# Patient Record
Sex: FEMALE | Race: WHITE | NOT HISPANIC OR LATINO | URBAN - METROPOLITAN AREA
[De-identification: names, ages, dates, MRNs, and addresses within clinical notes are randomized per-mention and may not be internally consistent; named-entity substitution may affect disease eponyms.]

---

## 2023-07-25 ENCOUNTER — INPATIENT (INPATIENT)
Facility: HOSPITAL | Age: 63
LOS: 12 days | Discharge: HOME HEALTH | DRG: 623 | End: 2023-08-07
Attending: INTERNAL MEDICINE | Admitting: INTERNAL MEDICINE
Payer: MEDICARE

## 2023-07-25 VITALS
SYSTOLIC BLOOD PRESSURE: 137 MMHG | HEART RATE: 61 BPM | RESPIRATION RATE: 18 BRPM | OXYGEN SATURATION: 95 % | TEMPERATURE: 98 F | WEIGHT: 199.08 LBS | DIASTOLIC BLOOD PRESSURE: 68 MMHG | HEIGHT: 63 IN

## 2023-07-25 DIAGNOSIS — E11.621 TYPE 2 DIABETES MELLITUS WITH FOOT ULCER: ICD-10-CM

## 2023-07-25 LAB
ALBUMIN SERPL ELPH-MCNC: 3.6 G/DL — SIGNIFICANT CHANGE UP (ref 3.3–5)
ALP SERPL-CCNC: 69 U/L — SIGNIFICANT CHANGE UP (ref 40–120)
ALT FLD-CCNC: 11 U/L — SIGNIFICANT CHANGE UP (ref 10–45)
ANION GAP SERPL CALC-SCNC: 11 MMOL/L — SIGNIFICANT CHANGE UP (ref 5–17)
APTT BLD: 28.6 SEC — SIGNIFICANT CHANGE UP (ref 24.5–35.6)
AST SERPL-CCNC: 19 U/L — SIGNIFICANT CHANGE UP (ref 10–40)
BASOPHILS # BLD AUTO: 0.06 K/UL — SIGNIFICANT CHANGE UP (ref 0–0.2)
BASOPHILS NFR BLD AUTO: 0.8 % — SIGNIFICANT CHANGE UP (ref 0–2)
BILIRUB SERPL-MCNC: 0.4 MG/DL — SIGNIFICANT CHANGE UP (ref 0.2–1.2)
BLD GP AB SCN SERPL QL: NEGATIVE — SIGNIFICANT CHANGE UP
BUN SERPL-MCNC: 22 MG/DL — SIGNIFICANT CHANGE UP (ref 7–23)
CALCIUM SERPL-MCNC: 9.5 MG/DL — SIGNIFICANT CHANGE UP (ref 8.4–10.5)
CHLORIDE SERPL-SCNC: 103 MMOL/L — SIGNIFICANT CHANGE UP (ref 96–108)
CO2 SERPL-SCNC: 26 MMOL/L — SIGNIFICANT CHANGE UP (ref 22–31)
CREAT SERPL-MCNC: 1.1 MG/DL — SIGNIFICANT CHANGE UP (ref 0.5–1.3)
CRP SERPL-MCNC: 41 MG/L — HIGH (ref 0–4)
EGFR: 56 ML/MIN/1.73M2 — LOW
EOSINOPHIL # BLD AUTO: 0.2 K/UL — SIGNIFICANT CHANGE UP (ref 0–0.5)
EOSINOPHIL NFR BLD AUTO: 2.8 % — SIGNIFICANT CHANGE UP (ref 0–6)
GLUCOSE BLDC GLUCOMTR-MCNC: 252 MG/DL — HIGH (ref 70–99)
GLUCOSE SERPL-MCNC: 201 MG/DL — HIGH (ref 70–99)
HCT VFR BLD CALC: 35.1 % — SIGNIFICANT CHANGE UP (ref 34.5–45)
HGB BLD-MCNC: 11.3 G/DL — LOW (ref 11.5–15.5)
IMM GRANULOCYTES NFR BLD AUTO: 0.4 % — SIGNIFICANT CHANGE UP (ref 0–0.9)
INR BLD: 1 RATIO — SIGNIFICANT CHANGE UP (ref 0.85–1.18)
LYMPHOCYTES # BLD AUTO: 2.17 K/UL — SIGNIFICANT CHANGE UP (ref 1–3.3)
LYMPHOCYTES # BLD AUTO: 29.9 % — SIGNIFICANT CHANGE UP (ref 13–44)
MCHC RBC-ENTMCNC: 27.5 PG — SIGNIFICANT CHANGE UP (ref 27–34)
MCHC RBC-ENTMCNC: 32.2 GM/DL — SIGNIFICANT CHANGE UP (ref 32–36)
MCV RBC AUTO: 85.4 FL — SIGNIFICANT CHANGE UP (ref 80–100)
MONOCYTES # BLD AUTO: 0.51 K/UL — SIGNIFICANT CHANGE UP (ref 0–0.9)
MONOCYTES NFR BLD AUTO: 7 % — SIGNIFICANT CHANGE UP (ref 2–14)
NEUTROPHILS # BLD AUTO: 4.28 K/UL — SIGNIFICANT CHANGE UP (ref 1.8–7.4)
NEUTROPHILS NFR BLD AUTO: 59.1 % — SIGNIFICANT CHANGE UP (ref 43–77)
NRBC # BLD: 0 /100 WBCS — SIGNIFICANT CHANGE UP (ref 0–0)
PLATELET # BLD AUTO: 275 K/UL — SIGNIFICANT CHANGE UP (ref 150–400)
POTASSIUM SERPL-MCNC: 4.8 MMOL/L — SIGNIFICANT CHANGE UP (ref 3.5–5.3)
POTASSIUM SERPL-SCNC: 4.8 MMOL/L — SIGNIFICANT CHANGE UP (ref 3.5–5.3)
PROT SERPL-MCNC: 8.3 G/DL — SIGNIFICANT CHANGE UP (ref 6–8.3)
PROTHROM AB SERPL-ACNC: 11.6 SEC — SIGNIFICANT CHANGE UP (ref 9.5–13)
RBC # BLD: 4.11 M/UL — SIGNIFICANT CHANGE UP (ref 3.8–5.2)
RBC # FLD: 14.4 % — SIGNIFICANT CHANGE UP (ref 10.3–14.5)
RH IG SCN BLD-IMP: POSITIVE — SIGNIFICANT CHANGE UP
SODIUM SERPL-SCNC: 140 MMOL/L — SIGNIFICANT CHANGE UP (ref 135–145)
WBC # BLD: 7.25 K/UL — SIGNIFICANT CHANGE UP (ref 3.8–10.5)
WBC # FLD AUTO: 7.25 K/UL — SIGNIFICANT CHANGE UP (ref 3.8–10.5)

## 2023-07-25 PROCEDURE — 99285 EMERGENCY DEPT VISIT HI MDM: CPT | Mod: FS,GC

## 2023-07-25 PROCEDURE — 73630 X-RAY EXAM OF FOOT: CPT | Mod: 26,RT

## 2023-07-25 RX ORDER — ATORVASTATIN CALCIUM 80 MG/1
40 TABLET, FILM COATED ORAL AT BEDTIME
Refills: 0 | Status: DISCONTINUED | OUTPATIENT
Start: 2023-07-25 | End: 2023-07-27

## 2023-07-25 RX ORDER — TAMSULOSIN HYDROCHLORIDE 0.4 MG/1
0.4 CAPSULE ORAL AT BEDTIME
Refills: 0 | Status: DISCONTINUED | OUTPATIENT
Start: 2023-07-25 | End: 2023-07-27

## 2023-07-25 RX ORDER — INSULIN GLARGINE 100 [IU]/ML
20 INJECTION, SOLUTION SUBCUTANEOUS AT BEDTIME
Refills: 0 | Status: DISCONTINUED | OUTPATIENT
Start: 2023-07-25 | End: 2023-07-26

## 2023-07-25 RX ORDER — SODIUM CHLORIDE 9 MG/ML
1000 INJECTION, SOLUTION INTRAVENOUS
Refills: 0 | Status: DISCONTINUED | OUTPATIENT
Start: 2023-07-25 | End: 2023-07-27

## 2023-07-25 RX ORDER — PIPERACILLIN AND TAZOBACTAM 4; .5 G/20ML; G/20ML
3.38 INJECTION, POWDER, LYOPHILIZED, FOR SOLUTION INTRAVENOUS ONCE
Refills: 0 | Status: COMPLETED | OUTPATIENT
Start: 2023-07-25 | End: 2023-07-25

## 2023-07-25 RX ORDER — INSULIN LISPRO 100/ML
VIAL (ML) SUBCUTANEOUS
Refills: 0 | Status: DISCONTINUED | OUTPATIENT
Start: 2023-07-25 | End: 2023-07-26

## 2023-07-25 RX ORDER — VANCOMYCIN HCL 1 G
1000 VIAL (EA) INTRAVENOUS ONCE
Refills: 0 | Status: COMPLETED | OUTPATIENT
Start: 2023-07-25 | End: 2023-07-25

## 2023-07-25 RX ORDER — DEXTROSE 50 % IN WATER 50 %
12.5 SYRINGE (ML) INTRAVENOUS ONCE
Refills: 0 | Status: DISCONTINUED | OUTPATIENT
Start: 2023-07-25 | End: 2023-07-27

## 2023-07-25 RX ORDER — GLUCAGON INJECTION, SOLUTION 0.5 MG/.1ML
1 INJECTION, SOLUTION SUBCUTANEOUS ONCE
Refills: 0 | Status: DISCONTINUED | OUTPATIENT
Start: 2023-07-25 | End: 2023-07-27

## 2023-07-25 RX ORDER — DEXTROSE 50 % IN WATER 50 %
25 SYRINGE (ML) INTRAVENOUS ONCE
Refills: 0 | Status: DISCONTINUED | OUTPATIENT
Start: 2023-07-25 | End: 2023-07-27

## 2023-07-25 RX ORDER — FUROSEMIDE 40 MG
20 TABLET ORAL DAILY
Refills: 0 | Status: DISCONTINUED | OUTPATIENT
Start: 2023-07-25 | End: 2023-07-27

## 2023-07-25 RX ORDER — DEXTROSE 50 % IN WATER 50 %
15 SYRINGE (ML) INTRAVENOUS ONCE
Refills: 0 | Status: DISCONTINUED | OUTPATIENT
Start: 2023-07-25 | End: 2023-07-27

## 2023-07-25 RX ORDER — VANCOMYCIN HCL 1 G
1000 VIAL (EA) INTRAVENOUS EVERY 12 HOURS
Refills: 0 | Status: DISCONTINUED | OUTPATIENT
Start: 2023-07-25 | End: 2023-07-26

## 2023-07-25 RX ORDER — PIPERACILLIN AND TAZOBACTAM 4; .5 G/20ML; G/20ML
3.38 INJECTION, POWDER, LYOPHILIZED, FOR SOLUTION INTRAVENOUS EVERY 8 HOURS
Refills: 0 | Status: DISCONTINUED | OUTPATIENT
Start: 2023-07-25 | End: 2023-07-27

## 2023-07-25 RX ORDER — HYDRALAZINE HCL 50 MG
50 TABLET ORAL
Refills: 0 | Status: DISCONTINUED | OUTPATIENT
Start: 2023-07-25 | End: 2023-07-27

## 2023-07-25 RX ORDER — METOPROLOL TARTRATE 50 MG
50 TABLET ORAL DAILY
Refills: 0 | Status: DISCONTINUED | OUTPATIENT
Start: 2023-07-25 | End: 2023-07-27

## 2023-07-25 RX ORDER — HEPARIN SODIUM 5000 [USP'U]/ML
5000 INJECTION INTRAVENOUS; SUBCUTANEOUS EVERY 8 HOURS
Refills: 0 | Status: DISCONTINUED | OUTPATIENT
Start: 2023-07-25 | End: 2023-07-27

## 2023-07-25 RX ORDER — LISINOPRIL 2.5 MG/1
20 TABLET ORAL DAILY
Refills: 0 | Status: DISCONTINUED | OUTPATIENT
Start: 2023-07-25 | End: 2023-07-27

## 2023-07-25 RX ADMIN — PIPERACILLIN AND TAZOBACTAM 25 GRAM(S): 4; .5 INJECTION, POWDER, LYOPHILIZED, FOR SOLUTION INTRAVENOUS at 23:19

## 2023-07-25 RX ADMIN — INSULIN GLARGINE 20 UNIT(S): 100 INJECTION, SOLUTION SUBCUTANEOUS at 23:03

## 2023-07-25 RX ADMIN — Medication 250 MILLIGRAM(S): at 20:09

## 2023-07-25 RX ADMIN — Medication 6: at 23:03

## 2023-07-25 RX ADMIN — PIPERACILLIN AND TAZOBACTAM 200 GRAM(S): 4; .5 INJECTION, POWDER, LYOPHILIZED, FOR SOLUTION INTRAVENOUS at 18:20

## 2023-07-25 NOTE — PATIENT PROFILE ADULT - FALL HARM RISK - RISK INTERVENTIONS
Assistance OOB with selected safe patient handling equipment/Assistance with ambulation/Communicate Fall Risk and Risk Factors to all staff, patient, and family/Discuss with provider need for PT consult/Monitor gait and stability/Provide patient with walking aids - walker, cane, crutches/Reinforce activity limits and safety measures with patient and family/Visual Cue: Yellow wristband/Bed in lowest position, wheels locked, appropriate side rails in place/Call bell, personal items and telephone in reach/Instruct patient to call for assistance before getting out of bed or chair/Non-slip footwear when patient is out of bed/[place] to call system/Physically safe environment - no spills, clutter or unnecessary equipment/Purposeful Proactive Rounding/Room/bathroom lighting operational, light cord in reach Assistance OOB with selected safe patient handling equipment/Assistance with ambulation/Communicate Fall Risk and Risk Factors to all staff, patient, and family/Discuss with provider need for PT consult/Monitor gait and stability/Provide patient with walking aids - walker, cane, crutches/Reinforce activity limits and safety measures with patient and family/Visual Cue: Yellow wristband/Bed in lowest position, wheels locked, appropriate side rails in place/Call bell, personal items and telephone in reach/Instruct patient to call for assistance before getting out of bed or chair/Non-slip footwear when patient is out of bed/[place] to call system/Physically safe environment - no spills, clutter or unnecessary equipment/Purposeful Proactive Rounding/Room/bathroom lighting operational, light cord in reach Assistance OOB with selected safe patient handling equipment/Assistance with ambulation/Communicate Fall Risk and Risk Factors to all staff, patient, and family/Discuss with provider need for PT consult/Monitor gait and stability/Provide patient with walking aids - walker, cane, crutches/Reinforce activity limits and safety measures with patient and family/Visual Cue: Yellow wristband/Bed in lowest position, wheels locked, appropriate side rails in place/Call bell, personal items and telephone in reach/Instruct patient to call for assistance before getting out of bed or chair/Non-slip footwear when patient is out of bed/[place] to call system/Physically safe environment - no spills, clutter or unnecessary equipment/Purposeful Proactive Rounding/Room/bathroom lighting operational, light cord in reach

## 2023-07-25 NOTE — ED PROVIDER NOTE - ATTENDING CONTRIBUTION TO CARE
I, Carmelo Mcgrath, performed a history and physical exam of the patient and discussed their management with the resident and/or advanced care provider. I reviewed the resident and/or advanced care provider's note and agree with the documented findings and plan of care. I was present and available for all procedures.    64 y/o M PMHx of DMH2 on insulin, HTN, HLD p/w right heel ulcer. Patient is sent by her podiatrist for questionable right foot procedure.  She is unsure how long she has had this ulcer for.  States that she was recently hospitalized in the near [facility] for an evaluation of the right foot infection.  States she still had a 102 fever yesterday which resolved.  Denies any fevers today, chills, chest pain, shortness of breath, abdominal pain, nausea vomiting diarrhea.  States she ambulates with a walker at baseline.  Accompanied by her sister-in-law    Podiatrist: Dr. Ronald Flores    Well appearing and in NAD, head normal appearing atraumatic, trachea midline, no respiratory distress, lungs cta bilaterally, rrr no murmurs, soft NT ND abdomen, Right lower extremity foot ulcer otherwise no signs of necrotizing fasciitis no proximal cellulitis on tibial region, otherwise no visible extremity deformities, Alert and oriented, non focal neuro exam, skin warm and dry, normal affect and mood, no leg swelling, calf ttp or jvd    Concerning for right lower extremity wound infection acute on chronic otherwise fever which is since resolved needs podiatry evaluation x-ray screening blood work IV antibiotics admission for further management possible operative repair discussed with patient agreeable plan unlikely necrotizing fasciitis I, Carmelo Mcgrath, performed a history and physical exam of the patient and discussed their management with the resident and/or advanced care provider. I reviewed the resident and/or advanced care provider's note and agree with the documented findings and plan of care. I was present and available for all procedures.    64 y/o M PMHx of DMH2 on insulin, HTN, HLD p/w right heel ulcer. Patient is sent by her podiatrist for questionable right foot procedure.  She is unsure how long she has had this ulcer for.  States that she was recently hospitalized in the near [facility] for an evaluation of the right foot infection.  States she still had a 102 fever yesterday which resolved.  Denies any fevers today, chills, chest pain, shortness of breath, abdominal pain, nausea vomiting diarrhea.  States she ambulates with a walker at baseline.  Accompanied by her sister-in-law    Podiatrist: Dr. Ronald Flores    Well appearing and in NAD, head normal appearing atraumatic, trachea midline, no respiratory distress, lungs cta bilaterally, rrr no murmurs, soft NT ND abdomen, Right lower extremity foot ulcer otherwise no signs of necrotizing fasciitis no proximal cellulitis on tibial region, otherwise no visible extremity deformities, Alert and oriented, non focal neuro exam, skin warm and dry, normal affect and mood, no leg swelling, calf ttp or jvd    Concerning for right lower extremity wound infection acute on chronic otherwise fever which is since resolved needs podiatry evaluation x-ray screening blood work IV antibiotics admission for further management possible operative repair discussed with patient agreeable plan unlikely necrotizing fasciitis I, Carmelo Mcgrath, performed a history and physical exam of the patient and discussed their management with the resident and/or advanced care provider. I reviewed the resident and/or advanced care provider's note and agree with the documented findings and plan of care. I was present and available for all procedures.    62 y/o M PMHx of DMH2 on insulin, HTN, HLD p/w right heel ulcer. Patient is sent by her podiatrist for questionable right foot procedure.  She is unsure how long she has had this ulcer for.  States that she was recently hospitalized in the near [facility] for an evaluation of the right foot infection.  States she still had a 102 fever yesterday which resolved.  Denies any fevers today, chills, chest pain, shortness of breath, abdominal pain, nausea vomiting diarrhea.  States she ambulates with a walker at baseline.  Accompanied by her sister-in-law    Podiatrist: Dr. Ronald Flores    Well appearing and in NAD, head normal appearing atraumatic, trachea midline, no respiratory distress, lungs cta bilaterally, rrr no murmurs, soft NT ND abdomen, Right lower extremity foot ulcer otherwise no signs of necrotizing fasciitis no proximal cellulitis on tibial region, otherwise no visible extremity deformities, Alert and oriented, non focal neuro exam, skin warm and dry, normal affect and mood, no leg swelling, calf ttp or jvd    Concerning for right lower extremity wound infection acute on chronic otherwise fever which is since resolved needs podiatry evaluation x-ray screening blood work IV antibiotics admission for further management possible operative repair discussed with patient agreeable plan unlikely necrotizing fasciitis

## 2023-07-25 NOTE — ED PROVIDER NOTE - OBJECTIVE STATEMENT
62 y/o M PMHx of DMH2 on insulin, HTN, HLD p/w right heel ulcer.      Podiatrist: Dr. Ronald Flores 64 y/o M PMHx of DMH2 on insulin, HTN, HLD p/w right heel ulcer.      Podiatrist: Dr. Ronald Flores 62 y/o M PMHx of DMH2 on insulin, HTN, HLD p/w right heel ulcer. Patient is sent by her podiatrist for questionable right foot procedure.  She is unsure how long she has had this ulcer for.  States that she was recently hospitalized in the near [facility] for an evaluation of the right foot infection.  States she still had a 102 fever yesterday which resolved.  Denies any fevers today, chills, chest pain, shortness of breath, abdominal pain, nausea vomiting diarrhea.  States she ambulates with a walker at baseline.  Accompanied by her sister-in-law    Podiatrist: Dr. Ronald Flores 62 y/o M PMHx of DMH2 on insulin, HTN, HLD p/w right heel ulcer. Patient is sent by her podiatrist for questionable right foot procedure.  She is unsure how long she has had this ulcer for.  States that she was recently hospitalized in the near [facility] for an evaluation of the right foot infection.  States she still had a 102 fever yesterday which resolved.  Denies any fevers today, chills, chest pain, shortness of breath, abdominal pain, nausea vomiting diarrhea.  States she ambulates with a walker at baseline.  Accompanied by her sister-in-law    Podiatrist: Dr. Ronald Flores 62 y/o M PMHx of DMH2 on insulin, HTN, HLD p/w right heel ulcer. Patient is sent by her podiatrist for questionable right foot procedure.  She is unsure how long she has had this ulcer for.  States that she was recently hospitalized in the near [facility] for an evaluation of the right foot infection.  States she still had a 102 fever yesterday which resolved.  Denies any fevers today, chills, chest pain, shortness of breath, abdominal pain, nausea vomiting diarrhea.  States she ambulates with a walker at baseline.  Accompanied by her sister-in-law    Podiatrist: Dr. Ronald Flores

## 2023-07-25 NOTE — CONSULT NOTE ADULT - ASSESSMENT
63 F w bilateral foot wounds  - Patient seen and evaluated  - Patient is afebrile, WBC 7.25, ESR (p), CRP 4.1  - Left foot posterior heel wound to subq no drainage no pus no surrounding erythema. right heel full thickness eschar will mild bogginess noted plantar medially, mild malodor, no purulent drainage, no surrounding erythema.   - Right foot xray: no OM, no gas prelim  - Right foot wound culture  - Requested admission to medicine  - IV abx Vanco/Zosyn  - no need for vascular consult: patient has had bilateral vascular procedure outpatient  - ordered Right ankle MRI  - Pod plan right foot wound debridement with integra graft application pending MRI  - Please document medical clearance for podiatry procedure under anesthesia  - Discussed with attending    63 F w bilateral foot wounds  - Patient seen and evaluated  - Patient is afebrile, WBC 7.25, ESR (p), CRP 4.1  - Left foot posterior heel wound to subq no drainage no pus no surrounding erythema. right heel full thickness eschar with mild bogginess noted plantar medially, mild malodor, no purulent drainage, no surrounding erythema.   - Right foot xray: no OM, no gas prelim  - Right foot wound cultured  - Requested admission to medicine  - IV abx Vanco/Zosyn  - no need for vascular consult: patient has had bilateral vascular procedure outpatient  - ordered Right ankle MRI  - Pod plan right foot wound debridement with integra graft application pending MRI  - Please document medical clearance for podiatry procedure under anesthesia  - Discussed with attending

## 2023-07-25 NOTE — ED ADULT NURSE NOTE - OBJECTIVE STATEMENT
64 yo presents to the ED from home. A&Ox4, ambulatory with assistance c/o R heel ulcer. history of DMH2 on insulin, HTN, HLD. Patient is sent by her podiatrist for questionable right foot procedure. She is unsure how long she has had this ulcer for. States that she was recently hospitalized in the near [facility] for an evaluation of the right foot infection. States she still had a 102 fever yesterday which resolved. Denies any fevers today, chills, chest pain, shortness of breath, abdominal pain, nausea vomiting diarrhea. States she ambulates with a walker at baseline. Accompanied by her sister-in-law. 20G RAC. Patient undressed and placed into gown, call bell in hand and side rails up for safety. warm blanket provided, vital signs stable, pt in no acute distress. 62 yo presents to the ED from home. A&Ox4, ambulatory with assistance c/o R heel ulcer. history of DMH2 on insulin, HTN, HLD. Patient is sent by her podiatrist for questionable right foot procedure. She is unsure how long she has had this ulcer for. States that she was recently hospitalized in the near [facility] for an evaluation of the right foot infection. States she still had a 102 fever yesterday which resolved. Denies any fevers today, chills, chest pain, shortness of breath, abdominal pain, nausea vomiting diarrhea. States she ambulates with a walker at baseline. Accompanied by her sister-in-law. 20G RAC. Patient undressed and placed into gown, call bell in hand and side rails up for safety. warm blanket provided, vital signs stable, pt in no acute distress. 62 yo presents to the ED from home. A&Ox4, ambulatory with assistance c/o R heel ulcer. history of DMH2 on insulin, HTN, HLD. Patient is sent by her podiatrist for questionable right foot procedure. She is unsure how long she has had this ulcer for. States that she was recently hospitalized in the near [facility] for an evaluation of the right foot infection. States she still had a 102 fever yesterday which resolved. Denies any fevers today, chills, chest pain, shortness of breath, abdominal pain, nausea vomiting diarrhea. States she ambulates with a walker at baseline. Accompanied by her sister-in-law. 20G RAC. Patient undressed and placed into gown, call bell in hand and side rails up for safety. warm blanket provided, vital signs stable, pt in no acute distress.

## 2023-07-25 NOTE — ED PROVIDER NOTE - PHYSICAL EXAMINATION
Srinivasa Bassett DO (PGY2)   Physical Exam:    Gen: NAD, AOx3  Head: NCAT  HEENT: EOMI, PEERLA  Lung: CTAB, no respiratory distress, no wheezes/rhonchi/rales B/L  CV: RRR, no murmurs, rubs or gallops  Abd: soft, NT, ND, no guarding, no rigidity, no rebound tenderness, no CVA tenderness   MSK: Right foot ulcer localized to the heal, minimal serosangineous drainage. Chronic lymphedema with venous stasis to bilateral lower extremities. Diminished distal pulses to RLE. No visible deformities, ROM normal in UE/LE, no back pain  Neuro: No focal sensory or motor deficits

## 2023-07-25 NOTE — ED PROVIDER NOTE - NS ED MD DISPO DIVISION
[facility]

## 2023-07-25 NOTE — H&P ADULT - HISTORY OF PRESENT ILLNESS
64 y/o F PMHx of DMH2 on insulin, HTN, HLD p/w right heel ulcer. Patient is sent by her podiatrist for questionable right foot procedure.  She is unsure how long she has had this ulcer for.  States that she was recently hospitalized in the near [facility] for an evaluation of the right foot infection.  States she still had a 102 fever yesterday which resolved.  Denies any fevers today, chills, chest pain, shortness of breath, abdominal pain, nausea vomiting diarrhea.  States she ambulates with a walker at baseline.  Accompanied by her sister-in-law  64 y/o F PMHx of DMH2 on insulin, HTN, HLD p/w right heel ulcer. Patient is sent by her podiatrist for questionable right foot procedure.  She is unsure how long she has had this ulcer for.  States that she was recently hospitalized in the near [facility] for an evaluation of the right foot infection.  States she still had a 102 fever yesterday which resolved.  Denies any fevers today, chills, chest pain, shortness of breath, abdominal pain, nausea vomiting diarrhea.  States she ambulates with a walker at baseline.  Accompanied by her sister-in-law  62 y/o F PMHx of DMH2 on insulin, HTN, HLD p/w right heel ulcer. Patient is sent by her podiatrist for questionable right foot procedure.  She is unsure how long she has had this ulcer for.  States that she was recently hospitalized in the near [facility] for an evaluation of the right foot infection.  States she still had a 102 fever yesterday which resolved.  Denies any fevers today, chills, chest pain, shortness of breath, abdominal pain, nausea vomiting diarrhea.  States she ambulates with a walker at baseline.  Accompanied by her sister-in-law

## 2023-07-25 NOTE — ED PROVIDER NOTE - CLINICAL SUMMARY MEDICAL DECISION MAKING FREE TEXT BOX
62 y/o M PMHx of DMH2 on insulin, HTN, HLD p/w right heel ulcer. Patient is sent by her podiatrist for questionable right foot procedure.  She is unsure how long she has had this ulcer for.  States that she was recently hospitalized in the near [facility] for an evaluation of the right foot infection.  States she still had a 102 fever yesterday which resolved.  Vital signs stable, afebrile, not hypoxic. Plan for basic labs, esr, crp, xray of foot, podiatry consult. Likely abx and admission for procedure. 64 y/o M PMHx of DMH2 on insulin, HTN, HLD p/w right heel ulcer. Patient is sent by her podiatrist for questionable right foot procedure.  She is unsure how long she has had this ulcer for.  States that she was recently hospitalized in the near [facility] for an evaluation of the right foot infection.  States she still had a 102 fever yesterday which resolved.  Vital signs stable, afebrile, not hypoxic. Plan for basic labs, esr, crp, xray of foot, podiatry consult. Likely abx and admission for procedure. 64 y/o M PMHx of DMH2 on insulin, HTN, HLD p/w right heel ulcer. Patient is sent by her podiatrist for questionable right foot procedure.  She is unsure how long she has had this ulcer for.  States that she was recently hospitalized in the near [facility] for an evaluation of the right foot infection.  States she still had a 102 fever yesterday which resolved.  Vital signs stable, afebrile, not hypoxic. Plan for basic labs, esr, crp, xray of foot, podiatry consult. Likely abx and admission for procedure. 64 y/o M PMHx of DMH2 on insulin, HTN, HLD p/w right heel ulcer. Patient is sent by her podiatrist for questionable right foot procedure.  She is unsure how long she has had this ulcer for.  States that she was recently hospitalized in the near [facility] for an evaluation of the right foot infection.  States she still had a 102 fever yesterday which resolved.  Vital signs stable, afebrile, not hypoxic. Plan for basic labs, esr, crp, xray of foot, podiatry consult. Likely abx and admission for procedure.    pettet attending- see attending attestation for my mdm 64 y/o M PMHx of DMH2 on insulin, HTN, HLD p/w right heel ulcer. Patient is sent by her podiatrist for questionable right foot procedure.  She is unsure how long she has had this ulcer for.  States that she was recently hospitalized in the near [facility] for an evaluation of the right foot infection.  States she still had a 102 fever yesterday which resolved.  Vital signs stable, afebrile, not hypoxic. Plan for basic labs, esr, crp, xray of foot, podiatry consult. Likely abx and admission for procedure.    pettet attending- see attending attestation for my mdm 62 y/o M PMHx of DMH2 on insulin, HTN, HLD p/w right heel ulcer. Patient is sent by her podiatrist for questionable right foot procedure.  She is unsure how long she has had this ulcer for.  States that she was recently hospitalized in the near [facility] for an evaluation of the right foot infection.  States she still had a 102 fever yesterday which resolved.  Vital signs stable, afebrile, not hypoxic. Plan for basic labs, esr, crp, xray of foot, podiatry consult. Likely abx and admission for procedure.    pettet attending- see attending attestation for my mdm

## 2023-07-25 NOTE — ED PROVIDER NOTE - RAPID ASSESSMENT
64yo F with PMH of DMT2 on insulin, HTN, HLD, presenting with R heel ulcer, sent in by her podiatrist for "R foot procedure." Unsure how long she has had ulcer. Reports recent hospitalization at [facility] for abx for R foot infection. + Fever to 102.5F yesterday.   Podiatrist: Dr. Ronald Flores    Patient sitting comfortably in NAD. Ambulatory with walker. Nonlabored breathing.     JEIMY Ghotra: Patient seen by myself in triage area for rapid assessment only. Full H&P and complete work-up to be performed in main emergency department by ED providers. 64yo F with PMH of DMT2 on insulin, HTN, HLD, presenting with R heel ulcer, sent in by her podiatrist for "R foot procedure." Unsure how long she has had ulcer. Reports recent hospitalization at [facility] for abx for R foot infection. + Fever to 102.5F yesterday.   Podiatrist: Dr. Ronald lFores    Patient sitting comfortably in NAD. Ambulatory with walker. Nonlabored breathing.     JEIMY Ghotra: Patient seen by myself in triage area for rapid assessment only. Full H&P and complete work-up to be performed in main emergency department by ED providers. 64yo F with PMH of DMT2 on insulin, HTN, HLD, presenting with R heel ulcer, sent in by her podiatrist for "R foot procedure." Unsure how long she has had ulcer. Reports recent hospitalization at [facility] for abx for R foot infection. + Fever to 102.5F yesterday.   Podiatrist: Dr. Ronald Flores    Patient sitting comfortably in NAD. Ambulatory with walker. Nonlabored breathing.     JEIMY Ghotra: Patient seen by myself in triage area for rapid assessment only. Full H&P and complete work-up to be performed in main emergency department by ED providers. 62yo F with PMH of DMT2 on insulin, HTN, HLD, presenting with R heel ulcer, sent in by her podiatrist for "R foot procedure." Unsure how long she has had ulcer. Reports recent hospitalization at [facility] for abx for R foot infection. + Fever to 102.5F yesterday.   Podiatrist: Dr. Ronald Flores    Patient sitting comfortably in NAD. Ambulatory with walker in waiting room. Nonlabored breathing.     JEIMY Ghotra: Patient seen by myself in triage area for rapid assessment only. Full H&P and complete work-up to be performed in main emergency department by ED providers. 62yo F with PMH of DMT2 on insulin, HTN, HLD, presenting with R heel ulcer, sent in by her podiatrist for "R foot procedure." Unsure how long she has had ulcer. Reports recent hospitalization at [facility] for abx for R foot infection. + Fever to 102.5F yesterday.   Podiatrist: Dr. Ronald Flores    Patient sitting comfortably in NAD. Ambulatory with walker in waiting room. Nonlabored breathing.     JEIMY Ghotra: Patient seen by myself in triage area for rapid assessment only. Full H&P and complete work-up to be performed in main emergency department by ED providers. 64yo F with PMH of DMT2 on insulin, HTN, HLD, presenting with R heel ulcer, sent in by her podiatrist for "R foot procedure." Unsure how long she has had ulcer. Reports recent hospitalization at [facility] for abx for R foot infection. + Fever to 102.5F yesterday.   Podiatrist: Dr. Ronald Flores    Patient sitting comfortably in NAD. Ambulatory with walker in waiting room. Nonlabored breathing.     JEIMY Ghotra: Patient seen by myself in triage area for rapid assessment only. Full H&P and complete work-up to be performed in main emergency department by ED providers.

## 2023-07-25 NOTE — H&P ADULT - NSHPPHYSICALEXAM_GEN_ALL_CORE
Vital Signs Last 24 Hrs  T(C): 36.4 (25 Jul 2023 20:37), Max: 36.9 (25 Jul 2023 13:30)  T(F): 97.6 (25 Jul 2023 20:37), Max: 98.5 (25 Jul 2023 13:30)  HR: 59 (25 Jul 2023 20:37) (59 - 89)  BP: 148/74 (25 Jul 2023 20:37) (130/74 - 167/79)  BP(mean): 108 (25 Jul 2023 19:39) (108 - 108)  RR: 18 (25 Jul 2023 20:37) (18 - 18)  SpO2: 98% (25 Jul 2023 20:37) (95% - 98%)    Parameters below as of 25 Jul 2023 20:37  Patient On (Oxygen Delivery Method): room air      PHYSICAL EXAM:  GENERAL: NAD, well-developed  HEAD:  Atraumatic, Normocephalic  EYES: EOMI, PERRLA, conjunctiva and sclera clear  NECK: Supple, No JVD  CHEST/LUNG: Clear to auscultation bilaterally; No wheeze  HEART: Regular rate and rhythm; No murmurs, rubs, or gallops  ABDOMEN: Soft, Nontender, Nondistended; Bowel sounds present  EXTREMITIES:  2+ Peripheral Pulses, No clubbing, cyanosis, or edema. b/l foot dressing in place   PSYCH: AAOx3  NEUROLOGY: non-focal  SKIN: No rashes or lesions

## 2023-07-25 NOTE — H&P ADULT - ASSESSMENT
63 female h/o dm, htn, chol, here with b/l foot wounds    b/l foot wounds  pod eval noted  id consult  iv abx  f/u cult  wound care  f/u mri r/o osteo    dm  insulin as ordered  monitor fs    chol  cont statin    htn  cont home meds    dvt ppx      Advanced care planning was discussed with patient and family.  Advanced care planning forms were reviewed and discussed as appropriate.  Differential diagnosis and plan of care discussed with patient after the evaluation.   Pain assessed and judicious use of narcotics when appropriate was discussed.  Importance of Fall prevention discussed.  Counseling on Smoking and Alcohol cessation was offered when appropriate.  Counseling on Diet, exercise, and medication compliance was done.       Approx 60 minutes spent.

## 2023-07-25 NOTE — CONSULT NOTE ADULT - SUBJECTIVE AND OBJECTIVE BOX
Patient is a 63y old  Female who presents with a chief complaint of bilateral non healing wounds    HPI:  62 y/o M PMHx of DMH2 on insulin, HTN, HLD p/w right heel ulcer. Patient is sent by her podiatrist for questionable right foot procedure.  She is unsure how long she has had this ulcer for.  States that she was recently hospitalized in the near [facility] for an evaluation of the right foot infection.  States she still had a 102 fever yesterday which resolved.  Denies any fevers today, chills, chest pain, shortness of breath, abdominal pain, nausea vomiting diarrhea.  States she ambulates with a walker at baseline.  Accompanied by her sister-in-law      PAST MEDICAL & SURGICAL HISTORY:      MEDICATIONS  (STANDING):  vancomycin  IVPB. 1000 milliGRAM(s) IV Intermittent once    MEDICATIONS  (PRN):      Allergies    Allergy Status Unknown    Intolerances        VITALS:    Vital Signs Last 24 Hrs  T(C): 36.8 (25 Jul 2023 15:41), Max: 36.9 (25 Jul 2023 13:30)  T(F): 98.3 (25 Jul 2023 15:41), Max: 98.5 (25 Jul 2023 13:30)  HR: 89 (25 Jul 2023 15:41) (61 - 89)  BP: 130/74 (25 Jul 2023 15:41) (130/74 - 137/68)  BP(mean): --  RR: 18 (25 Jul 2023 13:30) (18 - 18)  SpO2: 98% (25 Jul 2023 15:41) (95% - 98%)    Parameters below as of 25 Jul 2023 15:41  Patient On (Oxygen Delivery Method): room air        LABS:                          11.3   7.25  )-----------( 275      ( 25 Jul 2023 16:49 )             35.1       07-25    140  |  103  |  22  ----------------------------<  201<H>  4.8   |  26  |  1.10    Ca    9.5      25 Jul 2023 16:49    TPro  8.3  /  Alb  3.6  /  TBili  0.4  /  DBili  x   /  AST  19  /  ALT  11  /  AlkPhos  69  07-25      CAPILLARY BLOOD GLUCOSE          PT/INR - ( 25 Jul 2023 16:49 )   PT: 11.6 sec;   INR: 1.00 ratio         PTT - ( 25 Jul 2023 16:49 )  PTT:28.6 sec    LOWER EXTREMITY PHYSICAL EXAM:    Vascular: DP/PT 0/4, B/L, CFT <3 seconds B/L, Temperature gradient warm to cool, B/L.   Neuro: Epicritic sensation dimished to the level of toes, B/L.  Musculoskeletal/Ortho: unremarkable   Skin: Left foot posterior heel wound to subq no drainage no pus no surrounding erythema. right heel full thickness eschar will mild bogginess noted plantar medially, mild malodor, no purulent drainage, no surrounding erythema.       RADIOLOGY & ADDITIONAL STUDIES:    < from: Xray Foot AP + Lateral + Oblique, Right (07.25.23 @ 17:54) >    ******PRELIMINARY REPORT******      ******PRELIMINARY REPORT******         ACC: [id number] EXAM:  XR FOOT COMP MIN 3 VIEWS RT   ORDERED BY: TAYA CARRANZA     PROCEDURE DATE:  07/25/2023    ******PRELIMINARY REPORT******      ******PRELIMINARY REPORT******           INTERPRETATION:  CLINICAL INDICATION: Right heel diabetic wound    TECHNIQUE: 3 views of the right foot    COMPARISON: None available.    FINDINGS /  IMPRESSION:  :  No acute fracture or dislocation of the right foot. Midfoot degenerative   changes are noted.    Soft tissue defect at the heel related to known heel ulcer. No osseous   erosive changes of the calcaneus or adjacent osseous structures. Soft   tissue edema is also noted along the dorsal foot and anterior lower shin.   No tracking subcutaneous gas.    Along the first distal phalanx base, there are juxtaarticular erosions at   the medial aspect and productive changes at the lateral aspect. Findings   may be due to underlying inflammatory arthritis.    Vascular calcifications.          ******PRELIMINARY REPORT******      ******PRELIMINARY REPORT******        DIGNA ROBERTS MD; Resident Radiologist  This document is a PRELIMINARY interpretation and is pending final   attending approval. Jul 25 2023  6:23PM    < end of copied text >   Patient is a 63y old  Female who presents with a chief complaint of bilateral non healing wounds    HPI:  64 y/o M PMHx of DMH2 on insulin, HTN, HLD p/w right heel ulcer. Patient is sent by her podiatrist for questionable right foot procedure.  She is unsure how long she has had this ulcer for.  States that she was recently hospitalized in the near [facility] for an evaluation of the right foot infection.  States she still had a 102 fever yesterday which resolved.  Denies any fevers today, chills, chest pain, shortness of breath, abdominal pain, nausea vomiting diarrhea.  States she ambulates with a walker at baseline.  Accompanied by her sister-in-law      PAST MEDICAL & SURGICAL HISTORY:      MEDICATIONS  (STANDING):  vancomycin  IVPB. 1000 milliGRAM(s) IV Intermittent once    MEDICATIONS  (PRN):      Allergies    Allergy Status Unknown    Intolerances        VITALS:    Vital Signs Last 24 Hrs  T(C): 36.8 (25 Jul 2023 15:41), Max: 36.9 (25 Jul 2023 13:30)  T(F): 98.3 (25 Jul 2023 15:41), Max: 98.5 (25 Jul 2023 13:30)  HR: 89 (25 Jul 2023 15:41) (61 - 89)  BP: 130/74 (25 Jul 2023 15:41) (130/74 - 137/68)  BP(mean): --  RR: 18 (25 Jul 2023 13:30) (18 - 18)  SpO2: 98% (25 Jul 2023 15:41) (95% - 98%)    Parameters below as of 25 Jul 2023 15:41  Patient On (Oxygen Delivery Method): room air        LABS:                          11.3   7.25  )-----------( 275      ( 25 Jul 2023 16:49 )             35.1       07-25    140  |  103  |  22  ----------------------------<  201<H>  4.8   |  26  |  1.10    Ca    9.5      25 Jul 2023 16:49    TPro  8.3  /  Alb  3.6  /  TBili  0.4  /  DBili  x   /  AST  19  /  ALT  11  /  AlkPhos  69  07-25      CAPILLARY BLOOD GLUCOSE          PT/INR - ( 25 Jul 2023 16:49 )   PT: 11.6 sec;   INR: 1.00 ratio         PTT - ( 25 Jul 2023 16:49 )  PTT:28.6 sec    LOWER EXTREMITY PHYSICAL EXAM:    Vascular: DP/PT 0/4, B/L, CFT <3 seconds B/L, Temperature gradient warm to cool, B/L.   Neuro: Epicritic sensation dimished to the level of toes, B/L.  Musculoskeletal/Ortho: unremarkable   Skin: Left foot posterior heel wound to subq no drainage no pus no surrounding erythema. right heel full thickness eschar will mild bogginess noted plantar medially, mild malodor, no purulent drainage, no surrounding erythema.       RADIOLOGY & ADDITIONAL STUDIES:    < from: Xray Foot AP + Lateral + Oblique, Right (07.25.23 @ 17:54) >    ******PRELIMINARY REPORT******      ******PRELIMINARY REPORT******         ACC: [id number] EXAM:  XR FOOT COMP MIN 3 VIEWS RT   ORDERED BY: TAYA CARRANZA     PROCEDURE DATE:  07/25/2023    ******PRELIMINARY REPORT******      ******PRELIMINARY REPORT******           INTERPRETATION:  CLINICAL INDICATION: Right heel diabetic wound    TECHNIQUE: 3 views of the right foot    COMPARISON: None available.    FINDINGS /  IMPRESSION:  :  No acute fracture or dislocation of the right foot. Midfoot degenerative   changes are noted.    Soft tissue defect at the heel related to known heel ulcer. No osseous   erosive changes of the calcaneus or adjacent osseous structures. Soft   tissue edema is also noted along the dorsal foot and anterior lower shin.   No tracking subcutaneous gas.    Along the first distal phalanx base, there are juxtaarticular erosions at   the medial aspect and productive changes at the lateral aspect. Findings   may be due to underlying inflammatory arthritis.    Vascular calcifications.          ******PRELIMINARY REPORT******      ******PRELIMINARY REPORT******        DIGNA ROBERTS MD; Resident Radiologist  This document is a PRELIMINARY interpretation and is pending final   attending approval. Jul 25 2023  6:23PM    < end of copied text >   Patient is a 63y old  Female who presents with a chief complaint of bilateral non healing wounds    HPI:  64 y/o M PMHx of DMH2 on insulin, HTN, HLD p/w right heel ulcer. Patient is sent by her podiatrist for questionable right foot procedure.  She is unsure how long she has had this ulcer for.  States that she was recently hospitalized in the near [facility] for an evaluation of the right foot infection.  States she still had a 102 fever yesterday which resolved.  Denies any fevers today, chills, chest pain, shortness of breath, abdominal pain, nausea vomiting diarrhea.  States she ambulates with a walker at baseline.  Accompanied by her sister-in-law      PAST MEDICAL & SURGICAL HISTORY:      MEDICATIONS  (STANDING):  vancomycin  IVPB. 1000 milliGRAM(s) IV Intermittent once    MEDICATIONS  (PRN):      Allergies    Allergy Status Unknown    Intolerances        VITALS:    Vital Signs Last 24 Hrs  T(C): 36.8 (25 Jul 2023 15:41), Max: 36.9 (25 Jul 2023 13:30)  T(F): 98.3 (25 Jul 2023 15:41), Max: 98.5 (25 Jul 2023 13:30)  HR: 89 (25 Jul 2023 15:41) (61 - 89)  BP: 130/74 (25 Jul 2023 15:41) (130/74 - 137/68)  BP(mean): --  RR: 18 (25 Jul 2023 13:30) (18 - 18)  SpO2: 98% (25 Jul 2023 15:41) (95% - 98%)    Parameters below as of 25 Jul 2023 15:41  Patient On (Oxygen Delivery Method): room air        LABS:                          11.3   7.25  )-----------( 275      ( 25 Jul 2023 16:49 )             35.1       07-25    140  |  103  |  22  ----------------------------<  201<H>  4.8   |  26  |  1.10    Ca    9.5      25 Jul 2023 16:49    TPro  8.3  /  Alb  3.6  /  TBili  0.4  /  DBili  x   /  AST  19  /  ALT  11  /  AlkPhos  69  07-25      CAPILLARY BLOOD GLUCOSE          PT/INR - ( 25 Jul 2023 16:49 )   PT: 11.6 sec;   INR: 1.00 ratio         PTT - ( 25 Jul 2023 16:49 )  PTT:28.6 sec    LOWER EXTREMITY PHYSICAL EXAM:    Vascular: DP/PT 0/4, B/L, CFT <3 seconds B/L, Temperature gradient warm to cool, B/L.   Neuro: Epicritic sensation dimished to the level of toes, B/L.  Musculoskeletal/Ortho: unremarkable   Skin: Left foot posterior heel wound to subq no drainage no pus no surrounding erythema. right heel full thickness eschar will mild bogginess noted plantar medially, mild malodor, no purulent drainage, no surrounding erythema.       RADIOLOGY & ADDITIONAL STUDIES:    < from: Xray Foot AP + Lateral + Oblique, Right (07.25.23 @ 17:54) >    ******PRELIMINARY REPORT******      ******PRELIMINARY REPORT******         ACC: [id number] EXAM:  XR FOOT COMP MIN 3 VIEWS RT   ORDERED BY: TAYA CARRANZA     PROCEDURE DATE:  07/25/2023    ******PRELIMINARY REPORT******      ******PRELIMINARY REPORT******           INTERPRETATION:  CLINICAL INDICATION: Right heel diabetic wound    TECHNIQUE: 3 views of the right foot    COMPARISON: None available.    FINDINGS /  IMPRESSION:  :  No acute fracture or dislocation of the right foot. Midfoot degenerative   changes are noted.    Soft tissue defect at the heel related to known heel ulcer. No osseous   erosive changes of the calcaneus or adjacent osseous structures. Soft   tissue edema is also noted along the dorsal foot and anterior lower shin.   No tracking subcutaneous gas.    Along the first distal phalanx base, there are juxtaarticular erosions at   the medial aspect and productive changes at the lateral aspect. Findings   may be due to underlying inflammatory arthritis.    Vascular calcifications.          ******PRELIMINARY REPORT******      ******PRELIMINARY REPORT******        DIGNA ROBERTS MD; Resident Radiologist  This document is a PRELIMINARY interpretation and is pending final   attending approval. Jul 25 2023  6:23PM    < end of copied text >   Patient is a 63y old  Female who presents with a chief complaint of bilateral non healing wounds    HPI:  64 y/o M PMHx of DMH2 on insulin, HTN, HLD p/w right heel ulcer. Patient is sent by her podiatrist for questionable right foot procedure.  She is unsure how long she has had this ulcer for.  States that she was recently hospitalized in the near [facility] for an evaluation of the right foot infection.  States she still had a 102 fever yesterday which resolved.  Denies any fevers today, chills, chest pain, shortness of breath, abdominal pain, nausea vomiting diarrhea.  States she ambulates with a walker at baseline.  Accompanied by her sister-in-law      PAST MEDICAL & SURGICAL HISTORY:      MEDICATIONS  (STANDING):  vancomycin  IVPB. 1000 milliGRAM(s) IV Intermittent once    MEDICATIONS  (PRN):      Allergies    Allergy Status Unknown    Intolerances        VITALS:    Vital Signs Last 24 Hrs  T(C): 36.8 (25 Jul 2023 15:41), Max: 36.9 (25 Jul 2023 13:30)  T(F): 98.3 (25 Jul 2023 15:41), Max: 98.5 (25 Jul 2023 13:30)  HR: 89 (25 Jul 2023 15:41) (61 - 89)  BP: 130/74 (25 Jul 2023 15:41) (130/74 - 137/68)  BP(mean): --  RR: 18 (25 Jul 2023 13:30) (18 - 18)  SpO2: 98% (25 Jul 2023 15:41) (95% - 98%)    Parameters below as of 25 Jul 2023 15:41  Patient On (Oxygen Delivery Method): room air        LABS:                          11.3   7.25  )-----------( 275      ( 25 Jul 2023 16:49 )             35.1       07-25    140  |  103  |  22  ----------------------------<  201<H>  4.8   |  26  |  1.10    Ca    9.5      25 Jul 2023 16:49    TPro  8.3  /  Alb  3.6  /  TBili  0.4  /  DBili  x   /  AST  19  /  ALT  11  /  AlkPhos  69  07-25      CAPILLARY BLOOD GLUCOSE          PT/INR - ( 25 Jul 2023 16:49 )   PT: 11.6 sec;   INR: 1.00 ratio         PTT - ( 25 Jul 2023 16:49 )  PTT:28.6 sec    LOWER EXTREMITY PHYSICAL EXAM:    Vascular: DP/PT 0/4, B/L, CFT <3 seconds B/L, Temperature gradient warm to cool, B/L.   Neuro: Epicritic sensation dimished to the level of toes, B/L.  Musculoskeletal/Ortho: unremarkable   Skin: Left foot posterior heel wound to subq no drainage no pus no surrounding erythema. right heel full thickness eschar with mild bogginess noted plantar medially, mild malodor, no purulent drainage, no surrounding erythema.       RADIOLOGY & ADDITIONAL STUDIES:    < from: Xray Foot AP + Lateral + Oblique, Right (07.25.23 @ 17:54) >    ******PRELIMINARY REPORT******      ******PRELIMINARY REPORT******         ACC: [id number] EXAM:  XR FOOT COMP MIN 3 VIEWS RT   ORDERED BY: TAYA CARRANZA     PROCEDURE DATE:  07/25/2023    ******PRELIMINARY REPORT******      ******PRELIMINARY REPORT******           INTERPRETATION:  CLINICAL INDICATION: Right heel diabetic wound    TECHNIQUE: 3 views of the right foot    COMPARISON: None available.    FINDINGS /  IMPRESSION:  :  No acute fracture or dislocation of the right foot. Midfoot degenerative   changes are noted.    Soft tissue defect at the heel related to known heel ulcer. No osseous   erosive changes of the calcaneus or adjacent osseous structures. Soft   tissue edema is also noted along the dorsal foot and anterior lower shin.   No tracking subcutaneous gas.    Along the first distal phalanx base, there are juxtaarticular erosions at   the medial aspect and productive changes at the lateral aspect. Findings   may be due to underlying inflammatory arthritis.    Vascular calcifications.          ******PRELIMINARY REPORT******      ******PRELIMINARY REPORT******        DIGNA ROBERTS MD; Resident Radiologist  This document is a PRELIMINARY interpretation and is pending final   attending approval. Jul 25 2023  6:23PM    < end of copied text >   Patient is a 63y old  Female who presents with a chief complaint of bilateral non healing wounds    HPI:  64 y/o M PMHx of DMH2 on insulin, HTN, HLD p/w right heel ulcer. Patient is sent by her podiatrist for questionable right foot procedure.  She is unsure how long she has had this ulcer for.  States that she was recently hospitalized in the near [facility] for an evaluation of the right foot infection.  States she still had a 102 fever yesterday which resolved.  Denies any fevers today, chills, chest pain, shortness of breath, abdominal pain, nausea vomiting diarrhea.  States she ambulates with a walker at baseline.  Accompanied by her sister-in-law      PAST MEDICAL & SURGICAL HISTORY:      MEDICATIONS  (STANDING):  vancomycin  IVPB. 1000 milliGRAM(s) IV Intermittent once    MEDICATIONS  (PRN):      Allergies    Allergy Status Unknown    Intolerances        VITALS:    Vital Signs Last 24 Hrs  T(C): 36.8 (25 Jul 2023 15:41), Max: 36.9 (25 Jul 2023 13:30)  T(F): 98.3 (25 Jul 2023 15:41), Max: 98.5 (25 Jul 2023 13:30)  HR: 89 (25 Jul 2023 15:41) (61 - 89)  BP: 130/74 (25 Jul 2023 15:41) (130/74 - 137/68)  BP(mean): --  RR: 18 (25 Jul 2023 13:30) (18 - 18)  SpO2: 98% (25 Jul 2023 15:41) (95% - 98%)    Parameters below as of 25 Jul 2023 15:41  Patient On (Oxygen Delivery Method): room air        LABS:                          11.3   7.25  )-----------( 275      ( 25 Jul 2023 16:49 )             35.1       07-25    140  |  103  |  22  ----------------------------<  201<H>  4.8   |  26  |  1.10    Ca    9.5      25 Jul 2023 16:49    TPro  8.3  /  Alb  3.6  /  TBili  0.4  /  DBili  x   /  AST  19  /  ALT  11  /  AlkPhos  69  07-25      CAPILLARY BLOOD GLUCOSE          PT/INR - ( 25 Jul 2023 16:49 )   PT: 11.6 sec;   INR: 1.00 ratio         PTT - ( 25 Jul 2023 16:49 )  PTT:28.6 sec    LOWER EXTREMITY PHYSICAL EXAM:    Vascular: DP/PT 0/4, B/L, CFT <3 seconds B/L, Temperature gradient warm to cool, B/L.   Neuro: Epicritic sensation dimished to the level of toes, B/L.  Musculoskeletal/Ortho: unremarkable   Skin: Left foot posterior heel wound to subq no drainage no pus no surrounding erythema. right heel full thickness eschar with mild bogginess noted plantar medially, mild malodor, no purulent drainage, no surrounding erythema.       RADIOLOGY & ADDITIONAL STUDIES:    < from: Xray Foot AP + Lateral + Oblique, Right (07.25.23 @ 17:54) >    ******PRELIMINARY REPORT******      ******PRELIMINARY REPORT******         ACC: [id number] EXAM:  XR FOOT COMP MIN 3 VIEWS RT   ORDERED BY: TAYA CARRANZA     PROCEDURE DATE:  07/25/2023    ******PRELIMINARY REPORT******      ******PRELIMINARY REPORT******           INTERPRETATION:  CLINICAL INDICATION: Right heel diabetic wound    TECHNIQUE: 3 views of the right foot    COMPARISON: None available.    FINDINGS /  IMPRESSION:  :  No acute fracture or dislocation of the right foot. Midfoot degenerative   changes are noted.    Soft tissue defect at the heel related to known heel ulcer. No osseous   erosive changes of the calcaneus or adjacent osseous structures. Soft   tissue edema is also noted along the dorsal foot and anterior lower shin.   No tracking subcutaneous gas.    Along the first distal phalanx base, there are juxtaarticular erosions at   the medial aspect and productive changes at the lateral aspect. Findings   may be due to underlying inflammatory arthritis.    Vascular calcifications.          ******PRELIMINARY REPORT******      ******PRELIMINARY REPORT******        DIGNA ROBERTS MD; Resident Radiologist  This document is a PRELIMINARY interpretation and is pending final   attending approval. Jul 25 2023  6:23PM    < end of copied text >   Patient is a 63y old  Female who presents with a chief complaint of bilateral non healing wounds    HPI:  64 y/o M PMHx of DMH2 on insulin, HTN, HLD p/w right heel ulcer. Patient is sent by her podiatrist for questionable right foot procedure.  She is unsure how long she has had this ulcer for.  States that she was recently hospitalized in the near [facility] for an evaluation of the right foot infection.  States she still had a 102 fever yesterday which resolved.  Denies any fevers today, chills, chest pain, shortness of breath, abdominal pain, nausea vomiting diarrhea.  States she ambulates with a walker at baseline.  Accompanied by her sister-in-law      PAST MEDICAL & SURGICAL HISTORY:      MEDICATIONS  (STANDING):  vancomycin  IVPB. 1000 milliGRAM(s) IV Intermittent once    MEDICATIONS  (PRN):      Allergies    Allergy Status Unknown    Intolerances        VITALS:    Vital Signs Last 24 Hrs  T(C): 36.8 (25 Jul 2023 15:41), Max: 36.9 (25 Jul 2023 13:30)  T(F): 98.3 (25 Jul 2023 15:41), Max: 98.5 (25 Jul 2023 13:30)  HR: 89 (25 Jul 2023 15:41) (61 - 89)  BP: 130/74 (25 Jul 2023 15:41) (130/74 - 137/68)  BP(mean): --  RR: 18 (25 Jul 2023 13:30) (18 - 18)  SpO2: 98% (25 Jul 2023 15:41) (95% - 98%)    Parameters below as of 25 Jul 2023 15:41  Patient On (Oxygen Delivery Method): room air        LABS:                          11.3   7.25  )-----------( 275      ( 25 Jul 2023 16:49 )             35.1       07-25    140  |  103  |  22  ----------------------------<  201<H>  4.8   |  26  |  1.10    Ca    9.5      25 Jul 2023 16:49    TPro  8.3  /  Alb  3.6  /  TBili  0.4  /  DBili  x   /  AST  19  /  ALT  11  /  AlkPhos  69  07-25      CAPILLARY BLOOD GLUCOSE          PT/INR - ( 25 Jul 2023 16:49 )   PT: 11.6 sec;   INR: 1.00 ratio         PTT - ( 25 Jul 2023 16:49 )  PTT:28.6 sec    LOWER EXTREMITY PHYSICAL EXAM:    Vascular: DP/PT 0/4, B/L, CFT <3 seconds B/L, Temperature gradient warm to cool, B/L.   Neuro: Epicritic sensation dimished to the level of toes, B/L.  Musculoskeletal/Ortho: unremarkable   Skin: Left foot posterior heel wound to subq no drainage no pus no surrounding erythema. right heel full thickness eschar with mild bogginess noted plantar medially, mild malodor, no purulent drainage, no surrounding erythema.       RADIOLOGY & ADDITIONAL STUDIES:    < from: Xray Foot AP + Lateral + Oblique, Right (07.25.23 @ 17:54) >    ******PRELIMINARY REPORT******      ******PRELIMINARY REPORT******         ACC: [id number] EXAM:  XR FOOT COMP MIN 3 VIEWS RT   ORDERED BY: TAYA CARRANZA     PROCEDURE DATE:  07/25/2023    ******PRELIMINARY REPORT******      ******PRELIMINARY REPORT******           INTERPRETATION:  CLINICAL INDICATION: Right heel diabetic wound    TECHNIQUE: 3 views of the right foot    COMPARISON: None available.    FINDINGS /  IMPRESSION:  :  No acute fracture or dislocation of the right foot. Midfoot degenerative   changes are noted.    Soft tissue defect at the heel related to known heel ulcer. No osseous   erosive changes of the calcaneus or adjacent osseous structures. Soft   tissue edema is also noted along the dorsal foot and anterior lower shin.   No tracking subcutaneous gas.    Along the first distal phalanx base, there are juxtaarticular erosions at   the medial aspect and productive changes at the lateral aspect. Findings   may be due to underlying inflammatory arthritis.    Vascular calcifications.          ******PRELIMINARY REPORT******      ******PRELIMINARY REPORT******        DIGNA ROBERTS MD; Resident Radiologist  This document is a PRELIMINARY interpretation and is pending final   attending approval. Jul 25 2023  6:23PM    < end of copied text >

## 2023-07-25 NOTE — ED ADULT NURSE NOTE - NSFALLRISKINTERV_ED_ALL_ED
Assistance OOB with selected safe patient handling equipment if applicable/Assistance with ambulation/Communicate fall risk and risk factors to all staff, patient, and family/Monitor gait and stability/Provide patient with walking aids/Provide visual cue: yellow wristband, yellow gown, etc/Reinforce activity limits and safety measures with patient and family/Call bell, personal items and telephone in reach/Instruct patient to call for assistance before getting out of bed/chair/stretcher/Non-slip footwear applied when patient is off stretcher/[place] to call system/Physically safe environment - no spills, clutter or unnecessary equipment/Purposeful Proactive Rounding/Room/bathroom lighting operational, light cord in reach Assistance OOB with selected safe patient handling equipment if applicable/Assistance with ambulation/Communicate fall risk and risk factors to all staff, patient, and family/Monitor gait and stability/Provide patient with walking aids/Provide visual cue: yellow wristband, yellow gown, etc/Reinforce activity limits and safety measures with patient and family/Call bell, personal items and telephone in reach/Instruct patient to call for assistance before getting out of bed/chair/stretcher/Non-slip footwear applied when patient is off stretcher/[place] to call system/Physically safe environment - no spills, clutter or unnecessary equipment/Purposeful Proactive Rounding/Room/bathroom lighting operational, light cord in reach Assistance OOB with selected safe patient handling equipment if applicable/Assistance with ambulation/Communicate fall risk and risk factors to all staff, patient, and family/Monitor gait and stability/Provide patient with walking aids/Provide visual cue: yellow wristband, yellow gown, etc/Reinforce activity limits and safety measures with patient and family/Call bell, personal items and telephone in reach/Instruct patient to call for assistance before getting out of bed/chair/stretcher/Non-slip footwear applied when patient is off stretcher/[place] to call system/Physically safe environment - no spills, clutter or unnecessary equipment/Purposeful Proactive Rounding/Room/bathroom lighting operational, light cord in reach

## 2023-07-26 ENCOUNTER — TRANSCRIPTION ENCOUNTER (OUTPATIENT)
Age: 63
End: 2023-07-26

## 2023-07-26 DIAGNOSIS — E11.9 TYPE 2 DIABETES MELLITUS WITHOUT COMPLICATIONS: ICD-10-CM

## 2023-07-26 DIAGNOSIS — E78.5 HYPERLIPIDEMIA, UNSPECIFIED: ICD-10-CM

## 2023-07-26 DIAGNOSIS — S91.301A UNSPECIFIED OPEN WOUND, RIGHT FOOT, INITIAL ENCOUNTER: ICD-10-CM

## 2023-07-26 DIAGNOSIS — I10 ESSENTIAL (PRIMARY) HYPERTENSION: ICD-10-CM

## 2023-07-26 LAB
A1C WITH ESTIMATED AVERAGE GLUCOSE RESULT: 8.4 % — HIGH (ref 4–5.6)
ANION GAP SERPL CALC-SCNC: 16 MMOL/L — SIGNIFICANT CHANGE UP (ref 5–17)
BUN SERPL-MCNC: 21 MG/DL — SIGNIFICANT CHANGE UP (ref 7–23)
CALCIUM SERPL-MCNC: 9.5 MG/DL — SIGNIFICANT CHANGE UP (ref 8.4–10.5)
CHLORIDE SERPL-SCNC: 103 MMOL/L — SIGNIFICANT CHANGE UP (ref 96–108)
CO2 SERPL-SCNC: 25 MMOL/L — SIGNIFICANT CHANGE UP (ref 22–31)
CREAT SERPL-MCNC: 0.99 MG/DL — SIGNIFICANT CHANGE UP (ref 0.5–1.3)
EGFR: 63 ML/MIN/1.73M2 — SIGNIFICANT CHANGE UP
ERYTHROCYTE [SEDIMENTATION RATE] IN BLOOD: 85 MM/HR — HIGH (ref 0–20)
ESTIMATED AVERAGE GLUCOSE: 194 MG/DL — HIGH (ref 68–114)
GLUCOSE BLDC GLUCOMTR-MCNC: 149 MG/DL — HIGH (ref 70–99)
GLUCOSE BLDC GLUCOMTR-MCNC: 183 MG/DL — HIGH (ref 70–99)
GLUCOSE BLDC GLUCOMTR-MCNC: 211 MG/DL — HIGH (ref 70–99)
GLUCOSE BLDC GLUCOMTR-MCNC: 215 MG/DL — HIGH (ref 70–99)
GLUCOSE SERPL-MCNC: 121 MG/DL — HIGH (ref 70–99)
GRAM STN FLD: SIGNIFICANT CHANGE UP
HCT VFR BLD CALC: 33.2 % — LOW (ref 34.5–45)
HCV AB S/CO SERPL IA: 0.14 S/CO — SIGNIFICANT CHANGE UP (ref 0–0.99)
HCV AB SERPL-IMP: SIGNIFICANT CHANGE UP
HGB BLD-MCNC: 10.9 G/DL — LOW (ref 11.5–15.5)
MCHC RBC-ENTMCNC: 27.7 PG — SIGNIFICANT CHANGE UP (ref 27–34)
MCHC RBC-ENTMCNC: 32.8 GM/DL — SIGNIFICANT CHANGE UP (ref 32–36)
MCV RBC AUTO: 84.5 FL — SIGNIFICANT CHANGE UP (ref 80–100)
NRBC # BLD: 0 /100 WBCS — SIGNIFICANT CHANGE UP (ref 0–0)
PLATELET # BLD AUTO: 295 K/UL — SIGNIFICANT CHANGE UP (ref 150–400)
POTASSIUM SERPL-MCNC: 4.3 MMOL/L — SIGNIFICANT CHANGE UP (ref 3.5–5.3)
POTASSIUM SERPL-SCNC: 4.3 MMOL/L — SIGNIFICANT CHANGE UP (ref 3.5–5.3)
RBC # BLD: 3.93 M/UL — SIGNIFICANT CHANGE UP (ref 3.8–5.2)
RBC # FLD: 14.3 % — SIGNIFICANT CHANGE UP (ref 10.3–14.5)
SODIUM SERPL-SCNC: 144 MMOL/L — SIGNIFICANT CHANGE UP (ref 135–145)
SPECIMEN SOURCE: SIGNIFICANT CHANGE UP
WBC # BLD: 7.24 K/UL — SIGNIFICANT CHANGE UP (ref 3.8–10.5)
WBC # FLD AUTO: 7.24 K/UL — SIGNIFICANT CHANGE UP (ref 3.8–10.5)

## 2023-07-26 PROCEDURE — 71045 X-RAY EXAM CHEST 1 VIEW: CPT | Mod: 26

## 2023-07-26 PROCEDURE — 99223 1ST HOSP IP/OBS HIGH 75: CPT | Mod: GC

## 2023-07-26 PROCEDURE — 93010 ELECTROCARDIOGRAM REPORT: CPT

## 2023-07-26 PROCEDURE — 73722 MRI JOINT OF LWR EXTR W/DYE: CPT | Mod: 26,RT

## 2023-07-26 RX ORDER — PANTOPRAZOLE SODIUM 20 MG/1
40 TABLET, DELAYED RELEASE ORAL
Refills: 0 | Status: DISCONTINUED | OUTPATIENT
Start: 2023-07-26 | End: 2023-07-27

## 2023-07-26 RX ORDER — INSULIN LISPRO 100/ML
4 VIAL (ML) SUBCUTANEOUS
Refills: 0 | Status: DISCONTINUED | OUTPATIENT
Start: 2023-07-26 | End: 2023-07-27

## 2023-07-26 RX ORDER — INSULIN LISPRO 100/ML
2 VIAL (ML) SUBCUTANEOUS
Refills: 0 | Status: DISCONTINUED | OUTPATIENT
Start: 2023-07-26 | End: 2023-07-26

## 2023-07-26 RX ORDER — INSULIN LISPRO 100/ML
VIAL (ML) SUBCUTANEOUS
Refills: 0 | Status: DISCONTINUED | OUTPATIENT
Start: 2023-07-26 | End: 2023-07-27

## 2023-07-26 RX ORDER — INSULIN GLARGINE 100 [IU]/ML
10 INJECTION, SOLUTION SUBCUTANEOUS AT BEDTIME
Refills: 0 | Status: COMPLETED | OUTPATIENT
Start: 2023-07-26 | End: 2023-07-26

## 2023-07-26 RX ORDER — INSULIN LISPRO 100/ML
3 VIAL (ML) SUBCUTANEOUS
Refills: 0 | Status: DISCONTINUED | OUTPATIENT
Start: 2023-07-26 | End: 2023-07-26

## 2023-07-26 RX ORDER — INSULIN LISPRO 100/ML
VIAL (ML) SUBCUTANEOUS AT BEDTIME
Refills: 0 | Status: DISCONTINUED | OUTPATIENT
Start: 2023-07-26 | End: 2023-07-27

## 2023-07-26 RX ORDER — CHLORHEXIDINE GLUCONATE 213 G/1000ML
1 SOLUTION TOPICAL
Refills: 0 | Status: DISCONTINUED | OUTPATIENT
Start: 2023-07-26 | End: 2023-07-27

## 2023-07-26 RX ORDER — ONDANSETRON 8 MG/1
4 TABLET, FILM COATED ORAL EVERY 8 HOURS
Refills: 0 | Status: DISCONTINUED | OUTPATIENT
Start: 2023-07-26 | End: 2023-07-27

## 2023-07-26 RX ADMIN — INSULIN GLARGINE 10 UNIT(S): 100 INJECTION, SOLUTION SUBCUTANEOUS at 21:13

## 2023-07-26 RX ADMIN — PIPERACILLIN AND TAZOBACTAM 25 GRAM(S): 4; .5 INJECTION, POWDER, LYOPHILIZED, FOR SOLUTION INTRAVENOUS at 18:38

## 2023-07-26 RX ADMIN — PIPERACILLIN AND TAZOBACTAM 25 GRAM(S): 4; .5 INJECTION, POWDER, LYOPHILIZED, FOR SOLUTION INTRAVENOUS at 23:51

## 2023-07-26 RX ADMIN — ONDANSETRON 4 MILLIGRAM(S): 8 TABLET, FILM COATED ORAL at 10:20

## 2023-07-26 RX ADMIN — Medication 50 MILLIGRAM(S): at 18:40

## 2023-07-26 RX ADMIN — Medication 50 MILLIGRAM(S): at 05:03

## 2023-07-26 RX ADMIN — HEPARIN SODIUM 5000 UNIT(S): 5000 INJECTION INTRAVENOUS; SUBCUTANEOUS at 21:13

## 2023-07-26 RX ADMIN — TAMSULOSIN HYDROCHLORIDE 0.4 MILLIGRAM(S): 0.4 CAPSULE ORAL at 21:13

## 2023-07-26 RX ADMIN — Medication 4 UNIT(S): at 18:41

## 2023-07-26 RX ADMIN — Medication 250 MILLIGRAM(S): at 05:01

## 2023-07-26 RX ADMIN — Medication 3 UNIT(S): at 12:37

## 2023-07-26 RX ADMIN — ONDANSETRON 4 MILLIGRAM(S): 8 TABLET, FILM COATED ORAL at 20:35

## 2023-07-26 RX ADMIN — Medication 1: at 18:51

## 2023-07-26 RX ADMIN — HEPARIN SODIUM 5000 UNIT(S): 5000 INJECTION INTRAVENOUS; SUBCUTANEOUS at 05:03

## 2023-07-26 RX ADMIN — ATORVASTATIN CALCIUM 40 MILLIGRAM(S): 80 TABLET, FILM COATED ORAL at 21:14

## 2023-07-26 RX ADMIN — LISINOPRIL 20 MILLIGRAM(S): 2.5 TABLET ORAL at 05:47

## 2023-07-26 RX ADMIN — PIPERACILLIN AND TAZOBACTAM 25 GRAM(S): 4; .5 INJECTION, POWDER, LYOPHILIZED, FOR SOLUTION INTRAVENOUS at 09:06

## 2023-07-26 RX ADMIN — Medication 20 MILLIGRAM(S): at 05:03

## 2023-07-26 RX ADMIN — HEPARIN SODIUM 5000 UNIT(S): 5000 INJECTION INTRAVENOUS; SUBCUTANEOUS at 12:38

## 2023-07-26 NOTE — CONSULT NOTE ADULT - ATTENDING COMMENTS
62 y/o F PMHx of DMH2 on insulin &7/26/23: A1C= 8.4%), HTN, HLD, BMI = 35.3,  bilateral heel ulcer and fever to 102F day prior to admission. ID consulted for eval of bilateral foot ulcer.   admitted 7/25/23  She notes nausea, emesis malaise  Right heel ulcer is long standing - Currently with extensive black eschar with sl separation at edges, drainage on dressing and malodour with likley underlying  infection, however malodor suggests infection.   The L heel ulcer is small, superficial and benign with no significant concern for infection.    7/25 ESR/CRP = 85/41    Antibiotics  Vano 7/25-->  Zosyn 7/25-->      #B/l Foot ulcer  #possible osteo  #infection  - pending MRI foot for possible osteomyelitis  -  right foot wound debridement with Integra graft application tomorrow 7/27 at 5pm per podiatry      Suggest  Continue vanc/zosyn for now   Vascular surgery consult evaluate for possible stent restenosis   Check nasal swab for MRSA PCR 64 y/o F PMHx of DMH2 on insulin &7/26/23: A1C= 8.4%), HTN, HLD, BMI = 35.3,  bilateral heel ulcer and fever to 102F day prior to admission. ID consulted for eval of bilateral foot ulcer.   admitted 7/25/23  She notes nausea, emesis malaise  Right heel ulcer is long standing - Currently with extensive black eschar with sl separation at edges, drainage on dressing and malodour with likley underlying  infection, however malodor suggests infection.   The L heel ulcer is small, superficial and benign with no significant concern for infection.    7/25 ESR/CRP = 85/41    Antibiotics  Vano 7/25-->  Zosyn 7/25-->      #B/l Foot ulcer  #possible osteo  #infection  - pending MRI foot for possible osteomyelitis  -  right foot wound debridement with Integra graft application tomorrow 7/27 at 5pm per podiatry      Suggest  Continue vanc/zosyn for now   Vascular surgery consult evaluate for possible stent restenosis   Check nasal swab for MRSA PCR

## 2023-07-26 NOTE — PROGRESS NOTE ADULT - ASSESSMENT
63F with BL heel wounds.  - Pt seen and evaluated.  - Afebrile, WBC 7.24, ESR 85, CRP 4.1.  - Left foot posterior heel wound to subq, no drainage, no purulence, no surrounding erythema, no acute signs of infection. Right heel full thickness eschar with mild bogginess plantar medially, well adhered eschar centrally with mild non-adherence along the circumferential periphery, mild malodor, no purulent drainage, no surrounding erythema.   - Right Foot X-Ray: No OM, no gas.  - Right Foot Wound Culture: Pending.  - Recommend continuing IV Vanco and Zosyn.  - No need for vascular consult 2/2 recent  vascular procedure outpatient.  - Right Ankle MRI: Pending.  - Pod Plan: Right foot wound debridement with Integra graft application likely tomorrow 7/27 pending MRI.  - Please document medical clearance for podiatry procedure under anesthesia.  - Discussed with attending. 63F with BL heel wounds.  - Pt seen and evaluated.  - Afebrile, WBC 7.24, ESR 85, CRP 4.1.  - Left foot posterior heel wound to subq, no drainage, no purulence, no surrounding erythema, no acute signs of infection. Right heel full thickness eschar with mild bogginess plantar medially, well adhered eschar centrally with mild non-adherence along the circumferential periphery, mild malodor, no purulent drainage, no surrounding erythema.   - Right Foot X-Ray: No OM, no gas.  - Right Foot Wound Culture: Pending.  - Recommend continuing IV Vanco and Zosyn.  - No need for vascular consult 2/2 recent  vascular procedure outpatient.  - Right Ankle MRI: Pending.  - Ordered BL z-flows to be worn at all times while in bed.  - Pod Plan: Right foot wound debridement with Integra graft application likely tomorrow 7/27 pending MRI.  - Please document medical clearance for podiatry procedure under anesthesia.  - Discussed with attending. 63F with BL heel wounds.  - Pt seen and evaluated.  - Afebrile, WBC 7.24, ESR 85, CRP 4.1.  - Left foot posterior heel wound to subq, no drainage, no purulence, no surrounding erythema, no acute signs of infection. Right heel full thickness eschar with mild bogginess plantar medially, well adhered eschar centrally with mild non-adherence along the circumferential periphery, mild malodor, no purulent drainage, no surrounding erythema.   - Right Foot X-Ray: No OM, no gas.  - Right Foot Wound Culture: Pending.  - Recommend continuing IV Vanco and Zosyn.  - No need for vascular consult 2/2 recent  vascular procedure outpatient.  - Right Ankle MRI: Pending.  - Ordered BL z-flows to be worn at all times while in bed.  - Pod Plan: Booked for right foot wound debridement with Integra graft application tomorrow 7/27 at 5pm with Dr. Flores pending MRI.  - Please document medical clearance for podiatry procedure under anesthesia.  - NPO tonight at midnight.  - AM labs including CBC, BMP, coags, and type and screen.  - Discussed with attending.

## 2023-07-26 NOTE — PROGRESS NOTE ADULT - ASSESSMENT
63 female h/o dm, htn, chol, here with b/l foot wounds    b/l foot wounds  pod eval noted  id consulted  iv abx  f/u cult  wound care  f/u mri r/o osteo    dm  insulin as ordered  monitor fs    chol  cont statin    htn  cont home meds    dvt ppx      Advanced care planning was discussed with patient and family.  Advanced care planning forms were reviewed and discussed as appropriate.  Differential diagnosis and plan of care discussed with patient after the evaluation.   Pain assessed and judicious use of narcotics when appropriate was discussed.  Importance of Fall prevention discussed.  Counseling on Smoking and Alcohol cessation was offered when appropriate.  Counseling on Diet, exercise, and medication compliance was done.       Approx 60 minutes spent.

## 2023-07-26 NOTE — CONSULT NOTE ADULT - ASSESSMENT
62 y/o F PMHx of DMH2 on insulin, HTN, HLD p/w right heel ulcer. Patient is sent by her podiatrist for questionable right foot procedure.  She is unsure how long she has had this ulcer for.  States that she was recently hospitalized in the near [facility] for an evaluation of the right foot infection.  States she still had a 102 fever yesterday which resolved.  Denies any fevers today, chills, chest pain, shortness of breath, abdominal pain, nausea vomiting diarrhea.  States she ambulates with a walker at baseline.  Accompanied by her sister-in-law. 62 y/o F PMHx of DMH2 on insulin, HTN, HLD p/w right heel ulcer. Patient is sent by her podiatrist for questionable right foot procedure.  She is unsure how long she has had this ulcer for.  States that she was recently hospitalized in the near [facility] for an evaluation of the right foot infection.  States she still had a 102 fever yesterday which resolved.  Denies any fevers today, chills, chest pain, shortness of breath, abdominal pain, nausea vomiting diarrhea.  States she ambulates with a walker at baseline.  Accompanied by her sister-in-law. 62 y/o F PMHx of DMH2 on insulin, HTN, HLD p/w right heel ulcer. Patient is sent by her podiatrist for questionable right foot procedure.  She is unsure how long she has had this ulcer for.  States that she was recently hospitalized in the near [facility] for an evaluation of the right foot infection.  States she still had a 102 fever yesterday which resolved.  Denies any fevers today, chills, chest pain, shortness of breath, abdominal pain, nausea vomiting diarrhea.  States she ambulates with a walker at baseline.  Accompanied by her sister-in-law.

## 2023-07-26 NOTE — PROGRESS NOTE ADULT - SUBJECTIVE AND OBJECTIVE BOX
STEPHANIE KAYE  63y Female  MRN:[id number]    Patient is a 63y old  Female who presents with a chief complaint of foot infection    HPI:   64 y/o F PMHx of DMH2 on insulin, HTN, HLD p/w right heel ulcer. Patient is sent by her podiatrist for questionable right foot procedure.  She is unsure how long she has had this ulcer for.  States that she was recently hospitalized in the near [facility] for an evaluation of the right foot infection.  States she still had a 102 fever yesterday which resolved.  Denies any fevers today, chills, chest pain, shortness of breath, abdominal pain, nausea vomiting diarrhea.  States she ambulates with a walker at baseline.  Accompanied by her sister-in-law (25 Jul 2023 21:58)      Patient seen and evaluated at bedside. No acute events overnight except as noted.    Interval HPI: no acute events o/n    PAST MEDICAL & SURGICAL HISTORY:  Diabetes      Hypertension      Hypercholesteremia          REVIEW OF SYSTEMS:  as per hpi     VITALS:  Vital Signs Last 24 Hrs  T(C): 36.4 (26 Jul 2023 04:56), Max: 36.9 (25 Jul 2023 13:30)  T(F): 97.5 (26 Jul 2023 04:56), Max: 98.5 (25 Jul 2023 13:30)  HR: 57 (26 Jul 2023 04:56) (57 - 89)  BP: 138/74 (26 Jul 2023 04:56) (130/74 - 167/79)  BP(mean): 108 (25 Jul 2023 19:39) (108 - 108)  RR: 18 (26 Jul 2023 04:56) (18 - 18)  SpO2: 96% (26 Jul 2023 04:56) (95% - 98%)    Parameters below as of 26 Jul 2023 04:56  Patient On (Oxygen Delivery Method): room air      CAPILLARY BLOOD GLUCOSE      POCT Blood Glucose.: 149 mg/dL (26 Jul 2023 08:15)  POCT Blood Glucose.: 252 mg/dL (25 Jul 2023 22:43)    I&O's Summary    25 Jul 2023 07:01  -  26 Jul 2023 07:00  --------------------------------------------------------  IN: 690 mL / OUT: 0 mL / NET: 690 mL        PHYSICAL EXAM:  GENERAL: NAD, well-developed  HEAD:  Atraumatic, Normocephalic  EYES: EOMI, PERRLA, conjunctiva and sclera clear  NECK: Supple, No JVD  CHEST/LUNG: Clear to auscultation bilaterally; No wheeze  HEART: S1, S2; No murmurs, rubs, or gallops  ABDOMEN: Soft, Nontender, Nondistended; Bowel sounds present  EXTREMITIES:  2+ Peripheral Pulses, No clubbing, cyanosis, or edema. b/l lower ext dressing in place   PSYCH: Normal affect  NEUROLOGY: AAOX3; non-focal  SKIN: No rashes or lesions    Consultant(s) Notes Reviewed:  [x ] YES  [ ] NO  Care Discussed with Consultants/Other Providers [ x] YES  [ ] NO    MEDS:  MEDICATIONS  (STANDING):  atorvastatin 40 milliGRAM(s) Oral at bedtime  dextrose 5%. 1000 milliLiter(s) (100 mL/Hr) IV Continuous <Continuous>  dextrose 5%. 1000 milliLiter(s) (50 mL/Hr) IV Continuous <Continuous>  dextrose 50% Injectable 12.5 Gram(s) IV Push once  dextrose 50% Injectable 25 Gram(s) IV Push once  dextrose 50% Injectable 25 Gram(s) IV Push once  furosemide    Tablet 20 milliGRAM(s) Oral daily  glucagon  Injectable 1 milliGRAM(s) IntraMuscular once  heparin   Injectable 5000 Unit(s) SubCutaneous every 8 hours  hydrALAZINE 50 milliGRAM(s) Oral two times a day  insulin glargine Injectable (LANTUS) 20 Unit(s) SubCutaneous at bedtime  insulin lispro (ADMELOG) corrective regimen sliding scale   SubCutaneous three times a day before meals  insulin lispro (ADMELOG) corrective regimen sliding scale   SubCutaneous at bedtime  insulin lispro Injectable (ADMELOG) 3 Unit(s) SubCutaneous before breakfast  insulin lispro Injectable (ADMELOG) 2 Unit(s) SubCutaneous before dinner  insulin lispro Injectable (ADMELOG) 3 Unit(s) SubCutaneous before lunch  lisinopril 20 milliGRAM(s) Oral daily  metoprolol succinate ER 50 milliGRAM(s) Oral daily  piperacillin/tazobactam IVPB.. 3.375 Gram(s) IV Intermittent every 8 hours  tamsulosin 0.4 milliGRAM(s) Oral at bedtime    MEDICATIONS  (PRN):  dextrose Oral Gel 15 Gram(s) Oral once PRN Blood Glucose LESS THAN 70 milliGRAM(s)/deciliter  ondansetron Injectable 4 milliGRAM(s) IV Push every 8 hours PRN Nausea and/or Vomiting    ALLERGIES:  Allergy Status Unknown      LABS:                        10.9   7.24  )-----------( 295      ( 26 Jul 2023 07:11 )             33.2     07-26    144  |  103  |  21  ----------------------------<  121<H>  4.3   |  25  |  0.99    Ca    9.5      26 Jul 2023 07:01    TPro  8.3  /  Alb  3.6  /  TBili  0.4  /  DBili  x   /  AST  19  /  ALT  11  /  AlkPhos  69  07-25    PT/INR - ( 25 Jul 2023 16:49 )   PT: 11.6 sec;   INR: 1.00 ratio         PTT - ( 25 Jul 2023 16:49 )  PTT:28.6 sec      LIVER FUNCTIONS - ( 25 Jul 2023 16:49 )  Alb: 3.6 g/dL / Pro: 8.3 g/dL / ALK PHOS: 69 U/L / ALT: 11 U/L / AST: 19 U/L / GGT: x           Urinalysis Basic - ( 26 Jul 2023 07:01 )    Color: x / Appearance: x / SG: x / pH: x  Gluc: 121 mg/dL / Ketone: x  / Bili: x / Urobili: x   Blood: x / Protein: x / Nitrite: x   Leuk Esterase: x / RBC: x / WBC x   Sq Epi: x / Non Sq Epi: x / Bacteria: x     < from: Xray Foot AP + Lateral + Oblique, Right (07.25.23 @ 17:54) >    IMPRESSION:    No acute fracture or dislocation of the right foot. Midfoot degenerative   changes are noted.    Soft tissue defect at the heel related to known heel ulcer. No osseous   erosive changes of the calcaneus or adjacent osseousstructures. Soft   tissue edema is also noted along the dorsal foot and anterior lower shin.   No tracking subcutaneous gas.    Along the first distal phalanx base, there are juxtaarticular erosions at   the medial aspect and productive changes at the lateral aspect. Findings   may be due to underlying inflammatory arthritis or crystalline   arthropathy.    Vascular calcifications.      --- End of Report ---    < end of copied text >   STEPHANIE KAYE  63y Female  MRN:[id number]    Patient is a 63y old  Female who presents with a chief complaint of foot infection    HPI:   64 y/o F PMHx of DMH2 on insulin, HTN, HLD p/w right heel ulcer. Patient is sent by her podiatrist for questionable right foot procedure.  She is unsure how long she has had this ulcer for.  States that she was recently hospitalized in the near [facility] for an evaluation of the right foot infection.  States she still had a 102 fever yesterday which resolved.  Denies any fevers today, chills, chest pain, shortness of breath, abdominal pain, nausea vomiting diarrhea.  States she ambulates with a walker at baseline.  Accompanied by her sister-in-law (25 Jul 2023 21:58)      Patient seen and evaluated at bedside. No acute events overnight except as noted.    Interval HPI: no acute events o/n    PAST MEDICAL & SURGICAL HISTORY:  Diabetes      Hypertension      Hypercholesteremia          REVIEW OF SYSTEMS:  as per hpi     VITALS:  Vital Signs Last 24 Hrs  T(C): 36.4 (26 Jul 2023 04:56), Max: 36.9 (25 Jul 2023 13:30)  T(F): 97.5 (26 Jul 2023 04:56), Max: 98.5 (25 Jul 2023 13:30)  HR: 57 (26 Jul 2023 04:56) (57 - 89)  BP: 138/74 (26 Jul 2023 04:56) (130/74 - 167/79)  BP(mean): 108 (25 Jul 2023 19:39) (108 - 108)  RR: 18 (26 Jul 2023 04:56) (18 - 18)  SpO2: 96% (26 Jul 2023 04:56) (95% - 98%)    Parameters below as of 26 Jul 2023 04:56  Patient On (Oxygen Delivery Method): room air      CAPILLARY BLOOD GLUCOSE      POCT Blood Glucose.: 149 mg/dL (26 Jul 2023 08:15)  POCT Blood Glucose.: 252 mg/dL (25 Jul 2023 22:43)    I&O's Summary    25 Jul 2023 07:01  -  26 Jul 2023 07:00  --------------------------------------------------------  IN: 690 mL / OUT: 0 mL / NET: 690 mL        PHYSICAL EXAM:  GENERAL: NAD, well-developed  HEAD:  Atraumatic, Normocephalic  EYES: EOMI, PERRLA, conjunctiva and sclera clear  NECK: Supple, No JVD  CHEST/LUNG: Clear to auscultation bilaterally; No wheeze  HEART: S1, S2; No murmurs, rubs, or gallops  ABDOMEN: Soft, Nontender, Nondistended; Bowel sounds present  EXTREMITIES:  2+ Peripheral Pulses, No clubbing, cyanosis, or edema. b/l lower ext dressing in place   PSYCH: Normal affect  NEUROLOGY: AAOX3; non-focal  SKIN: No rashes or lesions    Consultant(s) Notes Reviewed:  [x ] YES  [ ] NO  Care Discussed with Consultants/Other Providers [ x] YES  [ ] NO    MEDS:  MEDICATIONS  (STANDING):  atorvastatin 40 milliGRAM(s) Oral at bedtime  dextrose 5%. 1000 milliLiter(s) (100 mL/Hr) IV Continuous <Continuous>  dextrose 5%. 1000 milliLiter(s) (50 mL/Hr) IV Continuous <Continuous>  dextrose 50% Injectable 12.5 Gram(s) IV Push once  dextrose 50% Injectable 25 Gram(s) IV Push once  dextrose 50% Injectable 25 Gram(s) IV Push once  furosemide    Tablet 20 milliGRAM(s) Oral daily  glucagon  Injectable 1 milliGRAM(s) IntraMuscular once  heparin   Injectable 5000 Unit(s) SubCutaneous every 8 hours  hydrALAZINE 50 milliGRAM(s) Oral two times a day  insulin glargine Injectable (LANTUS) 20 Unit(s) SubCutaneous at bedtime  insulin lispro (ADMELOG) corrective regimen sliding scale   SubCutaneous three times a day before meals  insulin lispro (ADMELOG) corrective regimen sliding scale   SubCutaneous at bedtime  insulin lispro Injectable (ADMELOG) 3 Unit(s) SubCutaneous before breakfast  insulin lispro Injectable (ADMELOG) 2 Unit(s) SubCutaneous before dinner  insulin lispro Injectable (ADMELOG) 3 Unit(s) SubCutaneous before lunch  lisinopril 20 milliGRAM(s) Oral daily  metoprolol succinate ER 50 milliGRAM(s) Oral daily  piperacillin/tazobactam IVPB.. 3.375 Gram(s) IV Intermittent every 8 hours  tamsulosin 0.4 milliGRAM(s) Oral at bedtime    MEDICATIONS  (PRN):  dextrose Oral Gel 15 Gram(s) Oral once PRN Blood Glucose LESS THAN 70 milliGRAM(s)/deciliter  ondansetron Injectable 4 milliGRAM(s) IV Push every 8 hours PRN Nausea and/or Vomiting    ALLERGIES:  Allergy Status Unknown      LABS:                        10.9   7.24  )-----------( 295      ( 26 Jul 2023 07:11 )             33.2     07-26    144  |  103  |  21  ----------------------------<  121<H>  4.3   |  25  |  0.99    Ca    9.5      26 Jul 2023 07:01    TPro  8.3  /  Alb  3.6  /  TBili  0.4  /  DBili  x   /  AST  19  /  ALT  11  /  AlkPhos  69  07-25    PT/INR - ( 25 Jul 2023 16:49 )   PT: 11.6 sec;   INR: 1.00 ratio         PTT - ( 25 Jul 2023 16:49 )  PTT:28.6 sec      LIVER FUNCTIONS - ( 25 Jul 2023 16:49 )  Alb: 3.6 g/dL / Pro: 8.3 g/dL / ALK PHOS: 69 U/L / ALT: 11 U/L / AST: 19 U/L / GGT: x           Urinalysis Basic - ( 26 Jul 2023 07:01 )    Color: x / Appearance: x / SG: x / pH: x  Gluc: 121 mg/dL / Ketone: x  / Bili: x / Urobili: x   Blood: x / Protein: x / Nitrite: x   Leuk Esterase: x / RBC: x / WBC x   Sq Epi: x / Non Sq Epi: x / Bacteria: x     < from: Xray Foot AP + Lateral + Oblique, Right (07.25.23 @ 17:54) >    IMPRESSION:    No acute fracture or dislocation of the right foot. Midfoot degenerative   changes are noted.    Soft tissue defect at the heel related to known heel ulcer. No osseous   erosive changes of the calcaneus or adjacent osseousstructures. Soft   tissue edema is also noted along the dorsal foot and anterior lower shin.   No tracking subcutaneous gas.    Along the first distal phalanx base, there are juxtaarticular erosions at   the medial aspect and productive changes at the lateral aspect. Findings   may be due to underlying inflammatory arthritis or crystalline   arthropathy.    Vascular calcifications.      --- End of Report ---    < end of copied text >   STEPHANIE KAYE  63y Female  MRN:[id number]    Patient is a 63y old  Female who presents with a chief complaint of foot infection    HPI:   62 y/o F PMHx of DMH2 on insulin, HTN, HLD p/w right heel ulcer. Patient is sent by her podiatrist for questionable right foot procedure.  She is unsure how long she has had this ulcer for.  States that she was recently hospitalized in the near [facility] for an evaluation of the right foot infection.  States she still had a 102 fever yesterday which resolved.  Denies any fevers today, chills, chest pain, shortness of breath, abdominal pain, nausea vomiting diarrhea.  States she ambulates with a walker at baseline.  Accompanied by her sister-in-law (25 Jul 2023 21:58)      Patient seen and evaluated at bedside. No acute events overnight except as noted.    Interval HPI: no acute events o/n    PAST MEDICAL & SURGICAL HISTORY:  Diabetes      Hypertension      Hypercholesteremia          REVIEW OF SYSTEMS:  as per hpi     VITALS:  Vital Signs Last 24 Hrs  T(C): 36.4 (26 Jul 2023 04:56), Max: 36.9 (25 Jul 2023 13:30)  T(F): 97.5 (26 Jul 2023 04:56), Max: 98.5 (25 Jul 2023 13:30)  HR: 57 (26 Jul 2023 04:56) (57 - 89)  BP: 138/74 (26 Jul 2023 04:56) (130/74 - 167/79)  BP(mean): 108 (25 Jul 2023 19:39) (108 - 108)  RR: 18 (26 Jul 2023 04:56) (18 - 18)  SpO2: 96% (26 Jul 2023 04:56) (95% - 98%)    Parameters below as of 26 Jul 2023 04:56  Patient On (Oxygen Delivery Method): room air      CAPILLARY BLOOD GLUCOSE      POCT Blood Glucose.: 149 mg/dL (26 Jul 2023 08:15)  POCT Blood Glucose.: 252 mg/dL (25 Jul 2023 22:43)    I&O's Summary    25 Jul 2023 07:01  -  26 Jul 2023 07:00  --------------------------------------------------------  IN: 690 mL / OUT: 0 mL / NET: 690 mL        PHYSICAL EXAM:  GENERAL: NAD, well-developed  HEAD:  Atraumatic, Normocephalic  EYES: EOMI, PERRLA, conjunctiva and sclera clear  NECK: Supple, No JVD  CHEST/LUNG: Clear to auscultation bilaterally; No wheeze  HEART: S1, S2; No murmurs, rubs, or gallops  ABDOMEN: Soft, Nontender, Nondistended; Bowel sounds present  EXTREMITIES:  2+ Peripheral Pulses, No clubbing, cyanosis, or edema. b/l lower ext dressing in place   PSYCH: Normal affect  NEUROLOGY: AAOX3; non-focal  SKIN: No rashes or lesions    Consultant(s) Notes Reviewed:  [x ] YES  [ ] NO  Care Discussed with Consultants/Other Providers [ x] YES  [ ] NO    MEDS:  MEDICATIONS  (STANDING):  atorvastatin 40 milliGRAM(s) Oral at bedtime  dextrose 5%. 1000 milliLiter(s) (100 mL/Hr) IV Continuous <Continuous>  dextrose 5%. 1000 milliLiter(s) (50 mL/Hr) IV Continuous <Continuous>  dextrose 50% Injectable 12.5 Gram(s) IV Push once  dextrose 50% Injectable 25 Gram(s) IV Push once  dextrose 50% Injectable 25 Gram(s) IV Push once  furosemide    Tablet 20 milliGRAM(s) Oral daily  glucagon  Injectable 1 milliGRAM(s) IntraMuscular once  heparin   Injectable 5000 Unit(s) SubCutaneous every 8 hours  hydrALAZINE 50 milliGRAM(s) Oral two times a day  insulin glargine Injectable (LANTUS) 20 Unit(s) SubCutaneous at bedtime  insulin lispro (ADMELOG) corrective regimen sliding scale   SubCutaneous three times a day before meals  insulin lispro (ADMELOG) corrective regimen sliding scale   SubCutaneous at bedtime  insulin lispro Injectable (ADMELOG) 3 Unit(s) SubCutaneous before breakfast  insulin lispro Injectable (ADMELOG) 2 Unit(s) SubCutaneous before dinner  insulin lispro Injectable (ADMELOG) 3 Unit(s) SubCutaneous before lunch  lisinopril 20 milliGRAM(s) Oral daily  metoprolol succinate ER 50 milliGRAM(s) Oral daily  piperacillin/tazobactam IVPB.. 3.375 Gram(s) IV Intermittent every 8 hours  tamsulosin 0.4 milliGRAM(s) Oral at bedtime    MEDICATIONS  (PRN):  dextrose Oral Gel 15 Gram(s) Oral once PRN Blood Glucose LESS THAN 70 milliGRAM(s)/deciliter  ondansetron Injectable 4 milliGRAM(s) IV Push every 8 hours PRN Nausea and/or Vomiting    ALLERGIES:  Allergy Status Unknown      LABS:                        10.9   7.24  )-----------( 295      ( 26 Jul 2023 07:11 )             33.2     07-26    144  |  103  |  21  ----------------------------<  121<H>  4.3   |  25  |  0.99    Ca    9.5      26 Jul 2023 07:01    TPro  8.3  /  Alb  3.6  /  TBili  0.4  /  DBili  x   /  AST  19  /  ALT  11  /  AlkPhos  69  07-25    PT/INR - ( 25 Jul 2023 16:49 )   PT: 11.6 sec;   INR: 1.00 ratio         PTT - ( 25 Jul 2023 16:49 )  PTT:28.6 sec      LIVER FUNCTIONS - ( 25 Jul 2023 16:49 )  Alb: 3.6 g/dL / Pro: 8.3 g/dL / ALK PHOS: 69 U/L / ALT: 11 U/L / AST: 19 U/L / GGT: x           Urinalysis Basic - ( 26 Jul 2023 07:01 )    Color: x / Appearance: x / SG: x / pH: x  Gluc: 121 mg/dL / Ketone: x  / Bili: x / Urobili: x   Blood: x / Protein: x / Nitrite: x   Leuk Esterase: x / RBC: x / WBC x   Sq Epi: x / Non Sq Epi: x / Bacteria: x     < from: Xray Foot AP + Lateral + Oblique, Right (07.25.23 @ 17:54) >    IMPRESSION:    No acute fracture or dislocation of the right foot. Midfoot degenerative   changes are noted.    Soft tissue defect at the heel related to known heel ulcer. No osseous   erosive changes of the calcaneus or adjacent osseousstructures. Soft   tissue edema is also noted along the dorsal foot and anterior lower shin.   No tracking subcutaneous gas.    Along the first distal phalanx base, there are juxtaarticular erosions at   the medial aspect and productive changes at the lateral aspect. Findings   may be due to underlying inflammatory arthritis or crystalline   arthropathy.    Vascular calcifications.      --- End of Report ---    < end of copied text >

## 2023-07-26 NOTE — CONSULT NOTE ADULT - PROBLEM SELECTOR RECOMMENDATION 9
Will start Lantus to 20 units at bed time.  Will start Admelog to 4 units before each meal in addition to Admelog correction scale coverage.  Will continue monitoring FS, log, and glucose trends, will Follow up.  Patient counseled for compliance with consistent low carb diet and exercise as tolerated outpatient.   Was on home insulin Will start Lantus to 20 units at bed time.  Will start Admelog to 4 units before each meal if eats  in addition to Admelog correction scale coverage.  Will continue monitoring FS, log, and glucose trends, will Follow up.  Patient counseled for compliance with consistent low carb diet and exercise as tolerated outpatient.   Was on home insulin.  Vomiting, could be du to Zosyn, or Gasteroparesis  if continue vomiting consider Reglan 10 mg pre-each meal.

## 2023-07-26 NOTE — CONSULT NOTE ADULT - PROBLEM SELECTOR RECOMMENDATION 2
c/w meds as ordered  pain management   continue to monitor
Suggest to continue medications, monitoring, FU primary team recommendations.

## 2023-07-26 NOTE — CONSULT NOTE ADULT - SUBJECTIVE AND OBJECTIVE BOX
CHIEF COMPLAINT:  Right Heel Ulcer    HISTORY OF PRESENT ILLNESS: 62 y/o F PMHx of DMH2 on insulin, HTN, HLD p/w right heel ulcer. Patient is sent by her podiatrist for questionable right foot procedure.  She is unsure how long she has had this ulcer for.  States that she was recently hospitalized in the near [facility] for an evaluation of the right foot infection.  States she still had a 102 fever yesterday which resolved.  Denies any fevers today, chills, chest pain, shortness of breath, abdominal pain, nausea vomiting diarrhea.  States she ambulates with a walker at baseline.  Accompanied by her sister-in-law.    Cardiology consulted for pre-op cardiac risk stratification.  Pt denies cardiac history.  Currently denies chest pain, SOB, palpitations.     PAST MEDICAL & SURGICAL HISTORY:  Diabetes    Hypertension    Hypercholesteremia    MEDICATIONS:  furosemide    Tablet 20 milliGRAM(s) Oral daily  heparin   Injectable 5000 Unit(s) SubCutaneous every 8 hours  hydrALAZINE 50 milliGRAM(s) Oral two times a day  lisinopril 20 milliGRAM(s) Oral daily  metoprolol succinate ER 50 milliGRAM(s) Oral daily    piperacillin/tazobactam IVPB.. 3.375 Gram(s) IV Intermittent every 8 hours  vancomycin  IVPB 1000 milliGRAM(s) IV Intermittent every 12 hours    atorvastatin 40 milliGRAM(s) Oral at bedtime  dextrose 50% Injectable 25 Gram(s) IV Push once  dextrose 50% Injectable 25 Gram(s) IV Push once  dextrose 50% Injectable 12.5 Gram(s) IV Push once  dextrose Oral Gel 15 Gram(s) Oral once PRN  glucagon  Injectable 1 milliGRAM(s) IntraMuscular once  insulin glargine Injectable (LANTUS) 20 Unit(s) SubCutaneous at bedtime  insulin lispro (ADMELOG) corrective regimen sliding scale   SubCutaneous three times a day before meals    dextrose 5%. 1000 milliLiter(s) IV Continuous <Continuous>  dextrose 5%. 1000 milliLiter(s) IV Continuous <Continuous>  tamsulosin 0.4 milliGRAM(s) Oral at bedtime    FAMILY HISTORY:    SOCIAL HISTORY:    [ ] Non-smoker  [ ] Smoker  [ ] Alcohol    Allergies    Allergy Status Unknown    Intolerances    REVIEW OF SYSTEMS:  CONSTITUTIONAL: No fever, weight loss, + fatigue  EYES: No eye pain, visual disturbances, or discharge  ENMT:  No difficulty hearing, tinnitus, vertigo; No sinus or throat pain  NECK: No pain or stiffness  RESPIRATORY: No cough, wheezing, chills or hemoptysis; No Shortness of Breath  CARDIOVASCULAR: No chest pain, palpitations, passing out, dizziness, or leg swelling  GASTROINTESTINAL: No abdominal or epigastric pain. No nausea, vomiting, or hematemesis; No diarrhea or constipation. No melena or hematochezia.  GENITOURINARY: No dysuria, frequency, hematuria, or incontinence  NEUROLOGICAL: No headaches, memory loss, loss of strength, numbness, or tremors  SKIN: No itching, burning, rashes, or lesions   LYMPH Nodes: No enlarged glands  ENDOCRINE: No heat or cold intolerance; No hair loss  MUSCULOSKELETAL: No joint pain or swelling; No muscle, back, or extremity pain  PSYCHIATRIC: No depression, anxiety, mood swings, or difficulty sleeping  HEME/LYMPH: No easy bruising, or bleeding gums  ALLERY AND IMMUNOLOGIC: No hives or eczema	    [ ] All others negative	  [ ] Unable to obtain    PHYSICAL EXAM:  T(C): 36.4 (07-26-23 @ 04:56), Max: 36.9 (07-25-23 @ 13:30)  HR: 57 (07-26-23 @ 04:56) (57 - 89)  BP: 138/74 (07-26-23 @ 04:56) (130/74 - 167/79)  RR: 18 (07-26-23 @ 04:56) (18 - 18)  SpO2: 96% (07-26-23 @ 04:56) (95% - 98%)  Wt(kg): --  I&O's Summary    25 Jul 2023 07:01  -  26 Jul 2023 07:00  --------------------------------------------------------  IN: 690 mL / OUT: 0 mL / NET: 690 mL    Appearance: Normal	  HEENT:  Normal oral mucosa, PERRL, EOMI	  Lymphatic: No lymphadenopathy  Cardiovascular: Normal S1 S2, No JVD, No murmurs, No edema  Respiratory: Lungs clear to auscultation	  Psychiatry: A & O x 3, Mood & affect appropriate  Gastrointestinal:  Soft, Non-tender, + BS	  Skin: No rashes, No ecchymoses, No cyanosis - right heel ulcer dressing c/d/i  Neurologic: Non-focal  Extremities: Normal range of motion, No clubbing, cyanosis or edema  Vascular: Peripheral pulses palpable 2+ bilaterally    TELEMETRY: 	    ECG:  	  RADIOLOGY:  < from: Xray Foot AP + Lateral + Oblique, Right (07.25.23 @ 17:54) >  ACC: [id number] EXAM:  XR FOOT COMP MIN 3 VIEWS RT   ORDERED BY: TAYA CARRANZA     PROCEDURE DATE:  07/25/2023          INTERPRETATION:  CLINICAL INDICATION: Right heel diabetic wound    TECHNIQUE: 3 views of the right foot    COMPARISON: None available.    IMPRESSION:    No acute fracture or dislocation of the right foot. Midfoot degenerative   changes are noted.    Soft tissue defect at the heel related to known heel ulcer. No osseous   erosive changes of the calcaneus or adjacent osseousstructures. Soft   tissue edema is also noted along the dorsal foot and anterior lower shin.   No tracking subcutaneous gas.    Along the first distal phalanx base, there are juxtaarticular erosions at   the medial aspect and productive changes at the lateral aspect. Findings   may be due to underlying inflammatory arthritis or crystalline   arthropathy.    Vascular calcifications.      --- End of Report ---    < end of copied text >    OTHER: 	  	  LABS:	 	    CARDIAC MARKERS:                        10.9   7.24  )-----------( 295      ( 26 Jul 2023 07:11 )             33.2     07-26    144  |  103  |  21  ----------------------------<  121<H>  4.3   |  25  |  0.99    Ca    9.5      26 Jul 2023 07:01    TPro  8.3  /  Alb  3.6  /  TBili  0.4  /  DBili  x   /  AST  19  /  ALT  11  /  AlkPhos  69  07-25    proBNP:   Lipid Profile:   HgA1c:   TSH: CHIEF COMPLAINT:  Right Heel Ulcer    HISTORY OF PRESENT ILLNESS: 64 y/o F PMHx of DMH2 on insulin, HTN, HLD p/w right heel ulcer. Patient is sent by her podiatrist for questionable right foot procedure.  She is unsure how long she has had this ulcer for.  States that she was recently hospitalized in the near [facility] for an evaluation of the right foot infection.  States she still had a 102 fever yesterday which resolved.  Denies any fevers today, chills, chest pain, shortness of breath, abdominal pain, nausea vomiting diarrhea.  States she ambulates with a walker at baseline.  Accompanied by her sister-in-law.    Cardiology consulted for pre-op cardiac risk stratification.  Pt denies cardiac history.  Currently denies chest pain, SOB, palpitations.     PAST MEDICAL & SURGICAL HISTORY:  Diabetes    Hypertension    Hypercholesteremia    MEDICATIONS:  furosemide    Tablet 20 milliGRAM(s) Oral daily  heparin   Injectable 5000 Unit(s) SubCutaneous every 8 hours  hydrALAZINE 50 milliGRAM(s) Oral two times a day  lisinopril 20 milliGRAM(s) Oral daily  metoprolol succinate ER 50 milliGRAM(s) Oral daily    piperacillin/tazobactam IVPB.. 3.375 Gram(s) IV Intermittent every 8 hours  vancomycin  IVPB 1000 milliGRAM(s) IV Intermittent every 12 hours    atorvastatin 40 milliGRAM(s) Oral at bedtime  dextrose 50% Injectable 25 Gram(s) IV Push once  dextrose 50% Injectable 25 Gram(s) IV Push once  dextrose 50% Injectable 12.5 Gram(s) IV Push once  dextrose Oral Gel 15 Gram(s) Oral once PRN  glucagon  Injectable 1 milliGRAM(s) IntraMuscular once  insulin glargine Injectable (LANTUS) 20 Unit(s) SubCutaneous at bedtime  insulin lispro (ADMELOG) corrective regimen sliding scale   SubCutaneous three times a day before meals    dextrose 5%. 1000 milliLiter(s) IV Continuous <Continuous>  dextrose 5%. 1000 milliLiter(s) IV Continuous <Continuous>  tamsulosin 0.4 milliGRAM(s) Oral at bedtime    FAMILY HISTORY:    SOCIAL HISTORY:    [ ] Non-smoker  [ ] Smoker  [ ] Alcohol    Allergies    Allergy Status Unknown    Intolerances    REVIEW OF SYSTEMS:  CONSTITUTIONAL: No fever, weight loss, + fatigue  EYES: No eye pain, visual disturbances, or discharge  ENMT:  No difficulty hearing, tinnitus, vertigo; No sinus or throat pain  NECK: No pain or stiffness  RESPIRATORY: No cough, wheezing, chills or hemoptysis; No Shortness of Breath  CARDIOVASCULAR: No chest pain, palpitations, passing out, dizziness, or leg swelling  GASTROINTESTINAL: No abdominal or epigastric pain. No nausea, vomiting, or hematemesis; No diarrhea or constipation. No melena or hematochezia.  GENITOURINARY: No dysuria, frequency, hematuria, or incontinence  NEUROLOGICAL: No headaches, memory loss, loss of strength, numbness, or tremors  SKIN: No itching, burning, rashes, or lesions   LYMPH Nodes: No enlarged glands  ENDOCRINE: No heat or cold intolerance; No hair loss  MUSCULOSKELETAL: No joint pain or swelling; No muscle, back, or extremity pain  PSYCHIATRIC: No depression, anxiety, mood swings, or difficulty sleeping  HEME/LYMPH: No easy bruising, or bleeding gums  ALLERY AND IMMUNOLOGIC: No hives or eczema	    [ ] All others negative	  [ ] Unable to obtain    PHYSICAL EXAM:  T(C): 36.4 (07-26-23 @ 04:56), Max: 36.9 (07-25-23 @ 13:30)  HR: 57 (07-26-23 @ 04:56) (57 - 89)  BP: 138/74 (07-26-23 @ 04:56) (130/74 - 167/79)  RR: 18 (07-26-23 @ 04:56) (18 - 18)  SpO2: 96% (07-26-23 @ 04:56) (95% - 98%)  Wt(kg): --  I&O's Summary    25 Jul 2023 07:01  -  26 Jul 2023 07:00  --------------------------------------------------------  IN: 690 mL / OUT: 0 mL / NET: 690 mL    Appearance: Normal	  HEENT:  Normal oral mucosa, PERRL, EOMI	  Lymphatic: No lymphadenopathy  Cardiovascular: Normal S1 S2, No JVD, No murmurs, No edema  Respiratory: Lungs clear to auscultation	  Psychiatry: A & O x 3, Mood & affect appropriate  Gastrointestinal:  Soft, Non-tender, + BS	  Skin: No rashes, No ecchymoses, No cyanosis - right heel ulcer dressing c/d/i  Neurologic: Non-focal  Extremities: Normal range of motion, No clubbing, cyanosis or edema  Vascular: Peripheral pulses palpable 2+ bilaterally    TELEMETRY: 	    ECG:  	  RADIOLOGY:  < from: Xray Foot AP + Lateral + Oblique, Right (07.25.23 @ 17:54) >  ACC: [id number] EXAM:  XR FOOT COMP MIN 3 VIEWS RT   ORDERED BY: TAYA CARRANZA     PROCEDURE DATE:  07/25/2023          INTERPRETATION:  CLINICAL INDICATION: Right heel diabetic wound    TECHNIQUE: 3 views of the right foot    COMPARISON: None available.    IMPRESSION:    No acute fracture or dislocation of the right foot. Midfoot degenerative   changes are noted.    Soft tissue defect at the heel related to known heel ulcer. No osseous   erosive changes of the calcaneus or adjacent osseousstructures. Soft   tissue edema is also noted along the dorsal foot and anterior lower shin.   No tracking subcutaneous gas.    Along the first distal phalanx base, there are juxtaarticular erosions at   the medial aspect and productive changes at the lateral aspect. Findings   may be due to underlying inflammatory arthritis or crystalline   arthropathy.    Vascular calcifications.      --- End of Report ---    < end of copied text >    OTHER: 	  	  LABS:	 	    CARDIAC MARKERS:                        10.9   7.24  )-----------( 295      ( 26 Jul 2023 07:11 )             33.2     07-26    144  |  103  |  21  ----------------------------<  121<H>  4.3   |  25  |  0.99    Ca    9.5      26 Jul 2023 07:01    TPro  8.3  /  Alb  3.6  /  TBili  0.4  /  DBili  x   /  AST  19  /  ALT  11  /  AlkPhos  69  07-25    proBNP:   Lipid Profile:   HgA1c:   TSH: CHIEF COMPLAINT:  Right Heel Ulcer    HISTORY OF PRESENT ILLNESS: 62 y/o F PMHx of DMH2 on insulin, HTN, HLD p/w right heel ulcer. Patient is sent by her podiatrist for questionable right foot procedure.  She is unsure how long she has had this ulcer for.  States that she was recently hospitalized in the near [facility] for an evaluation of the right foot infection.  States she still had a 102 fever yesterday which resolved.  Denies any fevers today, chills, chest pain, shortness of breath, abdominal pain, nausea vomiting diarrhea.  States she ambulates with a walker at baseline.  Accompanied by her sister-in-law.    Cardiology consulted for pre-op cardiac risk stratification.  Pt denies cardiac history.  Currently denies chest pain, SOB, palpitations.     PAST MEDICAL & SURGICAL HISTORY:  Diabetes    Hypertension    Hypercholesteremia    MEDICATIONS:  furosemide    Tablet 20 milliGRAM(s) Oral daily  heparin   Injectable 5000 Unit(s) SubCutaneous every 8 hours  hydrALAZINE 50 milliGRAM(s) Oral two times a day  lisinopril 20 milliGRAM(s) Oral daily  metoprolol succinate ER 50 milliGRAM(s) Oral daily    piperacillin/tazobactam IVPB.. 3.375 Gram(s) IV Intermittent every 8 hours  vancomycin  IVPB 1000 milliGRAM(s) IV Intermittent every 12 hours    atorvastatin 40 milliGRAM(s) Oral at bedtime  dextrose 50% Injectable 25 Gram(s) IV Push once  dextrose 50% Injectable 25 Gram(s) IV Push once  dextrose 50% Injectable 12.5 Gram(s) IV Push once  dextrose Oral Gel 15 Gram(s) Oral once PRN  glucagon  Injectable 1 milliGRAM(s) IntraMuscular once  insulin glargine Injectable (LANTUS) 20 Unit(s) SubCutaneous at bedtime  insulin lispro (ADMELOG) corrective regimen sliding scale   SubCutaneous three times a day before meals    dextrose 5%. 1000 milliLiter(s) IV Continuous <Continuous>  dextrose 5%. 1000 milliLiter(s) IV Continuous <Continuous>  tamsulosin 0.4 milliGRAM(s) Oral at bedtime    FAMILY HISTORY:    SOCIAL HISTORY:    [ ] Non-smoker  [ ] Smoker  [ ] Alcohol    Allergies    Allergy Status Unknown    Intolerances    REVIEW OF SYSTEMS:  CONSTITUTIONAL: No fever, weight loss, + fatigue  EYES: No eye pain, visual disturbances, or discharge  ENMT:  No difficulty hearing, tinnitus, vertigo; No sinus or throat pain  NECK: No pain or stiffness  RESPIRATORY: No cough, wheezing, chills or hemoptysis; No Shortness of Breath  CARDIOVASCULAR: No chest pain, palpitations, passing out, dizziness, or leg swelling  GASTROINTESTINAL: No abdominal or epigastric pain. No nausea, vomiting, or hematemesis; No diarrhea or constipation. No melena or hematochezia.  GENITOURINARY: No dysuria, frequency, hematuria, or incontinence  NEUROLOGICAL: No headaches, memory loss, loss of strength, numbness, or tremors  SKIN: No itching, burning, rashes, or lesions   LYMPH Nodes: No enlarged glands  ENDOCRINE: No heat or cold intolerance; No hair loss  MUSCULOSKELETAL: No joint pain or swelling; No muscle, back, or extremity pain  PSYCHIATRIC: No depression, anxiety, mood swings, or difficulty sleeping  HEME/LYMPH: No easy bruising, or bleeding gums  ALLERY AND IMMUNOLOGIC: No hives or eczema	    [ ] All others negative	  [ ] Unable to obtain    PHYSICAL EXAM:  T(C): 36.4 (07-26-23 @ 04:56), Max: 36.9 (07-25-23 @ 13:30)  HR: 57 (07-26-23 @ 04:56) (57 - 89)  BP: 138/74 (07-26-23 @ 04:56) (130/74 - 167/79)  RR: 18 (07-26-23 @ 04:56) (18 - 18)  SpO2: 96% (07-26-23 @ 04:56) (95% - 98%)  Wt(kg): --  I&O's Summary    25 Jul 2023 07:01  -  26 Jul 2023 07:00  --------------------------------------------------------  IN: 690 mL / OUT: 0 mL / NET: 690 mL    Appearance: Normal	  HEENT:  Normal oral mucosa, PERRL, EOMI	  Lymphatic: No lymphadenopathy  Cardiovascular: Normal S1 S2, No JVD, No murmurs, No edema  Respiratory: Lungs clear to auscultation	  Psychiatry: A & O x 3, Mood & affect appropriate  Gastrointestinal:  Soft, Non-tender, + BS	  Skin: No rashes, No ecchymoses, No cyanosis - right heel ulcer dressing c/d/i  Neurologic: Non-focal  Extremities: Normal range of motion, No clubbing, cyanosis or edema  Vascular: Peripheral pulses palpable 2+ bilaterally    TELEMETRY: 	    ECG:  	  RADIOLOGY:  < from: Xray Foot AP + Lateral + Oblique, Right (07.25.23 @ 17:54) >  ACC: [id number] EXAM:  XR FOOT COMP MIN 3 VIEWS RT   ORDERED BY: TAYA CARRANZA     PROCEDURE DATE:  07/25/2023          INTERPRETATION:  CLINICAL INDICATION: Right heel diabetic wound    TECHNIQUE: 3 views of the right foot    COMPARISON: None available.    IMPRESSION:    No acute fracture or dislocation of the right foot. Midfoot degenerative   changes are noted.    Soft tissue defect at the heel related to known heel ulcer. No osseous   erosive changes of the calcaneus or adjacent osseousstructures. Soft   tissue edema is also noted along the dorsal foot and anterior lower shin.   No tracking subcutaneous gas.    Along the first distal phalanx base, there are juxtaarticular erosions at   the medial aspect and productive changes at the lateral aspect. Findings   may be due to underlying inflammatory arthritis or crystalline   arthropathy.    Vascular calcifications.      --- End of Report ---    < end of copied text >    OTHER: 	  	  LABS:	 	    CARDIAC MARKERS:                        10.9   7.24  )-----------( 295      ( 26 Jul 2023 07:11 )             33.2     07-26    144  |  103  |  21  ----------------------------<  121<H>  4.3   |  25  |  0.99    Ca    9.5      26 Jul 2023 07:01    TPro  8.3  /  Alb  3.6  /  TBili  0.4  /  DBili  x   /  AST  19  /  ALT  11  /  AlkPhos  69  07-25    proBNP:   Lipid Profile:   HgA1c:   TSH:

## 2023-07-26 NOTE — CONSULT NOTE ADULT - SUBJECTIVE AND OBJECTIVE BOX
INFECTIOUS DISEASE CONSULT NOTE    Patient is a 63y old  Female who presents with a chief complaint of   HPI:   64 y/o F PMHx of DMH2 on insulin, HTN, HLD p/w right heel ulcer. Patient is sent by her podiatrist for questionable right foot procedure.  She is unsure how long she has had this ulcer for.  States that she was recently hospitalized in the near [facility] for an evaluation of the right foot infection.  States she still had a 102 fever yesterday which resolved.  Denies any fevers today, chills, chest pain, shortness of breath, abdominal pain, nausea vomiting diarrhea.  States she ambulates with a walker at baseline.  Accompanied by her sister-in-law (25 Jul 2023 21:58)       REVIEW OF SYSTEMS:  CONSTITUTIONAL: No fever or chills  HEENT: No sore throat  RESPIRATORY: No cough, no shortness of breath  CARDIOVASCULAR: No chest pain or palpitations  GASTROINTESTINAL: No abdominal or epigastric pain  GENITOURINARY: No dysuria  NEUROLOGICAL: No headache/dizziness  MSK: No joint pain, erythema, or swelling; no back pain  SKIN: No itching, rashes  All other ROS negative except noted above    Prior hospital charts reviewed [Yes]  Primary team notes reviewed [Yes]  Other consultant notes reviewed [Yes]    PAST MEDICAL & SURGICAL HISTORY:  Diabetes      Hypertension      Hypercholesteremia          SOCIAL HISTORY:  - Born in _____, [place] in 19XX  - Currently working as / Retired  - Lives with _____; no pets  - No recent travel  - Denies tobacco use  - Denies alcohol use  - Denies illicit drug use  - Currently sexually active, has one male/female sexual partner    FAMILY HISTORY:      Allergies:  Allergy Status Unknown      ANTIMICROBIALS:  piperacillin/tazobactam IVPB.. 3.375 every 8 hours  vancomycin  IVPB 1000 every 12 hours      ANTIMICROBIALS (past 90 days):  MEDICATIONS  (STANDING):  piperacillin/tazobactam IVPB..   25 mL/Hr IV Intermittent (07-26-23 @ 09:06)   25 mL/Hr IV Intermittent (07-25-23 @ 23:19)    piperacillin/tazobactam IVPB...   200 mL/Hr IV Intermittent (07-25-23 @ 18:20)    vancomycin  IVPB   250 mL/Hr IV Intermittent (07-26-23 @ 05:01)    vancomycin  IVPB.   250 mL/Hr IV Intermittent (07-25-23 @ 20:09)        OTHER MEDS:   MEDICATIONS  (STANDING):  atorvastatin 40 at bedtime  dextrose 50% Injectable 25 once  dextrose 50% Injectable 25 once  dextrose 50% Injectable 12.5 once  dextrose Oral Gel 15 once PRN  furosemide    Tablet 20 daily  glucagon  Injectable 1 once  heparin   Injectable 5000 every 8 hours  hydrALAZINE 50 two times a day  insulin glargine Injectable (LANTUS) 20 at bedtime  insulin lispro (ADMELOG) corrective regimen sliding scale  three times a day before meals  lisinopril 20 daily  metoprolol succinate ER 50 daily  ondansetron Injectable 4 every 8 hours PRN  tamsulosin 0.4 at bedtime      VITALS:  Vital Signs Last 24 Hrs  T(F): 97.5 (07-26-23 @ 04:56), Max: 98.5 (07-25-23 @ 13:30)    Vital Signs Last 24 Hrs  HR: 57 (07-26-23 @ 04:56) (57 - 89)  BP: 138/74 (07-26-23 @ 04:56) (130/74 - 167/79)  RR: 18 (07-26-23 @ 04:56)  SpO2: 96% (07-26-23 @ 04:56) (95% - 98%)  Wt(kg): --    EXAM:  GENERAL: NAD, lying in bed comfortably  HEAD: No head lesions  EYES: Conjunctiva pink and cornea white  ENT: Normal external ears and nose, no discharges; moist mucous membranes  NECK: Supple, nontender to palpation; no JVD  CHEST/LUNG: Clear to auscultation bilaterally  HEART: S1 S2  ABDOMEN: Soft, nontender, nondistended; normoactive bowel sounds  EXTREMITIES: No clubbing, cyanosis, or petal edema  NERVOUS SYSTEM: Alert and oriented to person, time, place and situation, speech clear. No focal deficits   MSK: No joint erythema, swelling or pain  SKIN: No rashes or lesions, no superficial thrombophlebitis    Labs:                        10.9   7.24  )-----------( 295      ( 26 Jul 2023 07:11 )             33.2     07-26    144  |  103  |  21  ----------------------------<  121<H>  4.3   |  25  |  0.99    Ca    9.5      26 Jul 2023 07:01    TPro  8.3  /  Alb  3.6  /  TBili  0.4  /  DBili  x   /  AST  19  /  ALT  11  /  AlkPhos  69  07-25      WBC Trend:  WBC Count: 7.24 (07-26-23 @ 07:11)  WBC Count: 7.25 (07-25-23 @ 16:49)      Auto Neutrophil #: 4.28 K/uL (07-25-23 @ 16:49)      Creatine Trend:  Creatinine: 0.99 (07-26)  Creatinine: 1.10 (07-25)      Liver Biochemical Testing Trend:  Alanine Aminotransferase (ALT/SGPT): 11 (07-25)  Aspartate Aminotransferase (AST/SGOT): 19 (07-25-23 @ 16:49)  Bilirubin Total: 0.4 (07-25)      Trend LDH      Auto Eosinophil %: 2.8 % (07-25-23 @ 16:49)      Urinalysis Basic - ( 26 Jul 2023 07:01 )    Color: x / Appearance: x / SG: x / pH: x  Gluc: 121 mg/dL / Ketone: x  / Bili: x / Urobili: x   Blood: x / Protein: x / Nitrite: x   Leuk Esterase: x / RBC: x / WBC x   Sq Epi: x / Non Sq Epi: x / Bacteria: x        MICROBIOLOGY:        Culture - Abscess with Gram Stain (collected 25 Jul 2023 23:18)  Source: .Abscess right heel wound                                  C-Reactive Protein, Serum: 41 (07-25)                    RADIOLOGY:  imaging below personally reviewed   INFECTIOUS DISEASE CONSULT NOTE    Patient is a 63y old  Female who presents with a chief complaint of   HPI:   62 y/o F PMHx of DMH2 on insulin, HTN, HLD p/w right heel ulcer. Patient is sent by her podiatrist for questionable right foot procedure.  She is unsure how long she has had this ulcer for.  States that she was recently hospitalized in the near [facility] for an evaluation of the right foot infection.  States she still had a 102 fever yesterday which resolved.  Denies any fevers today, chills, chest pain, shortness of breath, abdominal pain, nausea vomiting diarrhea.  States she ambulates with a walker at baseline.  Accompanied by her sister-in-law (25 Jul 2023 21:58)       REVIEW OF SYSTEMS:  CONSTITUTIONAL: No fever or chills  HEENT: No sore throat  RESPIRATORY: No cough, no shortness of breath  CARDIOVASCULAR: No chest pain or palpitations  GASTROINTESTINAL: No abdominal or epigastric pain  GENITOURINARY: No dysuria  NEUROLOGICAL: No headache/dizziness  MSK: No joint pain, erythema, or swelling; no back pain  SKIN: No itching, rashes  All other ROS negative except noted above    Prior hospital charts reviewed [Yes]  Primary team notes reviewed [Yes]  Other consultant notes reviewed [Yes]    PAST MEDICAL & SURGICAL HISTORY:  Diabetes      Hypertension      Hypercholesteremia          SOCIAL HISTORY:  - Born in _____, [place] in 19XX  - Currently working as / Retired  - Lives with _____; no pets  - No recent travel  - Denies tobacco use  - Denies alcohol use  - Denies illicit drug use  - Currently sexually active, has one male/female sexual partner    FAMILY HISTORY:      Allergies:  Allergy Status Unknown      ANTIMICROBIALS:  piperacillin/tazobactam IVPB.. 3.375 every 8 hours  vancomycin  IVPB 1000 every 12 hours      ANTIMICROBIALS (past 90 days):  MEDICATIONS  (STANDING):  piperacillin/tazobactam IVPB..   25 mL/Hr IV Intermittent (07-26-23 @ 09:06)   25 mL/Hr IV Intermittent (07-25-23 @ 23:19)    piperacillin/tazobactam IVPB...   200 mL/Hr IV Intermittent (07-25-23 @ 18:20)    vancomycin  IVPB   250 mL/Hr IV Intermittent (07-26-23 @ 05:01)    vancomycin  IVPB.   250 mL/Hr IV Intermittent (07-25-23 @ 20:09)        OTHER MEDS:   MEDICATIONS  (STANDING):  atorvastatin 40 at bedtime  dextrose 50% Injectable 25 once  dextrose 50% Injectable 25 once  dextrose 50% Injectable 12.5 once  dextrose Oral Gel 15 once PRN  furosemide    Tablet 20 daily  glucagon  Injectable 1 once  heparin   Injectable 5000 every 8 hours  hydrALAZINE 50 two times a day  insulin glargine Injectable (LANTUS) 20 at bedtime  insulin lispro (ADMELOG) corrective regimen sliding scale  three times a day before meals  lisinopril 20 daily  metoprolol succinate ER 50 daily  ondansetron Injectable 4 every 8 hours PRN  tamsulosin 0.4 at bedtime      VITALS:  Vital Signs Last 24 Hrs  T(F): 97.5 (07-26-23 @ 04:56), Max: 98.5 (07-25-23 @ 13:30)    Vital Signs Last 24 Hrs  HR: 57 (07-26-23 @ 04:56) (57 - 89)  BP: 138/74 (07-26-23 @ 04:56) (130/74 - 167/79)  RR: 18 (07-26-23 @ 04:56)  SpO2: 96% (07-26-23 @ 04:56) (95% - 98%)  Wt(kg): --    EXAM:  GENERAL: NAD, lying in bed comfortably  HEAD: No head lesions  EYES: Conjunctiva pink and cornea white  ENT: Normal external ears and nose, no discharges; moist mucous membranes  NECK: Supple, nontender to palpation; no JVD  CHEST/LUNG: Clear to auscultation bilaterally  HEART: S1 S2  ABDOMEN: Soft, nontender, nondistended; normoactive bowel sounds  EXTREMITIES: No clubbing, cyanosis, or petal edema  NERVOUS SYSTEM: Alert and oriented to person, time, place and situation, speech clear. No focal deficits   MSK: No joint erythema, swelling or pain  SKIN: No rashes or lesions, no superficial thrombophlebitis    Labs:                        10.9   7.24  )-----------( 295      ( 26 Jul 2023 07:11 )             33.2     07-26    144  |  103  |  21  ----------------------------<  121<H>  4.3   |  25  |  0.99    Ca    9.5      26 Jul 2023 07:01    TPro  8.3  /  Alb  3.6  /  TBili  0.4  /  DBili  x   /  AST  19  /  ALT  11  /  AlkPhos  69  07-25      WBC Trend:  WBC Count: 7.24 (07-26-23 @ 07:11)  WBC Count: 7.25 (07-25-23 @ 16:49)      Auto Neutrophil #: 4.28 K/uL (07-25-23 @ 16:49)      Creatine Trend:  Creatinine: 0.99 (07-26)  Creatinine: 1.10 (07-25)      Liver Biochemical Testing Trend:  Alanine Aminotransferase (ALT/SGPT): 11 (07-25)  Aspartate Aminotransferase (AST/SGOT): 19 (07-25-23 @ 16:49)  Bilirubin Total: 0.4 (07-25)      Trend LDH      Auto Eosinophil %: 2.8 % (07-25-23 @ 16:49)      Urinalysis Basic - ( 26 Jul 2023 07:01 )    Color: x / Appearance: x / SG: x / pH: x  Gluc: 121 mg/dL / Ketone: x  / Bili: x / Urobili: x   Blood: x / Protein: x / Nitrite: x   Leuk Esterase: x / RBC: x / WBC x   Sq Epi: x / Non Sq Epi: x / Bacteria: x        MICROBIOLOGY:        Culture - Abscess with Gram Stain (collected 25 Jul 2023 23:18)  Source: .Abscess right heel wound                                  C-Reactive Protein, Serum: 41 (07-25)                    RADIOLOGY:  imaging below personally reviewed   INFECTIOUS DISEASE CONSULT NOTE    Patient is a 63y old  Female who presents with a chief complaint of   HPI:   62 y/o F PMHx of DMH2 on insulin, HTN, HLD p/w right heel ulcer. Patient is sent by her podiatrist for questionable right foot procedure.  She is unsure how long she has had this ulcer for.  States that she was recently hospitalized in the near [facility] for an evaluation of the right foot infection.  States she still had a 102 fever yesterday which resolved.  Denies any fevers today, chills, chest pain, shortness of breath, abdominal pain, nausea vomiting diarrhea.  States she ambulates with a walker at baseline.  Accompanied by her sister-in-law (25 Jul 2023 21:58)       REVIEW OF SYSTEMS:  CONSTITUTIONAL: No fever or chills  HEENT: No sore throat  RESPIRATORY: No cough, no shortness of breath  CARDIOVASCULAR: No chest pain or palpitations  GASTROINTESTINAL: No abdominal or epigastric pain  GENITOURINARY: No dysuria  NEUROLOGICAL: No headache/dizziness  MSK: No joint pain, erythema, or swelling; no back pain  SKIN: No itching, rashes  All other ROS negative except noted above    Prior hospital charts reviewed [Yes]  Primary team notes reviewed [Yes]  Other consultant notes reviewed [Yes]    PAST MEDICAL & SURGICAL HISTORY:  Diabetes      Hypertension      Hypercholesteremia          SOCIAL HISTORY:  - Born in _____, [place] in 19XX  - Currently working as / Retired  - Lives with _____; no pets  - No recent travel  - Denies tobacco use  - Denies alcohol use  - Denies illicit drug use  - Currently sexually active, has one male/female sexual partner    FAMILY HISTORY:      Allergies:  Allergy Status Unknown      ANTIMICROBIALS:  piperacillin/tazobactam IVPB.. 3.375 every 8 hours  vancomycin  IVPB 1000 every 12 hours      ANTIMICROBIALS (past 90 days):  MEDICATIONS  (STANDING):  piperacillin/tazobactam IVPB..   25 mL/Hr IV Intermittent (07-26-23 @ 09:06)   25 mL/Hr IV Intermittent (07-25-23 @ 23:19)    piperacillin/tazobactam IVPB...   200 mL/Hr IV Intermittent (07-25-23 @ 18:20)    vancomycin  IVPB   250 mL/Hr IV Intermittent (07-26-23 @ 05:01)    vancomycin  IVPB.   250 mL/Hr IV Intermittent (07-25-23 @ 20:09)        OTHER MEDS:   MEDICATIONS  (STANDING):  atorvastatin 40 at bedtime  dextrose 50% Injectable 25 once  dextrose 50% Injectable 25 once  dextrose 50% Injectable 12.5 once  dextrose Oral Gel 15 once PRN  furosemide    Tablet 20 daily  glucagon  Injectable 1 once  heparin   Injectable 5000 every 8 hours  hydrALAZINE 50 two times a day  insulin glargine Injectable (LANTUS) 20 at bedtime  insulin lispro (ADMELOG) corrective regimen sliding scale  three times a day before meals  lisinopril 20 daily  metoprolol succinate ER 50 daily  ondansetron Injectable 4 every 8 hours PRN  tamsulosin 0.4 at bedtime      VITALS:  Vital Signs Last 24 Hrs  T(F): 97.5 (07-26-23 @ 04:56), Max: 98.5 (07-25-23 @ 13:30)    Vital Signs Last 24 Hrs  HR: 57 (07-26-23 @ 04:56) (57 - 89)  BP: 138/74 (07-26-23 @ 04:56) (130/74 - 167/79)  RR: 18 (07-26-23 @ 04:56)  SpO2: 96% (07-26-23 @ 04:56) (95% - 98%)  Wt(kg): --    EXAM:  GENERAL: NAD, lying in bed comfortably  HEAD: No head lesions  EYES: Conjunctiva pink and cornea white  ENT: Normal external ears and nose, no discharges; moist mucous membranes  NECK: Supple, nontender to palpation; no JVD  CHEST/LUNG: Clear to auscultation bilaterally  HEART: S1 S2  ABDOMEN: Soft, nontender, nondistended; normoactive bowel sounds  EXTREMITIES: No clubbing, cyanosis, or petal edema  NERVOUS SYSTEM: Alert and oriented to person, time, place and situation, speech clear. No focal deficits   MSK: No joint erythema, swelling or pain  SKIN: No rashes or lesions, no superficial thrombophlebitis    Labs:                        10.9   7.24  )-----------( 295      ( 26 Jul 2023 07:11 )             33.2     07-26    144  |  103  |  21  ----------------------------<  121<H>  4.3   |  25  |  0.99    Ca    9.5      26 Jul 2023 07:01    TPro  8.3  /  Alb  3.6  /  TBili  0.4  /  DBili  x   /  AST  19  /  ALT  11  /  AlkPhos  69  07-25      WBC Trend:  WBC Count: 7.24 (07-26-23 @ 07:11)  WBC Count: 7.25 (07-25-23 @ 16:49)      Auto Neutrophil #: 4.28 K/uL (07-25-23 @ 16:49)      Creatine Trend:  Creatinine: 0.99 (07-26)  Creatinine: 1.10 (07-25)      Liver Biochemical Testing Trend:  Alanine Aminotransferase (ALT/SGPT): 11 (07-25)  Aspartate Aminotransferase (AST/SGOT): 19 (07-25-23 @ 16:49)  Bilirubin Total: 0.4 (07-25)      Trend LDH      Auto Eosinophil %: 2.8 % (07-25-23 @ 16:49)      Urinalysis Basic - ( 26 Jul 2023 07:01 )    Color: x / Appearance: x / SG: x / pH: x  Gluc: 121 mg/dL / Ketone: x  / Bili: x / Urobili: x   Blood: x / Protein: x / Nitrite: x   Leuk Esterase: x / RBC: x / WBC x   Sq Epi: x / Non Sq Epi: x / Bacteria: x        MICROBIOLOGY:        Culture - Abscess with Gram Stain (collected 25 Jul 2023 23:18)  Source: .Abscess right heel wound                                  C-Reactive Protein, Serum: 41 (07-25)                    RADIOLOGY:  imaging below personally reviewed   INFECTIOUS DISEASE CONSULT NOTE    Patient is a 63y old  Female who presents with a chief complaint of   HPI:   64 y/o F PMHx of DMH2 on insulin, HTN, HLD p/w right heel ulcer. Patient is sent by her podiatrist for questionable right foot procedure.  She is unsure how long she has had this ulcer for.  States that she was recently hospitalized in the near [facility] for an evaluation of the right foot infection.  States she still had a 102 fever yesterday which resolved.  Denies any fevers today, chills, chest pain, shortness of breath, abdominal pain, nausea vomiting diarrhea.  States she ambulates with a walker at baseline.  Accompanied by her sister-in-law (25 Jul 2023 21:58)    On exam. patient states that she is not sure about when her bilateral foot ulcers began. States that discoloration on her legs has been there since 1996. States has some nausea currently and she feels it is due to having too many pills and not enough food. No chills, chest pain, SOB, abdominal pain, diarrhea, dysuria.        REVIEW OF SYSTEMS:  CONSTITUTIONAL: No fever or chills  HEENT: No sore throat  RESPIRATORY: No cough, no shortness of breath  CARDIOVASCULAR: No chest pain or palpitations  GASTROINTESTINAL: No abdominal or epigastric pain  GENITOURINARY: No dysuria  NEUROLOGICAL: No headache/dizziness  MSK: No joint pain, erythema, or swelling; no back pain  SKIN: No itching, rashes  All other ROS negative except noted above    Prior hospital charts reviewed [Yes]  Primary team notes reviewed [Yes]  Other consultant notes reviewed [Yes]    PAST MEDICAL & SURGICAL HISTORY:  Diabetes      Hypertension      Hypercholesteremia          SOCIAL HISTORY:  - Born in _____, [place] in 19XX  - Currently working as / Retired  - Lives with _____; no pets  - No recent travel  - Denies tobacco use  - Denies alcohol use  - Denies illicit drug use  - Currently sexually active, has one male/female sexual partner    FAMILY HISTORY:      Allergies:  Allergy Status Unknown      ANTIMICROBIALS:  piperacillin/tazobactam IVPB.. 3.375 every 8 hours  vancomycin  IVPB 1000 every 12 hours      ANTIMICROBIALS (past 90 days):  MEDICATIONS  (STANDING):  piperacillin/tazobactam IVPB..   25 mL/Hr IV Intermittent (07-26-23 @ 09:06)   25 mL/Hr IV Intermittent (07-25-23 @ 23:19)    piperacillin/tazobactam IVPB...   200 mL/Hr IV Intermittent (07-25-23 @ 18:20)    vancomycin  IVPB   250 mL/Hr IV Intermittent (07-26-23 @ 05:01)    vancomycin  IVPB.   250 mL/Hr IV Intermittent (07-25-23 @ 20:09)        OTHER MEDS:   MEDICATIONS  (STANDING):  atorvastatin 40 at bedtime  dextrose 50% Injectable 25 once  dextrose 50% Injectable 25 once  dextrose 50% Injectable 12.5 once  dextrose Oral Gel 15 once PRN  furosemide    Tablet 20 daily  glucagon  Injectable 1 once  heparin   Injectable 5000 every 8 hours  hydrALAZINE 50 two times a day  insulin glargine Injectable (LANTUS) 20 at bedtime  insulin lispro (ADMELOG) corrective regimen sliding scale  three times a day before meals  lisinopril 20 daily  metoprolol succinate ER 50 daily  ondansetron Injectable 4 every 8 hours PRN  tamsulosin 0.4 at bedtime      VITALS:  Vital Signs Last 24 Hrs  T(F): 97.5 (07-26-23 @ 04:56), Max: 98.5 (07-25-23 @ 13:30)    Vital Signs Last 24 Hrs  HR: 57 (07-26-23 @ 04:56) (57 - 89)  BP: 138/74 (07-26-23 @ 04:56) (130/74 - 167/79)  RR: 18 (07-26-23 @ 04:56)  SpO2: 96% (07-26-23 @ 04:56) (95% - 98%)  Wt(kg): --    EXAM:  GENERAL: NAD, lying in bed comfortably  HEAD: No head lesions  EYES: Conjunctiva pink and cornea white  ENT: Normal external ears and nose, no discharges; moist mucous membranes  NECK: Supple, nontender to palpation; no JVD  CHEST/LUNG: Clear to auscultation bilaterally  HEART: S1 S2  ABDOMEN: Soft, nontender, nondistended; normoactive bowel sounds  EXTREMITIES: No clubbing, cyanosis  NERVOUS SYSTEM: Alert and oriented to person, time, place and situation, speech clear. No focal deficits   MSK: No joint erythema, swelling or pain  SKIN: Bilateral shins with black, scaly skin. L heel with 2cm wound to subcutaneous tissue, no erythema or purulence. R heel with large black eschar, no erythema or purulence noted, no probing to bone.     Labs:                        10.9   7.24  )-----------( 295      ( 26 Jul 2023 07:11 )             33.2     07-26    144  |  103  |  21  ----------------------------<  121<H>  4.3   |  25  |  0.99    Ca    9.5      26 Jul 2023 07:01    TPro  8.3  /  Alb  3.6  /  TBili  0.4  /  DBili  x   /  AST  19  /  ALT  11  /  AlkPhos  69  07-25      WBC Trend:  WBC Count: 7.24 (07-26-23 @ 07:11)  WBC Count: 7.25 (07-25-23 @ 16:49)      Auto Neutrophil #: 4.28 K/uL (07-25-23 @ 16:49)      Creatine Trend:  Creatinine: 0.99 (07-26)  Creatinine: 1.10 (07-25)      Liver Biochemical Testing Trend:  Alanine Aminotransferase (ALT/SGPT): 11 (07-25)  Aspartate Aminotransferase (AST/SGOT): 19 (07-25-23 @ 16:49)  Bilirubin Total: 0.4 (07-25)      Trend LDH      Auto Eosinophil %: 2.8 % (07-25-23 @ 16:49)      Urinalysis Basic - ( 26 Jul 2023 07:01 )    Color: x / Appearance: x / SG: x / pH: x  Gluc: 121 mg/dL / Ketone: x  / Bili: x / Urobili: x   Blood: x / Protein: x / Nitrite: x   Leuk Esterase: x / RBC: x / WBC x   Sq Epi: x / Non Sq Epi: x / Bacteria: x        MICROBIOLOGY:        Culture - Abscess with Gram Stain (collected 25 Jul 2023 23:18)  Source: .Abscess right heel wound                                  C-Reactive Protein, Serum: 41 (07-25)                    RADIOLOGY:  imaging below personally reviewed   INFECTIOUS DISEASE CONSULT NOTE    Patient is a 63y old  Female who presents with a chief complaint of   HPI:   62 y/o F PMHx of DMH2 on insulin, HTN, HLD p/w right heel ulcer. Patient is sent by her podiatrist for questionable right foot procedure.  She is unsure how long she has had this ulcer for.  States that she was recently hospitalized in the near [facility] for an evaluation of the right foot infection.  States she still had a 102 fever yesterday which resolved.  Denies any fevers today, chills, chest pain, shortness of breath, abdominal pain, nausea vomiting diarrhea.  States she ambulates with a walker at baseline.  Accompanied by her sister-in-law (25 Jul 2023 21:58)    On exam. patient states that she is not sure about when her bilateral foot ulcers began. States that discoloration on her legs has been there since 1996. States has some nausea currently and she feels it is due to having too many pills and not enough food. No chills, chest pain, SOB, abdominal pain, diarrhea, dysuria.        REVIEW OF SYSTEMS:  CONSTITUTIONAL: No fever or chills  HEENT: No sore throat  RESPIRATORY: No cough, no shortness of breath  CARDIOVASCULAR: No chest pain or palpitations  GASTROINTESTINAL: No abdominal or epigastric pain  GENITOURINARY: No dysuria  NEUROLOGICAL: No headache/dizziness  MSK: No joint pain, erythema, or swelling; no back pain  SKIN: No itching, rashes  All other ROS negative except noted above    Prior hospital charts reviewed [Yes]  Primary team notes reviewed [Yes]  Other consultant notes reviewed [Yes]    PAST MEDICAL & SURGICAL HISTORY:  Diabetes      Hypertension      Hypercholesteremia          SOCIAL HISTORY:  - Born in _____, [place] in [place]XX  - Currently working as / Retired  - Lives with _____; no pets  - No recent travel  - Denies tobacco use  - Denies alcohol use  - Denies illicit drug use  - Currently sexually active, has one male/female sexual partner    FAMILY HISTORY:      Allergies:  Allergy Status Unknown      ANTIMICROBIALS:  piperacillin/tazobactam IVPB.. 3.375 every 8 hours  vancomycin  IVPB 1000 every 12 hours      ANTIMICROBIALS (past 90 days):  MEDICATIONS  (STANDING):  piperacillin/tazobactam IVPB..   25 mL/Hr IV Intermittent (07-26-23 @ 09:06)   25 mL/Hr IV Intermittent (07-25-23 @ 23:19)    piperacillin/tazobactam IVPB...   200 mL/Hr IV Intermittent (07-25-23 @ 18:20)    vancomycin  IVPB   250 mL/Hr IV Intermittent (07-26-23 @ 05:01)    vancomycin  IVPB.   250 mL/Hr IV Intermittent (07-25-23 @ 20:09)        OTHER MEDS:   MEDICATIONS  (STANDING):  atorvastatin 40 at bedtime  dextrose 50% Injectable 25 once  dextrose 50% Injectable 25 once  dextrose 50% Injectable 12.5 once  dextrose Oral Gel 15 once PRN  furosemide    Tablet 20 daily  glucagon  Injectable 1 once  heparin   Injectable 5000 every 8 hours  hydrALAZINE 50 two times a day  insulin glargine Injectable (LANTUS) 20 at bedtime  insulin lispro (ADMELOG) corrective regimen sliding scale  three times a day before meals  lisinopril 20 daily  metoprolol succinate ER 50 daily  ondansetron Injectable 4 every 8 hours PRN  tamsulosin 0.4 at bedtime      VITALS:  Vital Signs Last 24 Hrs  T(F): 97.5 (07-26-23 @ 04:56), Max: 98.5 (07-25-23 @ 13:30)    Vital Signs Last 24 Hrs  HR: 57 (07-26-23 @ 04:56) (57 - 89)  BP: 138/74 (07-26-23 @ 04:56) (130/74 - 167/79)  RR: 18 (07-26-23 @ 04:56)  SpO2: 96% (07-26-23 @ 04:56) (95% - 98%)  Wt(kg): --    EXAM:  GENERAL: NAD, lying in bed comfortably  HEAD: No head lesions  EYES: Conjunctiva pink and cornea white  ENT: Normal external ears and nose, no discharges; moist mucous membranes  NECK: Supple, nontender to palpation; no JVD  CHEST/LUNG: Clear to auscultation bilaterally  HEART: S1 S2  ABDOMEN: Soft, nontender, nondistended; normoactive bowel sounds  EXTREMITIES: No clubbing, cyanosis  NERVOUS SYSTEM: Alert and oriented to person, time, place and situation, speech clear. No focal deficits   MSK: No joint erythema, swelling or pain  SKIN: Bilateral shins with black, scaly skin. L heel with 2cm wound to subcutaneous tissue, no erythema or purulence. R heel with large black eschar, no erythema or purulence noted, no probing to bone.     Labs:                        10.9   7.24  )-----------( 295      ( 26 Jul 2023 07:11 )             33.2     07-26    144  |  103  |  21  ----------------------------<  121<H>  4.3   |  25  |  0.99    Ca    9.5      26 Jul 2023 07:01    TPro  8.3  /  Alb  3.6  /  TBili  0.4  /  DBili  x   /  AST  19  /  ALT  11  /  AlkPhos  69  07-25      WBC Trend:  WBC Count: 7.24 (07-26-23 @ 07:11)  WBC Count: 7.25 (07-25-23 @ 16:49)      Auto Neutrophil #: 4.28 K/uL (07-25-23 @ 16:49)      Creatine Trend:  Creatinine: 0.99 (07-26)  Creatinine: 1.10 (07-25)      Liver Biochemical Testing Trend:  Alanine Aminotransferase (ALT/SGPT): 11 (07-25)  Aspartate Aminotransferase (AST/SGOT): 19 (07-25-23 @ 16:49)  Bilirubin Total: 0.4 (07-25)      Trend LDH      Auto Eosinophil %: 2.8 % (07-25-23 @ 16:49)      Urinalysis Basic - ( 26 Jul 2023 07:01 )    Color: x / Appearance: x / SG: x / pH: x  Gluc: 121 mg/dL / Ketone: x  / Bili: x / Urobili: x   Blood: x / Protein: x / Nitrite: x   Leuk Esterase: x / RBC: x / WBC x   Sq Epi: x / Non Sq Epi: x / Bacteria: x        MICROBIOLOGY:        Culture - Abscess with Gram Stain (collected 25 Jul 2023 23:18)  Source: .Abscess right heel wound                                  C-Reactive Protein, Serum: 41 (07-25)                    RADIOLOGY:  imaging below personally reviewed   INFECTIOUS DISEASE CONSULT NOTE    Patient is a 63y old  Female who presents with a chief complaint of   HPI:   62 y/o F PMHx of DMH2 on insulin, HTN, HLD p/w right heel ulcer. Patient is sent by her podiatrist for questionable right foot procedure.  She is unsure how long she has had this ulcer for.  States that she was recently hospitalized in the near [facility] for an evaluation of the right foot infection.  States she still had a 102 fever yesterday which resolved.  Denies any fevers today, chills, chest pain, shortness of breath, abdominal pain, nausea vomiting diarrhea.  States she ambulates with a walker at baseline.  Accompanied by her sister-in-law (25 Jul 2023 21:58)    On exam. patient states that she is not sure about when her bilateral foot ulcers began. States that discoloration on her legs has been there since 1996. States has some nausea currently and she feels it is due to having too many pills and not enough food. No chills, chest pain, SOB, abdominal pain, diarrhea, dysuria.        REVIEW OF SYSTEMS:  CONSTITUTIONAL: No fever or chills  HEENT: No sore throat  RESPIRATORY: No cough, no shortness of breath  CARDIOVASCULAR: No chest pain or palpitations  GASTROINTESTINAL: No abdominal or epigastric pain  GENITOURINARY: No dysuria  NEUROLOGICAL: No headache/dizziness  MSK: No joint pain, erythema, or swelling; no back pain  SKIN: No itching, rashes  All other ROS negative except noted above    Prior hospital charts reviewed [Yes]  Primary team notes reviewed [Yes]  Other consultant notes reviewed [Yes]    PAST MEDICAL & SURGICAL HISTORY:  Diabetes      Hypertension      Hypercholesteremia          SOCIAL HISTORY:  - Born in [place], [place] in 19XX  - Currently working as / Retired  - Lives with _____; no pets  - No recent travel  - Denies tobacco use  - Denies alcohol use  - Denies illicit drug use  - Currently sexually active, has one male/female sexual partner    FAMILY HISTORY:      Allergies:  Allergy Status Unknown      ANTIMICROBIALS:  piperacillin/tazobactam IVPB.. 3.375 every 8 hours  vancomycin  IVPB 1000 every 12 hours      ANTIMICROBIALS (past 90 days):  MEDICATIONS  (STANDING):  piperacillin/tazobactam IVPB..   25 mL/Hr IV Intermittent (07-26-23 @ 09:06)   25 mL/Hr IV Intermittent (07-25-23 @ 23:19)    piperacillin/tazobactam IVPB...   200 mL/Hr IV Intermittent (07-25-23 @ 18:20)    vancomycin  IVPB   250 mL/Hr IV Intermittent (07-26-23 @ 05:01)    vancomycin  IVPB.   250 mL/Hr IV Intermittent (07-25-23 @ 20:09)        OTHER MEDS:   MEDICATIONS  (STANDING):  atorvastatin 40 at bedtime  dextrose 50% Injectable 25 once  dextrose 50% Injectable 25 once  dextrose 50% Injectable 12.5 once  dextrose Oral Gel 15 once PRN  furosemide    Tablet 20 daily  glucagon  Injectable 1 once  heparin   Injectable 5000 every 8 hours  hydrALAZINE 50 two times a day  insulin glargine Injectable (LANTUS) 20 at bedtime  insulin lispro (ADMELOG) corrective regimen sliding scale  three times a day before meals  lisinopril 20 daily  metoprolol succinate ER 50 daily  ondansetron Injectable 4 every 8 hours PRN  tamsulosin 0.4 at bedtime      VITALS:  Vital Signs Last 24 Hrs  T(F): 97.5 (07-26-23 @ 04:56), Max: 98.5 (07-25-23 @ 13:30)    Vital Signs Last 24 Hrs  HR: 57 (07-26-23 @ 04:56) (57 - 89)  BP: 138/74 (07-26-23 @ 04:56) (130/74 - 167/79)  RR: 18 (07-26-23 @ 04:56)  SpO2: 96% (07-26-23 @ 04:56) (95% - 98%)  Wt(kg): --    EXAM:  GENERAL: NAD, lying in bed comfortably  HEAD: No head lesions  EYES: Conjunctiva pink and cornea white  ENT: Normal external ears and nose, no discharges; moist mucous membranes  NECK: Supple, nontender to palpation; no JVD  CHEST/LUNG: Clear to auscultation bilaterally  HEART: S1 S2  ABDOMEN: Soft, nontender, nondistended; normoactive bowel sounds  EXTREMITIES: No clubbing, cyanosis  NERVOUS SYSTEM: Alert and oriented to person, time, place and situation, speech clear. No focal deficits   MSK: No joint erythema, swelling or pain  SKIN: Bilateral shins with black, scaly skin. L heel with 2cm wound to subcutaneous tissue, no erythema or purulence. R heel with large black eschar, no erythema or purulence noted, no probing to bone.     Labs:                        10.9   7.24  )-----------( 295      ( 26 Jul 2023 07:11 )             33.2     07-26    144  |  103  |  21  ----------------------------<  121<H>  4.3   |  25  |  0.99    Ca    9.5      26 Jul 2023 07:01    TPro  8.3  /  Alb  3.6  /  TBili  0.4  /  DBili  x   /  AST  19  /  ALT  11  /  AlkPhos  69  07-25      WBC Trend:  WBC Count: 7.24 (07-26-23 @ 07:11)  WBC Count: 7.25 (07-25-23 @ 16:49)      Auto Neutrophil #: 4.28 K/uL (07-25-23 @ 16:49)      Creatine Trend:  Creatinine: 0.99 (07-26)  Creatinine: 1.10 (07-25)      Liver Biochemical Testing Trend:  Alanine Aminotransferase (ALT/SGPT): 11 (07-25)  Aspartate Aminotransferase (AST/SGOT): 19 (07-25-23 @ 16:49)  Bilirubin Total: 0.4 (07-25)      Trend LDH      Auto Eosinophil %: 2.8 % (07-25-23 @ 16:49)      Urinalysis Basic - ( 26 Jul 2023 07:01 )    Color: x / Appearance: x / SG: x / pH: x  Gluc: 121 mg/dL / Ketone: x  / Bili: x / Urobili: x   Blood: x / Protein: x / Nitrite: x   Leuk Esterase: x / RBC: x / WBC x   Sq Epi: x / Non Sq Epi: x / Bacteria: x        MICROBIOLOGY:        Culture - Abscess with Gram Stain (collected 25 Jul 2023 23:18)  Source: .Abscess right heel wound                                  C-Reactive Protein, Serum: 41 (07-25)                    RADIOLOGY:  imaging below personally reviewed   INFECTIOUS DISEASE CONSULT NOTE    Patient is a 63y old  Female who presents with a chief complaint of   HPI:   62 y/o F PMHx of DMH2 on insulin, HTN, HLD p/w right heel ulcer. Patient is sent by her podiatrist for questionable right foot procedure.  She is unsure how long she has had this ulcer for.  States that she was recently hospitalized in the near [facility] for an evaluation of the right foot infection.  States she still had a 102 fever yesterday which resolved.  Denies any fevers today, chills, chest pain, shortness of breath, abdominal pain, nausea vomiting diarrhea.  States she ambulates with a walker at baseline.  Accompanied by her sister-in-law (25 Jul 2023 21:58)    On exam. patient states that she is not sure about when her bilateral foot ulcers began. States that discoloration on her legs has been there since 1996. States has some nausea currently and she feels it is due to having too many pills and not enough food. No chills, chest pain, SOB, abdominal pain, diarrhea, dysuria.        REVIEW OF SYSTEMS:  CONSTITUTIONAL: No fever or chills  HEENT: No sore throat  RESPIRATORY: No cough, no shortness of breath  CARDIOVASCULAR: No chest pain or palpitations  GASTROINTESTINAL: No abdominal or epigastric pain,  + nausea/vomiting  GENITOURINARY: No dysuria  NEUROLOGICAL: No headache/dizziness  MSK: No joint pain, erythema, or swelling; no back pain  SKIN: No itching, rashes  All other ROS negative except noted above    Prior hospital charts reviewed [Yes]  Primary team notes reviewed [Yes]  Other consultant notes reviewed [Yes]    PAST MEDICAL & SURGICAL HISTORY:  Diabetes      Hypertension      Hypercholesteremia          SOCIAL HISTORY:  not currently working    FAMILY HISTORY:      Allergies:  Allergy Status Unknown      ANTIMICROBIALS:  piperacillin/tazobactam IVPB.. 3.375 every 8 hours  vancomycin  IVPB 1000 every 12 hours      ANTIMICROBIALS (past 90 days):  MEDICATIONS  (STANDING):  piperacillin/tazobactam IVPB..   25 mL/Hr IV Intermittent (07-26-23 @ 09:06)   25 mL/Hr IV Intermittent (07-25-23 @ 23:19)    piperacillin/tazobactam IVPB...   200 mL/Hr IV Intermittent (07-25-23 @ 18:20)    vancomycin  IVPB   250 mL/Hr IV Intermittent (07-26-23 @ 05:01)    vancomycin  IVPB.   250 mL/Hr IV Intermittent (07-25-23 @ 20:09)        OTHER MEDS:   MEDICATIONS  (STANDING):  atorvastatin 40 at bedtime  dextrose 50% Injectable 25 once  dextrose 50% Injectable 25 once  dextrose 50% Injectable 12.5 once  dextrose Oral Gel 15 once PRN  furosemide    Tablet 20 daily  glucagon  Injectable 1 once  heparin   Injectable 5000 every 8 hours  hydrALAZINE 50 two times a day  insulin glargine Injectable (LANTUS) 20 at bedtime  insulin lispro (ADMELOG) corrective regimen sliding scale  three times a day before meals  lisinopril 20 daily  metoprolol succinate ER 50 daily  ondansetron Injectable 4 every 8 hours PRN  tamsulosin 0.4 at bedtime      VITALS:  Vital Signs Last 24 Hrs  T(F): 97.5 (07-26-23 @ 04:56), Max: 98.5 (07-25-23 @ 13:30)    Vital Signs Last 24 Hrs  HR: 57 (07-26-23 @ 04:56) (57 - 89)  BP: 138/74 (07-26-23 @ 04:56) (130/74 - 167/79)  RR: 18 (07-26-23 @ 04:56)  SpO2: 96% (07-26-23 @ 04:56) (95% - 98%)  Wt(kg): --    EXAM:  GENERAL: NAD, lying in bed comfortably  HEAD: No head lesions  EYES: Conjunctiva pink and cornea white  ENT: Normal external ears and nose, no discharges; moist mucous membranes  NECK: Supple, nontender to palpation; no JVD  CHEST/LUNG: Clear to auscultation bilaterally  HEART: S1 S2  ABDOMEN: Soft, nontender, nondistended; normoactive bowel sounds  EXTREMITIES: No clubbing, cyanosis  NERVOUS SYSTEM: Alert and oriented to person, time, place and situation, speech clear. No focal deficits   MSK: No joint erythema, swelling or pain  SKIN: Bilateral shins with black, scaly skin. L heel with 2cm wound to subcutaneous tissue, no erythema or purulence. R heel with large black eschar, separation noted in between anterior aspect of eschar and skin, malodorous    Labs:                        10.9   7.24  )-----------( 295      ( 26 Jul 2023 07:11 )             33.2     07-26    144  |  103  |  21  ----------------------------<  121<H>  4.3   |  25  |  0.99    Ca    9.5      26 Jul 2023 07:01    TPro  8.3  /  Alb  3.6  /  TBili  0.4  /  DBili  x   /  AST  19  /  ALT  11  /  AlkPhos  69  07-25      WBC Trend:  WBC Count: 7.24 (07-26-23 @ 07:11)  WBC Count: 7.25 (07-25-23 @ 16:49)      Auto Neutrophil #: 4.28 K/uL (07-25-23 @ 16:49)      Creatine Trend:  Creatinine: 0.99 (07-26)  Creatinine: 1.10 (07-25)      Liver Biochemical Testing Trend:  Alanine Aminotransferase (ALT/SGPT): 11 (07-25)  Aspartate Aminotransferase (AST/SGOT): 19 (07-25-23 @ 16:49)  Bilirubin Total: 0.4 (07-25)      Trend LDH      Auto Eosinophil %: 2.8 % (07-25-23 @ 16:49)      Urinalysis Basic - ( 26 Jul 2023 07:01 )    Color: x / Appearance: x / SG: x / pH: x  Gluc: 121 mg/dL / Ketone: x  / Bili: x / Urobili: x   Blood: x / Protein: x / Nitrite: x   Leuk Esterase: x / RBC: x / WBC x   Sq Epi: x / Non Sq Epi: x / Bacteria: x        MICROBIOLOGY:        Culture - Abscess with Gram Stain (collected 25 Jul 2023 23:18)  Source: .Abscess right heel wound                                  C-Reactive Protein, Serum: 41 (07-25)                    RADIOLOGY:  imaging below personally reviewed   INFECTIOUS DISEASE CONSULT NOTE    Patient is a 63y old  Female who presents with a chief complaint of   HPI:   62 y/o F PMHx of DMH2 on insulin, HTN, HLD p/w right heel ulcer. Patient is sent by her podiatrist for questionable right foot procedure.  She is unsure how long she has had this ulcer for.  States that she was recently hospitalized in the near [facility] for an evaluation of the right foot infection.  States she still had a 102 fever yesterday which resolved.  Denies any fevers today, chills, chest pain, shortness of breath, abdominal pain, nausea vomiting diarrhea.  States she ambulates with a walker at baseline.  Accompanied by her sister-in-law (25 Jul 2023 21:58)    On exam. patient states that she is not sure about when her bilateral foot ulcers began. States that discoloration on her legs has been there since 1996. States has some nausea currently and she feels it is due to having too many pills and not enough food. No chills, chest pain, SOB, abdominal pain, diarrhea, dysuria.        REVIEW OF SYSTEMS:  CONSTITUTIONAL: No fever or chills  HEENT: No sore throat  RESPIRATORY: No cough, no shortness of breath  CARDIOVASCULAR: No chest pain or palpitations  GASTROINTESTINAL: No abdominal or epigastric pain,  + nausea/vomiting  GENITOURINARY: No dysuria  NEUROLOGICAL: No headache/dizziness  MSK: No joint pain, erythema, or swelling; no back pain  SKIN: No itching, rashes  All other ROS negative except noted above    Prior hospital charts reviewed [Yes]  Primary team notes reviewed [Yes]  Other consultant notes reviewed [Yes]    PAST MEDICAL & SURGICAL HISTORY:  Diabetes      Hypertension      Hypercholesteremia          SOCIAL HISTORY:  not currently working    FAMILY HISTORY:      Allergies:  Allergy Status Unknown      ANTIMICROBIALS:  piperacillin/tazobactam IVPB.. 3.375 every 8 hours  vancomycin  IVPB 1000 every 12 hours      ANTIMICROBIALS (past 90 days):  MEDICATIONS  (STANDING):  piperacillin/tazobactam IVPB..   25 mL/Hr IV Intermittent (07-26-23 @ 09:06)   25 mL/Hr IV Intermittent (07-25-23 @ 23:19)    piperacillin/tazobactam IVPB...   200 mL/Hr IV Intermittent (07-25-23 @ 18:20)    vancomycin  IVPB   250 mL/Hr IV Intermittent (07-26-23 @ 05:01)    vancomycin  IVPB.   250 mL/Hr IV Intermittent (07-25-23 @ 20:09)        OTHER MEDS:   MEDICATIONS  (STANDING):  atorvastatin 40 at bedtime  dextrose 50% Injectable 25 once  dextrose 50% Injectable 25 once  dextrose 50% Injectable 12.5 once  dextrose Oral Gel 15 once PRN  furosemide    Tablet 20 daily  glucagon  Injectable 1 once  heparin   Injectable 5000 every 8 hours  hydrALAZINE 50 two times a day  insulin glargine Injectable (LANTUS) 20 at bedtime  insulin lispro (ADMELOG) corrective regimen sliding scale  three times a day before meals  lisinopril 20 daily  metoprolol succinate ER 50 daily  ondansetron Injectable 4 every 8 hours PRN  tamsulosin 0.4 at bedtime      VITALS:  Vital Signs Last 24 Hrs  T(F): 97.5 (07-26-23 @ 04:56), Max: 98.5 (07-25-23 @ 13:30)    Vital Signs Last 24 Hrs  HR: 57 (07-26-23 @ 04:56) (57 - 89)  BP: 138/74 (07-26-23 @ 04:56) (130/74 - 167/79)  RR: 18 (07-26-23 @ 04:56)  SpO2: 96% (07-26-23 @ 04:56) (95% - 98%)  Wt(kg): --    EXAM:  GENERAL: NAD, lying in bed comfortably  HEAD: No head lesions  EYES: Conjunctiva pink and cornea white  ENT: Normal external ears and nose, no discharges; moist mucous membranes  NECK: Supple, nontender to palpation; no JVD  CHEST/LUNG: Clear to auscultation bilaterally  HEART: S1 S2  ABDOMEN: Soft, nontender, nondistended; normoactive bowel sounds  EXTREMITIES: No clubbing, cyanosis  NERVOUS SYSTEM: Alert and oriented to person, time, place and situation, speech clear. No focal deficits   MSK: No joint erythema, swelling or pain  SKIN: Bilateral shins with black, scaly skin. L heel with 2cm wound to subcutaneous tissue, no erythema or purulence. R heel with large black eschar, separation noted in between anterior aspect of eschar and skin, malodorous    Labs:                        10.9   7.24  )-----------( 295      ( 26 Jul 2023 07:11 )             33.2     07-26    144  |  103  |  21  ----------------------------<  121<H>  4.3   |  25  |  0.99    Ca    9.5      26 Jul 2023 07:01    TPro  8.3  /  Alb  3.6  /  TBili  0.4  /  DBili  x   /  AST  19  /  ALT  11  /  AlkPhos  69  07-25      WBC Trend:  WBC Count: 7.24 (07-26-23 @ 07:11)  WBC Count: 7.25 (07-25-23 @ 16:49)      Auto Neutrophil #: 4.28 K/uL (07-25-23 @ 16:49)      Creatine Trend:  Creatinine: 0.99 (07-26)  Creatinine: 1.10 (07-25)      Liver Biochemical Testing Trend:  Alanine Aminotransferase (ALT/SGPT): 11 (07-25)  Aspartate Aminotransferase (AST/SGOT): 19 (07-25-23 @ 16:49)  Bilirubin Total: 0.4 (07-25)      Trend LDH      Auto Eosinophil %: 2.8 % (07-25-23 @ 16:49)      Urinalysis Basic - ( 26 Jul 2023 07:01 )    Color: x / Appearance: x / SG: x / pH: x  Gluc: 121 mg/dL / Ketone: x  / Bili: x / Urobili: x   Blood: x / Protein: x / Nitrite: x   Leuk Esterase: x / RBC: x / WBC x   Sq Epi: x / Non Sq Epi: x / Bacteria: x        MICROBIOLOGY:        Culture - Abscess with Gram Stain (collected 25 Jul 2023 23:18)  Source: .Abscess right heel wound                                  C-Reactive Protein, Serum: 41 (07-25)                    RADIOLOGY:  imaging below personally reviewed   INFECTIOUS DISEASE CONSULT NOTE    Patient is a 63y old  Female who presents with a chief complaint of   HPI:   62 y/o F PMHx of DMH2 on insulin, HTN, HLD p/w right heel ulcer. Patient is sent by her podiatrist for questionable right foot procedure.  She is unsure how long she has had this ulcer for.  States that she was recently hospitalized in the near [facility] for an evaluation of the right foot infection.  States she still had a 102 fever yesterday which resolved.  Denies any fevers today, chills, chest pain, shortness of breath, abdominal pain, nausea vomiting diarrhea.  States she ambulates with a walker at baseline.  Accompanied by her sister-in-law (25 Jul 2023 21:58)    On exam. patient states that she is not sure about when her bilateral foot ulcers began. States that discoloration on her legs has been there since 1996. States has some nausea currently and she feels it is due to having too many pills and not enough food. No chills, chest pain, SOB, abdominal pain, diarrhea, dysuria.        REVIEW OF SYSTEMS:  CONSTITUTIONAL: No fever or chills  HEENT: No sore throat  RESPIRATORY: No cough, no shortness of breath  CARDIOVASCULAR: No chest pain or palpitations  GASTROINTESTINAL: No abdominal or epigastric pain,  + nausea/vomiting  GENITOURINARY: No dysuria  NEUROLOGICAL: No headache/dizziness  MSK: No joint pain, erythema, or swelling; no back pain  SKIN: No itching, rashes  All other ROS negative except noted above    Prior hospital charts reviewed [Yes]  Primary team notes reviewed [Yes]  Other consultant notes reviewed [Yes]    PAST MEDICAL & SURGICAL HISTORY:  Diabetes      Hypertension      Hypercholesteremia          SOCIAL HISTORY:  not currently working    FAMILY HISTORY:      Allergies:  Allergy Status Unknown      ANTIMICROBIALS:  piperacillin/tazobactam IVPB.. 3.375 every 8 hours  vancomycin  IVPB 1000 every 12 hours      ANTIMICROBIALS (past 90 days):  MEDICATIONS  (STANDING):  piperacillin/tazobactam IVPB..   25 mL/Hr IV Intermittent (07-26-23 @ 09:06)   25 mL/Hr IV Intermittent (07-25-23 @ 23:19)    piperacillin/tazobactam IVPB...   200 mL/Hr IV Intermittent (07-25-23 @ 18:20)    vancomycin  IVPB   250 mL/Hr IV Intermittent (07-26-23 @ 05:01)    vancomycin  IVPB.   250 mL/Hr IV Intermittent (07-25-23 @ 20:09)        OTHER MEDS:   MEDICATIONS  (STANDING):  atorvastatin 40 at bedtime  dextrose 50% Injectable 25 once  dextrose 50% Injectable 25 once  dextrose 50% Injectable 12.5 once  dextrose Oral Gel 15 once PRN  furosemide    Tablet 20 daily  glucagon  Injectable 1 once  heparin   Injectable 5000 every 8 hours  hydrALAZINE 50 two times a day  insulin glargine Injectable (LANTUS) 20 at bedtime  insulin lispro (ADMELOG) corrective regimen sliding scale  three times a day before meals  lisinopril 20 daily  metoprolol succinate ER 50 daily  ondansetron Injectable 4 every 8 hours PRN  tamsulosin 0.4 at bedtime      VITALS:  Vital Signs Last 24 Hrs  T(F): 97.5 (07-26-23 @ 04:56), Max: 98.5 (07-25-23 @ 13:30)    Vital Signs Last 24 Hrs  HR: 57 (07-26-23 @ 04:56) (57 - 89)  BP: 138/74 (07-26-23 @ 04:56) (130/74 - 167/79)  RR: 18 (07-26-23 @ 04:56)  SpO2: 96% (07-26-23 @ 04:56) (95% - 98%)  Wt(kg): --    EXAM:  GENERAL: NAD, lying in bed comfortably  HEAD: No head lesions  EYES: Conjunctiva pink and cornea white  ENT: Normal external ears and nose, no discharges; moist mucous membranes  NECK: Supple, nontender to palpation; no JVD  CHEST/LUNG: Clear to auscultation bilaterally  HEART: S1 S2  ABDOMEN: Soft, nontender, nondistended; normoactive bowel sounds  EXTREMITIES: No clubbing, cyanosis  NERVOUS SYSTEM: Alert and oriented to person, time, place and situation, speech clear. No focal deficits   MSK: No joint erythema, swelling or pain  SKIN: Bilateral shins with black, scaly skin. L heel with 2cm wound to subcutaneous tissue, no erythema or purulence. R heel with large black eschar, separation noted in between anterior aspect of eschar and skin, malodorous    Labs:                        10.9   7.24  )-----------( 295      ( 26 Jul 2023 07:11 )             33.2     07-26    144  |  103  |  21  ----------------------------<  121<H>  4.3   |  25  |  0.99    Ca    9.5      26 Jul 2023 07:01    TPro  8.3  /  Alb  3.6  /  TBili  0.4  /  DBili  x   /  AST  19  /  ALT  11  /  AlkPhos  69  07-25      WBC Trend:  WBC Count: 7.24 (07-26-23 @ 07:11)  WBC Count: 7.25 (07-25-23 @ 16:49)      Auto Neutrophil #: 4.28 K/uL (07-25-23 @ 16:49)      Creatine Trend:  Creatinine: 0.99 (07-26)  Creatinine: 1.10 (07-25)      Liver Biochemical Testing Trend:  Alanine Aminotransferase (ALT/SGPT): 11 (07-25)  Aspartate Aminotransferase (AST/SGOT): 19 (07-25-23 @ 16:49)  Bilirubin Total: 0.4 (07-25)      Trend LDH      Auto Eosinophil %: 2.8 % (07-25-23 @ 16:49)      Urinalysis Basic - ( 26 Jul 2023 07:01 )    Color: x / Appearance: x / SG: x / pH: x  Gluc: 121 mg/dL / Ketone: x  / Bili: x / Urobili: x   Blood: x / Protein: x / Nitrite: x   Leuk Esterase: x / RBC: x / WBC x   Sq Epi: x / Non Sq Epi: x / Bacteria: x        MICROBIOLOGY:        Culture - Abscess with Gram Stain (collected 25 Jul 2023 23:18)  Source: .Abscess right heel wound                                  C-Reactive Protein, Serum: 41 (07-25)                    RADIOLOGY:  imaging below personally reviewed

## 2023-07-26 NOTE — CONSULT NOTE ADULT - ASSESSMENT
62 y/o F PMHx of DMH2 on insulin, HTN, HLD p/w right heel ulcer.    Assessment  DMT2: 63y Female with DM T2 with hyperglycemia, A1C 8.4%, was on insulin at home, now on basal bolus insulin with coverage, blood sugars running high. Has right heel ulcer.   Foot wound: on medications, monitored. Podiatry eval/work up  HTN: on antihypertensive medications, monitored, asymptomatic.  HLD: stable, on statin, monitored.    Discussed plan and management with Attending   Armando Toscano NP - TEAMS  Dr Wesley Hannah  [phone]         62 y/o F PMHx of DMH2 on insulin, HTN, HLD p/w right heel ulcer.    Assessment  DMT2: 63y Female with DM T2 with hyperglycemia, A1C 8.4%, was on insulin at home, now on basal bolus insulin with coverage, blood sugars running high. Has right heel ulcer.   Foot wound: on medications, monitored. Podiatry eval/work up  HTN: on antihypertensive medications, monitored, asymptomatic.  HLD: stable, on statin, monitored.    Discussed plan and management with Attending   Armando Toscano NP - TEAMS  Dr Wesley Hannah  [phone]         62 y/o F PMHx of DMH2 on insulin, HTN, HLD p/w right heel ulcer.    Assessment  DMT2: 63y Female with DM T2 with hyperglycemia, A1C 8.4%, was on insulin at home, now on basal bolus insulin with coverage, blood sugars running high. Has right heel ulcer.   Foot wound: on medications, monitored. Podiatry eval/work up  HTN: on antihypertensive medications, monitored, asymptomatic.  HLD: stable, on statin, monitored.    Discussed plan and management with Attending   Armando Toscano NP - TEAMS  Dr Wesley Hannah  [phone]

## 2023-07-26 NOTE — CONSULT NOTE ADULT - SUBJECTIVE AND OBJECTIVE BOX
HPI:   64 y/o F PMHx of DMH2 on insulin, HTN, HLD p/w right heel ulcer. Patient is sent by her podiatrist for questionable right foot procedure.  She is unsure how long she has had this ulcer for.  States that she was recently hospitalized in the near [facility] for an evaluation of the right foot infection.  States she still had a 102 fever yesterday which resolved.  Denies any fevers today, chills, chest pain, shortness of breath, abdominal pain, nausea vomiting diarrhea.  States she ambulates with a walker at baseline.  Accompanied by her sister-in-law (25 Jul 2023 21:58)      Patient has history of diabetes, A1C A1C with Estimated Average Glucose Result: 8.4 % on home insulin   Endo was consulted for glycemic control.      PAST MEDICAL & SURGICAL HISTORY:  Diabetes      Hypertension      Hypercholesteremia          FAMILY HISTORY:      Social History:            HOME MEDICATIONS:  Home Medications:  atorvastatin 40 mg oral tablet: 1 tab(s) orally once a day (25 Jul 2023 18:56)  furosemide 20 mg oral tablet: 1 tab(s) orally once a day (25 Jul 2023 18:56)  hydrALAZINE 50 mg oral tablet: 1 tab(s) orally 2 times a day (25 Jul 2023 18:56)  Lantus Solostar Pen 100 units/mL subcutaneous solution: 20 unit(s) subcutaneous once a day (at bedtime) (25 Jul 2023 18:56)  lisinopril 20 mg oral tablet: 1 tab(s) orally once a day (25 Jul 2023 18:56)  metoprolol succinate 50 mg oral tablet, extended release: 1 tab(s) orally once a day (25 Jul 2023 18:56)  NovoLOG FlexPen 100 units/mL injectable solution: 2-4 units subcutaneous three times a before meals (25 Jul 2023 18:56)  Santyl 250 units/g topical ointment: Apply topically to affected area 2 times a day right heel (25 Jul 2023 18:56)  tamsulosin 0.4 mg oral capsule: 1 cap(s) orally once a day (25 Jul 2023 18:56)  Thera-M oral tablet: 1 tab(s) orally once a day (25 Jul 2023 18:56)  Tylenol 325 mg oral tablet: 2 tab(s) orally as needed for pain (25 Jul 2023 18:56)            MEDICATIONS  (STANDING):  atorvastatin 40 milliGRAM(s) Oral at bedtime  dextrose 5%. 1000 milliLiter(s) (100 mL/Hr) IV Continuous <Continuous>  dextrose 5%. 1000 milliLiter(s) (50 mL/Hr) IV Continuous <Continuous>  dextrose 50% Injectable 12.5 Gram(s) IV Push once  dextrose 50% Injectable 25 Gram(s) IV Push once  dextrose 50% Injectable 25 Gram(s) IV Push once  furosemide    Tablet 20 milliGRAM(s) Oral daily  glucagon  Injectable 1 milliGRAM(s) IntraMuscular once  heparin   Injectable 5000 Unit(s) SubCutaneous every 8 hours  hydrALAZINE 50 milliGRAM(s) Oral two times a day  insulin glargine Injectable (LANTUS) 20 Unit(s) SubCutaneous at bedtime  insulin lispro (ADMELOG) corrective regimen sliding scale   SubCutaneous three times a day before meals  insulin lispro (ADMELOG) corrective regimen sliding scale   SubCutaneous at bedtime  insulin lispro Injectable (ADMELOG) 3 Unit(s) SubCutaneous before breakfast  insulin lispro Injectable (ADMELOG) 2 Unit(s) SubCutaneous before dinner  insulin lispro Injectable (ADMELOG) 3 Unit(s) SubCutaneous before lunch  lisinopril 20 milliGRAM(s) Oral daily  metoprolol succinate ER 50 milliGRAM(s) Oral daily  piperacillin/tazobactam IVPB.. 3.375 Gram(s) IV Intermittent every 8 hours  tamsulosin 0.4 milliGRAM(s) Oral at bedtime    MEDICATIONS  (PRN):  dextrose Oral Gel 15 Gram(s) Oral once PRN Blood Glucose LESS THAN 70 milliGRAM(s)/deciliter  ondansetron Injectable 4 milliGRAM(s) IV Push every 8 hours PRN Nausea and/or Vomiting      Allergies    Allergy Status Unknown    Intolerances        Review of Systems:  Neuro: No HA, no dizziness  Cardiovascular: No chest pain, no palpitations  Respiratory: no SOB, no cough  GI: No nausea, vomiting, abdominal pain  MSK: Denies joint/muscle pain      ALL OTHER SYSTEMS REVIEWED AND NEGATIVE      PHYSICAL EXAM:  VITALS: T(C): 36.4 (07-26-23 @ 04:56)  T(F): 97.5 (07-26-23 @ 04:56), Max: 98.5 (07-25-23 @ 13:30)  HR: 57 (07-26-23 @ 04:56) (57 - 89)  BP: 138/74 (07-26-23 @ 04:56) (130/74 - 167/79)  RR:  (18 - 18)  SpO2:  (95% - 98%)  Wt(kg): --  GENERAL: NAD, well-groomed, well-developed  NEURO:  alert and oriented  RESPIRATORY: Clear to auscultation bilaterally; No rales, rhonchi, wheezing  CARDIOVASCULAR: Si S2  GI: Soft, non distended, normal bowel sounds  MUSCULOSKELETAL: Moves all extremities equally       POCT Blood Glucose.: 215 mg/dL (07-26-23 @ 12:33)  POCT Blood Glucose.: 149 mg/dL (07-26-23 @ 08:15)  POCT Blood Glucose.: 252 mg/dL (07-25-23 @ 22:43)                            10.9   7.24  )-----------( 295      ( 26 Jul 2023 07:11 )             33.2       07-26    144  |  103  |  21  ----------------------------<  121<H>  4.3   |  25  |  0.99    eGFR: 63    Ca    9.5      07-26    TPro  8.3  /  Alb  3.6  /  TBili  0.4  /  DBili  x   /  AST  19  /  ALT  11  /  AlkPhos  69  07-25      Thyroid Function Tests:    Diet, NPO after Midnight:      NPO Start Date: 26-Jul-2023,   NPO Start Time: 23:59 (07-26-23 @ 11:26) [Active]  Diet, Consistent Carbohydrate w/Evening Snack (07-25-23 @ 22:06) [Active]          A1C with Estimated Average Glucose Result: 8.4 % (07-26-23 @ 07:11)                     HPI:   64 y/o F PMHx of DMH2 on insulin, HTN, HLD p/w right heel ulcer. Patient is sent by her podiatrist for questionable right foot procedure.  She is unsure how long she has had this ulcer for.  States that she was recently hospitalized in the near [facility] for an evaluation of the right foot infection.  States she still had a 102 fever yesterday which resolved.  Denies any fevers today, chills, chest pain, shortness of breath, abdominal pain, nausea vomiting diarrhea.  States she ambulates with a walker at baseline.  Accompanied by her sister-in-law (25 Jul 2023 21:58)      Patient has history of diabetes, A1C A1C with Estimated Average Glucose Result: 8.4 % on home insulin   Endo was consulted for glycemic control.      PAST MEDICAL & SURGICAL HISTORY:  Diabetes      Hypertension      Hypercholesteremia          FAMILY HISTORY:      Social History:            HOME MEDICATIONS:  Home Medications:  atorvastatin 40 mg oral tablet: 1 tab(s) orally once a day (25 Jul 2023 18:56)  furosemide 20 mg oral tablet: 1 tab(s) orally once a day (25 Jul 2023 18:56)  hydrALAZINE 50 mg oral tablet: 1 tab(s) orally 2 times a day (25 Jul 2023 18:56)  Lantus Solostar Pen 100 units/mL subcutaneous solution: 20 unit(s) subcutaneous once a day (at bedtime) (25 Jul 2023 18:56)  lisinopril 20 mg oral tablet: 1 tab(s) orally once a day (25 Jul 2023 18:56)  metoprolol succinate 50 mg oral tablet, extended release: 1 tab(s) orally once a day (25 Jul 2023 18:56)  NovoLOG FlexPen 100 units/mL injectable solution: 2-4 units subcutaneous three times a before meals (25 Jul 2023 18:56)  Santyl 250 units/g topical ointment: Apply topically to affected area 2 times a day right heel (25 Jul 2023 18:56)  tamsulosin 0.4 mg oral capsule: 1 cap(s) orally once a day (25 Jul 2023 18:56)  Thera-M oral tablet: 1 tab(s) orally once a day (25 Jul 2023 18:56)  Tylenol 325 mg oral tablet: 2 tab(s) orally as needed for pain (25 Jul 2023 18:56)            MEDICATIONS  (STANDING):  atorvastatin 40 milliGRAM(s) Oral at bedtime  dextrose 5%. 1000 milliLiter(s) (100 mL/Hr) IV Continuous <Continuous>  dextrose 5%. 1000 milliLiter(s) (50 mL/Hr) IV Continuous <Continuous>  dextrose 50% Injectable 12.5 Gram(s) IV Push once  dextrose 50% Injectable 25 Gram(s) IV Push once  dextrose 50% Injectable 25 Gram(s) IV Push once  furosemide    Tablet 20 milliGRAM(s) Oral daily  glucagon  Injectable 1 milliGRAM(s) IntraMuscular once  heparin   Injectable 5000 Unit(s) SubCutaneous every 8 hours  hydrALAZINE 50 milliGRAM(s) Oral two times a day  insulin glargine Injectable (LANTUS) 20 Unit(s) SubCutaneous at bedtime  insulin lispro (ADMELOG) corrective regimen sliding scale   SubCutaneous three times a day before meals  insulin lispro (ADMELOG) corrective regimen sliding scale   SubCutaneous at bedtime  insulin lispro Injectable (ADMELOG) 3 Unit(s) SubCutaneous before breakfast  insulin lispro Injectable (ADMELOG) 2 Unit(s) SubCutaneous before dinner  insulin lispro Injectable (ADMELOG) 3 Unit(s) SubCutaneous before lunch  lisinopril 20 milliGRAM(s) Oral daily  metoprolol succinate ER 50 milliGRAM(s) Oral daily  piperacillin/tazobactam IVPB.. 3.375 Gram(s) IV Intermittent every 8 hours  tamsulosin 0.4 milliGRAM(s) Oral at bedtime    MEDICATIONS  (PRN):  dextrose Oral Gel 15 Gram(s) Oral once PRN Blood Glucose LESS THAN 70 milliGRAM(s)/deciliter  ondansetron Injectable 4 milliGRAM(s) IV Push every 8 hours PRN Nausea and/or Vomiting      Allergies    Allergy Status Unknown    Intolerances        Review of Systems:  Neuro: No HA, no dizziness  Cardiovascular: No chest pain, no palpitations  Respiratory: no SOB, no cough  GI: No nausea, vomiting, abdominal pain  MSK: Denies joint/muscle pain      ALL OTHER SYSTEMS REVIEWED AND NEGATIVE      PHYSICAL EXAM:  VITALS: T(C): 36.4 (07-26-23 @ 04:56)  T(F): 97.5 (07-26-23 @ 04:56), Max: 98.5 (07-25-23 @ 13:30)  HR: 57 (07-26-23 @ 04:56) (57 - 89)  BP: 138/74 (07-26-23 @ 04:56) (130/74 - 167/79)  RR:  (18 - 18)  SpO2:  (95% - 98%)  Wt(kg): --  GENERAL: NAD, well-groomed, well-developed  NEURO:  alert and oriented  RESPIRATORY: Clear to auscultation bilaterally; No rales, rhonchi, wheezing  CARDIOVASCULAR: Si S2  GI: Soft, non distended, normal bowel sounds  MUSCULOSKELETAL: Moves all extremities equally       POCT Blood Glucose.: 215 mg/dL (07-26-23 @ 12:33)  POCT Blood Glucose.: 149 mg/dL (07-26-23 @ 08:15)  POCT Blood Glucose.: 252 mg/dL (07-25-23 @ 22:43)                            10.9   7.24  )-----------( 295      ( 26 Jul 2023 07:11 )             33.2       07-26    144  |  103  |  21  ----------------------------<  121<H>  4.3   |  25  |  0.99    eGFR: 63    Ca    9.5      07-26    TPro  8.3  /  Alb  3.6  /  TBili  0.4  /  DBili  x   /  AST  19  /  ALT  11  /  AlkPhos  69  07-25      Thyroid Function Tests:    Diet, NPO after Midnight:      NPO Start Date: 26-Jul-2023,   NPO Start Time: 23:59 (07-26-23 @ 11:26) [Active]  Diet, Consistent Carbohydrate w/Evening Snack (07-25-23 @ 22:06) [Active]          A1C with Estimated Average Glucose Result: 8.4 % (07-26-23 @ 07:11)                     HPI:   64 y/o F PMHx of DMH2 on insulin, HTN, HLD p/w right heel ulcer. Patient is sent by her podiatrist for questionable right foot procedure.  She is unsure how long she has had this ulcer for.  States that she was recently hospitalized in the near [facility] for an evaluation of the right foot infection.  States she still had a 102 fever yesterday which resolved.  Denies any fevers today, chills, chest pain, shortness of breath, abdominal pain, nausea vomiting diarrhea.  States she ambulates with a walker at baseline.  Accompanied by her sister-in-law (25 Jul 2023 21:58)      Patient has history of diabetes, A1C A1C with Estimated Average Glucose Result: 8.4 % on home insulin   Endo was consulted for glycemic control.      PAST MEDICAL & SURGICAL HISTORY:  Diabetes      Hypertension      Hypercholesteremia          FAMILY HISTORY:      Social History:            HOME MEDICATIONS:  Home Medications:  atorvastatin 40 mg oral tablet: 1 tab(s) orally once a day (25 Jul 2023 18:56)  furosemide 20 mg oral tablet: 1 tab(s) orally once a day (25 Jul 2023 18:56)  hydrALAZINE 50 mg oral tablet: 1 tab(s) orally 2 times a day (25 Jul 2023 18:56)  Lantus Solostar Pen 100 units/mL subcutaneous solution: 20 unit(s) subcutaneous once a day (at bedtime) (25 Jul 2023 18:56)  lisinopril 20 mg oral tablet: 1 tab(s) orally once a day (25 Jul 2023 18:56)  metoprolol succinate 50 mg oral tablet, extended release: 1 tab(s) orally once a day (25 Jul 2023 18:56)  NovoLOG FlexPen 100 units/mL injectable solution: 2-4 units subcutaneous three times a before meals (25 Jul 2023 18:56)  Santyl 250 units/g topical ointment: Apply topically to affected area 2 times a day right heel (25 Jul 2023 18:56)  tamsulosin 0.4 mg oral capsule: 1 cap(s) orally once a day (25 Jul 2023 18:56)  Thera-M oral tablet: 1 tab(s) orally once a day (25 Jul 2023 18:56)  Tylenol 325 mg oral tablet: 2 tab(s) orally as needed for pain (25 Jul 2023 18:56)            MEDICATIONS  (STANDING):  atorvastatin 40 milliGRAM(s) Oral at bedtime  dextrose 5%. 1000 milliLiter(s) (100 mL/Hr) IV Continuous <Continuous>  dextrose 5%. 1000 milliLiter(s) (50 mL/Hr) IV Continuous <Continuous>  dextrose 50% Injectable 12.5 Gram(s) IV Push once  dextrose 50% Injectable 25 Gram(s) IV Push once  dextrose 50% Injectable 25 Gram(s) IV Push once  furosemide    Tablet 20 milliGRAM(s) Oral daily  glucagon  Injectable 1 milliGRAM(s) IntraMuscular once  heparin   Injectable 5000 Unit(s) SubCutaneous every 8 hours  hydrALAZINE 50 milliGRAM(s) Oral two times a day  insulin glargine Injectable (LANTUS) 20 Unit(s) SubCutaneous at bedtime  insulin lispro (ADMELOG) corrective regimen sliding scale   SubCutaneous three times a day before meals  insulin lispro (ADMELOG) corrective regimen sliding scale   SubCutaneous at bedtime  insulin lispro Injectable (ADMELOG) 3 Unit(s) SubCutaneous before breakfast  insulin lispro Injectable (ADMELOG) 2 Unit(s) SubCutaneous before dinner  insulin lispro Injectable (ADMELOG) 3 Unit(s) SubCutaneous before lunch  lisinopril 20 milliGRAM(s) Oral daily  metoprolol succinate ER 50 milliGRAM(s) Oral daily  piperacillin/tazobactam IVPB.. 3.375 Gram(s) IV Intermittent every 8 hours  tamsulosin 0.4 milliGRAM(s) Oral at bedtime    MEDICATIONS  (PRN):  dextrose Oral Gel 15 Gram(s) Oral once PRN Blood Glucose LESS THAN 70 milliGRAM(s)/deciliter  ondansetron Injectable 4 milliGRAM(s) IV Push every 8 hours PRN Nausea and/or Vomiting      Allergies    Allergy Status Unknown    Intolerances        Review of Systems:  Neuro: No HA, no dizziness  Cardiovascular: No chest pain, no palpitations  Respiratory: no SOB, no cough  GI: No nausea, vomiting, abdominal pain  MSK: Denies joint/muscle pain      ALL OTHER SYSTEMS REVIEWED AND NEGATIVE      PHYSICAL EXAM:  VITALS: T(C): 36.4 (07-26-23 @ 04:56)  T(F): 97.5 (07-26-23 @ 04:56), Max: 98.5 (07-25-23 @ 13:30)  HR: 57 (07-26-23 @ 04:56) (57 - 89)  BP: 138/74 (07-26-23 @ 04:56) (130/74 - 167/79)  RR:  (18 - 18)  SpO2:  (95% - 98%)  Wt(kg): --  GENERAL: NAD, well-groomed, well-developed  NEURO:  alert and oriented  RESPIRATORY: Clear to auscultation bilaterally; No rales, rhonchi, wheezing  CARDIOVASCULAR: Si S2  GI: Soft, non distended, normal bowel sounds  MUSCULOSKELETAL: Moves all extremities equally       POCT Blood Glucose.: 215 mg/dL (07-26-23 @ 12:33)  POCT Blood Glucose.: 149 mg/dL (07-26-23 @ 08:15)  POCT Blood Glucose.: 252 mg/dL (07-25-23 @ 22:43)                            10.9   7.24  )-----------( 295      ( 26 Jul 2023 07:11 )             33.2       07-26    144  |  103  |  21  ----------------------------<  121<H>  4.3   |  25  |  0.99    eGFR: 63    Ca    9.5      07-26    TPro  8.3  /  Alb  3.6  /  TBili  0.4  /  DBili  x   /  AST  19  /  ALT  11  /  AlkPhos  69  07-25      Thyroid Function Tests:    Diet, NPO after Midnight:      NPO Start Date: 26-Jul-2023,   NPO Start Time: 23:59 (07-26-23 @ 11:26) [Active]  Diet, Consistent Carbohydrate w/Evening Snack (07-25-23 @ 22:06) [Active]          A1C with Estimated Average Glucose Result: 8.4 % (07-26-23 @ 07:11)                     HPI:   62 y/o F PMHx of DMH2 on insulin, HTN, HLD p/w right heel ulcer. Patient is sent by her podiatrist for questionable right foot procedure.  She is unsure how long she has had this ulcer for.  States that she was recently hospitalized in the near [facility] for an evaluation of the right foot infection.  States she still had a 102 fever yesterday which resolved.  Denies any fevers today, chills, chest pain, shortness of breath, abdominal pain, nausea vomiting diarrhea.  States she ambulates with a walker at baseline.  Accompanied by her sister-in-law (25 Jul 2023 21:58)  Raquel Zosyn, complaining of nausea and vomiting    Patient has history of diabetes, A1C A1C with Estimated Average Glucose Result: 8.4 % on home insulin   Endo was consulted for glycemic control.      PAST MEDICAL & SURGICAL HISTORY:  Diabetes      Hypertension      Hypercholesteremia          FAMILY HISTORY:      Social History:            HOME MEDICATIONS:  Home Medications:  atorvastatin 40 mg oral tablet: 1 tab(s) orally once a day (25 Jul 2023 18:56)  furosemide 20 mg oral tablet: 1 tab(s) orally once a day (25 Jul 2023 18:56)  hydrALAZINE 50 mg oral tablet: 1 tab(s) orally 2 times a day (25 Jul 2023 18:56)  Lantus Solostar Pen 100 units/mL subcutaneous solution: 20 unit(s) subcutaneous once a day (at bedtime) (25 Jul 2023 18:56)  lisinopril 20 mg oral tablet: 1 tab(s) orally once a day (25 Jul 2023 18:56)  metoprolol succinate 50 mg oral tablet, extended release: 1 tab(s) orally once a day (25 Jul 2023 18:56)  NovoLOG FlexPen 100 units/mL injectable solution: 2-4 units subcutaneous three times a before meals (25 Jul 2023 18:56)  Santyl 250 units/g topical ointment: Apply topically to affected area 2 times a day right heel (25 Jul 2023 18:56)  tamsulosin 0.4 mg oral capsule: 1 cap(s) orally once a day (25 Jul 2023 18:56)  Thera-M oral tablet: 1 tab(s) orally once a day (25 Jul 2023 18:56)  Tylenol 325 mg oral tablet: 2 tab(s) orally as needed for pain (25 Jul 2023 18:56)            MEDICATIONS  (STANDING):  atorvastatin 40 milliGRAM(s) Oral at bedtime  dextrose 5%. 1000 milliLiter(s) (100 mL/Hr) IV Continuous <Continuous>  dextrose 5%. 1000 milliLiter(s) (50 mL/Hr) IV Continuous <Continuous>  dextrose 50% Injectable 12.5 Gram(s) IV Push once  dextrose 50% Injectable 25 Gram(s) IV Push once  dextrose 50% Injectable 25 Gram(s) IV Push once  furosemide    Tablet 20 milliGRAM(s) Oral daily  glucagon  Injectable 1 milliGRAM(s) IntraMuscular once  heparin   Injectable 5000 Unit(s) SubCutaneous every 8 hours  hydrALAZINE 50 milliGRAM(s) Oral two times a day  insulin glargine Injectable (LANTUS) 20 Unit(s) SubCutaneous at bedtime  insulin lispro (ADMELOG) corrective regimen sliding scale   SubCutaneous three times a day before meals  insulin lispro (ADMELOG) corrective regimen sliding scale   SubCutaneous at bedtime  insulin lispro Injectable (ADMELOG) 3 Unit(s) SubCutaneous before breakfast  insulin lispro Injectable (ADMELOG) 2 Unit(s) SubCutaneous before dinner  insulin lispro Injectable (ADMELOG) 3 Unit(s) SubCutaneous before lunch  lisinopril 20 milliGRAM(s) Oral daily  metoprolol succinate ER 50 milliGRAM(s) Oral daily  piperacillin/tazobactam IVPB.. 3.375 Gram(s) IV Intermittent every 8 hours  tamsulosin 0.4 milliGRAM(s) Oral at bedtime    MEDICATIONS  (PRN):  dextrose Oral Gel 15 Gram(s) Oral once PRN Blood Glucose LESS THAN 70 milliGRAM(s)/deciliter  ondansetron Injectable 4 milliGRAM(s) IV Push every 8 hours PRN Nausea and/or Vomiting      Allergies    Allergy Status Unknown    Intolerances        Review of Systems:  Neuro: No HA, no dizziness  Cardiovascular: No chest pain, no palpitations  Respiratory: no SOB, no cough  GI: No nausea, vomiting, abdominal pain  MSK: Denies joint/muscle pain      ALL OTHER SYSTEMS REVIEWED AND NEGATIVE      PHYSICAL EXAM:  VITALS: T(C): 36.4 (07-26-23 @ 04:56)  T(F): 97.5 (07-26-23 @ 04:56), Max: 98.5 (07-25-23 @ 13:30)  HR: 57 (07-26-23 @ 04:56) (57 - 89)  BP: 138/74 (07-26-23 @ 04:56) (130/74 - 167/79)  RR:  (18 - 18)  SpO2:  (95% - 98%)  Wt(kg): --  GENERAL: NAD, well-groomed, well-developed  NEURO:  alert and oriented  RESPIRATORY: Clear to auscultation bilaterally; No rales, rhonchi, wheezing  CARDIOVASCULAR: Si S2  GI: Soft, non distended, normal bowel sounds  MUSCULOSKELETAL: Moves all extremities equally       POCT Blood Glucose.: 215 mg/dL (07-26-23 @ 12:33)  POCT Blood Glucose.: 149 mg/dL (07-26-23 @ 08:15)  POCT Blood Glucose.: 252 mg/dL (07-25-23 @ 22:43)                            10.9   7.24  )-----------( 295      ( 26 Jul 2023 07:11 )             33.2       07-26    144  |  103  |  21  ----------------------------<  121<H>  4.3   |  25  |  0.99    eGFR: 63    Ca    9.5      07-26    TPro  8.3  /  Alb  3.6  /  TBili  0.4  /  DBili  x   /  AST  19  /  ALT  11  /  AlkPhos  69  07-25      Thyroid Function Tests:    Diet, NPO after Midnight:      NPO Start Date: 26-Jul-2023,   NPO Start Time: 23:59 (07-26-23 @ 11:26) [Active]  Diet, Consistent Carbohydrate w/Evening Snack (07-25-23 @ 22:06) [Active]          A1C with Estimated Average Glucose Result: 8.4 % (07-26-23 @ 07:11)                     HPI:   64 y/o F PMHx of DMH2 on insulin, HTN, HLD p/w right heel ulcer. Patient is sent by her podiatrist for questionable right foot procedure.  She is unsure how long she has had this ulcer for.  States that she was recently hospitalized in the near [facility] for an evaluation of the right foot infection.  States she still had a 102 fever yesterday which resolved.  Denies any fevers today, chills, chest pain, shortness of breath, abdominal pain, nausea vomiting diarrhea.  States she ambulates with a walker at baseline.  Accompanied by her sister-in-law (25 Jul 2023 21:58)  Raquel Zosyn, complaining of nausea and vomiting    Patient has history of diabetes, A1C A1C with Estimated Average Glucose Result: 8.4 % on home insulin   Endo was consulted for glycemic control.      PAST MEDICAL & SURGICAL HISTORY:  Diabetes      Hypertension      Hypercholesteremia          FAMILY HISTORY:      Social History:            HOME MEDICATIONS:  Home Medications:  atorvastatin 40 mg oral tablet: 1 tab(s) orally once a day (25 Jul 2023 18:56)  furosemide 20 mg oral tablet: 1 tab(s) orally once a day (25 Jul 2023 18:56)  hydrALAZINE 50 mg oral tablet: 1 tab(s) orally 2 times a day (25 Jul 2023 18:56)  Lantus Solostar Pen 100 units/mL subcutaneous solution: 20 unit(s) subcutaneous once a day (at bedtime) (25 Jul 2023 18:56)  lisinopril 20 mg oral tablet: 1 tab(s) orally once a day (25 Jul 2023 18:56)  metoprolol succinate 50 mg oral tablet, extended release: 1 tab(s) orally once a day (25 Jul 2023 18:56)  NovoLOG FlexPen 100 units/mL injectable solution: 2-4 units subcutaneous three times a before meals (25 Jul 2023 18:56)  Santyl 250 units/g topical ointment: Apply topically to affected area 2 times a day right heel (25 Jul 2023 18:56)  tamsulosin 0.4 mg oral capsule: 1 cap(s) orally once a day (25 Jul 2023 18:56)  Thera-M oral tablet: 1 tab(s) orally once a day (25 Jul 2023 18:56)  Tylenol 325 mg oral tablet: 2 tab(s) orally as needed for pain (25 Jul 2023 18:56)            MEDICATIONS  (STANDING):  atorvastatin 40 milliGRAM(s) Oral at bedtime  dextrose 5%. 1000 milliLiter(s) (100 mL/Hr) IV Continuous <Continuous>  dextrose 5%. 1000 milliLiter(s) (50 mL/Hr) IV Continuous <Continuous>  dextrose 50% Injectable 12.5 Gram(s) IV Push once  dextrose 50% Injectable 25 Gram(s) IV Push once  dextrose 50% Injectable 25 Gram(s) IV Push once  furosemide    Tablet 20 milliGRAM(s) Oral daily  glucagon  Injectable 1 milliGRAM(s) IntraMuscular once  heparin   Injectable 5000 Unit(s) SubCutaneous every 8 hours  hydrALAZINE 50 milliGRAM(s) Oral two times a day  insulin glargine Injectable (LANTUS) 20 Unit(s) SubCutaneous at bedtime  insulin lispro (ADMELOG) corrective regimen sliding scale   SubCutaneous three times a day before meals  insulin lispro (ADMELOG) corrective regimen sliding scale   SubCutaneous at bedtime  insulin lispro Injectable (ADMELOG) 3 Unit(s) SubCutaneous before breakfast  insulin lispro Injectable (ADMELOG) 2 Unit(s) SubCutaneous before dinner  insulin lispro Injectable (ADMELOG) 3 Unit(s) SubCutaneous before lunch  lisinopril 20 milliGRAM(s) Oral daily  metoprolol succinate ER 50 milliGRAM(s) Oral daily  piperacillin/tazobactam IVPB.. 3.375 Gram(s) IV Intermittent every 8 hours  tamsulosin 0.4 milliGRAM(s) Oral at bedtime    MEDICATIONS  (PRN):  dextrose Oral Gel 15 Gram(s) Oral once PRN Blood Glucose LESS THAN 70 milliGRAM(s)/deciliter  ondansetron Injectable 4 milliGRAM(s) IV Push every 8 hours PRN Nausea and/or Vomiting      Allergies    Allergy Status Unknown    Intolerances        Review of Systems:  Neuro: No HA, no dizziness  Cardiovascular: No chest pain, no palpitations  Respiratory: no SOB, no cough  GI: No nausea, vomiting, abdominal pain  MSK: Denies joint/muscle pain      ALL OTHER SYSTEMS REVIEWED AND NEGATIVE      PHYSICAL EXAM:  VITALS: T(C): 36.4 (07-26-23 @ 04:56)  T(F): 97.5 (07-26-23 @ 04:56), Max: 98.5 (07-25-23 @ 13:30)  HR: 57 (07-26-23 @ 04:56) (57 - 89)  BP: 138/74 (07-26-23 @ 04:56) (130/74 - 167/79)  RR:  (18 - 18)  SpO2:  (95% - 98%)  Wt(kg): --  GENERAL: NAD, well-groomed, well-developed  NEURO:  alert and oriented  RESPIRATORY: Clear to auscultation bilaterally; No rales, rhonchi, wheezing  CARDIOVASCULAR: Si S2  GI: Soft, non distended, normal bowel sounds  MUSCULOSKELETAL: Moves all extremities equally       POCT Blood Glucose.: 215 mg/dL (07-26-23 @ 12:33)  POCT Blood Glucose.: 149 mg/dL (07-26-23 @ 08:15)  POCT Blood Glucose.: 252 mg/dL (07-25-23 @ 22:43)                            10.9   7.24  )-----------( 295      ( 26 Jul 2023 07:11 )             33.2       07-26    144  |  103  |  21  ----------------------------<  121<H>  4.3   |  25  |  0.99    eGFR: 63    Ca    9.5      07-26    TPro  8.3  /  Alb  3.6  /  TBili  0.4  /  DBili  x   /  AST  19  /  ALT  11  /  AlkPhos  69  07-25      Thyroid Function Tests:    Diet, NPO after Midnight:      NPO Start Date: 26-Jul-2023,   NPO Start Time: 23:59 (07-26-23 @ 11:26) [Active]  Diet, Consistent Carbohydrate w/Evening Snack (07-25-23 @ 22:06) [Active]          A1C with Estimated Average Glucose Result: 8.4 % (07-26-23 @ 07:11)                     HPI:   62 y/o F PMHx of DMH2 on insulin, HTN, HLD p/w right heel ulcer. Patient is sent by her podiatrist for questionable right foot procedure.  She is unsure how long she has had this ulcer for.  States that she was recently hospitalized in the near [facility] for an evaluation of the right foot infection.  States she still had a 102 fever yesterday which resolved.  Denies any fevers today, chills, chest pain, shortness of breath, abdominal pain, nausea vomiting diarrhea.  States she ambulates with a walker at baseline.  Accompanied by her sister-in-law (25 Jul 2023 21:58)  Raquel Zosyn, complaining of nausea and vomiting    Patient has history of diabetes, A1C A1C with Estimated Average Glucose Result: 8.4 % on home insulin   Endo was consulted for glycemic control.      PAST MEDICAL & SURGICAL HISTORY:  Diabetes      Hypertension      Hypercholesteremia          FAMILY HISTORY:      Social History:            HOME MEDICATIONS:  Home Medications:  atorvastatin 40 mg oral tablet: 1 tab(s) orally once a day (25 Jul 2023 18:56)  furosemide 20 mg oral tablet: 1 tab(s) orally once a day (25 Jul 2023 18:56)  hydrALAZINE 50 mg oral tablet: 1 tab(s) orally 2 times a day (25 Jul 2023 18:56)  Lantus Solostar Pen 100 units/mL subcutaneous solution: 20 unit(s) subcutaneous once a day (at bedtime) (25 Jul 2023 18:56)  lisinopril 20 mg oral tablet: 1 tab(s) orally once a day (25 Jul 2023 18:56)  metoprolol succinate 50 mg oral tablet, extended release: 1 tab(s) orally once a day (25 Jul 2023 18:56)  NovoLOG FlexPen 100 units/mL injectable solution: 2-4 units subcutaneous three times a before meals (25 Jul 2023 18:56)  Santyl 250 units/g topical ointment: Apply topically to affected area 2 times a day right heel (25 Jul 2023 18:56)  tamsulosin 0.4 mg oral capsule: 1 cap(s) orally once a day (25 Jul 2023 18:56)  Thera-M oral tablet: 1 tab(s) orally once a day (25 Jul 2023 18:56)  Tylenol 325 mg oral tablet: 2 tab(s) orally as needed for pain (25 Jul 2023 18:56)            MEDICATIONS  (STANDING):  atorvastatin 40 milliGRAM(s) Oral at bedtime  dextrose 5%. 1000 milliLiter(s) (100 mL/Hr) IV Continuous <Continuous>  dextrose 5%. 1000 milliLiter(s) (50 mL/Hr) IV Continuous <Continuous>  dextrose 50% Injectable 12.5 Gram(s) IV Push once  dextrose 50% Injectable 25 Gram(s) IV Push once  dextrose 50% Injectable 25 Gram(s) IV Push once  furosemide    Tablet 20 milliGRAM(s) Oral daily  glucagon  Injectable 1 milliGRAM(s) IntraMuscular once  heparin   Injectable 5000 Unit(s) SubCutaneous every 8 hours  hydrALAZINE 50 milliGRAM(s) Oral two times a day  insulin glargine Injectable (LANTUS) 20 Unit(s) SubCutaneous at bedtime  insulin lispro (ADMELOG) corrective regimen sliding scale   SubCutaneous three times a day before meals  insulin lispro (ADMELOG) corrective regimen sliding scale   SubCutaneous at bedtime  insulin lispro Injectable (ADMELOG) 3 Unit(s) SubCutaneous before breakfast  insulin lispro Injectable (ADMELOG) 2 Unit(s) SubCutaneous before dinner  insulin lispro Injectable (ADMELOG) 3 Unit(s) SubCutaneous before lunch  lisinopril 20 milliGRAM(s) Oral daily  metoprolol succinate ER 50 milliGRAM(s) Oral daily  piperacillin/tazobactam IVPB.. 3.375 Gram(s) IV Intermittent every 8 hours  tamsulosin 0.4 milliGRAM(s) Oral at bedtime    MEDICATIONS  (PRN):  dextrose Oral Gel 15 Gram(s) Oral once PRN Blood Glucose LESS THAN 70 milliGRAM(s)/deciliter  ondansetron Injectable 4 milliGRAM(s) IV Push every 8 hours PRN Nausea and/or Vomiting      Allergies    Allergy Status Unknown    Intolerances        Review of Systems:  Neuro: No HA, no dizziness  Cardiovascular: No chest pain, no palpitations  Respiratory: no SOB, no cough  GI: No nausea, vomiting, abdominal pain  MSK: Denies joint/muscle pain      ALL OTHER SYSTEMS REVIEWED AND NEGATIVE      PHYSICAL EXAM:  VITALS: T(C): 36.4 (07-26-23 @ 04:56)  T(F): 97.5 (07-26-23 @ 04:56), Max: 98.5 (07-25-23 @ 13:30)  HR: 57 (07-26-23 @ 04:56) (57 - 89)  BP: 138/74 (07-26-23 @ 04:56) (130/74 - 167/79)  RR:  (18 - 18)  SpO2:  (95% - 98%)  Wt(kg): --  GENERAL: NAD, well-groomed, well-developed  NEURO:  alert and oriented  RESPIRATORY: Clear to auscultation bilaterally; No rales, rhonchi, wheezing  CARDIOVASCULAR: Si S2  GI: Soft, non distended, normal bowel sounds  MUSCULOSKELETAL: Moves all extremities equally       POCT Blood Glucose.: 215 mg/dL (07-26-23 @ 12:33)  POCT Blood Glucose.: 149 mg/dL (07-26-23 @ 08:15)  POCT Blood Glucose.: 252 mg/dL (07-25-23 @ 22:43)                            10.9   7.24  )-----------( 295      ( 26 Jul 2023 07:11 )             33.2       07-26    144  |  103  |  21  ----------------------------<  121<H>  4.3   |  25  |  0.99    eGFR: 63    Ca    9.5      07-26    TPro  8.3  /  Alb  3.6  /  TBili  0.4  /  DBili  x   /  AST  19  /  ALT  11  /  AlkPhos  69  07-25      Thyroid Function Tests:    Diet, NPO after Midnight:      NPO Start Date: 26-Jul-2023,   NPO Start Time: 23:59 (07-26-23 @ 11:26) [Active]  Diet, Consistent Carbohydrate w/Evening Snack (07-25-23 @ 22:06) [Active]          A1C with Estimated Average Glucose Result: 8.4 % (07-26-23 @ 07:11)

## 2023-07-26 NOTE — PROGRESS NOTE ADULT - SUBJECTIVE AND OBJECTIVE BOX
Patient is a 63y old  Female who presents with a chief complaint of      INTERVAL HPI/OVERNIGHT EVENTS:  Patient seen and evaluated at bedside.  Pt is resting comfortable in NAD. Denies N/V/F/C.    Allergies    Allergy Status Unknown    Intolerances        Vital Signs Last 24 Hrs  T(C): 36.4 (26 Jul 2023 04:56), Max: 36.9 (25 Jul 2023 13:30)  T(F): 97.5 (26 Jul 2023 04:56), Max: 98.5 (25 Jul 2023 13:30)  HR: 57 (26 Jul 2023 04:56) (57 - 89)  BP: 138/74 (26 Jul 2023 04:56) (130/74 - 167/79)  BP(mean): 108 (25 Jul 2023 19:39) (108 - 108)  RR: 18 (26 Jul 2023 04:56) (18 - 18)  SpO2: 96% (26 Jul 2023 04:56) (95% - 98%)    Parameters below as of 26 Jul 2023 04:56  Patient On (Oxygen Delivery Method): room air        LABS:                        10.9   7.24  )-----------( 295      ( 26 Jul 2023 07:11 )             33.2     07-26    144  |  103  |  21  ----------------------------<  121<H>  4.3   |  25  |  0.99    Ca    9.5      26 Jul 2023 07:01    TPro  8.3  /  Alb  3.6  /  TBili  0.4  /  DBili  x   /  AST  19  /  ALT  11  /  AlkPhos  69  07-25    PT/INR - ( 25 Jul 2023 16:49 )   PT: 11.6 sec;   INR: 1.00 ratio         PTT - ( 25 Jul 2023 16:49 )  PTT:28.6 sec  Urinalysis Basic - ( 26 Jul 2023 07:01 )    Color: x / Appearance: x / SG: x / pH: x  Gluc: 121 mg/dL / Ketone: x  / Bili: x / Urobili: x   Blood: x / Protein: x / Nitrite: x   Leuk Esterase: x / RBC: x / WBC x   Sq Epi: x / Non Sq Epi: x / Bacteria: x      CAPILLARY BLOOD GLUCOSE      POCT Blood Glucose.: 252 mg/dL (25 Jul 2023 22:43)      Lower Extremity Physical Exam:  Vascular: DP/PT 0/4, B/L, CFT <3 seconds B/L, Temperature gradient warm to cool, B/L.   Neuro: Epicritic sensation dimished to the level of toes, B/L.  Musculoskeletal/Ortho: Unremarkable.  Skin: Left foot posterior heel wound to subq, no drainage, no purulence, no surrounding erythema, no acute signs of infection. Right heel full thickness eschar with mild bogginess plantar medially, well adhered eschar centrally with mild non-adherence along the circumferential periphery, mild malodor, no purulent drainage, no surrounding erythema.     RADIOLOGY & ADDITIONAL TESTS:

## 2023-07-26 NOTE — CONSULT NOTE ADULT - PROBLEM SELECTOR RECOMMENDATION 9
right foot posterior heel wound     - x-ray - no OM    - culture pending  IV Antibiotics  plan for wound debridement with integra graft application    - RCRI Class I - no anginal symptoms, no SOB - acceptable cardiac risk to proceed  wound care  pain management  management as per podiatry and primary team

## 2023-07-26 NOTE — CONSULT NOTE ADULT - ASSESSMENT
62 y/o F PMHx of DMH2 on insulin, HTN, HLD p/w right heel ulcer and fever to 102F day prior to admission. ID consulted for eval of foot infection.         NOTE AND RECOMMENDATIONS NOT FINALIZED UNTIL SIGNED BY ATTENDING.   64 y/o F PMHx of DMH2 on insulin, HTN, HLD p/w right heel ulcer and fever to 102F day prior to admission. ID consulted for eval of foot infection.         NOTE AND RECOMMENDATIONS NOT FINALIZED UNTIL SIGNED BY ATTENDING.   64 y/o F PMHx of DMH2 on insulin, HTN, HLD p/w known bilateral heel ulcer and fever to 102F day prior to admission. ID consulted for eval of bilateral foot ulcer.       #B/l Foot ulcer  - pending MRI foot  - Currently on vanc/zosyn  -  right foot wound debridement with Integra graft application tomorrow 7/27 at 5pm per podiatry        NOTE AND RECOMMENDATIONS NOT FINALIZED UNTIL SIGNED BY ATTENDING.   62 y/o F PMHx of DMH2 on insulin, HTN, HLD p/w known bilateral heel ulcer and fever to 102F day prior to admission. ID consulted for eval of bilateral foot ulcer.       #B/l Foot ulcer  - pending MRI foot  - Currently on vanc/zosyn  -  right foot wound debridement with Integra graft application tomorrow 7/27 at 5pm per podiatry        NOTE AND RECOMMENDATIONS NOT FINALIZED UNTIL SIGNED BY ATTENDING.   62 y/o F PMHx of DMH2 on insulin, HTN, HLD p/w known bilateral heel ulcer and fever to 102F day prior to admission. ID consulted for eval of bilateral foot ulcer. Patient's black eschar on R heel obscures visualization of possible infection, however malodor suggests infection. Patient's L heel ulcer with no significant concern for infection.      #B/l Foot ulcer  #possible osteo  #infection  - pending MRI foot for possible osteomyelitis  -  right foot wound debridement with Integra graft application tomorrow 7/27 at 5pm per podiatry  - Continue vanc/zosyn for now given podiatry procedure 7/27, where biopsy can be taken for guidance for narrowing abx  - Vascular surgery consult recommended given possible stent restenosis   - MRSA PCR      Discussed with attending.   64 y/o F PMHx of DMH2 on insulin, HTN, HLD p/w known bilateral heel ulcer and fever to 102F day prior to admission. ID consulted for eval of bilateral foot ulcer. Patient's black eschar on R heel obscures visualization of possible infection, however malodor suggests infection. Patient's L heel ulcer with no significant concern for infection.      #B/l Foot ulcer  #possible osteo  #infection  - pending MRI foot for possible osteomyelitis  -  right foot wound debridement with Integra graft application tomorrow 7/27 at 5pm per podiatry  - Continue vanc/zosyn for now given podiatry procedure 7/27, where biopsy can be taken for guidance for narrowing abx  - Vascular surgery consult recommended given possible stent restenosis   - MRSA PCR      Discussed with attending.

## 2023-07-26 NOTE — CHART NOTE - NSCHARTNOTEFT_GEN_A_CORE
Primary team had initially reached out to vascular surgery for consult. But rescinded the consult due to palpable pulses in distal lower extremities.    Vascular surgery available for consultation, if needed. Reach out with questions/concerns.    Jose Harrison, PGY2  Vascular Surgery, [id number] Primary team had initially reached out to vascular surgery for consult. But rescinded the consult due to palpable pulses in distal lower extremities.    Vascular surgery available for consultation, if needed. Reach out with questions/concerns.    Jose Harrison, PGY2  Vascular Surgery, [id number] Primary team had initially reached out to vascular surgery for consult. But rescinded the consult due to palpable pulses in distal lower extremities.    Vascular surgery available for consultation, if needed. Reach out with questions/concerns.    Jose Harrison, PGY2  Vascular Surgery, [id number]

## 2023-07-27 ENCOUNTER — TRANSCRIPTION ENCOUNTER (OUTPATIENT)
Age: 63
End: 2023-07-27

## 2023-07-27 LAB
ANION GAP SERPL CALC-SCNC: 9 MMOL/L — SIGNIFICANT CHANGE UP (ref 5–17)
APTT BLD: 27.1 SEC — SIGNIFICANT CHANGE UP (ref 24.5–35.6)
BLD GP AB SCN SERPL QL: NEGATIVE — SIGNIFICANT CHANGE UP
BUN SERPL-MCNC: 14 MG/DL — SIGNIFICANT CHANGE UP (ref 7–23)
CALCIUM SERPL-MCNC: 8.8 MG/DL — SIGNIFICANT CHANGE UP (ref 8.4–10.5)
CHLORIDE SERPL-SCNC: 102 MMOL/L — SIGNIFICANT CHANGE UP (ref 96–108)
CO2 SERPL-SCNC: 28 MMOL/L — SIGNIFICANT CHANGE UP (ref 22–31)
CREAT SERPL-MCNC: 0.86 MG/DL — SIGNIFICANT CHANGE UP (ref 0.5–1.3)
EGFR: 75 ML/MIN/1.73M2 — SIGNIFICANT CHANGE UP
GLUCOSE BLDC GLUCOMTR-MCNC: 113 MG/DL — HIGH (ref 70–99)
GLUCOSE BLDC GLUCOMTR-MCNC: 122 MG/DL — HIGH (ref 70–99)
GLUCOSE BLDC GLUCOMTR-MCNC: 126 MG/DL — HIGH (ref 70–99)
GLUCOSE BLDC GLUCOMTR-MCNC: 146 MG/DL — HIGH (ref 70–99)
GLUCOSE BLDC GLUCOMTR-MCNC: 168 MG/DL — HIGH (ref 70–99)
GLUCOSE BLDC GLUCOMTR-MCNC: 186 MG/DL — HIGH (ref 70–99)
GLUCOSE SERPL-MCNC: 170 MG/DL — HIGH (ref 70–99)
HCT VFR BLD CALC: 34.8 % — SIGNIFICANT CHANGE UP (ref 34.5–45)
HGB BLD-MCNC: 11.4 G/DL — LOW (ref 11.5–15.5)
INR BLD: 1.07 RATIO — SIGNIFICANT CHANGE UP (ref 0.85–1.18)
MCHC RBC-ENTMCNC: 27.4 PG — SIGNIFICANT CHANGE UP (ref 27–34)
MCHC RBC-ENTMCNC: 32.8 GM/DL — SIGNIFICANT CHANGE UP (ref 32–36)
MCV RBC AUTO: 83.7 FL — SIGNIFICANT CHANGE UP (ref 80–100)
MRSA PCR RESULT.: DETECTED
NRBC # BLD: 0 /100 WBCS — SIGNIFICANT CHANGE UP (ref 0–0)
PLATELET # BLD AUTO: 292 K/UL — SIGNIFICANT CHANGE UP (ref 150–400)
POTASSIUM SERPL-MCNC: 3.5 MMOL/L — SIGNIFICANT CHANGE UP (ref 3.5–5.3)
POTASSIUM SERPL-SCNC: 3.5 MMOL/L — SIGNIFICANT CHANGE UP (ref 3.5–5.3)
PROTHROM AB SERPL-ACNC: 11.8 SEC — SIGNIFICANT CHANGE UP (ref 9.5–13)
RBC # BLD: 4.16 M/UL — SIGNIFICANT CHANGE UP (ref 3.8–5.2)
RBC # FLD: 14.1 % — SIGNIFICANT CHANGE UP (ref 10.3–14.5)
RH IG SCN BLD-IMP: POSITIVE — SIGNIFICANT CHANGE UP
S AUREUS DNA NOSE QL NAA+PROBE: DETECTED
SODIUM SERPL-SCNC: 139 MMOL/L — SIGNIFICANT CHANGE UP (ref 135–145)
WBC # BLD: 7.26 K/UL — SIGNIFICANT CHANGE UP (ref 3.8–10.5)
WBC # FLD AUTO: 7.26 K/UL — SIGNIFICANT CHANGE UP (ref 3.8–10.5)

## 2023-07-27 PROCEDURE — 99232 SBSQ HOSP IP/OBS MODERATE 35: CPT

## 2023-07-27 PROCEDURE — 88311 DECALCIFY TISSUE: CPT | Mod: 26

## 2023-07-27 PROCEDURE — 88307 TISSUE EXAM BY PATHOLOGIST: CPT | Mod: 26

## 2023-07-27 DEVICE — GRAFT FISH SKIN OMEGA3 WOUND 7X10CM: Type: IMPLANTABLE DEVICE | Site: RIGHT | Status: FUNCTIONAL

## 2023-07-27 RX ORDER — TAMSULOSIN HYDROCHLORIDE 0.4 MG/1
0.4 CAPSULE ORAL AT BEDTIME
Refills: 0 | Status: DISCONTINUED | OUTPATIENT
Start: 2023-07-27 | End: 2023-08-07

## 2023-07-27 RX ORDER — INSULIN LISPRO 100/ML
VIAL (ML) SUBCUTANEOUS EVERY 6 HOURS
Refills: 0 | Status: DISCONTINUED | OUTPATIENT
Start: 2023-07-27 | End: 2023-07-28

## 2023-07-27 RX ORDER — ATORVASTATIN CALCIUM 80 MG/1
40 TABLET, FILM COATED ORAL AT BEDTIME
Refills: 0 | Status: DISCONTINUED | OUTPATIENT
Start: 2023-07-27 | End: 2023-08-07

## 2023-07-27 RX ORDER — PANTOPRAZOLE SODIUM 20 MG/1
40 TABLET, DELAYED RELEASE ORAL
Refills: 0 | Status: DISCONTINUED | OUTPATIENT
Start: 2023-07-27 | End: 2023-08-07

## 2023-07-27 RX ORDER — INSULIN GLARGINE 100 [IU]/ML
12 INJECTION, SOLUTION SUBCUTANEOUS ONCE
Refills: 0 | Status: COMPLETED | OUTPATIENT
Start: 2023-07-27 | End: 2023-07-27

## 2023-07-27 RX ORDER — INSULIN GLARGINE 100 [IU]/ML
20 INJECTION, SOLUTION SUBCUTANEOUS AT BEDTIME
Refills: 0 | Status: DISCONTINUED | OUTPATIENT
Start: 2023-07-27 | End: 2023-07-28

## 2023-07-27 RX ORDER — ACETAMINOPHEN 500 MG
650 TABLET ORAL EVERY 6 HOURS
Refills: 0 | Status: DISCONTINUED | OUTPATIENT
Start: 2023-07-27 | End: 2023-08-07

## 2023-07-27 RX ORDER — PIPERACILLIN AND TAZOBACTAM 4; .5 G/20ML; G/20ML
3.38 INJECTION, POWDER, LYOPHILIZED, FOR SOLUTION INTRAVENOUS EVERY 8 HOURS
Refills: 0 | Status: COMPLETED | OUTPATIENT
Start: 2023-07-27 | End: 2023-08-03

## 2023-07-27 RX ORDER — INSULIN LISPRO 100/ML
VIAL (ML) SUBCUTANEOUS
Refills: 0 | Status: DISCONTINUED | OUTPATIENT
Start: 2023-07-27 | End: 2023-08-07

## 2023-07-27 RX ORDER — HYDROMORPHONE HYDROCHLORIDE 2 MG/ML
0.5 INJECTION INTRAMUSCULAR; INTRAVENOUS; SUBCUTANEOUS EVERY 4 HOURS
Refills: 0 | Status: DISCONTINUED | OUTPATIENT
Start: 2023-07-27 | End: 2023-07-28

## 2023-07-27 RX ORDER — DEXTROSE 50 % IN WATER 50 %
15 SYRINGE (ML) INTRAVENOUS ONCE
Refills: 0 | Status: DISCONTINUED | OUTPATIENT
Start: 2023-07-27 | End: 2023-08-07

## 2023-07-27 RX ORDER — INSULIN GLARGINE 100 [IU]/ML
12 INJECTION, SOLUTION SUBCUTANEOUS AT BEDTIME
Refills: 0 | Status: DISCONTINUED | OUTPATIENT
Start: 2023-07-27 | End: 2023-07-27

## 2023-07-27 RX ORDER — MUPIROCIN 20 MG/G
1 OINTMENT TOPICAL
Refills: 0 | Status: DISCONTINUED | OUTPATIENT
Start: 2023-07-27 | End: 2023-07-27

## 2023-07-27 RX ORDER — DEXTROSE 50 % IN WATER 50 %
25 SYRINGE (ML) INTRAVENOUS ONCE
Refills: 0 | Status: DISCONTINUED | OUTPATIENT
Start: 2023-07-27 | End: 2023-08-07

## 2023-07-27 RX ORDER — DEXTROSE 50 % IN WATER 50 %
12.5 SYRINGE (ML) INTRAVENOUS ONCE
Refills: 0 | Status: DISCONTINUED | OUTPATIENT
Start: 2023-07-27 | End: 2023-08-07

## 2023-07-27 RX ORDER — CHLORHEXIDINE GLUCONATE 213 G/1000ML
1 SOLUTION TOPICAL
Refills: 0 | Status: DISCONTINUED | OUTPATIENT
Start: 2023-07-27 | End: 2023-08-07

## 2023-07-27 RX ORDER — INSULIN LISPRO 100/ML
4 VIAL (ML) SUBCUTANEOUS
Refills: 0 | Status: DISCONTINUED | OUTPATIENT
Start: 2023-07-27 | End: 2023-07-28

## 2023-07-27 RX ORDER — SODIUM CHLORIDE 9 MG/ML
1000 INJECTION, SOLUTION INTRAVENOUS
Refills: 0 | Status: DISCONTINUED | OUTPATIENT
Start: 2023-07-27 | End: 2023-07-27

## 2023-07-27 RX ORDER — LISINOPRIL 2.5 MG/1
20 TABLET ORAL DAILY
Refills: 0 | Status: DISCONTINUED | OUTPATIENT
Start: 2023-07-27 | End: 2023-08-07

## 2023-07-27 RX ORDER — FENTANYL CITRATE 50 UG/ML
25 INJECTION INTRAVENOUS
Refills: 0 | Status: DISCONTINUED | OUTPATIENT
Start: 2023-07-27 | End: 2023-07-27

## 2023-07-27 RX ORDER — METOPROLOL TARTRATE 50 MG
50 TABLET ORAL DAILY
Refills: 0 | Status: DISCONTINUED | OUTPATIENT
Start: 2023-07-27 | End: 2023-08-07

## 2023-07-27 RX ORDER — INSULIN LISPRO 100/ML
2 VIAL (ML) SUBCUTANEOUS
Refills: 0 | Status: DISCONTINUED | OUTPATIENT
Start: 2023-07-27 | End: 2023-07-31

## 2023-07-27 RX ORDER — INSULIN LISPRO 100/ML
VIAL (ML) SUBCUTANEOUS AT BEDTIME
Refills: 0 | Status: DISCONTINUED | OUTPATIENT
Start: 2023-07-27 | End: 2023-07-28

## 2023-07-27 RX ORDER — INSULIN LISPRO 100/ML
3 VIAL (ML) SUBCUTANEOUS
Refills: 0 | Status: DISCONTINUED | OUTPATIENT
Start: 2023-07-27 | End: 2023-07-31

## 2023-07-27 RX ORDER — INSULIN LISPRO 100/ML
VIAL (ML) SUBCUTANEOUS EVERY 6 HOURS
Refills: 0 | Status: DISCONTINUED | OUTPATIENT
Start: 2023-07-27 | End: 2023-07-27

## 2023-07-27 RX ORDER — GLUCAGON INJECTION, SOLUTION 0.5 MG/.1ML
1 INJECTION, SOLUTION SUBCUTANEOUS ONCE
Refills: 0 | Status: DISCONTINUED | OUTPATIENT
Start: 2023-07-27 | End: 2023-08-07

## 2023-07-27 RX ORDER — FUROSEMIDE 40 MG
20 TABLET ORAL DAILY
Refills: 0 | Status: DISCONTINUED | OUTPATIENT
Start: 2023-07-27 | End: 2023-08-07

## 2023-07-27 RX ORDER — OXYCODONE AND ACETAMINOPHEN 5; 325 MG/1; MG/1
1 TABLET ORAL EVERY 4 HOURS
Refills: 0 | Status: DISCONTINUED | OUTPATIENT
Start: 2023-07-27 | End: 2023-07-27

## 2023-07-27 RX ORDER — SODIUM CHLORIDE 9 MG/ML
1000 INJECTION, SOLUTION INTRAVENOUS
Refills: 0 | Status: DISCONTINUED | OUTPATIENT
Start: 2023-07-27 | End: 2023-08-07

## 2023-07-27 RX ORDER — ONDANSETRON 8 MG/1
4 TABLET, FILM COATED ORAL EVERY 8 HOURS
Refills: 0 | Status: DISCONTINUED | OUTPATIENT
Start: 2023-07-27 | End: 2023-08-07

## 2023-07-27 RX ORDER — INSULIN LISPRO 100/ML
VIAL (ML) SUBCUTANEOUS
Refills: 0 | Status: DISCONTINUED | OUTPATIENT
Start: 2023-07-27 | End: 2023-07-28

## 2023-07-27 RX ORDER — INSULIN LISPRO 100/ML
VIAL (ML) SUBCUTANEOUS AT BEDTIME
Refills: 0 | Status: DISCONTINUED | OUTPATIENT
Start: 2023-07-27 | End: 2023-08-07

## 2023-07-27 RX ORDER — HYDRALAZINE HCL 50 MG
50 TABLET ORAL
Refills: 0 | Status: DISCONTINUED | OUTPATIENT
Start: 2023-07-27 | End: 2023-08-07

## 2023-07-27 RX ORDER — MUPIROCIN 20 MG/G
1 OINTMENT TOPICAL
Refills: 0 | Status: COMPLETED | OUTPATIENT
Start: 2023-07-27 | End: 2023-08-01

## 2023-07-27 RX ORDER — HEPARIN SODIUM 5000 [USP'U]/ML
5000 INJECTION INTRAVENOUS; SUBCUTANEOUS EVERY 8 HOURS
Refills: 0 | Status: DISCONTINUED | OUTPATIENT
Start: 2023-07-27 | End: 2023-08-07

## 2023-07-27 RX ADMIN — Medication 50 MILLIGRAM(S): at 05:11

## 2023-07-27 RX ADMIN — Medication 50 MILLIGRAM(S): at 17:32

## 2023-07-27 RX ADMIN — Medication 1 DROP(S): at 05:50

## 2023-07-27 RX ADMIN — PIPERACILLIN AND TAZOBACTAM 25 GRAM(S): 4; .5 INJECTION, POWDER, LYOPHILIZED, FOR SOLUTION INTRAVENOUS at 23:30

## 2023-07-27 RX ADMIN — HEPARIN SODIUM 5000 UNIT(S): 5000 INJECTION INTRAVENOUS; SUBCUTANEOUS at 05:12

## 2023-07-27 RX ADMIN — ATORVASTATIN CALCIUM 40 MILLIGRAM(S): 80 TABLET, FILM COATED ORAL at 21:51

## 2023-07-27 RX ADMIN — LISINOPRIL 20 MILLIGRAM(S): 2.5 TABLET ORAL at 05:11

## 2023-07-27 RX ADMIN — MUPIROCIN 1 APPLICATION(S): 20 OINTMENT TOPICAL at 17:35

## 2023-07-27 RX ADMIN — HEPARIN SODIUM 5000 UNIT(S): 5000 INJECTION INTRAVENOUS; SUBCUTANEOUS at 12:29

## 2023-07-27 RX ADMIN — PIPERACILLIN AND TAZOBACTAM 25 GRAM(S): 4; .5 INJECTION, POWDER, LYOPHILIZED, FOR SOLUTION INTRAVENOUS at 17:33

## 2023-07-27 RX ADMIN — Medication 1: at 06:09

## 2023-07-27 RX ADMIN — CHLORHEXIDINE GLUCONATE 1 APPLICATION(S): 213 SOLUTION TOPICAL at 05:50

## 2023-07-27 RX ADMIN — TAMSULOSIN HYDROCHLORIDE 0.4 MILLIGRAM(S): 0.4 CAPSULE ORAL at 21:51

## 2023-07-27 RX ADMIN — PIPERACILLIN AND TAZOBACTAM 25 GRAM(S): 4; .5 INJECTION, POWDER, LYOPHILIZED, FOR SOLUTION INTRAVENOUS at 06:02

## 2023-07-27 RX ADMIN — Medication 1 DROP(S): at 12:30

## 2023-07-27 RX ADMIN — Medication 20 MILLIGRAM(S): at 05:12

## 2023-07-27 RX ADMIN — SODIUM CHLORIDE 75 MILLILITER(S): 9 INJECTION, SOLUTION INTRAVENOUS at 21:52

## 2023-07-27 NOTE — DISCHARGE NOTE PROVIDER - CARE PROVIDER_API CALL
Ronald Flores  Podiatric Medicine and Surgery  [address] [address]  Phone: [phone]  Fax: [phone]  Follow Up Time: 2 weeks    Mikael Daniels  Infectious Disease  [address] [place] [address]  Phone: [phone]  Fax: [phone]  Follow Up Time: 1 month   Ronald Flores  Podiatric Medicine and Surgery  [address] [address]  Phone: [phone]  Fax: [phone]  Follow Up Time: 2 weeks    Mikael Daniels  Infectious Disease  [address] [place] [address]  Phone: [phone]  Fax: [phone]  Follow Up Time: 1 month   Ronald Flores  Podiatric Medicine and Surgery  [address] [address]  Phone: [phone]  Fax: [phone]  Follow Up Time: 2 weeks    Mikael Daniels  Infectious Disease  [address] [place] [address]  Phone: [phone]  Fax: [phone]  Follow Up Time: 1 month

## 2023-07-27 NOTE — DISCHARGE NOTE PROVIDER - NSDCMRMEDTOKEN_GEN_ALL_CORE_FT
atorvastatin 40 mg oral tablet: 1 tab(s) orally once a day  furosemide 20 mg oral tablet: 1 tab(s) orally once a day  hydrALAZINE 50 mg oral tablet: 1 tab(s) orally 2 times a day  Lantus Solostar Pen 100 units/mL subcutaneous solution: 20 unit(s) subcutaneous once a day (at bedtime)  lisinopril 20 mg oral tablet: 1 tab(s) orally once a day  metoprolol succinate 50 mg oral tablet, extended release: 1 tab(s) orally once a day  NovoLOG FlexPen 100 units/mL injectable solution: 2-4 units subcutaneous three times a before meals  Santyl 250 units/g topical ointment: Apply topically to affected area 2 times a day right heel  tamsulosin 0.4 mg oral capsule: 1 cap(s) orally once a day  Thera-M oral tablet: 1 tab(s) orally once a day  Tylenol 325 mg oral tablet: 2 tab(s) orally as needed for pain   atorvastatin 40 mg oral tablet: 1 tab(s) orally once a day  furosemide 20 mg oral tablet: 1 tab(s) orally once a day  hydrALAZINE 50 mg oral tablet: 1 tab(s) orally 2 times a day  Lantus Solostar Pen 100 units/mL subcutaneous solution: 26 unit(s) subcutaneous once a day (at bedtime)  lisinopril 20 mg oral tablet: 1 tab(s) orally once a day  metoprolol succinate 50 mg oral tablet, extended release: 1 tab(s) orally once a day  mupirocin 2% topical ointment: 1 Apply topically to affected area 2 times a day  NovoLOG FlexPen 100 units/mL injectable solution: 9 unit(s) injectable 3 times a day (before meals) plus sliding scales  ocular lubricant ophthalmic solution: 1 drop(s) to each affected eye 3 times a day  Santyl 250 units/g topical ointment: Apply topically to affected area 2 times a day right heel  senna leaf extract oral tablet: 2 tab(s) orally once a day (at bedtime)  tamsulosin 0.4 mg oral capsule: 1 cap(s) orally once a day  Thera-M oral tablet: 1 tab(s) orally once a day  Tylenol 325 mg oral tablet: 2 tab(s) orally as needed for pain  weekly labs while on antibiotics.: wkly CBC. CMP, CK, ESR, CRP; fax the results to Dr. Mikael Daniels(Fax: [phone].)   atorvastatin 40 mg oral tablet: 1 tab(s) orally once a day  furosemide 20 mg oral tablet: 1 tab(s) orally once a day  hydrALAZINE 50 mg oral tablet: 1 tab(s) orally 2 times a day  Lantus Solostar Pen 100 units/mL subcutaneous solution: 26 unit(s) subcutaneous once a day (at bedtime)  lisinopril 20 mg oral tablet: 1 tab(s) orally once a day  metoprolol succinate 50 mg oral tablet, extended release: 1 tab(s) orally once a day  mupirocin 2% topical ointment: 1 Apply topically to affected area 2 times a day  NovoLOG FlexPen 100 units/mL injectable solution: 9 unit(s) injectable 3 times a day (before meals) plus sliding scales  ocular lubricant ophthalmic solution: 1 drop(s) to each affected eye 3 times a day  Santyl 250 units/g topical ointment: Apply topically to affected area 2 times a day right heel  senna leaf extract oral tablet: 2 tab(s) orally once a day (at bedtime)  tamsulosin 0.4 mg oral capsule: 1 cap(s) orally once a day  Thera-M oral tablet: 1 tab(s) orally once a day  Tylenol 325 mg oral tablet: 2 tab(s) orally as needed for pain  weekly labs while on antibiotics.: wkly CBC. CMP, CK, ESR, CRP; fax the results to Dr. Mikael Daniels(Fax: [phone].)   atorvastatin 40 mg oral tablet: 1 tab(s) orally once a day  furosemide 20 mg oral tablet: 1 tab(s) orally once a day  hydrALAZINE 50 mg oral tablet: 1 tab(s) orally 2 times a day  Lantus Solostar Pen 100 units/mL subcutaneous solution: 26 unit(s) subcutaneous once a day (at bedtime)  lisinopril 20 mg oral tablet: 1 tab(s) orally once a day  metoprolol succinate 50 mg oral tablet, extended release: 1 tab(s) orally once a day  mupirocin 2% topical ointment: 1 Apply topically to affected area 2 times a day  NovoLOG FlexPen 100 units/mL injectable solution: 9 unit(s) injectable 3 times a day (before meals) plus sliding scales  ocular lubricant ophthalmic solution: 1 drop(s) to each affected eye 3 times a day  Santyl 250 units/g topical ointment: Apply topically to affected area 2 times a day right heel  senna leaf extract oral tablet: 2 tab(s) orally once a day (at bedtime)  tamsulosin 0.4 mg oral capsule: 1 cap(s) orally once a day  Thera-M oral tablet: 1 tab(s) orally once a day  Tylenol 325 mg oral tablet: 2 tab(s) orally as needed for pain  weekly labs while on antibiotics.: wkly CBC. CMP, CK, ESR, CRP; fax the results to Dr. Mikael Daniels(Fax: [phone].)

## 2023-07-27 NOTE — DISCHARGE NOTE PROVIDER - HOSPITAL COURSE
· Assessment	  63 female h/o dm, htn, chol, here with b/l foot wounds    b/l foot wounds  pod eval noted  id consult apprec  iv abx as per id, currently on vanco and zosyn, will switch them to dampto 500QD & Cipro 500 bid til 9/7 with wkly labs.   f/u cult  wound care  MRI noted  s/p bilateral foot heel wound debridement with right foot kerecis graft application, and right foot calcaneus bone biopsy on 7/27  post op care per pod  VAC applied  f/u cultures from OR  abx per id  picc line placed for long term abx       dm  insulin as ordered  monitor fs    chol  cont statin    htn  cont home meds    dvt ppx    dc planning        Advanced care planning was discussed with patient and family.  Advanced care planning forms were reviewed and discussed as appropriate.  Differential diagnosis and plan of care discussed with patient after the evaluation.   Pain assessed and judicious use of narcotics when appropriate was discussed.  Importance of Fall prevention discussed.  Counseling on Smoking and Alcohol cessation was offered when appropriate.  Counseling on Diet, exercise, and medication compliance was done.

## 2023-07-27 NOTE — PROGRESS NOTE ADULT - ASSESSMENT
63 female h/o dm, htn, chol, here with b/l foot wounds    b/l foot wounds  pod eval noted  id consult apprec  iv abx as per id  f/u cult  wound care  MRI noted  plan for OR today for  bilateral wound debridement with integra  no medical contraindications to proceed with planned procedure    dm  insulin as ordered  monitor fs    chol  cont statin    htn  cont home meds    dvt ppx      Advanced care planning was discussed with patient and family.  Advanced care planning forms were reviewed and discussed as appropriate.  Differential diagnosis and plan of care discussed with patient after the evaluation.   Pain assessed and judicious use of narcotics when appropriate was discussed.  Importance of Fall prevention discussed.  Counseling on Smoking and Alcohol cessation was offered when appropriate.  Counseling on Diet, exercise, and medication compliance was done.       Approx 60 minutes spent.

## 2023-07-27 NOTE — PROGRESS NOTE ADULT - SUBJECTIVE AND OBJECTIVE BOX
Follow Up:  heel wound    Interval History/ROS:  nausea resolved today,     Allergies  Allergy Status Unknown    ANTIMICROBIALS:  piperacillin/tazobactam IVPB.. 3.375 every 8 hours      OTHER MEDS:  MEDICATIONS  (STANDING):  atorvastatin 40 at bedtime  dextrose 50% Injectable 12.5 once  dextrose 50% Injectable 25 once  dextrose 50% Injectable 25 once  dextrose Oral Gel 15 once PRN  furosemide    Tablet 20 daily  glucagon  Injectable 1 once  heparin   Injectable 5000 every 8 hours  hydrALAZINE 50 two times a day  insulin glargine Injectable (LANTUS) 12 at bedtime  insulin lispro (ADMELOG) corrective regimen sliding scale  at bedtime  insulin lispro (ADMELOG) corrective regimen sliding scale  every 6 hours  insulin lispro Injectable (ADMELOG) 4 before breakfast  insulin lispro Injectable (ADMELOG) 4 before dinner  insulin lispro Injectable (ADMELOG) 4 before lunch  lisinopril 20 daily  metoprolol succinate ER 50 daily  ondansetron Injectable 4 every 8 hours PRN  pantoprazole    Tablet 40 before breakfast  tamsulosin 0.4 at bedtime      Vital Signs Last 24 Hrs  T(C): 36.6 (27 Jul 2023 16:42), Max: 36.9 (27 Jul 2023 12:26)  T(F): 97.8 (27 Jul 2023 16:42), Max: 98.5 (27 Jul 2023 12:26)  HR: 78 (27 Jul 2023 16:42) (59 - 81)  BP: 145/77 (27 Jul 2023 16:42) (124/76 - 178/87)  BP(mean): --  RR: 18 (27 Jul 2023 16:42) (18 - 18)  SpO2: 98% (27 Jul 2023 16:42) (91% - 98%)    Parameters below as of 27 Jul 2023 16:42  Patient On (Oxygen Delivery Method): room air        PHYSICAL EXAM:  General:  NAD, Non-toxic  Neurology: resting comfortably  Respiratory: no resp distress  Extremities: dressing clean and dry  Line Sites: Clear  Skin: No rash                        11.4   7.26  )-----------( 292      ( 27 Jul 2023 06:46 )             34.8       07-27    139  |  102  |  14  ----------------------------<  170<H>  3.5   |  28  |  0.86    Ca    8.8      27 Jul 2023 06:46        Urinalysis Basic - ( 27 Jul 2023 06:46 )    Color: x / Appearance: x / SG: x / pH: x  Gluc: 170 mg/dL / Ketone: x  / Bili: x / Urobili: x   Blood: x / Protein: x / Nitrite: x   Leuk Esterase: x / RBC: x / WBC x   Sq Epi: x / Non Sq Epi: x / Bacteria: x        MICROBIOLOGY:  .Abscess right heel wound  07-25-23   Numerous Proteus mirabilis  Numerous Pseudomonas aeruginosa  Moderate Enterococcus faecalis  --    No polymorphonuclear cells seen per low power field  Moderate Gram Negative Rods seen per oil power field    .Blood Blood  07-25-23   No growth at 24 hours  --  --      .Blood Blood  07-25-23   No growth at 24 hours  --  --      RADIOLOGY:  < from: MR Ankle w/ IV Cont, Right (07.26.23 @ 18:31) >  IMPRESSION:  1.  There is a ulcer overlying the heel roughly spanning 5.4 cm extending   to the lateral calcaneus. There is minimal osseous edema within the   lateral calcaneus with mild postcontrast enhancement and without   significant loss of normal T1 fatty marrow. This is possibly representing   early osteomyelitis versus reactive in nature.  2.  Questionable minimal osseous edema within the fourth metatarsal head   with postcontrast enhancement which is incompletely evaluated. These   findings are possibly secondary to reactive degenerative changes versus   osteomyelitis if there is adjacent ulcer. Clinical correlation is advised.    < end of copied text >      Mikael Daniels MD; Division of Infectious Disease; Pager: [phone]; nights and weekends: [phone]   Follow Up:  heel wound    Interval History/ROS:  nausea resolved today,     Allergies  Allergy Status Unknown    ANTIMICROBIALS:  piperacillin/tazobactam IVPB.. 3.375 every 8 hours      OTHER MEDS:  MEDICATIONS  (STANDING):  atorvastatin 40 at bedtime  dextrose 50% Injectable 12.5 once  dextrose 50% Injectable 25 once  dextrose 50% Injectable 25 once  dextrose Oral Gel 15 once PRN  furosemide    Tablet 20 daily  glucagon  Injectable 1 once  heparin   Injectable 5000 every 8 hours  hydrALAZINE 50 two times a day  insulin glargine Injectable (LANTUS) 12 at bedtime  insulin lispro (ADMELOG) corrective regimen sliding scale  at bedtime  insulin lispro (ADMELOG) corrective regimen sliding scale  every 6 hours  insulin lispro Injectable (ADMELOG) 4 before breakfast  insulin lispro Injectable (ADMELOG) 4 before dinner  insulin lispro Injectable (ADMELOG) 4 before lunch  lisinopril 20 daily  metoprolol succinate ER 50 daily  ondansetron Injectable 4 every 8 hours PRN  pantoprazole    Tablet 40 before breakfast  tamsulosin 0.4 at bedtime      Vital Signs Last 24 Hrs  T(C): 36.6 (27 Jul 2023 16:42), Max: 36.9 (27 Jul 2023 12:26)  T(F): 97.8 (27 Jul 2023 16:42), Max: 98.5 (27 Jul 2023 12:26)  HR: 78 (27 Jul 2023 16:42) (59 - 81)  BP: 145/77 (27 Jul 2023 16:42) (124/76 - 178/87)  BP(mean): --  RR: 18 (27 Jul 2023 16:42) (18 - 18)  SpO2: 98% (27 Jul 2023 16:42) (91% - 98%)    Parameters below as of 27 Jul 2023 16:42  Patient On (Oxygen Delivery Method): room air        PHYSICAL EXAM:  General:  NAD, Non-toxic  Neurology: resting comfortably  Respiratory: no resp distress  Extremities: dressing clean and dry  Line Sites: Clear  Skin: No rash                        11.4   7.26  )-----------( 292      ( 27 Jul 2023 06:46 )             34.8       07-27    139  |  102  |  14  ----------------------------<  170<H>  3.5   |  28  |  0.86    Ca    8.8      27 Jul 2023 06:46        Urinalysis Basic - ( 27 Jul 2023 06:46 )    Color: x / Appearance: x / SG: x / pH: x  Gluc: 170 mg/dL / Ketone: x  / Bili: x / Urobili: x   Blood: x / Protein: x / Nitrite: x   Leuk Esterase: x / RBC: x / WBC x   Sq Epi: x / Non Sq Epi: x / Bacteria: x        MICROBIOLOGY:  .Abscess right heel wound  07-25-23   Numerous Proteus mirabilis  Numerous Pseudomonas aeruginosa  Moderate Enterococcus faecalis  --    No polymorphonuclear cells seen per low power field  Moderate Gram Negative Rods seen per oil power field    .Blood Blood  07-25-23   No growth at 24 hours  --  --      .Blood Blood  07-25-23   No growth at 24 hours  --  --      RADIOLOGY:  < from: MR Ankle w/ IV Cont, Right (07.26.23 @ 18:31) >  IMPRESSION:  1.  There is a ulcer overlying the heel roughly spanning 5.4 cm extending   to the lateral calcaneus. There is minimal osseous edema within the   lateral calcaneus with mild postcontrast enhancement and without   significant loss of normal T1 fatty marrow. This is possibly representing   early osteomyelitis versus reactive in nature.  2.  Questionable minimal osseous edema within the fourth metatarsal head   with postcontrast enhancement which is incompletely evaluated. These   findings are possibly secondary to reactive degenerative changes versus   osteomyelitis if there is adjacent ulcer. Clinical correlation is advised.    < end of copied text >      Mikael Daniels MD; Division of Infectious Disease; Pager: [phone]; nights and weekends: [phone]   Follow Up:  heel wound    Interval History/ROS:  nausea resolved today,     Allergies  Allergy Status Unknown    ANTIMICROBIALS:  piperacillin/tazobactam IVPB.. 3.375 every 8 hours      OTHER MEDS:  MEDICATIONS  (STANDING):  atorvastatin 40 at bedtime  dextrose 50% Injectable 12.5 once  dextrose 50% Injectable 25 once  dextrose 50% Injectable 25 once  dextrose Oral Gel 15 once PRN  furosemide    Tablet 20 daily  glucagon  Injectable 1 once  heparin   Injectable 5000 every 8 hours  hydrALAZINE 50 two times a day  insulin glargine Injectable (LANTUS) 12 at bedtime  insulin lispro (ADMELOG) corrective regimen sliding scale  at bedtime  insulin lispro (ADMELOG) corrective regimen sliding scale  every 6 hours  insulin lispro Injectable (ADMELOG) 4 before breakfast  insulin lispro Injectable (ADMELOG) 4 before dinner  insulin lispro Injectable (ADMELOG) 4 before lunch  lisinopril 20 daily  metoprolol succinate ER 50 daily  ondansetron Injectable 4 every 8 hours PRN  pantoprazole    Tablet 40 before breakfast  tamsulosin 0.4 at bedtime      Vital Signs Last 24 Hrs  T(C): 36.6 (27 Jul 2023 16:42), Max: 36.9 (27 Jul 2023 12:26)  T(F): 97.8 (27 Jul 2023 16:42), Max: 98.5 (27 Jul 2023 12:26)  HR: 78 (27 Jul 2023 16:42) (59 - 81)  BP: 145/77 (27 Jul 2023 16:42) (124/76 - 178/87)  BP(mean): --  RR: 18 (27 Jul 2023 16:42) (18 - 18)  SpO2: 98% (27 Jul 2023 16:42) (91% - 98%)    Parameters below as of 27 Jul 2023 16:42  Patient On (Oxygen Delivery Method): room air        PHYSICAL EXAM:  General:  NAD, Non-toxic  Neurology: resting comfortably  Respiratory: no resp distress  Extremities: dressing clean and dry  Line Sites: Clear  Skin: No rash                        11.4   7.26  )-----------( 292      ( 27 Jul 2023 06:46 )             34.8       07-27    139  |  102  |  14  ----------------------------<  170<H>  3.5   |  28  |  0.86    Ca    8.8      27 Jul 2023 06:46        Urinalysis Basic - ( 27 Jul 2023 06:46 )    Color: x / Appearance: x / SG: x / pH: x  Gluc: 170 mg/dL / Ketone: x  / Bili: x / Urobili: x   Blood: x / Protein: x / Nitrite: x   Leuk Esterase: x / RBC: x / WBC x   Sq Epi: x / Non Sq Epi: x / Bacteria: x        MICROBIOLOGY:  .Abscess right heel wound  07-25-23   Numerous Proteus mirabilis  Numerous Pseudomonas aeruginosa  Moderate Enterococcus faecalis  --    No polymorphonuclear cells seen per low power field  Moderate Gram Negative Rods seen per oil power field    .Blood Blood  07-25-23   No growth at 24 hours  --  --      .Blood Blood  07-25-23   No growth at 24 hours  --  --      RADIOLOGY:  < from: MR Ankle w/ IV Cont, Right (07.26.23 @ 18:31) >  IMPRESSION:  1.  There is a ulcer overlying the heel roughly spanning 5.4 cm extending   to the lateral calcaneus. There is minimal osseous edema within the   lateral calcaneus with mild postcontrast enhancement and without   significant loss of normal T1 fatty marrow. This is possibly representing   early osteomyelitis versus reactive in nature.  2.  Questionable minimal osseous edema within the fourth metatarsal head   with postcontrast enhancement which is incompletely evaluated. These   findings are possibly secondary to reactive degenerative changes versus   osteomyelitis if there is adjacent ulcer. Clinical correlation is advised.    < end of copied text >      Mikael Daniels MD; Division of Infectious Disease; Pager: [phone]; nights and weekends: [phone]

## 2023-07-27 NOTE — DISCHARGE NOTE PROVIDER - NSDCCPCAREPLAN_GEN_ALL_CORE_FT
PRINCIPAL DISCHARGE DIAGNOSIS  Diagnosis: Diabetic ulcer of right heel  Assessment and Plan of Treatment: found MRSA from wound and staphy from bone biopsy. started vancomycin and zosyn, will discharge with daptomycin 500mg IV daily  and cipro 500mg twice a day until 9/7/23.   s/p PICC for long term iv antibiotic.   weekly labs needed.         SECONDARY DISCHARGE DIAGNOSES  Diagnosis: DM2 (diabetes mellitus, type 2)  Assessment and Plan of Treatment: HgA1C this admission 8.4.   Make sure you get your HgA1c checked every three months.  If you take oral diabetes medications, check your blood glucose two times a day.  If you take insulin, check your blood glucose before meals and at bedtime.  It's important not to skip any meals.  Keep a log of your blood glucose results and always take it with you to your doctor appointments.  Keep a list of your current medications including injectables and over the counter medications and bring this medication list with you to all your doctor appointments.  If you have not seen your opthalmologist this year call for appointment.  Check your feet daily for redness, sores, or openings. Do not self treat. If no improvement in two days call your primary care physician for an appointment.  Low blood sugar (hypoglycemia) is a blood sugar below 70mg/dl. Check your blood sugar if you feel signs/symptoms of hypoglycemia. If your blood sugar is below 70 take 15 grams of carbohydrates (ex 4 oz of apple juice, 3-4 glucosr tablets, or 4-6 oz of regular soda) wait 15 minutes and repeat blood sugar to make sure it comes up above 70.  If your blood sugar is above 70 and you are due for a meal, have a meal.  If you are not due for a meal have a snack.  This snack helps keeps your blood sugar at a safe range.      Diagnosis: HLD (hyperlipidemia)  Assessment and Plan of Treatment: Follow up with PCP for treatment goals, continue medication, have liver function testing every 3 months as anti lipid medications can cause liver irritation, eat low fat, low cholesterol meals      Diagnosis: HTN (hypertension)  Assessment and Plan of Treatment: Low salt diet  Activity as tolerated.  Take all medication as prescribed.  Follow up with your medical doctor for routine blood pressure monitoring at your next visit.  Notify your doctor if you have any of the following symptoms:   Dizziness, Lightheadedness, Blurry vision, Headache, Chest pain, Shortness of breath

## 2023-07-27 NOTE — BRIEF OPERATIVE NOTE - OPERATION/FINDINGS
s/p b/l foot heel wound debridement and right foot heel graft application   - Right foot bone at proximal resection bone was soft and of poor quality  - Adequate intraop bleeding   - no evidence of purulence   - application of kerecis graft on right foot secured with staples s/p bilateral foot heel wound debridement with right foot kerecis graft application, and right foot calcaneus bone biopsy  - Right foot bone was soft and of poor quality  - Adequate intraop bleeding   - no evidence of purulence   - application of kerecis graft on right foot secured with staples

## 2023-07-27 NOTE — PROGRESS NOTE ADULT - ASSESSMENT
62 y/o F PMHx of DMH2 on insulin, HTN, HLD p/w right heel ulcer.    Assessment  DMT2: 63y Female with DM T2 with hyperglycemia, A1C 8.4%, was on insulin at home, now on basal bolus insulin with coverage, blood sugars running high. Has right heel ulcer.   ?Gastroparesis: Has nausea at times  Foot wound: on medications, monitored. Podiatry eval/work up  HTN: on antihypertensive medications, monitored, asymptomatic.  HLD: stable, on statin, monitored.    Discussed plan and management with Attending   Armando Toscano NP - TEAMS  Dr Wesley Hannah  [phone]         64 y/o F PMHx of DMH2 on insulin, HTN, HLD p/w right heel ulcer.    Assessment  DMT2: 63y Female with DM T2 with hyperglycemia, A1C 8.4%, was on insulin at home, now on basal bolus insulin with coverage, blood sugars running high. Has right heel ulcer.   ?Gastroparesis: Has nausea at times  Foot wound: on medications, monitored. Podiatry eval/work up  HTN: on antihypertensive medications, monitored, asymptomatic.  HLD: stable, on statin, monitored.    Discussed plan and management with Attending   Armando Toscano NP - TEAMS  Dr Wesley Hannah  [phone]         64 y/o F PMHx of DMH2 on insulin, HTN, HLD p/w right heel ulcer.    Assessment  DMT2: 63y Female with DM T2 with hyperglycemia, A1C 8.4%, was on insulin at home, now on basal bolus insulin with coverage, blood sugars running high. Has right heel ulcer.   ?Gastroparesis: Has nausea at times  Foot wound: on medications, monitored. Podiatry eval/work up  HTN: on antihypertensive medications, monitored, asymptomatic.  HLD: stable, on statin, monitored.    Discussed plan and management with Attending   Armando Toscano NP - TEAMS  Dr Wesley Hannah  [phone]

## 2023-07-27 NOTE — PROGRESS NOTE ADULT - SUBJECTIVE AND OBJECTIVE BOX
Patient is a 63y old  Female who presents with a chief complaint of     INTERVAL HPI/OVERNIGHT EVENTS:   Pt is scheduled for bilateral wound debridement with integra with Dr. Flores at 5:00 PM. Patient is aware of procedure and is NPO since midnight.    MEDICATIONS  (STANDING):  artificial  tears Solution 1 Drop(s) Both EYES three times a day  atorvastatin 40 milliGRAM(s) Oral at bedtime  chlorhexidine 2% Cloths 1 Application(s) Topical <User Schedule>  dextrose 5%. 1000 milliLiter(s) (50 mL/Hr) IV Continuous <Continuous>  dextrose 5%. 1000 milliLiter(s) (100 mL/Hr) IV Continuous <Continuous>  dextrose 50% Injectable 25 Gram(s) IV Push once  dextrose 50% Injectable 25 Gram(s) IV Push once  dextrose 50% Injectable 12.5 Gram(s) IV Push once  furosemide    Tablet 20 milliGRAM(s) Oral daily  glucagon  Injectable 1 milliGRAM(s) IntraMuscular once  heparin   Injectable 5000 Unit(s) SubCutaneous every 8 hours  hydrALAZINE 50 milliGRAM(s) Oral two times a day  insulin glargine Injectable (LANTUS) 12 Unit(s) SubCutaneous at bedtime  insulin lispro (ADMELOG) corrective regimen sliding scale   SubCutaneous at bedtime  insulin lispro (ADMELOG) corrective regimen sliding scale   SubCutaneous every 6 hours  insulin lispro Injectable (ADMELOG) 4 Unit(s) SubCutaneous before dinner  insulin lispro Injectable (ADMELOG) 4 Unit(s) SubCutaneous before lunch  insulin lispro Injectable (ADMELOG) 4 Unit(s) SubCutaneous before breakfast  lisinopril 20 milliGRAM(s) Oral daily  metoprolol succinate ER 50 milliGRAM(s) Oral daily  pantoprazole    Tablet 40 milliGRAM(s) Oral before breakfast  piperacillin/tazobactam IVPB.. 3.375 Gram(s) IV Intermittent every 8 hours  tamsulosin 0.4 milliGRAM(s) Oral at bedtime    MEDICATIONS  (PRN):  dextrose Oral Gel 15 Gram(s) Oral once PRN Blood Glucose LESS THAN 70 milliGRAM(s)/deciliter  ondansetron Injectable 4 milliGRAM(s) IV Push every 8 hours PRN Nausea and/or Vomiting      Allergies    Allergy Status Unknown    Intolerances        Vital Signs Last 24 Hrs  T(C): 36.4 (27 Jul 2023 06:14), Max: 36.6 (26 Jul 2023 19:37)  T(F): 97.5 (27 Jul 2023 06:14), Max: 97.8 (26 Jul 2023 19:37)  HR: 62 (27 Jul 2023 04:12) (56 - 62)  BP: 167/75 (27 Jul 2023 06:14) (161/80 - 178/87)  BP(mean): --  RR: 18 (27 Jul 2023 04:12) (18 - 18)  SpO2: 97% (27 Jul 2023 06:14) (91% - 97%)    Parameters below as of 27 Jul 2023 04:12  Patient On (Oxygen Delivery Method): room air        LABS:                        11.4   7.26  )-----------( 292      ( 27 Jul 2023 06:46 )             34.8     07-27    139  |  102  |  14  ----------------------------<  170<H>  3.5   |  28  |  0.86    Ca    8.8      27 Jul 2023 06:46    TPro  8.3  /  Alb  3.6  /  TBili  0.4  /  DBili  x   /  AST  19  /  ALT  11  /  AlkPhos  69  07-25    PT/INR - ( 27 Jul 2023 06:46 )   PT: 11.8 sec;   INR: 1.07 ratio         PTT - ( 27 Jul 2023 06:46 )  PTT:27.1 sec  Urinalysis Basic - ( 27 Jul 2023 06:46 )    Color: x / Appearance: x / SG: x / pH: x  Gluc: 170 mg/dL / Ketone: x  / Bili: x / Urobili: x   Blood: x / Protein: x / Nitrite: x   Leuk Esterase: x / RBC: x / WBC x   Sq Epi: x / Non Sq Epi: x / Bacteria: x      CAPILLARY BLOOD GLUCOSE      POCT Blood Glucose.: 168 mg/dL (27 Jul 2023 06:07)  POCT Blood Glucose.: 186 mg/dL (27 Jul 2023 00:35)  POCT Blood Glucose.: 211 mg/dL (26 Jul 2023 21:08)  POCT Blood Glucose.: 183 mg/dL (26 Jul 2023 18:36)  POCT Blood Glucose.: 215 mg/dL (26 Jul 2023 12:33)      RADIOLOGY & ADDITIONAL TESTS:

## 2023-07-27 NOTE — PROGRESS NOTE ADULT - ASSESSMENT
Plan:   Pt is scheduled for bilateral wound debridement with integra with Dr. Flores at 5:00 PM. Patient is aware of procedure and is NPO since midnight.  CXR on sunrise.  EKG in patient's chart  Medical/Cardiac clearance PENDING and documented in chart.  Consent signed and in chart.  Procedure was explained to patient in detail. All alternatives, risks and complications were discussed. All questions answered.   Plan:   Pt is scheduled for bilateral wound debridement with integra with Dr. Flores at 5:00 PM . Patient is aware of procedure and is NPO since midnight.  CXR on sunrise.  EKG in patient's chart  Medical clearance since 7/27 and documented in chart.  Consent signed and in chart.  Procedure was explained to patient in detail. All alternatives, risks and complications were discussed. All questions answered.

## 2023-07-27 NOTE — PROGRESS NOTE ADULT - ASSESSMENT
64 y/o F PMHx of DMH2 on insulin &7/26/23: A1C= 8.4%), HTN, HLD, BMI = 35.3,  bilateral heel ulcer and fever to 102F day prior to admission. ID consulted for eval of bilateral foot ulcer.   admitted 7/25/23  She notes nausea, emesis malaise  Right heel ulcer is long standing - Currently with extensive black eschar with sl separation at edges, drainage on dressing and malodour with likley underlying  infection, however malodor suggests infection.   The L heel ulcer is small, superficial and benign with no significant concern for infection.    7/25 ESR/CRP = 85/41    Antibiotics  Vano 7/25--> 7/26  Zosyn 7/25-->      #B/l Foot ulcer  #possible osteo  #infection  MRI foot suggest  possible osteomyelitis calcaneus    7/26  nasal swab POSITIVE MRSA PCR .  7/27 for  right foot wound debridement with Integra graft application later today       Suggest  Continue Zosyn  STOP Vanco  Vascular surgery consult evaluate for possible stent restenosis     I will be out until 7/31  ID service will follow and monitor susceptibilities       62 y/o F PMHx of DMH2 on insulin &7/26/23: A1C= 8.4%), HTN, HLD, BMI = 35.3,  bilateral heel ulcer and fever to 102F day prior to admission. ID consulted for eval of bilateral foot ulcer.   admitted 7/25/23  She notes nausea, emesis malaise  Right heel ulcer is long standing - Currently with extensive black eschar with sl separation at edges, drainage on dressing and malodour with likley underlying  infection, however malodor suggests infection.   The L heel ulcer is small, superficial and benign with no significant concern for infection.    7/25 ESR/CRP = 85/41    Antibiotics  Vano 7/25--> 7/26  Zosyn 7/25-->      #B/l Foot ulcer  #possible osteo  #infection  MRI foot suggest  possible osteomyelitis calcaneus    7/26  nasal swab POSITIVE MRSA PCR .  7/27 for  right foot wound debridement with Integra graft application later today       Suggest  Continue Zosyn  STOP Vanco  Vascular surgery consult evaluate for possible stent restenosis     I will be out until 7/31  ID service will follow and monitor susceptibilities

## 2023-07-27 NOTE — PROGRESS NOTE ADULT - SUBJECTIVE AND OBJECTIVE BOX
STEPHANIE KAYE  63y Female  MRN:[id number]    Patient is a 63y old  Female who presents with a chief complaint of foot infection    HPI:   62 y/o F PMHx of DMH2 on insulin, HTN, HLD p/w right heel ulcer. Patient is sent by her podiatrist for questionable right foot procedure.  She is unsure how long she has had this ulcer for.  States that she was recently hospitalized in the near [facility] for an evaluation of the right foot infection.  States she still had a 102 fever yesterday which resolved.  Denies any fevers today, chills, chest pain, shortness of breath, abdominal pain, nausea vomiting diarrhea.  States she ambulates with a walker at baseline.  Accompanied by her sister-in-law (25 Jul 2023 21:58)      Patient seen and evaluated at bedside. No acute events overnight except as noted.    Interval HPI: no acute events o/n    PAST MEDICAL & SURGICAL HISTORY:  Diabetes      Hypertension      Hypercholesteremia          REVIEW OF SYSTEMS:  as per hpi     VITALS:   Vital Signs Last 24 Hrs  T(C): 36.4 (27 Jul 2023 06:14), Max: 36.6 (26 Jul 2023 19:37)  T(F): 97.5 (27 Jul 2023 06:14), Max: 97.8 (26 Jul 2023 19:37)  HR: 62 (27 Jul 2023 04:12) (56 - 62)  BP: 167/75 (27 Jul 2023 06:14) (161/80 - 178/87)  BP(mean): --  RR: 18 (27 Jul 2023 04:12) (18 - 18)  SpO2: 97% (27 Jul 2023 06:14) (91% - 97%)    Parameters below as of 27 Jul 2023 04:12  Patient On (Oxygen Delivery Method): room air          PHYSICAL EXAM:  GENERAL: NAD, well-developed  HEAD:  Atraumatic, Normocephalic  EYES: EOMI, PERRLA, conjunctiva and sclera clear  NECK: Supple, No JVD  CHEST/LUNG: Clear to auscultation bilaterally; No wheeze  HEART: S1, S2; No murmurs, rubs, or gallops  ABDOMEN: Soft, Nontender, Nondistended; Bowel sounds present  EXTREMITIES:  2+ Peripheral Pulses, No clubbing, cyanosis, or edema. b/l lower ext dressing in place   PSYCH: Normal affect  NEUROLOGY: AAOX3; non-focal  SKIN: No rashes or lesions    Consultant(s) Notes Reviewed:  [x ] YES  [ ] NO  Care Discussed with Consultants/Other Providers [ x] YES  [ ] NO    MEDS:   MEDICATIONS  (STANDING):  artificial  tears Solution 1 Drop(s) Both EYES three times a day  atorvastatin 40 milliGRAM(s) Oral at bedtime  chlorhexidine 2% Cloths 1 Application(s) Topical <User Schedule>  dextrose 5%. 1000 milliLiter(s) (100 mL/Hr) IV Continuous <Continuous>  dextrose 5%. 1000 milliLiter(s) (50 mL/Hr) IV Continuous <Continuous>  dextrose 50% Injectable 12.5 Gram(s) IV Push once  dextrose 50% Injectable 25 Gram(s) IV Push once  dextrose 50% Injectable 25 Gram(s) IV Push once  furosemide    Tablet 20 milliGRAM(s) Oral daily  glucagon  Injectable 1 milliGRAM(s) IntraMuscular once  heparin   Injectable 5000 Unit(s) SubCutaneous every 8 hours  hydrALAZINE 50 milliGRAM(s) Oral two times a day  insulin glargine Injectable (LANTUS) 12 Unit(s) SubCutaneous at bedtime  insulin lispro (ADMELOG) corrective regimen sliding scale   SubCutaneous at bedtime  insulin lispro (ADMELOG) corrective regimen sliding scale   SubCutaneous every 6 hours  insulin lispro Injectable (ADMELOG) 4 Unit(s) SubCutaneous before breakfast  insulin lispro Injectable (ADMELOG) 4 Unit(s) SubCutaneous before dinner  insulin lispro Injectable (ADMELOG) 4 Unit(s) SubCutaneous before lunch  lisinopril 20 milliGRAM(s) Oral daily  metoprolol succinate ER 50 milliGRAM(s) Oral daily  pantoprazole    Tablet 40 milliGRAM(s) Oral before breakfast  piperacillin/tazobactam IVPB.. 3.375 Gram(s) IV Intermittent every 8 hours  tamsulosin 0.4 milliGRAM(s) Oral at bedtime    MEDICATIONS  (PRN):  dextrose Oral Gel 15 Gram(s) Oral once PRN Blood Glucose LESS THAN 70 milliGRAM(s)/deciliter  ondansetron Injectable 4 milliGRAM(s) IV Push every 8 hours PRN Nausea and/or Vomiting        ALLERGIES:  Allergy Status Unknown      LABS:                                            11.4   7.26  )-----------( 292      ( 27 Jul 2023 06:46 )             34.8   07-27    139  |  102  |  14  ----------------------------<  170<H>  3.5   |  28  |  0.86    Ca    8.8      27 Jul 2023 06:46    TPro  8.3  /  Alb  3.6  /  TBili  0.4  /  DBili  x   /  AST  19  /  ALT  11  /  AlkPhos  69  07-25    < from: MR Ankle w/ IV Cont, Right (07.26.23 @ 18:31) >    IMPRESSION:  1.  There is a ulcer overlying the heel roughly spanning 5.4 cm extending   to the lateral calcaneus. There is minimal osseous edema within the   lateral calcaneus with mild postcontrast enhancement and without   significant loss of normal T1 fatty marrow. This is possibly representing   early osteomyelitis versus reactive in nature.  2.  Questionable minimal osseous edema within the fourth metatarsal head   with postcontrast enhancement which is incompletely evaluated. These   findings are possibly secondary to reactive degenerative changes versus   osteomyelitis if there is adjacent ulcer. Clinical correlation is advised.    --- End of Report ---        < end of copied text >     < from: Xray Foot AP + Lateral + Oblique, Right (07.25.23 @ 17:54) >    IMPRESSION:    No acute fracture or dislocation of the right foot. Midfoot degenerative   changes are noted.    Soft tissue defect at the heel related to known heel ulcer. No osseous   erosive changes of the calcaneus or adjacent osseousstructures. Soft   tissue edema is also noted along the dorsal foot and anterior lower shin.   No tracking subcutaneous gas.    Along the first distal phalanx base, there are juxtaarticular erosions at   the medial aspect and productive changes at the lateral aspect. Findings   may be due to underlying inflammatory arthritis or crystalline   arthropathy.    Vascular calcifications.      --- End of Report ---    < end of copied text >   STEPHANIE KAYE  63y Female  MRN:[id number]    Patient is a 63y old  Female who presents with a chief complaint of foot infection    HPI:   64 y/o F PMHx of DMH2 on insulin, HTN, HLD p/w right heel ulcer. Patient is sent by her podiatrist for questionable right foot procedure.  She is unsure how long she has had this ulcer for.  States that she was recently hospitalized in the near [facility] for an evaluation of the right foot infection.  States she still had a 102 fever yesterday which resolved.  Denies any fevers today, chills, chest pain, shortness of breath, abdominal pain, nausea vomiting diarrhea.  States she ambulates with a walker at baseline.  Accompanied by her sister-in-law (25 Jul 2023 21:58)      Patient seen and evaluated at bedside. No acute events overnight except as noted.    Interval HPI: no acute events o/n    PAST MEDICAL & SURGICAL HISTORY:  Diabetes      Hypertension      Hypercholesteremia          REVIEW OF SYSTEMS:  as per hpi     VITALS:   Vital Signs Last 24 Hrs  T(C): 36.4 (27 Jul 2023 06:14), Max: 36.6 (26 Jul 2023 19:37)  T(F): 97.5 (27 Jul 2023 06:14), Max: 97.8 (26 Jul 2023 19:37)  HR: 62 (27 Jul 2023 04:12) (56 - 62)  BP: 167/75 (27 Jul 2023 06:14) (161/80 - 178/87)  BP(mean): --  RR: 18 (27 Jul 2023 04:12) (18 - 18)  SpO2: 97% (27 Jul 2023 06:14) (91% - 97%)    Parameters below as of 27 Jul 2023 04:12  Patient On (Oxygen Delivery Method): room air          PHYSICAL EXAM:  GENERAL: NAD, well-developed  HEAD:  Atraumatic, Normocephalic  EYES: EOMI, PERRLA, conjunctiva and sclera clear  NECK: Supple, No JVD  CHEST/LUNG: Clear to auscultation bilaterally; No wheeze  HEART: S1, S2; No murmurs, rubs, or gallops  ABDOMEN: Soft, Nontender, Nondistended; Bowel sounds present  EXTREMITIES:  2+ Peripheral Pulses, No clubbing, cyanosis, or edema. b/l lower ext dressing in place   PSYCH: Normal affect  NEUROLOGY: AAOX3; non-focal  SKIN: No rashes or lesions    Consultant(s) Notes Reviewed:  [x ] YES  [ ] NO  Care Discussed with Consultants/Other Providers [ x] YES  [ ] NO    MEDS:   MEDICATIONS  (STANDING):  artificial  tears Solution 1 Drop(s) Both EYES three times a day  atorvastatin 40 milliGRAM(s) Oral at bedtime  chlorhexidine 2% Cloths 1 Application(s) Topical <User Schedule>  dextrose 5%. 1000 milliLiter(s) (100 mL/Hr) IV Continuous <Continuous>  dextrose 5%. 1000 milliLiter(s) (50 mL/Hr) IV Continuous <Continuous>  dextrose 50% Injectable 12.5 Gram(s) IV Push once  dextrose 50% Injectable 25 Gram(s) IV Push once  dextrose 50% Injectable 25 Gram(s) IV Push once  furosemide    Tablet 20 milliGRAM(s) Oral daily  glucagon  Injectable 1 milliGRAM(s) IntraMuscular once  heparin   Injectable 5000 Unit(s) SubCutaneous every 8 hours  hydrALAZINE 50 milliGRAM(s) Oral two times a day  insulin glargine Injectable (LANTUS) 12 Unit(s) SubCutaneous at bedtime  insulin lispro (ADMELOG) corrective regimen sliding scale   SubCutaneous at bedtime  insulin lispro (ADMELOG) corrective regimen sliding scale   SubCutaneous every 6 hours  insulin lispro Injectable (ADMELOG) 4 Unit(s) SubCutaneous before breakfast  insulin lispro Injectable (ADMELOG) 4 Unit(s) SubCutaneous before dinner  insulin lispro Injectable (ADMELOG) 4 Unit(s) SubCutaneous before lunch  lisinopril 20 milliGRAM(s) Oral daily  metoprolol succinate ER 50 milliGRAM(s) Oral daily  pantoprazole    Tablet 40 milliGRAM(s) Oral before breakfast  piperacillin/tazobactam IVPB.. 3.375 Gram(s) IV Intermittent every 8 hours  tamsulosin 0.4 milliGRAM(s) Oral at bedtime    MEDICATIONS  (PRN):  dextrose Oral Gel 15 Gram(s) Oral once PRN Blood Glucose LESS THAN 70 milliGRAM(s)/deciliter  ondansetron Injectable 4 milliGRAM(s) IV Push every 8 hours PRN Nausea and/or Vomiting        ALLERGIES:  Allergy Status Unknown      LABS:                                            11.4   7.26  )-----------( 292      ( 27 Jul 2023 06:46 )             34.8   07-27    139  |  102  |  14  ----------------------------<  170<H>  3.5   |  28  |  0.86    Ca    8.8      27 Jul 2023 06:46    TPro  8.3  /  Alb  3.6  /  TBili  0.4  /  DBili  x   /  AST  19  /  ALT  11  /  AlkPhos  69  07-25    < from: MR Ankle w/ IV Cont, Right (07.26.23 @ 18:31) >    IMPRESSION:  1.  There is a ulcer overlying the heel roughly spanning 5.4 cm extending   to the lateral calcaneus. There is minimal osseous edema within the   lateral calcaneus with mild postcontrast enhancement and without   significant loss of normal T1 fatty marrow. This is possibly representing   early osteomyelitis versus reactive in nature.  2.  Questionable minimal osseous edema within the fourth metatarsal head   with postcontrast enhancement which is incompletely evaluated. These   findings are possibly secondary to reactive degenerative changes versus   osteomyelitis if there is adjacent ulcer. Clinical correlation is advised.    --- End of Report ---        < end of copied text >     < from: Xray Foot AP + Lateral + Oblique, Right (07.25.23 @ 17:54) >    IMPRESSION:    No acute fracture or dislocation of the right foot. Midfoot degenerative   changes are noted.    Soft tissue defect at the heel related to known heel ulcer. No osseous   erosive changes of the calcaneus or adjacent osseousstructures. Soft   tissue edema is also noted along the dorsal foot and anterior lower shin.   No tracking subcutaneous gas.    Along the first distal phalanx base, there are juxtaarticular erosions at   the medial aspect and productive changes at the lateral aspect. Findings   may be due to underlying inflammatory arthritis or crystalline   arthropathy.    Vascular calcifications.      --- End of Report ---    < end of copied text >   STEPHANIE KAYE  63y Female  MRN:[id number]    Patient is a 63y old  Female who presents with a chief complaint of foot infection    HPI:   64 y/o F PMHx of DMH2 on insulin, HTN, HLD p/w right heel ulcer. Patient is sent by her podiatrist for questionable right foot procedure.  She is unsure how long she has had this ulcer for.  States that she was recently hospitalized in the near [facility] for an evaluation of the right foot infection.  States she still had a 102 fever yesterday which resolved.  Denies any fevers today, chills, chest pain, shortness of breath, abdominal pain, nausea vomiting diarrhea.  States she ambulates with a walker at baseline.  Accompanied by her sister-in-law (25 Jul 2023 21:58)      Patient seen and evaluated at bedside. No acute events overnight except as noted.    Interval HPI: no acute events o/n    PAST MEDICAL & SURGICAL HISTORY:  Diabetes      Hypertension      Hypercholesteremia          REVIEW OF SYSTEMS:  as per hpi     VITALS:   Vital Signs Last 24 Hrs  T(C): 36.4 (27 Jul 2023 06:14), Max: 36.6 (26 Jul 2023 19:37)  T(F): 97.5 (27 Jul 2023 06:14), Max: 97.8 (26 Jul 2023 19:37)  HR: 62 (27 Jul 2023 04:12) (56 - 62)  BP: 167/75 (27 Jul 2023 06:14) (161/80 - 178/87)  BP(mean): --  RR: 18 (27 Jul 2023 04:12) (18 - 18)  SpO2: 97% (27 Jul 2023 06:14) (91% - 97%)    Parameters below as of 27 Jul 2023 04:12  Patient On (Oxygen Delivery Method): room air          PHYSICAL EXAM:  GENERAL: NAD, well-developed  HEAD:  Atraumatic, Normocephalic  EYES: EOMI, PERRLA, conjunctiva and sclera clear  NECK: Supple, No JVD  CHEST/LUNG: Clear to auscultation bilaterally; No wheeze  HEART: S1, S2; No murmurs, rubs, or gallops  ABDOMEN: Soft, Nontender, Nondistended; Bowel sounds present  EXTREMITIES:  2+ Peripheral Pulses, No clubbing, cyanosis, or edema. b/l lower ext dressing in place   PSYCH: Normal affect  NEUROLOGY: AAOX3; non-focal  SKIN: No rashes or lesions    Consultant(s) Notes Reviewed:  [x ] YES  [ ] NO  Care Discussed with Consultants/Other Providers [ x] YES  [ ] NO    MEDS:   MEDICATIONS  (STANDING):  artificial  tears Solution 1 Drop(s) Both EYES three times a day  atorvastatin 40 milliGRAM(s) Oral at bedtime  chlorhexidine 2% Cloths 1 Application(s) Topical <User Schedule>  dextrose 5%. 1000 milliLiter(s) (100 mL/Hr) IV Continuous <Continuous>  dextrose 5%. 1000 milliLiter(s) (50 mL/Hr) IV Continuous <Continuous>  dextrose 50% Injectable 12.5 Gram(s) IV Push once  dextrose 50% Injectable 25 Gram(s) IV Push once  dextrose 50% Injectable 25 Gram(s) IV Push once  furosemide    Tablet 20 milliGRAM(s) Oral daily  glucagon  Injectable 1 milliGRAM(s) IntraMuscular once  heparin   Injectable 5000 Unit(s) SubCutaneous every 8 hours  hydrALAZINE 50 milliGRAM(s) Oral two times a day  insulin glargine Injectable (LANTUS) 12 Unit(s) SubCutaneous at bedtime  insulin lispro (ADMELOG) corrective regimen sliding scale   SubCutaneous at bedtime  insulin lispro (ADMELOG) corrective regimen sliding scale   SubCutaneous every 6 hours  insulin lispro Injectable (ADMELOG) 4 Unit(s) SubCutaneous before breakfast  insulin lispro Injectable (ADMELOG) 4 Unit(s) SubCutaneous before dinner  insulin lispro Injectable (ADMELOG) 4 Unit(s) SubCutaneous before lunch  lisinopril 20 milliGRAM(s) Oral daily  metoprolol succinate ER 50 milliGRAM(s) Oral daily  pantoprazole    Tablet 40 milliGRAM(s) Oral before breakfast  piperacillin/tazobactam IVPB.. 3.375 Gram(s) IV Intermittent every 8 hours  tamsulosin 0.4 milliGRAM(s) Oral at bedtime    MEDICATIONS  (PRN):  dextrose Oral Gel 15 Gram(s) Oral once PRN Blood Glucose LESS THAN 70 milliGRAM(s)/deciliter  ondansetron Injectable 4 milliGRAM(s) IV Push every 8 hours PRN Nausea and/or Vomiting        ALLERGIES:  Allergy Status Unknown      LABS:                                            11.4   7.26  )-----------( 292      ( 27 Jul 2023 06:46 )             34.8   07-27    139  |  102  |  14  ----------------------------<  170<H>  3.5   |  28  |  0.86    Ca    8.8      27 Jul 2023 06:46    TPro  8.3  /  Alb  3.6  /  TBili  0.4  /  DBili  x   /  AST  19  /  ALT  11  /  AlkPhos  69  07-25    < from: MR Ankle w/ IV Cont, Right (07.26.23 @ 18:31) >    IMPRESSION:  1.  There is a ulcer overlying the heel roughly spanning 5.4 cm extending   to the lateral calcaneus. There is minimal osseous edema within the   lateral calcaneus with mild postcontrast enhancement and without   significant loss of normal T1 fatty marrow. This is possibly representing   early osteomyelitis versus reactive in nature.  2.  Questionable minimal osseous edema within the fourth metatarsal head   with postcontrast enhancement which is incompletely evaluated. These   findings are possibly secondary to reactive degenerative changes versus   osteomyelitis if there is adjacent ulcer. Clinical correlation is advised.    --- End of Report ---        < end of copied text >     < from: Xray Foot AP + Lateral + Oblique, Right (07.25.23 @ 17:54) >    IMPRESSION:    No acute fracture or dislocation of the right foot. Midfoot degenerative   changes are noted.    Soft tissue defect at the heel related to known heel ulcer. No osseous   erosive changes of the calcaneus or adjacent osseousstructures. Soft   tissue edema is also noted along the dorsal foot and anterior lower shin.   No tracking subcutaneous gas.    Along the first distal phalanx base, there are juxtaarticular erosions at   the medial aspect and productive changes at the lateral aspect. Findings   may be due to underlying inflammatory arthritis or crystalline   arthropathy.    Vascular calcifications.      --- End of Report ---    < end of copied text >

## 2023-07-27 NOTE — PRE-OP CHECKLIST - ISOLATION PRECAUTIONS
The left coronary artery was selectively engaged and injected. A Catheter Guide Jl 4 Crv 6fr 100cm Vista Brtp was used. 
none
none

## 2023-07-27 NOTE — DISCHARGE NOTE PROVIDER - CARE PROVIDERS DIRECT ADDRESSES
,DirectAddress_Unknown,[email or web address] ,DirectAddress_Unknown,[email or web address] ,DirectAddress_Unknown,[email or web address]

## 2023-07-27 NOTE — DISCHARGE NOTE PROVIDER - NSDCFUADDAPPT_GEN_ALL_CORE_FT
Podiatry Discharge Instructions:  Follow up: Please follow up with Dr. Flores within 1 week of discharge from the hospital, please call [phone] for appointment and discuss that you recently were seen in the hospital.  Wound Care: Please apply VAC to right foot heel to be changed 3x/week  Weight bearing: Please remain nonweightbearing bilaterally   Antibiotics: Please continue as instructed.   Podiatry Discharge Instructions:  Follow up: Please follow up with Dr. Flores within 1 week of discharge from the hospital, please call [phone] for appointment and discuss that you recently were seen in the hospital.  Wound Care: Please apply VAC to right foot heel to be changed 3x/week  Weight bearing: Please remain nonweightbearing bilaterally   Antibiotics: Please continue as instructed.   Podiatry Discharge Instructions:  Follow up: Please follow up with Dr. Flores within 1 week of discharge from the hospital, please call [phone] for appointment and discuss that you recently were seen in the hospital.  Wound Care: Please apply VAC to right foot heel to be changed 3x/week  Weight bearing: Please remain nonweightbearing bilaterally   Antibiotics: Please continue as instructed.   Podiatry Discharge Instructions:  Follow up: Please follow up with Dr. Flores within 1 week of discharge from the hospital, please call [phone] for appointment and discuss that you recently were seen in the hospital.  Wound Care: Please manage wound vac to right foot at 125mmHg with black foam and change 3x per week.  Weight bearing: Please remain non-weightbearing to both feet and wear z-flows at all times while in bed.  Antibiotics: Please continue as instructed.   Podiatry Discharge Instructions:  Follow up: Please follow up with Dr. Flores within 1 week of discharge from the hospital, please call [phone] for appointment and discuss that you recently were seen in the hospital.  Wound Care: Please manage wound vac to right foot at 125mmHg with black foam and change 3x per week.  Weight bearing: Please remain non-weightbearing to both feet and wear z-flows at all times while in bed.  Antibiotics: Please continue as instructed.   Podiatry Discharge Instructions:  Follow up: Please follow up with Dr. Floers within 1 week of discharge from the hospital, please call [phone] for appointment and discuss that you recently were seen in the hospital.  Wound Care: Please manage wound vac to right foot at 125mmHg with black foam and change 3x per week.  Weight bearing: Please remain non-weightbearing to both feet and wear z-flows at all times while in bed.  Antibiotics: Please continue as instructed.   Podiatry Discharge Instructions:  Follow up: Please follow up with Dr. Flores within 1 week of discharge from the hospital, please call [phone] for appointment and discuss that you recently were seen in the hospital.  Wound Care: Please manage wound vac to right foot at 125mmHg with black foam and change 3x per week.  Weight bearing: Please weightbear as tolerated to both feet on the tops in heel offloading shoes.  Antibiotics: Please continue as instructed.   Podiatry Discharge Instructions:  Follow up: Please follow up with Dr. Flores within 1 week of discharge from the hospital, please call [phone] for appointment and discuss that you recently were seen in the hospital.  Wound Care: Please manage wound vac to right foot at 125mmHg with black foam and change 3x per week.  Weight bearing: Please weightbear as tolerated to both feet on the tops in heel offloading shoes.  Antibiotics: Please continue as instructed.   Podiatry Discharge Instructions:  Follow up: Please follow up with Dr. Flores within 1 week of discharge from the hospital, please call [phone] for appointment and discuss that you recently were seen in the hospital.  Wound Care: Please manage wound vac to right foot at 125mmHg with black foam and change 3x per week.  Weight bearing: Please weightbear as tolerated to both feet on the tops in heel offloading shoes.  Antibiotics: Please continue as instructed.   Podiatry Discharge Instructions:  Follow up: Please follow up with Dr. Flores within 1 week of discharge from the hospital, please call [phone] for appointment and discuss that you recently were seen in the hospital.  Wound Care: Please manage wound vac to right foot at 125mmHg with black foam and change 3x per week.  Weight bearing: Please weight bear as tolerated to both feet on the tops in heel offloading shoes.  Antibiotics: Please continue as instructed.   Podiatry Discharge Instructions:  Follow up: Please follow up with Dr. Flores within 1 week of discharge from the hospital, please call [phone] for appointment and discuss that you recently were seen in the hospital.  Wound Care: Please manage wound vac to right foot at 125mmHg with black foam and change 3x per week.  Weight bearing: Please weight bear as tolerated to both feet on the tops in heel offloading shoes.  Antibiotics: Please continue as instructed.   Podiatry Discharge Instructions:  Follow up: Please follow up with Dr. Flores within 1 week of discharge from the hospital, please call [phone] for appointment and discuss that you recently were seen in the hospital.  Wound Care: Please manage wound vac to right foot at 125mmHg with black foam and change 3x per week.  Weight bearing: Please weight bear as tolerated to both feet on the tops in heel offloading shoes.  Antibiotics: Please continue as instructed.

## 2023-07-27 NOTE — PROGRESS NOTE ADULT - SUBJECTIVE AND OBJECTIVE BOX
Chief complaint  Patient is a 63y old  Female who presents with a chief complaint of        Labs and Fingersticks  CAPILLARY BLOOD GLUCOSE      POCT Blood Glucose.: 168 mg/dL (27 Jul 2023 06:07)  POCT Blood Glucose.: 186 mg/dL (27 Jul 2023 00:35)  POCT Blood Glucose.: 211 mg/dL (26 Jul 2023 21:08)  POCT Blood Glucose.: 183 mg/dL (26 Jul 2023 18:36)  POCT Blood Glucose.: 215 mg/dL (26 Jul 2023 12:33)      Anion Gap: 9 (07-27 @ 06:46)  Anion Gap: 16 (07-26 @ 07:01)  Anion Gap: 11 (07-25 @ 16:49)      Calcium: 8.8 (07-27 @ 06:46)  Calcium: 9.5 (07-26 @ 07:01)  Calcium: 9.5 (07-25 @ 16:49)  Albumin: 3.6 (07-25 @ 16:49)    Alanine Aminotransferase (ALT/SGPT): 11 (07-25 @ 16:49)  Alkaline Phosphatase: 69 (07-25 @ 16:49)  Aspartate Aminotransferase (AST/SGOT): 19 (07-25 @ 16:49)        07-27    139  |  102  |  14  ----------------------------<  170<H>  3.5   |  28  |  0.86    Ca    8.8      27 Jul 2023 06:46    TPro  8.3  /  Alb  3.6  /  TBili  0.4  /  DBili  x   /  AST  19  /  ALT  11  /  AlkPhos  69  07-25                        11.4   7.26  )-----------( 292      ( 27 Jul 2023 06:46 )             34.8     Medications  MEDICATIONS  (STANDING):  artificial  tears Solution 1 Drop(s) Both EYES three times a day  atorvastatin 40 milliGRAM(s) Oral at bedtime  chlorhexidine 2% Cloths 1 Application(s) Topical <User Schedule>  dextrose 5%. 1000 milliLiter(s) (100 mL/Hr) IV Continuous <Continuous>  dextrose 5%. 1000 milliLiter(s) (50 mL/Hr) IV Continuous <Continuous>  dextrose 50% Injectable 12.5 Gram(s) IV Push once  dextrose 50% Injectable 25 Gram(s) IV Push once  dextrose 50% Injectable 25 Gram(s) IV Push once  furosemide    Tablet 20 milliGRAM(s) Oral daily  glucagon  Injectable 1 milliGRAM(s) IntraMuscular once  heparin   Injectable 5000 Unit(s) SubCutaneous every 8 hours  hydrALAZINE 50 milliGRAM(s) Oral two times a day  insulin glargine Injectable (LANTUS) 12 Unit(s) SubCutaneous at bedtime  insulin lispro (ADMELOG) corrective regimen sliding scale   SubCutaneous at bedtime  insulin lispro (ADMELOG) corrective regimen sliding scale   SubCutaneous every 6 hours  insulin lispro Injectable (ADMELOG) 4 Unit(s) SubCutaneous before breakfast  insulin lispro Injectable (ADMELOG) 4 Unit(s) SubCutaneous before dinner  insulin lispro Injectable (ADMELOG) 4 Unit(s) SubCutaneous before lunch  lisinopril 20 milliGRAM(s) Oral daily  metoprolol succinate ER 50 milliGRAM(s) Oral daily  pantoprazole    Tablet 40 milliGRAM(s) Oral before breakfast  piperacillin/tazobactam IVPB.. 3.375 Gram(s) IV Intermittent every 8 hours  tamsulosin 0.4 milliGRAM(s) Oral at bedtime      Physical Exam  General: Patient comfortable in bed  Vital Signs Last 12 Hrs  T(F): 97.5 (07-27-23 @ 06:14), Max: 97.5 (07-26-23 @ 23:49)  HR: 62 (07-27-23 @ 04:12) (59 - 62)  BP: 167/75 (07-27-23 @ 06:14) (167/75 - 168/78)  BP(mean): --  RR: 18 (07-27-23 @ 04:12) (18 - 18)  SpO2: 97% (07-27-23 @ 06:14) (91% - 97%)    CVS: S1S2   Respiratory: No wheezing, no crepitations  GI: Abdomen soft, bowel sounds positive  Musculoskeletal:  moves all extremities  : Voiding

## 2023-07-27 NOTE — BRIEF OPERATIVE NOTE - COMMENTS
Pt is s/p b/l foot heel wound debridement and right foot heel graft application   - high concern for residual bone infection of right foot  - low concern for viability, adequate intra-op bleeding  - podiatry plan: follow up OR data for 48 hours of no growth, stable for discharge from podiatry standpoint pending VAC placement of right foot and 48 hours of no growth from the clean bone margin culture Pt is s/p bilateral foot heel wound debridement with right foot kerecis graft application, and right foot calcaneus bone biopsy  - high concern for residual bone infection of right foot  - low concern for viability, adequate intra-op bleeding  - podiatry plan: follow up OR data for 48 hours of no growth, stable for discharge from podiatry standpoint pending VAC placement of right foot and 48 hours of no growth from the clean bone biopsy culture

## 2023-07-27 NOTE — DISCHARGE NOTE PROVIDER - PROVIDER TOKENS
PROVIDER:[TOKEN:[[id number]],FOLLOWUP:[2 weeks]],PROVIDER:[TOKEN:[[id number]],FOLLOWUP:[1 month]] PROVIDER:[TOKEN:[[id number]],FOLLOWUP:[2 weeks]],PROVIDER:[TOKEN:[[id number]],FOLLOWUP:[1 month]] PROVIDER:[TOKEN:[[id number]],FOLLOWUP:[2 weeks]],PROVIDER:[[id number]],FOLLOWUP:[1 month]]

## 2023-07-27 NOTE — BRIEF OPERATIVE NOTE - SPECIMENS
Pathogy: 1) Right foot clean bone margin, Culture: 1) Right foot soft tissue, 2) Right foot clean bone margin Pathogy: 1) Right foot calcaneal bone biopsy, Culture: 1) Right foot soft tissue, 2) Right foot calcaneal bone biopsy

## 2023-07-27 NOTE — PROGRESS NOTE ADULT - PROBLEM SELECTOR PLAN 1
Will continue current insulin regimen for now.   Will continue monitoring FS, log, and glucose trends, will Follow up.  Patient counseled for compliance with consistent low carb diet and exercise as tolerated outpatient.   ?Gastroparesis? suggest gi eval, study Will continue current insulin regimen  lanatus  20 units qHS  and ADMELOG 4 unit before each meal for now.   Will continue monitoring FS, log, and glucose trends, will Follow up.  Patient counseled for compliance with consistent low carb diet and exercise as tolerated outpatient.   If continue nausea and vomiting Consider Gastric emptying study, to r/o Gastroparesis.

## 2023-07-28 LAB
-  AMIKACIN: SIGNIFICANT CHANGE UP
-  AMOXICILLIN/CLAVULANIC ACID: SIGNIFICANT CHANGE UP
-  AMPICILLIN/SULBACTAM: SIGNIFICANT CHANGE UP
-  AMPICILLIN: SIGNIFICANT CHANGE UP
-  AZTREONAM: SIGNIFICANT CHANGE UP
-  CEFAZOLIN: SIGNIFICANT CHANGE UP
-  CEFEPIME: SIGNIFICANT CHANGE UP
-  CEFOXITIN: SIGNIFICANT CHANGE UP
-  CEFTAZIDIME: SIGNIFICANT CHANGE UP
-  CEFTRIAXONE: SIGNIFICANT CHANGE UP
-  CIPROFLOXACIN: SIGNIFICANT CHANGE UP
-  DAPTOMYCIN: SIGNIFICANT CHANGE UP
-  ERTAPENEM: SIGNIFICANT CHANGE UP
-  GENTAMICIN: SIGNIFICANT CHANGE UP
-  IMIPENEM: SIGNIFICANT CHANGE UP
-  LEVOFLOXACIN: SIGNIFICANT CHANGE UP
-  LINEZOLID: SIGNIFICANT CHANGE UP
-  MEROPENEM: SIGNIFICANT CHANGE UP
-  PIPERACILLIN/TAZOBACTAM: SIGNIFICANT CHANGE UP
-  TETRACYCLINE: SIGNIFICANT CHANGE UP
-  TOBRAMYCIN: SIGNIFICANT CHANGE UP
-  TRIMETHOPRIM/SULFAMETHOXAZOLE: SIGNIFICANT CHANGE UP
-  VANCOMYCIN: SIGNIFICANT CHANGE UP
ANION GAP SERPL CALC-SCNC: 11 MMOL/L — SIGNIFICANT CHANGE UP (ref 5–17)
BASOPHILS # BLD AUTO: 0.08 K/UL — SIGNIFICANT CHANGE UP (ref 0–0.2)
BASOPHILS NFR BLD AUTO: 1 % — SIGNIFICANT CHANGE UP (ref 0–2)
BUN SERPL-MCNC: 12 MG/DL — SIGNIFICANT CHANGE UP (ref 7–23)
CALCIUM SERPL-MCNC: 9.3 MG/DL — SIGNIFICANT CHANGE UP (ref 8.4–10.5)
CHLORIDE SERPL-SCNC: 103 MMOL/L — SIGNIFICANT CHANGE UP (ref 96–108)
CO2 SERPL-SCNC: 31 MMOL/L — SIGNIFICANT CHANGE UP (ref 22–31)
CREAT SERPL-MCNC: 1 MG/DL — SIGNIFICANT CHANGE UP (ref 0.5–1.3)
EGFR: 63 ML/MIN/1.73M2 — SIGNIFICANT CHANGE UP
EOSINOPHIL # BLD AUTO: 0.22 K/UL — SIGNIFICANT CHANGE UP (ref 0–0.5)
EOSINOPHIL NFR BLD AUTO: 2.7 % — SIGNIFICANT CHANGE UP (ref 0–6)
GLUCOSE BLDC GLUCOMTR-MCNC: 114 MG/DL — HIGH (ref 70–99)
GLUCOSE BLDC GLUCOMTR-MCNC: 124 MG/DL — HIGH (ref 70–99)
GLUCOSE BLDC GLUCOMTR-MCNC: 129 MG/DL — HIGH (ref 70–99)
GLUCOSE BLDC GLUCOMTR-MCNC: 75 MG/DL — SIGNIFICANT CHANGE UP (ref 70–99)
GLUCOSE SERPL-MCNC: 74 MG/DL — SIGNIFICANT CHANGE UP (ref 70–99)
GRAM STN FLD: SIGNIFICANT CHANGE UP
HCT VFR BLD CALC: 36.2 % — SIGNIFICANT CHANGE UP (ref 34.5–45)
HGB BLD-MCNC: 11.5 G/DL — SIGNIFICANT CHANGE UP (ref 11.5–15.5)
IMM GRANULOCYTES NFR BLD AUTO: 0.4 % — SIGNIFICANT CHANGE UP (ref 0–0.9)
LYMPHOCYTES # BLD AUTO: 2.17 K/UL — SIGNIFICANT CHANGE UP (ref 1–3.3)
LYMPHOCYTES # BLD AUTO: 27 % — SIGNIFICANT CHANGE UP (ref 13–44)
MCHC RBC-ENTMCNC: 27.3 PG — SIGNIFICANT CHANGE UP (ref 27–34)
MCHC RBC-ENTMCNC: 31.8 GM/DL — LOW (ref 32–36)
MCV RBC AUTO: 85.8 FL — SIGNIFICANT CHANGE UP (ref 80–100)
METHOD TYPE: SIGNIFICANT CHANGE UP
MONOCYTES # BLD AUTO: 0.51 K/UL — SIGNIFICANT CHANGE UP (ref 0–0.9)
MONOCYTES NFR BLD AUTO: 6.3 % — SIGNIFICANT CHANGE UP (ref 2–14)
NEUTROPHILS # BLD AUTO: 5.03 K/UL — SIGNIFICANT CHANGE UP (ref 1.8–7.4)
NEUTROPHILS NFR BLD AUTO: 62.6 % — SIGNIFICANT CHANGE UP (ref 43–77)
NRBC # BLD: 0 /100 WBCS — SIGNIFICANT CHANGE UP (ref 0–0)
PLATELET # BLD AUTO: 335 K/UL — SIGNIFICANT CHANGE UP (ref 150–400)
POTASSIUM SERPL-MCNC: 4.3 MMOL/L — SIGNIFICANT CHANGE UP (ref 3.5–5.3)
POTASSIUM SERPL-SCNC: 4.3 MMOL/L — SIGNIFICANT CHANGE UP (ref 3.5–5.3)
RBC # BLD: 4.22 M/UL — SIGNIFICANT CHANGE UP (ref 3.8–5.2)
RBC # FLD: 14.2 % — SIGNIFICANT CHANGE UP (ref 10.3–14.5)
SODIUM SERPL-SCNC: 145 MMOL/L — SIGNIFICANT CHANGE UP (ref 135–145)
SPECIMEN SOURCE: SIGNIFICANT CHANGE UP
WBC # BLD: 8.04 K/UL — SIGNIFICANT CHANGE UP (ref 3.8–10.5)
WBC # FLD AUTO: 8.04 K/UL — SIGNIFICANT CHANGE UP (ref 3.8–10.5)

## 2023-07-28 PROCEDURE — 99232 SBSQ HOSP IP/OBS MODERATE 35: CPT

## 2023-07-28 RX ORDER — INSULIN GLARGINE 100 [IU]/ML
12 INJECTION, SOLUTION SUBCUTANEOUS AT BEDTIME
Refills: 0 | Status: DISCONTINUED | OUTPATIENT
Start: 2023-07-28 | End: 2023-07-29

## 2023-07-28 RX ORDER — MUPIROCIN 20 MG/G
1 OINTMENT TOPICAL
Refills: 0 | Status: DISCONTINUED | OUTPATIENT
Start: 2023-07-28 | End: 2023-08-07

## 2023-07-28 RX ADMIN — Medication 3 UNIT(S): at 13:28

## 2023-07-28 RX ADMIN — HYDROMORPHONE HYDROCHLORIDE 0.5 MILLIGRAM(S): 2 INJECTION INTRAMUSCULAR; INTRAVENOUS; SUBCUTANEOUS at 02:50

## 2023-07-28 RX ADMIN — MUPIROCIN 1 APPLICATION(S): 20 OINTMENT TOPICAL at 05:04

## 2023-07-28 RX ADMIN — HYDROMORPHONE HYDROCHLORIDE 0.5 MILLIGRAM(S): 2 INJECTION INTRAMUSCULAR; INTRAVENOUS; SUBCUTANEOUS at 03:05

## 2023-07-28 RX ADMIN — INSULIN GLARGINE 12 UNIT(S): 100 INJECTION, SOLUTION SUBCUTANEOUS at 00:20

## 2023-07-28 RX ADMIN — TAMSULOSIN HYDROCHLORIDE 0.4 MILLIGRAM(S): 0.4 CAPSULE ORAL at 21:00

## 2023-07-28 RX ADMIN — PIPERACILLIN AND TAZOBACTAM 25 GRAM(S): 4; .5 INJECTION, POWDER, LYOPHILIZED, FOR SOLUTION INTRAVENOUS at 06:01

## 2023-07-28 RX ADMIN — HEPARIN SODIUM 5000 UNIT(S): 5000 INJECTION INTRAVENOUS; SUBCUTANEOUS at 13:28

## 2023-07-28 RX ADMIN — HYDROMORPHONE HYDROCHLORIDE 0.5 MILLIGRAM(S): 2 INJECTION INTRAMUSCULAR; INTRAVENOUS; SUBCUTANEOUS at 23:32

## 2023-07-28 RX ADMIN — Medication 2 UNIT(S): at 17:23

## 2023-07-28 RX ADMIN — Medication 50 MILLIGRAM(S): at 05:03

## 2023-07-28 RX ADMIN — HEPARIN SODIUM 5000 UNIT(S): 5000 INJECTION INTRAVENOUS; SUBCUTANEOUS at 21:00

## 2023-07-28 RX ADMIN — Medication 20 MILLIGRAM(S): at 05:03

## 2023-07-28 RX ADMIN — Medication 50 MILLIGRAM(S): at 17:23

## 2023-07-28 RX ADMIN — PIPERACILLIN AND TAZOBACTAM 25 GRAM(S): 4; .5 INJECTION, POWDER, LYOPHILIZED, FOR SOLUTION INTRAVENOUS at 15:56

## 2023-07-28 RX ADMIN — CHLORHEXIDINE GLUCONATE 1 APPLICATION(S): 213 SOLUTION TOPICAL at 05:33

## 2023-07-28 RX ADMIN — INSULIN GLARGINE 12 UNIT(S): 100 INJECTION, SOLUTION SUBCUTANEOUS at 21:16

## 2023-07-28 RX ADMIN — PIPERACILLIN AND TAZOBACTAM 25 GRAM(S): 4; .5 INJECTION, POWDER, LYOPHILIZED, FOR SOLUTION INTRAVENOUS at 23:02

## 2023-07-28 RX ADMIN — ATORVASTATIN CALCIUM 40 MILLIGRAM(S): 80 TABLET, FILM COATED ORAL at 21:00

## 2023-07-28 RX ADMIN — MUPIROCIN 1 APPLICATION(S): 20 OINTMENT TOPICAL at 17:22

## 2023-07-28 RX ADMIN — HEPARIN SODIUM 5000 UNIT(S): 5000 INJECTION INTRAVENOUS; SUBCUTANEOUS at 05:04

## 2023-07-28 RX ADMIN — LISINOPRIL 20 MILLIGRAM(S): 2.5 TABLET ORAL at 05:04

## 2023-07-28 RX ADMIN — MUPIROCIN 1 APPLICATION(S): 20 OINTMENT TOPICAL at 21:00

## 2023-07-28 RX ADMIN — PANTOPRAZOLE SODIUM 40 MILLIGRAM(S): 20 TABLET, DELAYED RELEASE ORAL at 05:34

## 2023-07-28 NOTE — PHYSICAL THERAPY INITIAL EVALUATION ADULT - MODALITIES TREATMENT COMMENTS
Pt dressing rec'd C/D/I, graft with adaptic over top appears clean, [id number]. VAC with adaptic and black sponge to 75mmHg, continuous therapy, good seal. PT woundcare to continue to follow up 3x per week Pt dressing rec'd C/D/I, graft with adaptic over top appears clean, [id number]. VAC with adaptic and black sponge to 75mmHg, continuous therapy, good seal. PT woundcare to continue to follow up 3x per week Pt dressing rec'd C/D/I, graft with adaptic over top appears clean, [id number]. VAC with adaptic and black sponge to 75mmHg, continuous therapy, good seal. PT woundcare to continue to follow up 3x per week

## 2023-07-28 NOTE — PROGRESS NOTE ADULT - PROBLEM SELECTOR PLAN 1
Will continue current insulin regimen  Lantus 12 units qHS  and ADMELOG 3 unit before each meal for now.   Received partial dose Lantus, insulin decreased  Will continue monitoring FS, log, and glucose trends, will Follow up.  Patient counseled for compliance with consistent low carb diet and exercise as tolerated outpatient.   If continue nausea and vomiting Consider Gastric emptying study, to r/o Gastroparesis.

## 2023-07-28 NOTE — PHYSICAL THERAPY INITIAL EVALUATION ADULT - PERTINENT HX OF CURRENT PROBLEM, REHAB EVAL
62 y/o M PMHx of DMH2 on insulin, HTN, HLD p/w right heel ulcer. Patient is sent by her podiatrist for questionable right foot procedure.  She is unsure how long she has had this ulcer for.  States that she was recently hospitalized in the near [facility] for an evaluation of the right foot infection.  States she still had a 102 fever yesterday which resolved.  Denies any fevers today, chills, chest pain, shortness of breath, abdominal pain, nausea vomiting diarrhea. Pt s/p bilateral foot heel wound debridement with right foot kerecis graft application, and right foot calcaneus bone biopsy on 7/27. Xray R foot: Midfoot degenerative changes are noted. Soft tissue defect at the heel related to known heel ulcer. No osseous erosive changes of the calcaneus or adjacent osseous structures. Soft tissue edema is also noted along the dorsal foot and anterior lower shin. No tracking subcutaneous gas. Along the first distal phalanx base, there are juxtaarticular erosions at the medial aspect and productive changes at the lateral aspect. Findings may be due to underlying inflammatory arthritis or crystalline arthropathy. Vascular calcifications. CXR: (-). MRI R ankle: 1. There is a ulcer overlying the heel roughly spanning 5.4 cm extending to the lateral calcaneus. There is minimal osseous edema within the lateral calcaneus with mild postcontrast enhancement and without significant loss of normal T1 fatty marrow. This is possibly representing early osteomyelitis versus reactive in nature. 2. Questionable minimal osseous edema within the fourth metatarsal head with postcontrast enhancement which is incompletely evaluated. These findings are possibly secondary to reactive degenerative changes versus osteomyelitis if there is adjacent ulcer. 64 y/o M PMHx of DMH2 on insulin, HTN, HLD p/w right heel ulcer. Patient is sent by her podiatrist for questionable right foot procedure.  She is unsure how long she has had this ulcer for.  States that she was recently hospitalized in the near [facility] for an evaluation of the right foot infection.  States she still had a 102 fever yesterday which resolved.  Denies any fevers today, chills, chest pain, shortness of breath, abdominal pain, nausea vomiting diarrhea. Pt s/p bilateral foot heel wound debridement with right foot kerecis graft application, and right foot calcaneus bone biopsy on 7/27. Xray R foot: Midfoot degenerative changes are noted. Soft tissue defect at the heel related to known heel ulcer. No osseous erosive changes of the calcaneus or adjacent osseous structures. Soft tissue edema is also noted along the dorsal foot and anterior lower shin. No tracking subcutaneous gas. Along the first distal phalanx base, there are juxtaarticular erosions at the medial aspect and productive changes at the lateral aspect. Findings may be due to underlying inflammatory arthritis or crystalline arthropathy. Vascular calcifications. CXR: (-). MRI R ankle: 1. There is a ulcer overlying the heel roughly spanning 5.4 cm extending to the lateral calcaneus. There is minimal osseous edema within the lateral calcaneus with mild postcontrast enhancement and without significant loss of normal T1 fatty marrow. This is possibly representing early osteomyelitis versus reactive in nature. 2. Questionable minimal osseous edema within the fourth metatarsal head with postcontrast enhancement which is incompletely evaluated. These findings are possibly secondary to reactive degenerative changes versus osteomyelitis if there is adjacent ulcer. 64 y/o M PMHx of DMH2 on insulin, HTN, HLD p/w right heel ulcer. Patient is sent by her podiatrist for questionable right foot procedure.  She is unsure how long she has had this ulcer for.  States that she was recently hospitalized in the near [facility] for an evaluation of the right foot infection.  States she still had a 102 fever yesterday which resolved.  Denies any fevers today, chills, chest pain, shortness of breath, abdominal pain, nausea vomiting diarrhea. Pt s/p bilateral foot heel wound debridement with right foot kerecis graft application, and right foot calcaneus bone biopsy on 7/27. Xray R foot: Midfoot degenerative changes are noted. Soft tissue defect at the heel related to known heel ulcer. No osseous erosive changes of the calcaneus or adjacent osseous structures. Soft tissue edema is also noted along the dorsal foot and anterior lower shin. No tracking subcutaneous gas. Along the first distal phalanx base, there are juxtaarticular erosions at the medial aspect and productive changes at the lateral aspect. Findings may be due to underlying inflammatory arthritis or crystalline arthropathy. Vascular calcifications. CXR: (-). MRI R ankle: 1. There is a ulcer overlying the heel roughly spanning 5.4 cm extending to the lateral calcaneus. There is minimal osseous edema within the lateral calcaneus with mild postcontrast enhancement and without significant loss of normal T1 fatty marrow. This is possibly representing early osteomyelitis versus reactive in nature. 2. Questionable minimal osseous edema within the fourth metatarsal head with postcontrast enhancement which is incompletely evaluated. These findings are possibly secondary to reactive degenerative changes versus osteomyelitis if there is adjacent ulcer.

## 2023-07-28 NOTE — PROGRESS NOTE ADULT - ASSESSMENT
63 female h/o dm, htn, chol, here with b/l foot wounds    b/l foot wounds  pod eval noted  id consult apprec  iv abx as per id  f/u cult  wound care  MRI noted  s/p bilateral foot heel wound debridement with right foot kerecis graft application, and right foot calcaneus bone biopsy  post op care per pod  VAC applied  f/u cultures from OR      dm  insulin as ordered  monitor fs    chol  cont statin    htn  cont home meds    dvt ppx      Advanced care planning was discussed with patient and family.  Advanced care planning forms were reviewed and discussed as appropriate.  Differential diagnosis and plan of care discussed with patient after the evaluation.   Pain assessed and judicious use of narcotics when appropriate was discussed.  Importance of Fall prevention discussed.  Counseling on Smoking and Alcohol cessation was offered when appropriate.  Counseling on Diet, exercise, and medication compliance was done.       Approx 60 minutes spent.

## 2023-07-28 NOTE — PHYSICAL THERAPY INITIAL EVALUATION ADULT - GENERAL OBSERVATIONS, REHAB EVAL
Pt rachel 35-min PT eval well. Pt received semi-supine in bed, NAD, +PIVlocked, +b/l LE dressings clean/dry/intact, +R heel wound vac, +external female catheter, A&O x4, agreeable to PT. Pt s/p bilateral foot heel wound debridement with right foot kerecis graft application, and right foot calcaneus bone biopsy on 7/27, NWB b/l LEs.

## 2023-07-28 NOTE — PROGRESS NOTE ADULT - ASSESSMENT
62 y/o F PMHx of DMH2 on insulin &7/26/23: A1C= 8.4%), HTN, HLD, BMI = 35.3,  bilateral heel ulcer and fever to 102F day prior to admission. ID consulted for eval of bilateral foot ulcer.   admitted 7/25/23  She notes nausea, emesis malaise  Right heel ulcer is long standing - Currently with extensive black eschar with sl separation at edges, drainage on dressing and malodour with likley underlying  infection, however malodor suggests infection.   The L heel ulcer is small, superficial and benign with no significant concern for infection.    7/25 ESR/CRP = 85/41    Antibiotics  Vano 7/25--> 7/26  Zosyn 7/25-->      #B/l Foot ulcer  #possible osteo  #infection  MRI foot suggest  possible osteomyelitis calcaneus    7/26  nasal swab POSITIVE MRSA PCR .  7/27 for  right foot wound debridement with Integra graft application later today       Suggest  Continue Zosyn   await the results of biopsy and culture    OM of the calcaneous is likely to ultimately lead to BKA  Vascular surgery consult evaluate for possible stent restenosis   ID covering over weekend             64 y/o F PMHx of DMH2 on insulin &7/26/23: A1C= 8.4%), HTN, HLD, BMI = 35.3,  bilateral heel ulcer and fever to 102F day prior to admission. ID consulted for eval of bilateral foot ulcer.   admitted 7/25/23  She notes nausea, emesis malaise  Right heel ulcer is long standing - Currently with extensive black eschar with sl separation at edges, drainage on dressing and malodour with likley underlying  infection, however malodor suggests infection.   The L heel ulcer is small, superficial and benign with no significant concern for infection.    7/25 ESR/CRP = 85/41    Antibiotics  Vano 7/25--> 7/26  Zosyn 7/25-->      #B/l Foot ulcer  #possible osteo  #infection  MRI foot suggest  possible osteomyelitis calcaneus    7/26  nasal swab POSITIVE MRSA PCR .  7/27 for  right foot wound debridement with Integra graft application later today       Suggest  Continue Zosyn   await the results of biopsy and culture    OM of the calcaneous is likely to ultimately lead to BKA  Vascular surgery consult evaluate for possible stent restenosis   ID covering over weekend

## 2023-07-28 NOTE — PHYSICAL THERAPY INITIAL EVALUATION ADULT - ADDITIONAL COMMENTS
Per pt, pt lives in a private house alone (brothers live upstairs) with no steps to enter. Prior to admission, pt was ambulating and performing all ADLs/IADLs independently. Pt owns a rolling walker and wheelchair. Pt stated she was recently d/c'd from [facility] and was receiving home care services including [facility] 5 days/week 4 hours/day. Per pt, pt lives in a private house alone (brothers live upstairs) with no steps to enter. Prior to admission, pt was ambulating and performing all ADLs/IADLs independently. Pt owns a rolling walker and wheelchair. Pt stated she was recently d/c'd from [facility] and was receiving home care services including [facility] 5 days/week 4 hours/day. Per pt, pt lives in a private house alone (brothers live upstairs) with no steps to enter. Prior to admission, pt was ambulating and performing all ADLs/IADLs independently. Pt owns a rolling walker and wheelchair. Pt stated she was recently d/c'd from [facility] and was receiving home care services including [facility] 5 days/week 4 hours/day.

## 2023-07-28 NOTE — PROGRESS NOTE ADULT - ASSESSMENT
63F s/p BL heel wound debridement with right foot graft application and bone biopsy (DOS 7/27).  - Pt seen and evaluated.  - Afebrile, WBC 8.04.  - s/p BL heel wound debridement with right foot graft application and bone biopsy (DOS 7/27): adaptic and graft intact, staples intact, mild serosagnuinous drainage, no purulence, no erythema, no acute signs of infection.   - Intraop Findings: High concern for residual bone infection, low concern for viability.  - Right Foot Eschar Culture: Pending.  - Right Foot Calcaneal Bone Biopsy Culture: Pending.   - Right foot VAC to be applied today.  - BL z-flows to be worn at all times while in bed.  - Stable for discharge pending VAC application, 48 hours of no growth from the calcaneal bone biopsy, and final ID recs.  - Wound care instructions and followup information listed in discharge provider note.  - Discussed with attending.

## 2023-07-28 NOTE — PROGRESS NOTE ADULT - SUBJECTIVE AND OBJECTIVE BOX
STEPHANIE KAYE  63y Female  MRN:[id number]    Patient is a 63y old  Female who presents with a chief complaint of foot infection    HPI:   64 y/o F PMHx of DMH2 on insulin, HTN, HLD p/w right heel ulcer. Patient is sent by her podiatrist for questionable right foot procedure.  She is unsure how long she has had this ulcer for.  States that she was recently hospitalized in the near [facility] for an evaluation of the right foot infection.  States she still had a 102 fever yesterday which resolved.  Denies any fevers today, chills, chest pain, shortness of breath, abdominal pain, nausea vomiting diarrhea.  States she ambulates with a walker at baseline.  Accompanied by her sister-in-law (25 Jul 2023 21:58)      Patient seen and evaluated at bedside. No acute events overnight except as noted.    Interval HPI: s/p bilateral foot heel wound debridement with right foot kerecis graft application, and right foot calcaneus bone biopsy        PAST MEDICAL & SURGICAL HISTORY:  Diabetes      Hypertension      Hypercholesteremia          REVIEW OF SYSTEMS:  as per hpi     VITALS:   Vital Signs Last 24 Hrs  T(C): 36.3 (28 Jul 2023 04:54), Max: 36.9 (27 Jul 2023 12:26)  T(F): 97.3 (28 Jul 2023 04:54), Max: 98.5 (27 Jul 2023 12:26)  HR: 52 (28 Jul 2023 04:54) (50 - 81)  BP: 143/71 (28 Jul 2023 04:54) (124/76 - 176/77)  BP(mean): 103 (27 Jul 2023 22:30) (83 - 108)  RR: 18 (28 Jul 2023 04:54) (15 - 18)  SpO2: 98% (28 Jul 2023 04:54) (94% - 100%)    Parameters below as of 28 Jul 2023 04:54  Patient On (Oxygen Delivery Method): room air        PHYSICAL EXAM:  GENERAL: NAD, well-developed  HEAD:  Atraumatic, Normocephalic  EYES: EOMI, PERRLA, conjunctiva and sclera clear  NECK: Supple, No JVD  CHEST/LUNG: Clear to auscultation bilaterally; No wheeze  HEART: S1, S2; No murmurs, rubs, or gallops  ABDOMEN: Soft, Nontender, Nondistended; Bowel sounds present  EXTREMITIES:  2+ Peripheral Pulses, No clubbing, cyanosis, or edema. b/l lower ext dressing in place   PSYCH: Normal affect  NEUROLOGY: AAOX3; non-focal  SKIN: No rashes or lesions    Consultant(s) Notes Reviewed:  [x ] YES  [ ] NO  Care Discussed with Consultants/Other Providers [ x] YES  [ ] NO    MEDS:   MEDICATIONS  (STANDING):  artificial  tears Solution 1 Drop(s) Both EYES three times a day  atorvastatin 40 milliGRAM(s) Oral at bedtime  chlorhexidine 2% Cloths 1 Application(s) Topical <User Schedule>  dextrose 5%. 1000 milliLiter(s) (50 mL/Hr) IV Continuous <Continuous>  dextrose 5%. 1000 milliLiter(s) (100 mL/Hr) IV Continuous <Continuous>  dextrose 50% Injectable 25 Gram(s) IV Push once  dextrose 50% Injectable 12.5 Gram(s) IV Push once  dextrose 50% Injectable 25 Gram(s) IV Push once  furosemide    Tablet 20 milliGRAM(s) Oral daily  glucagon  Injectable 1 milliGRAM(s) IntraMuscular once  heparin   Injectable 5000 Unit(s) SubCutaneous every 8 hours  hydrALAZINE 50 milliGRAM(s) Oral two times a day  insulin glargine Injectable (LANTUS) 12 Unit(s) SubCutaneous at bedtime  insulin lispro (ADMELOG) corrective regimen sliding scale   SubCutaneous three times a day before meals  insulin lispro (ADMELOG) corrective regimen sliding scale   SubCutaneous at bedtime  insulin lispro Injectable (ADMELOG) 3 Unit(s) SubCutaneous before lunch  insulin lispro Injectable (ADMELOG) 2 Unit(s) SubCutaneous before dinner  insulin lispro Injectable (ADMELOG) 3 Unit(s) SubCutaneous before breakfast  lisinopril 20 milliGRAM(s) Oral daily  metoprolol succinate ER 50 milliGRAM(s) Oral daily  mupirocin 2% Nasal 1 Application(s) Both Nostrils two times a day  pantoprazole    Tablet 40 milliGRAM(s) Oral before breakfast  piperacillin/tazobactam IVPB.. 3.375 Gram(s) IV Intermittent every 8 hours  tamsulosin 0.4 milliGRAM(s) Oral at bedtime    MEDICATIONS  (PRN):  acetaminophen     Tablet .. 650 milliGRAM(s) Oral every 6 hours PRN Mild Pain (1 - 3)  dextrose Oral Gel 15 Gram(s) Oral once PRN Blood Glucose LESS THAN 70 milliGRAM(s)/deciliter  HYDROmorphone  Injectable 0.5 milliGRAM(s) IV Push every 4 hours PRN Severe Pain (7 - 10)  ondansetron Injectable 4 milliGRAM(s) IV Push every 8 hours PRN Nausea and/or Vomiting  oxycodone    5 mG/acetaminophen 325 mG 1 Tablet(s) Oral every 4 hours PRN Moderate Pain (4 - 6)        ALLERGIES:  Allergy Status Unknown      LABS:                                              11.5   8.04  )-----------( 335      ( 28 Jul 2023 07:21 )             36.2    07-28    145  |  103  |  12  ----------------------------<  74  4.3   |  31  |  1.00    Ca    9.3      28 Jul 2023 07:19        < from: MR Ankle w/ IV Cont, Right (07.26.23 @ 18:31) >    IMPRESSION:  1.  There is a ulcer overlying the heel roughly spanning 5.4 cm extending   to the lateral calcaneus. There is minimal osseous edema within the   lateral calcaneus with mild postcontrast enhancement and without   significant loss of normal T1 fatty marrow. This is possibly representing   early osteomyelitis versus reactive in nature.  2.  Questionable minimal osseous edema within the fourth metatarsal head   with postcontrast enhancement which is incompletely evaluated. These   findings are possibly secondary to reactive degenerative changes versus   osteomyelitis if there is adjacent ulcer. Clinical correlation is advised.    --- End of Report ---        < end of copied text >     < from: Xray Foot AP + Lateral + Oblique, Right (07.25.23 @ 17:54) >    IMPRESSION:    No acute fracture or dislocation of the right foot. Midfoot degenerative   changes are noted.    Soft tissue defect at the heel related to known heel ulcer. No osseous   erosive changes of the calcaneus or adjacent osseousstructures. Soft   tissue edema is also noted along the dorsal foot and anterior lower shin.   No tracking subcutaneous gas.    Along the first distal phalanx base, there are juxtaarticular erosions at   the medial aspect and productive changes at the lateral aspect. Findings   may be due to underlying inflammatory arthritis or crystalline   arthropathy.    Vascular calcifications.      --- End of Report ---    < end of copied text >   STEPHANIE KAYE  63y Female  MRN:[id number]    Patient is a 63y old  Female who presents with a chief complaint of foot infection    HPI:   62 y/o F PMHx of DMH2 on insulin, HTN, HLD p/w right heel ulcer. Patient is sent by her podiatrist for questionable right foot procedure.  She is unsure how long she has had this ulcer for.  States that she was recently hospitalized in the near [facility] for an evaluation of the right foot infection.  States she still had a 102 fever yesterday which resolved.  Denies any fevers today, chills, chest pain, shortness of breath, abdominal pain, nausea vomiting diarrhea.  States she ambulates with a walker at baseline.  Accompanied by her sister-in-law (25 Jul 2023 21:58)      Patient seen and evaluated at bedside. No acute events overnight except as noted.    Interval HPI: s/p bilateral foot heel wound debridement with right foot kerecis graft application, and right foot calcaneus bone biopsy        PAST MEDICAL & SURGICAL HISTORY:  Diabetes      Hypertension      Hypercholesteremia          REVIEW OF SYSTEMS:  as per hpi     VITALS:   Vital Signs Last 24 Hrs  T(C): 36.3 (28 Jul 2023 04:54), Max: 36.9 (27 Jul 2023 12:26)  T(F): 97.3 (28 Jul 2023 04:54), Max: 98.5 (27 Jul 2023 12:26)  HR: 52 (28 Jul 2023 04:54) (50 - 81)  BP: 143/71 (28 Jul 2023 04:54) (124/76 - 176/77)  BP(mean): 103 (27 Jul 2023 22:30) (83 - 108)  RR: 18 (28 Jul 2023 04:54) (15 - 18)  SpO2: 98% (28 Jul 2023 04:54) (94% - 100%)    Parameters below as of 28 Jul 2023 04:54  Patient On (Oxygen Delivery Method): room air        PHYSICAL EXAM:  GENERAL: NAD, well-developed  HEAD:  Atraumatic, Normocephalic  EYES: EOMI, PERRLA, conjunctiva and sclera clear  NECK: Supple, No JVD  CHEST/LUNG: Clear to auscultation bilaterally; No wheeze  HEART: S1, S2; No murmurs, rubs, or gallops  ABDOMEN: Soft, Nontender, Nondistended; Bowel sounds present  EXTREMITIES:  2+ Peripheral Pulses, No clubbing, cyanosis, or edema. b/l lower ext dressing in place   PSYCH: Normal affect  NEUROLOGY: AAOX3; non-focal  SKIN: No rashes or lesions    Consultant(s) Notes Reviewed:  [x ] YES  [ ] NO  Care Discussed with Consultants/Other Providers [ x] YES  [ ] NO    MEDS:   MEDICATIONS  (STANDING):  artificial  tears Solution 1 Drop(s) Both EYES three times a day  atorvastatin 40 milliGRAM(s) Oral at bedtime  chlorhexidine 2% Cloths 1 Application(s) Topical <User Schedule>  dextrose 5%. 1000 milliLiter(s) (50 mL/Hr) IV Continuous <Continuous>  dextrose 5%. 1000 milliLiter(s) (100 mL/Hr) IV Continuous <Continuous>  dextrose 50% Injectable 25 Gram(s) IV Push once  dextrose 50% Injectable 12.5 Gram(s) IV Push once  dextrose 50% Injectable 25 Gram(s) IV Push once  furosemide    Tablet 20 milliGRAM(s) Oral daily  glucagon  Injectable 1 milliGRAM(s) IntraMuscular once  heparin   Injectable 5000 Unit(s) SubCutaneous every 8 hours  hydrALAZINE 50 milliGRAM(s) Oral two times a day  insulin glargine Injectable (LANTUS) 12 Unit(s) SubCutaneous at bedtime  insulin lispro (ADMELOG) corrective regimen sliding scale   SubCutaneous three times a day before meals  insulin lispro (ADMELOG) corrective regimen sliding scale   SubCutaneous at bedtime  insulin lispro Injectable (ADMELOG) 3 Unit(s) SubCutaneous before lunch  insulin lispro Injectable (ADMELOG) 2 Unit(s) SubCutaneous before dinner  insulin lispro Injectable (ADMELOG) 3 Unit(s) SubCutaneous before breakfast  lisinopril 20 milliGRAM(s) Oral daily  metoprolol succinate ER 50 milliGRAM(s) Oral daily  mupirocin 2% Nasal 1 Application(s) Both Nostrils two times a day  pantoprazole    Tablet 40 milliGRAM(s) Oral before breakfast  piperacillin/tazobactam IVPB.. 3.375 Gram(s) IV Intermittent every 8 hours  tamsulosin 0.4 milliGRAM(s) Oral at bedtime    MEDICATIONS  (PRN):  acetaminophen     Tablet .. 650 milliGRAM(s) Oral every 6 hours PRN Mild Pain (1 - 3)  dextrose Oral Gel 15 Gram(s) Oral once PRN Blood Glucose LESS THAN 70 milliGRAM(s)/deciliter  HYDROmorphone  Injectable 0.5 milliGRAM(s) IV Push every 4 hours PRN Severe Pain (7 - 10)  ondansetron Injectable 4 milliGRAM(s) IV Push every 8 hours PRN Nausea and/or Vomiting  oxycodone    5 mG/acetaminophen 325 mG 1 Tablet(s) Oral every 4 hours PRN Moderate Pain (4 - 6)        ALLERGIES:  Allergy Status Unknown      LABS:                                              11.5   8.04  )-----------( 335      ( 28 Jul 2023 07:21 )             36.2    07-28    145  |  103  |  12  ----------------------------<  74  4.3   |  31  |  1.00    Ca    9.3      28 Jul 2023 07:19        < from: MR Ankle w/ IV Cont, Right (07.26.23 @ 18:31) >    IMPRESSION:  1.  There is a ulcer overlying the heel roughly spanning 5.4 cm extending   to the lateral calcaneus. There is minimal osseous edema within the   lateral calcaneus with mild postcontrast enhancement and without   significant loss of normal T1 fatty marrow. This is possibly representing   early osteomyelitis versus reactive in nature.  2.  Questionable minimal osseous edema within the fourth metatarsal head   with postcontrast enhancement which is incompletely evaluated. These   findings are possibly secondary to reactive degenerative changes versus   osteomyelitis if there is adjacent ulcer. Clinical correlation is advised.    --- End of Report ---        < end of copied text >     < from: Xray Foot AP + Lateral + Oblique, Right (07.25.23 @ 17:54) >    IMPRESSION:    No acute fracture or dislocation of the right foot. Midfoot degenerative   changes are noted.    Soft tissue defect at the heel related to known heel ulcer. No osseous   erosive changes of the calcaneus or adjacent osseousstructures. Soft   tissue edema is also noted along the dorsal foot and anterior lower shin.   No tracking subcutaneous gas.    Along the first distal phalanx base, there are juxtaarticular erosions at   the medial aspect and productive changes at the lateral aspect. Findings   may be due to underlying inflammatory arthritis or crystalline   arthropathy.    Vascular calcifications.      --- End of Report ---    < end of copied text >   STEPHANIE KAYE  63y Female  MRN:[id number]    Patient is a 63y old  Female who presents with a chief complaint of foot infection    HPI:   64 y/o F PMHx of DMH2 on insulin, HTN, HLD p/w right heel ulcer. Patient is sent by her podiatrist for questionable right foot procedure.  She is unsure how long she has had this ulcer for.  States that she was recently hospitalized in the near [facility] for an evaluation of the right foot infection.  States she still had a 102 fever yesterday which resolved.  Denies any fevers today, chills, chest pain, shortness of breath, abdominal pain, nausea vomiting diarrhea.  States she ambulates with a walker at baseline.  Accompanied by her sister-in-law (25 Jul 2023 21:58)      Patient seen and evaluated at bedside. No acute events overnight except as noted.    Interval HPI: s/p bilateral foot heel wound debridement with right foot kerecis graft application, and right foot calcaneus bone biopsy        PAST MEDICAL & SURGICAL HISTORY:  Diabetes      Hypertension      Hypercholesteremia          REVIEW OF SYSTEMS:  as per hpi     VITALS:   Vital Signs Last 24 Hrs  T(C): 36.3 (28 Jul 2023 04:54), Max: 36.9 (27 Jul 2023 12:26)  T(F): 97.3 (28 Jul 2023 04:54), Max: 98.5 (27 Jul 2023 12:26)  HR: 52 (28 Jul 2023 04:54) (50 - 81)  BP: 143/71 (28 Jul 2023 04:54) (124/76 - 176/77)  BP(mean): 103 (27 Jul 2023 22:30) (83 - 108)  RR: 18 (28 Jul 2023 04:54) (15 - 18)  SpO2: 98% (28 Jul 2023 04:54) (94% - 100%)    Parameters below as of 28 Jul 2023 04:54  Patient On (Oxygen Delivery Method): room air        PHYSICAL EXAM:  GENERAL: NAD, well-developed  HEAD:  Atraumatic, Normocephalic  EYES: EOMI, PERRLA, conjunctiva and sclera clear  NECK: Supple, No JVD  CHEST/LUNG: Clear to auscultation bilaterally; No wheeze  HEART: S1, S2; No murmurs, rubs, or gallops  ABDOMEN: Soft, Nontender, Nondistended; Bowel sounds present  EXTREMITIES:  2+ Peripheral Pulses, No clubbing, cyanosis, or edema. b/l lower ext dressing in place   PSYCH: Normal affect  NEUROLOGY: AAOX3; non-focal  SKIN: No rashes or lesions    Consultant(s) Notes Reviewed:  [x ] YES  [ ] NO  Care Discussed with Consultants/Other Providers [ x] YES  [ ] NO    MEDS:   MEDICATIONS  (STANDING):  artificial  tears Solution 1 Drop(s) Both EYES three times a day  atorvastatin 40 milliGRAM(s) Oral at bedtime  chlorhexidine 2% Cloths 1 Application(s) Topical <User Schedule>  dextrose 5%. 1000 milliLiter(s) (50 mL/Hr) IV Continuous <Continuous>  dextrose 5%. 1000 milliLiter(s) (100 mL/Hr) IV Continuous <Continuous>  dextrose 50% Injectable 25 Gram(s) IV Push once  dextrose 50% Injectable 12.5 Gram(s) IV Push once  dextrose 50% Injectable 25 Gram(s) IV Push once  furosemide    Tablet 20 milliGRAM(s) Oral daily  glucagon  Injectable 1 milliGRAM(s) IntraMuscular once  heparin   Injectable 5000 Unit(s) SubCutaneous every 8 hours  hydrALAZINE 50 milliGRAM(s) Oral two times a day  insulin glargine Injectable (LANTUS) 12 Unit(s) SubCutaneous at bedtime  insulin lispro (ADMELOG) corrective regimen sliding scale   SubCutaneous three times a day before meals  insulin lispro (ADMELOG) corrective regimen sliding scale   SubCutaneous at bedtime  insulin lispro Injectable (ADMELOG) 3 Unit(s) SubCutaneous before lunch  insulin lispro Injectable (ADMELOG) 2 Unit(s) SubCutaneous before dinner  insulin lispro Injectable (ADMELOG) 3 Unit(s) SubCutaneous before breakfast  lisinopril 20 milliGRAM(s) Oral daily  metoprolol succinate ER 50 milliGRAM(s) Oral daily  mupirocin 2% Nasal 1 Application(s) Both Nostrils two times a day  pantoprazole    Tablet 40 milliGRAM(s) Oral before breakfast  piperacillin/tazobactam IVPB.. 3.375 Gram(s) IV Intermittent every 8 hours  tamsulosin 0.4 milliGRAM(s) Oral at bedtime    MEDICATIONS  (PRN):  acetaminophen     Tablet .. 650 milliGRAM(s) Oral every 6 hours PRN Mild Pain (1 - 3)  dextrose Oral Gel 15 Gram(s) Oral once PRN Blood Glucose LESS THAN 70 milliGRAM(s)/deciliter  HYDROmorphone  Injectable 0.5 milliGRAM(s) IV Push every 4 hours PRN Severe Pain (7 - 10)  ondansetron Injectable 4 milliGRAM(s) IV Push every 8 hours PRN Nausea and/or Vomiting  oxycodone    5 mG/acetaminophen 325 mG 1 Tablet(s) Oral every 4 hours PRN Moderate Pain (4 - 6)        ALLERGIES:  Allergy Status Unknown      LABS:                                              11.5   8.04  )-----------( 335      ( 28 Jul 2023 07:21 )             36.2    07-28    145  |  103  |  12  ----------------------------<  74  4.3   |  31  |  1.00    Ca    9.3      28 Jul 2023 07:19        < from: MR Ankle w/ IV Cont, Right (07.26.23 @ 18:31) >    IMPRESSION:  1.  There is a ulcer overlying the heel roughly spanning 5.4 cm extending   to the lateral calcaneus. There is minimal osseous edema within the   lateral calcaneus with mild postcontrast enhancement and without   significant loss of normal T1 fatty marrow. This is possibly representing   early osteomyelitis versus reactive in nature.  2.  Questionable minimal osseous edema within the fourth metatarsal head   with postcontrast enhancement which is incompletely evaluated. These   findings are possibly secondary to reactive degenerative changes versus   osteomyelitis if there is adjacent ulcer. Clinical correlation is advised.    --- End of Report ---        < end of copied text >     < from: Xray Foot AP + Lateral + Oblique, Right (07.25.23 @ 17:54) >    IMPRESSION:    No acute fracture or dislocation of the right foot. Midfoot degenerative   changes are noted.    Soft tissue defect at the heel related to known heel ulcer. No osseous   erosive changes of the calcaneus or adjacent osseousstructures. Soft   tissue edema is also noted along the dorsal foot and anterior lower shin.   No tracking subcutaneous gas.    Along the first distal phalanx base, there are juxtaarticular erosions at   the medial aspect and productive changes at the lateral aspect. Findings   may be due to underlying inflammatory arthritis or crystalline   arthropathy.    Vascular calcifications.      --- End of Report ---    < end of copied text >

## 2023-07-28 NOTE — PROGRESS NOTE ADULT - SUBJECTIVE AND OBJECTIVE BOX
Patient is a 63y old  Female who presents with a chief complaint of heel wound (27 Jul 2023 18:16)       INTERVAL HPI/OVERNIGHT EVENTS:  Patient seen and evaluated at bedside.  Pt is resting comfortable in NAD. Denies N/V/F/C.    Allergies    No Known Allergies    Intolerances        Vital Signs Last 24 Hrs  T(C): 36.3 (28 Jul 2023 04:54), Max: 36.9 (27 Jul 2023 12:26)  T(F): 97.3 (28 Jul 2023 04:54), Max: 98.5 (27 Jul 2023 12:26)  HR: 52 (28 Jul 2023 04:54) (50 - 81)  BP: 143/71 (28 Jul 2023 04:54) (124/76 - 176/77)  BP(mean): 103 (27 Jul 2023 22:30) (83 - 108)  RR: 18 (28 Jul 2023 04:54) (15 - 18)  SpO2: 98% (28 Jul 2023 04:54) (94% - 100%)    Parameters below as of 28 Jul 2023 04:54  Patient On (Oxygen Delivery Method): room air        LABS:                        11.5   8.04  )-----------( 335      ( 28 Jul 2023 07:21 )             36.2     07-28    145  |  103  |  12  ----------------------------<  74  4.3   |  31  |  1.00    Ca    9.3      28 Jul 2023 07:19      PT/INR - ( 27 Jul 2023 06:46 )   PT: 11.8 sec;   INR: 1.07 ratio         PTT - ( 27 Jul 2023 06:46 )  PTT:27.1 sec  Urinalysis Basic - ( 28 Jul 2023 07:19 )    Color: x / Appearance: x / SG: x / pH: x  Gluc: 74 mg/dL / Ketone: x  / Bili: x / Urobili: x   Blood: x / Protein: x / Nitrite: x   Leuk Esterase: x / RBC: x / WBC x   Sq Epi: x / Non Sq Epi: x / Bacteria: x      CAPILLARY BLOOD GLUCOSE      POCT Blood Glucose.: 75 mg/dL (28 Jul 2023 08:23)  POCT Blood Glucose.: 122 mg/dL (27 Jul 2023 23:29)  POCT Blood Glucose.: 113 mg/dL (27 Jul 2023 21:15)  POCT Blood Glucose.: 126 mg/dL (27 Jul 2023 16:51)  POCT Blood Glucose.: 146 mg/dL (27 Jul 2023 12:00)      Lower Extremity Physical Exam:  Vascular: DP/PT 0/4, B/L, CFT <3 seconds B/L, Temperature gradient warm to cool, B/L.   Neuro: Epicritic sensation dimished to the level of toes, B/L.  Musculoskeletal/Ortho: Unremarkable.  Skin: s/p BL heel wound debridement with right foot graft application and bone biopsy (DOS 7/27): adaptic and graft intact, staples intact, mild serosagnuinous drainage, no purulence, no erythema, no acute signs of infection.     RADIOLOGY & ADDITIONAL TESTS:

## 2023-07-28 NOTE — PROGRESS NOTE ADULT - ASSESSMENT
62 y/o F PMHx of DMH2 on insulin, HTN, HLD p/w right heel ulcer.    Assessment  DMT2: 63y Female with DM T2 with hyperglycemia, A1C 8.4%, was on insulin at home, now on basal bolus insulin with coverage, blood sugars running high. Has right heel ulcer. Received partial dose Lantus, glucose improving.   ?Gastroparesis: Has nausea at times  Foot wound: on medications, monitored. Podiatry eval/work up  HTN: on antihypertensive medications, monitored, asymptomatic.  HLD: stable, on statin, monitored.    Discussed plan and management with Attending   Armando Toscano NP - TEAMS  Dr Wesley Hannah  [phone]         64 y/o F PMHx of DMH2 on insulin, HTN, HLD p/w right heel ulcer.    Assessment  DMT2: 63y Female with DM T2 with hyperglycemia, A1C 8.4%, was on insulin at home, now on basal bolus insulin with coverage, blood sugars running high. Has right heel ulcer. Received partial dose Lantus, glucose improving.   ?Gastroparesis: Has nausea at times  Foot wound: on medications, monitored. Podiatry eval/work up  HTN: on antihypertensive medications, monitored, asymptomatic.  HLD: stable, on statin, monitored.    Discussed plan and management with Attending   Armando Toscano NP - TEAMS  Dr Wesley Hannah  [phone]         64 y/o F PMHx of DMH2 on insulin, HTN, HLD p/w right heel ulcer.    Assessment  DMT2: 63y Female with DM T2 with hyperglycemia, A1C 8.4%, was on insulin at home, now on basal bolus insulin with coverage, blood sugars running high. Has right heel ulcer. Received partial dose Lantus, glucose improving.   ?Gastroparesis: Has nausea at times  Foot wound: on medications, monitored. Podiatry eval/work up  HTN: on antihypertensive medications, monitored, asymptomatic.  HLD: stable, on statin, monitored.    Discussed plan and management with Attending   Armando Toscano NP - TEAMS  Dr Wesley Hannah  [phone]

## 2023-07-28 NOTE — PROGRESS NOTE ADULT - SUBJECTIVE AND OBJECTIVE BOX
infectious diseases progress note:    Patient is a 63y old  Female who presents with a chief complaint of heel wound (27 Jul 2023 18:16)        Type 2 diabetes mellitus with foot ulcer             Allergies    No Known Allergies    Intolerances        ANTIBIOTICS/RELEVANT:  antimicrobials  piperacillin/tazobactam IVPB.. 3.375 Gram(s) IV Intermittent every 8 hours    immunologic:    OTHER:  acetaminophen     Tablet .. 650 milliGRAM(s) Oral every 6 hours PRN  artificial  tears Solution 1 Drop(s) Both EYES three times a day  atorvastatin 40 milliGRAM(s) Oral at bedtime  chlorhexidine 2% Cloths 1 Application(s) Topical <User Schedule>  dextrose 5%. 1000 milliLiter(s) IV Continuous <Continuous>  dextrose 5%. 1000 milliLiter(s) IV Continuous <Continuous>  dextrose 50% Injectable 25 Gram(s) IV Push once  dextrose 50% Injectable 12.5 Gram(s) IV Push once  dextrose 50% Injectable 25 Gram(s) IV Push once  dextrose Oral Gel 15 Gram(s) Oral once PRN  furosemide    Tablet 20 milliGRAM(s) Oral daily  glucagon  Injectable 1 milliGRAM(s) IntraMuscular once  heparin   Injectable 5000 Unit(s) SubCutaneous every 8 hours  hydrALAZINE 50 milliGRAM(s) Oral two times a day  HYDROmorphone  Injectable 0.5 milliGRAM(s) IV Push every 4 hours PRN  insulin glargine Injectable (LANTUS) 12 Unit(s) SubCutaneous at bedtime  insulin lispro (ADMELOG) corrective regimen sliding scale   SubCutaneous three times a day before meals  insulin lispro (ADMELOG) corrective regimen sliding scale   SubCutaneous at bedtime  insulin lispro Injectable (ADMELOG) 3 Unit(s) SubCutaneous before breakfast  insulin lispro Injectable (ADMELOG) 2 Unit(s) SubCutaneous before dinner  insulin lispro Injectable (ADMELOG) 3 Unit(s) SubCutaneous before lunch  lisinopril 20 milliGRAM(s) Oral daily  metoprolol succinate ER 50 milliGRAM(s) Oral daily  mupirocin 2% Nasal 1 Application(s) Both Nostrils two times a day  ondansetron Injectable 4 milliGRAM(s) IV Push every 8 hours PRN  oxycodone    5 mG/acetaminophen 325 mG 1 Tablet(s) Oral every 4 hours PRN  pantoprazole    Tablet 40 milliGRAM(s) Oral before breakfast  tamsulosin 0.4 milliGRAM(s) Oral at bedtime      Objective:  Vital Signs Last 24 Hrs  T(C): 36.3 (28 Jul 2023 04:54), Max: 36.9 (27 Jul 2023 12:26)  T(F): 97.3 (28 Jul 2023 04:54), Max: 98.5 (27 Jul 2023 12:26)  HR: 52 (28 Jul 2023 04:54) (50 - 81)  BP: 143/71 (28 Jul 2023 04:54) (124/76 - 176/77)  BP(mean): 103 (27 Jul 2023 22:30) (83 - 108)  RR: 18 (28 Jul 2023 04:54) (15 - 18)  SpO2: 98% (28 Jul 2023 04:54) (94% - 100%)    Parameters below as of 28 Jul 2023 04:54  Patient On (Oxygen Delivery Method): room air         Eyes:DYANA, EOMI  Ear/Nose/Throat: no oral lesion, no sinus tenderness on percussion	  Neck:no JVD, no lymphadenopathy, supple  Respiratory: CTA shravan  Cardiovascular: S1S2 RRR, no murmurs  Gastrointestinal:soft, (+) BS, no HSM  Extremities:no e/e/c        LABS:                        11.5   8.04  )-----------( 335      ( 28 Jul 2023 07:21 )             36.2     07-28    145  |  103  |  12  ----------------------------<  74  4.3   |  31  |  1.00    Ca    9.3      28 Jul 2023 07:19      PT/INR - ( 27 Jul 2023 06:46 )   PT: 11.8 sec;   INR: 1.07 ratio         PTT - ( 27 Jul 2023 06:46 )  PTT:27.1 sec  Urinalysis Basic - ( 28 Jul 2023 07:19 )    Color: x / Appearance: x / SG: x / pH: x  Gluc: 74 mg/dL / Ketone: x  / Bili: x / Urobili: x   Blood: x / Protein: x / Nitrite: x   Leuk Esterase: x / RBC: x / WBC x   Sq Epi: x / Non Sq Epi: x / Bacteria: x          MICROBIOLOGY:    RECENT CULTURES:  07-27 @ 22:22 .Tissue Other       No polymorphonuclear cells seen per low power field  Numerous Gram Negative Rods seen per oil power field  Few Gram positive cocci in pairs seen per oil power field  Rare Yeast like cells seen per oil power field             07-25 @ 23:18 .Abscess right heel wound       No polymorphonuclear cells seen per low power field  Moderate Gram Negative Rods seen per oil power field           Numerous Proteus mirabilis  Numerous Pseudomonas aeruginosa  Moderate Enterococcus faecalis    07-25 @ 18:10 .Blood Blood                No growth at 48 Hours    07-25 @ 18:05 .Blood Blood                No growth at 48 Hours          RESPIRATORY CULTURES:              RADIOLOGY & ADDITIONAL STUDIES:        Pager [phone]  After 5 pm/weekends or if no response :[phone] infectious diseases progress note:    Patient is a 63y old  Female who presents with a chief complaint of heel wound (27 Jul 2023 18:16)        Type 2 diabetes mellitus with foot ulcer             Allergies    No Known Allergies    Intolerances        ANTIBIOTICS/RELEVANT:  antimicrobials  piperacillin/tazobactam IVPB.. 3.375 Gram(s) IV Intermittent every 8 hours    immunologic:    OTHER:  acetaminophen     Tablet .. 650 milliGRAM(s) Oral every 6 hours PRN  artificial  tears Solution 1 Drop(s) Both EYES three times a day  atorvastatin 40 milliGRAM(s) Oral at bedtime  chlorhexidine 2% Cloths 1 Application(s) Topical <User Schedule>  dextrose 5%. 1000 milliLiter(s) IV Continuous <Continuous>  dextrose 5%. 1000 milliLiter(s) IV Continuous <Continuous>  dextrose 50% Injectable 25 Gram(s) IV Push once  dextrose 50% Injectable 12.5 Gram(s) IV Push once  dextrose 50% Injectable 25 Gram(s) IV Push once  dextrose Oral Gel 15 Gram(s) Oral once PRN  furosemide    Tablet 20 milliGRAM(s) Oral daily  glucagon  Injectable 1 milliGRAM(s) IntraMuscular once  heparin   Injectable 5000 Unit(s) SubCutaneous every 8 hours  hydrALAZINE 50 milliGRAM(s) Oral two times a day  HYDROmorphone  Injectable 0.5 milliGRAM(s) IV Push every 4 hours PRN  insulin glargine Injectable (LANTUS) 12 Unit(s) SubCutaneous at bedtime  insulin lispro (ADMELOG) corrective regimen sliding scale   SubCutaneous three times a day before meals  insulin lispro (ADMELOG) corrective regimen sliding scale   SubCutaneous at bedtime  insulin lispro Injectable (ADMELOG) 3 Unit(s) SubCutaneous before breakfast  insulin lispro Injectable (ADMELOG) 2 Unit(s) SubCutaneous before dinner  insulin lispro Injectable (ADMELOG) 3 Unit(s) SubCutaneous before lunch  lisinopril 20 milliGRAM(s) Oral daily  metoprolol succinate ER 50 milliGRAM(s) Oral daily  mupirocin 2% Nasal 1 Application(s) Both Nostrils two times a day  ondansetron Injectable 4 milliGRAM(s) IV Push every 8 hours PRN  oxycodone    5 mG/acetaminophen 325 mG 1 Tablet(s) Oral every 4 hours PRN  pantoprazole    Tablet 40 milliGRAM(s) Oral before breakfast  tamsulosin 0.4 milliGRAM(s) Oral at bedtime      Objective:  Vital Signs Last 24 Hrs  T(C): 36.3 (28 Jul 2023 04:54), Max: 36.9 (27 Jul 2023 12:26)  T(F): 97.3 (28 Jul 2023 04:54), Max: 98.5 (27 Jul 2023 12:26)  HR: 52 (28 Jul 2023 04:54) (50 - 81)  BP: 143/71 (28 Jul 2023 04:54) (124/76 - 176/77)  BP(mean): 103 (27 Jul 2023 22:30) (83 - 108)  RR: 18 (28 Jul 2023 04:54) (15 - 18)  SpO2: 98% (28 Jul 2023 04:54) (94% - 100%)    Parameters below as of 28 Jul 2023 04:54  Patient On (Oxygen Delivery Method): room air         Eyes:DYANA, EOMI  Ear/Nose/Throat: no oral lesion, no sinus tenderness on percussion	  Neck:no JVD, no lymphadenopathy, supple  Respiratory: CTA shravan  Cardiovascular: S1S2 RRR, no murmurs  Gastrointestinal:soft, (+) BS, no HSM  Extremities:no e/e/c        LABS:                        11.5   8.04  )-----------( 335      ( 28 Jul 2023 07:21 )             36.2     07-28    145  |  103  |  12  ----------------------------<  74  4.3   |  31  |  1.00    Ca    9.3      28 Jul 2023 07:19      PT/INR - ( 27 Jul 2023 06:46 )   PT: 11.8 sec;   INR: 1.07 ratio         PTT - ( 27 Jul 2023 06:46 )  PTT:27.1 sec  Urinalysis Basic - ( 28 Jul 2023 07:19 )    Color: x / Appearance: x / SG: x / pH: x  Gluc: 74 mg/dL / Ketone: x  / Bili: x / Urobili: x   Blood: x / Protein: x / Nitrite: x   Leuk Esterase: x / RBC: x / WBC x   Sq Epi: x / Non Sq Epi: x / Bacteria: x          MICROBIOLOGY:    RECENT CULTURES:  07-27 @ 22:22 .Tissue Other       No polymorphonuclear cells seen per low power field  Numerous Gram Negative Rods seen per oil power field  Few Gram positive cocci in pairs seen per oil power field  Rare Yeast like cells seen per oil power field             07-25 @ 23:18 .Abscess right heel wound       No polymorphonuclear cells seen per low power field  Moderate Gram Negative Rods seen per oil power field           Numerous Proteus mirabilis  Numerous Pseudomonas aeruginosa  Moderate Enterococcus faecalis    07-25 @ 18:10 .Blood Blood                No growth at 48 Hours    07-25 @ 18:05 .Blood Blood                No growth at 48 Hours          RESPIRATORY CULTURES:              RADIOLOGY & ADDITIONAL STUDIES:        Pager [phone]  After 5 pm/weekends or if no response :[phone] infectious diseases progress note:    Patient is a 63y old  Female who presents with a chief complaint of heel wound (27 Jul 2023 18:16)        Type 2 diabetes mellitus with foot ulcer             Allergies    No Known Allergies    Intolerances        ANTIBIOTICS/RELEVANT:  antimicrobials  piperacillin/tazobactam IVPB.. 3.375 Gram(s) IV Intermittent every 8 hours    immunologic:    OTHER:  acetaminophen     Tablet .. 650 milliGRAM(s) Oral every 6 hours PRN  artificial  tears Solution 1 Drop(s) Both EYES three times a day  atorvastatin 40 milliGRAM(s) Oral at bedtime  chlorhexidine 2% Cloths 1 Application(s) Topical <User Schedule>  dextrose 5%. 1000 milliLiter(s) IV Continuous <Continuous>  dextrose 5%. 1000 milliLiter(s) IV Continuous <Continuous>  dextrose 50% Injectable 25 Gram(s) IV Push once  dextrose 50% Injectable 12.5 Gram(s) IV Push once  dextrose 50% Injectable 25 Gram(s) IV Push once  dextrose Oral Gel 15 Gram(s) Oral once PRN  furosemide    Tablet 20 milliGRAM(s) Oral daily  glucagon  Injectable 1 milliGRAM(s) IntraMuscular once  heparin   Injectable 5000 Unit(s) SubCutaneous every 8 hours  hydrALAZINE 50 milliGRAM(s) Oral two times a day  HYDROmorphone  Injectable 0.5 milliGRAM(s) IV Push every 4 hours PRN  insulin glargine Injectable (LANTUS) 12 Unit(s) SubCutaneous at bedtime  insulin lispro (ADMELOG) corrective regimen sliding scale   SubCutaneous three times a day before meals  insulin lispro (ADMELOG) corrective regimen sliding scale   SubCutaneous at bedtime  insulin lispro Injectable (ADMELOG) 3 Unit(s) SubCutaneous before breakfast  insulin lispro Injectable (ADMELOG) 2 Unit(s) SubCutaneous before dinner  insulin lispro Injectable (ADMELOG) 3 Unit(s) SubCutaneous before lunch  lisinopril 20 milliGRAM(s) Oral daily  metoprolol succinate ER 50 milliGRAM(s) Oral daily  mupirocin 2% Nasal 1 Application(s) Both Nostrils two times a day  ondansetron Injectable 4 milliGRAM(s) IV Push every 8 hours PRN  oxycodone    5 mG/acetaminophen 325 mG 1 Tablet(s) Oral every 4 hours PRN  pantoprazole    Tablet 40 milliGRAM(s) Oral before breakfast  tamsulosin 0.4 milliGRAM(s) Oral at bedtime      Objective:  Vital Signs Last 24 Hrs  T(C): 36.3 (28 Jul 2023 04:54), Max: 36.9 (27 Jul 2023 12:26)  T(F): 97.3 (28 Jul 2023 04:54), Max: 98.5 (27 Jul 2023 12:26)  HR: 52 (28 Jul 2023 04:54) (50 - 81)  BP: 143/71 (28 Jul 2023 04:54) (124/76 - 176/77)  BP(mean): 103 (27 Jul 2023 22:30) (83 - 108)  RR: 18 (28 Jul 2023 04:54) (15 - 18)  SpO2: 98% (28 Jul 2023 04:54) (94% - 100%)    Parameters below as of 28 Jul 2023 04:54  Patient On (Oxygen Delivery Method): room air         Eyes:DYANA, EOMI  Ear/Nose/Throat: no oral lesion, no sinus tenderness on percussion	  Neck:no JVD, no lymphadenopathy, supple  Respiratory: CTA shravan  Cardiovascular: S1S2 RRR, no murmurs  Gastrointestinal:soft, (+) BS, no HSM  Extremities:no e/e/c        LABS:                        11.5   8.04  )-----------( 335      ( 28 Jul 2023 07:21 )             36.2     07-28    145  |  103  |  12  ----------------------------<  74  4.3   |  31  |  1.00    Ca    9.3      28 Jul 2023 07:19      PT/INR - ( 27 Jul 2023 06:46 )   PT: 11.8 sec;   INR: 1.07 ratio         PTT - ( 27 Jul 2023 06:46 )  PTT:27.1 sec  Urinalysis Basic - ( 28 Jul 2023 07:19 )    Color: x / Appearance: x / SG: x / pH: x  Gluc: 74 mg/dL / Ketone: x  / Bili: x / Urobili: x   Blood: x / Protein: x / Nitrite: x   Leuk Esterase: x / RBC: x / WBC x   Sq Epi: x / Non Sq Epi: x / Bacteria: x          MICROBIOLOGY:    RECENT CULTURES:  07-27 @ 22:22 .Tissue Other       No polymorphonuclear cells seen per low power field  Numerous Gram Negative Rods seen per oil power field  Few Gram positive cocci in pairs seen per oil power field  Rare Yeast like cells seen per oil power field             07-25 @ 23:18 .Abscess right heel wound       No polymorphonuclear cells seen per low power field  Moderate Gram Negative Rods seen per oil power field           Numerous Proteus mirabilis  Numerous Pseudomonas aeruginosa  Moderate Enterococcus faecalis    07-25 @ 18:10 .Blood Blood                No growth at 48 Hours    07-25 @ 18:05 .Blood Blood                No growth at 48 Hours          RESPIRATORY CULTURES:              RADIOLOGY & ADDITIONAL STUDIES:        Pager [phone]  After 5 pm/weekends or if no response :[phone]

## 2023-07-28 NOTE — PROVIDER CONTACT NOTE (OTHER) - ASSESSMENT
Pt A+Ox4, VSS, has FSQ6 order for when she was NPO. Pt is no longer NPO, surgery was completed 7/27.

## 2023-07-29 LAB
-  AMIKACIN: SIGNIFICANT CHANGE UP
-  AMOXICILLIN/CLAVULANIC ACID: SIGNIFICANT CHANGE UP
-  AMPICILLIN/SULBACTAM: SIGNIFICANT CHANGE UP
-  AMPICILLIN: SIGNIFICANT CHANGE UP
-  AZTREONAM: SIGNIFICANT CHANGE UP
-  CEFAZOLIN: SIGNIFICANT CHANGE UP
-  CEFEPIME: SIGNIFICANT CHANGE UP
-  CEFOXITIN: SIGNIFICANT CHANGE UP
-  CEFTRIAXONE: SIGNIFICANT CHANGE UP
-  CIPROFLOXACIN: SIGNIFICANT CHANGE UP
-  CLINDAMYCIN: SIGNIFICANT CHANGE UP
-  DAPTOMYCIN: SIGNIFICANT CHANGE UP
-  ERTAPENEM: SIGNIFICANT CHANGE UP
-  ERYTHROMYCIN: SIGNIFICANT CHANGE UP
-  GENTAMICIN: SIGNIFICANT CHANGE UP
-  IMIPENEM: SIGNIFICANT CHANGE UP
-  LEVOFLOXACIN: SIGNIFICANT CHANGE UP
-  LINEZOLID: SIGNIFICANT CHANGE UP
-  MEROPENEM: SIGNIFICANT CHANGE UP
-  OXACILLIN: SIGNIFICANT CHANGE UP
-  PENICILLIN: SIGNIFICANT CHANGE UP
-  PIPERACILLIN/TAZOBACTAM: SIGNIFICANT CHANGE UP
-  RIFAMPIN: SIGNIFICANT CHANGE UP
-  TETRACYCLINE: SIGNIFICANT CHANGE UP
-  TOBRAMYCIN: SIGNIFICANT CHANGE UP
-  TRIMETHOPRIM/SULFAMETHOXAZOLE: SIGNIFICANT CHANGE UP
-  VANCOMYCIN: SIGNIFICANT CHANGE UP
GLUCOSE BLDC GLUCOMTR-MCNC: 143 MG/DL — HIGH (ref 70–99)
GLUCOSE BLDC GLUCOMTR-MCNC: 148 MG/DL — HIGH (ref 70–99)
GLUCOSE BLDC GLUCOMTR-MCNC: 154 MG/DL — HIGH (ref 70–99)
GLUCOSE BLDC GLUCOMTR-MCNC: 96 MG/DL — SIGNIFICANT CHANGE UP (ref 70–99)
METHOD TYPE: SIGNIFICANT CHANGE UP

## 2023-07-29 PROCEDURE — 99232 SBSQ HOSP IP/OBS MODERATE 35: CPT

## 2023-07-29 RX ORDER — SENNA PLUS 8.6 MG/1
2 TABLET ORAL AT BEDTIME
Refills: 0 | Status: DISCONTINUED | OUTPATIENT
Start: 2023-07-29 | End: 2023-08-07

## 2023-07-29 RX ORDER — INSULIN GLARGINE 100 [IU]/ML
10 INJECTION, SOLUTION SUBCUTANEOUS AT BEDTIME
Refills: 0 | Status: DISCONTINUED | OUTPATIENT
Start: 2023-07-29 | End: 2023-07-31

## 2023-07-29 RX ADMIN — HEPARIN SODIUM 5000 UNIT(S): 5000 INJECTION INTRAVENOUS; SUBCUTANEOUS at 14:26

## 2023-07-29 RX ADMIN — PANTOPRAZOLE SODIUM 40 MILLIGRAM(S): 20 TABLET, DELAYED RELEASE ORAL at 05:12

## 2023-07-29 RX ADMIN — INSULIN GLARGINE 10 UNIT(S): 100 INJECTION, SOLUTION SUBCUTANEOUS at 21:53

## 2023-07-29 RX ADMIN — MUPIROCIN 1 APPLICATION(S): 20 OINTMENT TOPICAL at 05:13

## 2023-07-29 RX ADMIN — Medication 2 UNIT(S): at 16:41

## 2023-07-29 RX ADMIN — MUPIROCIN 1 APPLICATION(S): 20 OINTMENT TOPICAL at 18:02

## 2023-07-29 RX ADMIN — Medication 50 MILLIGRAM(S): at 18:02

## 2023-07-29 RX ADMIN — ATORVASTATIN CALCIUM 40 MILLIGRAM(S): 80 TABLET, FILM COATED ORAL at 21:53

## 2023-07-29 RX ADMIN — Medication 1: at 14:24

## 2023-07-29 RX ADMIN — PIPERACILLIN AND TAZOBACTAM 25 GRAM(S): 4; .5 INJECTION, POWDER, LYOPHILIZED, FOR SOLUTION INTRAVENOUS at 22:46

## 2023-07-29 RX ADMIN — Medication 20 MILLIGRAM(S): at 05:12

## 2023-07-29 RX ADMIN — CHLORHEXIDINE GLUCONATE 1 APPLICATION(S): 213 SOLUTION TOPICAL at 05:13

## 2023-07-29 RX ADMIN — LISINOPRIL 20 MILLIGRAM(S): 2.5 TABLET ORAL at 05:12

## 2023-07-29 RX ADMIN — Medication 3 UNIT(S): at 08:56

## 2023-07-29 RX ADMIN — SENNA PLUS 2 TABLET(S): 8.6 TABLET ORAL at 21:52

## 2023-07-29 RX ADMIN — Medication 1 DROP(S): at 14:25

## 2023-07-29 RX ADMIN — HEPARIN SODIUM 5000 UNIT(S): 5000 INJECTION INTRAVENOUS; SUBCUTANEOUS at 05:12

## 2023-07-29 RX ADMIN — PIPERACILLIN AND TAZOBACTAM 25 GRAM(S): 4; .5 INJECTION, POWDER, LYOPHILIZED, FOR SOLUTION INTRAVENOUS at 06:00

## 2023-07-29 RX ADMIN — Medication 50 MILLIGRAM(S): at 05:12

## 2023-07-29 RX ADMIN — TAMSULOSIN HYDROCHLORIDE 0.4 MILLIGRAM(S): 0.4 CAPSULE ORAL at 21:53

## 2023-07-29 RX ADMIN — HYDROMORPHONE HYDROCHLORIDE 0.5 MILLIGRAM(S): 2 INJECTION INTRAMUSCULAR; INTRAVENOUS; SUBCUTANEOUS at 00:00

## 2023-07-29 RX ADMIN — PIPERACILLIN AND TAZOBACTAM 25 GRAM(S): 4; .5 INJECTION, POWDER, LYOPHILIZED, FOR SOLUTION INTRAVENOUS at 16:41

## 2023-07-29 RX ADMIN — Medication 50 MILLIGRAM(S): at 05:11

## 2023-07-29 RX ADMIN — Medication 3 UNIT(S): at 14:24

## 2023-07-29 RX ADMIN — MUPIROCIN 1 APPLICATION(S): 20 OINTMENT TOPICAL at 06:00

## 2023-07-29 RX ADMIN — HEPARIN SODIUM 5000 UNIT(S): 5000 INJECTION INTRAVENOUS; SUBCUTANEOUS at 21:53

## 2023-07-29 NOTE — PROGRESS NOTE ADULT - ASSESSMENT
62 y/o F PMHx of DMH2 on insulin, HTN, HLD p/w right heel ulcer. Patient is sent by her podiatrist for questionable right foot procedure.  She is unsure how long she has had this ulcer for.  States that she was recently hospitalized in the near [facility] for an evaluation of the right foot infection.  States she still had a 102 fever yesterday which resolved.  Denies any fevers today, chills, chest pain, shortness of breath, abdominal pain, nausea vomiting diarrhea.  States she ambulates with a walker at baseline.  Accompanied by her sister-in-law. 62 y/o F PMHx of DMH2 on insulin, HTN, HLD p/w right heel ulcer. Patient is sent by her podiatrist for questionable right foot procedure.  She is unsure how long she has had this ulcer for.  States that she was recently hospitalized in the near [facility] for an evaluation of the right foot infection.  States she still had a 102 fever yesterday which resolved.  Denies any fevers today, chills, chest pain, shortness of breath, abdominal pain, nausea vomiting diarrhea.  States she ambulates with a walker at baseline.  Accompanied by her sister-in-law. 62 y/o F PMHx of DMH2 on insulin, HTN, HLD p/w right heel ulcer. Patient is sent by her podiatrist for questionable right foot procedure.  She is unsure how long she has had this ulcer for.  States that she was recently hospitalized in the near [facility] for an evaluation of the right foot infection.  States she still had a 102 fever yesterday which resolved.  Denies any fevers today, chills, chest pain, shortness of breath, abdominal pain, nausea vomiting diarrhea.  States she ambulates with a walker at baseline.  Accompanied by her sister-in-law.

## 2023-07-29 NOTE — PROGRESS NOTE ADULT - SUBJECTIVE AND OBJECTIVE BOX
Chief complaint  Patient is a 63y old  Female who presents with a chief complaint of ro om (28 Jul 2023 09:41)         Labs and Fingersticks  CAPILLARY BLOOD GLUCOSE      POCT Blood Glucose.: 96 mg/dL (29 Jul 2023 08:34)  POCT Blood Glucose.: 124 mg/dL (28 Jul 2023 21:11)  POCT Blood Glucose.: 114 mg/dL (28 Jul 2023 16:59)  POCT Blood Glucose.: 129 mg/dL (28 Jul 2023 12:23)      Anion Gap: 11 (07-28 @ 07:19)      Calcium: 9.3 (07-28 @ 07:19)          07-28    145  |  103  |  12  ----------------------------<  74  4.3   |  31  |  1.00    Ca    9.3      28 Jul 2023 07:19                          11.5   8.04  )-----------( 335      ( 28 Jul 2023 07:21 )             36.2     Medications  MEDICATIONS  (STANDING):  artificial  tears Solution 1 Drop(s) Both EYES three times a day  atorvastatin 40 milliGRAM(s) Oral at bedtime  chlorhexidine 2% Cloths 1 Application(s) Topical <User Schedule>  dextrose 5%. 1000 milliLiter(s) (50 mL/Hr) IV Continuous <Continuous>  dextrose 5%. 1000 milliLiter(s) (100 mL/Hr) IV Continuous <Continuous>  dextrose 50% Injectable 25 Gram(s) IV Push once  dextrose 50% Injectable 12.5 Gram(s) IV Push once  dextrose 50% Injectable 25 Gram(s) IV Push once  furosemide    Tablet 20 milliGRAM(s) Oral daily  glucagon  Injectable 1 milliGRAM(s) IntraMuscular once  heparin   Injectable 5000 Unit(s) SubCutaneous every 8 hours  hydrALAZINE 50 milliGRAM(s) Oral two times a day  insulin glargine Injectable (LANTUS) 12 Unit(s) SubCutaneous at bedtime  insulin lispro (ADMELOG) corrective regimen sliding scale   SubCutaneous three times a day before meals  insulin lispro (ADMELOG) corrective regimen sliding scale   SubCutaneous at bedtime  insulin lispro Injectable (ADMELOG) 3 Unit(s) SubCutaneous before lunch  insulin lispro Injectable (ADMELOG) 2 Unit(s) SubCutaneous before dinner  insulin lispro Injectable (ADMELOG) 3 Unit(s) SubCutaneous before breakfast  lisinopril 20 milliGRAM(s) Oral daily  metoprolol succinate ER 50 milliGRAM(s) Oral daily  mupirocin 2% Nasal 1 Application(s) Both Nostrils two times a day  mupirocin 2% Ointment 1 Application(s) Topical two times a day  pantoprazole    Tablet 40 milliGRAM(s) Oral before breakfast  piperacillin/tazobactam IVPB.. 3.375 Gram(s) IV Intermittent every 8 hours  tamsulosin 0.4 milliGRAM(s) Oral at bedtime      Physical Exam  General: Patient comfortable in bed   Vital Signs Last 12 Hrs  T(F): 98.4 (07-29-23 @ 04:05), Max: 98.4 (07-29-23 @ 04:05)  HR: 60 (07-29-23 @ 04:05) (60 - 60)  BP: 130/74 (07-29-23 @ 04:05) (130/74 - 130/74)  BP(mean): --  RR: 17 (07-29-23 @ 04:05) (17 - 17)  SpO2: 95% (07-29-23 @ 04:05) (95% - 95%)    CVS: S1S2   Respiratory: No wheezing, no crepitations  GI: Abdomen soft, bowel sounds positive  Musculoskeletal:  moves all extremities  : Voiding

## 2023-07-29 NOTE — PROGRESS NOTE ADULT - PROBLEM SELECTOR PLAN 1
right foot posterior heel wound     - x-ray - no OM    - culture pending  IV Antibiotics  s/p wound debridement with integra graft application  wound care w/ wound vac  pain management  management as per podiatry and primary team

## 2023-07-29 NOTE — PROGRESS NOTE ADULT - SUBJECTIVE AND OBJECTIVE BOX
Subjective: Patient seen and examined. No new events except as noted.   No complaints.     REVIEW OF SYSTEMS:    CONSTITUTIONAL: + weakness, fevers or chills  EYES/ENT: No visual changes;  No vertigo or throat pain   NECK: No pain or stiffness  RESPIRATORY: No cough, wheezing, hemoptysis; No shortness of breath  CARDIOVASCULAR: No chest pain or palpitations  GASTROINTESTINAL: No abdominal or epigastric pain. No nausea, vomiting, or hematemesis; No diarrhea or constipation. No melena or hematochezia.  GENITOURINARY: No dysuria, frequency or hematuria  NEUROLOGICAL: No numbness or weakness  SKIN: No itching, burning, rashes, or lesions   All other review of systems is negative unless indicated above.    MEDICATIONS:  MEDICATIONS  (STANDING):  artificial  tears Solution 1 Drop(s) Both EYES three times a day  atorvastatin 40 milliGRAM(s) Oral at bedtime  chlorhexidine 2% Cloths 1 Application(s) Topical <User Schedule>  dextrose 5%. 1000 milliLiter(s) (50 mL/Hr) IV Continuous <Continuous>  dextrose 5%. 1000 milliLiter(s) (100 mL/Hr) IV Continuous <Continuous>  dextrose 50% Injectable 25 Gram(s) IV Push once  dextrose 50% Injectable 12.5 Gram(s) IV Push once  dextrose 50% Injectable 25 Gram(s) IV Push once  furosemide    Tablet 20 milliGRAM(s) Oral daily  glucagon  Injectable 1 milliGRAM(s) IntraMuscular once  heparin   Injectable 5000 Unit(s) SubCutaneous every 8 hours  hydrALAZINE 50 milliGRAM(s) Oral two times a day  insulin glargine Injectable (LANTUS) 10 Unit(s) SubCutaneous at bedtime  insulin lispro (ADMELOG) corrective regimen sliding scale   SubCutaneous three times a day before meals  insulin lispro (ADMELOG) corrective regimen sliding scale   SubCutaneous at bedtime  insulin lispro Injectable (ADMELOG) 2 Unit(s) SubCutaneous before dinner  insulin lispro Injectable (ADMELOG) 3 Unit(s) SubCutaneous before lunch  insulin lispro Injectable (ADMELOG) 3 Unit(s) SubCutaneous before breakfast  lisinopril 20 milliGRAM(s) Oral daily  metoprolol succinate ER 50 milliGRAM(s) Oral daily  mupirocin 2% Nasal 1 Application(s) Both Nostrils two times a day  mupirocin 2% Ointment 1 Application(s) Topical two times a day  pantoprazole    Tablet 40 milliGRAM(s) Oral before breakfast  piperacillin/tazobactam IVPB.. 3.375 Gram(s) IV Intermittent every 8 hours  tamsulosin 0.4 milliGRAM(s) Oral at bedtime    PHYSICAL EXAM:  T(C): 36.9 (07-29-23 @ 04:05), Max: 36.9 (07-28-23 @ 20:12)  HR: 60 (07-29-23 @ 04:05) (60 - 71)  BP: 130/74 (07-29-23 @ 04:05) (130/74 - 175/76)  RR: 17 (07-29-23 @ 04:05) (17 - 18)  SpO2: 95% (07-29-23 @ 04:05) (95% - 98%)  Wt(kg): --  I&O's Summary    28 Jul 2023 07:01  -  29 Jul 2023 07:00  --------------------------------------------------------  IN: 730 mL / OUT: 2200 mL / NET: -1470 mL    Appearance: Normal	  HEENT:  Normal oral mucosa, PERRL, EOMI	  Lymphatic: No lymphadenopathy , no edema  Cardiovascular: Normal S1 S2, No JVD, No murmurs , Peripheral pulses palpable 2+ bilaterally  Respiratory: Lungs clear to auscultation, normal effort 	  Gastrointestinal:  Soft, Non-tender, + BS	  Skin: No rashes, No ecchymoses, No cyanosis, warm to touch - bilateral leg dressings w/ vac  Musculoskeletal: Normal range of motion, normal strength  Psychiatry:  Mood & affect appropriate  Ext: No edema    LABS:    CARDIAC MARKERS:                        11.5   8.04  )-----------( 335      ( 28 Jul 2023 07:21 )             36.2     07-28    145  |  103  |  12  ----------------------------<  74  4.3   |  31  |  1.00    Ca    9.3      28 Jul 2023 07:19    proBNP:   Lipid Profile:   HgA1c:   TSH:     TELEMETRY: 	    ECG:  	  RADIOLOGY:   DIAGNOSTIC TESTING:  [ ] Echocardiogram:  [ ]  Catheterization:  [ ] Stress Test:    OTHER:

## 2023-07-29 NOTE — PROGRESS NOTE ADULT - PROBLEM SELECTOR PLAN 1
Will continue current insulin regimen  Lantus 10 units qHS  and ADMELOG 3 unit before each meal for now.   Will continue monitoring FS, log, and glucose trends, will Follow up.  Patient counseled for compliance with consistent low carb diet and exercise as tolerated outpatient.   If continue nausea and vomiting Consider Gastric emptying study, to r/o Gastroparesis.    Was using insulin at home, basal and bolus. DC home on current insulin doses if sugars remain stable  may use his home insulin brands Will continue current insulin regimen  Lantus 10 units qHS  and ADMELOG 3 unit before each meal for now.   Will continue monitoring FS, log, and glucose trends, will Follow up.  Patient counseled for compliance with consistent low carb diet and exercise as tolerated outpatient.   If continue nausea and vomiting Consider Gastric emptying study, to r/o Gastroparesis.    Was using insulin at home, basal and bolus. DC home on current insulin doses if sugars remain stable  may use her home insulin brands

## 2023-07-29 NOTE — PROGRESS NOTE ADULT - ASSESSMENT
62 y/o F PMHx of DMH2 on insulin, HTN, HLD p/w right heel ulcer.    Assessment  DMT2: 63y Female with DM T2 with hyperglycemia, A1C 8.4%, was on insulin at home, now on basal bolus insulin with coverage, blood sugars running high. Has right heel ulcer. Started on basal insulin glucose improving.   ?Gastroparesis: Has nausea at times  Foot wound: on medications, monitored. Podiatry eval/work up  HTN: on antihypertensive medications, monitored, asymptomatic.  HLD: stable, on statin, monitored.    Discussed plan and management with Attending   Armando Toscano NP - TEAMS  Dr Wesley Hannah  [phone]         64 y/o F PMHx of DMH2 on insulin, HTN, HLD p/w right heel ulcer.    Assessment  DMT2: 63y Female with DM T2 with hyperglycemia, A1C 8.4%, was on insulin at home, now on basal bolus insulin with coverage, blood sugars running high. Has right heel ulcer. Started on basal insulin glucose improving.   ?Gastroparesis: Has nausea at times  Foot wound: on medications, monitored. Podiatry eval/work up  HTN: on antihypertensive medications, monitored, asymptomatic.  HLD: stable, on statin, monitored.    Discussed plan and management with Attending   Armando Toscano NP - TEAMS  Dr Wesley Hannah  [phone]         62 y/o F PMHx of DMH2 on insulin, HTN, HLD p/w right heel ulcer.    Assessment  DMT2: 63y Female with DM T2 with hyperglycemia, A1C 8.4%, was on insulin at home, now on basal bolus insulin with coverage, blood sugars running high. Has right heel ulcer. Started on basal insulin glucose improving.   ?Gastroparesis: Has nausea at times  Foot wound: on medications, monitored. Podiatry eval/work up  HTN: on antihypertensive medications, monitored, asymptomatic.  HLD: stable, on statin, monitored.    Discussed plan and management with Attending   Armando Toscano NP - TEAMS  Dr Wesley Hannah  [phone]

## 2023-07-29 NOTE — PROGRESS NOTE ADULT - ASSESSMENT
63 female h/o dm, htn, chol, here with b/l foot wounds    b/l foot wounds  pod eval noted  id consult apprec  iv abx as per id  f/u cult  wound care  MRI noted  s/p bilateral foot heel wound debridement with right foot kerecis graft application, and right foot calcaneus bone biopsy on 7/27  post op care per pod  VAC applied  f/u cultures from OR  abx per id      dm  insulin as ordered  monitor fs    chol  cont statin    htn  cont home meds    dvt ppx      Advanced care planning was discussed with patient and family.  Advanced care planning forms were reviewed and discussed as appropriate.  Differential diagnosis and plan of care discussed with patient after the evaluation.   Pain assessed and judicious use of narcotics when appropriate was discussed.  Importance of Fall prevention discussed.  Counseling on Smoking and Alcohol cessation was offered when appropriate.  Counseling on Diet, exercise, and medication compliance was done.       Approx 60 minutes spent.

## 2023-07-29 NOTE — PROGRESS NOTE ADULT - PROBLEM SELECTOR PLAN 2
c/w meds as ordered  pain management   continue to monitor c/w meds as ordered    - can increase lisinopril to 40mg  pain management   continue to monitor

## 2023-07-29 NOTE — PROGRESS NOTE ADULT - SUBJECTIVE AND OBJECTIVE BOX
Patient is a 63y old  Female who presents with a chief complaint of ro om (28 Jul 2023 09:41)    Being followed by ID for Heel infection    Interval history:  No acute events      ROS:  No cough, SOB, CP  No N/V/D./abd pain  No other complaints      Antimicrobials:    piperacillin/tazobactam IVPB.. 3.375 Gram(s) IV Intermittent every 8 hours      Vital Signs Last 24 Hrs  T(C): 36.9 (07-29-23 @ 04:05), Max: 36.9 (07-28-23 @ 20:12)  T(F): 98.4 (07-29-23 @ 04:05), Max: 98.5 (07-28-23 @ 20:12)  HR: 60 (07-29-23 @ 04:05) (60 - 60)  BP: 130/74 (07-29-23 @ 04:05) (130/74 - 175/76)  BP(mean): --  RR: 17 (07-29-23 @ 04:05) (17 - 18)  SpO2: 95% (07-29-23 @ 04:05) (95% - 96%)    Physical Exam:    Constitutional well preserved, NAD    HEENT EOMI, No pallor or icterus    No oral exudate or erythema    Neck supple no LN    Chest Clear to auscultation    CVS S1 S2 WNl No murmur or rub or gallop    Abd soft BS normal No tenderness no masses    Ext Bilateral dressings intact, Right foot Vac    IV site no erythema tenderness or discharge    Joints no swelling or LOM    CNS AAO X 3 non focal    Lab Data:                          11.5   8.04  )-----------( 335      ( 28 Jul 2023 07:21 )             36.2       07-28    145  |  103  |  12  ----------------------------<  74  4.3   |  31  |  1.00    Ca    9.3      28 Jul 2023 07:19          .Tissue Other  07-27-23   No growth  --    No polymorphonuclear cells seen per low power field  Numerous Gram Negative Rods seen per oil power field  Few Gram positive cocci in pairs seen per oil power field  Rare Yeast like cells seen per oil power field      .Abscess right heel wound  07-25-23   Numerous Proteus mirabilis  Numerous Pseudomonas aeruginosa  Moderate Enterococcus faecalis (vancomycin resistant)  Few Methicillin Resistant Staphylococcus aureus  Moderate Klebsiella oxytoca/Raoultella ornithinolytica  --  Proteus mirabilis  Pseudomonas aeruginosa  Enterococcus faecalis (vancomycin resistant)  Methicillin resistant Staphylococcus aureus  Klebsiella oxytoca /Raoutella ornithinolytica      .Blood Blood  07-25-23   No growth at 72 Hours  --  --      .Blood Blood  07-25-23   No growth at 72 Hours  --  --    < from: MR Ankle w/ IV Cont, Right (07.26.23 @ 18:31) >  ACC: [id number] EXAM:  MR ANKLE IC RT   ORDERED BY:  ERICA AMADOR     PROCEDURE DATE:  07/26/2023          INTERPRETATION:  Exam Type: MR ANKLE WITH IV CONTRAST RIGHT  Exam Date and Time: 7/26/2023 6:31 PM  Indication: Concern for osteoarthritis of the heel  Comparison: Right foot radiograph performed on 6 7/25/2023    TECHNIQUE:  Multiplanar multisequence MRI of the right ankle was performed with   contrast.    Contrast: 9 ml IV Gadavist    FINDINGS:    There is a ulcer overlying the heel roughly spanning 5.4 cm extending to   the lateral calcaneus. There is minimal osseous edema within the lateral   calcaneus with mild postcontrast enhancement and without significant loss   of normal T1 fatty marrow. Questionable minimal osseous edema within the   fourth metatarsal head with postcontrast enhancement which is   incompletely evaluated. These findings are possibly secondary to reactive   degenerative changes versus osteomyelitis if there is adjacent ulcer.   Clinical correlation is advised. The ankle mortise is intact. Moderate   osteoarthritis of the midfoot. Extensive soft tissue swelling is seen   throughout the ankle. Fatty atrophy is of the muscles of the foot. No   mature, drainable, or enhancing collection is seen at this time.    IMPRESSION:  1.  There is a ulcer overlying the heel roughly spanning 5.4 cm extending   to the lateral calcaneus. There is minimal osseous edema within the   lateral calcaneus with mild postcontrast enhancement and without   significant loss of normal T1 fatty marrow. This is possibly representing   early osteomyelitis versus reactive in nature.  2.  Questionable minimal osseous edema within the fourth metatarsal head   with postcontrast enhancement which is incompletely evaluated. These   findings are possibly secondary to reactive degenerative changes versus   osteomyelitis if there is adjacent ulcer. Clinical correlation is advised.    < end of copied text >  < from: Xray Chest 1 View- PORTABLE-Urgent (Xray Chest 1 View- PORTABLE-Urgent .) (07.26.23 @ 12:19) >    ACC: [id number] EXAM:  XR CHEST PORTABLE URGENT 1V   ORDERED BY:  JUAN SANCHEZ     PROCEDURE DATE:  07/26/2023          INTERPRETATION:  CLINICAL INFORMATION: Preoperative evaluation    TECHNIQUE: Frontal radiograph of the chest    COMPARISON: None available.    FINDINGS:    No focal consolidation. No pleural effusion. No pneumothorax. Cardiac   silhouette size cannot be accurately assessed on this projection. No   acute osseous findings.    IMPRESSION:    No focal consolidation.    < end of copied text >                       Patient is a 63y old  Female who presents with a chief complaint of ro om (28 Jul 2023 09:41)    Being followed by ID for Heel infection    Interval history:  No acute events      ROS:  No cough, SOB, CP  No N/V/D./abd pain  No other complaints      Antimicrobials:    piperacillin/tazobactam IVPB.. 3.375 Gram(s) IV Intermittent every 8 hours      Vital Signs Last 24 Hrs  T(C): 36.9 (07-29-23 @ 04:05), Max: 36.9 (07-28-23 @ 20:12)  T(F): 98.4 (07-29-23 @ 04:05), Max: 98.5 (07-28-23 @ 20:12)  HR: 60 (07-29-23 @ 04:05) (60 - 60)  BP: 130/74 (07-29-23 @ 04:05) (130/74 - 175/76)  BP(mean): --  RR: 17 (07-29-23 @ 04:05) (17 - 18)  SpO2: 95% (07-29-23 @ 04:05) (95% - 96%)    Physical Exam:    Constitutional well preserved, NAD    HEENT EOMI, No pallor or icterus    No oral exudate or erythema    Neck supple no LN    Chest Clear to auscultation    CVS S1 S2 WNl No murmur or rub or gallop    Abd soft BS normal No tenderness no masses    Ext Bilateral dressings intact, Right foot Vac    IV site no erythema tenderness or discharge    Joints no swelling or LOM    CNS AAO X 3 non focal    Lab Data:                          11.5   8.04  )-----------( 335      ( 28 Jul 2023 07:21 )             36.2       07-28    145  |  103  |  12  ----------------------------<  74  4.3   |  31  |  1.00    Ca    9.3      28 Jul 2023 07:19          .Tissue Other  07-27-23   No growth  --    No polymorphonuclear cells seen per low power field  Numerous Gram Negative Rods seen per oil power field  Few Gram positive cocci in pairs seen per oil power field  Rare Yeast like cells seen per oil power field      .Abscess right heel wound  07-25-23   Numerous Proteus mirabilis  Numerous Pseudomonas aeruginosa  Moderate Enterococcus faecalis (vancomycin resistant)  Few Methicillin Resistant Staphylococcus aureus  Moderate Klebsiella oxytoca/Raoultella ornithinolytica  --  Proteus mirabilis  Pseudomonas aeruginosa  Enterococcus faecalis (vancomycin resistant)  Methicillin resistant Staphylococcus aureus  Klebsiella oxytoca /Raoutella ornithinolytica      .Blood Blood  07-25-23   No growth at 72 Hours  --  --      .Blood Blood  07-25-23   No growth at 72 Hours  --  --    < from: MR Ankle w/ IV Cont, Right (07.26.23 @ 18:31) >  ACC: [id number] EXAM:  MR ANKLE IC RT   ORDERED BY:  ERICA AMADOR     PROCEDURE DATE:  07/26/2023          INTERPRETATION:  Exam Type: MR ANKLE WITH IV CONTRAST RIGHT  Exam Date and Time: 7/26/2023 6:31 PM  Indication: Concern for osteoarthritis of the heel  Comparison: Right foot radiograph performed on 6 7/25/2023    TECHNIQUE:  Multiplanar multisequence MRI of the right ankle was performed with   contrast.    Contrast: 9 ml IV Gadavist    FINDINGS:    There is a ulcer overlying the heel roughly spanning 5.4 cm extending to   the lateral calcaneus. There is minimal osseous edema within the lateral   calcaneus with mild postcontrast enhancement and without significant loss   of normal T1 fatty marrow. Questionable minimal osseous edema within the   fourth metatarsal head with postcontrast enhancement which is   incompletely evaluated. These findings are possibly secondary to reactive   degenerative changes versus osteomyelitis if there is adjacent ulcer.   Clinical correlation is advised. The ankle mortise is intact. Moderate   osteoarthritis of the midfoot. Extensive soft tissue swelling is seen   throughout the ankle. Fatty atrophy is of the muscles of the foot. No   mature, drainable, or enhancing collection is seen at this time.    IMPRESSION:  1.  There is a ulcer overlying the heel roughly spanning 5.4 cm extending   to the lateral calcaneus. There is minimal osseous edema within the   lateral calcaneus with mild postcontrast enhancement and without   significant loss of normal T1 fatty marrow. This is possibly representing   early osteomyelitis versus reactive in nature.  2.  Questionable minimal osseous edema within the fourth metatarsal head   with postcontrast enhancement which is incompletely evaluated. These   findings are possibly secondary to reactive degenerative changes versus   osteomyelitis if there is adjacent ulcer. Clinical correlation is advised.    < end of copied text >  < from: Xray Chest 1 View- PORTABLE-Urgent (Xray Chest 1 View- PORTABLE-Urgent .) (07.26.23 @ 12:19) >    ACC: [id number] EXAM:  XR CHEST PORTABLE URGENT 1V   ORDERED BY:  JUAN SANCHEZ     PROCEDURE DATE:  07/26/2023          INTERPRETATION:  CLINICAL INFORMATION: Preoperative evaluation    TECHNIQUE: Frontal radiograph of the chest    COMPARISON: None available.    FINDINGS:    No focal consolidation. No pleural effusion. No pneumothorax. Cardiac   silhouette size cannot be accurately assessed on this projection. No   acute osseous findings.    IMPRESSION:    No focal consolidation.    < end of copied text >                       Patient is a 63y old  Female who presents with a chief complaint of ro om (28 Jul 2023 09:41)    Being followed by ID for Heel infection    Interval history:  No acute events      ROS:  No cough, SOB, CP  No N/V/D./abd pain  No other complaints      Antimicrobials:    piperacillin/tazobactam IVPB.. 3.375 Gram(s) IV Intermittent every 8 hours      Vital Signs Last 24 Hrs  T(C): 36.9 (07-29-23 @ 04:05), Max: 36.9 (07-28-23 @ 20:12)  T(F): 98.4 (07-29-23 @ 04:05), Max: 98.5 (07-28-23 @ 20:12)  HR: 60 (07-29-23 @ 04:05) (60 - 60)  BP: 130/74 (07-29-23 @ 04:05) (130/74 - 175/76)  BP(mean): --  RR: 17 (07-29-23 @ 04:05) (17 - 18)  SpO2: 95% (07-29-23 @ 04:05) (95% - 96%)    Physical Exam:    Constitutional well preserved, NAD    HEENT EOMI, No pallor or icterus    No oral exudate or erythema    Neck supple no LN    Chest Clear to auscultation    CVS S1 S2 WNl No murmur or rub or gallop    Abd soft BS normal No tenderness no masses    Ext Bilateral dressings intact, Right foot Vac    IV site no erythema tenderness or discharge    Joints no swelling or LOM    CNS AAO X 3 non focal    Lab Data:                          11.5   8.04  )-----------( 335      ( 28 Jul 2023 07:21 )             36.2       07-28    145  |  103  |  12  ----------------------------<  74  4.3   |  31  |  1.00    Ca    9.3      28 Jul 2023 07:19          .Tissue Other  07-27-23   No growth  --    No polymorphonuclear cells seen per low power field  Numerous Gram Negative Rods seen per oil power field  Few Gram positive cocci in pairs seen per oil power field  Rare Yeast like cells seen per oil power field      .Abscess right heel wound  07-25-23   Numerous Proteus mirabilis  Numerous Pseudomonas aeruginosa  Moderate Enterococcus faecalis (vancomycin resistant)  Few Methicillin Resistant Staphylococcus aureus  Moderate Klebsiella oxytoca/Raoultella ornithinolytica  --  Proteus mirabilis  Pseudomonas aeruginosa  Enterococcus faecalis (vancomycin resistant)  Methicillin resistant Staphylococcus aureus  Klebsiella oxytoca /Raoutella ornithinolytica      .Blood Blood  07-25-23   No growth at 72 Hours  --  --      .Blood Blood  07-25-23   No growth at 72 Hours  --  --    < from: MR Ankle w/ IV Cont, Right (07.26.23 @ 18:31) >  ACC: [id number] EXAM:  MR ANKLE IC RT   ORDERED BY:  ERICA AMADOR     PROCEDURE DATE:  07/26/2023          INTERPRETATION:  Exam Type: MR ANKLE WITH IV CONTRAST RIGHT  Exam Date and Time: 7/26/2023 6:31 PM  Indication: Concern for osteoarthritis of the heel  Comparison: Right foot radiograph performed on 6 7/25/2023    TECHNIQUE:  Multiplanar multisequence MRI of the right ankle was performed with   contrast.    Contrast: 9 ml IV Gadavist    FINDINGS:    There is a ulcer overlying the heel roughly spanning 5.4 cm extending to   the lateral calcaneus. There is minimal osseous edema within the lateral   calcaneus with mild postcontrast enhancement and without significant loss   of normal T1 fatty marrow. Questionable minimal osseous edema within the   fourth metatarsal head with postcontrast enhancement which is   incompletely evaluated. These findings are possibly secondary to reactive   degenerative changes versus osteomyelitis if there is adjacent ulcer.   Clinical correlation is advised. The ankle mortise is intact. Moderate   osteoarthritis of the midfoot. Extensive soft tissue swelling is seen   throughout the ankle. Fatty atrophy is of the muscles of the foot. No   mature, drainable, or enhancing collection is seen at this time.    IMPRESSION:  1.  There is a ulcer overlying the heel roughly spanning 5.4 cm extending   to the lateral calcaneus. There is minimal osseous edema within the   lateral calcaneus with mild postcontrast enhancement and without   significant loss of normal T1 fatty marrow. This is possibly representing   early osteomyelitis versus reactive in nature.  2.  Questionable minimal osseous edema within the fourth metatarsal head   with postcontrast enhancement which is incompletely evaluated. These   findings are possibly secondary to reactive degenerative changes versus   osteomyelitis if there is adjacent ulcer. Clinical correlation is advised.    < end of copied text >  < from: Xray Chest 1 View- PORTABLE-Urgent (Xray Chest 1 View- PORTABLE-Urgent .) (07.26.23 @ 12:19) >    ACC: [id number] EXAM:  XR CHEST PORTABLE URGENT 1V   ORDERED BY:  JUAN SANCHEZ     PROCEDURE DATE:  07/26/2023          INTERPRETATION:  CLINICAL INFORMATION: Preoperative evaluation    TECHNIQUE: Frontal radiograph of the chest    COMPARISON: None available.    FINDINGS:    No focal consolidation. No pleural effusion. No pneumothorax. Cardiac   silhouette size cannot be accurately assessed on this projection. No   acute osseous findings.    IMPRESSION:    No focal consolidation.    < end of copied text >                       Patient is a 63y old  Female who presents with a chief complaint of ro om (28 Jul 2023 09:41)    Being followed by ID for Heel infection    Interval history:  No acute events      ROS: Foot discomfort, not in heel  No cough, SOB, CP  No N/V/D./abd pain  No other complaints      Antimicrobials:    piperacillin/tazobactam IVPB.. 3.375 Gram(s) IV Intermittent every 8 hours      Vital Signs Last 24 Hrs  T(C): 36.9 (07-29-23 @ 04:05), Max: 36.9 (07-28-23 @ 20:12)  T(F): 98.4 (07-29-23 @ 04:05), Max: 98.5 (07-28-23 @ 20:12)  HR: 60 (07-29-23 @ 04:05) (60 - 60)  BP: 130/74 (07-29-23 @ 04:05) (130/74 - 175/76)  BP(mean): --  RR: 17 (07-29-23 @ 04:05) (17 - 18)  SpO2: 95% (07-29-23 @ 04:05) (95% - 96%)    Physical Exam:    Constitutional well preserved, NAD    HEENT EOMI, No pallor or icterus    No oral exudate or erythema    Neck supple no LN    Chest Clear to auscultation    CVS S1 S2 WNl No murmur or rub or gallop    Abd soft BS normal No tenderness no masses    Ext Bilateral chronic stasis changes LE, Bilateral dressings intact, Right foot Vac    IV site no erythema tenderness or discharge    Joints no swelling or LOM    CNS AAO X 3 non focal    Lab Data:                          11.5   8.04  )-----------( 335      ( 28 Jul 2023 07:21 )             36.2       07-28    145  |  103  |  12  ----------------------------<  74  4.3   |  31  |  1.00    Ca    9.3      28 Jul 2023 07:19          .Tissue Other  07-27-23   No growth  --    No polymorphonuclear cells seen per low power field  Numerous Gram Negative Rods seen per oil power field  Few Gram positive cocci in pairs seen per oil power field  Rare Yeast like cells seen per oil power field      .Abscess right heel wound  07-25-23   Numerous Proteus mirabilis  Numerous Pseudomonas aeruginosa  Moderate Enterococcus faecalis (vancomycin resistant)  Few Methicillin Resistant Staphylococcus aureus  Moderate Klebsiella oxytoca/Raoultella ornithinolytica  --  Proteus mirabilis  Pseudomonas aeruginosa  Enterococcus faecalis (vancomycin resistant)  Methicillin resistant Staphylococcus aureus  Klebsiella oxytoca /Raoutella ornithinolytica      .Blood Blood  07-25-23   No growth at 72 Hours  --  --      .Blood Blood  07-25-23   No growth at 72 Hours  --  --    < from: MR Ankle w/ IV Cont, Right (07.26.23 @ 18:31) >  ACC: [id number] EXAM:  MR ANKLE IC RT   ORDERED BY:  ERICA AMADOR     PROCEDURE DATE:  07/26/2023          INTERPRETATION:  Exam Type: MR ANKLE WITH IV CONTRAST RIGHT  Exam Date and Time: 7/26/2023 6:31 PM  Indication: Concern for osteoarthritis of the heel  Comparison: Right foot radiograph performed on 6 7/25/2023    TECHNIQUE:  Multiplanar multisequence MRI of the right ankle was performed with   contrast.    Contrast: 9 ml IV Gadavist    FINDINGS:    There is a ulcer overlying the heel roughly spanning 5.4 cm extending to   the lateral calcaneus. There is minimal osseous edema within the lateral   calcaneus with mild postcontrast enhancement and without significant loss   of normal T1 fatty marrow. Questionable minimal osseous edema within the   fourth metatarsal head with postcontrast enhancement which is   incompletely evaluated. These findings are possibly secondary to reactive   degenerative changes versus osteomyelitis if there is adjacent ulcer.   Clinical correlation is advised. The ankle mortise is intact. Moderate   osteoarthritis of the midfoot. Extensive soft tissue swelling is seen   throughout the ankle. Fatty atrophy is of the muscles of the foot. No   mature, drainable, or enhancing collection is seen at this time.    IMPRESSION:  1.  There is a ulcer overlying the heel roughly spanning 5.4 cm extending   to the lateral calcaneus. There is minimal osseous edema within the   lateral calcaneus with mild postcontrast enhancement and without   significant loss of normal T1 fatty marrow. This is possibly representing   early osteomyelitis versus reactive in nature.  2.  Questionable minimal osseous edema within the fourth metatarsal head   with postcontrast enhancement which is incompletely evaluated. These   findings are possibly secondary to reactive degenerative changes versus   osteomyelitis if there is adjacent ulcer. Clinical correlation is advised.    < end of copied text >  < from: Xray Chest 1 View- PORTABLE-Urgent (Xray Chest 1 View- PORTABLE-Urgent .) (07.26.23 @ 12:19) >    ACC: [id number] EXAM:  XR CHEST PORTABLE URGENT 1V   ORDERED BY:  JUAN SANCHEZ     PROCEDURE DATE:  07/26/2023          INTERPRETATION:  CLINICAL INFORMATION: Preoperative evaluation    TECHNIQUE: Frontal radiograph of the chest    COMPARISON: None available.    FINDINGS:    No focal consolidation. No pleural effusion. No pneumothorax. Cardiac   silhouette size cannot be accurately assessed on this projection. No   acute osseous findings.    IMPRESSION:    No focal consolidation.    < end of copied text >                       Patient is a 63y old  Female who presents with a chief complaint of ro om (28 Jul 2023 09:41)    Being followed by ID for Heel infection    Interval history:  No acute events      ROS: Foot discomfort, not in heel  No cough, SOB, CP  No N/V/D./abd pain  No other complaints      Antimicrobials:    piperacillin/tazobactam IVPB.. 3.375 Gram(s) IV Intermittent every 8 hours      Vital Signs Last 24 Hrs  T(C): 36.9 (07-29-23 @ 04:05), Max: 36.9 (07-28-23 @ 20:12)  T(F): 98.4 (07-29-23 @ 04:05), Max: 98.5 (07-28-23 @ 20:12)  HR: 60 (07-29-23 @ 04:05) (60 - 60)  BP: 130/74 (07-29-23 @ 04:05) (130/74 - 175/76)  BP(mean): --  RR: 17 (07-29-23 @ 04:05) (17 - 18)  SpO2: 95% (07-29-23 @ 04:05) (95% - 96%)    Physical Exam:    Constitutional well preserved, NAD    HEENT EOMI, No pallor or icterus    No oral exudate or erythema    Neck supple no LN    Chest Clear to auscultation    CVS S1 S2 WNl No murmur or rub or gallop    Abd soft BS normal No tenderness no masses    Ext Bilateral chronic stasis changes LE, Bilateral dressings intact, Right foot Vac    IV site no erythema tenderness or discharge    Joints no swelling or LOM    CNS AAO X 3 non focal    Lab Data:                          11.5   8.04  )-----------( 335      ( 28 Jul 2023 07:21 )             36.2       07-28    145  |  103  |  12  ----------------------------<  74  4.3   |  31  |  1.00    Ca    9.3      28 Jul 2023 07:19          .Tissue Other  07-27-23   No growth  --    No polymorphonuclear cells seen per low power field  Numerous Gram Negative Rods seen per oil power field  Few Gram positive cocci in pairs seen per oil power field  Rare Yeast like cells seen per oil power field      .Abscess right heel wound  07-25-23   Numerous Proteus mirabilis  Numerous Pseudomonas aeruginosa  Moderate Enterococcus faecalis (vancomycin resistant)  Few Methicillin Resistant Staphylococcus aureus  Moderate Klebsiella oxytoca/Raoultella ornithinolytica  --  Proteus mirabilis  Pseudomonas aeruginosa  Enterococcus faecalis (vancomycin resistant)  Methicillin resistant Staphylococcus aureus  Klebsiella oxytoca /Raoutella ornithinolytica      .Blood Blood  07-25-23   No growth at 72 Hours  --  --      .Blood Blood  07-25-23   No growth at 72 Hours  --  --    < from: MR Ankle w/ IV Cont, Right (07.26.23 @ 18:31) >  ACC: [id number] EXAM:  MR ANKLE IC RT   ORDERED BY:  ERICA AMADOR     PROCEDURE DATE:  07/26/2023          INTERPRETATION:  Exam Type: MR ANKLE WITH IV CONTRAST RIGHT  Exam Date and Time: 7/26/2023 6:31 PM  Indication: Concern for osteoarthritis of the heel  Comparison: Right foot radiograph performed on 6 7/25/2023    TECHNIQUE:  Multiplanar multisequence MRI of the right ankle was performed with   contrast.    Contrast: 9 ml IV Gadavist    FINDINGS:    There is a ulcer overlying the heel roughly spanning 5.4 cm extending to   the lateral calcaneus. There is minimal osseous edema within the lateral   calcaneus with mild postcontrast enhancement and without significant loss   of normal T1 fatty marrow. Questionable minimal osseous edema within the   fourth metatarsal head with postcontrast enhancement which is   incompletely evaluated. These findings are possibly secondary to reactive   degenerative changes versus osteomyelitis if there is adjacent ulcer.   Clinical correlation is advised. The ankle mortise is intact. Moderate   osteoarthritis of the midfoot. Extensive soft tissue swelling is seen   throughout the ankle. Fatty atrophy is of the muscles of the foot. No   mature, drainable, or enhancing collection is seen at this time.    IMPRESSION:  1.  There is a ulcer overlying the heel roughly spanning 5.4 cm extending   to the lateral calcaneus. There is minimal osseous edema within the   lateral calcaneus with mild postcontrast enhancement and without   significant loss of normal T1 fatty marrow. This is possibly representing   early osteomyelitis versus reactive in nature.  2.  Questionable minimal osseous edema within the fourth metatarsal head   with postcontrast enhancement which is incompletely evaluated. These   findings are possibly secondary to reactive degenerative changes versus   osteomyelitis if there is adjacent ulcer. Clinical correlation is advised.    < end of copied text >  < from: Xray Chest 1 View- PORTABLE-Urgent (Xray Chest 1 View- PORTABLE-Urgent .) (07.26.23 @ 12:19) >    ACC: [id number] EXAM:  XR CHEST PORTABLE URGENT 1V   ORDERED BY:  JUAN SANCHEZ     PROCEDURE DATE:  07/26/2023          INTERPRETATION:  CLINICAL INFORMATION: Preoperative evaluation    TECHNIQUE: Frontal radiograph of the chest    COMPARISON: None available.    FINDINGS:    No focal consolidation. No pleural effusion. No pneumothorax. Cardiac   silhouette size cannot be accurately assessed on this projection. No   acute osseous findings.    IMPRESSION:    No focal consolidation.    < end of copied text >                       Patient is a 63y old  Female who presents with a chief complaint of ro om (28 Jul 2023 09:41)    Being followed by ID for Heel infection    Interval history:  No acute events      ROS: Foot discomfort, not in heel  No cough, SOB, CP  No N/V/D./abd pain  No other complaints      Antimicrobials:    piperacillin/tazobactam IVPB.. 3.375 Gram(s) IV Intermittent every 8 hours      Vital Signs Last 24 Hrs  T(C): 36.9 (07-29-23 @ 04:05), Max: 36.9 (07-28-23 @ 20:12)  T(F): 98.4 (07-29-23 @ 04:05), Max: 98.5 (07-28-23 @ 20:12)  HR: 60 (07-29-23 @ 04:05) (60 - 60)  BP: 130/74 (07-29-23 @ 04:05) (130/74 - 175/76)  BP(mean): --  RR: 17 (07-29-23 @ 04:05) (17 - 18)  SpO2: 95% (07-29-23 @ 04:05) (95% - 96%)    Physical Exam:    Constitutional well preserved, NAD    HEENT EOMI, No pallor or icterus    No oral exudate or erythema    Neck supple no LN    Chest Clear to auscultation    CVS S1 S2 WNl No murmur or rub or gallop    Abd soft BS normal No tenderness no masses    Ext Bilateral chronic stasis changes LE, Bilateral dressings intact, Right foot Vac    IV site no erythema tenderness or discharge    Joints no swelling or LOM    CNS AAO X 3 non focal    Lab Data:                          11.5   8.04  )-----------( 335      ( 28 Jul 2023 07:21 )             36.2       07-28    145  |  103  |  12  ----------------------------<  74  4.3   |  31  |  1.00    Ca    9.3      28 Jul 2023 07:19          .Tissue Other  07-27-23   No growth  --    No polymorphonuclear cells seen per low power field  Numerous Gram Negative Rods seen per oil power field  Few Gram positive cocci in pairs seen per oil power field  Rare Yeast like cells seen per oil power field      .Abscess right heel wound  07-25-23   Numerous Proteus mirabilis  Numerous Pseudomonas aeruginosa  Moderate Enterococcus faecalis (vancomycin resistant)  Few Methicillin Resistant Staphylococcus aureus  Moderate Klebsiella oxytoca/Raoultella ornithinolytica  --  Proteus mirabilis  Pseudomonas aeruginosa  Enterococcus faecalis (vancomycin resistant)  Methicillin resistant Staphylococcus aureus  Klebsiella oxytoca /Raoutella ornithinolytica      .Blood Blood  07-25-23   No growth at 72 Hours  --  --      .Blood Blood  07-25-23   No growth at 72 Hours  --  --    < from: MR Ankle w/ IV Cont, Right (07.26.23 @ 18:31) >  ACC: [id number] EXAM:  MR ANKLE IC RT   ORDERED BY:  ERICA AMADOR     PROCEDURE DATE:  07/26/2023          INTERPRETATION:  Exam Type: MR ANKLE WITH IV CONTRAST RIGHT  Exam Date and Time: 7/26/2023 6:31 PM  Indication: Concern for osteoarthritis of the heel  Comparison: Right foot radiograph performed on 6 7/25/2023    TECHNIQUE:  Multiplanar multisequence MRI of the right ankle was performed with   contrast.    Contrast: 9 ml IV Gadavist    FINDINGS:    There is a ulcer overlying the heel roughly spanning 5.4 cm extending to   the lateral calcaneus. There is minimal osseous edema within the lateral   calcaneus with mild postcontrast enhancement and without significant loss   of normal T1 fatty marrow. Questionable minimal osseous edema within the   fourth metatarsal head with postcontrast enhancement which is   incompletely evaluated. These findings are possibly secondary to reactive   degenerative changes versus osteomyelitis if there is adjacent ulcer.   Clinical correlation is advised. The ankle mortise is intact. Moderate   osteoarthritis of the midfoot. Extensive soft tissue swelling is seen   throughout the ankle. Fatty atrophy is of the muscles of the foot. No   mature, drainable, or enhancing collection is seen at this time.    IMPRESSION:  1.  There is a ulcer overlying the heel roughly spanning 5.4 cm extending   to the lateral calcaneus. There is minimal osseous edema within the   lateral calcaneus with mild postcontrast enhancement and without   significant loss of normal T1 fatty marrow. This is possibly representing   early osteomyelitis versus reactive in nature.  2.  Questionable minimal osseous edema within the fourth metatarsal head   with postcontrast enhancement which is incompletely evaluated. These   findings are possibly secondary to reactive degenerative changes versus   osteomyelitis if there is adjacent ulcer. Clinical correlation is advised.    < end of copied text >  < from: Xray Chest 1 View- PORTABLE-Urgent (Xray Chest 1 View- PORTABLE-Urgent .) (07.26.23 @ 12:19) >    ACC: [id number] EXAM:  XR CHEST PORTABLE URGENT 1V   ORDERED BY:  JUAN SANCHEZ     PROCEDURE DATE:  07/26/2023          INTERPRETATION:  CLINICAL INFORMATION: Preoperative evaluation    TECHNIQUE: Frontal radiograph of the chest    COMPARISON: None available.    FINDINGS:    No focal consolidation. No pleural effusion. No pneumothorax. Cardiac   silhouette size cannot be accurately assessed on this projection. No   acute osseous findings.    IMPRESSION:    No focal consolidation.    < end of copied text >

## 2023-07-29 NOTE — PROGRESS NOTE ADULT - SUBJECTIVE AND OBJECTIVE BOX
STEPHANIE KAYE  63y Female  MRN:[id number]    Patient is a 63y old  Female who presents with a chief complaint of foot infection    HPI:   64 y/o F PMHx of DMH2 on insulin, HTN, HLD p/w right heel ulcer. Patient is sent by her podiatrist for questionable right foot procedure.  She is unsure how long she has had this ulcer for.  States that she was recently hospitalized in the near [facility] for an evaluation of the right foot infection.  States she still had a 102 fever yesterday which resolved.  Denies any fevers today, chills, chest pain, shortness of breath, abdominal pain, nausea vomiting diarrhea.  States she ambulates with a walker at baseline.  Accompanied by her sister-in-law (25 Jul 2023 21:58)      pt now s/p bilateral foot heel wound debridement with right foot kerecis graft application, and right foot calcaneus bone biopsy on 7/27      Patient seen and evaluated at bedside. No acute events overnight except as noted.    Interval HPI: no acute events o/n        PAST MEDICAL & SURGICAL HISTORY:  Diabetes      Hypertension      Hypercholesteremia          REVIEW OF SYSTEMS:  as per hpi     VITALS:   Vital Signs Last 24 Hrs  T(C): 37.5 (29 Jul 2023 21:31), Max: 37.5 (29 Jul 2023 21:31)  T(F): 99.5 (29 Jul 2023 21:31), Max: 99.5 (29 Jul 2023 21:31)  HR: 64 (29 Jul 2023 21:31) (60 - 64)  BP: 139/72 (29 Jul 2023 21:31) (122/75 - 139/72)  BP(mean): --  RR: 18 (29 Jul 2023 21:31) (17 - 18)  SpO2: 94% (29 Jul 2023 21:31) (94% - 95%)    Parameters below as of 29 Jul 2023 21:31  Patient On (Oxygen Delivery Method): room air          PHYSICAL EXAM:  GENERAL: NAD, well-developed  HEAD:  Atraumatic, Normocephalic  EYES: EOMI, PERRLA, conjunctiva and sclera clear  NECK: Supple, No JVD  CHEST/LUNG: Clear to auscultation bilaterally; No wheeze  HEART: S1, S2; No murmurs, rubs, or gallops  ABDOMEN: Soft, Nontender, Nondistended; Bowel sounds present  EXTREMITIES:  2+ Peripheral Pulses, No clubbing, cyanosis, or edema. b/l lower ext dressing in place   PSYCH: Normal affect  NEUROLOGY: AAOX3; non-focal  SKIN: No rashes or lesions    Consultant(s) Notes Reviewed:  [x ] YES  [ ] NO  Care Discussed with Consultants/Other Providers [ x] YES  [ ] NO    MEDS:   MEDICATIONS  (STANDING):  artificial  tears Solution 1 Drop(s) Both EYES three times a day  atorvastatin 40 milliGRAM(s) Oral at bedtime  chlorhexidine 2% Cloths 1 Application(s) Topical <User Schedule>  dextrose 5%. 1000 milliLiter(s) (100 mL/Hr) IV Continuous <Continuous>  dextrose 5%. 1000 milliLiter(s) (50 mL/Hr) IV Continuous <Continuous>  dextrose 50% Injectable 25 Gram(s) IV Push once  dextrose 50% Injectable 25 Gram(s) IV Push once  dextrose 50% Injectable 12.5 Gram(s) IV Push once  furosemide    Tablet 20 milliGRAM(s) Oral daily  glucagon  Injectable 1 milliGRAM(s) IntraMuscular once  heparin   Injectable 5000 Unit(s) SubCutaneous every 8 hours  hydrALAZINE 50 milliGRAM(s) Oral two times a day  insulin glargine Injectable (LANTUS) 10 Unit(s) SubCutaneous at bedtime  insulin lispro (ADMELOG) corrective regimen sliding scale   SubCutaneous three times a day before meals  insulin lispro (ADMELOG) corrective regimen sliding scale   SubCutaneous at bedtime  insulin lispro Injectable (ADMELOG) 3 Unit(s) SubCutaneous before breakfast  insulin lispro Injectable (ADMELOG) 2 Unit(s) SubCutaneous before dinner  insulin lispro Injectable (ADMELOG) 3 Unit(s) SubCutaneous before lunch  lisinopril 20 milliGRAM(s) Oral daily  metoprolol succinate ER 50 milliGRAM(s) Oral daily  mupirocin 2% Nasal 1 Application(s) Both Nostrils two times a day  mupirocin 2% Ointment 1 Application(s) Topical two times a day  pantoprazole    Tablet 40 milliGRAM(s) Oral before breakfast  piperacillin/tazobactam IVPB.. 3.375 Gram(s) IV Intermittent every 8 hours  senna 2 Tablet(s) Oral at bedtime  tamsulosin 0.4 milliGRAM(s) Oral at bedtime    MEDICATIONS  (PRN):  acetaminophen     Tablet .. 650 milliGRAM(s) Oral every 6 hours PRN Mild Pain (1 - 3)  dextrose Oral Gel 15 Gram(s) Oral once PRN Blood Glucose LESS THAN 70 milliGRAM(s)/deciliter  HYDROmorphone  Injectable 0.5 milliGRAM(s) IV Push every 4 hours PRN Severe Pain (7 - 10)  ondansetron Injectable 4 milliGRAM(s) IV Push every 8 hours PRN Nausea and/or Vomiting  oxycodone    5 mG/acetaminophen 325 mG 1 Tablet(s) Oral every 4 hours PRN Moderate Pain (4 - 6)      ALLERGIES:  Allergy Status Unknown      LABS:                                               11.5   8.04  )-----------( 335      ( 28 Jul 2023 07:21 )             36.2   07-28    145  |  103  |  12  ----------------------------<  74  4.3   |  31  |  1.00    Ca    9.3      28 Jul 2023 07:19        < from: MR Ankle w/ IV Cont, Right (07.26.23 @ 18:31) >    IMPRESSION:  1.  There is a ulcer overlying the heel roughly spanning 5.4 cm extending   to the lateral calcaneus. There is minimal osseous edema within the   lateral calcaneus with mild postcontrast enhancement and without   significant loss of normal T1 fatty marrow. This is possibly representing   early osteomyelitis versus reactive in nature.  2.  Questionable minimal osseous edema within the fourth metatarsal head   with postcontrast enhancement which is incompletely evaluated. These   findings are possibly secondary to reactive degenerative changes versus   osteomyelitis if there is adjacent ulcer. Clinical correlation is advised.    --- End of Report ---        < end of copied text >     < from: Xray Foot AP + Lateral + Oblique, Right (07.25.23 @ 17:54) >    IMPRESSION:    No acute fracture or dislocation of the right foot. Midfoot degenerative   changes are noted.    Soft tissue defect at the heel related to known heel ulcer. No osseous   erosive changes of the calcaneus or adjacent osseousstructures. Soft   tissue edema is also noted along the dorsal foot and anterior lower shin.   No tracking subcutaneous gas.    Along the first distal phalanx base, there are juxtaarticular erosions at   the medial aspect and productive changes at the lateral aspect. Findings   may be due to underlying inflammatory arthritis or crystalline   arthropathy.    Vascular calcifications.      --- End of Report ---    < end of copied text >   STEPHANIE KAYE  63y Female  MRN:[id number]    Patient is a 63y old  Female who presents with a chief complaint of foot infection    HPI:   62 y/o F PMHx of DMH2 on insulin, HTN, HLD p/w right heel ulcer. Patient is sent by her podiatrist for questionable right foot procedure.  She is unsure how long she has had this ulcer for.  States that she was recently hospitalized in the near [facility] for an evaluation of the right foot infection.  States she still had a 102 fever yesterday which resolved.  Denies any fevers today, chills, chest pain, shortness of breath, abdominal pain, nausea vomiting diarrhea.  States she ambulates with a walker at baseline.  Accompanied by her sister-in-law (25 Jul 2023 21:58)      pt now s/p bilateral foot heel wound debridement with right foot kerecis graft application, and right foot calcaneus bone biopsy on 7/27      Patient seen and evaluated at bedside. No acute events overnight except as noted.    Interval HPI: no acute events o/n        PAST MEDICAL & SURGICAL HISTORY:  Diabetes      Hypertension      Hypercholesteremia          REVIEW OF SYSTEMS:  as per hpi     VITALS:   Vital Signs Last 24 Hrs  T(C): 37.5 (29 Jul 2023 21:31), Max: 37.5 (29 Jul 2023 21:31)  T(F): 99.5 (29 Jul 2023 21:31), Max: 99.5 (29 Jul 2023 21:31)  HR: 64 (29 Jul 2023 21:31) (60 - 64)  BP: 139/72 (29 Jul 2023 21:31) (122/75 - 139/72)  BP(mean): --  RR: 18 (29 Jul 2023 21:31) (17 - 18)  SpO2: 94% (29 Jul 2023 21:31) (94% - 95%)    Parameters below as of 29 Jul 2023 21:31  Patient On (Oxygen Delivery Method): room air          PHYSICAL EXAM:  GENERAL: NAD, well-developed  HEAD:  Atraumatic, Normocephalic  EYES: EOMI, PERRLA, conjunctiva and sclera clear  NECK: Supple, No JVD  CHEST/LUNG: Clear to auscultation bilaterally; No wheeze  HEART: S1, S2; No murmurs, rubs, or gallops  ABDOMEN: Soft, Nontender, Nondistended; Bowel sounds present  EXTREMITIES:  2+ Peripheral Pulses, No clubbing, cyanosis, or edema. b/l lower ext dressing in place   PSYCH: Normal affect  NEUROLOGY: AAOX3; non-focal  SKIN: No rashes or lesions    Consultant(s) Notes Reviewed:  [x ] YES  [ ] NO  Care Discussed with Consultants/Other Providers [ x] YES  [ ] NO    MEDS:   MEDICATIONS  (STANDING):  artificial  tears Solution 1 Drop(s) Both EYES three times a day  atorvastatin 40 milliGRAM(s) Oral at bedtime  chlorhexidine 2% Cloths 1 Application(s) Topical <User Schedule>  dextrose 5%. 1000 milliLiter(s) (100 mL/Hr) IV Continuous <Continuous>  dextrose 5%. 1000 milliLiter(s) (50 mL/Hr) IV Continuous <Continuous>  dextrose 50% Injectable 25 Gram(s) IV Push once  dextrose 50% Injectable 25 Gram(s) IV Push once  dextrose 50% Injectable 12.5 Gram(s) IV Push once  furosemide    Tablet 20 milliGRAM(s) Oral daily  glucagon  Injectable 1 milliGRAM(s) IntraMuscular once  heparin   Injectable 5000 Unit(s) SubCutaneous every 8 hours  hydrALAZINE 50 milliGRAM(s) Oral two times a day  insulin glargine Injectable (LANTUS) 10 Unit(s) SubCutaneous at bedtime  insulin lispro (ADMELOG) corrective regimen sliding scale   SubCutaneous three times a day before meals  insulin lispro (ADMELOG) corrective regimen sliding scale   SubCutaneous at bedtime  insulin lispro Injectable (ADMELOG) 3 Unit(s) SubCutaneous before breakfast  insulin lispro Injectable (ADMELOG) 2 Unit(s) SubCutaneous before dinner  insulin lispro Injectable (ADMELOG) 3 Unit(s) SubCutaneous before lunch  lisinopril 20 milliGRAM(s) Oral daily  metoprolol succinate ER 50 milliGRAM(s) Oral daily  mupirocin 2% Nasal 1 Application(s) Both Nostrils two times a day  mupirocin 2% Ointment 1 Application(s) Topical two times a day  pantoprazole    Tablet 40 milliGRAM(s) Oral before breakfast  piperacillin/tazobactam IVPB.. 3.375 Gram(s) IV Intermittent every 8 hours  senna 2 Tablet(s) Oral at bedtime  tamsulosin 0.4 milliGRAM(s) Oral at bedtime    MEDICATIONS  (PRN):  acetaminophen     Tablet .. 650 milliGRAM(s) Oral every 6 hours PRN Mild Pain (1 - 3)  dextrose Oral Gel 15 Gram(s) Oral once PRN Blood Glucose LESS THAN 70 milliGRAM(s)/deciliter  HYDROmorphone  Injectable 0.5 milliGRAM(s) IV Push every 4 hours PRN Severe Pain (7 - 10)  ondansetron Injectable 4 milliGRAM(s) IV Push every 8 hours PRN Nausea and/or Vomiting  oxycodone    5 mG/acetaminophen 325 mG 1 Tablet(s) Oral every 4 hours PRN Moderate Pain (4 - 6)      ALLERGIES:  Allergy Status Unknown      LABS:                                               11.5   8.04  )-----------( 335      ( 28 Jul 2023 07:21 )             36.2   07-28    145  |  103  |  12  ----------------------------<  74  4.3   |  31  |  1.00    Ca    9.3      28 Jul 2023 07:19        < from: MR Ankle w/ IV Cont, Right (07.26.23 @ 18:31) >    IMPRESSION:  1.  There is a ulcer overlying the heel roughly spanning 5.4 cm extending   to the lateral calcaneus. There is minimal osseous edema within the   lateral calcaneus with mild postcontrast enhancement and without   significant loss of normal T1 fatty marrow. This is possibly representing   early osteomyelitis versus reactive in nature.  2.  Questionable minimal osseous edema within the fourth metatarsal head   with postcontrast enhancement which is incompletely evaluated. These   findings are possibly secondary to reactive degenerative changes versus   osteomyelitis if there is adjacent ulcer. Clinical correlation is advised.    --- End of Report ---        < end of copied text >     < from: Xray Foot AP + Lateral + Oblique, Right (07.25.23 @ 17:54) >    IMPRESSION:    No acute fracture or dislocation of the right foot. Midfoot degenerative   changes are noted.    Soft tissue defect at the heel related to known heel ulcer. No osseous   erosive changes of the calcaneus or adjacent osseousstructures. Soft   tissue edema is also noted along the dorsal foot and anterior lower shin.   No tracking subcutaneous gas.    Along the first distal phalanx base, there are juxtaarticular erosions at   the medial aspect and productive changes at the lateral aspect. Findings   may be due to underlying inflammatory arthritis or crystalline   arthropathy.    Vascular calcifications.      --- End of Report ---    < end of copied text >   STEPHANIE KAYE  63y Female  MRN:[id number]    Patient is a 63y old  Female who presents with a chief complaint of foot infection    HPI:   62 y/o F PMHx of DMH2 on insulin, HTN, HLD p/w right heel ulcer. Patient is sent by her podiatrist for questionable right foot procedure.  She is unsure how long she has had this ulcer for.  States that she was recently hospitalized in the near [facility] for an evaluation of the right foot infection.  States she still had a 102 fever yesterday which resolved.  Denies any fevers today, chills, chest pain, shortness of breath, abdominal pain, nausea vomiting diarrhea.  States she ambulates with a walker at baseline.  Accompanied by her sister-in-law (25 Jul 2023 21:58)      pt now s/p bilateral foot heel wound debridement with right foot kerecis graft application, and right foot calcaneus bone biopsy on 7/27      Patient seen and evaluated at bedside. No acute events overnight except as noted.    Interval HPI: no acute events o/n        PAST MEDICAL & SURGICAL HISTORY:  Diabetes      Hypertension      Hypercholesteremia          REVIEW OF SYSTEMS:  as per hpi     VITALS:   Vital Signs Last 24 Hrs  T(C): 37.5 (29 Jul 2023 21:31), Max: 37.5 (29 Jul 2023 21:31)  T(F): 99.5 (29 Jul 2023 21:31), Max: 99.5 (29 Jul 2023 21:31)  HR: 64 (29 Jul 2023 21:31) (60 - 64)  BP: 139/72 (29 Jul 2023 21:31) (122/75 - 139/72)  BP(mean): --  RR: 18 (29 Jul 2023 21:31) (17 - 18)  SpO2: 94% (29 Jul 2023 21:31) (94% - 95%)    Parameters below as of 29 Jul 2023 21:31  Patient On (Oxygen Delivery Method): room air          PHYSICAL EXAM:  GENERAL: NAD, well-developed  HEAD:  Atraumatic, Normocephalic  EYES: EOMI, PERRLA, conjunctiva and sclera clear  NECK: Supple, No JVD  CHEST/LUNG: Clear to auscultation bilaterally; No wheeze  HEART: S1, S2; No murmurs, rubs, or gallops  ABDOMEN: Soft, Nontender, Nondistended; Bowel sounds present  EXTREMITIES:  2+ Peripheral Pulses, No clubbing, cyanosis, or edema. b/l lower ext dressing in place   PSYCH: Normal affect  NEUROLOGY: AAOX3; non-focal  SKIN: No rashes or lesions    Consultant(s) Notes Reviewed:  [x ] YES  [ ] NO  Care Discussed with Consultants/Other Providers [ x] YES  [ ] NO    MEDS:   MEDICATIONS  (STANDING):  artificial  tears Solution 1 Drop(s) Both EYES three times a day  atorvastatin 40 milliGRAM(s) Oral at bedtime  chlorhexidine 2% Cloths 1 Application(s) Topical <User Schedule>  dextrose 5%. 1000 milliLiter(s) (100 mL/Hr) IV Continuous <Continuous>  dextrose 5%. 1000 milliLiter(s) (50 mL/Hr) IV Continuous <Continuous>  dextrose 50% Injectable 25 Gram(s) IV Push once  dextrose 50% Injectable 25 Gram(s) IV Push once  dextrose 50% Injectable 12.5 Gram(s) IV Push once  furosemide    Tablet 20 milliGRAM(s) Oral daily  glucagon  Injectable 1 milliGRAM(s) IntraMuscular once  heparin   Injectable 5000 Unit(s) SubCutaneous every 8 hours  hydrALAZINE 50 milliGRAM(s) Oral two times a day  insulin glargine Injectable (LANTUS) 10 Unit(s) SubCutaneous at bedtime  insulin lispro (ADMELOG) corrective regimen sliding scale   SubCutaneous three times a day before meals  insulin lispro (ADMELOG) corrective regimen sliding scale   SubCutaneous at bedtime  insulin lispro Injectable (ADMELOG) 3 Unit(s) SubCutaneous before breakfast  insulin lispro Injectable (ADMELOG) 2 Unit(s) SubCutaneous before dinner  insulin lispro Injectable (ADMELOG) 3 Unit(s) SubCutaneous before lunch  lisinopril 20 milliGRAM(s) Oral daily  metoprolol succinate ER 50 milliGRAM(s) Oral daily  mupirocin 2% Nasal 1 Application(s) Both Nostrils two times a day  mupirocin 2% Ointment 1 Application(s) Topical two times a day  pantoprazole    Tablet 40 milliGRAM(s) Oral before breakfast  piperacillin/tazobactam IVPB.. 3.375 Gram(s) IV Intermittent every 8 hours  senna 2 Tablet(s) Oral at bedtime  tamsulosin 0.4 milliGRAM(s) Oral at bedtime    MEDICATIONS  (PRN):  acetaminophen     Tablet .. 650 milliGRAM(s) Oral every 6 hours PRN Mild Pain (1 - 3)  dextrose Oral Gel 15 Gram(s) Oral once PRN Blood Glucose LESS THAN 70 milliGRAM(s)/deciliter  HYDROmorphone  Injectable 0.5 milliGRAM(s) IV Push every 4 hours PRN Severe Pain (7 - 10)  ondansetron Injectable 4 milliGRAM(s) IV Push every 8 hours PRN Nausea and/or Vomiting  oxycodone    5 mG/acetaminophen 325 mG 1 Tablet(s) Oral every 4 hours PRN Moderate Pain (4 - 6)      ALLERGIES:  Allergy Status Unknown      LABS:                                               11.5   8.04  )-----------( 335      ( 28 Jul 2023 07:21 )             36.2   07-28    145  |  103  |  12  ----------------------------<  74  4.3   |  31  |  1.00    Ca    9.3      28 Jul 2023 07:19        < from: MR Ankle w/ IV Cont, Right (07.26.23 @ 18:31) >    IMPRESSION:  1.  There is a ulcer overlying the heel roughly spanning 5.4 cm extending   to the lateral calcaneus. There is minimal osseous edema within the   lateral calcaneus with mild postcontrast enhancement and without   significant loss of normal T1 fatty marrow. This is possibly representing   early osteomyelitis versus reactive in nature.  2.  Questionable minimal osseous edema within the fourth metatarsal head   with postcontrast enhancement which is incompletely evaluated. These   findings are possibly secondary to reactive degenerative changes versus   osteomyelitis if there is adjacent ulcer. Clinical correlation is advised.    --- End of Report ---        < end of copied text >     < from: Xray Foot AP + Lateral + Oblique, Right (07.25.23 @ 17:54) >    IMPRESSION:    No acute fracture or dislocation of the right foot. Midfoot degenerative   changes are noted.    Soft tissue defect at the heel related to known heel ulcer. No osseous   erosive changes of the calcaneus or adjacent osseousstructures. Soft   tissue edema is also noted along the dorsal foot and anterior lower shin.   No tracking subcutaneous gas.    Along the first distal phalanx base, there are juxtaarticular erosions at   the medial aspect and productive changes at the lateral aspect. Findings   may be due to underlying inflammatory arthritis or crystalline   arthropathy.    Vascular calcifications.      --- End of Report ---    < end of copied text >

## 2023-07-29 NOTE — PROGRESS NOTE ADULT - ASSESSMENT
64 y/o F PMHx of DMH2 on insulin &7/26/23: A1C= 8.4%), HTN, HLD, BMI = 35.3,  bilateral heel ulcer and fever to 102F day prior to admission. ID consulted for eval of bilateral foot ulcer.   admitted 7/25/23  She notes nausea, emesis malaise  Right heel ulcer is long standing - Currently with extensive black eschar with sl separation at edges, drainage on dressing and malodour with likley underlying  infection, however malodor suggests infection.   The L heel ulcer is small, superficial and benign with no significant concern for infection.    7/25 ESR/CRP = 85/41    Antibiotics  Vano 7/25--> 7/26  Zosyn 7/25-->      #B/l Foot ulcer  #possible osteo  #infection  MRI foot suggest  possible osteomyelitis calcaneus    7/26  nasal swab POSITIVE MRSA PCR .  7/27 bilateral foot wound debridement,  right foot wound debridement with Integra graft application and bone biopsy for culture/R/O osteo. Intraoperative finding suggested residual bone infection present.      Suggest  Continue Zosyn   await the results of biopsy and culture from OR    OM of the calcaneous is likely to ultimately lead to BKA  Vascular surgery consult evaluate for possible stent restenosis   Kuldeep Ramirez MD  pager [phone]  office [phone]  Over the weekend please call [phone]            64 y/o F PMHx of DMH2 on insulin &7/26/23: A1C= 8.4%), HTN, HLD, BMI = 35.3,  bilateral heel ulcer and fever to 102F day prior to admission. ID consulted for eval of bilateral foot ulcer.   admitted 7/25/23  She notes nausea, emesis malaise  Right heel ulcer is long standing - Currently with extensive black eschar with sl separation at edges, drainage on dressing and malodour with likley underlying  infection, however malodor suggests infection.   The L heel ulcer is small, superficial and benign with no significant concern for infection.    7/25 ESR/CRP = 85/41    Antibiotics  Vano 7/25--> 7/26  Zosyn 7/25-->      #B/l Foot ulcer  #possible osteo  #infection  MRI foot suggest  possible osteomyelitis calcaneus    7/26  nasal swab POSITIVE MRSA PCR .  7/27 bilateral foot wound debridement,  right foot wound debridement with Integra graft application and bone biopsy for culture/R/O osteo. Intraoperative finding suggested residual bone infection present.      Suggest  Continue Zosyn   await the results of biopsy and culture from OR    OM of the calcaneous is likely to ultimately lead to BKA  Vascular surgery consult evaluate for possible stent restenosis   Kuldeep Ramirez MD  pager [phone]  office [phone]  Over the weekend please call [phone]            62 y/o F PMHx of DMH2 on insulin &7/26/23: A1C= 8.4%), HTN, HLD, BMI = 35.3,  bilateral heel ulcer and fever to 102F day prior to admission. ID consulted for eval of bilateral foot ulcer.   admitted 7/25/23  She notes nausea, emesis malaise  Right heel ulcer is long standing - Currently with extensive black eschar with sl separation at edges, drainage on dressing and malodour with likley underlying  infection, however malodor suggests infection.   The L heel ulcer is small, superficial and benign with no significant concern for infection.    7/25 ESR/CRP = 85/41    Antibiotics  Vano 7/25--> 7/26  Zosyn 7/25-->      #B/l Foot ulcer  #possible osteo  #infection  MRI foot suggest  possible osteomyelitis calcaneus    7/26  nasal swab POSITIVE MRSA PCR .  7/27 bilateral foot wound debridement,  right foot wound debridement with Integra graft application and bone biopsy for culture/R/O osteo. Intraoperative finding suggested residual bone infection present.      Suggest  Continue Zosyn   await the results of biopsy and culture from OR    OM of the calcaneous is likely to ultimately lead to BKA  Vascular surgery consult evaluate for possible stent restenosis   Kuldeep Ramirez MD  pager [phone]  office [phone]  Over the weekend please call [phone]            62 y/o F PMHx of DMH2 on insulin &7/26/23: A1C= 8.4%), HTN, HLD, BMI = 35.3,  bilateral heel ulcer and fever to 102F day prior to admission. ID consulted for eval of bilateral foot ulcer.   admitted 7/25/23  She notes nausea, emesis malaise  Right heel ulcer is long standing - Currently with extensive black eschar with sl separation at edges, drainage on dressing and malodour with likley underlying  infection, however malodor suggests infection.   The L heel ulcer is small, superficial and benign with no significant concern for infection.    7/25 ESR/CRP = 85/41    Antibiotics  Vano 7/25--> 7/26  Zosyn 7/25-->      #B/l Foot ulcer  #possible osteo  #infection  MRI foot suggest  possible osteomyelitis calcaneus    7/26  nasal swab POSITIVE MRSA PCR .  7/27 bilateral foot wound debridement,  right foot wound debridement with Integra graft application and bone biopsy for culture/R/O osteo. Intraoperative finding suggested residual bone infection present.      Suggest  Continue Zosyn   await the results of biopsy and culture from OR    OM of the calcaneous is likely to ultimately lead to BKA  Vascular surgery consult evaluate for possible stent restenosis       Kuldeep Ramirez MD  pager [phone]  office [phone]  Over the weekend please call [phone]            64 y/o F PMHx of DMH2 on insulin &7/26/23: A1C= 8.4%), HTN, HLD, BMI = 35.3,  bilateral heel ulcer and fever to 102F day prior to admission. ID consulted for eval of bilateral foot ulcer.   admitted 7/25/23  She notes nausea, emesis malaise  Right heel ulcer is long standing - Currently with extensive black eschar with sl separation at edges, drainage on dressing and malodour with likley underlying  infection, however malodor suggests infection.   The L heel ulcer is small, superficial and benign with no significant concern for infection.    7/25 ESR/CRP = 85/41    Antibiotics  Vano 7/25--> 7/26  Zosyn 7/25-->      #B/l Foot ulcer  #possible osteo  #infection  MRI foot suggest  possible osteomyelitis calcaneus    7/26  nasal swab POSITIVE MRSA PCR .  7/27 bilateral foot wound debridement,  right foot wound debridement with Integra graft application and bone biopsy for culture/R/O osteo. Intraoperative finding suggested residual bone infection present.      Suggest  Continue Zosyn   await the results of biopsy and culture from OR    OM of the calcaneous is likely to ultimately lead to BKA  Vascular surgery consult evaluate for possible stent restenosis       Kuldeep Ramirez MD  pager [phone]  office [phone]  Over the weekend please call [phone]            62 y/o F PMHx of DMH2 on insulin &7/26/23: A1C= 8.4%), HTN, HLD, BMI = 35.3,  bilateral heel ulcer and fever to 102F day prior to admission. ID consulted for eval of bilateral foot ulcer.   admitted 7/25/23  She notes nausea, emesis malaise  Right heel ulcer is long standing - Currently with extensive black eschar with sl separation at edges, drainage on dressing and malodour with likley underlying  infection, however malodor suggests infection.   The L heel ulcer is small, superficial and benign with no significant concern for infection.    7/25 ESR/CRP = 85/41    Antibiotics  Vano 7/25--> 7/26  Zosyn 7/25-->      #B/l Foot ulcer  #possible osteo  #infection  MRI foot suggest  possible osteomyelitis calcaneus    7/26  nasal swab POSITIVE MRSA PCR .  7/27 bilateral foot wound debridement,  right foot wound debridement with Integra graft application and bone biopsy for culture/R/O osteo. Intraoperative finding suggested residual bone infection present.      Suggest  Continue Zosyn   await the results of biopsy and culture from OR    OM of the calcaneous is likely to ultimately lead to BKA  Vascular surgery consult evaluate for possible stent restenosis       Kuldeep Ramirez MD  pager [phone]  office [phone]  Over the weekend please call [phone]

## 2023-07-30 LAB
-  AMIKACIN: SIGNIFICANT CHANGE UP
-  AMOXICILLIN/CLAVULANIC ACID: SIGNIFICANT CHANGE UP
-  AMPICILLIN/SULBACTAM: SIGNIFICANT CHANGE UP
-  AMPICILLIN: SIGNIFICANT CHANGE UP
-  AZTREONAM: SIGNIFICANT CHANGE UP
-  CEFAZOLIN: SIGNIFICANT CHANGE UP
-  CEFEPIME: SIGNIFICANT CHANGE UP
-  CEFOXITIN: SIGNIFICANT CHANGE UP
-  CEFTRIAXONE: SIGNIFICANT CHANGE UP
-  CIPROFLOXACIN: SIGNIFICANT CHANGE UP
-  CLINDAMYCIN: SIGNIFICANT CHANGE UP
-  DAPTOMYCIN: SIGNIFICANT CHANGE UP
-  ERTAPENEM: SIGNIFICANT CHANGE UP
-  ERYTHROMYCIN: SIGNIFICANT CHANGE UP
-  GENTAMICIN: SIGNIFICANT CHANGE UP
-  LEVOFLOXACIN: SIGNIFICANT CHANGE UP
-  LINEZOLID: SIGNIFICANT CHANGE UP
-  MEROPENEM: SIGNIFICANT CHANGE UP
-  OXACILLIN: SIGNIFICANT CHANGE UP
-  PENICILLIN: SIGNIFICANT CHANGE UP
-  PIPERACILLIN/TAZOBACTAM: SIGNIFICANT CHANGE UP
-  RIFAMPIN: SIGNIFICANT CHANGE UP
-  TETRACYCLINE: SIGNIFICANT CHANGE UP
-  TOBRAMYCIN: SIGNIFICANT CHANGE UP
-  TRIMETHOPRIM/SULFAMETHOXAZOLE: SIGNIFICANT CHANGE UP
-  VANCOMYCIN: SIGNIFICANT CHANGE UP
CULTURE RESULTS: SIGNIFICANT CHANGE UP
GLUCOSE BLDC GLUCOMTR-MCNC: 147 MG/DL — HIGH (ref 70–99)
GLUCOSE BLDC GLUCOMTR-MCNC: 164 MG/DL — HIGH (ref 70–99)
GLUCOSE BLDC GLUCOMTR-MCNC: 190 MG/DL — HIGH (ref 70–99)
GLUCOSE BLDC GLUCOMTR-MCNC: 191 MG/DL — HIGH (ref 70–99)
METHOD TYPE: SIGNIFICANT CHANGE UP
SPECIMEN SOURCE: SIGNIFICANT CHANGE UP

## 2023-07-30 RX ADMIN — Medication 2 UNIT(S): at 17:04

## 2023-07-30 RX ADMIN — Medication 1 DROP(S): at 22:10

## 2023-07-30 RX ADMIN — Medication 1: at 17:04

## 2023-07-30 RX ADMIN — Medication 20 MILLIGRAM(S): at 06:45

## 2023-07-30 RX ADMIN — Medication 50 MILLIGRAM(S): at 06:46

## 2023-07-30 RX ADMIN — Medication 3 UNIT(S): at 12:25

## 2023-07-30 RX ADMIN — HEPARIN SODIUM 5000 UNIT(S): 5000 INJECTION INTRAVENOUS; SUBCUTANEOUS at 14:24

## 2023-07-30 RX ADMIN — CHLORHEXIDINE GLUCONATE 1 APPLICATION(S): 213 SOLUTION TOPICAL at 14:25

## 2023-07-30 RX ADMIN — PIPERACILLIN AND TAZOBACTAM 25 GRAM(S): 4; .5 INJECTION, POWDER, LYOPHILIZED, FOR SOLUTION INTRAVENOUS at 22:09

## 2023-07-30 RX ADMIN — HEPARIN SODIUM 5000 UNIT(S): 5000 INJECTION INTRAVENOUS; SUBCUTANEOUS at 06:46

## 2023-07-30 RX ADMIN — PIPERACILLIN AND TAZOBACTAM 25 GRAM(S): 4; .5 INJECTION, POWDER, LYOPHILIZED, FOR SOLUTION INTRAVENOUS at 14:24

## 2023-07-30 RX ADMIN — INSULIN GLARGINE 10 UNIT(S): 100 INJECTION, SOLUTION SUBCUTANEOUS at 22:10

## 2023-07-30 RX ADMIN — MUPIROCIN 1 APPLICATION(S): 20 OINTMENT TOPICAL at 18:05

## 2023-07-30 RX ADMIN — TAMSULOSIN HYDROCHLORIDE 0.4 MILLIGRAM(S): 0.4 CAPSULE ORAL at 22:09

## 2023-07-30 RX ADMIN — Medication 50 MILLIGRAM(S): at 17:03

## 2023-07-30 RX ADMIN — Medication 1 DROP(S): at 14:25

## 2023-07-30 RX ADMIN — ATORVASTATIN CALCIUM 40 MILLIGRAM(S): 80 TABLET, FILM COATED ORAL at 22:09

## 2023-07-30 RX ADMIN — Medication 1: at 12:25

## 2023-07-30 RX ADMIN — MUPIROCIN 1 APPLICATION(S): 20 OINTMENT TOPICAL at 17:04

## 2023-07-30 RX ADMIN — HEPARIN SODIUM 5000 UNIT(S): 5000 INJECTION INTRAVENOUS; SUBCUTANEOUS at 22:10

## 2023-07-30 RX ADMIN — PANTOPRAZOLE SODIUM 40 MILLIGRAM(S): 20 TABLET, DELAYED RELEASE ORAL at 06:46

## 2023-07-30 RX ADMIN — Medication 3 UNIT(S): at 08:13

## 2023-07-30 RX ADMIN — PIPERACILLIN AND TAZOBACTAM 25 GRAM(S): 4; .5 INJECTION, POWDER, LYOPHILIZED, FOR SOLUTION INTRAVENOUS at 06:57

## 2023-07-30 RX ADMIN — Medication 50 MILLIGRAM(S): at 06:45

## 2023-07-30 RX ADMIN — MUPIROCIN 1 APPLICATION(S): 20 OINTMENT TOPICAL at 06:59

## 2023-07-30 RX ADMIN — LISINOPRIL 20 MILLIGRAM(S): 2.5 TABLET ORAL at 06:46

## 2023-07-30 RX ADMIN — MUPIROCIN 1 APPLICATION(S): 20 OINTMENT TOPICAL at 06:45

## 2023-07-30 RX ADMIN — Medication 1 DROP(S): at 06:57

## 2023-07-30 NOTE — PROGRESS NOTE ADULT - PROBLEM SELECTOR PLAN 1
Will continue current insulin regimen  Lantus 10 units qHS  and ADMELOG 3 unit before each meal for now.   Will continue monitoring FS, log, and glucose trends, will Follow up.  Patient counseled for compliance with consistent low carb diet and exercise as tolerated outpatient.   If continue nausea and vomiting Consider Gastric emptying study, to r/o Gastroparesis.    Was using insulin at home, basal and bolus. DC home on current insulin doses if sugars remain stable  may use her home insulin brands

## 2023-07-30 NOTE — PROGRESS NOTE ADULT - SUBJECTIVE AND OBJECTIVE BOX
Subjective: Patient seen and examined. No new events except as noted.     REVIEW OF SYSTEMS:    CONSTITUTIONAL: + weakness, fevers or chills  EYES/ENT: No visual changes;  No vertigo or throat pain   NECK: No pain or stiffness  RESPIRATORY: No cough, wheezing, hemoptysis; No shortness of breath  CARDIOVASCULAR: No chest pain or palpitations  GASTROINTESTINAL: No abdominal or epigastric pain. No nausea, vomiting, or hematemesis; No diarrhea or constipation. No melena or hematochezia.  GENITOURINARY: No dysuria, frequency or hematuria  NEUROLOGICAL: No numbness or weakness  SKIN: No itching, burning, rashes, or lesions   All other review of systems is negative unless indicated above.    MEDICATIONS:  MEDICATIONS  (STANDING):  artificial  tears Solution 1 Drop(s) Both EYES three times a day  atorvastatin 40 milliGRAM(s) Oral at bedtime  chlorhexidine 2% Cloths 1 Application(s) Topical <User Schedule>  dextrose 5%. 1000 milliLiter(s) (50 mL/Hr) IV Continuous <Continuous>  dextrose 5%. 1000 milliLiter(s) (100 mL/Hr) IV Continuous <Continuous>  dextrose 50% Injectable 25 Gram(s) IV Push once  dextrose 50% Injectable 12.5 Gram(s) IV Push once  dextrose 50% Injectable 25 Gram(s) IV Push once  furosemide    Tablet 20 milliGRAM(s) Oral daily  glucagon  Injectable 1 milliGRAM(s) IntraMuscular once  heparin   Injectable 5000 Unit(s) SubCutaneous every 8 hours  hydrALAZINE 50 milliGRAM(s) Oral two times a day  insulin glargine Injectable (LANTUS) 10 Unit(s) SubCutaneous at bedtime  insulin lispro (ADMELOG) corrective regimen sliding scale   SubCutaneous three times a day before meals  insulin lispro (ADMELOG) corrective regimen sliding scale   SubCutaneous at bedtime  insulin lispro Injectable (ADMELOG) 3 Unit(s) SubCutaneous before breakfast  insulin lispro Injectable (ADMELOG) 2 Unit(s) SubCutaneous before dinner  insulin lispro Injectable (ADMELOG) 3 Unit(s) SubCutaneous before lunch  lisinopril 20 milliGRAM(s) Oral daily  metoprolol succinate ER 50 milliGRAM(s) Oral daily  mupirocin 2% Nasal 1 Application(s) Both Nostrils two times a day  mupirocin 2% Ointment 1 Application(s) Topical two times a day  pantoprazole    Tablet 40 milliGRAM(s) Oral before breakfast  piperacillin/tazobactam IVPB.. 3.375 Gram(s) IV Intermittent every 8 hours  senna 2 Tablet(s) Oral at bedtime  tamsulosin 0.4 milliGRAM(s) Oral at bedtime      PHYSICAL EXAM:  T(C): 36.6 (07-30-23 @ 06:40), Max: 37.5 (07-29-23 @ 21:31)  HR: 68 (07-30-23 @ 06:40) (61 - 68)  BP: 159/85 (07-30-23 @ 06:40) (122/75 - 162/72)  RR: 18 (07-30-23 @ 06:40) (18 - 18)  SpO2: 95% (07-30-23 @ 06:40) (94% - 95%)  Wt(kg): --  I&O's Summary    29 Jul 2023 07:01  -  30 Jul 2023 07:00  --------------------------------------------------------  IN: 220 mL / OUT: 1 mL / NET: 219 mL          Appearance: NAD  HEENT:  Dry oral mucosa, PERRL, EOMI	  Lymphatic: No lymphadenopathy , no edema  Cardiovascular: Normal S1 S2, No JVD, No murmurs , Peripheral pulses palpable 2+ bilaterally  Respiratory: Lungs clear to auscultation, normal effort 	  Gastrointestinal:  Soft, Non-tender, + BS	  Musculoskeletal: Normal range of motion, normal strength  Psychiatry:  Mood & affect appropriate  Vascular: DP/PT 0/4, B/L, CFT <3 seconds B/L, Temperature gradient warm to cool, B/L.   Neuro: Epicritic sensation dimished to the level of toes, B/L.  Musculoskeletal/Ortho: Unremarkable.  Skin: s/p BL heel wound debridement with right foot graft application and bone biopsy (DOS 7/27): adaptic and graft intact, staples intact, mild serosagnuinous drainage, no purulence, no erythema, no acute signs of infection.           LABS:    CARDIAC MARKERS:                  proBNP:   Lipid Profile:   HgA1c:   TSH:             TELEMETRY: 	    ECG:  	  RADIOLOGY:   DIAGNOSTIC TESTING:  [ ] Echocardiogram:  [ ]  Catheterization:  [ ] Stress Test:    OTHER:

## 2023-07-30 NOTE — PROVIDER CONTACT NOTE (CRITICAL VALUE NOTIFICATION) - SITUATION
called by [facility] lab to report abnormal tissue culture from 7/27/2023. Numerous Morganelli Morganii, moderate MRSA, moderate enterococcus Faecalis, few klebsiella pneumoniae and moderate corynebacterium amycolatum called by [facility] lab to report abnormal tissue culture from 7/27/2023. Numerous Morganelli Morganii, moderate MRSA, moderate enterococcus Faecalis, few klebsiella pneumoniae and moderate corynebacterium amycolatum called by [facility] lab to report abnormal tissue culture from 7/27/2023. Numerous Morganelli Morganii, moderate MRSA, moderate enterococcus Faecalis, few klebsiella pneumoniae and moderate corynebacterium amycolatum

## 2023-07-30 NOTE — PROGRESS NOTE ADULT - SUBJECTIVE AND OBJECTIVE BOX
STEPHANIE KAYE  63y Female  MRN:[id number]    Patient is a 63y old  Female who presents with a chief complaint of foot infection    HPI:   62 y/o F PMHx of DMH2 on insulin, HTN, HLD p/w right heel ulcer. Patient is sent by her podiatrist for questionable right foot procedure.  She is unsure how long she has had this ulcer for.  States that she was recently hospitalized in the near [facility] for an evaluation of the right foot infection.  States she still had a 102 fever yesterday which resolved.  Denies any fevers today, chills, chest pain, shortness of breath, abdominal pain, nausea vomiting diarrhea.  States she ambulates with a walker at baseline.  Accompanied by her sister-in-law (25 Jul 2023 21:58)      pt now s/p bilateral foot heel wound debridement with right foot kerecis graft application, and right foot calcaneus bone biopsy on 7/27      Patient seen and evaluated at bedside. No acute events overnight except as noted.    Interval HPI: no acute events o/n        PAST MEDICAL & SURGICAL HISTORY:  Diabetes      Hypertension      Hypercholesteremia          REVIEW OF SYSTEMS:  as per hpi     VITALS:   Vital Signs Last 24 Hrs  T(C): 36.5 (30 Jul 2023 15:32), Max: 37.5 (29 Jul 2023 21:31)  T(F): 97.7 (30 Jul 2023 15:32), Max: 99.5 (29 Jul 2023 21:31)  HR: 55 (30 Jul 2023 15:32) (55 - 68)  BP: 146/72 (30 Jul 2023 15:32) (139/72 - 162/72)  BP(mean): --  RR: 18 (30 Jul 2023 15:32) (18 - 18)  SpO2: 95% (30 Jul 2023 15:32) (94% - 100%)    Parameters below as of 30 Jul 2023 15:32  Patient On (Oxygen Delivery Method): room air            PHYSICAL EXAM:  GENERAL: NAD, well-developed  HEAD:  Atraumatic, Normocephalic  EYES: EOMI, PERRLA, conjunctiva and sclera clear  NECK: Supple, No JVD  CHEST/LUNG: Clear to auscultation bilaterally; No wheeze  HEART: S1, S2; No murmurs, rubs, or gallops  ABDOMEN: Soft, Nontender, Nondistended; Bowel sounds present  EXTREMITIES:  2+ Peripheral Pulses, No clubbing, cyanosis, or edema. b/l lower ext dressing in place   PSYCH: Normal affect  NEUROLOGY: AAOX3; non-focal  SKIN: No rashes or lesions    Consultant(s) Notes Reviewed:  [x ] YES  [ ] NO  Care Discussed with Consultants/Other Providers [ x] YES  [ ] NO    MEDS:   MEDICATIONS  (STANDING):  artificial  tears Solution 1 Drop(s) Both EYES three times a day  atorvastatin 40 milliGRAM(s) Oral at bedtime  chlorhexidine 2% Cloths 1 Application(s) Topical <User Schedule>  dextrose 5%. 1000 milliLiter(s) (100 mL/Hr) IV Continuous <Continuous>  dextrose 5%. 1000 milliLiter(s) (50 mL/Hr) IV Continuous <Continuous>  dextrose 50% Injectable 25 Gram(s) IV Push once  dextrose 50% Injectable 12.5 Gram(s) IV Push once  dextrose 50% Injectable 25 Gram(s) IV Push once  furosemide    Tablet 20 milliGRAM(s) Oral daily  glucagon  Injectable 1 milliGRAM(s) IntraMuscular once  heparin   Injectable 5000 Unit(s) SubCutaneous every 8 hours  hydrALAZINE 50 milliGRAM(s) Oral two times a day  insulin glargine Injectable (LANTUS) 10 Unit(s) SubCutaneous at bedtime  insulin lispro (ADMELOG) corrective regimen sliding scale   SubCutaneous three times a day before meals  insulin lispro (ADMELOG) corrective regimen sliding scale   SubCutaneous at bedtime  insulin lispro Injectable (ADMELOG) 2 Unit(s) SubCutaneous before dinner  insulin lispro Injectable (ADMELOG) 3 Unit(s) SubCutaneous before lunch  insulin lispro Injectable (ADMELOG) 3 Unit(s) SubCutaneous before breakfast  lisinopril 20 milliGRAM(s) Oral daily  metoprolol succinate ER 50 milliGRAM(s) Oral daily  mupirocin 2% Nasal 1 Application(s) Both Nostrils two times a day  mupirocin 2% Ointment 1 Application(s) Topical two times a day  pantoprazole    Tablet 40 milliGRAM(s) Oral before breakfast  piperacillin/tazobactam IVPB.. 3.375 Gram(s) IV Intermittent every 8 hours  senna 2 Tablet(s) Oral at bedtime  tamsulosin 0.4 milliGRAM(s) Oral at bedtime    MEDICATIONS  (PRN):  acetaminophen     Tablet .. 650 milliGRAM(s) Oral every 6 hours PRN Mild Pain (1 - 3)  dextrose Oral Gel 15 Gram(s) Oral once PRN Blood Glucose LESS THAN 70 milliGRAM(s)/deciliter  HYDROmorphone  Injectable 0.5 milliGRAM(s) IV Push every 4 hours PRN Severe Pain (7 - 10)  ondansetron Injectable 4 milliGRAM(s) IV Push every 8 hours PRN Nausea and/or Vomiting  oxycodone    5 mG/acetaminophen 325 mG 1 Tablet(s) Oral every 4 hours PRN Moderate Pain (4 - 6)      ALLERGIES:  Allergy Status Unknown      LABS:                        < from: MR Ankle w/ IV Cont, Right (07.26.23 @ 18:31) >    IMPRESSION:  1.  There is a ulcer overlying the heel roughly spanning 5.4 cm extending   to the lateral calcaneus. There is minimal osseous edema within the   lateral calcaneus with mild postcontrast enhancement and without   significant loss of normal T1 fatty marrow. This is possibly representing   early osteomyelitis versus reactive in nature.  2.  Questionable minimal osseous edema within the fourth metatarsal head   with postcontrast enhancement which is incompletely evaluated. These   findings are possibly secondary to reactive degenerative changes versus   osteomyelitis if there is adjacent ulcer. Clinical correlation is advised.    --- End of Report ---        < end of copied text >     < from: Xray Foot AP + Lateral + Oblique, Right (07.25.23 @ 17:54) >    IMPRESSION:    No acute fracture or dislocation of the right foot. Midfoot degenerative   changes are noted.    Soft tissue defect at the heel related to known heel ulcer. No osseous   erosive changes of the calcaneus or adjacent osseousstructures. Soft   tissue edema is also noted along the dorsal foot and anterior lower shin.   No tracking subcutaneous gas.    Along the first distal phalanx base, there are juxtaarticular erosions at   the medial aspect and productive changes at the lateral aspect. Findings   may be due to underlying inflammatory arthritis or crystalline   arthropathy.    Vascular calcifications.      --- End of Report ---    < end of copied text >   STEPHANIE KAYE  63y Female  MRN:[id number]    Patient is a 63y old  Female who presents with a chief complaint of foot infection    HPI:   62 y/o F PMHx of DMH2 on insulin, HTN, HLD p/w right heel ulcer. Patient is sent by her podiatrist for questionable right foot procedure.  She is unsure how long she has had this ulcer for.  States that she was recently hospitalized in the near [facility] for an evaluation of the right foot infection.  States she still had a 102 fever yesterday which resolved.  Denies any fevers today, chills, chest pain, shortness of breath, abdominal pain, nausea vomiting diarrhea.  States she ambulates with a walker at baseline.  Accompanied by her sister-in-law (25 Jul 2023 21:58)      pt now s/p bilateral foot heel wound debridement with right foot kerecis graft application, and right foot calcaneus bone biopsy on 7/27      Patient seen and evaluated at bedside. No acute events overnight except as noted.    Interval HPI: no acute events o/n        PAST MEDICAL & SURGICAL HISTORY:  Diabetes      Hypertension      Hypercholesteremia          REVIEW OF SYSTEMS:  as per hpi     VITALS:   Vital Signs Last 24 Hrs  T(C): 36.5 (30 Jul 2023 15:32), Max: 37.5 (29 Jul 2023 21:31)  T(F): 97.7 (30 Jul 2023 15:32), Max: 99.5 (29 Jul 2023 21:31)  HR: 55 (30 Jul 2023 15:32) (55 - 68)  BP: 146/72 (30 Jul 2023 15:32) (139/72 - 162/72)  BP(mean): --  RR: 18 (30 Jul 2023 15:32) (18 - 18)  SpO2: 95% (30 Jul 2023 15:32) (94% - 100%)    Parameters below as of 30 Jul 2023 15:32  Patient On (Oxygen Delivery Method): room air            PHYSICAL EXAM:  GENERAL: NAD, well-developed  HEAD:  Atraumatic, Normocephalic  EYES: EOMI, PERRLA, conjunctiva and sclera clear  NECK: Supple, No JVD  CHEST/LUNG: Clear to auscultation bilaterally; No wheeze  HEART: S1, S2; No murmurs, rubs, or gallops  ABDOMEN: Soft, Nontender, Nondistended; Bowel sounds present  EXTREMITIES:  2+ Peripheral Pulses, No clubbing, cyanosis, or edema. b/l lower ext dressing in place   PSYCH: Normal affect  NEUROLOGY: AAOX3; non-focal  SKIN: No rashes or lesions    Consultant(s) Notes Reviewed:  [x ] YES  [ ] NO  Care Discussed with Consultants/Other Providers [ x] YES  [ ] NO    MEDS:   MEDICATIONS  (STANDING):  artificial  tears Solution 1 Drop(s) Both EYES three times a day  atorvastatin 40 milliGRAM(s) Oral at bedtime  chlorhexidine 2% Cloths 1 Application(s) Topical <User Schedule>  dextrose 5%. 1000 milliLiter(s) (100 mL/Hr) IV Continuous <Continuous>  dextrose 5%. 1000 milliLiter(s) (50 mL/Hr) IV Continuous <Continuous>  dextrose 50% Injectable 25 Gram(s) IV Push once  dextrose 50% Injectable 12.5 Gram(s) IV Push once  dextrose 50% Injectable 25 Gram(s) IV Push once  furosemide    Tablet 20 milliGRAM(s) Oral daily  glucagon  Injectable 1 milliGRAM(s) IntraMuscular once  heparin   Injectable 5000 Unit(s) SubCutaneous every 8 hours  hydrALAZINE 50 milliGRAM(s) Oral two times a day  insulin glargine Injectable (LANTUS) 10 Unit(s) SubCutaneous at bedtime  insulin lispro (ADMELOG) corrective regimen sliding scale   SubCutaneous three times a day before meals  insulin lispro (ADMELOG) corrective regimen sliding scale   SubCutaneous at bedtime  insulin lispro Injectable (ADMELOG) 2 Unit(s) SubCutaneous before dinner  insulin lispro Injectable (ADMELOG) 3 Unit(s) SubCutaneous before lunch  insulin lispro Injectable (ADMELOG) 3 Unit(s) SubCutaneous before breakfast  lisinopril 20 milliGRAM(s) Oral daily  metoprolol succinate ER 50 milliGRAM(s) Oral daily  mupirocin 2% Nasal 1 Application(s) Both Nostrils two times a day  mupirocin 2% Ointment 1 Application(s) Topical two times a day  pantoprazole    Tablet 40 milliGRAM(s) Oral before breakfast  piperacillin/tazobactam IVPB.. 3.375 Gram(s) IV Intermittent every 8 hours  senna 2 Tablet(s) Oral at bedtime  tamsulosin 0.4 milliGRAM(s) Oral at bedtime    MEDICATIONS  (PRN):  acetaminophen     Tablet .. 650 milliGRAM(s) Oral every 6 hours PRN Mild Pain (1 - 3)  dextrose Oral Gel 15 Gram(s) Oral once PRN Blood Glucose LESS THAN 70 milliGRAM(s)/deciliter  HYDROmorphone  Injectable 0.5 milliGRAM(s) IV Push every 4 hours PRN Severe Pain (7 - 10)  ondansetron Injectable 4 milliGRAM(s) IV Push every 8 hours PRN Nausea and/or Vomiting  oxycodone    5 mG/acetaminophen 325 mG 1 Tablet(s) Oral every 4 hours PRN Moderate Pain (4 - 6)      ALLERGIES:  Allergy Status Unknown      LABS:                        < from: MR Ankle w/ IV Cont, Right (07.26.23 @ 18:31) >    IMPRESSION:  1.  There is a ulcer overlying the heel roughly spanning 5.4 cm extending   to the lateral calcaneus. There is minimal osseous edema within the   lateral calcaneus with mild postcontrast enhancement and without   significant loss of normal T1 fatty marrow. This is possibly representing   early osteomyelitis versus reactive in nature.  2.  Questionable minimal osseous edema within the fourth metatarsal head   with postcontrast enhancement which is incompletely evaluated. These   findings are possibly secondary to reactive degenerative changes versus   osteomyelitis if there is adjacent ulcer. Clinical correlation is advised.    --- End of Report ---        < end of copied text >     < from: Xray Foot AP + Lateral + Oblique, Right (07.25.23 @ 17:54) >    IMPRESSION:    No acute fracture or dislocation of the right foot. Midfoot degenerative   changes are noted.    Soft tissue defect at the heel related to known heel ulcer. No osseous   erosive changes of the calcaneus or adjacent osseousstructures. Soft   tissue edema is also noted along the dorsal foot and anterior lower shin.   No tracking subcutaneous gas.    Along the first distal phalanx base, there are juxtaarticular erosions at   the medial aspect and productive changes at the lateral aspect. Findings   may be due to underlying inflammatory arthritis or crystalline   arthropathy.    Vascular calcifications.      --- End of Report ---    < end of copied text >   STEPHANIE KAYE  63y Female  MRN:[id number]    Patient is a 63y old  Female who presents with a chief complaint of foot infection    HPI:   64 y/o F PMHx of DMH2 on insulin, HTN, HLD p/w right heel ulcer. Patient is sent by her podiatrist for questionable right foot procedure.  She is unsure how long she has had this ulcer for.  States that she was recently hospitalized in the near [facility] for an evaluation of the right foot infection.  States she still had a 102 fever yesterday which resolved.  Denies any fevers today, chills, chest pain, shortness of breath, abdominal pain, nausea vomiting diarrhea.  States she ambulates with a walker at baseline.  Accompanied by her sister-in-law (25 Jul 2023 21:58)      pt now s/p bilateral foot heel wound debridement with right foot kerecis graft application, and right foot calcaneus bone biopsy on 7/27      Patient seen and evaluated at bedside. No acute events overnight except as noted.    Interval HPI: no acute events o/n        PAST MEDICAL & SURGICAL HISTORY:  Diabetes      Hypertension      Hypercholesteremia          REVIEW OF SYSTEMS:  as per hpi     VITALS:   Vital Signs Last 24 Hrs  T(C): 36.5 (30 Jul 2023 15:32), Max: 37.5 (29 Jul 2023 21:31)  T(F): 97.7 (30 Jul 2023 15:32), Max: 99.5 (29 Jul 2023 21:31)  HR: 55 (30 Jul 2023 15:32) (55 - 68)  BP: 146/72 (30 Jul 2023 15:32) (139/72 - 162/72)  BP(mean): --  RR: 18 (30 Jul 2023 15:32) (18 - 18)  SpO2: 95% (30 Jul 2023 15:32) (94% - 100%)    Parameters below as of 30 Jul 2023 15:32  Patient On (Oxygen Delivery Method): room air            PHYSICAL EXAM:  GENERAL: NAD, well-developed  HEAD:  Atraumatic, Normocephalic  EYES: EOMI, PERRLA, conjunctiva and sclera clear  NECK: Supple, No JVD  CHEST/LUNG: Clear to auscultation bilaterally; No wheeze  HEART: S1, S2; No murmurs, rubs, or gallops  ABDOMEN: Soft, Nontender, Nondistended; Bowel sounds present  EXTREMITIES:  2+ Peripheral Pulses, No clubbing, cyanosis, or edema. b/l lower ext dressing in place   PSYCH: Normal affect  NEUROLOGY: AAOX3; non-focal  SKIN: No rashes or lesions    Consultant(s) Notes Reviewed:  [x ] YES  [ ] NO  Care Discussed with Consultants/Other Providers [ x] YES  [ ] NO    MEDS:   MEDICATIONS  (STANDING):  artificial  tears Solution 1 Drop(s) Both EYES three times a day  atorvastatin 40 milliGRAM(s) Oral at bedtime  chlorhexidine 2% Cloths 1 Application(s) Topical <User Schedule>  dextrose 5%. 1000 milliLiter(s) (100 mL/Hr) IV Continuous <Continuous>  dextrose 5%. 1000 milliLiter(s) (50 mL/Hr) IV Continuous <Continuous>  dextrose 50% Injectable 25 Gram(s) IV Push once  dextrose 50% Injectable 12.5 Gram(s) IV Push once  dextrose 50% Injectable 25 Gram(s) IV Push once  furosemide    Tablet 20 milliGRAM(s) Oral daily  glucagon  Injectable 1 milliGRAM(s) IntraMuscular once  heparin   Injectable 5000 Unit(s) SubCutaneous every 8 hours  hydrALAZINE 50 milliGRAM(s) Oral two times a day  insulin glargine Injectable (LANTUS) 10 Unit(s) SubCutaneous at bedtime  insulin lispro (ADMELOG) corrective regimen sliding scale   SubCutaneous three times a day before meals  insulin lispro (ADMELOG) corrective regimen sliding scale   SubCutaneous at bedtime  insulin lispro Injectable (ADMELOG) 2 Unit(s) SubCutaneous before dinner  insulin lispro Injectable (ADMELOG) 3 Unit(s) SubCutaneous before lunch  insulin lispro Injectable (ADMELOG) 3 Unit(s) SubCutaneous before breakfast  lisinopril 20 milliGRAM(s) Oral daily  metoprolol succinate ER 50 milliGRAM(s) Oral daily  mupirocin 2% Nasal 1 Application(s) Both Nostrils two times a day  mupirocin 2% Ointment 1 Application(s) Topical two times a day  pantoprazole    Tablet 40 milliGRAM(s) Oral before breakfast  piperacillin/tazobactam IVPB.. 3.375 Gram(s) IV Intermittent every 8 hours  senna 2 Tablet(s) Oral at bedtime  tamsulosin 0.4 milliGRAM(s) Oral at bedtime    MEDICATIONS  (PRN):  acetaminophen     Tablet .. 650 milliGRAM(s) Oral every 6 hours PRN Mild Pain (1 - 3)  dextrose Oral Gel 15 Gram(s) Oral once PRN Blood Glucose LESS THAN 70 milliGRAM(s)/deciliter  HYDROmorphone  Injectable 0.5 milliGRAM(s) IV Push every 4 hours PRN Severe Pain (7 - 10)  ondansetron Injectable 4 milliGRAM(s) IV Push every 8 hours PRN Nausea and/or Vomiting  oxycodone    5 mG/acetaminophen 325 mG 1 Tablet(s) Oral every 4 hours PRN Moderate Pain (4 - 6)      ALLERGIES:  Allergy Status Unknown      LABS:                        < from: MR Ankle w/ IV Cont, Right (07.26.23 @ 18:31) >    IMPRESSION:  1.  There is a ulcer overlying the heel roughly spanning 5.4 cm extending   to the lateral calcaneus. There is minimal osseous edema within the   lateral calcaneus with mild postcontrast enhancement and without   significant loss of normal T1 fatty marrow. This is possibly representing   early osteomyelitis versus reactive in nature.  2.  Questionable minimal osseous edema within the fourth metatarsal head   with postcontrast enhancement which is incompletely evaluated. These   findings are possibly secondary to reactive degenerative changes versus   osteomyelitis if there is adjacent ulcer. Clinical correlation is advised.    --- End of Report ---        < end of copied text >     < from: Xray Foot AP + Lateral + Oblique, Right (07.25.23 @ 17:54) >    IMPRESSION:    No acute fracture or dislocation of the right foot. Midfoot degenerative   changes are noted.    Soft tissue defect at the heel related to known heel ulcer. No osseous   erosive changes of the calcaneus or adjacent osseousstructures. Soft   tissue edema is also noted along the dorsal foot and anterior lower shin.   No tracking subcutaneous gas.    Along the first distal phalanx base, there are juxtaarticular erosions at   the medial aspect and productive changes at the lateral aspect. Findings   may be due to underlying inflammatory arthritis or crystalline   arthropathy.    Vascular calcifications.      --- End of Report ---    < end of copied text >

## 2023-07-30 NOTE — PROGRESS NOTE ADULT - ASSESSMENT
62 y/o F PMHx of DMH2 on insulin, HTN, HLD p/w right heel ulcer. Patient is sent by her podiatrist for questionable right foot procedure.  She is unsure how long she has had this ulcer for.  States that she was recently hospitalized in the near [facility] for an evaluation of the right foot infection.  States she still had a 102 fever yesterday which resolved.  Denies any fevers today, chills, chest pain, shortness of breath, abdominal pain, nausea vomiting diarrhea.  States she ambulates with a walker at baseline.  Accompanied by her sister-in-law. 64 y/o F PMHx of DMH2 on insulin, HTN, HLD p/w right heel ulcer. Patient is sent by her podiatrist for questionable right foot procedure.  She is unsure how long she has had this ulcer for.  States that she was recently hospitalized in the near [facility] for an evaluation of the right foot infection.  States she still had a 102 fever yesterday which resolved.  Denies any fevers today, chills, chest pain, shortness of breath, abdominal pain, nausea vomiting diarrhea.  States she ambulates with a walker at baseline.  Accompanied by her sister-in-law. 62 y/o F PMHx of DMH2 on insulin, HTN, HLD p/w right heel ulcer. Patient is sent by her podiatrist for questionable right foot procedure.  She is unsure how long she has had this ulcer for.  States that she was recently hospitalized in the near [facility] for an evaluation of the right foot infection.  States she still had a 102 fever yesterday which resolved.  Denies any fevers today, chills, chest pain, shortness of breath, abdominal pain, nausea vomiting diarrhea.  States she ambulates with a walker at baseline.  Accompanied by her sister-in-law.

## 2023-07-30 NOTE — PROGRESS NOTE ADULT - ASSESSMENT
62 y/o F PMHx of DMH2 on insulin, HTN, HLD p/w right heel ulcer.    Assessment  DMT2: 63y Female with DM T2 with hyperglycemia, A1C 8.4%, was on insulin at home, now on basal bolus insulin with coverage, blood sugars running high. Has right heel ulcer. Started on basal insulin glucose improving.   ?Gastroparesis: Has nausea at times  Foot wound: on medications, monitored. Podiatry eval/work up. MRSA wound on isolation   HTN: on antihypertensive medications, monitored, asymptomatic.  HLD: stable, on statin, monitored.    Discussed plan and management with Attending   Armando Toscano NP - TEAMS  Dr Wesley Hannah  [phone]         64 y/o F PMHx of DMH2 on insulin, HTN, HLD p/w right heel ulcer.    Assessment  DMT2: 63y Female with DM T2 with hyperglycemia, A1C 8.4%, was on insulin at home, now on basal bolus insulin with coverage, blood sugars running high. Has right heel ulcer. Started on basal insulin glucose improving.   ?Gastroparesis: Has nausea at times  Foot wound: on medications, monitored. Podiatry eval/work up. MRSA wound on isolation   HTN: on antihypertensive medications, monitored, asymptomatic.  HLD: stable, on statin, monitored.    Discussed plan and management with Attending   Armando Toscano NP - TEAMS  Dr Wesley Hannah  [phone]         64 y/o F PMHx of DMH2 on insulin, HTN, HLD p/w right heel ulcer.    Assessment  DMT2: 63y Female with DM T2 with hyperglycemia, A1C 8.4%, was on insulin at home, now on basal bolus insulin with coverage, blood sugars running high. Has right heel ulcer. Started on basal insulin glucose improving.   ?Gastroparesis: Has nausea at times  Foot wound: on medications, monitored. Podiatry eval/work up. MRSA wound on isolation   HTN: on antihypertensive medications, monitored, asymptomatic.  HLD: stable, on statin, monitored.    Discussed plan and management with Attending   Armando Toscano NP - TEAMS  Dr Wesley Hannah  [phone]

## 2023-07-30 NOTE — PROGRESS NOTE ADULT - SUBJECTIVE AND OBJECTIVE BOX
Chief complaint  Patient is a 63y old  Female who presents with a chief complaint of Heel infection (29 Jul 2023 12:58)         Labs and Fingersticks  CAPILLARY BLOOD GLUCOSE      POCT Blood Glucose.: 147 mg/dL (30 Jul 2023 07:52)  POCT Blood Glucose.: 148 mg/dL (29 Jul 2023 21:01)  POCT Blood Glucose.: 143 mg/dL (29 Jul 2023 16:23)  POCT Blood Glucose.: 154 mg/dL (29 Jul 2023 14:21)                        Medications  MEDICATIONS  (STANDING):  artificial  tears Solution 1 Drop(s) Both EYES three times a day  atorvastatin 40 milliGRAM(s) Oral at bedtime  chlorhexidine 2% Cloths 1 Application(s) Topical <User Schedule>  dextrose 5%. 1000 milliLiter(s) (50 mL/Hr) IV Continuous <Continuous>  dextrose 5%. 1000 milliLiter(s) (100 mL/Hr) IV Continuous <Continuous>  dextrose 50% Injectable 25 Gram(s) IV Push once  dextrose 50% Injectable 12.5 Gram(s) IV Push once  dextrose 50% Injectable 25 Gram(s) IV Push once  furosemide    Tablet 20 milliGRAM(s) Oral daily  glucagon  Injectable 1 milliGRAM(s) IntraMuscular once  heparin   Injectable 5000 Unit(s) SubCutaneous every 8 hours  hydrALAZINE 50 milliGRAM(s) Oral two times a day  insulin glargine Injectable (LANTUS) 10 Unit(s) SubCutaneous at bedtime  insulin lispro (ADMELOG) corrective regimen sliding scale   SubCutaneous three times a day before meals  insulin lispro (ADMELOG) corrective regimen sliding scale   SubCutaneous at bedtime  insulin lispro Injectable (ADMELOG) 2 Unit(s) SubCutaneous before dinner  insulin lispro Injectable (ADMELOG) 3 Unit(s) SubCutaneous before lunch  insulin lispro Injectable (ADMELOG) 3 Unit(s) SubCutaneous before breakfast  lisinopril 20 milliGRAM(s) Oral daily  metoprolol succinate ER 50 milliGRAM(s) Oral daily  mupirocin 2% Nasal 1 Application(s) Both Nostrils two times a day  mupirocin 2% Ointment 1 Application(s) Topical two times a day  pantoprazole    Tablet 40 milliGRAM(s) Oral before breakfast  piperacillin/tazobactam IVPB.. 3.375 Gram(s) IV Intermittent every 8 hours  senna 2 Tablet(s) Oral at bedtime  tamsulosin 0.4 milliGRAM(s) Oral at bedtime      Physical Exam  General: Patient comfortable in bed   Vital Signs Last 12 Hrs  T(F): 97.8 (07-30-23 @ 06:40), Max: 99.5 (07-29-23 @ 21:31)  HR: 68 (07-30-23 @ 06:40) (64 - 68)  BP: 159/85 (07-30-23 @ 06:40) (139/72 - 162/72)  BP(mean): --  RR: 18 (07-30-23 @ 06:40) (18 - 18)  SpO2: 95% (07-30-23 @ 06:40) (94% - 95%)    CVS: S1S2   Respiratory: No wheezing, no crepitations  GI: Abdomen soft, bowel sounds positive  Musculoskeletal:  moves all extremities  : Voiding

## 2023-07-31 LAB
-  AMIKACIN: SIGNIFICANT CHANGE UP
-  AMOXICILLIN/CLAVULANIC ACID: SIGNIFICANT CHANGE UP
-  AMPICILLIN/SULBACTAM: SIGNIFICANT CHANGE UP
-  AMPICILLIN: SIGNIFICANT CHANGE UP
-  AZTREONAM: SIGNIFICANT CHANGE UP
-  CEFAZOLIN: SIGNIFICANT CHANGE UP
-  CEFEPIME: SIGNIFICANT CHANGE UP
-  CEFOXITIN: SIGNIFICANT CHANGE UP
-  CEFTRIAXONE: SIGNIFICANT CHANGE UP
-  CIPROFLOXACIN: SIGNIFICANT CHANGE UP
-  ERTAPENEM: SIGNIFICANT CHANGE UP
-  GENTAMICIN: SIGNIFICANT CHANGE UP
-  IMIPENEM: SIGNIFICANT CHANGE UP
-  LEVOFLOXACIN: SIGNIFICANT CHANGE UP
-  MEROPENEM: SIGNIFICANT CHANGE UP
-  PIPERACILLIN/TAZOBACTAM: SIGNIFICANT CHANGE UP
-  TOBRAMYCIN: SIGNIFICANT CHANGE UP
-  TRIMETHOPRIM/SULFAMETHOXAZOLE: SIGNIFICANT CHANGE UP
CULTURE RESULTS: SIGNIFICANT CHANGE UP
GLUCOSE BLDC GLUCOMTR-MCNC: 201 MG/DL — HIGH (ref 70–99)
GLUCOSE BLDC GLUCOMTR-MCNC: 213 MG/DL — HIGH (ref 70–99)
GLUCOSE BLDC GLUCOMTR-MCNC: 217 MG/DL — HIGH (ref 70–99)
GLUCOSE BLDC GLUCOMTR-MCNC: 227 MG/DL — HIGH (ref 70–99)
METHOD TYPE: SIGNIFICANT CHANGE UP
ORGANISM # SPEC MICROSCOPIC CNT: SIGNIFICANT CHANGE UP
SPECIMEN SOURCE: SIGNIFICANT CHANGE UP

## 2023-07-31 PROCEDURE — 99232 SBSQ HOSP IP/OBS MODERATE 35: CPT

## 2023-07-31 RX ORDER — INSULIN LISPRO 100/ML
4 VIAL (ML) SUBCUTANEOUS
Refills: 0 | Status: DISCONTINUED | OUTPATIENT
Start: 2023-07-31 | End: 2023-08-02

## 2023-07-31 RX ORDER — VANCOMYCIN HCL 1 G
1000 VIAL (EA) INTRAVENOUS ONCE
Refills: 0 | Status: COMPLETED | OUTPATIENT
Start: 2023-07-31 | End: 2023-07-31

## 2023-07-31 RX ORDER — INSULIN LISPRO 100/ML
4 VIAL (ML) SUBCUTANEOUS
Refills: 0 | Status: DISCONTINUED | OUTPATIENT
Start: 2023-08-01 | End: 2023-08-02

## 2023-07-31 RX ORDER — VANCOMYCIN HCL 1 G
VIAL (EA) INTRAVENOUS
Refills: 0 | Status: DISCONTINUED | OUTPATIENT
Start: 2023-07-31 | End: 2023-08-05

## 2023-07-31 RX ORDER — INSULIN GLARGINE 100 [IU]/ML
13 INJECTION, SOLUTION SUBCUTANEOUS AT BEDTIME
Refills: 0 | Status: DISCONTINUED | OUTPATIENT
Start: 2023-07-31 | End: 2023-08-01

## 2023-07-31 RX ORDER — VANCOMYCIN HCL 1 G
750 VIAL (EA) INTRAVENOUS EVERY 12 HOURS
Refills: 0 | Status: DISCONTINUED | OUTPATIENT
Start: 2023-08-01 | End: 2023-08-05

## 2023-07-31 RX ADMIN — Medication 20 MILLIGRAM(S): at 05:43

## 2023-07-31 RX ADMIN — TAMSULOSIN HYDROCHLORIDE 0.4 MILLIGRAM(S): 0.4 CAPSULE ORAL at 21:29

## 2023-07-31 RX ADMIN — PANTOPRAZOLE SODIUM 40 MILLIGRAM(S): 20 TABLET, DELAYED RELEASE ORAL at 05:14

## 2023-07-31 RX ADMIN — MUPIROCIN 1 APPLICATION(S): 20 OINTMENT TOPICAL at 05:15

## 2023-07-31 RX ADMIN — MUPIROCIN 1 APPLICATION(S): 20 OINTMENT TOPICAL at 17:39

## 2023-07-31 RX ADMIN — Medication 2: at 17:19

## 2023-07-31 RX ADMIN — ATORVASTATIN CALCIUM 40 MILLIGRAM(S): 80 TABLET, FILM COATED ORAL at 21:29

## 2023-07-31 RX ADMIN — Medication 2: at 08:05

## 2023-07-31 RX ADMIN — Medication 250 MILLIGRAM(S): at 18:51

## 2023-07-31 RX ADMIN — MUPIROCIN 1 APPLICATION(S): 20 OINTMENT TOPICAL at 05:14

## 2023-07-31 RX ADMIN — Medication 4 UNIT(S): at 12:11

## 2023-07-31 RX ADMIN — HEPARIN SODIUM 5000 UNIT(S): 5000 INJECTION INTRAVENOUS; SUBCUTANEOUS at 14:37

## 2023-07-31 RX ADMIN — PIPERACILLIN AND TAZOBACTAM 25 GRAM(S): 4; .5 INJECTION, POWDER, LYOPHILIZED, FOR SOLUTION INTRAVENOUS at 23:29

## 2023-07-31 RX ADMIN — INSULIN GLARGINE 13 UNIT(S): 100 INJECTION, SOLUTION SUBCUTANEOUS at 21:29

## 2023-07-31 RX ADMIN — Medication 50 MILLIGRAM(S): at 17:21

## 2023-07-31 RX ADMIN — PIPERACILLIN AND TAZOBACTAM 25 GRAM(S): 4; .5 INJECTION, POWDER, LYOPHILIZED, FOR SOLUTION INTRAVENOUS at 14:37

## 2023-07-31 RX ADMIN — Medication 4 UNIT(S): at 17:20

## 2023-07-31 RX ADMIN — Medication 50 MILLIGRAM(S): at 05:15

## 2023-07-31 RX ADMIN — HEPARIN SODIUM 5000 UNIT(S): 5000 INJECTION INTRAVENOUS; SUBCUTANEOUS at 05:15

## 2023-07-31 RX ADMIN — MUPIROCIN 1 APPLICATION(S): 20 OINTMENT TOPICAL at 17:20

## 2023-07-31 RX ADMIN — Medication 3 UNIT(S): at 08:05

## 2023-07-31 RX ADMIN — HEPARIN SODIUM 5000 UNIT(S): 5000 INJECTION INTRAVENOUS; SUBCUTANEOUS at 21:29

## 2023-07-31 RX ADMIN — LISINOPRIL 20 MILLIGRAM(S): 2.5 TABLET ORAL at 05:15

## 2023-07-31 RX ADMIN — Medication 2: at 12:11

## 2023-07-31 RX ADMIN — PIPERACILLIN AND TAZOBACTAM 25 GRAM(S): 4; .5 INJECTION, POWDER, LYOPHILIZED, FOR SOLUTION INTRAVENOUS at 05:18

## 2023-07-31 RX ADMIN — CHLORHEXIDINE GLUCONATE 1 APPLICATION(S): 213 SOLUTION TOPICAL at 06:12

## 2023-07-31 NOTE — PROGRESS NOTE PEDS - PROBLEM SELECTOR PLAN 1
right foot posterior heel wound     - x-ray - no OM    - culture reviewed  IV Antibiotics  s/p wound debridement with integra graft application  wound care w/ wound vac  pain management  management as per podiatry and primary team

## 2023-07-31 NOTE — PROGRESS NOTE ADULT - SUBJECTIVE AND OBJECTIVE BOX
Chief complaint  Patient is a 63y old  Female who presents with a chief complaint of Heel infection (29 Jul 2023 12:58)         Labs and Fingersticks  CAPILLARY BLOOD GLUCOSE      POCT Blood Glucose.: 201 mg/dL (31 Jul 2023 07:57)  POCT Blood Glucose.: 191 mg/dL (30 Jul 2023 21:29)  POCT Blood Glucose.: 164 mg/dL (30 Jul 2023 16:43)  POCT Blood Glucose.: 190 mg/dL (30 Jul 2023 12:10)                        Medications  MEDICATIONS  (STANDING):  artificial  tears Solution 1 Drop(s) Both EYES three times a day  atorvastatin 40 milliGRAM(s) Oral at bedtime  chlorhexidine 2% Cloths 1 Application(s) Topical <User Schedule>  dextrose 5%. 1000 milliLiter(s) (100 mL/Hr) IV Continuous <Continuous>  dextrose 5%. 1000 milliLiter(s) (50 mL/Hr) IV Continuous <Continuous>  dextrose 50% Injectable 25 Gram(s) IV Push once  dextrose 50% Injectable 12.5 Gram(s) IV Push once  dextrose 50% Injectable 25 Gram(s) IV Push once  furosemide    Tablet 20 milliGRAM(s) Oral daily  glucagon  Injectable 1 milliGRAM(s) IntraMuscular once  heparin   Injectable 5000 Unit(s) SubCutaneous every 8 hours  hydrALAZINE 50 milliGRAM(s) Oral two times a day  insulin glargine Injectable (LANTUS) 13 Unit(s) SubCutaneous at bedtime  insulin lispro (ADMELOG) corrective regimen sliding scale   SubCutaneous three times a day before meals  insulin lispro (ADMELOG) corrective regimen sliding scale   SubCutaneous at bedtime  insulin lispro Injectable (ADMELOG) 4 Unit(s) SubCutaneous before lunch  insulin lispro Injectable (ADMELOG) 4 Unit(s) SubCutaneous before dinner  lisinopril 20 milliGRAM(s) Oral daily  metoprolol succinate ER 50 milliGRAM(s) Oral daily  mupirocin 2% Nasal 1 Application(s) Both Nostrils two times a day  mupirocin 2% Ointment 1 Application(s) Topical two times a day  pantoprazole    Tablet 40 milliGRAM(s) Oral before breakfast  piperacillin/tazobactam IVPB.. 3.375 Gram(s) IV Intermittent every 8 hours  senna 2 Tablet(s) Oral at bedtime  tamsulosin 0.4 milliGRAM(s) Oral at bedtime      Physical Exam  General: Patient comfortable in bed   Vital Signs Last 12 Hrs  T(F): 97.7 (07-31-23 @ 00:17), Max: 97.7 (07-31-23 @ 00:17)  HR: 55 (07-31-23 @ 05:00) (55 - 63)  BP: 160/71 (07-31-23 @ 05:00) (160/71 - 163/80)  BP(mean): --  RR: 18 (07-31-23 @ 00:17) (18 - 18)  SpO2: 95% (07-31-23 @ 00:17) (95% - 95%)    CVS: S1S2   Respiratory: No wheezing, no crepitations  GI: Abdomen soft, bowel sounds positive  Musculoskeletal:  moves all extremities  : Voiding

## 2023-07-31 NOTE — PROGRESS NOTE ADULT - PROBLEM SELECTOR PLAN 1
Increased Lantus 13 units qHS  and ADMELOG 4 unit before each meal for now.   Will continue monitoring FS, log, and glucose trends, will Follow up.  Patient counseled for compliance with consistent low carb diet and exercise as tolerated outpatient.   If continue nausea and vomiting Consider Gastric emptying study, to r/o Gastroparesis.    Was using insulin at home, basal and bolus. DC home on current insulin doses if sugars remain stable  may use her home insulin brands

## 2023-07-31 NOTE — PROGRESS NOTE PEDS - ASSESSMENT
64 y/o F PMHx of DMH2 on insulin, HTN, HLD p/w right heel ulcer. Patient is sent by her podiatrist for questionable right foot procedure.  She is unsure how long she has had this ulcer for.  States that she was recently hospitalized in the near [facility], [place] for an evaluation of the right foot infection.  States she still had a 102 fever yesterday which resolved.  Denies any fevers today, chills, chest pain, shortness of breath, abdominal pain, nausea vomiting diarrhea.  States she ambulates with a walker at baseline.  Accompanied by her sister-in-law. 64 y/o F PMHx of DMH2 on insulin, HTN, HLD p/w right heel ulcer. Patient is sent by her podiatrist for questionable right foot procedure.  She is unsure how long she has had this ulcer for.  States that she was recently hospitalized in the near [facility] for an evaluation of the right foot infection.  States she still had a 102 fever yesterday which resolved.  Denies any fevers today, chills, chest pain, shortness of breath, abdominal pain, nausea vomiting diarrhea.  States she ambulates with a walker at baseline.  Accompanied by her sister-in-law. 62 y/o F PMHx of DMH2 on insulin, HTN, HLD p/w right heel ulcer. Patient is sent by her podiatrist for questionable right foot procedure.  She is unsure how long she has had this ulcer for.  States that she was recently hospitalized in the near [facility] for an evaluation of the right foot infection.  States she still had a 102 fever yesterday which resolved.  Denies any fevers today, chills, chest pain, shortness of breath, abdominal pain, nausea vomiting diarrhea.  States she ambulates with a walker at baseline.  Accompanied by her sister-in-law.

## 2023-07-31 NOTE — PROGRESS NOTE PEDS - SUBJECTIVE AND OBJECTIVE BOX
Subjective: Patient seen and examined. No new events except as noted.     REVIEW OF SYSTEMS:    CONSTITUTIONAL: + weakness, fevers or chills  EYES/ENT: No visual changes;  No vertigo or throat pain   NECK: No pain or stiffness  RESPIRATORY: No cough, wheezing, hemoptysis; No shortness of breath  CARDIOVASCULAR: No chest pain or palpitations  GASTROINTESTINAL: No abdominal or epigastric pain. No nausea, vomiting, or hematemesis; No diarrhea or constipation. No melena or hematochezia.  GENITOURINARY: No dysuria, frequency or hematuria  NEUROLOGICAL: No numbness or weakness  SKIN: No itching, burning, rashes, or lesions   All other review of systems is negative unless indicated above.    MEDICATIONS:  MEDICATIONS  (STANDING):  artificial  tears Solution 1 Drop(s) Both EYES three times a day  atorvastatin 40 milliGRAM(s) Oral at bedtime  chlorhexidine 2% Cloths 1 Application(s) Topical <User Schedule>  dextrose 5%. 1000 milliLiter(s) (50 mL/Hr) IV Continuous <Continuous>  dextrose 5%. 1000 milliLiter(s) (100 mL/Hr) IV Continuous <Continuous>  dextrose 50% Injectable 25 Gram(s) IV Push once  dextrose 50% Injectable 12.5 Gram(s) IV Push once  dextrose 50% Injectable 25 Gram(s) IV Push once  furosemide    Tablet 20 milliGRAM(s) Oral daily  glucagon  Injectable 1 milliGRAM(s) IntraMuscular once  heparin   Injectable 5000 Unit(s) SubCutaneous every 8 hours  hydrALAZINE 50 milliGRAM(s) Oral two times a day  insulin glargine Injectable (LANTUS) 10 Unit(s) SubCutaneous at bedtime  insulin lispro (ADMELOG) corrective regimen sliding scale   SubCutaneous three times a day before meals  insulin lispro (ADMELOG) corrective regimen sliding scale   SubCutaneous at bedtime  insulin lispro Injectable (ADMELOG) 3 Unit(s) SubCutaneous before breakfast  insulin lispro Injectable (ADMELOG) 2 Unit(s) SubCutaneous before dinner  insulin lispro Injectable (ADMELOG) 3 Unit(s) SubCutaneous before lunch  lisinopril 20 milliGRAM(s) Oral daily  metoprolol succinate ER 50 milliGRAM(s) Oral daily  mupirocin 2% Nasal 1 Application(s) Both Nostrils two times a day  mupirocin 2% Ointment 1 Application(s) Topical two times a day  pantoprazole    Tablet 40 milliGRAM(s) Oral before breakfast  piperacillin/tazobactam IVPB.. 3.375 Gram(s) IV Intermittent every 8 hours  senna 2 Tablet(s) Oral at bedtime  tamsulosin 0.4 milliGRAM(s) Oral at bedtime    PHYSICAL EXAM:  Vital Signs Last 24 Hrs  T(C): 36.5 (31 Jul 2023 00:17), Max: 36.5 (30 Jul 2023 15:32)  T(F): 97.7 (31 Jul 2023 00:17), Max: 97.7 (30 Jul 2023 15:32)  HR: 55 (31 Jul 2023 05:00) (55 - 63)  BP: 160/71 (31 Jul 2023 05:00) (146/72 - 163/80)  BP(mean): --  RR: 18 (31 Jul 2023 00:17) (18 - 18)  SpO2: 95% (31 Jul 2023 00:17) (95% - 95%)    Parameters below as of 31 Jul 2023 00:17  Patient On (Oxygen Delivery Method): room air    I&O's Summary    30 Jul 2023 07:01  -  31 Jul 2023 07:00  --------------------------------------------------------  IN: 0 mL / OUT: 250 mL / NET: -250 mL    31 Jul 2023 07:01  -  31 Jul 2023 12:15  --------------------------------------------------------  IN: 280 mL / OUT: 1 mL / NET: 279 mL    Appearance: NAD  HEENT:  Dry oral mucosa, PERRL, EOMI	  Lymphatic: No lymphadenopathy , no edema  Cardiovascular: Normal S1 S2, No JVD, No murmurs , Peripheral pulses palpable 2+ bilaterally  Respiratory: Lungs clear to auscultation, normal effort 	  Gastrointestinal:  Soft, Non-tender, + BS	  Musculoskeletal: Normal range of motion, normal strength  Psychiatry:  Mood & affect appropriate  Vascular: DP/PT 0/4, B/L, CFT <3 seconds B/L, Temperature gradient warm to cool, B/L.   Neuro: Epicritic sensation dimished to the level of toes, B/L.  Musculoskeletal/Ortho: Unremarkable.  Skin: s/p BL heel wound debridement with right foot graft application and bone biopsy (DOS 7/27): adaptic and graft intact, staples intact, mild serosagnuinous drainage, no purulence, no erythema, no acute signs of infection.     LABS:    CARDIAC MARKERS:    proBNP:   Lipid Profile:   HgA1c:   TSH:     TELEMETRY: 	    ECG:  	  RADIOLOGY:   DIAGNOSTIC TESTING:  [ ] Echocardiogram:  [ ]  Catheterization:  [ ] Stress Test:    OTHER:

## 2023-07-31 NOTE — PROVIDER CONTACT NOTE (CRITICAL VALUE NOTIFICATION) - SITUATION
called by [facility] lab to report a positive abscess culture form 7/25/23. final with suseptability called by [facility] lab to report a positive abscess culture form 7/25/23. final with suseptability called by [facility] lab to report a positive abscess culture form 7/25/23. final with suseptability

## 2023-07-31 NOTE — PROGRESS NOTE ADULT - SUBJECTIVE AND OBJECTIVE BOX
STEPHANIE KAYE  63y Female  MRN:[id number]    Patient is a 63y old  Female who presents with a chief complaint of foot infection    HPI:   62 y/o F PMHx of DMH2 on insulin, HTN, HLD p/w right heel ulcer. Patient is sent by her podiatrist for questionable right foot procedure.  She is unsure how long she has had this ulcer for.  States that she was recently hospitalized in the near [facility] for an evaluation of the right foot infection.  States she still had a 102 fever yesterday which resolved.  Denies any fevers today, chills, chest pain, shortness of breath, abdominal pain, nausea vomiting diarrhea.  States she ambulates with a walker at baseline.  Accompanied by her sister-in-law (25 Jul 2023 21:58)      pt now s/p bilateral foot heel wound debridement with right foot kerecis graft application, and right foot calcaneus bone biopsy on 7/27      Patient seen and evaluated at bedside. No acute events overnight except as noted.    Interval HPI: no acute events o/n        PAST MEDICAL & SURGICAL HISTORY:  Diabetes      Hypertension      Hypercholesteremia          REVIEW OF SYSTEMS:  as per hpi     VITALS:   Vital Signs Last 24 Hrs  T(C): 36.5 (31 Jul 2023 00:17), Max: 36.5 (30 Jul 2023 15:32)  T(F): 97.7 (31 Jul 2023 00:17), Max: 97.7 (30 Jul 2023 15:32)  HR: 55 (31 Jul 2023 05:00) (55 - 63)  BP: 160/71 (31 Jul 2023 05:00) (146/72 - 163/80)  BP(mean): --  RR: 18 (31 Jul 2023 00:17) (18 - 18)  SpO2: 95% (31 Jul 2023 00:17) (95% - 95%)    Parameters below as of 31 Jul 2023 00:17  Patient On (Oxygen Delivery Method): room air            PHYSICAL EXAM:  GENERAL: NAD, well-developed  HEAD:  Atraumatic, Normocephalic  EYES: EOMI, PERRLA, conjunctiva and sclera clear  NECK: Supple, No JVD  CHEST/LUNG: Clear to auscultation bilaterally; No wheeze  HEART: S1, S2; No murmurs, rubs, or gallops  ABDOMEN: Soft, Nontender, Nondistended; Bowel sounds present  EXTREMITIES:  2+ Peripheral Pulses, No clubbing, cyanosis, or edema. b/l lower ext dressing in place   PSYCH: Normal affect  NEUROLOGY: AAOX3; non-focal  SKIN: No rashes or lesions    Consultant(s) Notes Reviewed:  [x ] YES  [ ] NO  Care Discussed with Consultants/Other Providers [ x] YES  [ ] NO    MEDS:   MEDICATIONS  (STANDING):  artificial  tears Solution 1 Drop(s) Both EYES three times a day  atorvastatin 40 milliGRAM(s) Oral at bedtime  chlorhexidine 2% Cloths 1 Application(s) Topical <User Schedule>  dextrose 5%. 1000 milliLiter(s) (50 mL/Hr) IV Continuous <Continuous>  dextrose 5%. 1000 milliLiter(s) (100 mL/Hr) IV Continuous <Continuous>  dextrose 50% Injectable 25 Gram(s) IV Push once  dextrose 50% Injectable 12.5 Gram(s) IV Push once  dextrose 50% Injectable 25 Gram(s) IV Push once  furosemide    Tablet 20 milliGRAM(s) Oral daily  glucagon  Injectable 1 milliGRAM(s) IntraMuscular once  heparin   Injectable 5000 Unit(s) SubCutaneous every 8 hours  hydrALAZINE 50 milliGRAM(s) Oral two times a day  insulin glargine Injectable (LANTUS) 13 Unit(s) SubCutaneous at bedtime  insulin lispro (ADMELOG) corrective regimen sliding scale   SubCutaneous three times a day before meals  insulin lispro (ADMELOG) corrective regimen sliding scale   SubCutaneous at bedtime  insulin lispro Injectable (ADMELOG) 4 Unit(s) SubCutaneous before lunch  insulin lispro Injectable (ADMELOG) 4 Unit(s) SubCutaneous before dinner  lisinopril 20 milliGRAM(s) Oral daily  metoprolol succinate ER 50 milliGRAM(s) Oral daily  mupirocin 2% Nasal 1 Application(s) Both Nostrils two times a day  mupirocin 2% Ointment 1 Application(s) Topical two times a day  pantoprazole    Tablet 40 milliGRAM(s) Oral before breakfast  piperacillin/tazobactam IVPB.. 3.375 Gram(s) IV Intermittent every 8 hours  senna 2 Tablet(s) Oral at bedtime  tamsulosin 0.4 milliGRAM(s) Oral at bedtime    MEDICATIONS  (PRN):  acetaminophen     Tablet .. 650 milliGRAM(s) Oral every 6 hours PRN Mild Pain (1 - 3)  dextrose Oral Gel 15 Gram(s) Oral once PRN Blood Glucose LESS THAN 70 milliGRAM(s)/deciliter  HYDROmorphone  Injectable 0.5 milliGRAM(s) IV Push every 4 hours PRN Severe Pain (7 - 10)  ondansetron Injectable 4 milliGRAM(s) IV Push every 8 hours PRN Nausea and/or Vomiting  oxycodone    5 mG/acetaminophen 325 mG 1 Tablet(s) Oral every 4 hours PRN Moderate Pain (4 - 6)      ALLERGIES:  Allergy Status Unknown      LABS:                        < from: MR Ankle w/ IV Cont, Right (07.26.23 @ 18:31) >    IMPRESSION:  1.  There is a ulcer overlying the heel roughly spanning 5.4 cm extending   to the lateral calcaneus. There is minimal osseous edema within the   lateral calcaneus with mild postcontrast enhancement and without   significant loss of normal T1 fatty marrow. This is possibly representing   early osteomyelitis versus reactive in nature.  2.  Questionable minimal osseous edema within the fourth metatarsal head   with postcontrast enhancement which is incompletely evaluated. These   findings are possibly secondary to reactive degenerative changes versus   osteomyelitis if there is adjacent ulcer. Clinical correlation is advised.    --- End of Report ---        < end of copied text >     < from: Xray Foot AP + Lateral + Oblique, Right (07.25.23 @ 17:54) >    IMPRESSION:    No acute fracture or dislocation of the right foot. Midfoot degenerative   changes are noted.    Soft tissue defect at the heel related to known heel ulcer. No osseous   erosive changes of the calcaneus or adjacent osseousstructures. Soft   tissue edema is also noted along the dorsal foot and anterior lower shin.   No tracking subcutaneous gas.    Along the first distal phalanx base, there are juxtaarticular erosions at   the medial aspect and productive changes at the lateral aspect. Findings   may be due to underlying inflammatory arthritis or crystalline   arthropathy.    Vascular calcifications.      --- End of Report ---    < end of copied text >   STEPHANIE KAYE  63y Female  MRN:[id number]    Patient is a 63y old  Female who presents with a chief complaint of foot infection    HPI:   62 y/o F PMHx of DMH2 on insulin, HTN, HLD p/w right heel ulcer. Patient is sent by her podiatrist for questionable right foot procedure.  She is unsure how long she has had this ulcer for.  States that she was recently hospitalized in the near [facility] for an evaluation of the right foot infection.  States she still had a 102 fever yesterday which resolved.  Denies any fevers today, chills, chest pain, shortness of breath, abdominal pain, nausea vomiting diarrhea.  States she ambulates with a walker at baseline.  Accompanied by her sister-in-law (25 Jul 2023 21:58)      pt now s/p bilateral foot heel wound debridement with right foot kerecis graft application, and right foot calcaneus bone biopsy on 7/27      Patient seen and evaluated at bedside. No acute events overnight except as noted.    Interval HPI: no acute events o/n        PAST MEDICAL & SURGICAL HISTORY:  Diabetes      Hypertension      Hypercholesteremia          REVIEW OF SYSTEMS:  as per hpi     VITALS:   Vital Signs Last 24 Hrs  T(C): 36.5 (31 Jul 2023 00:17), Max: 36.5 (30 Jul 2023 15:32)  T(F): 97.7 (31 Jul 2023 00:17), Max: 97.7 (30 Jul 2023 15:32)  HR: 55 (31 Jul 2023 05:00) (55 - 63)  BP: 160/71 (31 Jul 2023 05:00) (146/72 - 163/80)  BP(mean): --  RR: 18 (31 Jul 2023 00:17) (18 - 18)  SpO2: 95% (31 Jul 2023 00:17) (95% - 95%)    Parameters below as of 31 Jul 2023 00:17  Patient On (Oxygen Delivery Method): room air            PHYSICAL EXAM:  GENERAL: NAD, well-developed  HEAD:  Atraumatic, Normocephalic  EYES: EOMI, PERRLA, conjunctiva and sclera clear  NECK: Supple, No JVD  CHEST/LUNG: Clear to auscultation bilaterally; No wheeze  HEART: S1, S2; No murmurs, rubs, or gallops  ABDOMEN: Soft, Nontender, Nondistended; Bowel sounds present  EXTREMITIES:  2+ Peripheral Pulses, No clubbing, cyanosis, or edema. b/l lower ext dressing in place   PSYCH: Normal affect  NEUROLOGY: AAOX3; non-focal  SKIN: No rashes or lesions    Consultant(s) Notes Reviewed:  [x ] YES  [ ] NO  Care Discussed with Consultants/Other Providers [ x] YES  [ ] NO    MEDS:   MEDICATIONS  (STANDING):  artificial  tears Solution 1 Drop(s) Both EYES three times a day  atorvastatin 40 milliGRAM(s) Oral at bedtime  chlorhexidine 2% Cloths 1 Application(s) Topical <User Schedule>  dextrose 5%. 1000 milliLiter(s) (50 mL/Hr) IV Continuous <Continuous>  dextrose 5%. 1000 milliLiter(s) (100 mL/Hr) IV Continuous <Continuous>  dextrose 50% Injectable 25 Gram(s) IV Push once  dextrose 50% Injectable 12.5 Gram(s) IV Push once  dextrose 50% Injectable 25 Gram(s) IV Push once  furosemide    Tablet 20 milliGRAM(s) Oral daily  glucagon  Injectable 1 milliGRAM(s) IntraMuscular once  heparin   Injectable 5000 Unit(s) SubCutaneous every 8 hours  hydrALAZINE 50 milliGRAM(s) Oral two times a day  insulin glargine Injectable (LANTUS) 13 Unit(s) SubCutaneous at bedtime  insulin lispro (ADMELOG) corrective regimen sliding scale   SubCutaneous three times a day before meals  insulin lispro (ADMELOG) corrective regimen sliding scale   SubCutaneous at bedtime  insulin lispro Injectable (ADMELOG) 4 Unit(s) SubCutaneous before lunch  insulin lispro Injectable (ADMELOG) 4 Unit(s) SubCutaneous before dinner  lisinopril 20 milliGRAM(s) Oral daily  metoprolol succinate ER 50 milliGRAM(s) Oral daily  mupirocin 2% Nasal 1 Application(s) Both Nostrils two times a day  mupirocin 2% Ointment 1 Application(s) Topical two times a day  pantoprazole    Tablet 40 milliGRAM(s) Oral before breakfast  piperacillin/tazobactam IVPB.. 3.375 Gram(s) IV Intermittent every 8 hours  senna 2 Tablet(s) Oral at bedtime  tamsulosin 0.4 milliGRAM(s) Oral at bedtime    MEDICATIONS  (PRN):  acetaminophen     Tablet .. 650 milliGRAM(s) Oral every 6 hours PRN Mild Pain (1 - 3)  dextrose Oral Gel 15 Gram(s) Oral once PRN Blood Glucose LESS THAN 70 milliGRAM(s)/deciliter  HYDROmorphone  Injectable 0.5 milliGRAM(s) IV Push every 4 hours PRN Severe Pain (7 - 10)  ondansetron Injectable 4 milliGRAM(s) IV Push every 8 hours PRN Nausea and/or Vomiting  oxycodone    5 mG/acetaminophen 325 mG 1 Tablet(s) Oral every 4 hours PRN Moderate Pain (4 - 6)      ALLERGIES:  Allergy Status Unknown      LABS:                        < from: MR Ankle w/ IV Cont, Right (07.26.23 @ 18:31) >    IMPRESSION:  1.  There is a ulcer overlying the heel roughly spanning 5.4 cm extending   to the lateral calcaneus. There is minimal osseous edema within the   lateral calcaneus with mild postcontrast enhancement and without   significant loss of normal T1 fatty marrow. This is possibly representing   early osteomyelitis versus reactive in nature.  2.  Questionable minimal osseous edema within the fourth metatarsal head   with postcontrast enhancement which is incompletely evaluated. These   findings are possibly secondary to reactive degenerative changes versus   osteomyelitis if there is adjacent ulcer. Clinical correlation is advised.    --- End of Report ---        < end of copied text >     < from: Xray Foot AP + Lateral + Oblique, Right (07.25.23 @ 17:54) >    IMPRESSION:    No acute fracture or dislocation of the right foot. Midfoot degenerative   changes are noted.    Soft tissue defect at the heel related to known heel ulcer. No osseous   erosive changes of the calcaneus or adjacent osseousstructures. Soft   tissue edema is also noted along the dorsal foot and anterior lower shin.   No tracking subcutaneous gas.    Along the first distal phalanx base, there are juxtaarticular erosions at   the medial aspect and productive changes at the lateral aspect. Findings   may be due to underlying inflammatory arthritis or crystalline   arthropathy.    Vascular calcifications.      --- End of Report ---    < end of copied text >   STEPHANIE KAYE  63y Female  MRN:[id number]    Patient is a 63y old  Female who presents with a chief complaint of foot infection    HPI:   62 y/o F PMHx of DMH2 on insulin, HTN, HLD p/w right heel ulcer. Patient is sent by her podiatrist for questionable right foot procedure.  She is unsure how long she has had this ulcer for.  States that she was recently hospitalized in the near [facility] for an evaluation of the right foot infection.  States she still had a 102 fever yesterday which resolved.  Denies any fevers today, chills, chest pain, shortness of breath, abdominal pain, nausea vomiting diarrhea.  States she ambulates with a walker at baseline.  Accompanied by her sister-in-law (25 Jul 2023 21:58)      pt now s/p bilateral foot heel wound debridement with right foot kerecis graft application, and right foot calcaneus bone biopsy on 7/27      Patient seen and evaluated at bedside. No acute events overnight except as noted.    Interval HPI: no acute events o/n        PAST MEDICAL & SURGICAL HISTORY:  Diabetes      Hypertension      Hypercholesteremia          REVIEW OF SYSTEMS:  as per hpi     VITALS:   Vital Signs Last 24 Hrs  T(C): 36.5 (31 Jul 2023 00:17), Max: 36.5 (30 Jul 2023 15:32)  T(F): 97.7 (31 Jul 2023 00:17), Max: 97.7 (30 Jul 2023 15:32)  HR: 55 (31 Jul 2023 05:00) (55 - 63)  BP: 160/71 (31 Jul 2023 05:00) (146/72 - 163/80)  BP(mean): --  RR: 18 (31 Jul 2023 00:17) (18 - 18)  SpO2: 95% (31 Jul 2023 00:17) (95% - 95%)    Parameters below as of 31 Jul 2023 00:17  Patient On (Oxygen Delivery Method): room air            PHYSICAL EXAM:  GENERAL: NAD, well-developed  HEAD:  Atraumatic, Normocephalic  EYES: EOMI, PERRLA, conjunctiva and sclera clear  NECK: Supple, No JVD  CHEST/LUNG: Clear to auscultation bilaterally; No wheeze  HEART: S1, S2; No murmurs, rubs, or gallops  ABDOMEN: Soft, Nontender, Nondistended; Bowel sounds present  EXTREMITIES:  2+ Peripheral Pulses, No clubbing, cyanosis, or edema. b/l lower ext dressing in place   PSYCH: Normal affect  NEUROLOGY: AAOX3; non-focal  SKIN: No rashes or lesions    Consultant(s) Notes Reviewed:  [x ] YES  [ ] NO  Care Discussed with Consultants/Other Providers [ x] YES  [ ] NO    MEDS:   MEDICATIONS  (STANDING):  artificial  tears Solution 1 Drop(s) Both EYES three times a day  atorvastatin 40 milliGRAM(s) Oral at bedtime  chlorhexidine 2% Cloths 1 Application(s) Topical <User Schedule>  dextrose 5%. 1000 milliLiter(s) (50 mL/Hr) IV Continuous <Continuous>  dextrose 5%. 1000 milliLiter(s) (100 mL/Hr) IV Continuous <Continuous>  dextrose 50% Injectable 25 Gram(s) IV Push once  dextrose 50% Injectable 12.5 Gram(s) IV Push once  dextrose 50% Injectable 25 Gram(s) IV Push once  furosemide    Tablet 20 milliGRAM(s) Oral daily  glucagon  Injectable 1 milliGRAM(s) IntraMuscular once  heparin   Injectable 5000 Unit(s) SubCutaneous every 8 hours  hydrALAZINE 50 milliGRAM(s) Oral two times a day  insulin glargine Injectable (LANTUS) 13 Unit(s) SubCutaneous at bedtime  insulin lispro (ADMELOG) corrective regimen sliding scale   SubCutaneous three times a day before meals  insulin lispro (ADMELOG) corrective regimen sliding scale   SubCutaneous at bedtime  insulin lispro Injectable (ADMELOG) 4 Unit(s) SubCutaneous before lunch  insulin lispro Injectable (ADMELOG) 4 Unit(s) SubCutaneous before dinner  lisinopril 20 milliGRAM(s) Oral daily  metoprolol succinate ER 50 milliGRAM(s) Oral daily  mupirocin 2% Nasal 1 Application(s) Both Nostrils two times a day  mupirocin 2% Ointment 1 Application(s) Topical two times a day  pantoprazole    Tablet 40 milliGRAM(s) Oral before breakfast  piperacillin/tazobactam IVPB.. 3.375 Gram(s) IV Intermittent every 8 hours  senna 2 Tablet(s) Oral at bedtime  tamsulosin 0.4 milliGRAM(s) Oral at bedtime    MEDICATIONS  (PRN):  acetaminophen     Tablet .. 650 milliGRAM(s) Oral every 6 hours PRN Mild Pain (1 - 3)  dextrose Oral Gel 15 Gram(s) Oral once PRN Blood Glucose LESS THAN 70 milliGRAM(s)/deciliter  HYDROmorphone  Injectable 0.5 milliGRAM(s) IV Push every 4 hours PRN Severe Pain (7 - 10)  ondansetron Injectable 4 milliGRAM(s) IV Push every 8 hours PRN Nausea and/or Vomiting  oxycodone    5 mG/acetaminophen 325 mG 1 Tablet(s) Oral every 4 hours PRN Moderate Pain (4 - 6)      ALLERGIES:  Allergy Status Unknown      LABS:                        < from: MR Ankle w/ IV Cont, Right (07.26.23 @ 18:31) >    IMPRESSION:  1.  There is a ulcer overlying the heel roughly spanning 5.4 cm extending   to the lateral calcaneus. There is minimal osseous edema within the   lateral calcaneus with mild postcontrast enhancement and without   significant loss of normal T1 fatty marrow. This is possibly representing   early osteomyelitis versus reactive in nature.  2.  Questionable minimal osseous edema within the fourth metatarsal head   with postcontrast enhancement which is incompletely evaluated. These   findings are possibly secondary to reactive degenerative changes versus   osteomyelitis if there is adjacent ulcer. Clinical correlation is advised.    --- End of Report ---        < end of copied text >     < from: Xray Foot AP + Lateral + Oblique, Right (07.25.23 @ 17:54) >    IMPRESSION:    No acute fracture or dislocation of the right foot. Midfoot degenerative   changes are noted.    Soft tissue defect at the heel related to known heel ulcer. No osseous   erosive changes of the calcaneus or adjacent osseousstructures. Soft   tissue edema is also noted along the dorsal foot and anterior lower shin.   No tracking subcutaneous gas.    Along the first distal phalanx base, there are juxtaarticular erosions at   the medial aspect and productive changes at the lateral aspect. Findings   may be due to underlying inflammatory arthritis or crystalline   arthropathy.    Vascular calcifications.      --- End of Report ---    < end of copied text >

## 2023-07-31 NOTE — PROGRESS NOTE ADULT - SUBJECTIVE AND OBJECTIVE BOX
Follow Up:  heel wounds    Interval History/ROS:  no pain,      Allergies  No Known Allergies    ANTIMICROBIALS:  piperacillin/tazobactam IVPB.. 3.375 every 8 hours      OTHER MEDS:  MEDICATIONS  (STANDING):  acetaminophen     Tablet .. 650 every 6 hours PRN  atorvastatin 40 at bedtime  dextrose 50% Injectable 25 once  dextrose 50% Injectable 12.5 once  dextrose 50% Injectable 25 once  dextrose Oral Gel 15 once PRN  furosemide    Tablet 20 daily  glucagon  Injectable 1 once  heparin   Injectable 5000 every 8 hours  hydrALAZINE 50 two times a day  HYDROmorphone  Injectable 0.5 every 4 hours PRN  insulin glargine Injectable (LANTUS) 13 at bedtime  insulin lispro (ADMELOG) corrective regimen sliding scale  three times a day before meals  insulin lispro (ADMELOG) corrective regimen sliding scale  at bedtime  insulin lispro Injectable (ADMELOG) 4 before lunch  insulin lispro Injectable (ADMELOG) 4 before dinner  lisinopril 20 daily  metoprolol succinate ER 50 daily  ondansetron Injectable 4 every 8 hours PRN  oxycodone    5 mG/acetaminophen 325 mG 1 every 4 hours PRN  pantoprazole    Tablet 40 before breakfast  senna 2 at bedtime  tamsulosin 0.4 at bedtime      Vital Signs Last 24 Hrs  T(C): 36.4 (31 Jul 2023 15:55), Max: 36.5 (31 Jul 2023 00:17)  T(F): 97.6 (31 Jul 2023 15:55), Max: 97.7 (31 Jul 2023 00:17)  HR: 67 (31 Jul 2023 15:55) (55 - 67)  BP: 177/90 (31 Jul 2023 15:55) (160/71 - 177/90)  BP(mean): --  RR: 18 (31 Jul 2023 15:55) (18 - 18)  SpO2: 91% (31 Jul 2023 15:55) (91% - 95%)    Parameters below as of 31 Jul 2023 15:55  Patient On (Oxygen Delivery Method): room air        PHYSICAL EXAM:  General: WN/WD NAD, Non-toxic  Neurology: A&Ox3, nonfocal  Respiratory: Clear to auscultation bilaterally  CV: RRR, S1S2, no murmurs, rubs or gallops  Abdominal: Soft, Non-tender, non-distended, normal bowel sounds  Extremities: LE skin changes, dry dark scaling, feet dressed  Line Sites: Clear  Skin: No rash    WBC Count: 8.04 (07-28 @ 07:21)  WBC Count: 7.26 (07-27 @ 06:46)      Creatinine: 1.00 (07-28 @ 07:19)    Creatinine: 0.86 (07-27 @ 06:46)        07.25.23 @ 16:49) Sedimentation Rate, Erythrocyte: 85 mm/hr  07.25.23 @ 16:49) C-Reactive Protein, Serum: 41 mg/      MICROBIOLOGY:  .Tissue Other  07-27-23   Testing in progress  --  Morganella morganii  Methicillin resistant Staphylococcus aureus  Klebsiella pneumoniae      .Abscess right heel wound  07-25-23   Numerous Proteus mirabilis  Numerous Pseudomonas aeruginosa  Moderate Enterococcus faecalis (vancomycin resistant)  Few Methicillin Resistant Staphylococcus aureus  Moderate Klebsiella oxytoca/Raoultella ornithinolytica  --  Proteus mirabilis  Pseudomonas aeruginosa  Enterococcus faecalis (vancomycin resistant)  Methicillin resistant Staphylococcus aureus  Klebsiella oxytoca /Raoutella ornithinolytica      .Blood Blood  07-25-23   No growth at 5 days  --  --      .Blood Blood  07-25-23   No growth at 5 days  --  --        RADIOLOGY:  < from: MR Ankle w/ IV Cont, Right (07.26.23 @ 18:31) >  IMPRESSION:  1.  There is a ulcer overlying the heel roughly spanning 5.4 cm extending   to the lateral calcaneus. There is minimal osseous edema within the   lateral calcaneus with mild postcontrast enhancement and without   significant loss of normal T1 fatty marrow. This is possibly representing   early osteomyelitis versus reactive in nature.  2.  Questionable minimal osseous edema within the fourth metatarsal head   with postcontrast enhancement which is incompletely evaluated. These   findings are possibly secondary to reactive degenerative changes versus   osteomyelitis if there is adjacent ulcer. Clinical correlation is advised.    < end of copied text >      Mikael Daniels MD; Division of Infectious Disease; Pager: [phone]; nights and weekends: [phone]   Follow Up:  heel wounds    Interval History/ROS:  no pain,      Allergies  No Known Allergies    ANTIMICROBIALS:  piperacillin/tazobactam IVPB.. 3.375 every 8 hours      OTHER MEDS:  MEDICATIONS  (STANDING):  acetaminophen     Tablet .. 650 every 6 hours PRN  atorvastatin 40 at bedtime  dextrose 50% Injectable 25 once  dextrose 50% Injectable 12.5 once  dextrose 50% Injectable 25 once  dextrose Oral Gel 15 once PRN  furosemide    Tablet 20 daily  glucagon  Injectable 1 once  heparin   Injectable 5000 every 8 hours  hydrALAZINE 50 two times a day  HYDROmorphone  Injectable 0.5 every 4 hours PRN  insulin glargine Injectable (LANTUS) 13 at bedtime  insulin lispro (ADMELOG) corrective regimen sliding scale  three times a day before meals  insulin lispro (ADMELOG) corrective regimen sliding scale  at bedtime  insulin lispro Injectable (ADMELOG) 4 before lunch  insulin lispro Injectable (ADMELOG) 4 before dinner  lisinopril 20 daily  metoprolol succinate ER 50 daily  ondansetron Injectable 4 every 8 hours PRN  oxycodone    5 mG/acetaminophen 325 mG 1 every 4 hours PRN  pantoprazole    Tablet 40 before breakfast  senna 2 at bedtime  tamsulosin 0.4 at bedtime      Vital Signs Last 24 Hrs  T(C): 36.4 (31 Jul 2023 15:55), Max: 36.5 (31 Jul 2023 00:17)  T(F): 97.6 (31 Jul 2023 15:55), Max: 97.7 (31 Jul 2023 00:17)  HR: 67 (31 Jul 2023 15:55) (55 - 67)  BP: 177/90 (31 Jul 2023 15:55) (160/71 - 177/90)  BP(mean): --  RR: 18 (31 Jul 2023 15:55) (18 - 18)  SpO2: 91% (31 Jul 2023 15:55) (91% - 95%)    Parameters below as of 31 Jul 2023 15:55  Patient On (Oxygen Delivery Method): room air        PHYSICAL EXAM:  General: WN/WD NAD, Non-toxic  Neurology: A&Ox3, nonfocal  Respiratory: Clear to auscultation bilaterally  CV: RRR, S1S2, no murmurs, rubs or gallops  Abdominal: Soft, Non-tender, non-distended, normal bowel sounds  Extremities: LE skin changes, dry dark scaling, feet dressed  Line Sites: Clear  Skin: No rash    WBC Count: 8.04 (07-28 @ 07:21)  WBC Count: 7.26 (07-27 @ 06:46)      Creatinine: 1.00 (07-28 @ 07:19)    Creatinine: 0.86 (07-27 @ 06:46)        07.25.23 @ 16:49) Sedimentation Rate, Erythrocyte: 85 mm/hr  07.25.23 @ 16:49) C-Reactive Protein, Serum: 41 mg/      MICROBIOLOGY:  .Tissue Other  07-27-23   Testing in progress  --  Morganella morganii  Methicillin resistant Staphylococcus aureus  Klebsiella pneumoniae      .Abscess right heel wound  07-25-23   Numerous Proteus mirabilis  Numerous Pseudomonas aeruginosa  Moderate Enterococcus faecalis (vancomycin resistant)  Few Methicillin Resistant Staphylococcus aureus  Moderate Klebsiella oxytoca/Raoultella ornithinolytica  --  Proteus mirabilis  Pseudomonas aeruginosa  Enterococcus faecalis (vancomycin resistant)  Methicillin resistant Staphylococcus aureus  Klebsiella oxytoca /Raoutella ornithinolytica      .Blood Blood  07-25-23   No growth at 5 days  --  --      .Blood Blood  07-25-23   No growth at 5 days  --  --        RADIOLOGY:  < from: MR Ankle w/ IV Cont, Right (07.26.23 @ 18:31) >  IMPRESSION:  1.  There is a ulcer overlying the heel roughly spanning 5.4 cm extending   to the lateral calcaneus. There is minimal osseous edema within the   lateral calcaneus with mild postcontrast enhancement and without   significant loss of normal T1 fatty marrow. This is possibly representing   early osteomyelitis versus reactive in nature.  2.  Questionable minimal osseous edema within the fourth metatarsal head   with postcontrast enhancement which is incompletely evaluated. These   findings are possibly secondary to reactive degenerative changes versus   osteomyelitis if there is adjacent ulcer. Clinical correlation is advised.    < end of copied text >      Mikael Daniels MD; Division of Infectious Disease; Pager: [phone]; nights and weekends: [phone]   Follow Up:  heel wounds    Interval History/ROS:  no pain,      Allergies  No Known Allergies    ANTIMICROBIALS:  piperacillin/tazobactam IVPB.. 3.375 every 8 hours      OTHER MEDS:  MEDICATIONS  (STANDING):  acetaminophen     Tablet .. 650 every 6 hours PRN  atorvastatin 40 at bedtime  dextrose 50% Injectable 25 once  dextrose 50% Injectable 12.5 once  dextrose 50% Injectable 25 once  dextrose Oral Gel 15 once PRN  furosemide    Tablet 20 daily  glucagon  Injectable 1 once  heparin   Injectable 5000 every 8 hours  hydrALAZINE 50 two times a day  HYDROmorphone  Injectable 0.5 every 4 hours PRN  insulin glargine Injectable (LANTUS) 13 at bedtime  insulin lispro (ADMELOG) corrective regimen sliding scale  three times a day before meals  insulin lispro (ADMELOG) corrective regimen sliding scale  at bedtime  insulin lispro Injectable (ADMELOG) 4 before lunch  insulin lispro Injectable (ADMELOG) 4 before dinner  lisinopril 20 daily  metoprolol succinate ER 50 daily  ondansetron Injectable 4 every 8 hours PRN  oxycodone    5 mG/acetaminophen 325 mG 1 every 4 hours PRN  pantoprazole    Tablet 40 before breakfast  senna 2 at bedtime  tamsulosin 0.4 at bedtime      Vital Signs Last 24 Hrs  T(C): 36.4 (31 Jul 2023 15:55), Max: 36.5 (31 Jul 2023 00:17)  T(F): 97.6 (31 Jul 2023 15:55), Max: 97.7 (31 Jul 2023 00:17)  HR: 67 (31 Jul 2023 15:55) (55 - 67)  BP: 177/90 (31 Jul 2023 15:55) (160/71 - 177/90)  BP(mean): --  RR: 18 (31 Jul 2023 15:55) (18 - 18)  SpO2: 91% (31 Jul 2023 15:55) (91% - 95%)    Parameters below as of 31 Jul 2023 15:55  Patient On (Oxygen Delivery Method): room air        PHYSICAL EXAM:  General: WN/WD NAD, Non-toxic  Neurology: A&Ox3, nonfocal  Respiratory: Clear to auscultation bilaterally  CV: RRR, S1S2, no murmurs, rubs or gallops  Abdominal: Soft, Non-tender, non-distended, normal bowel sounds  Extremities: LE skin changes, dry dark scaling, feet dressed  Line Sites: Clear  Skin: No rash    WBC Count: 8.04 (07-28 @ 07:21)  WBC Count: 7.26 (07-27 @ 06:46)      Creatinine: 1.00 (07-28 @ 07:19)    Creatinine: 0.86 (07-27 @ 06:46)        07.25.23 @ 16:49) Sedimentation Rate, Erythrocyte: 85 mm/hr  07.25.23 @ 16:49) C-Reactive Protein, Serum: 41 mg/      MICROBIOLOGY:  .Tissue Other  07-27-23   Testing in progress  --  Morganella morganii  Methicillin resistant Staphylococcus aureus  Klebsiella pneumoniae      .Abscess right heel wound  07-25-23   Numerous Proteus mirabilis  Numerous Pseudomonas aeruginosa  Moderate Enterococcus faecalis (vancomycin resistant)  Few Methicillin Resistant Staphylococcus aureus  Moderate Klebsiella oxytoca/Raoultella ornithinolytica  --  Proteus mirabilis  Pseudomonas aeruginosa  Enterococcus faecalis (vancomycin resistant)  Methicillin resistant Staphylococcus aureus  Klebsiella oxytoca /Raoutella ornithinolytica      .Blood Blood  07-25-23   No growth at 5 days  --  --      .Blood Blood  07-25-23   No growth at 5 days  --  --        RADIOLOGY:  < from: MR Ankle w/ IV Cont, Right (07.26.23 @ 18:31) >  IMPRESSION:  1.  There is a ulcer overlying the heel roughly spanning 5.4 cm extending   to the lateral calcaneus. There is minimal osseous edema within the   lateral calcaneus with mild postcontrast enhancement and without   significant loss of normal T1 fatty marrow. This is possibly representing   early osteomyelitis versus reactive in nature.  2.  Questionable minimal osseous edema within the fourth metatarsal head   with postcontrast enhancement which is incompletely evaluated. These   findings are possibly secondary to reactive degenerative changes versus   osteomyelitis if there is adjacent ulcer. Clinical correlation is advised.    < end of copied text >      Mikael Daniels MD; Division of Infectious Disease; Pager: [phone]; nights and weekends: [phone]

## 2023-07-31 NOTE — PROGRESS NOTE ADULT - ASSESSMENT
63F s/p BL heel wound debridement with right foot graft application and bone biopsy (DOS 7/27).  - Pt seen and evaluated.  - Afebrile, WBC 8.04.  - s/p BL heel wound debridement with right foot graft application and bone biopsy (DOS 7/27): adaptic and graft intact, staples intact, no drainage, no purulence, no erythema, no acute signs of infection.   - Intraop Findings: High concern for residual bone infection, low concern for viability.  - OR soft tissue and bone biopsy cultures not differentiated on sunrise.  - Right Foot Eschar Culture: Morganella morganii, Klebsiella pneumoniae, E. faecalis, MRSA, Corynebacterium amycolatum.  - Right Foot Calcaneal Bone Biopsy Culture: Staph aureus (prelim).   - Right foot VAC to be re-applied today.  - BL z-flows to be worn at all times while in bed.  - Stable for discharge pending final ID recs.  - Wound care instructions and followup information listed in discharge provider note.  - Discussed with attending. 63F s/p BL heel wound debridement with right foot graft application and bone biopsy (DOS 7/27).  - Pt seen and evaluated.  - Afebrile, WBC 8.04.  - s/p BL heel wound debridement with right foot graft application and bone biopsy (DOS 7/27): adaptic and graft intact, staples intact, no drainage, no purulence, no erythema, no acute signs of infection.   - Intraop Findings: High concern for residual bone infection, low concern for viability.  - OR soft tissue and bone biopsy cultures not differentiated on sunrise but likely results listed below.  - Right Foot Eschar Culture: Morganella morganii, Klebsiella pneumoniae, E. faecalis, MRSA, Corynebacterium amycolatum.  - Right Foot Calcaneal Bone Biopsy Culture: Staph aureus (prelim).   - Right foot VAC to be re-applied today.  - BL z-flows to be worn at all times while in bed.  - Stable for discharge pending final ID recs.  - Wound care instructions and followup information listed in discharge provider note.  - Discussed with attending.

## 2023-07-31 NOTE — CHART NOTE - NSCHARTNOTEFT_GEN_A_CORE
Reported by RN, patient with MRSA and klebsiella PNA growing in wound cx . CW Anju and Gini, ID on board.  Suzi Hanley NP  [phone] Reported by RN, patient with MRSA and klebsiella PNA growing in wound cx . CW Anju and Gini, ID on board.  Suzi Hanley NP  [phone] Reported by RN, patient with MRSA and klebsiella PNA growing in wound cx . CW Anju and Gini, ID on board.  Suzi Hanley NP  [phone]

## 2023-07-31 NOTE — PROGRESS NOTE ADULT - SUBJECTIVE AND OBJECTIVE BOX
Patient is a 63y old  Female who presents with a chief complaint of Heel infection (29 Jul 2023 12:58)       INTERVAL HPI/OVERNIGHT EVENTS:  Patient seen and evaluated at bedside.  Pt is resting comfortable in NAD. Denies N/V/F/C.    Allergies    No Known Allergies    Intolerances        Vital Signs Last 24 Hrs  T(C): 36.5 (31 Jul 2023 00:17), Max: 36.5 (30 Jul 2023 15:32)  T(F): 97.7 (31 Jul 2023 00:17), Max: 97.7 (30 Jul 2023 15:32)  HR: 55 (31 Jul 2023 05:00) (55 - 63)  BP: 160/71 (31 Jul 2023 05:00) (146/72 - 163/80)  BP(mean): --  RR: 18 (31 Jul 2023 00:17) (18 - 18)  SpO2: 95% (31 Jul 2023 00:17) (95% - 95%)    Parameters below as of 31 Jul 2023 00:17  Patient On (Oxygen Delivery Method): room air        LABS:              CAPILLARY BLOOD GLUCOSE      POCT Blood Glucose.: 201 mg/dL (31 Jul 2023 07:57)  POCT Blood Glucose.: 191 mg/dL (30 Jul 2023 21:29)  POCT Blood Glucose.: 164 mg/dL (30 Jul 2023 16:43)  POCT Blood Glucose.: 190 mg/dL (30 Jul 2023 12:10)      Lower Extremity Physical Exam:  Vascular: DP/PT 0/4, B/L, CFT <3 seconds B/L, Temperature gradient warm to cool, B/L.   Neuro: Epicritic sensation dimished to the level of toes, B/L.  Musculoskeletal/Ortho: Unremarkable.  Skin: s/p BL heel wound debridement with right foot graft application and bone biopsy (DOS 7/27): adaptic and graft intact, staples intact, no drainage, no purulence, no erythema, no acute signs of infection.     RADIOLOGY & ADDITIONAL TESTS:

## 2023-07-31 NOTE — PROGRESS NOTE ADULT - ASSESSMENT
63 female h/o dm, htn, chol, here with b/l foot wounds    b/l foot wounds  pod eval noted  id consult apprec  iv abx as per id  f/u cult  wound care  MRI noted  s/p bilateral foot heel wound debridement with right foot kerecis graft application, and right foot calcaneus bone biopsy on 7/27  post op care per pod  VAC applied  f/u cultures from OR  abx per id      dm  insulin as ordered  monitor fs    chol  cont statin    htn  cont home meds    dvt ppx    dc planning rehab      Advanced care planning was discussed with patient and family.  Advanced care planning forms were reviewed and discussed as appropriate.  Differential diagnosis and plan of care discussed with patient after the evaluation.   Pain assessed and judicious use of narcotics when appropriate was discussed.  Importance of Fall prevention discussed.  Counseling on Smoking and Alcohol cessation was offered when appropriate.  Counseling on Diet, exercise, and medication compliance was done.       Approx 60 minutes spent.

## 2023-07-31 NOTE — PROGRESS NOTE ADULT - ASSESSMENT
62 y/o F PMHx of DMH2 on insulin, HTN, HLD p/w right heel ulcer.    Assessment  DMT2: 63y Female with DM T2 with hyperglycemia, A1C 8.4%, was on insulin at home, now on basal bolus insulin with coverage, blood sugars running high. Has right heel ulcer. Started on basal insulin glucose improving.   ?Gastroparesis: Has nausea at times, improved.   Foot wound: on medications, monitored. Podiatry eval/work up. MRSA wound on isolation   HTN: on antihypertensive medications, monitored, asymptomatic.  HLD: stable, on statin, monitored.    Discussed plan and management with Attending   Armando Toscano NP - TEAMS  Dr Wesley Hannah  [phone]         64 y/o F PMHx of DMH2 on insulin, HTN, HLD p/w right heel ulcer.    Assessment  DMT2: 63y Female with DM T2 with hyperglycemia, A1C 8.4%, was on insulin at home, now on basal bolus insulin with coverage, blood sugars running high. Has right heel ulcer. Started on basal insulin glucose improving.   ?Gastroparesis: Has nausea at times, improved.   Foot wound: on medications, monitored. Podiatry eval/work up. MRSA wound on isolation   HTN: on antihypertensive medications, monitored, asymptomatic.  HLD: stable, on statin, monitored.    Discussed plan and management with Attending   Armando Toscano NP - TEAMS  Dr Wesley Hannah  [phone]         62 y/o F PMHx of DMH2 on insulin, HTN, HLD p/w right heel ulcer.    Assessment  DMT2: 63y Female with DM T2 with hyperglycemia, A1C 8.4%, was on insulin at home, now on basal bolus insulin with coverage, blood sugars running high. Has right heel ulcer. Started on basal insulin glucose improving.   ?Gastroparesis: Has nausea at times, improved.   Foot wound: on medications, monitored. Podiatry eval/work up. MRSA wound on isolation   HTN: on antihypertensive medications, monitored, asymptomatic.  HLD: stable, on statin, monitored.    Discussed plan and management with Attending   Armando Toscano NP - TEAMS  Dr Wesley Hannah  [phone]

## 2023-07-31 NOTE — PROGRESS NOTE ADULT - ASSESSMENT
62 y/o F PMHx of DMH2 on insulin &7/26/23: A1C= 8.4%), HTN, HLD, BMI = 35.3,  bilateral heel ulcer and fever to 102F day prior to admission. ID consulted for eval of bilateral foot ulcer.   admitted 7/25/23  She notes nausea, emesis malaise  Right heel ulcer is long standing - Currently with extensive black eschar with sl separation at edges, drainage on dressing and malodour with likley underlying  infection, however malodor suggests infection.   The L heel ulcer is small, superficial and benign with no significant concern for infection.    7/25 ESR/CRP = 85/41    Antibiotics  Vano 7/25--> 7/26  Zosyn 7/25-->      #B/l Foot ulcer  #possible osteo  #infection  MRI foot suggest  possible osteomyelitis calcaneus    7/26  nasal swab POSITIVE MRSA PCR .  7/27 bilateral foot wound debridement,  right foot wound debridement with Integra graft application and bone biopsy for culture/R/O osteo. Intraoperative finding suggested residual bone infection present.      Suggest  Continue Zosyn  which covers all isolates except for MRSA  ADD VANCO   (I will order)  repeat Arterial duplex    Is patient candidate for partial calcanectomy?       64 y/o F PMHx of DMH2 on insulin &7/26/23: A1C= 8.4%), HTN, HLD, BMI = 35.3,  bilateral heel ulcer and fever to 102F day prior to admission. ID consulted for eval of bilateral foot ulcer.   admitted 7/25/23  She notes nausea, emesis malaise  Right heel ulcer is long standing - Currently with extensive black eschar with sl separation at edges, drainage on dressing and malodour with likley underlying  infection, however malodor suggests infection.   The L heel ulcer is small, superficial and benign with no significant concern for infection.    7/25 ESR/CRP = 85/41    Antibiotics  Vano 7/25--> 7/26  Zosyn 7/25-->      #B/l Foot ulcer  #possible osteo  #infection  MRI foot suggest  possible osteomyelitis calcaneus    7/26  nasal swab POSITIVE MRSA PCR .  7/27 bilateral foot wound debridement,  right foot wound debridement with Integra graft application and bone biopsy for culture/R/O osteo. Intraoperative finding suggested residual bone infection present.      Suggest  Continue Zosyn  which covers all isolates except for MRSA  ADD VANCO   (I will order)  repeat Arterial duplex    Is patient candidate for partial calcanectomy?

## 2023-08-01 LAB
-  AMPICILLIN/SULBACTAM: SIGNIFICANT CHANGE UP
-  AMPICILLIN: SIGNIFICANT CHANGE UP
-  CEFAZOLIN: SIGNIFICANT CHANGE UP
-  CIPROFLOXACIN: SIGNIFICANT CHANGE UP
-  CLINDAMYCIN: SIGNIFICANT CHANGE UP
-  DAPTOMYCIN: SIGNIFICANT CHANGE UP
-  ERYTHROMYCIN: SIGNIFICANT CHANGE UP
-  GENTAMICIN: SIGNIFICANT CHANGE UP
-  LEVOFLOXACIN: SIGNIFICANT CHANGE UP
-  LINEZOLID: SIGNIFICANT CHANGE UP
-  OXACILLIN: SIGNIFICANT CHANGE UP
-  PENICILLIN: SIGNIFICANT CHANGE UP
-  RIFAMPIN: SIGNIFICANT CHANGE UP
-  TETRACYCLINE: SIGNIFICANT CHANGE UP
-  TRIMETHOPRIM/SULFAMETHOXAZOLE: SIGNIFICANT CHANGE UP
-  VANCOMYCIN: SIGNIFICANT CHANGE UP
ALBUMIN SERPL ELPH-MCNC: 3 G/DL — LOW (ref 3.3–5)
ALP SERPL-CCNC: 53 U/L — SIGNIFICANT CHANGE UP (ref 40–120)
ALT FLD-CCNC: 6 U/L — LOW (ref 10–45)
ANION GAP SERPL CALC-SCNC: 17 MMOL/L — SIGNIFICANT CHANGE UP (ref 5–17)
AST SERPL-CCNC: 11 U/L — SIGNIFICANT CHANGE UP (ref 10–40)
BILIRUB SERPL-MCNC: 0.3 MG/DL — SIGNIFICANT CHANGE UP (ref 0.2–1.2)
BUN SERPL-MCNC: 12 MG/DL — SIGNIFICANT CHANGE UP (ref 7–23)
CALCIUM SERPL-MCNC: 9.1 MG/DL — SIGNIFICANT CHANGE UP (ref 8.4–10.5)
CHLORIDE SERPL-SCNC: 99 MMOL/L — SIGNIFICANT CHANGE UP (ref 96–108)
CO2 SERPL-SCNC: 25 MMOL/L — SIGNIFICANT CHANGE UP (ref 22–31)
CREAT SERPL-MCNC: 1.17 MG/DL — SIGNIFICANT CHANGE UP (ref 0.5–1.3)
EGFR: 52 ML/MIN/1.73M2 — LOW
GLUCOSE BLDC GLUCOMTR-MCNC: 150 MG/DL — HIGH (ref 70–99)
GLUCOSE BLDC GLUCOMTR-MCNC: 205 MG/DL — HIGH (ref 70–99)
GLUCOSE BLDC GLUCOMTR-MCNC: 244 MG/DL — HIGH (ref 70–99)
GLUCOSE BLDC GLUCOMTR-MCNC: 250 MG/DL — HIGH (ref 70–99)
GLUCOSE SERPL-MCNC: 197 MG/DL — HIGH (ref 70–99)
HCT VFR BLD CALC: 36.5 % — SIGNIFICANT CHANGE UP (ref 34.5–45)
HGB BLD-MCNC: 11.9 G/DL — SIGNIFICANT CHANGE UP (ref 11.5–15.5)
MCHC RBC-ENTMCNC: 27.3 PG — SIGNIFICANT CHANGE UP (ref 27–34)
MCHC RBC-ENTMCNC: 32.6 GM/DL — SIGNIFICANT CHANGE UP (ref 32–36)
MCV RBC AUTO: 83.7 FL — SIGNIFICANT CHANGE UP (ref 80–100)
METHOD TYPE: SIGNIFICANT CHANGE UP
NRBC # BLD: 0 /100 WBCS — SIGNIFICANT CHANGE UP (ref 0–0)
PLATELET # BLD AUTO: 346 K/UL — SIGNIFICANT CHANGE UP (ref 150–400)
POTASSIUM SERPL-MCNC: 3.5 MMOL/L — SIGNIFICANT CHANGE UP (ref 3.5–5.3)
POTASSIUM SERPL-SCNC: 3.5 MMOL/L — SIGNIFICANT CHANGE UP (ref 3.5–5.3)
PROT SERPL-MCNC: 7.3 G/DL — SIGNIFICANT CHANGE UP (ref 6–8.3)
RBC # BLD: 4.36 M/UL — SIGNIFICANT CHANGE UP (ref 3.8–5.2)
RBC # FLD: 14.1 % — SIGNIFICANT CHANGE UP (ref 10.3–14.5)
SODIUM SERPL-SCNC: 141 MMOL/L — SIGNIFICANT CHANGE UP (ref 135–145)
WBC # BLD: 8.88 K/UL — SIGNIFICANT CHANGE UP (ref 3.8–10.5)
WBC # FLD AUTO: 8.88 K/UL — SIGNIFICANT CHANGE UP (ref 3.8–10.5)

## 2023-08-01 PROCEDURE — 99232 SBSQ HOSP IP/OBS MODERATE 35: CPT

## 2023-08-01 RX ORDER — INSULIN GLARGINE 100 [IU]/ML
15 INJECTION, SOLUTION SUBCUTANEOUS AT BEDTIME
Refills: 0 | Status: DISCONTINUED | OUTPATIENT
Start: 2023-08-01 | End: 2023-08-02

## 2023-08-01 RX ADMIN — Medication 2: at 17:03

## 2023-08-01 RX ADMIN — Medication 250 MILLIGRAM(S): at 18:56

## 2023-08-01 RX ADMIN — Medication 20 MILLIGRAM(S): at 05:05

## 2023-08-01 RX ADMIN — PANTOPRAZOLE SODIUM 40 MILLIGRAM(S): 20 TABLET, DELAYED RELEASE ORAL at 05:06

## 2023-08-01 RX ADMIN — Medication 50 MILLIGRAM(S): at 17:06

## 2023-08-01 RX ADMIN — Medication 50 MILLIGRAM(S): at 05:05

## 2023-08-01 RX ADMIN — HEPARIN SODIUM 5000 UNIT(S): 5000 INJECTION INTRAVENOUS; SUBCUTANEOUS at 14:27

## 2023-08-01 RX ADMIN — ATORVASTATIN CALCIUM 40 MILLIGRAM(S): 80 TABLET, FILM COATED ORAL at 21:52

## 2023-08-01 RX ADMIN — LISINOPRIL 20 MILLIGRAM(S): 2.5 TABLET ORAL at 05:07

## 2023-08-01 RX ADMIN — TAMSULOSIN HYDROCHLORIDE 0.4 MILLIGRAM(S): 0.4 CAPSULE ORAL at 21:51

## 2023-08-01 RX ADMIN — PIPERACILLIN AND TAZOBACTAM 25 GRAM(S): 4; .5 INJECTION, POWDER, LYOPHILIZED, FOR SOLUTION INTRAVENOUS at 07:05

## 2023-08-01 RX ADMIN — CHLORHEXIDINE GLUCONATE 1 APPLICATION(S): 213 SOLUTION TOPICAL at 07:07

## 2023-08-01 RX ADMIN — Medication 50 MILLIGRAM(S): at 05:07

## 2023-08-01 RX ADMIN — Medication 250 MILLIGRAM(S): at 05:06

## 2023-08-01 RX ADMIN — Medication 4 UNIT(S): at 12:31

## 2023-08-01 RX ADMIN — MUPIROCIN 1 APPLICATION(S): 20 OINTMENT TOPICAL at 05:06

## 2023-08-01 RX ADMIN — MUPIROCIN 1 APPLICATION(S): 20 OINTMENT TOPICAL at 17:04

## 2023-08-01 RX ADMIN — Medication 1 DROP(S): at 05:06

## 2023-08-01 RX ADMIN — Medication 4 UNIT(S): at 17:03

## 2023-08-01 RX ADMIN — Medication 4 UNIT(S): at 08:25

## 2023-08-01 RX ADMIN — HEPARIN SODIUM 5000 UNIT(S): 5000 INJECTION INTRAVENOUS; SUBCUTANEOUS at 05:05

## 2023-08-01 RX ADMIN — Medication 2: at 08:24

## 2023-08-01 RX ADMIN — PIPERACILLIN AND TAZOBACTAM 25 GRAM(S): 4; .5 INJECTION, POWDER, LYOPHILIZED, FOR SOLUTION INTRAVENOUS at 14:28

## 2023-08-01 RX ADMIN — MUPIROCIN 1 APPLICATION(S): 20 OINTMENT TOPICAL at 09:17

## 2023-08-01 RX ADMIN — INSULIN GLARGINE 15 UNIT(S): 100 INJECTION, SOLUTION SUBCUTANEOUS at 21:51

## 2023-08-01 RX ADMIN — HEPARIN SODIUM 5000 UNIT(S): 5000 INJECTION INTRAVENOUS; SUBCUTANEOUS at 21:51

## 2023-08-01 NOTE — PROGRESS NOTE ADULT - SUBJECTIVE AND OBJECTIVE BOX
Follow Up:  OM calcaneus    Interval History/ROS:  feels ok    Allergies  No Known Allergies    ANTIMICROBIALS:  piperacillin/tazobactam IVPB.. 3.375 every 8 hours  vancomycin  IVPB    vancomycin  IVPB 750 every 12 hours      OTHER MEDS:  MEDICATIONS  (STANDING):  acetaminophen     Tablet .. 650 every 6 hours PRN  atorvastatin 40 at bedtime  dextrose 50% Injectable 25 once  dextrose 50% Injectable 12.5 once  dextrose 50% Injectable 25 once  dextrose Oral Gel 15 once PRN  furosemide    Tablet 20 daily  glucagon  Injectable 1 once  heparin   Injectable 5000 every 8 hours  hydrALAZINE 50 two times a day  HYDROmorphone  Injectable 0.5 every 4 hours PRN  insulin glargine Injectable (LANTUS) 15 at bedtime  insulin lispro (ADMELOG) corrective regimen sliding scale  three times a day before meals  insulin lispro (ADMELOG) corrective regimen sliding scale  at bedtime  insulin lispro Injectable (ADMELOG) 4 before lunch  insulin lispro Injectable (ADMELOG) 4 before dinner  insulin lispro Injectable (ADMELOG) 4 before breakfast  lisinopril 20 daily  metoprolol succinate ER 50 daily  ondansetron Injectable 4 every 8 hours PRN  oxycodone    5 mG/acetaminophen 325 mG 1 every 4 hours PRN  pantoprazole    Tablet 40 before breakfast  senna 2 at bedtime  tamsulosin 0.4 at bedtime      Vital Signs Last 24 Hrs  T(C): 36.7 (01 Aug 2023 15:21), Max: 36.7 (01 Aug 2023 07:56)  T(F): 98.1 (01 Aug 2023 15:21), Max: 98.1 (01 Aug 2023 07:56)  HR: 59 (01 Aug 2023 15:21) (59 - 62)  BP: 145/72 (01 Aug 2023 15:21) (145/72 - 168/77)  BP(mean): --  RR: 18 (01 Aug 2023 15:21) (18 - 18)  SpO2: 92% (01 Aug 2023 15:21) (92% - 95%)    Parameters below as of 01 Aug 2023 15:21  Patient On (Oxygen Delivery Method): room air        PHYSICAL EXAM:  General: WN/WD NAD, Non-toxic  Neurology: A&Ox3, nonfocal  Respiratory: Clear to auscultation bilaterally  CV: RRR, S1S2, no murmurs, rubs or gallops  Abdominal: Soft, Non-tender, non-distended, normal bowel sounds  Extremities: edemia, chronic venous stasis changes, dried hyperkerotoses Lower calves  - Vac in place  Line Sites: Clear  Skin: No rash                          11.9   8.88  )-----------( 346      ( 01 Aug 2023 06:57 )             36.5       08-01    141  |  99  |  12  ----------------------------<  197<H>  3.5   |  25  |  1.17    Ca    9.1      01 Aug 2023 06:57    TPro  7.3  /  Alb  3.0<L>  /  TBili  0.3  /  DBili  x   /  AST  11  /  ALT  6<L>  /  AlkPhos  53  08-01      Urinalysis Basic - ( 01 Aug 2023 06:57 )    Color: x / Appearance: x / SG: x / pH: x  Gluc: 197 mg/dL / Ketone: x  / Bili: x / Urobili: x   Blood: x / Protein: x / Nitrite: x   Leuk Esterase: x / RBC: x / WBC x   Sq Epi: x / Non Sq Epi: x / Bacteria: x      MICROBIOLOGY:  .Tissue Other  07-27-23   Testing in progress  --  Morganella morganii  Methicillin resistant Staphylococcus aureus  Klebsiella pneumoniae  Enterococcus faecalis (vancomycin resistant)      .Abscess right heel wound  07-25-23   Numerous Proteus mirabilis  Numerous Pseudomonas aeruginosa  Moderate Enterococcus faecalis (vancomycin resistant)  Few Methicillin Resistant Staphylococcus aureus  Moderate Klebsiella oxytoca/Raoultella ornithinolytica  --  Proteus mirabilis  Pseudomonas aeruginosa  Enterococcus faecalis (vancomycin resistant)  Methicillin resistant Staphylococcus aureus  Klebsiella oxytoca /Raoutella ornithinolytica      .Blood Blood  07-25-23   No growth at 5 days  --  --      .Blood Blood  07-25-23   No growth at 5 days  --  --      RADIOLOGY:  < from: MR Ankle w/ IV Cont, Right (07.26.23 @ 18:31) >  IMPRESSION:  1.  There is a ulcer overlying the heel roughly spanning 5.4 cm extending   to the lateral calcaneus. There is minimal osseous edema within the   lateral calcaneus with mild postcontrast enhancement and without   significant loss of normal T1 fatty marrow. This is possibly representing   early osteomyelitis versus reactive in nature.  2.  Questionable minimal osseous edema within the fourth metatarsal head   with postcontrast enhancement which is incompletely evaluated. These   findings are possibly secondary to reactive degenerative changes versus   osteomyelitis if there is adjacent ulcer. Clinical correlation is advised.    < end of copied text >      Mikael Daniels MD; Division of Infectious Disease; Pager: [phone]; nights and weekends: [phone]   Follow Up:  OM calcaneus    Interval History/ROS:  feels ok    Allergies  No Known Allergies    ANTIMICROBIALS:  piperacillin/tazobactam IVPB.. 3.375 every 8 hours  vancomycin  IVPB    vancomycin  IVPB 750 every 12 hours      OTHER MEDS:  MEDICATIONS  (STANDING):  acetaminophen     Tablet .. 650 every 6 hours PRN  atorvastatin 40 at bedtime  dextrose 50% Injectable 25 once  dextrose 50% Injectable 12.5 once  dextrose 50% Injectable 25 once  dextrose Oral Gel 15 once PRN  furosemide    Tablet 20 daily  glucagon  Injectable 1 once  heparin   Injectable 5000 every 8 hours  hydrALAZINE 50 two times a day  HYDROmorphone  Injectable 0.5 every 4 hours PRN  insulin glargine Injectable (LANTUS) 15 at bedtime  insulin lispro (ADMELOG) corrective regimen sliding scale  three times a day before meals  insulin lispro (ADMELOG) corrective regimen sliding scale  at bedtime  insulin lispro Injectable (ADMELOG) 4 before lunch  insulin lispro Injectable (ADMELOG) 4 before dinner  insulin lispro Injectable (ADMELOG) 4 before breakfast  lisinopril 20 daily  metoprolol succinate ER 50 daily  ondansetron Injectable 4 every 8 hours PRN  oxycodone    5 mG/acetaminophen 325 mG 1 every 4 hours PRN  pantoprazole    Tablet 40 before breakfast  senna 2 at bedtime  tamsulosin 0.4 at bedtime      Vital Signs Last 24 Hrs  T(C): 36.7 (01 Aug 2023 15:21), Max: 36.7 (01 Aug 2023 07:56)  T(F): 98.1 (01 Aug 2023 15:21), Max: 98.1 (01 Aug 2023 07:56)  HR: 59 (01 Aug 2023 15:21) (59 - 62)  BP: 145/72 (01 Aug 2023 15:21) (145/72 - 168/77)  BP(mean): --  RR: 18 (01 Aug 2023 15:21) (18 - 18)  SpO2: 92% (01 Aug 2023 15:21) (92% - 95%)    Parameters below as of 01 Aug 2023 15:21  Patient On (Oxygen Delivery Method): room air        PHYSICAL EXAM:  General: WN/WD NAD, Non-toxic  Neurology: A&Ox3, nonfocal  Respiratory: Clear to auscultation bilaterally  CV: RRR, S1S2, no murmurs, rubs or gallops  Abdominal: Soft, Non-tender, non-distended, normal bowel sounds  Extremities: edemia, chronic venous stasis changes, dried hyperkerotoses Lower calves  - Vac in place  Line Sites: Clear  Skin: No rash                          11.9   8.88  )-----------( 346      ( 01 Aug 2023 06:57 )             36.5       08-01    141  |  99  |  12  ----------------------------<  197<H>  3.5   |  25  |  1.17    Ca    9.1      01 Aug 2023 06:57    TPro  7.3  /  Alb  3.0<L>  /  TBili  0.3  /  DBili  x   /  AST  11  /  ALT  6<L>  /  AlkPhos  53  08-01      Urinalysis Basic - ( 01 Aug 2023 06:57 )    Color: x / Appearance: x / SG: x / pH: x  Gluc: 197 mg/dL / Ketone: x  / Bili: x / Urobili: x   Blood: x / Protein: x / Nitrite: x   Leuk Esterase: x / RBC: x / WBC x   Sq Epi: x / Non Sq Epi: x / Bacteria: x      MICROBIOLOGY:  .Tissue Other  07-27-23   Testing in progress  --  Morganella morganii  Methicillin resistant Staphylococcus aureus  Klebsiella pneumoniae  Enterococcus faecalis (vancomycin resistant)      .Abscess right heel wound  07-25-23   Numerous Proteus mirabilis  Numerous Pseudomonas aeruginosa  Moderate Enterococcus faecalis (vancomycin resistant)  Few Methicillin Resistant Staphylococcus aureus  Moderate Klebsiella oxytoca/Raoultella ornithinolytica  --  Proteus mirabilis  Pseudomonas aeruginosa  Enterococcus faecalis (vancomycin resistant)  Methicillin resistant Staphylococcus aureus  Klebsiella oxytoca /Raoutella ornithinolytica      .Blood Blood  07-25-23   No growth at 5 days  --  --      .Blood Blood  07-25-23   No growth at 5 days  --  --      RADIOLOGY:  < from: MR Ankle w/ IV Cont, Right (07.26.23 @ 18:31) >  IMPRESSION:  1.  There is a ulcer overlying the heel roughly spanning 5.4 cm extending   to the lateral calcaneus. There is minimal osseous edema within the   lateral calcaneus with mild postcontrast enhancement and without   significant loss of normal T1 fatty marrow. This is possibly representing   early osteomyelitis versus reactive in nature.  2.  Questionable minimal osseous edema within the fourth metatarsal head   with postcontrast enhancement which is incompletely evaluated. These   findings are possibly secondary to reactive degenerative changes versus   osteomyelitis if there is adjacent ulcer. Clinical correlation is advised.    < end of copied text >      Mikael Daniels MD; Division of Infectious Disease; Pager: [phone]; nights and weekends: [phone]   Follow Up:  OM calcaneus    Interval History/ROS:  feels ok    Allergies  No Known Allergies    ANTIMICROBIALS:  piperacillin/tazobactam IVPB.. 3.375 every 8 hours  vancomycin  IVPB    vancomycin  IVPB 750 every 12 hours      OTHER MEDS:  MEDICATIONS  (STANDING):  acetaminophen     Tablet .. 650 every 6 hours PRN  atorvastatin 40 at bedtime  dextrose 50% Injectable 25 once  dextrose 50% Injectable 12.5 once  dextrose 50% Injectable 25 once  dextrose Oral Gel 15 once PRN  furosemide    Tablet 20 daily  glucagon  Injectable 1 once  heparin   Injectable 5000 every 8 hours  hydrALAZINE 50 two times a day  HYDROmorphone  Injectable 0.5 every 4 hours PRN  insulin glargine Injectable (LANTUS) 15 at bedtime  insulin lispro (ADMELOG) corrective regimen sliding scale  three times a day before meals  insulin lispro (ADMELOG) corrective regimen sliding scale  at bedtime  insulin lispro Injectable (ADMELOG) 4 before lunch  insulin lispro Injectable (ADMELOG) 4 before dinner  insulin lispro Injectable (ADMELOG) 4 before breakfast  lisinopril 20 daily  metoprolol succinate ER 50 daily  ondansetron Injectable 4 every 8 hours PRN  oxycodone    5 mG/acetaminophen 325 mG 1 every 4 hours PRN  pantoprazole    Tablet 40 before breakfast  senna 2 at bedtime  tamsulosin 0.4 at bedtime      Vital Signs Last 24 Hrs  T(C): 36.7 (01 Aug 2023 15:21), Max: 36.7 (01 Aug 2023 07:56)  T(F): 98.1 (01 Aug 2023 15:21), Max: 98.1 (01 Aug 2023 07:56)  HR: 59 (01 Aug 2023 15:21) (59 - 62)  BP: 145/72 (01 Aug 2023 15:21) (145/72 - 168/77)  BP(mean): --  RR: 18 (01 Aug 2023 15:21) (18 - 18)  SpO2: 92% (01 Aug 2023 15:21) (92% - 95%)    Parameters below as of 01 Aug 2023 15:21  Patient On (Oxygen Delivery Method): room air        PHYSICAL EXAM:  General: WN/WD NAD, Non-toxic  Neurology: A&Ox3, nonfocal  Respiratory: Clear to auscultation bilaterally  CV: RRR, S1S2, no murmurs, rubs or gallops  Abdominal: Soft, Non-tender, non-distended, normal bowel sounds  Extremities: edemia, chronic venous stasis changes, dried hyperkerotoses Lower calves  - Vac in place  Line Sites: Clear  Skin: No rash                          11.9   8.88  )-----------( 346      ( 01 Aug 2023 06:57 )             36.5       08-01    141  |  99  |  12  ----------------------------<  197<H>  3.5   |  25  |  1.17    Ca    9.1      01 Aug 2023 06:57    TPro  7.3  /  Alb  3.0<L>  /  TBili  0.3  /  DBili  x   /  AST  11  /  ALT  6<L>  /  AlkPhos  53  08-01      Urinalysis Basic - ( 01 Aug 2023 06:57 )    Color: x / Appearance: x / SG: x / pH: x  Gluc: 197 mg/dL / Ketone: x  / Bili: x / Urobili: x   Blood: x / Protein: x / Nitrite: x   Leuk Esterase: x / RBC: x / WBC x   Sq Epi: x / Non Sq Epi: x / Bacteria: x      MICROBIOLOGY:  .Tissue Other  07-27-23   Testing in progress  --  Morganella morganii  Methicillin resistant Staphylococcus aureus  Klebsiella pneumoniae  Enterococcus faecalis (vancomycin resistant)      .Abscess right heel wound  07-25-23   Numerous Proteus mirabilis  Numerous Pseudomonas aeruginosa  Moderate Enterococcus faecalis (vancomycin resistant)  Few Methicillin Resistant Staphylococcus aureus  Moderate Klebsiella oxytoca/Raoultella ornithinolytica  --  Proteus mirabilis  Pseudomonas aeruginosa  Enterococcus faecalis (vancomycin resistant)  Methicillin resistant Staphylococcus aureus  Klebsiella oxytoca /Raoutella ornithinolytica      .Blood Blood  07-25-23   No growth at 5 days  --  --      .Blood Blood  07-25-23   No growth at 5 days  --  --      RADIOLOGY:  < from: MR Ankle w/ IV Cont, Right (07.26.23 @ 18:31) >  IMPRESSION:  1.  There is a ulcer overlying the heel roughly spanning 5.4 cm extending   to the lateral calcaneus. There is minimal osseous edema within the   lateral calcaneus with mild postcontrast enhancement and without   significant loss of normal T1 fatty marrow. This is possibly representing   early osteomyelitis versus reactive in nature.  2.  Questionable minimal osseous edema within the fourth metatarsal head   with postcontrast enhancement which is incompletely evaluated. These   findings are possibly secondary to reactive degenerative changes versus   osteomyelitis if there is adjacent ulcer. Clinical correlation is advised.    < end of copied text >      Mikael Daniels MD; Division of Infectious Disease; Pager: [phone]; nights and weekends: [phone]

## 2023-08-01 NOTE — PROGRESS NOTE ADULT - ASSESSMENT
62 y/o F PMHx of DMH2 on insulin &7/26/23: A1C= 8.4%), HTN, HLD, BMI = 35.3,  bilateral heel ulcer and fever to 102F day prior to admission. ID consulted for eval of bilateral foot ulcer.   admitted 7/25/23  She notes nausea, emesis malaise  Right heel ulcer is long standing - Currently with extensive black eschar with sl separation at edges, drainage on dressing and malodour with likley underlying  infection, however malodor suggests infection.     Antibiotics  Vanco 7/25--> 7/26; 7/31-->  Zosyn 7/25-->    #B/l Foot ulcer  #possible osteo  #infection  MRI foot suggest  possible osteomyelitis calcaneus    7/25 ESR/CRP = 85/41  7/26  nasal swab POSITIVE MRSA PCR .  7/27 bilateral foot wound debridement,  right foot wound debridement with Integra graft application and bone biopsy for culture/R/O osteo. Intraoperative finding suggested residual bone infection present.    Suggest  Continue Zosyn  which covers all isolates except for MRSA  Continue VANCO     repeat Arterial duplex  Podiatry follow up  Is patient candidate for partial calcanectomy?  if not,  would provide 6 week course abx through  September 7, 2023     may require BANDAR placement    message sent to Podiatry resident   64 y/o F PMHx of DMH2 on insulin &7/26/23: A1C= 8.4%), HTN, HLD, BMI = 35.3,  bilateral heel ulcer and fever to 102F day prior to admission. ID consulted for eval of bilateral foot ulcer.   admitted 7/25/23  She notes nausea, emesis malaise  Right heel ulcer is long standing - Currently with extensive black eschar with sl separation at edges, drainage on dressing and malodour with likley underlying  infection, however malodor suggests infection.     Antibiotics  Vanco 7/25--> 7/26; 7/31-->  Zosyn 7/25-->    #B/l Foot ulcer  #possible osteo  #infection  MRI foot suggest  possible osteomyelitis calcaneus    7/25 ESR/CRP = 85/41  7/26  nasal swab POSITIVE MRSA PCR .  7/27 bilateral foot wound debridement,  right foot wound debridement with Integra graft application and bone biopsy for culture/R/O osteo. Intraoperative finding suggested residual bone infection present.    Suggest  Continue Zosyn  which covers all isolates except for MRSA  Continue VANCO     repeat Arterial duplex  Podiatry follow up  Is patient candidate for partial calcanectomy?  if not,  would provide 6 week course abx through  September 7, 2023     may require BANDAR placement    message sent to Podiatry resident

## 2023-08-01 NOTE — PROGRESS NOTE ADULT - SUBJECTIVE AND OBJECTIVE BOX
Chief complaint  Patient is a 63y old  Female who presents with a chief complaint of heel wound (31 Jul 2023 17:42)         Labs and Fingersticks  CAPILLARY BLOOD GLUCOSE      POCT Blood Glucose.: 250 mg/dL (01 Aug 2023 08:05)  POCT Blood Glucose.: 217 mg/dL (31 Jul 2023 21:16)  POCT Blood Glucose.: 213 mg/dL (31 Jul 2023 16:58)  POCT Blood Glucose.: 227 mg/dL (31 Jul 2023 11:51)      Anion Gap: 17 (08-01 @ 06:57)      Calcium: 9.1 (08-01 @ 06:57)  Albumin: 3.0 *L* (08-01 @ 06:57)    Alanine Aminotransferase (ALT/SGPT): 6 *L* (08-01 @ 06:57)  Alkaline Phosphatase: 53 (08-01 @ 06:57)  Aspartate Aminotransferase (AST/SGOT): 11 (08-01 @ 06:57)        08-01    141  |  99  |  12  ----------------------------<  197<H>  3.5   |  25  |  1.17    Ca    9.1      01 Aug 2023 06:57    TPro  7.3  /  Alb  3.0<L>  /  TBili  0.3  /  DBili  x   /  AST  11  /  ALT  6<L>  /  AlkPhos  53  08-01                        11.9   8.88  )-----------( 346      ( 01 Aug 2023 06:57 )             36.5     Medications  MEDICATIONS  (STANDING):  artificial  tears Solution 1 Drop(s) Both EYES three times a day  atorvastatin 40 milliGRAM(s) Oral at bedtime  chlorhexidine 2% Cloths 1 Application(s) Topical <User Schedule>  dextrose 5%. 1000 milliLiter(s) (100 mL/Hr) IV Continuous <Continuous>  dextrose 5%. 1000 milliLiter(s) (50 mL/Hr) IV Continuous <Continuous>  dextrose 50% Injectable 25 Gram(s) IV Push once  dextrose 50% Injectable 12.5 Gram(s) IV Push once  dextrose 50% Injectable 25 Gram(s) IV Push once  furosemide    Tablet 20 milliGRAM(s) Oral daily  glucagon  Injectable 1 milliGRAM(s) IntraMuscular once  heparin   Injectable 5000 Unit(s) SubCutaneous every 8 hours  hydrALAZINE 50 milliGRAM(s) Oral two times a day  insulin glargine Injectable (LANTUS) 13 Unit(s) SubCutaneous at bedtime  insulin lispro (ADMELOG) corrective regimen sliding scale   SubCutaneous three times a day before meals  insulin lispro (ADMELOG) corrective regimen sliding scale   SubCutaneous at bedtime  insulin lispro Injectable (ADMELOG) 4 Unit(s) SubCutaneous before lunch  insulin lispro Injectable (ADMELOG) 4 Unit(s) SubCutaneous before dinner  insulin lispro Injectable (ADMELOG) 4 Unit(s) SubCutaneous before breakfast  lisinopril 20 milliGRAM(s) Oral daily  metoprolol succinate ER 50 milliGRAM(s) Oral daily  mupirocin 2% Nasal 1 Application(s) Both Nostrils two times a day  mupirocin 2% Ointment 1 Application(s) Topical two times a day  pantoprazole    Tablet 40 milliGRAM(s) Oral before breakfast  piperacillin/tazobactam IVPB.. 3.375 Gram(s) IV Intermittent every 8 hours  senna 2 Tablet(s) Oral at bedtime  tamsulosin 0.4 milliGRAM(s) Oral at bedtime  vancomycin  IVPB      vancomycin  IVPB 750 milliGRAM(s) IV Intermittent every 12 hours      Physical Exam  General: Patient comfortable in bed   Vital Signs Last 12 Hrs  T(F): 98.1 (08-01-23 @ 07:56), Max: 98.1 (08-01-23 @ 07:56)  HR: 60 (08-01-23 @ 07:56) (60 - 60)  BP: 168/77 (08-01-23 @ 07:56) (168/77 - 168/77)  BP(mean): --  RR: 18 (08-01-23 @ 07:56) (18 - 18)  SpO2: 94% (08-01-23 @ 07:56) (94% - 94%)    CVS: S1S2   Respiratory: No wheezing, no crepitations  GI: Abdomen soft, bowel sounds positive  Musculoskeletal:  moves all extremities  : Voiding

## 2023-08-01 NOTE — PROGRESS NOTE ADULT - SUBJECTIVE AND OBJECTIVE BOX
Subjective: Patient seen and examined. No new events except as noted.     REVIEW OF SYSTEMS:    CONSTITUTIONAL: + weakness, fevers or chills  EYES/ENT: No visual changes;  No vertigo or throat pain   NECK: No pain or stiffness  RESPIRATORY: No cough, wheezing, hemoptysis; No shortness of breath  CARDIOVASCULAR: No chest pain or palpitations  GASTROINTESTINAL: No abdominal or epigastric pain. No nausea, vomiting, or hematemesis; No diarrhea or constipation. No melena or hematochezia.  GENITOURINARY: No dysuria, frequency or hematuria  NEUROLOGICAL: No numbness or weakness  SKIN: No itching, burning, rashes, or lesions   All other review of systems is negative unless indicated above.    MEDICATIONS:  MEDICATIONS  (STANDING):  artificial  tears Solution 1 Drop(s) Both EYES three times a day  atorvastatin 40 milliGRAM(s) Oral at bedtime  chlorhexidine 2% Cloths 1 Application(s) Topical <User Schedule>  dextrose 5%. 1000 milliLiter(s) (50 mL/Hr) IV Continuous <Continuous>  dextrose 5%. 1000 milliLiter(s) (100 mL/Hr) IV Continuous <Continuous>  dextrose 50% Injectable 25 Gram(s) IV Push once  dextrose 50% Injectable 12.5 Gram(s) IV Push once  dextrose 50% Injectable 25 Gram(s) IV Push once  furosemide    Tablet 20 milliGRAM(s) Oral daily  glucagon  Injectable 1 milliGRAM(s) IntraMuscular once  heparin   Injectable 5000 Unit(s) SubCutaneous every 8 hours  hydrALAZINE 50 milliGRAM(s) Oral two times a day  insulin glargine Injectable (LANTUS) 10 Unit(s) SubCutaneous at bedtime  insulin lispro (ADMELOG) corrective regimen sliding scale   SubCutaneous three times a day before meals  insulin lispro (ADMELOG) corrective regimen sliding scale   SubCutaneous at bedtime  insulin lispro Injectable (ADMELOG) 3 Unit(s) SubCutaneous before breakfast  insulin lispro Injectable (ADMELOG) 2 Unit(s) SubCutaneous before dinner  insulin lispro Injectable (ADMELOG) 3 Unit(s) SubCutaneous before lunch  lisinopril 20 milliGRAM(s) Oral daily  metoprolol succinate ER 50 milliGRAM(s) Oral daily  mupirocin 2% Nasal 1 Application(s) Both Nostrils two times a day  mupirocin 2% Ointment 1 Application(s) Topical two times a day  pantoprazole    Tablet 40 milliGRAM(s) Oral before breakfast  piperacillin/tazobactam IVPB.. 3.375 Gram(s) IV Intermittent every 8 hours  senna 2 Tablet(s) Oral at bedtime  tamsulosin 0.4 milliGRAM(s) Oral at bedtime    PHYSICAL EXAM:  Vital Signs Last 24 Hrs  T(C): 36.7 (01 Aug 2023 07:56), Max: 36.7 (01 Aug 2023 07:56)  T(F): 98.1 (01 Aug 2023 07:56), Max: 98.1 (01 Aug 2023 07:56)  HR: 60 (01 Aug 2023 07:56) (60 - 67)  BP: 168/77 (01 Aug 2023 07:56) (155/80 - 177/90)  BP(mean): --  RR: 18 (01 Aug 2023 07:56) (18 - 18)  SpO2: 94% (01 Aug 2023 07:56) (91% - 95%)    Parameters below as of 01 Aug 2023 07:56  Patient On (Oxygen Delivery Method): room air    I&O's Summary    31 Jul 2023 07:01  -  01 Aug 2023 07:00  --------------------------------------------------------  IN: 500 mL / OUT: 1302 mL / NET: -802 mL    Appearance: NAD  HEENT:  Dry oral mucosa, PERRL, EOMI	  Lymphatic: No lymphadenopathy , no edema  Cardiovascular: Normal S1 S2, No JVD, No murmurs , Peripheral pulses palpable 2+ bilaterally  Respiratory: Lungs clear to auscultation, normal effort 	  Gastrointestinal:  Soft, Non-tender, + BS	  Musculoskeletal: Normal range of motion, normal strength  Psychiatry:  Mood & affect appropriate  Vascular: DP/PT 0/4, B/L, CFT <3 seconds B/L, Temperature gradient warm to cool, B/L.   Neuro: Epicritic sensation dimished to the level of toes, B/L.  Musculoskeletal/Ortho: Unremarkable.  Skin: s/p BL heel wound debridement dressings c/d/i     LABS:    CARDIAC MARKERS:                        11.9   8.88  )-----------( 346      ( 01 Aug 2023 06:57 )             36.5     08-01    141  |  99  |  12  ----------------------------<  197<H>  3.5   |  25  |  1.17    Ca    9.1      01 Aug 2023 06:57    TPro  7.3  /  Alb  3.0<L>  /  TBili  0.3  /  DBili  x   /  AST  11  /  ALT  6<L>  /  AlkPhos  53  08-01    proBNP:   Lipid Profile:   HgA1c:   TSH:     TELEMETRY: 	    ECG:  	  RADIOLOGY:   DIAGNOSTIC TESTING:  [ ] Echocardiogram:  [ ]  Catheterization:  [ ] Stress Test:    OTHER:

## 2023-08-01 NOTE — PROGRESS NOTE ADULT - ASSESSMENT
62 y/o F PMHx of DMH2 on insulin, HTN, HLD p/w right heel ulcer. Patient is sent by her podiatrist for questionable right foot procedure.  She is unsure how long she has had this ulcer for.  States that she was recently hospitalized in the near [facility] for an evaluation of the right foot infection.  States she still had a 102 fever yesterday which resolved.  Denies any fevers today, chills, chest pain, shortness of breath, abdominal pain, nausea vomiting diarrhea.  States she ambulates with a walker at baseline.  Accompanied by her sister-in-law. 64 y/o F PMHx of DMH2 on insulin, HTN, HLD p/w right heel ulcer. Patient is sent by her podiatrist for questionable right foot procedure.  She is unsure how long she has had this ulcer for.  States that she was recently hospitalized in the near [facility] for an evaluation of the right foot infection.  States she still had a 102 fever yesterday which resolved.  Denies any fevers today, chills, chest pain, shortness of breath, abdominal pain, nausea vomiting diarrhea.  States she ambulates with a walker at baseline.  Accompanied by her sister-in-law. 62 y/o F PMHx of DMH2 on insulin, HTN, HLD p/w right heel ulcer. Patient is sent by her podiatrist for questionable right foot procedure.  She is unsure how long she has had this ulcer for.  States that she was recently hospitalized in the near [facility] for an evaluation of the right foot infection.  States she still had a 102 fever yesterday which resolved.  Denies any fevers today, chills, chest pain, shortness of breath, abdominal pain, nausea vomiting diarrhea.  States she ambulates with a walker at baseline.  Accompanied by her sister-in-law.

## 2023-08-01 NOTE — PROGRESS NOTE ADULT - ASSESSMENT
64 y/o F PMHx of DMH2 on insulin, HTN, HLD p/w right heel ulcer.    Assessment  DMT2: 63y Female with DM T2 with hyperglycemia, A1C 8.4%, was on insulin at home, now on basal bolus insulin with coverage, blood sugars running high. Has right heel ulcer. Started on basal insulin, glucose trending high.   ?Gastroparesis: Has nausea at times, improved.   Foot wound: on medications, monitored. Podiatry eval/work up. MRSA wound on isolation   HTN: on antihypertensive medications, monitored, asymptomatic.  HLD: stable, on statin, monitored.    Discussed plan and management with Attending   Armando Toscano NP - TEAMS  Dr Wesley Hannah  [phone]         62 y/o F PMHx of DMH2 on insulin, HTN, HLD p/w right heel ulcer.    Assessment  DMT2: 63y Female with DM T2 with hyperglycemia, A1C 8.4%, was on insulin at home, now on basal bolus insulin with coverage, blood sugars running high. Has right heel ulcer. Started on basal insulin, glucose trending high.   ?Gastroparesis: Has nausea at times, improved.   Foot wound: on medications, monitored. Podiatry eval/work up. MRSA wound on isolation   HTN: on antihypertensive medications, monitored, asymptomatic.  HLD: stable, on statin, monitored.    Discussed plan and management with Attending   Armando Toscano NP - TEAMS  Dr Wesley Hannah  [phone]         62 y/o F PMHx of DMH2 on insulin, HTN, HLD p/w right heel ulcer.    Assessment  DMT2: 63y Female with DM T2 with hyperglycemia, A1C 8.4%, was on insulin at home, now on basal bolus insulin with coverage, blood sugars running high. Has right heel ulcer. Started on basal insulin, glucose trending high.   ?Gastroparesis: Has nausea at times, improved.   Foot wound: on medications, monitored. Podiatry eval/work up. MRSA wound on isolation   HTN: on antihypertensive medications, monitored, asymptomatic.  HLD: stable, on statin, monitored.    Discussed plan and management with Attending   Armando Toscano NP - TEAMS  Dr Wesley Hannah  [phone]

## 2023-08-01 NOTE — PROGRESS NOTE ADULT - SUBJECTIVE AND OBJECTIVE BOX
STEPHANIE KAYE  63y Female  MRN:[id number]    Patient is a 63y old  Female who presents with a chief complaint of foot infection    HPI:   62 y/o F PMHx of DMH2 on insulin, HTN, HLD p/w right heel ulcer. Patient is sent by her podiatrist for questionable right foot procedure.  She is unsure how long she has had this ulcer for.  States that she was recently hospitalized in the near [facility] for an evaluation of the right foot infection.  States she still had a 102 fever yesterday which resolved.  Denies any fevers today, chills, chest pain, shortness of breath, abdominal pain, nausea vomiting diarrhea.  States she ambulates with a walker at baseline.  Accompanied by her sister-in-law (25 Jul 2023 21:58)      pt now s/p bilateral foot heel wound debridement with right foot kerecis graft application, and right foot calcaneus bone biopsy on 7/27      Patient seen and evaluated at bedside. No acute events overnight except as noted.    Interval HPI: no acute events o/n        PAST MEDICAL & SURGICAL HISTORY:  Diabetes      Hypertension      Hypercholesteremia          REVIEW OF SYSTEMS:  as per hpi     VITALS:   Vital Signs Last 24 Hrs  T(C): 36.7 (01 Aug 2023 15:21), Max: 36.7 (01 Aug 2023 07:56)  T(F): 98.1 (01 Aug 2023 15:21), Max: 98.1 (01 Aug 2023 07:56)  HR: 59 (01 Aug 2023 15:21) (59 - 62)  BP: 145/72 (01 Aug 2023 15:21) (145/72 - 168/77)  BP(mean): --  RR: 18 (01 Aug 2023 15:21) (18 - 18)  SpO2: 92% (01 Aug 2023 15:21) (92% - 95%)    Parameters below as of 01 Aug 2023 15:21  Patient On (Oxygen Delivery Method): room air            PHYSICAL EXAM:  GENERAL: NAD, well-developed  HEAD:  Atraumatic, Normocephalic  EYES: EOMI, PERRLA, conjunctiva and sclera clear  NECK: Supple, No JVD  CHEST/LUNG: Clear to auscultation bilaterally; No wheeze  HEART: S1, S2; No murmurs, rubs, or gallops  ABDOMEN: Soft, Nontender, Nondistended; Bowel sounds present  EXTREMITIES:  2+ Peripheral Pulses, No clubbing, cyanosis, or edema. b/l lower ext dressing in place   PSYCH: Normal affect  NEUROLOGY: AAOX3; non-focal  SKIN: No rashes or lesions    Consultant(s) Notes Reviewed:  [x ] YES  [ ] NO  Care Discussed with Consultants/Other Providers [ x] YES  [ ] NO    MEDS:   MEDICATIONS  (STANDING):  artificial  tears Solution 1 Drop(s) Both EYES three times a day  atorvastatin 40 milliGRAM(s) Oral at bedtime  chlorhexidine 2% Cloths 1 Application(s) Topical <User Schedule>  dextrose 5%. 1000 milliLiter(s) (100 mL/Hr) IV Continuous <Continuous>  dextrose 5%. 1000 milliLiter(s) (50 mL/Hr) IV Continuous <Continuous>  dextrose 50% Injectable 25 Gram(s) IV Push once  dextrose 50% Injectable 12.5 Gram(s) IV Push once  dextrose 50% Injectable 25 Gram(s) IV Push once  furosemide    Tablet 20 milliGRAM(s) Oral daily  glucagon  Injectable 1 milliGRAM(s) IntraMuscular once  heparin   Injectable 5000 Unit(s) SubCutaneous every 8 hours  hydrALAZINE 50 milliGRAM(s) Oral two times a day  insulin glargine Injectable (LANTUS) 15 Unit(s) SubCutaneous at bedtime  insulin lispro (ADMELOG) corrective regimen sliding scale   SubCutaneous three times a day before meals  insulin lispro (ADMELOG) corrective regimen sliding scale   SubCutaneous at bedtime  insulin lispro Injectable (ADMELOG) 4 Unit(s) SubCutaneous before lunch  insulin lispro Injectable (ADMELOG) 4 Unit(s) SubCutaneous before dinner  insulin lispro Injectable (ADMELOG) 4 Unit(s) SubCutaneous before breakfast  lisinopril 20 milliGRAM(s) Oral daily  metoprolol succinate ER 50 milliGRAM(s) Oral daily  mupirocin 2% Nasal 1 Application(s) Both Nostrils two times a day  mupirocin 2% Ointment 1 Application(s) Topical two times a day  pantoprazole    Tablet 40 milliGRAM(s) Oral before breakfast  piperacillin/tazobactam IVPB.. 3.375 Gram(s) IV Intermittent every 8 hours  senna 2 Tablet(s) Oral at bedtime  tamsulosin 0.4 milliGRAM(s) Oral at bedtime  vancomycin  IVPB 750 milliGRAM(s) IV Intermittent every 12 hours  vancomycin  IVPB        MEDICATIONS  (PRN):  acetaminophen     Tablet .. 650 milliGRAM(s) Oral every 6 hours PRN Mild Pain (1 - 3)  dextrose Oral Gel 15 Gram(s) Oral once PRN Blood Glucose LESS THAN 70 milliGRAM(s)/deciliter  HYDROmorphone  Injectable 0.5 milliGRAM(s) IV Push every 4 hours PRN Severe Pain (7 - 10)  ondansetron Injectable 4 milliGRAM(s) IV Push every 8 hours PRN Nausea and/or Vomiting  oxycodone    5 mG/acetaminophen 325 mG 1 Tablet(s) Oral every 4 hours PRN Moderate Pain (4 - 6)      ALLERGIES:  Allergy Status Unknown      LABS:                                          11.9   8.88  )-----------( 346      ( 01 Aug 2023 06:57 )             36.5   08-01    141  |  99  |  12  ----------------------------<  197<H>  3.5   |  25  |  1.17    Ca    9.1      01 Aug 2023 06:57    TPro  7.3  /  Alb  3.0<L>  /  TBili  0.3  /  DBili  x   /  AST  11  /  ALT  6<L>  /  AlkPhos  53  08-01      < from: MR Ankle w/ IV Cont, Right (07.26.23 @ 18:31) >    IMPRESSION:  1.  There is a ulcer overlying the heel roughly spanning 5.4 cm extending   to the lateral calcaneus. There is minimal osseous edema within the   lateral calcaneus with mild postcontrast enhancement and without   significant loss of normal T1 fatty marrow. This is possibly representing   early osteomyelitis versus reactive in nature.  2.  Questionable minimal osseous edema within the fourth metatarsal head   with postcontrast enhancement which is incompletely evaluated. These   findings are possibly secondary to reactive degenerative changes versus   osteomyelitis if there is adjacent ulcer. Clinical correlation is advised.    --- End of Report ---        < end of copied text >     < from: Xray Foot AP + Lateral + Oblique, Right (07.25.23 @ 17:54) >    IMPRESSION:    No acute fracture or dislocation of the right foot. Midfoot degenerative   changes are noted.    Soft tissue defect at the heel related to known heel ulcer. No osseous   erosive changes of the calcaneus or adjacent osseousstructures. Soft   tissue edema is also noted along the dorsal foot and anterior lower shin.   No tracking subcutaneous gas.    Along the first distal phalanx base, there are juxtaarticular erosions at   the medial aspect and productive changes at the lateral aspect. Findings   may be due to underlying inflammatory arthritis or crystalline   arthropathy.    Vascular calcifications.      --- End of Report ---    < end of copied text >   STEPHANIE KAYE  63y Female  MRN:[id number]    Patient is a 63y old  Female who presents with a chief complaint of foot infection    HPI:   64 y/o F PMHx of DMH2 on insulin, HTN, HLD p/w right heel ulcer. Patient is sent by her podiatrist for questionable right foot procedure.  She is unsure how long she has had this ulcer for.  States that she was recently hospitalized in the near [facility] for an evaluation of the right foot infection.  States she still had a 102 fever yesterday which resolved.  Denies any fevers today, chills, chest pain, shortness of breath, abdominal pain, nausea vomiting diarrhea.  States she ambulates with a walker at baseline.  Accompanied by her sister-in-law (25 Jul 2023 21:58)      pt now s/p bilateral foot heel wound debridement with right foot kerecis graft application, and right foot calcaneus bone biopsy on 7/27      Patient seen and evaluated at bedside. No acute events overnight except as noted.    Interval HPI: no acute events o/n        PAST MEDICAL & SURGICAL HISTORY:  Diabetes      Hypertension      Hypercholesteremia          REVIEW OF SYSTEMS:  as per hpi     VITALS:   Vital Signs Last 24 Hrs  T(C): 36.7 (01 Aug 2023 15:21), Max: 36.7 (01 Aug 2023 07:56)  T(F): 98.1 (01 Aug 2023 15:21), Max: 98.1 (01 Aug 2023 07:56)  HR: 59 (01 Aug 2023 15:21) (59 - 62)  BP: 145/72 (01 Aug 2023 15:21) (145/72 - 168/77)  BP(mean): --  RR: 18 (01 Aug 2023 15:21) (18 - 18)  SpO2: 92% (01 Aug 2023 15:21) (92% - 95%)    Parameters below as of 01 Aug 2023 15:21  Patient On (Oxygen Delivery Method): room air            PHYSICAL EXAM:  GENERAL: NAD, well-developed  HEAD:  Atraumatic, Normocephalic  EYES: EOMI, PERRLA, conjunctiva and sclera clear  NECK: Supple, No JVD  CHEST/LUNG: Clear to auscultation bilaterally; No wheeze  HEART: S1, S2; No murmurs, rubs, or gallops  ABDOMEN: Soft, Nontender, Nondistended; Bowel sounds present  EXTREMITIES:  2+ Peripheral Pulses, No clubbing, cyanosis, or edema. b/l lower ext dressing in place   PSYCH: Normal affect  NEUROLOGY: AAOX3; non-focal  SKIN: No rashes or lesions    Consultant(s) Notes Reviewed:  [x ] YES  [ ] NO  Care Discussed with Consultants/Other Providers [ x] YES  [ ] NO    MEDS:   MEDICATIONS  (STANDING):  artificial  tears Solution 1 Drop(s) Both EYES three times a day  atorvastatin 40 milliGRAM(s) Oral at bedtime  chlorhexidine 2% Cloths 1 Application(s) Topical <User Schedule>  dextrose 5%. 1000 milliLiter(s) (100 mL/Hr) IV Continuous <Continuous>  dextrose 5%. 1000 milliLiter(s) (50 mL/Hr) IV Continuous <Continuous>  dextrose 50% Injectable 25 Gram(s) IV Push once  dextrose 50% Injectable 12.5 Gram(s) IV Push once  dextrose 50% Injectable 25 Gram(s) IV Push once  furosemide    Tablet 20 milliGRAM(s) Oral daily  glucagon  Injectable 1 milliGRAM(s) IntraMuscular once  heparin   Injectable 5000 Unit(s) SubCutaneous every 8 hours  hydrALAZINE 50 milliGRAM(s) Oral two times a day  insulin glargine Injectable (LANTUS) 15 Unit(s) SubCutaneous at bedtime  insulin lispro (ADMELOG) corrective regimen sliding scale   SubCutaneous three times a day before meals  insulin lispro (ADMELOG) corrective regimen sliding scale   SubCutaneous at bedtime  insulin lispro Injectable (ADMELOG) 4 Unit(s) SubCutaneous before lunch  insulin lispro Injectable (ADMELOG) 4 Unit(s) SubCutaneous before dinner  insulin lispro Injectable (ADMELOG) 4 Unit(s) SubCutaneous before breakfast  lisinopril 20 milliGRAM(s) Oral daily  metoprolol succinate ER 50 milliGRAM(s) Oral daily  mupirocin 2% Nasal 1 Application(s) Both Nostrils two times a day  mupirocin 2% Ointment 1 Application(s) Topical two times a day  pantoprazole    Tablet 40 milliGRAM(s) Oral before breakfast  piperacillin/tazobactam IVPB.. 3.375 Gram(s) IV Intermittent every 8 hours  senna 2 Tablet(s) Oral at bedtime  tamsulosin 0.4 milliGRAM(s) Oral at bedtime  vancomycin  IVPB 750 milliGRAM(s) IV Intermittent every 12 hours  vancomycin  IVPB        MEDICATIONS  (PRN):  acetaminophen     Tablet .. 650 milliGRAM(s) Oral every 6 hours PRN Mild Pain (1 - 3)  dextrose Oral Gel 15 Gram(s) Oral once PRN Blood Glucose LESS THAN 70 milliGRAM(s)/deciliter  HYDROmorphone  Injectable 0.5 milliGRAM(s) IV Push every 4 hours PRN Severe Pain (7 - 10)  ondansetron Injectable 4 milliGRAM(s) IV Push every 8 hours PRN Nausea and/or Vomiting  oxycodone    5 mG/acetaminophen 325 mG 1 Tablet(s) Oral every 4 hours PRN Moderate Pain (4 - 6)      ALLERGIES:  Allergy Status Unknown      LABS:                                          11.9   8.88  )-----------( 346      ( 01 Aug 2023 06:57 )             36.5   08-01    141  |  99  |  12  ----------------------------<  197<H>  3.5   |  25  |  1.17    Ca    9.1      01 Aug 2023 06:57    TPro  7.3  /  Alb  3.0<L>  /  TBili  0.3  /  DBili  x   /  AST  11  /  ALT  6<L>  /  AlkPhos  53  08-01      < from: MR Ankle w/ IV Cont, Right (07.26.23 @ 18:31) >    IMPRESSION:  1.  There is a ulcer overlying the heel roughly spanning 5.4 cm extending   to the lateral calcaneus. There is minimal osseous edema within the   lateral calcaneus with mild postcontrast enhancement and without   significant loss of normal T1 fatty marrow. This is possibly representing   early osteomyelitis versus reactive in nature.  2.  Questionable minimal osseous edema within the fourth metatarsal head   with postcontrast enhancement which is incompletely evaluated. These   findings are possibly secondary to reactive degenerative changes versus   osteomyelitis if there is adjacent ulcer. Clinical correlation is advised.    --- End of Report ---        < end of copied text >     < from: Xray Foot AP + Lateral + Oblique, Right (07.25.23 @ 17:54) >    IMPRESSION:    No acute fracture or dislocation of the right foot. Midfoot degenerative   changes are noted.    Soft tissue defect at the heel related to known heel ulcer. No osseous   erosive changes of the calcaneus or adjacent osseousstructures. Soft   tissue edema is also noted along the dorsal foot and anterior lower shin.   No tracking subcutaneous gas.    Along the first distal phalanx base, there are juxtaarticular erosions at   the medial aspect and productive changes at the lateral aspect. Findings   may be due to underlying inflammatory arthritis or crystalline   arthropathy.    Vascular calcifications.      --- End of Report ---    < end of copied text >   STEPHANIE KAYE  63y Female  MRN:[id number]    Patient is a 63y old  Female who presents with a chief complaint of foot infection    HPI:   62 y/o F PMHx of DMH2 on insulin, HTN, HLD p/w right heel ulcer. Patient is sent by her podiatrist for questionable right foot procedure.  She is unsure how long she has had this ulcer for.  States that she was recently hospitalized in the near [facility] for an evaluation of the right foot infection.  States she still had a 102 fever yesterday which resolved.  Denies any fevers today, chills, chest pain, shortness of breath, abdominal pain, nausea vomiting diarrhea.  States she ambulates with a walker at baseline.  Accompanied by her sister-in-law (25 Jul 2023 21:58)      pt now s/p bilateral foot heel wound debridement with right foot kerecis graft application, and right foot calcaneus bone biopsy on 7/27      Patient seen and evaluated at bedside. No acute events overnight except as noted.    Interval HPI: no acute events o/n        PAST MEDICAL & SURGICAL HISTORY:  Diabetes      Hypertension      Hypercholesteremia          REVIEW OF SYSTEMS:  as per hpi     VITALS:   Vital Signs Last 24 Hrs  T(C): 36.7 (01 Aug 2023 15:21), Max: 36.7 (01 Aug 2023 07:56)  T(F): 98.1 (01 Aug 2023 15:21), Max: 98.1 (01 Aug 2023 07:56)  HR: 59 (01 Aug 2023 15:21) (59 - 62)  BP: 145/72 (01 Aug 2023 15:21) (145/72 - 168/77)  BP(mean): --  RR: 18 (01 Aug 2023 15:21) (18 - 18)  SpO2: 92% (01 Aug 2023 15:21) (92% - 95%)    Parameters below as of 01 Aug 2023 15:21  Patient On (Oxygen Delivery Method): room air            PHYSICAL EXAM:  GENERAL: NAD, well-developed  HEAD:  Atraumatic, Normocephalic  EYES: EOMI, PERRLA, conjunctiva and sclera clear  NECK: Supple, No JVD  CHEST/LUNG: Clear to auscultation bilaterally; No wheeze  HEART: S1, S2; No murmurs, rubs, or gallops  ABDOMEN: Soft, Nontender, Nondistended; Bowel sounds present  EXTREMITIES:  2+ Peripheral Pulses, No clubbing, cyanosis, or edema. b/l lower ext dressing in place   PSYCH: Normal affect  NEUROLOGY: AAOX3; non-focal  SKIN: No rashes or lesions    Consultant(s) Notes Reviewed:  [x ] YES  [ ] NO  Care Discussed with Consultants/Other Providers [ x] YES  [ ] NO    MEDS:   MEDICATIONS  (STANDING):  artificial  tears Solution 1 Drop(s) Both EYES three times a day  atorvastatin 40 milliGRAM(s) Oral at bedtime  chlorhexidine 2% Cloths 1 Application(s) Topical <User Schedule>  dextrose 5%. 1000 milliLiter(s) (100 mL/Hr) IV Continuous <Continuous>  dextrose 5%. 1000 milliLiter(s) (50 mL/Hr) IV Continuous <Continuous>  dextrose 50% Injectable 25 Gram(s) IV Push once  dextrose 50% Injectable 12.5 Gram(s) IV Push once  dextrose 50% Injectable 25 Gram(s) IV Push once  furosemide    Tablet 20 milliGRAM(s) Oral daily  glucagon  Injectable 1 milliGRAM(s) IntraMuscular once  heparin   Injectable 5000 Unit(s) SubCutaneous every 8 hours  hydrALAZINE 50 milliGRAM(s) Oral two times a day  insulin glargine Injectable (LANTUS) 15 Unit(s) SubCutaneous at bedtime  insulin lispro (ADMELOG) corrective regimen sliding scale   SubCutaneous three times a day before meals  insulin lispro (ADMELOG) corrective regimen sliding scale   SubCutaneous at bedtime  insulin lispro Injectable (ADMELOG) 4 Unit(s) SubCutaneous before lunch  insulin lispro Injectable (ADMELOG) 4 Unit(s) SubCutaneous before dinner  insulin lispro Injectable (ADMELOG) 4 Unit(s) SubCutaneous before breakfast  lisinopril 20 milliGRAM(s) Oral daily  metoprolol succinate ER 50 milliGRAM(s) Oral daily  mupirocin 2% Nasal 1 Application(s) Both Nostrils two times a day  mupirocin 2% Ointment 1 Application(s) Topical two times a day  pantoprazole    Tablet 40 milliGRAM(s) Oral before breakfast  piperacillin/tazobactam IVPB.. 3.375 Gram(s) IV Intermittent every 8 hours  senna 2 Tablet(s) Oral at bedtime  tamsulosin 0.4 milliGRAM(s) Oral at bedtime  vancomycin  IVPB 750 milliGRAM(s) IV Intermittent every 12 hours  vancomycin  IVPB        MEDICATIONS  (PRN):  acetaminophen     Tablet .. 650 milliGRAM(s) Oral every 6 hours PRN Mild Pain (1 - 3)  dextrose Oral Gel 15 Gram(s) Oral once PRN Blood Glucose LESS THAN 70 milliGRAM(s)/deciliter  HYDROmorphone  Injectable 0.5 milliGRAM(s) IV Push every 4 hours PRN Severe Pain (7 - 10)  ondansetron Injectable 4 milliGRAM(s) IV Push every 8 hours PRN Nausea and/or Vomiting  oxycodone    5 mG/acetaminophen 325 mG 1 Tablet(s) Oral every 4 hours PRN Moderate Pain (4 - 6)      ALLERGIES:  Allergy Status Unknown      LABS:                                          11.9   8.88  )-----------( 346      ( 01 Aug 2023 06:57 )             36.5   08-01    141  |  99  |  12  ----------------------------<  197<H>  3.5   |  25  |  1.17    Ca    9.1      01 Aug 2023 06:57    TPro  7.3  /  Alb  3.0<L>  /  TBili  0.3  /  DBili  x   /  AST  11  /  ALT  6<L>  /  AlkPhos  53  08-01      < from: MR Ankle w/ IV Cont, Right (07.26.23 @ 18:31) >    IMPRESSION:  1.  There is a ulcer overlying the heel roughly spanning 5.4 cm extending   to the lateral calcaneus. There is minimal osseous edema within the   lateral calcaneus with mild postcontrast enhancement and without   significant loss of normal T1 fatty marrow. This is possibly representing   early osteomyelitis versus reactive in nature.  2.  Questionable minimal osseous edema within the fourth metatarsal head   with postcontrast enhancement which is incompletely evaluated. These   findings are possibly secondary to reactive degenerative changes versus   osteomyelitis if there is adjacent ulcer. Clinical correlation is advised.    --- End of Report ---        < end of copied text >     < from: Xray Foot AP + Lateral + Oblique, Right (07.25.23 @ 17:54) >    IMPRESSION:    No acute fracture or dislocation of the right foot. Midfoot degenerative   changes are noted.    Soft tissue defect at the heel related to known heel ulcer. No osseous   erosive changes of the calcaneus or adjacent osseousstructures. Soft   tissue edema is also noted along the dorsal foot and anterior lower shin.   No tracking subcutaneous gas.    Along the first distal phalanx base, there are juxtaarticular erosions at   the medial aspect and productive changes at the lateral aspect. Findings   may be due to underlying inflammatory arthritis or crystalline   arthropathy.    Vascular calcifications.      --- End of Report ---    < end of copied text >

## 2023-08-02 LAB
CULTURE RESULTS: SIGNIFICANT CHANGE UP
GLUCOSE BLDC GLUCOMTR-MCNC: 132 MG/DL — HIGH (ref 70–99)
GLUCOSE BLDC GLUCOMTR-MCNC: 162 MG/DL — HIGH (ref 70–99)
GLUCOSE BLDC GLUCOMTR-MCNC: 234 MG/DL — HIGH (ref 70–99)
GLUCOSE BLDC GLUCOMTR-MCNC: 236 MG/DL — HIGH (ref 70–99)
ORGANISM # SPEC MICROSCOPIC CNT: SIGNIFICANT CHANGE UP
SPECIMEN SOURCE: SIGNIFICANT CHANGE UP
VANCOMYCIN TROUGH SERPL-MCNC: 13.2 UG/ML — SIGNIFICANT CHANGE UP (ref 10–20)

## 2023-08-02 PROCEDURE — 99232 SBSQ HOSP IP/OBS MODERATE 35: CPT

## 2023-08-02 RX ORDER — INSULIN LISPRO 100/ML
6 VIAL (ML) SUBCUTANEOUS
Refills: 0 | Status: DISCONTINUED | OUTPATIENT
Start: 2023-08-02 | End: 2023-08-04

## 2023-08-02 RX ORDER — INSULIN GLARGINE 100 [IU]/ML
17 INJECTION, SOLUTION SUBCUTANEOUS AT BEDTIME
Refills: 0 | Status: DISCONTINUED | OUTPATIENT
Start: 2023-08-02 | End: 2023-08-04

## 2023-08-02 RX ADMIN — ATORVASTATIN CALCIUM 40 MILLIGRAM(S): 80 TABLET, FILM COATED ORAL at 22:19

## 2023-08-02 RX ADMIN — Medication 6 UNIT(S): at 17:18

## 2023-08-02 RX ADMIN — Medication 2: at 08:19

## 2023-08-02 RX ADMIN — Medication 50 MILLIGRAM(S): at 05:25

## 2023-08-02 RX ADMIN — PANTOPRAZOLE SODIUM 40 MILLIGRAM(S): 20 TABLET, DELAYED RELEASE ORAL at 05:25

## 2023-08-02 RX ADMIN — INSULIN GLARGINE 17 UNIT(S): 100 INJECTION, SOLUTION SUBCUTANEOUS at 22:19

## 2023-08-02 RX ADMIN — HEPARIN SODIUM 5000 UNIT(S): 5000 INJECTION INTRAVENOUS; SUBCUTANEOUS at 05:24

## 2023-08-02 RX ADMIN — Medication 6 UNIT(S): at 12:04

## 2023-08-02 RX ADMIN — CHLORHEXIDINE GLUCONATE 1 APPLICATION(S): 213 SOLUTION TOPICAL at 07:26

## 2023-08-02 RX ADMIN — Medication 4 UNIT(S): at 08:19

## 2023-08-02 RX ADMIN — Medication 250 MILLIGRAM(S): at 18:56

## 2023-08-02 RX ADMIN — PIPERACILLIN AND TAZOBACTAM 25 GRAM(S): 4; .5 INJECTION, POWDER, LYOPHILIZED, FOR SOLUTION INTRAVENOUS at 22:20

## 2023-08-02 RX ADMIN — PIPERACILLIN AND TAZOBACTAM 25 GRAM(S): 4; .5 INJECTION, POWDER, LYOPHILIZED, FOR SOLUTION INTRAVENOUS at 14:41

## 2023-08-02 RX ADMIN — TAMSULOSIN HYDROCHLORIDE 0.4 MILLIGRAM(S): 0.4 CAPSULE ORAL at 22:19

## 2023-08-02 RX ADMIN — Medication 250 MILLIGRAM(S): at 05:24

## 2023-08-02 RX ADMIN — Medication 1 DROP(S): at 22:19

## 2023-08-02 RX ADMIN — PIPERACILLIN AND TAZOBACTAM 25 GRAM(S): 4; .5 INJECTION, POWDER, LYOPHILIZED, FOR SOLUTION INTRAVENOUS at 07:26

## 2023-08-02 RX ADMIN — HEPARIN SODIUM 5000 UNIT(S): 5000 INJECTION INTRAVENOUS; SUBCUTANEOUS at 22:19

## 2023-08-02 RX ADMIN — HEPARIN SODIUM 5000 UNIT(S): 5000 INJECTION INTRAVENOUS; SUBCUTANEOUS at 14:40

## 2023-08-02 RX ADMIN — PIPERACILLIN AND TAZOBACTAM 25 GRAM(S): 4; .5 INJECTION, POWDER, LYOPHILIZED, FOR SOLUTION INTRAVENOUS at 00:05

## 2023-08-02 RX ADMIN — MUPIROCIN 1 APPLICATION(S): 20 OINTMENT TOPICAL at 05:24

## 2023-08-02 RX ADMIN — Medication 20 MILLIGRAM(S): at 05:24

## 2023-08-02 RX ADMIN — Medication 50 MILLIGRAM(S): at 17:19

## 2023-08-02 RX ADMIN — Medication 1: at 12:04

## 2023-08-02 RX ADMIN — LISINOPRIL 20 MILLIGRAM(S): 2.5 TABLET ORAL at 05:24

## 2023-08-02 NOTE — PROGRESS NOTE ADULT - ASSESSMENT
64 y/o F PMHx of DMH2 on insulin, HTN, HLD p/w right heel ulcer.    Assessment  DMT2: 63y Female with DM T2 with hyperglycemia, A1C 8.4%, was on insulin at home, now on basal bolus insulin with coverage, blood sugars running high. Has right heel ulcer. Started on basal insulin, glucose trending high.   ?Gastroparesis: Has nausea at times, improved.   Foot wound: on medications, monitored. Podiatry eval/work up. MRSA wound on isolation   HTN: on antihypertensive medications, monitored, asymptomatic.  HLD: stable, on statin, monitored.    Discussed plan and management with Attending   Armando Toscano NP - TEAMS  Dr Wesley Hannah  [phone]         62 y/o F PMHx of DMH2 on insulin, HTN, HLD p/w right heel ulcer.    Assessment  DMT2: 63y Female with DM T2 with hyperglycemia, A1C 8.4%, was on insulin at home, now on basal bolus insulin with coverage, blood sugars running high. Has right heel ulcer. Started on basal insulin, glucose trending high.   ?Gastroparesis: Has nausea at times, improved.   Foot wound: on medications, monitored. Podiatry eval/work up. MRSA wound on isolation   HTN: on antihypertensive medications, monitored, asymptomatic.  HLD: stable, on statin, monitored.    Discussed plan and management with Attending   Armando Toscano NP - TEAMS  Dr Wesley Hannah  [phone]         62 y/o F PMHx of DMH2 on insulin, HTN, HLD p/w right heel ulcer.    Assessment  DMT2: 63y Female with DM T2 with hyperglycemia, A1C 8.4%, was on insulin at home, now on basal bolus insulin with coverage, blood sugars running high. Has right heel ulcer. Started on basal insulin, glucose trending high.   ?Gastroparesis: Has nausea at times, improved.   Foot wound: on medications, monitored. Podiatry eval/work up. MRSA wound on isolation   HTN: on antihypertensive medications, monitored, asymptomatic.  HLD: stable, on statin, monitored.    Discussed plan and management with Attending   Armando Toscano NP - TEAMS  Dr Wesley Hannah  [phone]

## 2023-08-02 NOTE — PROGRESS NOTE ADULT - ASSESSMENT
63 female h/o dm, htn, chol, here with b/l foot wounds    b/l foot wounds  pod eval noted  id consult apprec  iv abx as per id  f/u cult  wound care  MRI noted  s/p bilateral foot heel wound debridement with right foot kerecis graft application, and right foot calcaneus bone biopsy on 7/27  post op care per pod  VAC applied  f/u cultures from OR  abx per id  will need picc line for long term abx       dm  insulin as ordered  monitor fs    chol  cont statin    htn  cont home meds    dvt ppx    dc planning rehab      Advanced care planning was discussed with patient and family.  Advanced care planning forms were reviewed and discussed as appropriate.  Differential diagnosis and plan of care discussed with patient after the evaluation.   Pain assessed and judicious use of narcotics when appropriate was discussed.  Importance of Fall prevention discussed.  Counseling on Smoking and Alcohol cessation was offered when appropriate.  Counseling on Diet, exercise, and medication compliance was done.       Approx 60 minutes spent.

## 2023-08-02 NOTE — PROGRESS NOTE ADULT - SUBJECTIVE AND OBJECTIVE BOX
Subjective: Patient seen and examined. No new events except as noted.     REVIEW OF SYSTEMS:    CONSTITUTIONAL: + weakness, fevers or chills  EYES/ENT: No visual changes;  No vertigo or throat pain   NECK: No pain or stiffness  RESPIRATORY: No cough, wheezing, hemoptysis; No shortness of breath  CARDIOVASCULAR: No chest pain or palpitations  GASTROINTESTINAL: No abdominal or epigastric pain. No nausea, vomiting, or hematemesis; No diarrhea or constipation. No melena or hematochezia.  GENITOURINARY: No dysuria, frequency or hematuria  NEUROLOGICAL: No numbness or weakness  SKIN: No itching, burning, rashes, or lesions   All other review of systems is negative unless indicated above.    MEDICATIONS:  MEDICATIONS  (STANDING):  artificial  tears Solution 1 Drop(s) Both EYES three times a day  atorvastatin 40 milliGRAM(s) Oral at bedtime  chlorhexidine 2% Cloths 1 Application(s) Topical <User Schedule>  dextrose 5%. 1000 milliLiter(s) (50 mL/Hr) IV Continuous <Continuous>  dextrose 5%. 1000 milliLiter(s) (100 mL/Hr) IV Continuous <Continuous>  dextrose 50% Injectable 25 Gram(s) IV Push once  dextrose 50% Injectable 12.5 Gram(s) IV Push once  dextrose 50% Injectable 25 Gram(s) IV Push once  furosemide    Tablet 20 milliGRAM(s) Oral daily  glucagon  Injectable 1 milliGRAM(s) IntraMuscular once  heparin   Injectable 5000 Unit(s) SubCutaneous every 8 hours  hydrALAZINE 50 milliGRAM(s) Oral two times a day  insulin glargine Injectable (LANTUS) 10 Unit(s) SubCutaneous at bedtime  insulin lispro (ADMELOG) corrective regimen sliding scale   SubCutaneous three times a day before meals  insulin lispro (ADMELOG) corrective regimen sliding scale   SubCutaneous at bedtime  insulin lispro Injectable (ADMELOG) 3 Unit(s) SubCutaneous before breakfast  insulin lispro Injectable (ADMELOG) 2 Unit(s) SubCutaneous before dinner  insulin lispro Injectable (ADMELOG) 3 Unit(s) SubCutaneous before lunch  lisinopril 20 milliGRAM(s) Oral daily  metoprolol succinate ER 50 milliGRAM(s) Oral daily  mupirocin 2% Nasal 1 Application(s) Both Nostrils two times a day  mupirocin 2% Ointment 1 Application(s) Topical two times a day  pantoprazole    Tablet 40 milliGRAM(s) Oral before breakfast  piperacillin/tazobactam IVPB.. 3.375 Gram(s) IV Intermittent every 8 hours  senna 2 Tablet(s) Oral at bedtime  tamsulosin 0.4 milliGRAM(s) Oral at bedtime    PHYSICAL EXAM:  Vital Signs Last 24 Hrs  T(C): 37 (02 Aug 2023 09:21), Max: 37 (02 Aug 2023 09:21)  T(F): 98.6 (02 Aug 2023 09:21), Max: 98.6 (02 Aug 2023 09:21)  HR: 61 (02 Aug 2023 09:21) (59 - 61)  BP: 151/77 (02 Aug 2023 09:21) (144/69 - 151/77)  BP(mean): --  RR: 18 (02 Aug 2023 09:21) (16 - 18)  SpO2: 94% (02 Aug 2023 09:21) (92% - 95%)    Parameters below as of 02 Aug 2023 09:21  Patient On (Oxygen Delivery Method): room air    I&O's Summary    01 Aug 2023 07:01  -  02 Aug 2023 07:00  --------------------------------------------------------  IN: 890 mL / OUT: 700 mL / NET: 190 mL    02 Aug 2023 07:01  -  02 Aug 2023 12:35  --------------------------------------------------------  IN: 320 mL / OUT: 0 mL / NET: 320 mL    Appearance: NAD  HEENT:  Dry oral mucosa, PERRL, EOMI	  Lymphatic: No lymphadenopathy , no edema  Cardiovascular: Normal S1 S2, No JVD, No murmurs , Peripheral pulses palpable 2+ bilaterally  Respiratory: Lungs clear to auscultation, normal effort 	  Gastrointestinal:  Soft, Non-tender, + BS	  Musculoskeletal: Normal range of motion, normal strength  Psychiatry:  Mood & affect appropriate  Vascular: DP/PT 0/4, B/L, CFT <3 seconds B/L, Temperature gradient warm to cool, B/L.   Neuro: Epicritic sensation dimished to the level of toes, B/L.  Musculoskeletal/Ortho: Unremarkable.  Skin: s/p BL heel wound debridement dressings c/d/i     LABS:    CARDIAC MARKERS:                        11.9   8.88  )-----------( 346      ( 01 Aug 2023 06:57 )             36.5     08-01    141  |  99  |  12  ----------------------------<  197<H>  3.5   |  25  |  1.17    Ca    9.1      01 Aug 2023 06:57    TPro  7.3  /  Alb  3.0<L>  /  TBili  0.3  /  DBili  x   /  AST  11  /  ALT  6<L>  /  AlkPhos  53  08-01    proBNP:   Lipid Profile:   HgA1c:   TSH:     TELEMETRY: 	    ECG:  	  RADIOLOGY:   DIAGNOSTIC TESTING:  [ ] Echocardiogram:  [ ]  Catheterization:  [ ] Stress Test:    OTHER:

## 2023-08-02 NOTE — PROGRESS NOTE ADULT - PROBLEM SELECTOR PLAN 1
Increased Lantus 17 units qHS  and ADMELOG 6 units before each meal for now.   Will continue monitoring FS, log, and glucose trends, will Follow up.  Patient counseled for compliance with consistent low carb diet and exercise as tolerated outpatient.   If continue nausea and vomiting Consider Gastric emptying study, to r/o Gastroparesis.    Was using insulin at home, basal and bolus. DC home on current insulin doses if sugars remain stable  may use her home insulin brands

## 2023-08-02 NOTE — PROVIDER CONTACT NOTE (CRITICAL VALUE NOTIFICATION) - ACTION/TREATMENT ORDERED:
No new orders at this time, continue with antibiotics
no  new orders
continue to monitor
no new orders
Np aware.

## 2023-08-02 NOTE — PROGRESS NOTE ADULT - SUBJECTIVE AND OBJECTIVE BOX
Follow Up:  calcaneal osteo    Interval History/ROS:  denies pain    Allergies  No Known Allergies    ANTIMICROBIALS:  piperacillin/tazobactam IVPB.. 3.375 every 8 hours  vancomycin  IVPB 750 every 12 hours  vancomycin  IVPB        OTHER MEDS:  MEDICATIONS  (STANDING):  acetaminophen     Tablet .. 650 every 6 hours PRN  atorvastatin 40 at bedtime  dextrose 50% Injectable 25 once  dextrose 50% Injectable 12.5 once  dextrose 50% Injectable 25 once  dextrose Oral Gel 15 once PRN  furosemide    Tablet 20 daily  glucagon  Injectable 1 once  heparin   Injectable 5000 every 8 hours  hydrALAZINE 50 two times a day  HYDROmorphone  Injectable 0.5 every 4 hours PRN  insulin glargine Injectable (LANTUS) 17 at bedtime  insulin lispro (ADMELOG) corrective regimen sliding scale  three times a day before meals  insulin lispro (ADMELOG) corrective regimen sliding scale  at bedtime  insulin lispro Injectable (ADMELOG) 6 before breakfast  insulin lispro Injectable (ADMELOG) 6 before lunch  insulin lispro Injectable (ADMELOG) 6 before dinner  lisinopril 20 daily  metoprolol succinate ER 50 daily  ondansetron Injectable 4 every 8 hours PRN  oxycodone    5 mG/acetaminophen 325 mG 1 every 4 hours PRN  pantoprazole    Tablet 40 before breakfast  senna 2 at bedtime  tamsulosin 0.4 at bedtime      Vital Signs Last 24 Hrs  T(C): 36.8 (02 Aug 2023 15:51), Max: 37 (02 Aug 2023 09:21)  T(F): 98.2 (02 Aug 2023 15:51), Max: 98.6 (02 Aug 2023 09:21)  HR: 60 (02 Aug 2023 15:51) (60 - 61)  BP: 131/68 (02 Aug 2023 15:51) (131/68 - 151/77)  BP(mean): --  RR: 18 (02 Aug 2023 15:51) (16 - 18)  SpO2: 99% (02 Aug 2023 15:51) (94% - 99%)    Parameters below as of 02 Aug 2023 15:51  Patient On (Oxygen Delivery Method): room air        PHYSICAL EXAM:  General: NAD, Non-toxic  Neurology: A&Ox3, nonfocal  Respiratory: Clear to auscultation bilaterally  CV: RRR, S1S2, no murmurs, rubs or gallops  Abdominal: Soft, Non-tender, non-distended, normal bowel sounds  Extremities: No edema  dressing in place  Line Sites: Clear  Skin: No rash                          11.9   8.88  )-----------( 346      ( 01 Aug 2023 06:57 )             36.5       08-01    141  |  99  |  12  ----------------------------<  197<H>  3.5   |  25  |  1.17    Ca    9.1      01 Aug 2023 06:57    TPro  7.3  /  Alb  3.0<L>  /  TBili  0.3  /  DBili  x   /  AST  11  /  ALT  6<L>  /  AlkPhos  53  08-01      Urinalysis Basic - ( 01 Aug 2023 06:57 )    Color: x / Appearance: x / SG: x / pH: x  Gluc: 197 mg/dL / Ketone: x  / Bili: x / Urobili: x   Blood: x / Protein: x / Nitrite: x   Leuk Esterase: x / RBC: x / WBC x   Sq Epi: x / Non Sq Epi: x / Bacteria: x        MICROBIOLOGY:  .Tissue Other  07-27-23   Testing in progress  --  Morganella morganii  Methicillin resistant Staphylococcus aureus  Klebsiella pneumoniae  Enterococcus faecalis (vancomycin resistant)      .Abscess right heel wound  07-25-23   Numerous Proteus mirabilis  Numerous Pseudomonas aeruginosa  Moderate Enterococcus faecalis (vancomycin resistant)  Few Methicillin Resistant Staphylococcus aureus  Moderate Klebsiella oxytoca/Raoultella ornithinolytica  --  Proteus mirabilis  Pseudomonas aeruginosa  Enterococcus faecalis (vancomycin resistant)  Methicillin resistant Staphylococcus aureus  Klebsiella oxytoca /Raoutella ornithinolytica      .Blood Blood  07-25-23   No growth at 5 days  --  --      .Blood Blood  07-25-23   No growth at 5 days  --  --          Vancomycin Level, Trough: 13.2 ug/mL (08-02-23 @ 04:37)  v          RADIOLOGY:    Mikael Daniels MD; Division of Infectious Disease; Pager: [phone]; nights and weekends: [phone]   Follow Up:  calcaneal osteo    Interval History/ROS:  denies pain    Allergies  No Known Allergies    ANTIMICROBIALS:  piperacillin/tazobactam IVPB.. 3.375 every 8 hours  vancomycin  IVPB 750 every 12 hours  vancomycin  IVPB        OTHER MEDS:  MEDICATIONS  (STANDING):  acetaminophen     Tablet .. 650 every 6 hours PRN  atorvastatin 40 at bedtime  dextrose 50% Injectable 25 once  dextrose 50% Injectable 12.5 once  dextrose 50% Injectable 25 once  dextrose Oral Gel 15 once PRN  furosemide    Tablet 20 daily  glucagon  Injectable 1 once  heparin   Injectable 5000 every 8 hours  hydrALAZINE 50 two times a day  HYDROmorphone  Injectable 0.5 every 4 hours PRN  insulin glargine Injectable (LANTUS) 17 at bedtime  insulin lispro (ADMELOG) corrective regimen sliding scale  three times a day before meals  insulin lispro (ADMELOG) corrective regimen sliding scale  at bedtime  insulin lispro Injectable (ADMELOG) 6 before breakfast  insulin lispro Injectable (ADMELOG) 6 before lunch  insulin lispro Injectable (ADMELOG) 6 before dinner  lisinopril 20 daily  metoprolol succinate ER 50 daily  ondansetron Injectable 4 every 8 hours PRN  oxycodone    5 mG/acetaminophen 325 mG 1 every 4 hours PRN  pantoprazole    Tablet 40 before breakfast  senna 2 at bedtime  tamsulosin 0.4 at bedtime      Vital Signs Last 24 Hrs  T(C): 36.8 (02 Aug 2023 15:51), Max: 37 (02 Aug 2023 09:21)  T(F): 98.2 (02 Aug 2023 15:51), Max: 98.6 (02 Aug 2023 09:21)  HR: 60 (02 Aug 2023 15:51) (60 - 61)  BP: 131/68 (02 Aug 2023 15:51) (131/68 - 151/77)  BP(mean): --  RR: 18 (02 Aug 2023 15:51) (16 - 18)  SpO2: 99% (02 Aug 2023 15:51) (94% - 99%)    Parameters below as of 02 Aug 2023 15:51  Patient On (Oxygen Delivery Method): room air        PHYSICAL EXAM:  General: NAD, Non-toxic  Neurology: A&Ox3, nonfocal  Respiratory: Clear to auscultation bilaterally  CV: RRR, S1S2, no murmurs, rubs or gallops  Abdominal: Soft, Non-tender, non-distended, normal bowel sounds  Extremities: No edema  dressing in place  Line Sites: Clear  Skin: No rash                          11.9   8.88  )-----------( 346      ( 01 Aug 2023 06:57 )             36.5       08-01    141  |  99  |  12  ----------------------------<  197<H>  3.5   |  25  |  1.17    Ca    9.1      01 Aug 2023 06:57    TPro  7.3  /  Alb  3.0<L>  /  TBili  0.3  /  DBili  x   /  AST  11  /  ALT  6<L>  /  AlkPhos  53  08-01      Urinalysis Basic - ( 01 Aug 2023 06:57 )    Color: x / Appearance: x / SG: x / pH: x  Gluc: 197 mg/dL / Ketone: x  / Bili: x / Urobili: x   Blood: x / Protein: x / Nitrite: x   Leuk Esterase: x / RBC: x / WBC x   Sq Epi: x / Non Sq Epi: x / Bacteria: x        MICROBIOLOGY:  .Tissue Other  07-27-23   Testing in progress  --  Morganella morganii  Methicillin resistant Staphylococcus aureus  Klebsiella pneumoniae  Enterococcus faecalis (vancomycin resistant)      .Abscess right heel wound  07-25-23   Numerous Proteus mirabilis  Numerous Pseudomonas aeruginosa  Moderate Enterococcus faecalis (vancomycin resistant)  Few Methicillin Resistant Staphylococcus aureus  Moderate Klebsiella oxytoca/Raoultella ornithinolytica  --  Proteus mirabilis  Pseudomonas aeruginosa  Enterococcus faecalis (vancomycin resistant)  Methicillin resistant Staphylococcus aureus  Klebsiella oxytoca /Raoutella ornithinolytica      .Blood Blood  07-25-23   No growth at 5 days  --  --      .Blood Blood  07-25-23   No growth at 5 days  --  --          Vancomycin Level, Trough: 13.2 ug/mL (08-02-23 @ 04:37)  v          RADIOLOGY:    Mikael Daniels MD; Division of Infectious Disease; Pager: [phone]; nights and weekends: [phone]   Follow Up:  calcaneal osteo    Interval History/ROS:  denies pain    Allergies  No Known Allergies    ANTIMICROBIALS:  piperacillin/tazobactam IVPB.. 3.375 every 8 hours  vancomycin  IVPB 750 every 12 hours  vancomycin  IVPB        OTHER MEDS:  MEDICATIONS  (STANDING):  acetaminophen     Tablet .. 650 every 6 hours PRN  atorvastatin 40 at bedtime  dextrose 50% Injectable 25 once  dextrose 50% Injectable 12.5 once  dextrose 50% Injectable 25 once  dextrose Oral Gel 15 once PRN  furosemide    Tablet 20 daily  glucagon  Injectable 1 once  heparin   Injectable 5000 every 8 hours  hydrALAZINE 50 two times a day  HYDROmorphone  Injectable 0.5 every 4 hours PRN  insulin glargine Injectable (LANTUS) 17 at bedtime  insulin lispro (ADMELOG) corrective regimen sliding scale  three times a day before meals  insulin lispro (ADMELOG) corrective regimen sliding scale  at bedtime  insulin lispro Injectable (ADMELOG) 6 before breakfast  insulin lispro Injectable (ADMELOG) 6 before lunch  insulin lispro Injectable (ADMELOG) 6 before dinner  lisinopril 20 daily  metoprolol succinate ER 50 daily  ondansetron Injectable 4 every 8 hours PRN  oxycodone    5 mG/acetaminophen 325 mG 1 every 4 hours PRN  pantoprazole    Tablet 40 before breakfast  senna 2 at bedtime  tamsulosin 0.4 at bedtime      Vital Signs Last 24 Hrs  T(C): 36.8 (02 Aug 2023 15:51), Max: 37 (02 Aug 2023 09:21)  T(F): 98.2 (02 Aug 2023 15:51), Max: 98.6 (02 Aug 2023 09:21)  HR: 60 (02 Aug 2023 15:51) (60 - 61)  BP: 131/68 (02 Aug 2023 15:51) (131/68 - 151/77)  BP(mean): --  RR: 18 (02 Aug 2023 15:51) (16 - 18)  SpO2: 99% (02 Aug 2023 15:51) (94% - 99%)    Parameters below as of 02 Aug 2023 15:51  Patient On (Oxygen Delivery Method): room air        PHYSICAL EXAM:  General: NAD, Non-toxic  Neurology: A&Ox3, nonfocal  Respiratory: Clear to auscultation bilaterally  CV: RRR, S1S2, no murmurs, rubs or gallops  Abdominal: Soft, Non-tender, non-distended, normal bowel sounds  Extremities: No edema  dressing in place  Line Sites: Clear  Skin: No rash                          11.9   8.88  )-----------( 346      ( 01 Aug 2023 06:57 )             36.5       08-01    141  |  99  |  12  ----------------------------<  197<H>  3.5   |  25  |  1.17    Ca    9.1      01 Aug 2023 06:57    TPro  7.3  /  Alb  3.0<L>  /  TBili  0.3  /  DBili  x   /  AST  11  /  ALT  6<L>  /  AlkPhos  53  08-01      Urinalysis Basic - ( 01 Aug 2023 06:57 )    Color: x / Appearance: x / SG: x / pH: x  Gluc: 197 mg/dL / Ketone: x  / Bili: x / Urobili: x   Blood: x / Protein: x / Nitrite: x   Leuk Esterase: x / RBC: x / WBC x   Sq Epi: x / Non Sq Epi: x / Bacteria: x        MICROBIOLOGY:  .Tissue Other  07-27-23   Testing in progress  --  Morganella morganii  Methicillin resistant Staphylococcus aureus  Klebsiella pneumoniae  Enterococcus faecalis (vancomycin resistant)      .Abscess right heel wound  07-25-23   Numerous Proteus mirabilis  Numerous Pseudomonas aeruginosa  Moderate Enterococcus faecalis (vancomycin resistant)  Few Methicillin Resistant Staphylococcus aureus  Moderate Klebsiella oxytoca/Raoultella ornithinolytica  --  Proteus mirabilis  Pseudomonas aeruginosa  Enterococcus faecalis (vancomycin resistant)  Methicillin resistant Staphylococcus aureus  Klebsiella oxytoca /Raoutella ornithinolytica      .Blood Blood  07-25-23   No growth at 5 days  --  --      .Blood Blood  07-25-23   No growth at 5 days  --  --          Vancomycin Level, Trough: 13.2 ug/mL (08-02-23 @ 04:37)  v          RADIOLOGY:    Mikael Daniels MD; Division of Infectious Disease; Pager: [phone]; nights and weekends: [phone]

## 2023-08-02 NOTE — PROGRESS NOTE ADULT - SUBJECTIVE AND OBJECTIVE BOX
Chief complaint  Patient is a 63y old  Female who presents with a chief complaint of foot wound (01 Aug 2023 17:21)         Labs and Fingersticks  CAPILLARY BLOOD GLUCOSE      POCT Blood Glucose.: 234 mg/dL (02 Aug 2023 07:47)  POCT Blood Glucose.: 244 mg/dL (01 Aug 2023 21:14)  POCT Blood Glucose.: 205 mg/dL (01 Aug 2023 16:27)  POCT Blood Glucose.: 150 mg/dL (01 Aug 2023 11:55)      Anion Gap: 17 (08-01 @ 06:57)      Calcium: 9.1 (08-01 @ 06:57)  Albumin: 3.0 *L* (08-01 @ 06:57)    Alanine Aminotransferase (ALT/SGPT): 6 *L* (08-01 @ 06:57)  Alkaline Phosphatase: 53 (08-01 @ 06:57)  Aspartate Aminotransferase (AST/SGOT): 11 (08-01 @ 06:57)        08-01    141  |  99  |  12  ----------------------------<  197<H>  3.5   |  25  |  1.17    Ca    9.1      01 Aug 2023 06:57    TPro  7.3  /  Alb  3.0<L>  /  TBili  0.3  /  DBili  x   /  AST  11  /  ALT  6<L>  /  AlkPhos  53  08-01                        11.9   8.88  )-----------( 346      ( 01 Aug 2023 06:57 )             36.5     Medications  MEDICATIONS  (STANDING):  artificial  tears Solution 1 Drop(s) Both EYES three times a day  atorvastatin 40 milliGRAM(s) Oral at bedtime  chlorhexidine 2% Cloths 1 Application(s) Topical <User Schedule>  dextrose 5%. 1000 milliLiter(s) (50 mL/Hr) IV Continuous <Continuous>  dextrose 5%. 1000 milliLiter(s) (100 mL/Hr) IV Continuous <Continuous>  dextrose 50% Injectable 25 Gram(s) IV Push once  dextrose 50% Injectable 25 Gram(s) IV Push once  dextrose 50% Injectable 12.5 Gram(s) IV Push once  furosemide    Tablet 20 milliGRAM(s) Oral daily  glucagon  Injectable 1 milliGRAM(s) IntraMuscular once  heparin   Injectable 5000 Unit(s) SubCutaneous every 8 hours  hydrALAZINE 50 milliGRAM(s) Oral two times a day  insulin glargine Injectable (LANTUS) 17 Unit(s) SubCutaneous at bedtime  insulin lispro (ADMELOG) corrective regimen sliding scale   SubCutaneous at bedtime  insulin lispro (ADMELOG) corrective regimen sliding scale   SubCutaneous three times a day before meals  insulin lispro Injectable (ADMELOG) 6 Unit(s) SubCutaneous before breakfast  insulin lispro Injectable (ADMELOG) 6 Unit(s) SubCutaneous before lunch  insulin lispro Injectable (ADMELOG) 6 Unit(s) SubCutaneous before dinner  lisinopril 20 milliGRAM(s) Oral daily  metoprolol succinate ER 50 milliGRAM(s) Oral daily  mupirocin 2% Ointment 1 Application(s) Topical two times a day  pantoprazole    Tablet 40 milliGRAM(s) Oral before breakfast  piperacillin/tazobactam IVPB.. 3.375 Gram(s) IV Intermittent every 8 hours  senna 2 Tablet(s) Oral at bedtime  tamsulosin 0.4 milliGRAM(s) Oral at bedtime  vancomycin  IVPB 750 milliGRAM(s) IV Intermittent every 12 hours  vancomycin  IVPB          Physical Exam  General: Patient comfortable in bed   Vital Signs Last 12 Hrs  T(F): 97.8 (08-02-23 @ 01:04), Max: 97.8 (08-02-23 @ 01:04)  HR: 60 (08-02-23 @ 01:04) (60 - 60)  BP: 144/69 (08-02-23 @ 01:04) (144/69 - 144/69)  BP(mean): --  RR: 16 (08-02-23 @ 01:04) (16 - 16)  SpO2: 95% (08-02-23 @ 01:04) (95% - 95%)    CVS: S1S2   Respiratory: No wheezing, no crepitations  GI: Abdomen soft, bowel sounds positive  Musculoskeletal:  moves all extremities  : Voiding

## 2023-08-02 NOTE — PROGRESS NOTE ADULT - SUBJECTIVE AND OBJECTIVE BOX
Podiatry pager #: [phone] ([place])/ [phone] ([facility])    Patient is a 63y old  Female who presents with a chief complaint of foot wound (01 Aug 2023 17:21)       INTERVAL HPI/OVERNIGHT EVENTS:  Patient seen and evaluated at bedside.  Pt is resting comfortable in NAD. Denies N/V/F/C.     Allergies    No Known Allergies    Intolerances        Vital Signs Last 24 Hrs  T(C): 37 (02 Aug 2023 09:21), Max: 37 (02 Aug 2023 09:21)  T(F): 98.6 (02 Aug 2023 09:21), Max: 98.6 (02 Aug 2023 09:21)  HR: 61 (02 Aug 2023 09:21) (59 - 61)  BP: 151/77 (02 Aug 2023 09:21) (144/69 - 151/77)  BP(mean): --  RR: 18 (02 Aug 2023 09:21) (16 - 18)  SpO2: 94% (02 Aug 2023 09:21) (92% - 95%)    Parameters below as of 02 Aug 2023 09:21  Patient On (Oxygen Delivery Method): room air        LABS:                        11.9   8.88  )-----------( 346      ( 01 Aug 2023 06:57 )             36.5     08-01    141  |  99  |  12  ----------------------------<  197<H>  3.5   |  25  |  1.17    Ca    9.1      01 Aug 2023 06:57    TPro  7.3  /  Alb  3.0<L>  /  TBili  0.3  /  DBili  x   /  AST  11  /  ALT  6<L>  /  AlkPhos  53  08-01      Urinalysis Basic - ( 01 Aug 2023 06:57 )    Color: x / Appearance: x / SG: x / pH: x  Gluc: 197 mg/dL / Ketone: x  / Bili: x / Urobili: x   Blood: x / Protein: x / Nitrite: x   Leuk Esterase: x / RBC: x / WBC x   Sq Epi: x / Non Sq Epi: x / Bacteria: x      CAPILLARY BLOOD GLUCOSE      POCT Blood Glucose.: 234 mg/dL (02 Aug 2023 07:47)  POCT Blood Glucose.: 244 mg/dL (01 Aug 2023 21:14)  POCT Blood Glucose.: 205 mg/dL (01 Aug 2023 16:27)  POCT Blood Glucose.: 150 mg/dL (01 Aug 2023 11:55)      Lower Extremity Physical Exam:  Vascular: DP/PT 0/4, B/L, CFT <3 seconds B/L, Temperature gradient warm to cool, B/L.   Neuro: Epicritic sensation dimished to the level of toes, B/L.  Musculoskeletal/Ortho: Unremarkable.  Skin: s/p BL heel wound debridement with right foot graft application and bone biopsy (DOS 7/27): adaptic and graft intact, staples intact, no drainage, no purulence, no erythema, no acute signs of infection.   RADIOLOGY & ADDITIONAL TESTS:   Podiatry pager #: [phone] ([place])/ [phone] ([facility])    Patient is a 63y old  Female who presents with a chief complaint of foot wound (01 Aug 2023 17:21)       INTERVAL HPI/OVERNIGHT EVENTS:  Patient seen and evaluated at bedside.  Pt is resting comfortable in NAD. Denies N/V/F/C.     Allergies    No Known Allergies    Intolerances        Vital Signs Last 24 Hrs  T(C): 37 (02 Aug 2023 09:21), Max: 37 (02 Aug 2023 09:21)  T(F): 98.6 (02 Aug 2023 09:21), Max: 98.6 (02 Aug 2023 09:21)  HR: 61 (02 Aug 2023 09:21) (59 - 61)  BP: 151/77 (02 Aug 2023 09:21) (144/69 - 151/77)  BP(mean): --  RR: 18 (02 Aug 2023 09:21) (16 - 18)  SpO2: 94% (02 Aug 2023 09:21) (92% - 95%)    Parameters below as of 02 Aug 2023 09:21  Patient On (Oxygen Delivery Method): room air        LABS:                        11.9   8.88  )-----------( 346      ( 01 Aug 2023 06:57 )             36.5     08-01    141  |  99  |  12  ----------------------------<  197<H>  3.5   |  25  |  1.17    Ca    9.1      01 Aug 2023 06:57    TPro  7.3  /  Alb  3.0<L>  /  TBili  0.3  /  DBili  x   /  AST  11  /  ALT  6<L>  /  AlkPhos  53  08-01      Urinalysis Basic - ( 01 Aug 2023 06:57 )    Color: x / Appearance: x / SG: x / pH: x  Gluc: 197 mg/dL / Ketone: x  / Bili: x / Urobili: x   Blood: x / Protein: x / Nitrite: x   Leuk Esterase: x / RBC: x / WBC x   Sq Epi: x / Non Sq Epi: x / Bacteria: x      CAPILLARY BLOOD GLUCOSE      POCT Blood Glucose.: 234 mg/dL (02 Aug 2023 07:47)  POCT Blood Glucose.: 244 mg/dL (01 Aug 2023 21:14)  POCT Blood Glucose.: 205 mg/dL (01 Aug 2023 16:27)  POCT Blood Glucose.: 150 mg/dL (01 Aug 2023 11:55)      Lower Extremity Physical Exam:  Vascular: DP/PT 0/4, B/L, CFT <3 seconds B/L, Temperature gradient warm to cool, B/L.   Neuro: Epicritic sensation dimished to the level of toes, B/L.  Musculoskeletal/Ortho: Unremarkable.  Skin: s/p BL heel wound debridement with right foot graft application and bone biopsy (DOS 7/27): adaptic and graft intact, staples intact, no drainage, no purulence, no erythema, no acute signs of infection.   RADIOLOGY & ADDITIONAL TESTS:   Podiatry pager #: [phone] ([place])/ [phone] ([facility])    Patient is a 63y old  Female who presents with a chief complaint of foot wound (01 Aug 2023 17:21)       INTERVAL HPI/OVERNIGHT EVENTS:  Patient seen and evaluated at bedside.  Pt is resting comfortable in NAD. Denies N/V/F/C.     Allergies    No Known Allergies    Intolerances        Vital Signs Last 24 Hrs  T(C): 37 (02 Aug 2023 09:21), Max: 37 (02 Aug 2023 09:21)  T(F): 98.6 (02 Aug 2023 09:21), Max: 98.6 (02 Aug 2023 09:21)  HR: 61 (02 Aug 2023 09:21) (59 - 61)  BP: 151/77 (02 Aug 2023 09:21) (144/69 - 151/77)  BP(mean): --  RR: 18 (02 Aug 2023 09:21) (16 - 18)  SpO2: 94% (02 Aug 2023 09:21) (92% - 95%)    Parameters below as of 02 Aug 2023 09:21  Patient On (Oxygen Delivery Method): room air        LABS:                        11.9   8.88  )-----------( 346      ( 01 Aug 2023 06:57 )             36.5     08-01    141  |  99  |  12  ----------------------------<  197<H>  3.5   |  25  |  1.17    Ca    9.1      01 Aug 2023 06:57    TPro  7.3  /  Alb  3.0<L>  /  TBili  0.3  /  DBili  x   /  AST  11  /  ALT  6<L>  /  AlkPhos  53  08-01      Urinalysis Basic - ( 01 Aug 2023 06:57 )    Color: x / Appearance: x / SG: x / pH: x  Gluc: 197 mg/dL / Ketone: x  / Bili: x / Urobili: x   Blood: x / Protein: x / Nitrite: x   Leuk Esterase: x / RBC: x / WBC x   Sq Epi: x / Non Sq Epi: x / Bacteria: x      CAPILLARY BLOOD GLUCOSE      POCT Blood Glucose.: 234 mg/dL (02 Aug 2023 07:47)  POCT Blood Glucose.: 244 mg/dL (01 Aug 2023 21:14)  POCT Blood Glucose.: 205 mg/dL (01 Aug 2023 16:27)  POCT Blood Glucose.: 150 mg/dL (01 Aug 2023 11:55)      Lower Extremity Physical Exam:  Vascular: DP/PT 0/4, B/L, CFT <3 seconds B/L, Temperature gradient warm to cool, B/L.   Neuro: Epicritic sensation dimished to the level of toes, B/L.  Musculoskeletal/Ortho: Unremarkable.  Skin: s/p BL heel wound debridement with right foot graft application and bone biopsy (DOS 7/27): adaptic and graft intact, staples intact, no drainage, no purulence, no erythema, no acute signs of infection.   RADIOLOGY & ADDITIONAL TESTS:

## 2023-08-02 NOTE — PROGRESS NOTE ADULT - SUBJECTIVE AND OBJECTIVE BOX
STEPHANIE KAYE  63y Female  MRN:[id number]    Patient is a 63y old  Female who presents with a chief complaint of foot infection    HPI:   62 y/o F PMHx of DMH2 on insulin, HTN, HLD p/w right heel ulcer. Patient is sent by her podiatrist for questionable right foot procedure.  She is unsure how long she has had this ulcer for.  States that she was recently hospitalized in the near [facility] for an evaluation of the right foot infection.  States she still had a 102 fever yesterday which resolved.  Denies any fevers today, chills, chest pain, shortness of breath, abdominal pain, nausea vomiting diarrhea.  States she ambulates with a walker at baseline.  Accompanied by her sister-in-law (25 Jul 2023 21:58)      pt now s/p bilateral foot heel wound debridement with right foot kerecis graft application, and right foot calcaneus bone biopsy on 7/27      Patient seen and evaluated at bedside. No acute events overnight except as noted.    Interval HPI: no acute events o/n        PAST MEDICAL & SURGICAL HISTORY:  Diabetes      Hypertension      Hypercholesteremia          REVIEW OF SYSTEMS:  as per hpi     VITALS:   Vital Signs Last 24 Hrs  T(C): 37 (02 Aug 2023 09:21), Max: 37 (02 Aug 2023 09:21)  T(F): 98.6 (02 Aug 2023 09:21), Max: 98.6 (02 Aug 2023 09:21)  HR: 61 (02 Aug 2023 09:21) (59 - 61)  BP: 151/77 (02 Aug 2023 09:21) (144/69 - 151/77)  BP(mean): --  RR: 18 (02 Aug 2023 09:21) (16 - 18)  SpO2: 94% (02 Aug 2023 09:21) (92% - 95%)    Parameters below as of 02 Aug 2023 09:21  Patient On (Oxygen Delivery Method): room air            PHYSICAL EXAM:  GENERAL: NAD, well-developed  HEAD:  Atraumatic, Normocephalic  EYES: EOMI, PERRLA, conjunctiva and sclera clear  NECK: Supple, No JVD  CHEST/LUNG: Clear to auscultation bilaterally; No wheeze  HEART: S1, S2; No murmurs, rubs, or gallops  ABDOMEN: Soft, Nontender, Nondistended; Bowel sounds present  EXTREMITIES:  2+ Peripheral Pulses, No clubbing, cyanosis, or edema. b/l lower ext dressing in place   PSYCH: Normal affect  NEUROLOGY: AAOX3; non-focal  SKIN: No rashes or lesions    Consultant(s) Notes Reviewed:  [x ] YES  [ ] NO  Care Discussed with Consultants/Other Providers [ x] YES  [ ] NO    MEDS:   MEDICATIONS  (STANDING):  artificial  tears Solution 1 Drop(s) Both EYES three times a day  atorvastatin 40 milliGRAM(s) Oral at bedtime  chlorhexidine 2% Cloths 1 Application(s) Topical <User Schedule>  dextrose 5%. 1000 milliLiter(s) (50 mL/Hr) IV Continuous <Continuous>  dextrose 5%. 1000 milliLiter(s) (100 mL/Hr) IV Continuous <Continuous>  dextrose 50% Injectable 25 Gram(s) IV Push once  dextrose 50% Injectable 12.5 Gram(s) IV Push once  dextrose 50% Injectable 25 Gram(s) IV Push once  furosemide    Tablet 20 milliGRAM(s) Oral daily  glucagon  Injectable 1 milliGRAM(s) IntraMuscular once  heparin   Injectable 5000 Unit(s) SubCutaneous every 8 hours  hydrALAZINE 50 milliGRAM(s) Oral two times a day  insulin glargine Injectable (LANTUS) 17 Unit(s) SubCutaneous at bedtime  insulin lispro (ADMELOG) corrective regimen sliding scale   SubCutaneous three times a day before meals  insulin lispro (ADMELOG) corrective regimen sliding scale   SubCutaneous at bedtime  insulin lispro Injectable (ADMELOG) 6 Unit(s) SubCutaneous before breakfast  insulin lispro Injectable (ADMELOG) 6 Unit(s) SubCutaneous before lunch  insulin lispro Injectable (ADMELOG) 6 Unit(s) SubCutaneous before dinner  lisinopril 20 milliGRAM(s) Oral daily  metoprolol succinate ER 50 milliGRAM(s) Oral daily  mupirocin 2% Ointment 1 Application(s) Topical two times a day  pantoprazole    Tablet 40 milliGRAM(s) Oral before breakfast  piperacillin/tazobactam IVPB.. 3.375 Gram(s) IV Intermittent every 8 hours  senna 2 Tablet(s) Oral at bedtime  tamsulosin 0.4 milliGRAM(s) Oral at bedtime  vancomycin  IVPB 750 milliGRAM(s) IV Intermittent every 12 hours  vancomycin  IVPB        MEDICATIONS  (PRN):  acetaminophen     Tablet .. 650 milliGRAM(s) Oral every 6 hours PRN Mild Pain (1 - 3)  dextrose Oral Gel 15 Gram(s) Oral once PRN Blood Glucose LESS THAN 70 milliGRAM(s)/deciliter  HYDROmorphone  Injectable 0.5 milliGRAM(s) IV Push every 4 hours PRN Severe Pain (7 - 10)  ondansetron Injectable 4 milliGRAM(s) IV Push every 8 hours PRN Nausea and/or Vomiting  oxycodone    5 mG/acetaminophen 325 mG 1 Tablet(s) Oral every 4 hours PRN Moderate Pain (4 - 6)    ALLERGIES:  Allergy Status Unknown      LABS:                                                    11.9   8.88  )-----------( 346      ( 01 Aug 2023 06:57 )             36.5   08-01    141  |  99  |  12  ----------------------------<  197<H>  3.5   |  25  |  1.17    Ca    9.1      01 Aug 2023 06:57    TPro  7.3  /  Alb  3.0<L>  /  TBili  0.3  /  DBili  x   /  AST  11  /  ALT  6<L>  /  AlkPhos  53  08-01      < from: MR Ankle w/ IV Cont, Right (07.26.23 @ 18:31) >    IMPRESSION:  1.  There is a ulcer overlying the heel roughly spanning 5.4 cm extending   to the lateral calcaneus. There is minimal osseous edema within the   lateral calcaneus with mild postcontrast enhancement and without   significant loss of normal T1 fatty marrow. This is possibly representing   early osteomyelitis versus reactive in nature.  2.  Questionable minimal osseous edema within the fourth metatarsal head   with postcontrast enhancement which is incompletely evaluated. These   findings are possibly secondary to reactive degenerative changes versus   osteomyelitis if there is adjacent ulcer. Clinical correlation is advised.    --- End of Report ---        < end of copied text >     < from: Xray Foot AP + Lateral + Oblique, Right (07.25.23 @ 17:54) >    IMPRESSION:    No acute fracture or dislocation of the right foot. Midfoot degenerative   changes are noted.    Soft tissue defect at the heel related to known heel ulcer. No osseous   erosive changes of the calcaneus or adjacent osseousstructures. Soft   tissue edema is also noted along the dorsal foot and anterior lower shin.   No tracking subcutaneous gas.    Along the first distal phalanx base, there are juxtaarticular erosions at   the medial aspect and productive changes at the lateral aspect. Findings   may be due to underlying inflammatory arthritis or crystalline   arthropathy.    Vascular calcifications.      --- End of Report ---    < end of copied text >   STEPHANIE KAYE  63y Female  MRN:[id number]    Patient is a 63y old  Female who presents with a chief complaint of foot infection    HPI:   64 y/o F PMHx of DMH2 on insulin, HTN, HLD p/w right heel ulcer. Patient is sent by her podiatrist for questionable right foot procedure.  She is unsure how long she has had this ulcer for.  States that she was recently hospitalized in the near [facility] for an evaluation of the right foot infection.  States she still had a 102 fever yesterday which resolved.  Denies any fevers today, chills, chest pain, shortness of breath, abdominal pain, nausea vomiting diarrhea.  States she ambulates with a walker at baseline.  Accompanied by her sister-in-law (25 Jul 2023 21:58)      pt now s/p bilateral foot heel wound debridement with right foot kerecis graft application, and right foot calcaneus bone biopsy on 7/27      Patient seen and evaluated at bedside. No acute events overnight except as noted.    Interval HPI: no acute events o/n        PAST MEDICAL & SURGICAL HISTORY:  Diabetes      Hypertension      Hypercholesteremia          REVIEW OF SYSTEMS:  as per hpi     VITALS:   Vital Signs Last 24 Hrs  T(C): 37 (02 Aug 2023 09:21), Max: 37 (02 Aug 2023 09:21)  T(F): 98.6 (02 Aug 2023 09:21), Max: 98.6 (02 Aug 2023 09:21)  HR: 61 (02 Aug 2023 09:21) (59 - 61)  BP: 151/77 (02 Aug 2023 09:21) (144/69 - 151/77)  BP(mean): --  RR: 18 (02 Aug 2023 09:21) (16 - 18)  SpO2: 94% (02 Aug 2023 09:21) (92% - 95%)    Parameters below as of 02 Aug 2023 09:21  Patient On (Oxygen Delivery Method): room air            PHYSICAL EXAM:  GENERAL: NAD, well-developed  HEAD:  Atraumatic, Normocephalic  EYES: EOMI, PERRLA, conjunctiva and sclera clear  NECK: Supple, No JVD  CHEST/LUNG: Clear to auscultation bilaterally; No wheeze  HEART: S1, S2; No murmurs, rubs, or gallops  ABDOMEN: Soft, Nontender, Nondistended; Bowel sounds present  EXTREMITIES:  2+ Peripheral Pulses, No clubbing, cyanosis, or edema. b/l lower ext dressing in place   PSYCH: Normal affect  NEUROLOGY: AAOX3; non-focal  SKIN: No rashes or lesions    Consultant(s) Notes Reviewed:  [x ] YES  [ ] NO  Care Discussed with Consultants/Other Providers [ x] YES  [ ] NO    MEDS:   MEDICATIONS  (STANDING):  artificial  tears Solution 1 Drop(s) Both EYES three times a day  atorvastatin 40 milliGRAM(s) Oral at bedtime  chlorhexidine 2% Cloths 1 Application(s) Topical <User Schedule>  dextrose 5%. 1000 milliLiter(s) (50 mL/Hr) IV Continuous <Continuous>  dextrose 5%. 1000 milliLiter(s) (100 mL/Hr) IV Continuous <Continuous>  dextrose 50% Injectable 25 Gram(s) IV Push once  dextrose 50% Injectable 12.5 Gram(s) IV Push once  dextrose 50% Injectable 25 Gram(s) IV Push once  furosemide    Tablet 20 milliGRAM(s) Oral daily  glucagon  Injectable 1 milliGRAM(s) IntraMuscular once  heparin   Injectable 5000 Unit(s) SubCutaneous every 8 hours  hydrALAZINE 50 milliGRAM(s) Oral two times a day  insulin glargine Injectable (LANTUS) 17 Unit(s) SubCutaneous at bedtime  insulin lispro (ADMELOG) corrective regimen sliding scale   SubCutaneous three times a day before meals  insulin lispro (ADMELOG) corrective regimen sliding scale   SubCutaneous at bedtime  insulin lispro Injectable (ADMELOG) 6 Unit(s) SubCutaneous before breakfast  insulin lispro Injectable (ADMELOG) 6 Unit(s) SubCutaneous before lunch  insulin lispro Injectable (ADMELOG) 6 Unit(s) SubCutaneous before dinner  lisinopril 20 milliGRAM(s) Oral daily  metoprolol succinate ER 50 milliGRAM(s) Oral daily  mupirocin 2% Ointment 1 Application(s) Topical two times a day  pantoprazole    Tablet 40 milliGRAM(s) Oral before breakfast  piperacillin/tazobactam IVPB.. 3.375 Gram(s) IV Intermittent every 8 hours  senna 2 Tablet(s) Oral at bedtime  tamsulosin 0.4 milliGRAM(s) Oral at bedtime  vancomycin  IVPB 750 milliGRAM(s) IV Intermittent every 12 hours  vancomycin  IVPB        MEDICATIONS  (PRN):  acetaminophen     Tablet .. 650 milliGRAM(s) Oral every 6 hours PRN Mild Pain (1 - 3)  dextrose Oral Gel 15 Gram(s) Oral once PRN Blood Glucose LESS THAN 70 milliGRAM(s)/deciliter  HYDROmorphone  Injectable 0.5 milliGRAM(s) IV Push every 4 hours PRN Severe Pain (7 - 10)  ondansetron Injectable 4 milliGRAM(s) IV Push every 8 hours PRN Nausea and/or Vomiting  oxycodone    5 mG/acetaminophen 325 mG 1 Tablet(s) Oral every 4 hours PRN Moderate Pain (4 - 6)    ALLERGIES:  Allergy Status Unknown      LABS:                                                    11.9   8.88  )-----------( 346      ( 01 Aug 2023 06:57 )             36.5   08-01    141  |  99  |  12  ----------------------------<  197<H>  3.5   |  25  |  1.17    Ca    9.1      01 Aug 2023 06:57    TPro  7.3  /  Alb  3.0<L>  /  TBili  0.3  /  DBili  x   /  AST  11  /  ALT  6<L>  /  AlkPhos  53  08-01      < from: MR Ankle w/ IV Cont, Right (07.26.23 @ 18:31) >    IMPRESSION:  1.  There is a ulcer overlying the heel roughly spanning 5.4 cm extending   to the lateral calcaneus. There is minimal osseous edema within the   lateral calcaneus with mild postcontrast enhancement and without   significant loss of normal T1 fatty marrow. This is possibly representing   early osteomyelitis versus reactive in nature.  2.  Questionable minimal osseous edema within the fourth metatarsal head   with postcontrast enhancement which is incompletely evaluated. These   findings are possibly secondary to reactive degenerative changes versus   osteomyelitis if there is adjacent ulcer. Clinical correlation is advised.    --- End of Report ---        < end of copied text >     < from: Xray Foot AP + Lateral + Oblique, Right (07.25.23 @ 17:54) >    IMPRESSION:    No acute fracture or dislocation of the right foot. Midfoot degenerative   changes are noted.    Soft tissue defect at the heel related to known heel ulcer. No osseous   erosive changes of the calcaneus or adjacent osseousstructures. Soft   tissue edema is also noted along the dorsal foot and anterior lower shin.   No tracking subcutaneous gas.    Along the first distal phalanx base, there are juxtaarticular erosions at   the medial aspect and productive changes at the lateral aspect. Findings   may be due to underlying inflammatory arthritis or crystalline   arthropathy.    Vascular calcifications.      --- End of Report ---    < end of copied text >   STEPHANIE KAYE  63y Female  MRN:[id number]    Patient is a 63y old  Female who presents with a chief complaint of foot infection    HPI:   62 y/o F PMHx of DMH2 on insulin, HTN, HLD p/w right heel ulcer. Patient is sent by her podiatrist for questionable right foot procedure.  She is unsure how long she has had this ulcer for.  States that she was recently hospitalized in the near [facility] for an evaluation of the right foot infection.  States she still had a 102 fever yesterday which resolved.  Denies any fevers today, chills, chest pain, shortness of breath, abdominal pain, nausea vomiting diarrhea.  States she ambulates with a walker at baseline.  Accompanied by her sister-in-law (25 Jul 2023 21:58)      pt now s/p bilateral foot heel wound debridement with right foot kerecis graft application, and right foot calcaneus bone biopsy on 7/27      Patient seen and evaluated at bedside. No acute events overnight except as noted.    Interval HPI: no acute events o/n        PAST MEDICAL & SURGICAL HISTORY:  Diabetes      Hypertension      Hypercholesteremia          REVIEW OF SYSTEMS:  as per hpi     VITALS:   Vital Signs Last 24 Hrs  T(C): 37 (02 Aug 2023 09:21), Max: 37 (02 Aug 2023 09:21)  T(F): 98.6 (02 Aug 2023 09:21), Max: 98.6 (02 Aug 2023 09:21)  HR: 61 (02 Aug 2023 09:21) (59 - 61)  BP: 151/77 (02 Aug 2023 09:21) (144/69 - 151/77)  BP(mean): --  RR: 18 (02 Aug 2023 09:21) (16 - 18)  SpO2: 94% (02 Aug 2023 09:21) (92% - 95%)    Parameters below as of 02 Aug 2023 09:21  Patient On (Oxygen Delivery Method): room air            PHYSICAL EXAM:  GENERAL: NAD, well-developed  HEAD:  Atraumatic, Normocephalic  EYES: EOMI, PERRLA, conjunctiva and sclera clear  NECK: Supple, No JVD  CHEST/LUNG: Clear to auscultation bilaterally; No wheeze  HEART: S1, S2; No murmurs, rubs, or gallops  ABDOMEN: Soft, Nontender, Nondistended; Bowel sounds present  EXTREMITIES:  2+ Peripheral Pulses, No clubbing, cyanosis, or edema. b/l lower ext dressing in place   PSYCH: Normal affect  NEUROLOGY: AAOX3; non-focal  SKIN: No rashes or lesions    Consultant(s) Notes Reviewed:  [x ] YES  [ ] NO  Care Discussed with Consultants/Other Providers [ x] YES  [ ] NO    MEDS:   MEDICATIONS  (STANDING):  artificial  tears Solution 1 Drop(s) Both EYES three times a day  atorvastatin 40 milliGRAM(s) Oral at bedtime  chlorhexidine 2% Cloths 1 Application(s) Topical <User Schedule>  dextrose 5%. 1000 milliLiter(s) (50 mL/Hr) IV Continuous <Continuous>  dextrose 5%. 1000 milliLiter(s) (100 mL/Hr) IV Continuous <Continuous>  dextrose 50% Injectable 25 Gram(s) IV Push once  dextrose 50% Injectable 12.5 Gram(s) IV Push once  dextrose 50% Injectable 25 Gram(s) IV Push once  furosemide    Tablet 20 milliGRAM(s) Oral daily  glucagon  Injectable 1 milliGRAM(s) IntraMuscular once  heparin   Injectable 5000 Unit(s) SubCutaneous every 8 hours  hydrALAZINE 50 milliGRAM(s) Oral two times a day  insulin glargine Injectable (LANTUS) 17 Unit(s) SubCutaneous at bedtime  insulin lispro (ADMELOG) corrective regimen sliding scale   SubCutaneous three times a day before meals  insulin lispro (ADMELOG) corrective regimen sliding scale   SubCutaneous at bedtime  insulin lispro Injectable (ADMELOG) 6 Unit(s) SubCutaneous before breakfast  insulin lispro Injectable (ADMELOG) 6 Unit(s) SubCutaneous before lunch  insulin lispro Injectable (ADMELOG) 6 Unit(s) SubCutaneous before dinner  lisinopril 20 milliGRAM(s) Oral daily  metoprolol succinate ER 50 milliGRAM(s) Oral daily  mupirocin 2% Ointment 1 Application(s) Topical two times a day  pantoprazole    Tablet 40 milliGRAM(s) Oral before breakfast  piperacillin/tazobactam IVPB.. 3.375 Gram(s) IV Intermittent every 8 hours  senna 2 Tablet(s) Oral at bedtime  tamsulosin 0.4 milliGRAM(s) Oral at bedtime  vancomycin  IVPB 750 milliGRAM(s) IV Intermittent every 12 hours  vancomycin  IVPB        MEDICATIONS  (PRN):  acetaminophen     Tablet .. 650 milliGRAM(s) Oral every 6 hours PRN Mild Pain (1 - 3)  dextrose Oral Gel 15 Gram(s) Oral once PRN Blood Glucose LESS THAN 70 milliGRAM(s)/deciliter  HYDROmorphone  Injectable 0.5 milliGRAM(s) IV Push every 4 hours PRN Severe Pain (7 - 10)  ondansetron Injectable 4 milliGRAM(s) IV Push every 8 hours PRN Nausea and/or Vomiting  oxycodone    5 mG/acetaminophen 325 mG 1 Tablet(s) Oral every 4 hours PRN Moderate Pain (4 - 6)    ALLERGIES:  Allergy Status Unknown      LABS:                                                    11.9   8.88  )-----------( 346      ( 01 Aug 2023 06:57 )             36.5   08-01    141  |  99  |  12  ----------------------------<  197<H>  3.5   |  25  |  1.17    Ca    9.1      01 Aug 2023 06:57    TPro  7.3  /  Alb  3.0<L>  /  TBili  0.3  /  DBili  x   /  AST  11  /  ALT  6<L>  /  AlkPhos  53  08-01      < from: MR Ankle w/ IV Cont, Right (07.26.23 @ 18:31) >    IMPRESSION:  1.  There is a ulcer overlying the heel roughly spanning 5.4 cm extending   to the lateral calcaneus. There is minimal osseous edema within the   lateral calcaneus with mild postcontrast enhancement and without   significant loss of normal T1 fatty marrow. This is possibly representing   early osteomyelitis versus reactive in nature.  2.  Questionable minimal osseous edema within the fourth metatarsal head   with postcontrast enhancement which is incompletely evaluated. These   findings are possibly secondary to reactive degenerative changes versus   osteomyelitis if there is adjacent ulcer. Clinical correlation is advised.    --- End of Report ---        < end of copied text >     < from: Xray Foot AP + Lateral + Oblique, Right (07.25.23 @ 17:54) >    IMPRESSION:    No acute fracture or dislocation of the right foot. Midfoot degenerative   changes are noted.    Soft tissue defect at the heel related to known heel ulcer. No osseous   erosive changes of the calcaneus or adjacent osseousstructures. Soft   tissue edema is also noted along the dorsal foot and anterior lower shin.   No tracking subcutaneous gas.    Along the first distal phalanx base, there are juxtaarticular erosions at   the medial aspect and productive changes at the lateral aspect. Findings   may be due to underlying inflammatory arthritis or crystalline   arthropathy.    Vascular calcifications.      --- End of Report ---    < end of copied text >

## 2023-08-02 NOTE — PROVIDER CONTACT NOTE (CRITICAL VALUE NOTIFICATION) - TEST AND RESULT REPORTED:
2 tissue cultures from 7/27- final growth in fluid media only MRSA 2) final numerous morganelle,morganii,moderate enterococcusfaecalis, vancomycin resistant , few klebsiella, moderate korynebacterium
Moderate gram negative rods in abcess culture collect July 25th
positive abscess culture from 7/25
tissue culture from 7/27/23 premilinary results numerous morganella,morganii mod MRSAand enterococus faecolisvancomycin resistant , few klebsiellapneumonea, mod corynbacterium, amycollatum susceptibility not performed
abnormal tissue culture

## 2023-08-02 NOTE — PROGRESS NOTE ADULT - ASSESSMENT
63F s/p BL heel wound debridement with right foot graft application and bone biopsy (DOS 7/27).  - Pt seen and evaluated.  - Afebrile, no leukocytosis  - s/p BL heel wound debridement with right foot graft application and bone biopsy (DOS 7/27): adaptic and graft intact, staples intact, no drainage, no purulence, no erythema, no acute signs of infection.   -Aseptic removal of all staples using a staple remover without incident  - Intraop Findings: High concern for residual bone infection, low concern for viability.  - OR soft tissue and bone biopsy cultures not differentiated on sunrise but likely results listed below.  - Right Foot Eschar Culture: Morganella morganii, Klebsiella pneumoniae, E. faecalis, MRSA, Corynebacterium amycolatum.  - Right Foot Calcaneal Bone Biopsy Culture: Staph aureus.  -ID recs appreciated  - Right foot VAC to be re-applied today.  - BL z-flows to be worn at all times while in bed.  - Stable for discharge pending final ID recs.  - Wound care instructions and followup information listed in discharge provider note.  - Discussed with attending.

## 2023-08-02 NOTE — PROGRESS NOTE ADULT - ASSESSMENT
62 y/o F PMHx of DMH2 on insulin &7/26/23: A1C= 8.4%), HTN, HLD, BMI = 35.3,  bilateral heel ulcer and fever to 102F day prior to admission. ID consulted for eval of bilateral foot ulcer.   admitted 7/25/23  She notes nausea, emesis malaise  Right heel ulcer is long standing - Currently with extensive black eschar with sl separation at edges, drainage on dressing and malodour with likley underlying  infection, however malodor suggests infection.     Antibiotics  Vanco 7/25--> 7/26; 7/31-->  Zosyn 7/25-->    #B/l Foot ulcer  #possible osteo  #infection  MRI foot suggest  possible osteomyelitis calcaneus    7/25 ESR/CRP = 85/41  7/26  nasal swab POSITIVE MRSA PCR .  7/27 bilateral foot wound debridement,  right foot wound debridement with Integra graft application and bone biopsy for culture/R/O osteo. Intraoperative finding suggested residual bone infection present.  VAC in placement    discussed yesterday with  Podiatry resident  - patient is not candidate for partial calcanectomy.    Suggest  Continue Zosyn  which covers all isolates except for MRSA  Continue VANCO                6 week course abx through  September 7, 2023  PICC line placement  consider rehab placement     64 y/o F PMHx of DMH2 on insulin &7/26/23: A1C= 8.4%), HTN, HLD, BMI = 35.3,  bilateral heel ulcer and fever to 102F day prior to admission. ID consulted for eval of bilateral foot ulcer.   admitted 7/25/23  She notes nausea, emesis malaise  Right heel ulcer is long standing - Currently with extensive black eschar with sl separation at edges, drainage on dressing and malodour with likley underlying  infection, however malodor suggests infection.     Antibiotics  Vanco 7/25--> 7/26; 7/31-->  Zosyn 7/25-->    #B/l Foot ulcer  #possible osteo  #infection  MRI foot suggest  possible osteomyelitis calcaneus    7/25 ESR/CRP = 85/41  7/26  nasal swab POSITIVE MRSA PCR .  7/27 bilateral foot wound debridement,  right foot wound debridement with Integra graft application and bone biopsy for culture/R/O osteo. Intraoperative finding suggested residual bone infection present.  VAC in placement    discussed yesterday with  Podiatry resident  - patient is not candidate for partial calcanectomy.    Suggest  Continue Zosyn  which covers all isolates except for MRSA  Continue VANCO                6 week course abx through  September 7, 2023  PICC line placement  consider rehab placement

## 2023-08-02 NOTE — PROGRESS NOTE ADULT - ASSESSMENT
64 y/o F PMHx of DMH2 on insulin, HTN, HLD p/w right heel ulcer. Patient is sent by her podiatrist for questionable right foot procedure.  She is unsure how long she has had this ulcer for.  States that she was recently hospitalized in the near [facility] for an evaluation of the right foot infection.  States she still had a 102 fever yesterday which resolved.  Denies any fevers today, chills, chest pain, shortness of breath, abdominal pain, nausea vomiting diarrhea.  States she ambulates with a walker at baseline.  Accompanied by her sister-in-law. 62 y/o F PMHx of DMH2 on insulin, HTN, HLD p/w right heel ulcer. Patient is sent by her podiatrist for questionable right foot procedure.  She is unsure how long she has had this ulcer for.  States that she was recently hospitalized in the near [facility] for an evaluation of the right foot infection.  States she still had a 102 fever yesterday which resolved.  Denies any fevers today, chills, chest pain, shortness of breath, abdominal pain, nausea vomiting diarrhea.  States she ambulates with a walker at baseline.  Accompanied by her sister-in-law. 64 y/o F PMHx of DMH2 on insulin, HTN, HLD p/w right heel ulcer. Patient is sent by her podiatrist for questionable right foot procedure.  She is unsure how long she has had this ulcer for.  States that she was recently hospitalized in the near [facility] for an evaluation of the right foot infection.  States she still had a 102 fever yesterday which resolved.  Denies any fevers today, chills, chest pain, shortness of breath, abdominal pain, nausea vomiting diarrhea.  States she ambulates with a walker at baseline.  Accompanied by her sister-in-law.

## 2023-08-03 LAB
GLUCOSE BLDC GLUCOMTR-MCNC: 166 MG/DL — HIGH (ref 70–99)
GLUCOSE BLDC GLUCOMTR-MCNC: 175 MG/DL — HIGH (ref 70–99)
GLUCOSE BLDC GLUCOMTR-MCNC: 238 MG/DL — HIGH (ref 70–99)
GLUCOSE BLDC GLUCOMTR-MCNC: 255 MG/DL — HIGH (ref 70–99)

## 2023-08-03 PROCEDURE — 71045 X-RAY EXAM CHEST 1 VIEW: CPT | Mod: 26

## 2023-08-03 RX ADMIN — Medication 250 MILLIGRAM(S): at 17:04

## 2023-08-03 RX ADMIN — Medication 50 MILLIGRAM(S): at 05:21

## 2023-08-03 RX ADMIN — TAMSULOSIN HYDROCHLORIDE 0.4 MILLIGRAM(S): 0.4 CAPSULE ORAL at 21:32

## 2023-08-03 RX ADMIN — LISINOPRIL 20 MILLIGRAM(S): 2.5 TABLET ORAL at 05:21

## 2023-08-03 RX ADMIN — Medication 1: at 17:03

## 2023-08-03 RX ADMIN — HEPARIN SODIUM 5000 UNIT(S): 5000 INJECTION INTRAVENOUS; SUBCUTANEOUS at 05:20

## 2023-08-03 RX ADMIN — Medication 2: at 12:42

## 2023-08-03 RX ADMIN — Medication 50 MILLIGRAM(S): at 17:04

## 2023-08-03 RX ADMIN — Medication 6 UNIT(S): at 12:43

## 2023-08-03 RX ADMIN — Medication 20 MILLIGRAM(S): at 05:21

## 2023-08-03 RX ADMIN — MUPIROCIN 1 APPLICATION(S): 20 OINTMENT TOPICAL at 08:14

## 2023-08-03 RX ADMIN — HEPARIN SODIUM 5000 UNIT(S): 5000 INJECTION INTRAVENOUS; SUBCUTANEOUS at 21:32

## 2023-08-03 RX ADMIN — PANTOPRAZOLE SODIUM 40 MILLIGRAM(S): 20 TABLET, DELAYED RELEASE ORAL at 05:20

## 2023-08-03 RX ADMIN — HEPARIN SODIUM 5000 UNIT(S): 5000 INJECTION INTRAVENOUS; SUBCUTANEOUS at 13:53

## 2023-08-03 RX ADMIN — Medication 3: at 08:10

## 2023-08-03 RX ADMIN — PIPERACILLIN AND TAZOBACTAM 25 GRAM(S): 4; .5 INJECTION, POWDER, LYOPHILIZED, FOR SOLUTION INTRAVENOUS at 13:53

## 2023-08-03 RX ADMIN — ATORVASTATIN CALCIUM 40 MILLIGRAM(S): 80 TABLET, FILM COATED ORAL at 21:32

## 2023-08-03 RX ADMIN — INSULIN GLARGINE 17 UNIT(S): 100 INJECTION, SOLUTION SUBCUTANEOUS at 21:31

## 2023-08-03 RX ADMIN — Medication 6 UNIT(S): at 08:11

## 2023-08-03 RX ADMIN — MUPIROCIN 1 APPLICATION(S): 20 OINTMENT TOPICAL at 17:04

## 2023-08-03 RX ADMIN — Medication 6 UNIT(S): at 17:03

## 2023-08-03 RX ADMIN — Medication 250 MILLIGRAM(S): at 05:20

## 2023-08-03 RX ADMIN — PIPERACILLIN AND TAZOBACTAM 25 GRAM(S): 4; .5 INJECTION, POWDER, LYOPHILIZED, FOR SOLUTION INTRAVENOUS at 06:50

## 2023-08-03 NOTE — PROGRESS NOTE ADULT - PROBLEM SELECTOR PLAN 1
Increased Lantus 20 units qHS  and continue ADMELOG 6 units before each meal for now.   Will continue monitoring FS, log, and glucose trends, will Follow up.  Patient counseled for compliance with consistent low carb diet and exercise as tolerated outpatient.   If continue nausea and vomiting Consider Gastric emptying study, to r/o Gastroparesis.    Was using insulin at home, basal and bolus. DC home on current insulin doses if sugars remain stable  may use her home insulin brands

## 2023-08-03 NOTE — PROGRESS NOTE ADULT - SUBJECTIVE AND OBJECTIVE BOX
STEPHANIE KAYE  63y Female  MRN:[id number]    Patient is a 63y old  Female who presents with a chief complaint of foot infection    HPI:   64 y/o F PMHx of DMH2 on insulin, HTN, HLD p/w right heel ulcer. Patient is sent by her podiatrist for questionable right foot procedure.  She is unsure how long she has had this ulcer for.  States that she was recently hospitalized in the near [facility] for an evaluation of the right foot infection.  States she still had a 102 fever yesterday which resolved.  Denies any fevers today, chills, chest pain, shortness of breath, abdominal pain, nausea vomiting diarrhea.  States she ambulates with a walker at baseline.  Accompanied by her sister-in-law (25 Jul 2023 21:58)      pt now s/p bilateral foot heel wound debridement with right foot kerecis graft application, and right foot calcaneus bone biopsy on 7/27      Patient seen and evaluated at bedside. No acute events overnight except as noted.    Interval HPI: no acute events o/n        PAST MEDICAL & SURGICAL HISTORY:  Diabetes      Hypertension      Hypercholesteremia          REVIEW OF SYSTEMS:  as per hpi     VITALS:   Vital Signs Last 24 Hrs  T(C): 37 (03 Aug 2023 11:07), Max: 37 (03 Aug 2023 11:07)  T(F): 98.6 (03 Aug 2023 11:07), Max: 98.6 (03 Aug 2023 11:07)  HR: 56 (03 Aug 2023 11:07) (56 - 69)  BP: 143/87 (03 Aug 2023 11:07) (121/65 - 150/74)  BP(mean): --  RR: 18 (03 Aug 2023 11:07) (18 - 18)  SpO2: 95% (03 Aug 2023 11:07) (92% - 99%)    Parameters below as of 03 Aug 2023 11:07  Patient On (Oxygen Delivery Method): room air            PHYSICAL EXAM:  GENERAL: NAD, well-developed  HEAD:  Atraumatic, Normocephalic  EYES: EOMI, PERRLA, conjunctiva and sclera clear  NECK: Supple, No JVD  CHEST/LUNG: Clear to auscultation bilaterally; No wheeze  HEART: S1, S2; No murmurs, rubs, or gallops  ABDOMEN: Soft, Nontender, Nondistended; Bowel sounds present  EXTREMITIES:  2+ Peripheral Pulses, No clubbing, cyanosis, or edema. b/l lower ext dressing in place   PSYCH: Normal affect  NEUROLOGY: AAOX3; non-focal  SKIN: No rashes or lesions    Consultant(s) Notes Reviewed:  [x ] YES  [ ] NO  Care Discussed with Consultants/Other Providers [ x] YES  [ ] NO    MEDS:   MEDICATIONS  (STANDING):  artificial  tears Solution 1 Drop(s) Both EYES three times a day  atorvastatin 40 milliGRAM(s) Oral at bedtime  chlorhexidine 2% Cloths 1 Application(s) Topical <User Schedule>  dextrose 5%. 1000 milliLiter(s) (50 mL/Hr) IV Continuous <Continuous>  dextrose 5%. 1000 milliLiter(s) (100 mL/Hr) IV Continuous <Continuous>  dextrose 50% Injectable 25 Gram(s) IV Push once  dextrose 50% Injectable 12.5 Gram(s) IV Push once  dextrose 50% Injectable 25 Gram(s) IV Push once  furosemide    Tablet 20 milliGRAM(s) Oral daily  glucagon  Injectable 1 milliGRAM(s) IntraMuscular once  heparin   Injectable 5000 Unit(s) SubCutaneous every 8 hours  hydrALAZINE 50 milliGRAM(s) Oral two times a day  insulin glargine Injectable (LANTUS) 17 Unit(s) SubCutaneous at bedtime  insulin lispro (ADMELOG) corrective regimen sliding scale   SubCutaneous at bedtime  insulin lispro (ADMELOG) corrective regimen sliding scale   SubCutaneous three times a day before meals  insulin lispro Injectable (ADMELOG) 6 Unit(s) SubCutaneous before lunch  insulin lispro Injectable (ADMELOG) 6 Unit(s) SubCutaneous before dinner  insulin lispro Injectable (ADMELOG) 6 Unit(s) SubCutaneous before breakfast  lisinopril 20 milliGRAM(s) Oral daily  metoprolol succinate ER 50 milliGRAM(s) Oral daily  mupirocin 2% Ointment 1 Application(s) Topical two times a day  pantoprazole    Tablet 40 milliGRAM(s) Oral before breakfast  piperacillin/tazobactam IVPB.. 3.375 Gram(s) IV Intermittent every 8 hours  senna 2 Tablet(s) Oral at bedtime  tamsulosin 0.4 milliGRAM(s) Oral at bedtime  vancomycin  IVPB      vancomycin  IVPB 750 milliGRAM(s) IV Intermittent every 12 hours    MEDICATIONS  (PRN):  acetaminophen     Tablet .. 650 milliGRAM(s) Oral every 6 hours PRN Mild Pain (1 - 3)  dextrose Oral Gel 15 Gram(s) Oral once PRN Blood Glucose LESS THAN 70 milliGRAM(s)/deciliter  HYDROmorphone  Injectable 0.5 milliGRAM(s) IV Push every 4 hours PRN Severe Pain (7 - 10)  ondansetron Injectable 4 milliGRAM(s) IV Push every 8 hours PRN Nausea and/or Vomiting  oxycodone    5 mG/acetaminophen 325 mG 1 Tablet(s) Oral every 4 hours PRN Moderate Pain (4 - 6)        ALLERGIES:  Allergy Status Unknown      LABS:                            < from: MR Ankle w/ IV Cont, Right (07.26.23 @ 18:31) >    IMPRESSION:  1.  There is a ulcer overlying the heel roughly spanning 5.4 cm extending   to the lateral calcaneus. There is minimal osseous edema within the   lateral calcaneus with mild postcontrast enhancement and without   significant loss of normal T1 fatty marrow. This is possibly representing   early osteomyelitis versus reactive in nature.  2.  Questionable minimal osseous edema within the fourth metatarsal head   with postcontrast enhancement which is incompletely evaluated. These   findings are possibly secondary to reactive degenerative changes versus   osteomyelitis if there is adjacent ulcer. Clinical correlation is advised.    --- End of Report ---        < end of copied text >     < from: Xray Foot AP + Lateral + Oblique, Right (07.25.23 @ 17:54) >    IMPRESSION:    No acute fracture or dislocation of the right foot. Midfoot degenerative   changes are noted.    Soft tissue defect at the heel related to known heel ulcer. No osseous   erosive changes of the calcaneus or adjacent osseousstructures. Soft   tissue edema is also noted along the dorsal foot and anterior lower shin.   No tracking subcutaneous gas.    Along the first distal phalanx base, there are juxtaarticular erosions at   the medial aspect and productive changes at the lateral aspect. Findings   may be due to underlying inflammatory arthritis or crystalline   arthropathy.    Vascular calcifications.      --- End of Report ---    < end of copied text >   STEPHANIE KAYE  63y Female  MRN:[id number]    Patient is a 63y old  Female who presents with a chief complaint of foot infection    HPI:   64 y/o F PMHx of DMH2 on insulin, HTN, HLD p/w right heel ulcer. Patient is sent by her podiatrist for questionable right foot procedure.  She is unsure how long she has had this ulcer for.  States that she was recently hospitalized in the near [facility] for an evaluation of the right foot infection.  States she still had a 102 fever yesterday which resolved.  Denies any fevers today, chills, chest pain, shortness of breath, abdominal pain, nausea vomiting diarrhea.  States she ambulates with a walker at baseline.  Accompanied by her sister-in-law (25 Jul 2023 21:58)      pt now s/p bilateral foot heel wound debridement with right foot kerecis graft application, and right foot calcaneus bone biopsy on 7/27      Patient seen and evaluated at bedside. No acute events overnight except as noted.    Interval HPI: no acute events o/n        PAST MEDICAL & SURGICAL HISTORY:  Diabetes      Hypertension      Hypercholesteremia          REVIEW OF SYSTEMS:  as per hpi     VITALS:   Vital Signs Last 24 Hrs  T(C): 37 (03 Aug 2023 11:07), Max: 37 (03 Aug 2023 11:07)  T(F): 98.6 (03 Aug 2023 11:07), Max: 98.6 (03 Aug 2023 11:07)  HR: 56 (03 Aug 2023 11:07) (56 - 69)  BP: 143/87 (03 Aug 2023 11:07) (121/65 - 150/74)  BP(mean): --  RR: 18 (03 Aug 2023 11:07) (18 - 18)  SpO2: 95% (03 Aug 2023 11:07) (92% - 99%)    Parameters below as of 03 Aug 2023 11:07  Patient On (Oxygen Delivery Method): room air            PHYSICAL EXAM:  GENERAL: NAD, well-developed  HEAD:  Atraumatic, Normocephalic  EYES: EOMI, PERRLA, conjunctiva and sclera clear  NECK: Supple, No JVD  CHEST/LUNG: Clear to auscultation bilaterally; No wheeze  HEART: S1, S2; No murmurs, rubs, or gallops  ABDOMEN: Soft, Nontender, Nondistended; Bowel sounds present  EXTREMITIES:  2+ Peripheral Pulses, No clubbing, cyanosis, or edema. b/l lower ext dressing in place   PSYCH: Normal affect  NEUROLOGY: AAOX3; non-focal  SKIN: No rashes or lesions    Consultant(s) Notes Reviewed:  [x ] YES  [ ] NO  Care Discussed with Consultants/Other Providers [ x] YES  [ ] NO    MEDS:   MEDICATIONS  (STANDING):  artificial  tears Solution 1 Drop(s) Both EYES three times a day  atorvastatin 40 milliGRAM(s) Oral at bedtime  chlorhexidine 2% Cloths 1 Application(s) Topical <User Schedule>  dextrose 5%. 1000 milliLiter(s) (50 mL/Hr) IV Continuous <Continuous>  dextrose 5%. 1000 milliLiter(s) (100 mL/Hr) IV Continuous <Continuous>  dextrose 50% Injectable 25 Gram(s) IV Push once  dextrose 50% Injectable 12.5 Gram(s) IV Push once  dextrose 50% Injectable 25 Gram(s) IV Push once  furosemide    Tablet 20 milliGRAM(s) Oral daily  glucagon  Injectable 1 milliGRAM(s) IntraMuscular once  heparin   Injectable 5000 Unit(s) SubCutaneous every 8 hours  hydrALAZINE 50 milliGRAM(s) Oral two times a day  insulin glargine Injectable (LANTUS) 17 Unit(s) SubCutaneous at bedtime  insulin lispro (ADMELOG) corrective regimen sliding scale   SubCutaneous at bedtime  insulin lispro (ADMELOG) corrective regimen sliding scale   SubCutaneous three times a day before meals  insulin lispro Injectable (ADMELOG) 6 Unit(s) SubCutaneous before lunch  insulin lispro Injectable (ADMELOG) 6 Unit(s) SubCutaneous before dinner  insulin lispro Injectable (ADMELOG) 6 Unit(s) SubCutaneous before breakfast  lisinopril 20 milliGRAM(s) Oral daily  metoprolol succinate ER 50 milliGRAM(s) Oral daily  mupirocin 2% Ointment 1 Application(s) Topical two times a day  pantoprazole    Tablet 40 milliGRAM(s) Oral before breakfast  piperacillin/tazobactam IVPB.. 3.375 Gram(s) IV Intermittent every 8 hours  senna 2 Tablet(s) Oral at bedtime  tamsulosin 0.4 milliGRAM(s) Oral at bedtime  vancomycin  IVPB      vancomycin  IVPB 750 milliGRAM(s) IV Intermittent every 12 hours    MEDICATIONS  (PRN):  acetaminophen     Tablet .. 650 milliGRAM(s) Oral every 6 hours PRN Mild Pain (1 - 3)  dextrose Oral Gel 15 Gram(s) Oral once PRN Blood Glucose LESS THAN 70 milliGRAM(s)/deciliter  HYDROmorphone  Injectable 0.5 milliGRAM(s) IV Push every 4 hours PRN Severe Pain (7 - 10)  ondansetron Injectable 4 milliGRAM(s) IV Push every 8 hours PRN Nausea and/or Vomiting  oxycodone    5 mG/acetaminophen 325 mG 1 Tablet(s) Oral every 4 hours PRN Moderate Pain (4 - 6)        ALLERGIES:  Allergy Status Unknown      LABS:                            < from: MR Ankle w/ IV Cont, Right (07.26.23 @ 18:31) >    IMPRESSION:  1.  There is a ulcer overlying the heel roughly spanning 5.4 cm extending   to the lateral calcaneus. There is minimal osseous edema within the   lateral calcaneus with mild postcontrast enhancement and without   significant loss of normal T1 fatty marrow. This is possibly representing   early osteomyelitis versus reactive in nature.  2.  Questionable minimal osseous edema within the fourth metatarsal head   with postcontrast enhancement which is incompletely evaluated. These   findings are possibly secondary to reactive degenerative changes versus   osteomyelitis if there is adjacent ulcer. Clinical correlation is advised.    --- End of Report ---        < end of copied text >     < from: Xray Foot AP + Lateral + Oblique, Right (07.25.23 @ 17:54) >    IMPRESSION:    No acute fracture or dislocation of the right foot. Midfoot degenerative   changes are noted.    Soft tissue defect at the heel related to known heel ulcer. No osseous   erosive changes of the calcaneus or adjacent osseousstructures. Soft   tissue edema is also noted along the dorsal foot and anterior lower shin.   No tracking subcutaneous gas.    Along the first distal phalanx base, there are juxtaarticular erosions at   the medial aspect and productive changes at the lateral aspect. Findings   may be due to underlying inflammatory arthritis or crystalline   arthropathy.    Vascular calcifications.      --- End of Report ---    < end of copied text >   STEPHANIE KAYE  63y Female  MRN:[id number]    Patient is a 63y old  Female who presents with a chief complaint of foot infection    HPI:   64 y/o F PMHx of DMH2 on insulin, HTN, HLD p/w right heel ulcer. Patient is sent by her podiatrist for questionable right foot procedure.  She is unsure how long she has had this ulcer for.  States that she was recently hospitalized in the near [facility] for an evaluation of the right foot infection.  States she still had a 102 fever yesterday which resolved.  Denies any fevers today, chills, chest pain, shortness of breath, abdominal pain, nausea vomiting diarrhea.  States she ambulates with a walker at baseline.  Accompanied by her sister-in-law (25 Jul 2023 21:58)      pt now s/p bilateral foot heel wound debridement with right foot kerecis graft application, and right foot calcaneus bone biopsy on 7/27      Patient seen and evaluated at bedside. No acute events overnight except as noted.    Interval HPI: no acute events o/n        PAST MEDICAL & SURGICAL HISTORY:  Diabetes      Hypertension      Hypercholesteremia          REVIEW OF SYSTEMS:  as per hpi     VITALS:   Vital Signs Last 24 Hrs  T(C): 37 (03 Aug 2023 11:07), Max: 37 (03 Aug 2023 11:07)  T(F): 98.6 (03 Aug 2023 11:07), Max: 98.6 (03 Aug 2023 11:07)  HR: 56 (03 Aug 2023 11:07) (56 - 69)  BP: 143/87 (03 Aug 2023 11:07) (121/65 - 150/74)  BP(mean): --  RR: 18 (03 Aug 2023 11:07) (18 - 18)  SpO2: 95% (03 Aug 2023 11:07) (92% - 99%)    Parameters below as of 03 Aug 2023 11:07  Patient On (Oxygen Delivery Method): room air            PHYSICAL EXAM:  GENERAL: NAD, well-developed  HEAD:  Atraumatic, Normocephalic  EYES: EOMI, PERRLA, conjunctiva and sclera clear  NECK: Supple, No JVD  CHEST/LUNG: Clear to auscultation bilaterally; No wheeze  HEART: S1, S2; No murmurs, rubs, or gallops  ABDOMEN: Soft, Nontender, Nondistended; Bowel sounds present  EXTREMITIES:  2+ Peripheral Pulses, No clubbing, cyanosis, or edema. b/l lower ext dressing in place   PSYCH: Normal affect  NEUROLOGY: AAOX3; non-focal  SKIN: No rashes or lesions    Consultant(s) Notes Reviewed:  [x ] YES  [ ] NO  Care Discussed with Consultants/Other Providers [ x] YES  [ ] NO    MEDS:   MEDICATIONS  (STANDING):  artificial  tears Solution 1 Drop(s) Both EYES three times a day  atorvastatin 40 milliGRAM(s) Oral at bedtime  chlorhexidine 2% Cloths 1 Application(s) Topical <User Schedule>  dextrose 5%. 1000 milliLiter(s) (50 mL/Hr) IV Continuous <Continuous>  dextrose 5%. 1000 milliLiter(s) (100 mL/Hr) IV Continuous <Continuous>  dextrose 50% Injectable 25 Gram(s) IV Push once  dextrose 50% Injectable 12.5 Gram(s) IV Push once  dextrose 50% Injectable 25 Gram(s) IV Push once  furosemide    Tablet 20 milliGRAM(s) Oral daily  glucagon  Injectable 1 milliGRAM(s) IntraMuscular once  heparin   Injectable 5000 Unit(s) SubCutaneous every 8 hours  hydrALAZINE 50 milliGRAM(s) Oral two times a day  insulin glargine Injectable (LANTUS) 17 Unit(s) SubCutaneous at bedtime  insulin lispro (ADMELOG) corrective regimen sliding scale   SubCutaneous at bedtime  insulin lispro (ADMELOG) corrective regimen sliding scale   SubCutaneous three times a day before meals  insulin lispro Injectable (ADMELOG) 6 Unit(s) SubCutaneous before lunch  insulin lispro Injectable (ADMELOG) 6 Unit(s) SubCutaneous before dinner  insulin lispro Injectable (ADMELOG) 6 Unit(s) SubCutaneous before breakfast  lisinopril 20 milliGRAM(s) Oral daily  metoprolol succinate ER 50 milliGRAM(s) Oral daily  mupirocin 2% Ointment 1 Application(s) Topical two times a day  pantoprazole    Tablet 40 milliGRAM(s) Oral before breakfast  piperacillin/tazobactam IVPB.. 3.375 Gram(s) IV Intermittent every 8 hours  senna 2 Tablet(s) Oral at bedtime  tamsulosin 0.4 milliGRAM(s) Oral at bedtime  vancomycin  IVPB      vancomycin  IVPB 750 milliGRAM(s) IV Intermittent every 12 hours    MEDICATIONS  (PRN):  acetaminophen     Tablet .. 650 milliGRAM(s) Oral every 6 hours PRN Mild Pain (1 - 3)  dextrose Oral Gel 15 Gram(s) Oral once PRN Blood Glucose LESS THAN 70 milliGRAM(s)/deciliter  HYDROmorphone  Injectable 0.5 milliGRAM(s) IV Push every 4 hours PRN Severe Pain (7 - 10)  ondansetron Injectable 4 milliGRAM(s) IV Push every 8 hours PRN Nausea and/or Vomiting  oxycodone    5 mG/acetaminophen 325 mG 1 Tablet(s) Oral every 4 hours PRN Moderate Pain (4 - 6)        ALLERGIES:  Allergy Status Unknown      LABS:                            < from: MR Ankle w/ IV Cont, Right (07.26.23 @ 18:31) >    IMPRESSION:  1.  There is a ulcer overlying the heel roughly spanning 5.4 cm extending   to the lateral calcaneus. There is minimal osseous edema within the   lateral calcaneus with mild postcontrast enhancement and without   significant loss of normal T1 fatty marrow. This is possibly representing   early osteomyelitis versus reactive in nature.  2.  Questionable minimal osseous edema within the fourth metatarsal head   with postcontrast enhancement which is incompletely evaluated. These   findings are possibly secondary to reactive degenerative changes versus   osteomyelitis if there is adjacent ulcer. Clinical correlation is advised.    --- End of Report ---        < end of copied text >     < from: Xray Foot AP + Lateral + Oblique, Right (07.25.23 @ 17:54) >    IMPRESSION:    No acute fracture or dislocation of the right foot. Midfoot degenerative   changes are noted.    Soft tissue defect at the heel related to known heel ulcer. No osseous   erosive changes of the calcaneus or adjacent osseousstructures. Soft   tissue edema is also noted along the dorsal foot and anterior lower shin.   No tracking subcutaneous gas.    Along the first distal phalanx base, there are juxtaarticular erosions at   the medial aspect and productive changes at the lateral aspect. Findings   may be due to underlying inflammatory arthritis or crystalline   arthropathy.    Vascular calcifications.      --- End of Report ---    < end of copied text >

## 2023-08-03 NOTE — PROGRESS NOTE ADULT - SUBJECTIVE AND OBJECTIVE BOX
Chief complaint  Patient is a 63y old  Female who presents with a chief complaint of heel wound (02 Aug 2023 17:09)         Labs and Fingersticks  CAPILLARY BLOOD GLUCOSE      POCT Blood Glucose.: 255 mg/dL (03 Aug 2023 07:51)  POCT Blood Glucose.: 236 mg/dL (02 Aug 2023 21:24)  POCT Blood Glucose.: 132 mg/dL (02 Aug 2023 16:25)  POCT Blood Glucose.: 162 mg/dL (02 Aug 2023 11:51)                        Medications  MEDICATIONS  (STANDING):  artificial  tears Solution 1 Drop(s) Both EYES three times a day  atorvastatin 40 milliGRAM(s) Oral at bedtime  chlorhexidine 2% Cloths 1 Application(s) Topical <User Schedule>  dextrose 5%. 1000 milliLiter(s) (50 mL/Hr) IV Continuous <Continuous>  dextrose 5%. 1000 milliLiter(s) (100 mL/Hr) IV Continuous <Continuous>  dextrose 50% Injectable 25 Gram(s) IV Push once  dextrose 50% Injectable 12.5 Gram(s) IV Push once  dextrose 50% Injectable 25 Gram(s) IV Push once  furosemide    Tablet 20 milliGRAM(s) Oral daily  glucagon  Injectable 1 milliGRAM(s) IntraMuscular once  heparin   Injectable 5000 Unit(s) SubCutaneous every 8 hours  hydrALAZINE 50 milliGRAM(s) Oral two times a day  insulin glargine Injectable (LANTUS) 17 Unit(s) SubCutaneous at bedtime  insulin lispro (ADMELOG) corrective regimen sliding scale   SubCutaneous at bedtime  insulin lispro (ADMELOG) corrective regimen sliding scale   SubCutaneous three times a day before meals  insulin lispro Injectable (ADMELOG) 6 Unit(s) SubCutaneous before lunch  insulin lispro Injectable (ADMELOG) 6 Unit(s) SubCutaneous before dinner  insulin lispro Injectable (ADMELOG) 6 Unit(s) SubCutaneous before breakfast  lisinopril 20 milliGRAM(s) Oral daily  metoprolol succinate ER 50 milliGRAM(s) Oral daily  mupirocin 2% Ointment 1 Application(s) Topical two times a day  pantoprazole    Tablet 40 milliGRAM(s) Oral before breakfast  piperacillin/tazobactam IVPB.. 3.375 Gram(s) IV Intermittent every 8 hours  senna 2 Tablet(s) Oral at bedtime  tamsulosin 0.4 milliGRAM(s) Oral at bedtime  vancomycin  IVPB      vancomycin  IVPB 750 milliGRAM(s) IV Intermittent every 12 hours      Physical Exam  General: Patient comfortable in bed  Vital Signs Last 12 Hrs  T(F): 98.1 (08-02-23 @ 23:36), Max: 98.1 (08-02-23 @ 23:36)  HR: 69 (08-02-23 @ 23:36) (69 - 69)  BP: 150/74 (08-02-23 @ 23:36) (150/74 - 150/74)  BP(mean): --  RR: 18 (08-02-23 @ 23:36) (18 - 18)  SpO2: 92% (08-02-23 @ 23:36) (92% - 92%)    CVS: S1S2   Respiratory: No wheezing, no crepitations  GI: Abdomen soft, bowel sounds positive  Musculoskeletal:  moves all extremities  : Voiding

## 2023-08-03 NOTE — PROGRESS NOTE ADULT - ASSESSMENT
63 female h/o dm, htn, chol, here with b/l foot wounds    b/l foot wounds  pod eval noted  id consult apprec  iv abx as per id  f/u cult  wound care  MRI noted  s/p bilateral foot heel wound debridement with right foot kerecis graft application, and right foot calcaneus bone biopsy on 7/27  post op care per pod  VAC applied  f/u cultures from OR  abx per id  will need picc line for long term abx       dm  insulin as ordered  monitor fs    chol  cont statin    htn  cont home meds    dvt ppx    dc planning        Advanced care planning was discussed with patient and family.  Advanced care planning forms were reviewed and discussed as appropriate.  Differential diagnosis and plan of care discussed with patient after the evaluation.   Pain assessed and judicious use of narcotics when appropriate was discussed.  Importance of Fall prevention discussed.  Counseling on Smoking and Alcohol cessation was offered when appropriate.  Counseling on Diet, exercise, and medication compliance was done.       Approx 60 minutes spent.

## 2023-08-03 NOTE — ADVANCED PRACTICE NURSE CONSULT - ASSESSMENT
Central Line Catheter Insertion Note  Patient or patient representative educated about central line associated blood stream infection prevention practices.  Catheter type: 4 F,  SL PICC  : Bard  Power injectable: Yes  Ref# [id number]  Lot # [id number]    Informed consent obtained by covering floor team.  Procedure assisted by: SHEEBA Rowe RN  Time out was preformed, confirming the patient's first and last name, date of birth, procedure, and correct site prior to state of procedure.    Patient was placed with HOB 30 degrees. Patient placement site was prepped with chlorhexidine solution, then draped using maximum sterile barrier protection. The area was injected with 2 ml of 1% lidocaine. Using the Bard Site Rite 8, the catheter was placed using the Modified Seldinger Technique. Strict adherence to outline aseptic technique including handwashing, glove and gown, utilizing mask and cap, plus draping the patient with a sterile drape was observed. Pt wore a face and hair covering. Upon completion of line placement, the insertion site was covered with a sterile CHG dressing. Pt tolerated procedure well.     All materials used for catheter insertion, including the intact guide wires, were accounted for at the end of the procedure.  Number of attempts: 1  Complications/Comments: None    Emergency Placement: No  Site: New   Anatomical Site of insertion: Right Basilic  Catheter size/length: 4F,   39cm  US guided Bard single lumen power PICC placed    Post procedure verification with chest Xray as per orders.    VS pre procedure:  /87  , HR 56   ,  RR 20  , Temp 98.6    ,  O2sat 95%  VS post procedure:  /82  , HR 55  , RR 18  , Temp  98.6 ,  O2sat 95%   Central Line Catheter Insertion Note  Patient or patient representative educated about central line associated blood stream infection prevention practices.  Catheter type: 4 F,  SL PICC  : Bard  Power injectable: Yes  Ref# [id number]  Lot # [id number]    Informed consent obtained by covering floor team.  Procedure assisted by: SHEEBA Rowe RN  Time out was preformed, confirming the patient's first and last name, date of birth, procedure, and correct site prior to state of procedure.    Patient was placed with HOB 30 degrees. Patient placement site was prepped with chlorhexidine solution, then draped using maximum sterile barrier protection. The area was injected with 2 ml of 1% lidocaine. Using the Bard Site Rite 8, the catheter was placed using the Modified Seldinger Technique. Strict adherence to outline aseptic technique including handwashing, glove and gown, utilizing mask and cap, plus draping the patient with a sterile drape was observed. Pt wore a face and hair covering. Upon completion of line placement, the insertion site was covered with a sterile CHG dressing. Pt tolerated procedure well.     All materials used for catheter insertion, including the intact guide wires, were accounted for at the end of the procedure.  Number of attempts: 1  Complications/Comments: None    Emergency Placement: No  Site: New   Anatomical Site of insertion: Right Basilic  Catheter size/length: 4F,   39cm  US guided Bard single lumen power PICC placed    Post procedure verification with chest Xray as per orders.    VS pre procedure:  /87  , HR 56   ,  RR 20  , Temp 98.6    ,  O2sat 95%  VS post procedure:  /82  , HR 55  , RR 18  , Temp  98.6 ,  O2sat 95%   Central Line Catheter Insertion Note  Patient or patient representative educated about central line associated blood stream infection prevention practices.  Catheter type: 4 F,  SL PICC  : Bard  Power injectable: Yes  Ref# [id number]  Lot # [id number]    Informed consent obtained by covering floor team.  Procedure assisted by: SHEEBA Rowe RN  Time out was preformed, confirming the patient's first and last name, date of birth, procedure, and correct site prior to state of procedure.    Patient was placed with HOB 30 degrees. Patient placement site was prepped with chlorhexidine solution, then draped using maximum sterile barrier protection. The area was injected with 2 ml of 1% lidocaine. Using the Bard Site Rite 8, the catheter was placed using the Modified Seldinger Technique. Strict adherence to outline aseptic technique including handwashing, glove and gown, utilizing mask and cap, plus draping the patient with a sterile drape was observed. Pt wore a face and hair covering. Upon completion of line placement, the insertion site was covered with a sterile CHG dressing. Pt tolerated procedure well.     All materials used for catheter insertion, including the intact guide wires, were accounted for at the end of the procedure.  Number of attempts: 1  Complications/Comments: None    Emergency Placement: No  Site: New   Anatomical Site of insertion: Right Basilic  Catheter size/length: 4F,   39cm  US guided Bard single lumen power PICC placed    Post procedure verification with chest Xray as per orders.    VS pre procedure:  /87  , HR 56   ,  RR 20  , Temp 98.6    ,  O2sat 95%  VS post procedure:  /82  , HR 55  , RR 18  , Temp  98.6 ,  O2sat 95%

## 2023-08-03 NOTE — PROGRESS NOTE ADULT - ASSESSMENT
62 y/o F PMHx of DMH2 on insulin, HTN, HLD p/w right heel ulcer. Patient is sent by her podiatrist for questionable right foot procedure.  She is unsure how long she has had this ulcer for.  States that she was recently hospitalized in the near [facility] for an evaluation of the right foot infection.  States she still had a 102 fever yesterday which resolved.  Denies any fevers today, chills, chest pain, shortness of breath, abdominal pain, nausea vomiting diarrhea.  States she ambulates with a walker at baseline.  Accompanied by her sister-in-law. 62 y/o F PMHx of DMH2 on insulin, HTN, HLD p/w right heel ulcer. Patient is sent by her podiatrist for questionable right foot procedure.  She is unsure how long she has had this ulcer for.  States that she was recently hospitalized in the near [facility] for an evaluation of the right foot infection.  States she still had a 102 fever yesterday which resolved.  Denies any fevers today, chills, chest pain, shortness of breath, abdominal pain, nausea vomiting diarrhea.  States she ambulates with a walker at baseline.  Accompanied by her sister-in-law. 62 y/o F PMHx of DMH2 on insulin, HTN, HLD p/w right heel ulcer. Patient is sent by her podiatrist for questionable right foot procedure.  She is unsure how long she has had this ulcer for.  States that she was recently hospitalized in the near [facility] for an evaluation of the right foot infection.  States she still had a 102 fever yesterday which resolved.  Denies any fevers today, chills, chest pain, shortness of breath, abdominal pain, nausea vomiting diarrhea.  States she ambulates with a walker at baseline.  Accompanied by her sister-in-law.

## 2023-08-03 NOTE — PROGRESS NOTE ADULT - ASSESSMENT
62 y/o F PMHx of DMH2 on insulin, HTN, HLD p/w right heel ulcer.    Assessment  DMT2: 63y Female with DM T2 with hyperglycemia, A1C 8.4%, was on insulin at home, now on basal bolus insulin with coverage, blood sugars running high. Has right heel ulcer. Started on basal insulin, glucose trending high.   ?Gastroparesis: Has nausea at times, improved.   Foot wound: on medications, monitored. Podiatry eval/work up. MRSA wound on isolation   HTN: on antihypertensive medications, monitored, asymptomatic.  HLD: stable, on statin, monitored.    Discussed plan and management with Attending   Armando Toscano NP - TEAMS  Dr Wesley Hannah  [phone]         62 y/o F PMHx of DMH2 on insulin, HTN, HLD p/w right heel ulcer.    Assessment  DMT2: 63y Female with DM T2 with hyperglycemia, A1C 8.4%, was on insulin at home, now on basal bolus insulin with coverage, blood sugars running high. Has right heel ulcer. Started on basal insulin, glucose trending high.   ?Gastroparesis: Has nausea at times, improved.   Foot wound: on medications, monitored. Podiatry eval/work up. MRSA wound on isolation   HTN: on antihypertensive medications, monitored, asymptomatic.  HLD: stable, on statin, monitored.    Discussed plan and management with Attending   Armando Toscano NP - TEAMS  Dr Wesley Hannah  [phone]         62 y/o F PMHx of DMH2 on insulin, HTN, HLD p/w right heel ulcer.    Assessment  DMT2: 63y Female with DM T2 with hyperglycemia, A1C 8.4%, was on insulin at home, now on basal bolus insulin with coverage, blood sugars running high. Has right heel ulcer. Started on basal insulin, glucose trending high.   ?Gastroparesis: Has nausea at times, improved.   Foot wound: on medications, monitored. Podiatry eval/work up. MRSA wound on isolation   HTN: on antihypertensive medications, monitored, asymptomatic.  HLD: stable, on statin, monitored.    Discussed plan and management with Attending   Armando Toscano NP - TEAMS  Dr Wesley Hannah  [phone]

## 2023-08-03 NOTE — PROGRESS NOTE ADULT - SUBJECTIVE AND OBJECTIVE BOX
Subjective: Patient seen and examined. No new events except as noted.     REVIEW OF SYSTEMS:    CONSTITUTIONAL: + weakness, fevers or chills  EYES/ENT: No visual changes;  No vertigo or throat pain   NECK: No pain or stiffness  RESPIRATORY: No cough, wheezing, hemoptysis; No shortness of breath  CARDIOVASCULAR: No chest pain or palpitations  GASTROINTESTINAL: No abdominal or epigastric pain. No nausea, vomiting, or hematemesis; No diarrhea or constipation. No melena or hematochezia.  GENITOURINARY: No dysuria, frequency or hematuria  NEUROLOGICAL: No numbness or weakness  SKIN: No itching, burning, rashes, or lesions   All other review of systems is negative unless indicated above.    MEDICATIONS:  MEDICATIONS  (STANDING):  artificial  tears Solution 1 Drop(s) Both EYES three times a day  atorvastatin 40 milliGRAM(s) Oral at bedtime  chlorhexidine 2% Cloths 1 Application(s) Topical <User Schedule>  dextrose 5%. 1000 milliLiter(s) (50 mL/Hr) IV Continuous <Continuous>  dextrose 5%. 1000 milliLiter(s) (100 mL/Hr) IV Continuous <Continuous>  dextrose 50% Injectable 25 Gram(s) IV Push once  dextrose 50% Injectable 12.5 Gram(s) IV Push once  dextrose 50% Injectable 25 Gram(s) IV Push once  furosemide    Tablet 20 milliGRAM(s) Oral daily  glucagon  Injectable 1 milliGRAM(s) IntraMuscular once  heparin   Injectable 5000 Unit(s) SubCutaneous every 8 hours  hydrALAZINE 50 milliGRAM(s) Oral two times a day  insulin glargine Injectable (LANTUS) 10 Unit(s) SubCutaneous at bedtime  insulin lispro (ADMELOG) corrective regimen sliding scale   SubCutaneous three times a day before meals  insulin lispro (ADMELOG) corrective regimen sliding scale   SubCutaneous at bedtime  insulin lispro Injectable (ADMELOG) 3 Unit(s) SubCutaneous before breakfast  insulin lispro Injectable (ADMELOG) 2 Unit(s) SubCutaneous before dinner  insulin lispro Injectable (ADMELOG) 3 Unit(s) SubCutaneous before lunch  lisinopril 20 milliGRAM(s) Oral daily  metoprolol succinate ER 50 milliGRAM(s) Oral daily  mupirocin 2% Nasal 1 Application(s) Both Nostrils two times a day  mupirocin 2% Ointment 1 Application(s) Topical two times a day  pantoprazole    Tablet 40 milliGRAM(s) Oral before breakfast  piperacillin/tazobactam IVPB.. 3.375 Gram(s) IV Intermittent every 8 hours  senna 2 Tablet(s) Oral at bedtime  tamsulosin 0.4 milliGRAM(s) Oral at bedtime    PHYSICAL EXAM:  Vital Signs Last 24 Hrs  T(C): 37 (03 Aug 2023 11:07), Max: 37 (03 Aug 2023 11:07)  T(F): 98.6 (03 Aug 2023 11:07), Max: 98.6 (03 Aug 2023 11:07)  HR: 56 (03 Aug 2023 11:07) (56 - 69)  BP: 143/87 (03 Aug 2023 11:07) (121/65 - 150/74)  BP(mean): --  RR: 18 (03 Aug 2023 11:07) (18 - 18)  SpO2: 95% (03 Aug 2023 11:07) (92% - 99%)    Parameters below as of 03 Aug 2023 11:07  Patient On (Oxygen Delivery Method): room air    I&O's Summary    02 Aug 2023 07:01  -  03 Aug 2023 07:00  --------------------------------------------------------  IN: 1230 mL / OUT: 2350 mL / NET: -1120 mL    03 Aug 2023 07:01  -  03 Aug 2023 11:45  --------------------------------------------------------  IN: 320 mL / OUT: 900 mL / NET: -580 mL    Appearance: NAD  HEENT:  Dry oral mucosa, PERRL, EOMI	  Lymphatic: No lymphadenopathy , no edema  Cardiovascular: Normal S1 S2, No JVD, No murmurs , Peripheral pulses palpable 2+ bilaterally  Respiratory: Lungs clear to auscultation, normal effort 	  Gastrointestinal:  Soft, Non-tender, + BS	  Musculoskeletal: Normal range of motion, normal strength  Psychiatry:  Mood & affect appropriate  Vascular: DP/PT 0/4, B/L, CFT <3 seconds B/L, Temperature gradient warm to cool, B/L.   Neuro: Epicritic sensation dimished to the level of toes, B/L.  Musculoskeletal/Ortho: Unremarkable.  Skin: s/p BL heel wound debridement dressings c/d/i     LABS:    CARDIAC MARKERS:    proBNP:   Lipid Profile:   HgA1c:   TSH:     TELEMETRY: 	    ECG:  	  RADIOLOGY:   DIAGNOSTIC TESTING:  [ ] Echocardiogram:  [ ]  Catheterization:  [ ] Stress Test:    OTHER:

## 2023-08-04 LAB
GLUCOSE BLDC GLUCOMTR-MCNC: 242 MG/DL — HIGH (ref 70–99)
GLUCOSE BLDC GLUCOMTR-MCNC: 264 MG/DL — HIGH (ref 70–99)
GLUCOSE BLDC GLUCOMTR-MCNC: 267 MG/DL — HIGH (ref 70–99)
GLUCOSE BLDC GLUCOMTR-MCNC: 287 MG/DL — HIGH (ref 70–99)
VANCOMYCIN TROUGH SERPL-MCNC: 17.7 UG/ML — SIGNIFICANT CHANGE UP (ref 10–20)

## 2023-08-04 PROCEDURE — 99232 SBSQ HOSP IP/OBS MODERATE 35: CPT

## 2023-08-04 RX ORDER — INSULIN GLARGINE 100 [IU]/ML
20 INJECTION, SOLUTION SUBCUTANEOUS AT BEDTIME
Refills: 0 | Status: DISCONTINUED | OUTPATIENT
Start: 2023-08-04 | End: 2023-08-05

## 2023-08-04 RX ORDER — INSULIN LISPRO 100/ML
8 VIAL (ML) SUBCUTANEOUS
Refills: 0 | Status: DISCONTINUED | OUTPATIENT
Start: 2023-08-05 | End: 2023-08-05

## 2023-08-04 RX ORDER — PIPERACILLIN AND TAZOBACTAM 4; .5 G/20ML; G/20ML
3.38 INJECTION, POWDER, LYOPHILIZED, FOR SOLUTION INTRAVENOUS ONCE
Refills: 0 | Status: COMPLETED | OUTPATIENT
Start: 2023-08-04 | End: 2023-08-04

## 2023-08-04 RX ORDER — PIPERACILLIN AND TAZOBACTAM 4; .5 G/20ML; G/20ML
3.38 INJECTION, POWDER, LYOPHILIZED, FOR SOLUTION INTRAVENOUS EVERY 8 HOURS
Refills: 0 | Status: DISCONTINUED | OUTPATIENT
Start: 2023-08-04 | End: 2023-08-05

## 2023-08-04 RX ADMIN — Medication 250 MILLIGRAM(S): at 06:37

## 2023-08-04 RX ADMIN — Medication 50 MILLIGRAM(S): at 06:37

## 2023-08-04 RX ADMIN — PIPERACILLIN AND TAZOBACTAM 200 GRAM(S): 4; .5 INJECTION, POWDER, LYOPHILIZED, FOR SOLUTION INTRAVENOUS at 12:05

## 2023-08-04 RX ADMIN — Medication 1: at 21:50

## 2023-08-04 RX ADMIN — TAMSULOSIN HYDROCHLORIDE 0.4 MILLIGRAM(S): 0.4 CAPSULE ORAL at 21:50

## 2023-08-04 RX ADMIN — MUPIROCIN 1 APPLICATION(S): 20 OINTMENT TOPICAL at 06:36

## 2023-08-04 RX ADMIN — ATORVASTATIN CALCIUM 40 MILLIGRAM(S): 80 TABLET, FILM COATED ORAL at 21:50

## 2023-08-04 RX ADMIN — PIPERACILLIN AND TAZOBACTAM 25 GRAM(S): 4; .5 INJECTION, POWDER, LYOPHILIZED, FOR SOLUTION INTRAVENOUS at 14:43

## 2023-08-04 RX ADMIN — Medication 20 MILLIGRAM(S): at 06:36

## 2023-08-04 RX ADMIN — HEPARIN SODIUM 5000 UNIT(S): 5000 INJECTION INTRAVENOUS; SUBCUTANEOUS at 13:23

## 2023-08-04 RX ADMIN — Medication 3: at 17:02

## 2023-08-04 RX ADMIN — PANTOPRAZOLE SODIUM 40 MILLIGRAM(S): 20 TABLET, DELAYED RELEASE ORAL at 06:37

## 2023-08-04 RX ADMIN — HEPARIN SODIUM 5000 UNIT(S): 5000 INJECTION INTRAVENOUS; SUBCUTANEOUS at 21:50

## 2023-08-04 RX ADMIN — Medication 250 MILLIGRAM(S): at 17:02

## 2023-08-04 RX ADMIN — LISINOPRIL 20 MILLIGRAM(S): 2.5 TABLET ORAL at 06:37

## 2023-08-04 RX ADMIN — Medication 3: at 12:12

## 2023-08-04 RX ADMIN — HEPARIN SODIUM 5000 UNIT(S): 5000 INJECTION INTRAVENOUS; SUBCUTANEOUS at 06:36

## 2023-08-04 RX ADMIN — MUPIROCIN 1 APPLICATION(S): 20 OINTMENT TOPICAL at 17:02

## 2023-08-04 RX ADMIN — CHLORHEXIDINE GLUCONATE 1 APPLICATION(S): 213 SOLUTION TOPICAL at 06:52

## 2023-08-04 RX ADMIN — Medication 2: at 08:16

## 2023-08-04 RX ADMIN — PIPERACILLIN AND TAZOBACTAM 25 GRAM(S): 4; .5 INJECTION, POWDER, LYOPHILIZED, FOR SOLUTION INTRAVENOUS at 21:50

## 2023-08-04 RX ADMIN — Medication 50 MILLIGRAM(S): at 17:02

## 2023-08-04 RX ADMIN — INSULIN GLARGINE 20 UNIT(S): 100 INJECTION, SOLUTION SUBCUTANEOUS at 21:51

## 2023-08-04 RX ADMIN — Medication 6 UNIT(S): at 08:16

## 2023-08-04 NOTE — PROGRESS NOTE ADULT - SUBJECTIVE AND OBJECTIVE BOX
Subjective: Patient seen and examined. No new events except as noted.     REVIEW OF SYSTEMS:    CONSTITUTIONAL:+ weakness, fevers or chills  EYES/ENT: No visual changes;  No vertigo or throat pain   NECK: No pain or stiffness  RESPIRATORY: No cough, wheezing, hemoptysis; No shortness of breath  CARDIOVASCULAR: No chest pain or palpitations  GASTROINTESTINAL: No abdominal or epigastric pain. No nausea, vomiting, or hematemesis; No diarrhea or constipation. No melena or hematochezia.  GENITOURINARY: No dysuria, frequency or hematuria  NEUROLOGICAL: No numbness or weakness  SKIN: No itching, burning, rashes, or lesions   All other review of systems is negative unless indicated above.    MEDICATIONS:  MEDICATIONS  (STANDING):  artificial  tears Solution 1 Drop(s) Both EYES three times a day  atorvastatin 40 milliGRAM(s) Oral at bedtime  chlorhexidine 2% Cloths 1 Application(s) Topical <User Schedule>  dextrose 5%. 1000 milliLiter(s) (50 mL/Hr) IV Continuous <Continuous>  dextrose 5%. 1000 milliLiter(s) (100 mL/Hr) IV Continuous <Continuous>  dextrose 50% Injectable 25 Gram(s) IV Push once  dextrose 50% Injectable 12.5 Gram(s) IV Push once  dextrose 50% Injectable 25 Gram(s) IV Push once  furosemide    Tablet 20 milliGRAM(s) Oral daily  glucagon  Injectable 1 milliGRAM(s) IntraMuscular once  heparin   Injectable 5000 Unit(s) SubCutaneous every 8 hours  hydrALAZINE 50 milliGRAM(s) Oral two times a day  insulin glargine Injectable (LANTUS) 20 Unit(s) SubCutaneous at bedtime  insulin lispro (ADMELOG) corrective regimen sliding scale   SubCutaneous three times a day before meals  insulin lispro (ADMELOG) corrective regimen sliding scale   SubCutaneous at bedtime  lisinopril 20 milliGRAM(s) Oral daily  metoprolol succinate ER 50 milliGRAM(s) Oral daily  mupirocin 2% Ointment 1 Application(s) Topical two times a day  pantoprazole    Tablet 40 milliGRAM(s) Oral before breakfast  senna 2 Tablet(s) Oral at bedtime  tamsulosin 0.4 milliGRAM(s) Oral at bedtime  vancomycin  IVPB      vancomycin  IVPB 750 milliGRAM(s) IV Intermittent every 12 hours      PHYSICAL EXAM:  T(C): 36.8 (08-04-23 @ 08:41), Max: 37 (08-03-23 @ 11:07)  HR: 65 (08-04-23 @ 08:41) (56 - 68)  BP: 140/72 (08-04-23 @ 08:41) (135/61 - 157/73)  RR: 18 (08-04-23 @ 08:41) (18 - 18)  SpO2: 98% (08-04-23 @ 08:41) (93% - 99%)  Wt(kg): --  I&O's Summary    03 Aug 2023 07:01  -  04 Aug 2023 07:00  --------------------------------------------------------  IN: 950 mL / OUT: 1750 mL / NET: -800 mL            Appearance: NAD  HEENT:  Dry oral mucosa, PERRL, EOMI	  Lymphatic: No lymphadenopathy , no edema  Cardiovascular: Normal S1 S2, No JVD, No murmurs , Peripheral pulses palpable 2+ bilaterally  Respiratory: decreased bsd 	  Gastrointestinal:  Soft, Non-tender, + BS	  Musculoskeletal: Normal range of motion, normal strength  Psychiatry:  Mood & affect appropriate  Vascular: DP/PT 0/4, B/L, CFT <3 seconds B/L, Temperature gradient warm to cool, B/L.   Neuro: Epicritic sensation dimished to the level of toes, B/L.  Musculoskeletal/Ortho: Unremarkable.  Skin: s/p BL heel wound debridement dressings c/d/i     LABS:    CARDIAC MARKERS:                  TELEMETRY: 	    ECG:  	  RADIOLOGY:   DIAGNOSTIC TESTING:  [ ] Echocardiogram:  [ ]  Catheterization:  [ ] Stress Test:    OTHER:

## 2023-08-04 NOTE — PROGRESS NOTE ADULT - ASSESSMENT
63 female h/o dm, htn, chol, here with b/l foot wounds    b/l foot wounds  pod eval noted  id consult apprec  iv abx as per id  f/u cult  wound care  MRI noted  s/p bilateral foot heel wound debridement with right foot kerecis graft application, and right foot calcaneus bone biopsy on 7/27  post op care per pod  VAC applied  f/u cultures from OR  abx per id  picc line placed for long term abx       dm  insulin as ordered  monitor fs    chol  cont statin    htn  cont home meds    dvt ppx    dc planning        Advanced care planning was discussed with patient and family.  Advanced care planning forms were reviewed and discussed as appropriate.  Differential diagnosis and plan of care discussed with patient after the evaluation.   Pain assessed and judicious use of narcotics when appropriate was discussed.  Importance of Fall prevention discussed.  Counseling on Smoking and Alcohol cessation was offered when appropriate.  Counseling on Diet, exercise, and medication compliance was done.       Approx 60 minutes spent.

## 2023-08-04 NOTE — PROGRESS NOTE ADULT - ASSESSMENT
63F s/p BL heel wound debridement with right foot graft application and bone biopsy (DOS 7/27).  - Pt seen and evaluated.  - Afebrile, no leukocytosis.  - s/p BL heel wound debridement with right foot graft application and bone biopsy (DOS 7/27): graft incorporating, scant serosanguinous drainage, no purulence, no erythema, no acute signs of infection.   - Intraop Findings: High concern for residual bone infection, low concern for viability.  - OR soft tissue and bone biopsy cultures not differentiated on sunrise but likely results listed below.  - Right Foot Eschar Culture: Morganella morganii, Klebsiella pneumoniae, E. faecalis, MRSA, Corynebacterium amycolatum.  - Right Foot Calcaneal Bone Biopsy Culture: MRSA.  - ID recs, appreciated.  - Right foot VAC to be re-applied today.  - BL z-flows to be worn at all times while in bed.  - Stable for discharge from the podiatry standpoint.  - Wound care instructions and followup information listed in discharge provider note.  - Discussed with attending.

## 2023-08-04 NOTE — PROGRESS NOTE ADULT - ASSESSMENT
62 y/o F PMHx of DMH2 on insulin, HTN, HLD p/w right heel ulcer. Patient is sent by her podiatrist for questionable right foot procedure.  She is unsure how long she has had this ulcer for.  States that she was recently hospitalized in the near [facility] for an evaluation of the right foot infection.  States she still had a 102 fever yesterday which resolved.  Denies any fevers today, chills, chest pain, shortness of breath, abdominal pain, nausea vomiting diarrhea.  States she ambulates with a walker at baseline.  Accompanied by her sister-in-law. 62 y/o F PMHx of DMH2 on insulin, HTN, HLD p/w right heel ulcer. Patient is sent by her podiatrist for questionable right foot procedure.  She is unsure how long she has had this ulcer for.  States that she was recently hospitalized in the near [facility] for an evaluation of the right foot infection.  States she still had a 102 fever yesterday which resolved.  Denies any fevers today, chills, chest pain, shortness of breath, abdominal pain, nausea vomiting diarrhea.  States she ambulates with a walker at baseline.  Accompanied by her sister-in-law. 62 y/o F PMHx of DMH2 on insulin, HTN, HLD p/w right heel ulcer. Patient is sent by her podiatrist for questionable right foot procedure.  She is unsure how long she has had this ulcer for.  States that she was recently hospitalized in the near [facility] for an evaluation of the right foot infection.  States she still had a 102 fever yesterday which resolved.  Denies any fevers today, chills, chest pain, shortness of breath, abdominal pain, nausea vomiting diarrhea.  States she ambulates with a walker at baseline.  Accompanied by her sister-in-law.

## 2023-08-04 NOTE — PROGRESS NOTE ADULT - ASSESSMENT
64 y/o F PMHx of DMH2 on insulin, HTN, HLD p/w right heel ulcer.    Assessment  DMT2: 63y Female with DM T2 with hyperglycemia, A1C 8.4%, was on insulin at home, now on basal bolus insulin with coverage, blood sugars running high. Has right heel ulcer. Started on basal insulin, glucose trending high. Insulin adjusted   ?Gastroparesis: Has nausea at times, improved.   Foot wound: on medications, monitored. Podiatry eval/work up. MRSA wound on isolation   HTN: on antihypertensive medications, monitored, asymptomatic.  HLD: stable, on statin, monitored.    Discussed plan and management with Attending   Armando Toscano NP - TEAMS  Dr Wesley Hannah  [phone]         62 y/o F PMHx of DMH2 on insulin, HTN, HLD p/w right heel ulcer.    Assessment  DMT2: 63y Female with DM T2 with hyperglycemia, A1C 8.4%, was on insulin at home, now on basal bolus insulin with coverage, blood sugars running high. Has right heel ulcer. Started on basal insulin, glucose trending high. Insulin adjusted   ?Gastroparesis: Has nausea at times, improved.   Foot wound: on medications, monitored. Podiatry eval/work up. MRSA wound on isolation   HTN: on antihypertensive medications, monitored, asymptomatic.  HLD: stable, on statin, monitored.    Discussed plan and management with Attending   Armando Toscano NP - TEAMS  Dr Wesley Hannah  [phone]         62 y/o F PMHx of DMH2 on insulin, HTN, HLD p/w right heel ulcer.    Assessment  DMT2: 63y Female with DM T2 with hyperglycemia, A1C 8.4%, was on insulin at home, now on basal bolus insulin with coverage, blood sugars running high. Has right heel ulcer. Started on basal insulin, glucose trending high. Insulin adjusted   ?Gastroparesis: Has nausea at times, improved.   Foot wound: on medications, monitored. Podiatry eval/work up. MRSA wound on isolation   HTN: on antihypertensive medications, monitored, asymptomatic.  HLD: stable, on statin, monitored.    Discussed plan and management with Attending   Armando Toscano NP - TEAMS  Dr Wesley Hannah  [phone]

## 2023-08-04 NOTE — PROGRESS NOTE ADULT - ASSESSMENT
62 y/o F PMHx of DMH2 on insulin &7/26/23: A1C= 8.4%), HTN, HLD, BMI = 35.3,  bilateral heel ulcer and fever to 102F day prior to admission. ID consulted for eval of bilateral foot ulcer.   admitted 7/25/23  She notes nausea, emesis malaise  Right heel ulcer is long standing - Currently with extensive black eschar with sl separation at edges, drainage on dressing and malodour with likley underlying  infection, however malodor suggests infection.     Antibiotics  Vanco 7/25--> 7/26; 7/31-->  Zosyn 7/25-->    #B/l Foot ulcer  #possible osteo  #infection  MRI foot suggest  possible osteomyelitis calcaneus    7/25 ESR/CRP = 85/41  7/26  nasal swab POSITIVE MRSA PCR .  7/27 bilateral foot wound debridement,  right foot wound debridement with Integra graft application and bone biopsy for culture/R/O osteo. Intraoperative finding suggested residual bone infection present.  VAC in placement    discussed yesterday with  Podiatry resident  - patient is not candidate for partial calcanectomy.  Patient has been on Zosyn  which covers all isolates except for MRSA as well as  VANCO   she is unable to go to [facility]  - I have been asked to provide the best once daily antibiotic choice        Daptomycin  covers MRSA and Enterococcus      Cipro   can be given po and cover GNRs                             Dapto dose = 500     BMI= 35.3; Adjusted body weight = 67kg x  8 mg/kg)    Suggest  continue Zosyn/Vanco until discharge  upon discharge CHANGE to IV Daptomycin 500 mg IV once daily  AND Ciprofloxacin 500 mg po TWICE daily  weekly CBC, CMP CK, ESR, CRP while on antibiotics         completed 6 week course abx through September 7, 2023    discussed with primary team       62 y/o F PMHx of DMH2 on insulin &7/26/23: A1C= 8.4%), HTN, HLD, BMI = 35.3,  bilateral heel ulcer and fever to 102F day prior to admission. ID consulted for eval of bilateral foot ulcer.   admitted 7/25/23  She notes nausea, emesis malaise  Right heel ulcer is long standing - Currently with extensive black eschar with sl separation at edges, drainage on dressing and malodour with likley underlying  infection, however malodor suggests infection.     Antibiotics  Vanco 7/25--> 7/26; 7/31-->  Zosyn 7/25-->    #B/l Foot ulcer  #possible osteo  #infection  MRI foot suggest  possible osteomyelitis calcaneus    7/25 ESR/CRP = 85/41  7/26  nasal swab POSITIVE MRSA PCR .  7/27 bilateral foot wound debridement,  right foot wound debridement with Integra graft application and bone biopsy for culture/R/O osteo. Intraoperative finding suggested residual bone infection present.  VAC in placement    discussed yesterday with  Podiatry resident  - patient is not candidate for partial calcanectomy.  Patient has been on Zosyn  which covers all isolates except for MRSA as well as  VANCO   she is unable to go to [facility]  - I have been asked to provide the best once daily antibiotic choice        Daptomycin  covers MRSA and Enterococcus      Cipro   can be given po and cover GNRs                             Dapto dose = 500     BMI= 35.3; Adjusted body weight = 67kg x  8 mg/kg)    Suggest  continue Zosyn/Vanco until discharge  upon discharge CHANGE to IV Daptomycin 500 mg IV once daily  AND Ciprofloxacin 500 mg po TWICE daily  weekly CBC, CMP CK, ESR, CRP while on antibiotics         completed 6 week course abx through September 7, 2023    discussed with primary team       62 y/o F PMHx of DMH2 on insulin &7/26/23: A1C= 8.4%), HTN, HLD, BMI = 35.3,  bilateral heel ulcer and fever to 102F day prior to admission. ID consulted for eval of bilateral foot ulcer.   admitted 7/25/23  She notes nausea, emesis malaise  Right heel ulcer is long standing - Currently with extensive black eschar with sl separation at edges, drainage on dressing and malodour with likley underlying  infection, however malodor suggests infection.     Antibiotics  Vanco 7/25--> 7/26; 7/31-->  Zosyn 7/25-->    #B/l Foot ulcer  #possible osteo  #infection  MRI foot suggest  possible osteomyelitis calcaneus    7/25 ESR/CRP = 85/41  7/26  nasal swab POSITIVE MRSA PCR .  7/27 bilateral foot wound debridement,  right foot wound debridement with Integra graft application and bone biopsy for culture/R/O osteo. Intraoperative finding suggested residual bone infection present.  VAC in placement    discussed yesterday with  Podiatry resident  - patient is not candidate for partial calcanectomy.  Patient has been on Zosyn  which covers all isolates except for MRSA as well as  VANCO   she is unable to go to [facility]  - I have been asked to provide the best once daily antibiotic choice        Daptomycin  covers MRSA and Enterococcus      Cipro   can be given po and cover GNRs                             Dapto dose = 500     BMI= 35.3; Adjusted body weight = 67kg x  8 mg/kg)    Suggest  continue Zosyn/Vanco until discharge  upon discharge CHANGE to IV Daptomycin 500 mg IV once daily  AND Ciprofloxacin 500 mg po TWICE daily  weekly CBC, CMP CK, ESR, CRP while on antibiotics         completed 6 week course abx through September 7, 2023    discussed with primary team

## 2023-08-04 NOTE — PROGRESS NOTE ADULT - SUBJECTIVE AND OBJECTIVE BOX
Follow Up:  osteo of calcaneus    Interval History/ROS:  some discomfort, no diarrhea, good appetite    Allergies  No Known Allergies    ANTIMICROBIALS:  piperacillin/tazobactam IVPB.. 3.375 every 8 hours  vancomycin  IVPB    vancomycin  IVPB 750 every 12 hours      OTHER MEDS:  MEDICATIONS  (STANDING):  acetaminophen     Tablet .. 650 every 6 hours PRN  atorvastatin 40 at bedtime  dextrose 50% Injectable 25 once  dextrose 50% Injectable 12.5 once  dextrose 50% Injectable 25 once  dextrose Oral Gel 15 once PRN  furosemide    Tablet 20 daily  glucagon  Injectable 1 once  heparin   Injectable 5000 every 8 hours  hydrALAZINE 50 two times a day  insulin glargine Injectable (LANTUS) 20 at bedtime  insulin lispro (ADMELOG) corrective regimen sliding scale  at bedtime  insulin lispro (ADMELOG) corrective regimen sliding scale  three times a day before meals  lisinopril 20 daily  metoprolol succinate ER 50 daily  ondansetron Injectable 4 every 8 hours PRN  pantoprazole    Tablet 40 before breakfast  senna 2 at bedtime  tamsulosin 0.4 at bedtime      Vital Signs Last 24 Hrs  T(C): 36.6 (04 Aug 2023 16:01), Max: 36.8 (04 Aug 2023 08:41)  T(F): 97.8 (04 Aug 2023 16:01), Max: 98.2 (04 Aug 2023 08:41)  HR: 66 (04 Aug 2023 16:01) (61 - 68)  BP: 129/71 (04 Aug 2023 16:01) (129/71 - 157/73)  BP(mean): --  RR: 18 (04 Aug 2023 16:01) (18 - 18)  SpO2: 95% (04 Aug 2023 16:01) (93% - 98%)    Parameters below as of 04 Aug 2023 16:01  Patient On (Oxygen Delivery Method): room air        PHYSICAL EXAM:  General: WN/WD NAD, Non-toxic  Neurology: A&Ox3, nonfocal  Respiratory: Clear to auscultation bilaterally  CV: RRR, S1S2, no murmurs, rubs or gallops  Abdominal: Soft, Non-tender, non-distended, normal bowel sounds  Extremities: No edema, le hyperpigmentation, scalling,  dressing in feet,   vac drain in place  Line Sites: Clear  Skin: No rash      MICROBIOLOGY:  .Tissue Other  07-27-23   Testing in progress  --  Morganella morganii  Methicillin resistant Staphylococcus aureus  Klebsiella pneumoniae  Enterococcus faecalis (vancomycin resistant)      .Abscess right heel wound  07-25-23   Numerous Proteus mirabilis  Numerous Pseudomonas aeruginosa  Moderate Enterococcus faecalis (vancomycin resistant)  Few Methicillin Resistant Staphylococcus aureus  Moderate Klebsiella oxytoca/Raoultella ornithinolytica  --  Proteus mirabilis  Pseudomonas aeruginosa  Enterococcus faecalis (vancomycin resistant)  Methicillin resistant Staphylococcus aureus  Klebsiella oxytoca /Raoutella ornithinolytica      .Blood Blood  07-25-23   No growth at 5 days  --  --      .Blood Blood  07-25-23   No growth at 5 days  --  --    Vancomycin Level, Trough: 17.7 ug/mL (08-04-23 @ 05:16)    < from: 12 Lead ECG (07.26.23 @ 10:48) >    Ventricular Rate 58 BPM    Atrial Rate 58 BPM    P-R Interval 174 ms    QRS Duration 150 ms    Q-T Interval 484 ms    QTC Calculation(Bazett) 475 ms    P Axis 47 degrees    R Axis -3 degrees    T Axis 3 degrees    Diagnosis Line SINUS BRADYCARDIA  RIGHT BUNDLE BRANCH BLOCK  ABNORMAL ECG  NO PREVIOUS ECGS AVAILABLE  Confirmed by MD MACRINA, LUBA ([id number]) on 7/27/2023 9:23:02 PM    < end of copied text >      Mikael Daniels MD; Division of Infectious Disease; Pager: [phone]; nights and weekends: [phone]   Follow Up:  osteo of calcaneus    Interval History/ROS:  some discomfort, no diarrhea, good appetite    Allergies  No Known Allergies    ANTIMICROBIALS:  piperacillin/tazobactam IVPB.. 3.375 every 8 hours  vancomycin  IVPB    vancomycin  IVPB 750 every 12 hours      OTHER MEDS:  MEDICATIONS  (STANDING):  acetaminophen     Tablet .. 650 every 6 hours PRN  atorvastatin 40 at bedtime  dextrose 50% Injectable 25 once  dextrose 50% Injectable 12.5 once  dextrose 50% Injectable 25 once  dextrose Oral Gel 15 once PRN  furosemide    Tablet 20 daily  glucagon  Injectable 1 once  heparin   Injectable 5000 every 8 hours  hydrALAZINE 50 two times a day  insulin glargine Injectable (LANTUS) 20 at bedtime  insulin lispro (ADMELOG) corrective regimen sliding scale  at bedtime  insulin lispro (ADMELOG) corrective regimen sliding scale  three times a day before meals  lisinopril 20 daily  metoprolol succinate ER 50 daily  ondansetron Injectable 4 every 8 hours PRN  pantoprazole    Tablet 40 before breakfast  senna 2 at bedtime  tamsulosin 0.4 at bedtime      Vital Signs Last 24 Hrs  T(C): 36.6 (04 Aug 2023 16:01), Max: 36.8 (04 Aug 2023 08:41)  T(F): 97.8 (04 Aug 2023 16:01), Max: 98.2 (04 Aug 2023 08:41)  HR: 66 (04 Aug 2023 16:01) (61 - 68)  BP: 129/71 (04 Aug 2023 16:01) (129/71 - 157/73)  BP(mean): --  RR: 18 (04 Aug 2023 16:01) (18 - 18)  SpO2: 95% (04 Aug 2023 16:01) (93% - 98%)    Parameters below as of 04 Aug 2023 16:01  Patient On (Oxygen Delivery Method): room air        PHYSICAL EXAM:  General: WN/WD NAD, Non-toxic  Neurology: A&Ox3, nonfocal  Respiratory: Clear to auscultation bilaterally  CV: RRR, S1S2, no murmurs, rubs or gallops  Abdominal: Soft, Non-tender, non-distended, normal bowel sounds  Extremities: No edema, le hyperpigmentation, scalling,  dressing in feet,   vac drain in place  Line Sites: Clear  Skin: No rash      MICROBIOLOGY:  .Tissue Other  07-27-23   Testing in progress  --  Morganella morganii  Methicillin resistant Staphylococcus aureus  Klebsiella pneumoniae  Enterococcus faecalis (vancomycin resistant)      .Abscess right heel wound  07-25-23   Numerous Proteus mirabilis  Numerous Pseudomonas aeruginosa  Moderate Enterococcus faecalis (vancomycin resistant)  Few Methicillin Resistant Staphylococcus aureus  Moderate Klebsiella oxytoca/Raoultella ornithinolytica  --  Proteus mirabilis  Pseudomonas aeruginosa  Enterococcus faecalis (vancomycin resistant)  Methicillin resistant Staphylococcus aureus  Klebsiella oxytoca /Raoutella ornithinolytica      .Blood Blood  07-25-23   No growth at 5 days  --  --      .Blood Blood  07-25-23   No growth at 5 days  --  --    Vancomycin Level, Trough: 17.7 ug/mL (08-04-23 @ 05:16)    < from: 12 Lead ECG (07.26.23 @ 10:48) >    Ventricular Rate 58 BPM    Atrial Rate 58 BPM    P-R Interval 174 ms    QRS Duration 150 ms    Q-T Interval 484 ms    QTC Calculation(Bazett) 475 ms    P Axis 47 degrees    R Axis -3 degrees    T Axis 3 degrees    Diagnosis Line SINUS BRADYCARDIA  RIGHT BUNDLE BRANCH BLOCK  ABNORMAL ECG  NO PREVIOUS ECGS AVAILABLE  Confirmed by MD MACRINA, LUBA ([id number]) on 7/27/2023 9:23:02 PM    < end of copied text >      Mikael Daniels MD; Division of Infectious Disease; Pager: [phone]; nights and weekends: [phone]   Follow Up:  osteo of calcaneus    Interval History/ROS:  some discomfort, no diarrhea, good appetite    Allergies  No Known Allergies    ANTIMICROBIALS:  piperacillin/tazobactam IVPB.. 3.375 every 8 hours  vancomycin  IVPB    vancomycin  IVPB 750 every 12 hours      OTHER MEDS:  MEDICATIONS  (STANDING):  acetaminophen     Tablet .. 650 every 6 hours PRN  atorvastatin 40 at bedtime  dextrose 50% Injectable 25 once  dextrose 50% Injectable 12.5 once  dextrose 50% Injectable 25 once  dextrose Oral Gel 15 once PRN  furosemide    Tablet 20 daily  glucagon  Injectable 1 once  heparin   Injectable 5000 every 8 hours  hydrALAZINE 50 two times a day  insulin glargine Injectable (LANTUS) 20 at bedtime  insulin lispro (ADMELOG) corrective regimen sliding scale  at bedtime  insulin lispro (ADMELOG) corrective regimen sliding scale  three times a day before meals  lisinopril 20 daily  metoprolol succinate ER 50 daily  ondansetron Injectable 4 every 8 hours PRN  pantoprazole    Tablet 40 before breakfast  senna 2 at bedtime  tamsulosin 0.4 at bedtime      Vital Signs Last 24 Hrs  T(C): 36.6 (04 Aug 2023 16:01), Max: 36.8 (04 Aug 2023 08:41)  T(F): 97.8 (04 Aug 2023 16:01), Max: 98.2 (04 Aug 2023 08:41)  HR: 66 (04 Aug 2023 16:01) (61 - 68)  BP: 129/71 (04 Aug 2023 16:01) (129/71 - 157/73)  BP(mean): --  RR: 18 (04 Aug 2023 16:01) (18 - 18)  SpO2: 95% (04 Aug 2023 16:01) (93% - 98%)    Parameters below as of 04 Aug 2023 16:01  Patient On (Oxygen Delivery Method): room air        PHYSICAL EXAM:  General: WN/WD NAD, Non-toxic  Neurology: A&Ox3, nonfocal  Respiratory: Clear to auscultation bilaterally  CV: RRR, S1S2, no murmurs, rubs or gallops  Abdominal: Soft, Non-tender, non-distended, normal bowel sounds  Extremities: No edema, le hyperpigmentation, scalling,  dressing in feet,   vac drain in place  Line Sites: Clear  Skin: No rash      MICROBIOLOGY:  .Tissue Other  07-27-23   Testing in progress  --  Morganella morganii  Methicillin resistant Staphylococcus aureus  Klebsiella pneumoniae  Enterococcus faecalis (vancomycin resistant)      .Abscess right heel wound  07-25-23   Numerous Proteus mirabilis  Numerous Pseudomonas aeruginosa  Moderate Enterococcus faecalis (vancomycin resistant)  Few Methicillin Resistant Staphylococcus aureus  Moderate Klebsiella oxytoca/Raoultella ornithinolytica  --  Proteus mirabilis  Pseudomonas aeruginosa  Enterococcus faecalis (vancomycin resistant)  Methicillin resistant Staphylococcus aureus  Klebsiella oxytoca /Raoutella ornithinolytica      .Blood Blood  07-25-23   No growth at 5 days  --  --      .Blood Blood  07-25-23   No growth at 5 days  --  --    Vancomycin Level, Trough: 17.7 ug/mL (08-04-23 @ 05:16)    < from: 12 Lead ECG (07.26.23 @ 10:48) >    Ventricular Rate 58 BPM    Atrial Rate 58 BPM    P-R Interval 174 ms    QRS Duration 150 ms    Q-T Interval 484 ms    QTC Calculation(Bazett) 475 ms    P Axis 47 degrees    R Axis -3 degrees    T Axis 3 degrees    Diagnosis Line SINUS BRADYCARDIA  RIGHT BUNDLE BRANCH BLOCK  ABNORMAL ECG  NO PREVIOUS ECGS AVAILABLE  Confirmed by MD MACRINA, LUBA ([id number]) on 7/27/2023 9:23:02 PM    < end of copied text >      Mikael Daniels MD; Division of Infectious Disease; Pager: [phone]; nights and weekends: [phone]

## 2023-08-04 NOTE — PROGRESS NOTE ADULT - SUBJECTIVE AND OBJECTIVE BOX
Chief complaint  Patient is a 63y old  Female who presents with a chief complaint of heel wound (02 Aug 2023 17:09)         Labs and Fingersticks  CAPILLARY BLOOD GLUCOSE      POCT Blood Glucose.: 242 mg/dL (04 Aug 2023 08:08)  POCT Blood Glucose.: 166 mg/dL (03 Aug 2023 20:58)  POCT Blood Glucose.: 175 mg/dL (03 Aug 2023 16:28)  POCT Blood Glucose.: 238 mg/dL (03 Aug 2023 12:02)                        Medications  MEDICATIONS  (STANDING):  artificial  tears Solution 1 Drop(s) Both EYES three times a day  atorvastatin 40 milliGRAM(s) Oral at bedtime  chlorhexidine 2% Cloths 1 Application(s) Topical <User Schedule>  dextrose 5%. 1000 milliLiter(s) (50 mL/Hr) IV Continuous <Continuous>  dextrose 5%. 1000 milliLiter(s) (100 mL/Hr) IV Continuous <Continuous>  dextrose 50% Injectable 25 Gram(s) IV Push once  dextrose 50% Injectable 12.5 Gram(s) IV Push once  dextrose 50% Injectable 25 Gram(s) IV Push once  furosemide    Tablet 20 milliGRAM(s) Oral daily  glucagon  Injectable 1 milliGRAM(s) IntraMuscular once  heparin   Injectable 5000 Unit(s) SubCutaneous every 8 hours  hydrALAZINE 50 milliGRAM(s) Oral two times a day  insulin glargine Injectable (LANTUS) 20 Unit(s) SubCutaneous at bedtime  insulin lispro (ADMELOG) corrective regimen sliding scale   SubCutaneous three times a day before meals  insulin lispro (ADMELOG) corrective regimen sliding scale   SubCutaneous at bedtime  lisinopril 20 milliGRAM(s) Oral daily  metoprolol succinate ER 50 milliGRAM(s) Oral daily  mupirocin 2% Ointment 1 Application(s) Topical two times a day  pantoprazole    Tablet 40 milliGRAM(s) Oral before breakfast  senna 2 Tablet(s) Oral at bedtime  tamsulosin 0.4 milliGRAM(s) Oral at bedtime  vancomycin  IVPB      vancomycin  IVPB 750 milliGRAM(s) IV Intermittent every 12 hours      Physical Exam  General: Patient comfortable in bed   Vital Signs Last 12 Hrs  T(F): 98.2 (08-04-23 @ 08:41), Max: 98.2 (08-04-23 @ 08:41)  HR: 65 (08-04-23 @ 08:41) (61 - 68)  BP: 140/72 (08-04-23 @ 08:41) (140/72 - 157/73)  BP(mean): --  RR: 18 (08-04-23 @ 08:41) (18 - 18)  SpO2: 98% (08-04-23 @ 08:41) (93% - 98%)    CVS: S1S2   Respiratory: No wheezing, no crepitations  GI: Abdomen soft, bowel sounds positive  Musculoskeletal:  moves all extremities  : Voiding

## 2023-08-04 NOTE — PROGRESS NOTE ADULT - PROBLEM SELECTOR PLAN 1
Increased Lantus 20 units qHS  and continue ADMELOG 8 units before each meal for now.   Will continue monitoring FS, log, and glucose trends, will Follow up.  Patient counseled for compliance with consistent low carb diet and exercise as tolerated outpatient.   If continue nausea and vomiting Consider Gastric emptying study, to r/o Gastroparesis.    Was using insulin at home, basal and bolus. DC home on current insulin doses if sugars remain stable  may use her home insulin brands

## 2023-08-04 NOTE — PROGRESS NOTE ADULT - SUBJECTIVE AND OBJECTIVE BOX
Patient is a 63y old  Female who presents with a chief complaint of heel wound (02 Aug 2023 17:09)       INTERVAL HPI/OVERNIGHT EVENTS:  Patient seen and evaluated at bedside.  Pt is resting comfortable in NAD. Denies N/V/F/C.    Allergies    No Known Allergies    Intolerances        Vital Signs Last 24 Hrs  T(C): 36.8 (04 Aug 2023 08:41), Max: 37 (03 Aug 2023 11:07)  T(F): 98.2 (04 Aug 2023 08:41), Max: 98.6 (03 Aug 2023 11:07)  HR: 65 (04 Aug 2023 08:41) (56 - 68)  BP: 140/72 (04 Aug 2023 08:41) (135/61 - 157/73)  BP(mean): --  RR: 18 (04 Aug 2023 08:41) (18 - 18)  SpO2: 98% (04 Aug 2023 08:41) (93% - 99%)    Parameters below as of 03 Aug 2023 23:20  Patient On (Oxygen Delivery Method): room air        LABS:              CAPILLARY BLOOD GLUCOSE      POCT Blood Glucose.: 242 mg/dL (04 Aug 2023 08:08)  POCT Blood Glucose.: 166 mg/dL (03 Aug 2023 20:58)  POCT Blood Glucose.: 175 mg/dL (03 Aug 2023 16:28)  POCT Blood Glucose.: 238 mg/dL (03 Aug 2023 12:02)      Lower Extremity Physical Exam:  Vascular: DP/PT 0/4, B/L, CFT <3 seconds B/L, Temperature gradient warm to cool, B/L.   Neuro: Epicritic sensation dimished to the level of toes, B/L.  Musculoskeletal/Ortho: Unremarkable.  Skin: s/p BL heel wound debridement with right foot graft application and bone biopsy (DOS 7/27): graft incorporating, scant serosanguinous drainage, no purulence, no erythema, no acute signs of infection.     RADIOLOGY & ADDITIONAL TESTS:

## 2023-08-04 NOTE — PROGRESS NOTE ADULT - SUBJECTIVE AND OBJECTIVE BOX
STEPHANIE KAYE  63y Female  MRN:[id number]    Patient is a 63y old  Female who presents with a chief complaint of foot infection    HPI:   64 y/o F PMHx of DMH2 on insulin, HTN, HLD p/w right heel ulcer. Patient is sent by her podiatrist for questionable right foot procedure.  She is unsure how long she has had this ulcer for.  States that she was recently hospitalized in the near [facility] for an evaluation of the right foot infection.  States she still had a 102 fever yesterday which resolved.  Denies any fevers today, chills, chest pain, shortness of breath, abdominal pain, nausea vomiting diarrhea.  States she ambulates with a walker at baseline.  Accompanied by her sister-in-law (25 Jul 2023 21:58)      pt now s/p bilateral foot heel wound debridement with right foot kerecis graft application, and right foot calcaneus bone biopsy on 7/27      Patient seen and evaluated at bedside. No acute events overnight except as noted.    Interval HPI: no acute events o/n        PAST MEDICAL & SURGICAL HISTORY:  Diabetes      Hypertension      Hypercholesteremia          REVIEW OF SYSTEMS:  as per hpi     VITALS:   Vital Signs Last 24 Hrs  T(C): 36.8 (04 Aug 2023 08:41), Max: 36.8 (04 Aug 2023 08:41)  T(F): 98.2 (04 Aug 2023 08:41), Max: 98.2 (04 Aug 2023 08:41)  HR: 65 (04 Aug 2023 08:41) (57 - 68)  BP: 140/72 (04 Aug 2023 08:41) (135/61 - 157/73)  BP(mean): --  RR: 18 (04 Aug 2023 08:41) (18 - 18)  SpO2: 98% (04 Aug 2023 08:41) (93% - 99%)    Parameters below as of 03 Aug 2023 23:20  Patient On (Oxygen Delivery Method): room air          PHYSICAL EXAM:  GENERAL: NAD, well-developed  HEAD:  Atraumatic, Normocephalic  EYES: EOMI, PERRLA, conjunctiva and sclera clear  NECK: Supple, No JVD  CHEST/LUNG: Clear to auscultation bilaterally; No wheeze  HEART: S1, S2; No murmurs, rubs, or gallops  ABDOMEN: Soft, Nontender, Nondistended; Bowel sounds present  EXTREMITIES:  2+ Peripheral Pulses, No clubbing, cyanosis, or edema. b/l lower ext dressing in place   PSYCH: Normal affect  NEUROLOGY: AAOX3; non-focal  SKIN: No rashes or lesions    Consultant(s) Notes Reviewed:  [x ] YES  [ ] NO  Care Discussed with Consultants/Other Providers [ x] YES  [ ] NO    MEDS:  MEDICATIONS  (STANDING):  artificial  tears Solution 1 Drop(s) Both EYES three times a day  atorvastatin 40 milliGRAM(s) Oral at bedtime  chlorhexidine 2% Cloths 1 Application(s) Topical <User Schedule>  dextrose 5%. 1000 milliLiter(s) (50 mL/Hr) IV Continuous <Continuous>  dextrose 5%. 1000 milliLiter(s) (100 mL/Hr) IV Continuous <Continuous>  dextrose 50% Injectable 25 Gram(s) IV Push once  dextrose 50% Injectable 12.5 Gram(s) IV Push once  dextrose 50% Injectable 25 Gram(s) IV Push once  furosemide    Tablet 20 milliGRAM(s) Oral daily  glucagon  Injectable 1 milliGRAM(s) IntraMuscular once  heparin   Injectable 5000 Unit(s) SubCutaneous every 8 hours  hydrALAZINE 50 milliGRAM(s) Oral two times a day  insulin glargine Injectable (LANTUS) 20 Unit(s) SubCutaneous at bedtime  insulin lispro (ADMELOG) corrective regimen sliding scale   SubCutaneous three times a day before meals  insulin lispro (ADMELOG) corrective regimen sliding scale   SubCutaneous at bedtime  lisinopril 20 milliGRAM(s) Oral daily  metoprolol succinate ER 50 milliGRAM(s) Oral daily  mupirocin 2% Ointment 1 Application(s) Topical two times a day  pantoprazole    Tablet 40 milliGRAM(s) Oral before breakfast  piperacillin/tazobactam IVPB.- 3.375 Gram(s) IV Intermittent once  piperacillin/tazobactam IVPB.. 3.375 Gram(s) IV Intermittent every 8 hours  senna 2 Tablet(s) Oral at bedtime  tamsulosin 0.4 milliGRAM(s) Oral at bedtime  vancomycin  IVPB      vancomycin  IVPB 750 milliGRAM(s) IV Intermittent every 12 hours    MEDICATIONS  (PRN):  acetaminophen     Tablet .. 650 milliGRAM(s) Oral every 6 hours PRN Mild Pain (1 - 3)  dextrose Oral Gel 15 Gram(s) Oral once PRN Blood Glucose LESS THAN 70 milliGRAM(s)/deciliter  ondansetron Injectable 4 milliGRAM(s) IV Push every 8 hours PRN Nausea and/or Vomiting        ALLERGIES:  Allergy Status Unknown      LABS:                            < from: MR Ankle w/ IV Cont, Right (07.26.23 @ 18:31) >    IMPRESSION:  1.  There is a ulcer overlying the heel roughly spanning 5.4 cm extending   to the lateral calcaneus. There is minimal osseous edema within the   lateral calcaneus with mild postcontrast enhancement and without   significant loss of normal T1 fatty marrow. This is possibly representing   early osteomyelitis versus reactive in nature.  2.  Questionable minimal osseous edema within the fourth metatarsal head   with postcontrast enhancement which is incompletely evaluated. These   findings are possibly secondary to reactive degenerative changes versus   osteomyelitis if there is adjacent ulcer. Clinical correlation is advised.    --- End of Report ---        < end of copied text >     < from: Xray Foot AP + Lateral + Oblique, Right (07.25.23 @ 17:54) >    IMPRESSION:    No acute fracture or dislocation of the right foot. Midfoot degenerative   changes are noted.    Soft tissue defect at the heel related to known heel ulcer. No osseous   erosive changes of the calcaneus or adjacent osseousstructures. Soft   tissue edema is also noted along the dorsal foot and anterior lower shin.   No tracking subcutaneous gas.    Along the first distal phalanx base, there are juxtaarticular erosions at   the medial aspect and productive changes at the lateral aspect. Findings   may be due to underlying inflammatory arthritis or crystalline   arthropathy.    Vascular calcifications.      --- End of Report ---    < end of copied text >   STEPHANIE KAYE  63y Female  MRN:[id number]    Patient is a 63y old  Female who presents with a chief complaint of foot infection    HPI:   64 y/o F PMHx of DMH2 on insulin, HTN, HLD p/w right heel ulcer. Patient is sent by her podiatrist for questionable right foot procedure.  She is unsure how long she has had this ulcer for.  States that she was recently hospitalized in the near [facility] for an evaluation of the right foot infection.  States she still had a 102 fever yesterday which resolved.  Denies any fevers today, chills, chest pain, shortness of breath, abdominal pain, nausea vomiting diarrhea.  States she ambulates with a walker at baseline.  Accompanied by her sister-in-law (25 Jul 2023 21:58)      pt now s/p bilateral foot heel wound debridement with right foot kerecis graft application, and right foot calcaneus bone biopsy on 7/27      Patient seen and evaluated at bedside. No acute events overnight except as noted.    Interval HPI: no acute events o/n        PAST MEDICAL & SURGICAL HISTORY:  Diabetes      Hypertension      Hypercholesteremia          REVIEW OF SYSTEMS:  as per hpi     VITALS:   Vital Signs Last 24 Hrs  T(C): 36.8 (04 Aug 2023 08:41), Max: 36.8 (04 Aug 2023 08:41)  T(F): 98.2 (04 Aug 2023 08:41), Max: 98.2 (04 Aug 2023 08:41)  HR: 65 (04 Aug 2023 08:41) (57 - 68)  BP: 140/72 (04 Aug 2023 08:41) (135/61 - 157/73)  BP(mean): --  RR: 18 (04 Aug 2023 08:41) (18 - 18)  SpO2: 98% (04 Aug 2023 08:41) (93% - 99%)    Parameters below as of 03 Aug 2023 23:20  Patient On (Oxygen Delivery Method): room air          PHYSICAL EXAM:  GENERAL: NAD, well-developed  HEAD:  Atraumatic, Normocephalic  EYES: EOMI, PERRLA, conjunctiva and sclera clear  NECK: Supple, No JVD  CHEST/LUNG: Clear to auscultation bilaterally; No wheeze  HEART: S1, S2; No murmurs, rubs, or gallops  ABDOMEN: Soft, Nontender, Nondistended; Bowel sounds present  EXTREMITIES:  2+ Peripheral Pulses, No clubbing, cyanosis, or edema. b/l lower ext dressing in place   PSYCH: Normal affect  NEUROLOGY: AAOX3; non-focal  SKIN: No rashes or lesions    Consultant(s) Notes Reviewed:  [x ] YES  [ ] NO  Care Discussed with Consultants/Other Providers [ x] YES  [ ] NO    MEDS:  MEDICATIONS  (STANDING):  artificial  tears Solution 1 Drop(s) Both EYES three times a day  atorvastatin 40 milliGRAM(s) Oral at bedtime  chlorhexidine 2% Cloths 1 Application(s) Topical <User Schedule>  dextrose 5%. 1000 milliLiter(s) (50 mL/Hr) IV Continuous <Continuous>  dextrose 5%. 1000 milliLiter(s) (100 mL/Hr) IV Continuous <Continuous>  dextrose 50% Injectable 25 Gram(s) IV Push once  dextrose 50% Injectable 12.5 Gram(s) IV Push once  dextrose 50% Injectable 25 Gram(s) IV Push once  furosemide    Tablet 20 milliGRAM(s) Oral daily  glucagon  Injectable 1 milliGRAM(s) IntraMuscular once  heparin   Injectable 5000 Unit(s) SubCutaneous every 8 hours  hydrALAZINE 50 milliGRAM(s) Oral two times a day  insulin glargine Injectable (LANTUS) 20 Unit(s) SubCutaneous at bedtime  insulin lispro (ADMELOG) corrective regimen sliding scale   SubCutaneous three times a day before meals  insulin lispro (ADMELOG) corrective regimen sliding scale   SubCutaneous at bedtime  lisinopril 20 milliGRAM(s) Oral daily  metoprolol succinate ER 50 milliGRAM(s) Oral daily  mupirocin 2% Ointment 1 Application(s) Topical two times a day  pantoprazole    Tablet 40 milliGRAM(s) Oral before breakfast  piperacillin/tazobactam IVPB.- 3.375 Gram(s) IV Intermittent once  piperacillin/tazobactam IVPB.. 3.375 Gram(s) IV Intermittent every 8 hours  senna 2 Tablet(s) Oral at bedtime  tamsulosin 0.4 milliGRAM(s) Oral at bedtime  vancomycin  IVPB      vancomycin  IVPB 750 milliGRAM(s) IV Intermittent every 12 hours    MEDICATIONS  (PRN):  acetaminophen     Tablet .. 650 milliGRAM(s) Oral every 6 hours PRN Mild Pain (1 - 3)  dextrose Oral Gel 15 Gram(s) Oral once PRN Blood Glucose LESS THAN 70 milliGRAM(s)/deciliter  ondansetron Injectable 4 milliGRAM(s) IV Push every 8 hours PRN Nausea and/or Vomiting        ALLERGIES:  Allergy Status Unknown      LABS:                            < from: MR Ankle w/ IV Cont, Right (07.26.23 @ 18:31) >    IMPRESSION:  1.  There is a ulcer overlying the heel roughly spanning 5.4 cm extending   to the lateral calcaneus. There is minimal osseous edema within the   lateral calcaneus with mild postcontrast enhancement and without   significant loss of normal T1 fatty marrow. This is possibly representing   early osteomyelitis versus reactive in nature.  2.  Questionable minimal osseous edema within the fourth metatarsal head   with postcontrast enhancement which is incompletely evaluated. These   findings are possibly secondary to reactive degenerative changes versus   osteomyelitis if there is adjacent ulcer. Clinical correlation is advised.    --- End of Report ---        < end of copied text >     < from: Xray Foot AP + Lateral + Oblique, Right (07.25.23 @ 17:54) >    IMPRESSION:    No acute fracture or dislocation of the right foot. Midfoot degenerative   changes are noted.    Soft tissue defect at the heel related to known heel ulcer. No osseous   erosive changes of the calcaneus or adjacent osseousstructures. Soft   tissue edema is also noted along the dorsal foot and anterior lower shin.   No tracking subcutaneous gas.    Along the first distal phalanx base, there are juxtaarticular erosions at   the medial aspect and productive changes at the lateral aspect. Findings   may be due to underlying inflammatory arthritis or crystalline   arthropathy.    Vascular calcifications.      --- End of Report ---    < end of copied text >   STEPHANIE KAYE  63y Female  MRN:[id number]    Patient is a 63y old  Female who presents with a chief complaint of foot infection    HPI:   64 y/o F PMHx of DMH2 on insulin, HTN, HLD p/w right heel ulcer. Patient is sent by her podiatrist for questionable right foot procedure.  She is unsure how long she has had this ulcer for.  States that she was recently hospitalized in the near [facility] for an evaluation of the right foot infection.  States she still had a 102 fever yesterday which resolved.  Denies any fevers today, chills, chest pain, shortness of breath, abdominal pain, nausea vomiting diarrhea.  States she ambulates with a walker at baseline.  Accompanied by her sister-in-law (25 Jul 2023 21:58)      pt now s/p bilateral foot heel wound debridement with right foot kerecis graft application, and right foot calcaneus bone biopsy on 7/27      Patient seen and evaluated at bedside. No acute events overnight except as noted.    Interval HPI: no acute events o/n        PAST MEDICAL & SURGICAL HISTORY:  Diabetes      Hypertension      Hypercholesteremia          REVIEW OF SYSTEMS:  as per hpi     VITALS:   Vital Signs Last 24 Hrs  T(C): 36.8 (04 Aug 2023 08:41), Max: 36.8 (04 Aug 2023 08:41)  T(F): 98.2 (04 Aug 2023 08:41), Max: 98.2 (04 Aug 2023 08:41)  HR: 65 (04 Aug 2023 08:41) (57 - 68)  BP: 140/72 (04 Aug 2023 08:41) (135/61 - 157/73)  BP(mean): --  RR: 18 (04 Aug 2023 08:41) (18 - 18)  SpO2: 98% (04 Aug 2023 08:41) (93% - 99%)    Parameters below as of 03 Aug 2023 23:20  Patient On (Oxygen Delivery Method): room air          PHYSICAL EXAM:  GENERAL: NAD, well-developed  HEAD:  Atraumatic, Normocephalic  EYES: EOMI, PERRLA, conjunctiva and sclera clear  NECK: Supple, No JVD  CHEST/LUNG: Clear to auscultation bilaterally; No wheeze  HEART: S1, S2; No murmurs, rubs, or gallops  ABDOMEN: Soft, Nontender, Nondistended; Bowel sounds present  EXTREMITIES:  2+ Peripheral Pulses, No clubbing, cyanosis, or edema. b/l lower ext dressing in place   PSYCH: Normal affect  NEUROLOGY: AAOX3; non-focal  SKIN: No rashes or lesions    Consultant(s) Notes Reviewed:  [x ] YES  [ ] NO  Care Discussed with Consultants/Other Providers [ x] YES  [ ] NO    MEDS:  MEDICATIONS  (STANDING):  artificial  tears Solution 1 Drop(s) Both EYES three times a day  atorvastatin 40 milliGRAM(s) Oral at bedtime  chlorhexidine 2% Cloths 1 Application(s) Topical <User Schedule>  dextrose 5%. 1000 milliLiter(s) (50 mL/Hr) IV Continuous <Continuous>  dextrose 5%. 1000 milliLiter(s) (100 mL/Hr) IV Continuous <Continuous>  dextrose 50% Injectable 25 Gram(s) IV Push once  dextrose 50% Injectable 12.5 Gram(s) IV Push once  dextrose 50% Injectable 25 Gram(s) IV Push once  furosemide    Tablet 20 milliGRAM(s) Oral daily  glucagon  Injectable 1 milliGRAM(s) IntraMuscular once  heparin   Injectable 5000 Unit(s) SubCutaneous every 8 hours  hydrALAZINE 50 milliGRAM(s) Oral two times a day  insulin glargine Injectable (LANTUS) 20 Unit(s) SubCutaneous at bedtime  insulin lispro (ADMELOG) corrective regimen sliding scale   SubCutaneous three times a day before meals  insulin lispro (ADMELOG) corrective regimen sliding scale   SubCutaneous at bedtime  lisinopril 20 milliGRAM(s) Oral daily  metoprolol succinate ER 50 milliGRAM(s) Oral daily  mupirocin 2% Ointment 1 Application(s) Topical two times a day  pantoprazole    Tablet 40 milliGRAM(s) Oral before breakfast  piperacillin/tazobactam IVPB.- 3.375 Gram(s) IV Intermittent once  piperacillin/tazobactam IVPB.. 3.375 Gram(s) IV Intermittent every 8 hours  senna 2 Tablet(s) Oral at bedtime  tamsulosin 0.4 milliGRAM(s) Oral at bedtime  vancomycin  IVPB      vancomycin  IVPB 750 milliGRAM(s) IV Intermittent every 12 hours    MEDICATIONS  (PRN):  acetaminophen     Tablet .. 650 milliGRAM(s) Oral every 6 hours PRN Mild Pain (1 - 3)  dextrose Oral Gel 15 Gram(s) Oral once PRN Blood Glucose LESS THAN 70 milliGRAM(s)/deciliter  ondansetron Injectable 4 milliGRAM(s) IV Push every 8 hours PRN Nausea and/or Vomiting        ALLERGIES:  Allergy Status Unknown      LABS:                            < from: MR Ankle w/ IV Cont, Right (07.26.23 @ 18:31) >    IMPRESSION:  1.  There is a ulcer overlying the heel roughly spanning 5.4 cm extending   to the lateral calcaneus. There is minimal osseous edema within the   lateral calcaneus with mild postcontrast enhancement and without   significant loss of normal T1 fatty marrow. This is possibly representing   early osteomyelitis versus reactive in nature.  2.  Questionable minimal osseous edema within the fourth metatarsal head   with postcontrast enhancement which is incompletely evaluated. These   findings are possibly secondary to reactive degenerative changes versus   osteomyelitis if there is adjacent ulcer. Clinical correlation is advised.    --- End of Report ---        < end of copied text >     < from: Xray Foot AP + Lateral + Oblique, Right (07.25.23 @ 17:54) >    IMPRESSION:    No acute fracture or dislocation of the right foot. Midfoot degenerative   changes are noted.    Soft tissue defect at the heel related to known heel ulcer. No osseous   erosive changes of the calcaneus or adjacent osseousstructures. Soft   tissue edema is also noted along the dorsal foot and anterior lower shin.   No tracking subcutaneous gas.    Along the first distal phalanx base, there are juxtaarticular erosions at   the medial aspect and productive changes at the lateral aspect. Findings   may be due to underlying inflammatory arthritis or crystalline   arthropathy.    Vascular calcifications.      --- End of Report ---    < end of copied text >

## 2023-08-05 LAB
ANION GAP SERPL CALC-SCNC: 9 MMOL/L — SIGNIFICANT CHANGE UP (ref 5–17)
BUN SERPL-MCNC: 23 MG/DL — SIGNIFICANT CHANGE UP (ref 7–23)
CALCIUM SERPL-MCNC: 9.2 MG/DL — SIGNIFICANT CHANGE UP (ref 8.4–10.5)
CHLORIDE SERPL-SCNC: 102 MMOL/L — SIGNIFICANT CHANGE UP (ref 96–108)
CO2 SERPL-SCNC: 28 MMOL/L — SIGNIFICANT CHANGE UP (ref 22–31)
CREAT SERPL-MCNC: 1 MG/DL — SIGNIFICANT CHANGE UP (ref 0.5–1.3)
EGFR: 63 ML/MIN/1.73M2 — SIGNIFICANT CHANGE UP
GLUCOSE BLDC GLUCOMTR-MCNC: 119 MG/DL — HIGH (ref 70–99)
GLUCOSE BLDC GLUCOMTR-MCNC: 131 MG/DL — HIGH (ref 70–99)
GLUCOSE BLDC GLUCOMTR-MCNC: 140 MG/DL — HIGH (ref 70–99)
GLUCOSE BLDC GLUCOMTR-MCNC: 179 MG/DL — HIGH (ref 70–99)
GLUCOSE BLDC GLUCOMTR-MCNC: 262 MG/DL — HIGH (ref 70–99)
GLUCOSE SERPL-MCNC: 289 MG/DL — HIGH (ref 70–99)
HCT VFR BLD CALC: 32.4 % — LOW (ref 34.5–45)
HGB BLD-MCNC: 10.7 G/DL — LOW (ref 11.5–15.5)
MCHC RBC-ENTMCNC: 27.9 PG — SIGNIFICANT CHANGE UP (ref 27–34)
MCHC RBC-ENTMCNC: 33 GM/DL — SIGNIFICANT CHANGE UP (ref 32–36)
MCV RBC AUTO: 84.4 FL — SIGNIFICANT CHANGE UP (ref 80–100)
NRBC # BLD: 0 /100 WBCS — SIGNIFICANT CHANGE UP (ref 0–0)
PLATELET # BLD AUTO: 260 K/UL — SIGNIFICANT CHANGE UP (ref 150–400)
POTASSIUM SERPL-MCNC: 3.4 MMOL/L — LOW (ref 3.5–5.3)
POTASSIUM SERPL-SCNC: 3.4 MMOL/L — LOW (ref 3.5–5.3)
RBC # BLD: 3.84 M/UL — SIGNIFICANT CHANGE UP (ref 3.8–5.2)
RBC # FLD: 14.5 % — SIGNIFICANT CHANGE UP (ref 10.3–14.5)
SODIUM SERPL-SCNC: 139 MMOL/L — SIGNIFICANT CHANGE UP (ref 135–145)
WBC # BLD: 8.33 K/UL — SIGNIFICANT CHANGE UP (ref 3.8–10.5)
WBC # FLD AUTO: 8.33 K/UL — SIGNIFICANT CHANGE UP (ref 3.8–10.5)

## 2023-08-05 RX ORDER — VANCOMYCIN HCL 1 G
750 VIAL (EA) INTRAVENOUS EVERY 12 HOURS
Refills: 0 | Status: DISCONTINUED | OUTPATIENT
Start: 2023-08-05 | End: 2023-08-07

## 2023-08-05 RX ORDER — INSULIN LISPRO 100/ML
9 VIAL (ML) SUBCUTANEOUS
Refills: 0 | Status: DISCONTINUED | OUTPATIENT
Start: 2023-08-05 | End: 2023-08-07

## 2023-08-05 RX ORDER — INSULIN GLARGINE 100 [IU]/ML
24 INJECTION, SOLUTION SUBCUTANEOUS AT BEDTIME
Refills: 0 | Status: DISCONTINUED | OUTPATIENT
Start: 2023-08-05 | End: 2023-08-06

## 2023-08-05 RX ORDER — PIPERACILLIN AND TAZOBACTAM 4; .5 G/20ML; G/20ML
3.38 INJECTION, POWDER, LYOPHILIZED, FOR SOLUTION INTRAVENOUS EVERY 8 HOURS
Refills: 0 | Status: DISCONTINUED | OUTPATIENT
Start: 2023-08-05 | End: 2023-08-07

## 2023-08-05 RX ORDER — POTASSIUM CHLORIDE 20 MEQ
40 PACKET (EA) ORAL ONCE
Refills: 0 | Status: COMPLETED | OUTPATIENT
Start: 2023-08-05 | End: 2023-08-05

## 2023-08-05 RX ADMIN — PIPERACILLIN AND TAZOBACTAM 25 GRAM(S): 4; .5 INJECTION, POWDER, LYOPHILIZED, FOR SOLUTION INTRAVENOUS at 07:21

## 2023-08-05 RX ADMIN — Medication 50 MILLIGRAM(S): at 17:03

## 2023-08-05 RX ADMIN — Medication 50 MILLIGRAM(S): at 05:25

## 2023-08-05 RX ADMIN — Medication 9 UNIT(S): at 07:58

## 2023-08-05 RX ADMIN — MUPIROCIN 1 APPLICATION(S): 20 OINTMENT TOPICAL at 17:03

## 2023-08-05 RX ADMIN — PIPERACILLIN AND TAZOBACTAM 25 GRAM(S): 4; .5 INJECTION, POWDER, LYOPHILIZED, FOR SOLUTION INTRAVENOUS at 21:07

## 2023-08-05 RX ADMIN — Medication 250 MILLIGRAM(S): at 17:03

## 2023-08-05 RX ADMIN — Medication 9 UNIT(S): at 12:00

## 2023-08-05 RX ADMIN — HEPARIN SODIUM 5000 UNIT(S): 5000 INJECTION INTRAVENOUS; SUBCUTANEOUS at 21:08

## 2023-08-05 RX ADMIN — Medication 1: at 11:59

## 2023-08-05 RX ADMIN — HEPARIN SODIUM 5000 UNIT(S): 5000 INJECTION INTRAVENOUS; SUBCUTANEOUS at 13:14

## 2023-08-05 RX ADMIN — Medication 9 UNIT(S): at 16:56

## 2023-08-05 RX ADMIN — INSULIN GLARGINE 24 UNIT(S): 100 INJECTION, SOLUTION SUBCUTANEOUS at 21:08

## 2023-08-05 RX ADMIN — MUPIROCIN 1 APPLICATION(S): 20 OINTMENT TOPICAL at 05:26

## 2023-08-05 RX ADMIN — Medication 3: at 07:57

## 2023-08-05 RX ADMIN — HEPARIN SODIUM 5000 UNIT(S): 5000 INJECTION INTRAVENOUS; SUBCUTANEOUS at 05:25

## 2023-08-05 RX ADMIN — TAMSULOSIN HYDROCHLORIDE 0.4 MILLIGRAM(S): 0.4 CAPSULE ORAL at 21:07

## 2023-08-05 RX ADMIN — Medication 20 MILLIGRAM(S): at 05:25

## 2023-08-05 RX ADMIN — CHLORHEXIDINE GLUCONATE 1 APPLICATION(S): 213 SOLUTION TOPICAL at 05:26

## 2023-08-05 RX ADMIN — Medication 250 MILLIGRAM(S): at 05:26

## 2023-08-05 RX ADMIN — PANTOPRAZOLE SODIUM 40 MILLIGRAM(S): 20 TABLET, DELAYED RELEASE ORAL at 05:27

## 2023-08-05 RX ADMIN — PIPERACILLIN AND TAZOBACTAM 25 GRAM(S): 4; .5 INJECTION, POWDER, LYOPHILIZED, FOR SOLUTION INTRAVENOUS at 13:14

## 2023-08-05 RX ADMIN — LISINOPRIL 20 MILLIGRAM(S): 2.5 TABLET ORAL at 05:25

## 2023-08-05 RX ADMIN — Medication 1 DROP(S): at 05:26

## 2023-08-05 RX ADMIN — ATORVASTATIN CALCIUM 40 MILLIGRAM(S): 80 TABLET, FILM COATED ORAL at 21:07

## 2023-08-05 RX ADMIN — Medication 40 MILLIEQUIVALENT(S): at 12:00

## 2023-08-05 NOTE — PROGRESS NOTE ADULT - PROBLEM SELECTOR PLAN 1
Increased Lantus 24 units qHS  and continue ADMELOG 9 units before each meal for now.   Will continue monitoring FS, log, and glucose trends, will Follow up.  Patient counseled for compliance with consistent low carb diet and exercise as tolerated outpatient.   If continue nausea and vomiting Consider Gastric emptying study, to r/o Gastroparesis.    Was using insulin at home, basal and bolus. Discharge home on current insulin doses if sugars remain stable  may use her home insulin brands

## 2023-08-05 NOTE — PROGRESS NOTE ADULT - SUBJECTIVE AND OBJECTIVE BOX
167.64 Subjective: Patient seen and examined. No new events except as noted.     REVIEW OF SYSTEMS:    CONSTITUTIONAL: + weakness, fevers or chills  EYES/ENT: No visual changes;  No vertigo or throat pain   NECK: No pain or stiffness  RESPIRATORY: No cough, wheezing, hemoptysis; No shortness of breath  CARDIOVASCULAR: No chest pain or palpitations  GASTROINTESTINAL: No abdominal or epigastric pain. No nausea, vomiting, or hematemesis; No diarrhea or constipation. No melena or hematochezia.  GENITOURINARY: No dysuria, frequency or hematuria  NEUROLOGICAL: No numbness or weakness  SKIN: No itching, burning, rashes, or lesions   All other review of systems is negative unless indicated above.    MEDICATIONS:  MEDICATIONS  (STANDING):  artificial  tears Solution 1 Drop(s) Both EYES three times a day  atorvastatin 40 milliGRAM(s) Oral at bedtime  chlorhexidine 2% Cloths 1 Application(s) Topical <User Schedule>  dextrose 5%. 1000 milliLiter(s) (50 mL/Hr) IV Continuous <Continuous>  dextrose 5%. 1000 milliLiter(s) (100 mL/Hr) IV Continuous <Continuous>  dextrose 50% Injectable 25 Gram(s) IV Push once  dextrose 50% Injectable 12.5 Gram(s) IV Push once  dextrose 50% Injectable 25 Gram(s) IV Push once  furosemide    Tablet 20 milliGRAM(s) Oral daily  glucagon  Injectable 1 milliGRAM(s) IntraMuscular once  heparin   Injectable 5000 Unit(s) SubCutaneous every 8 hours  hydrALAZINE 50 milliGRAM(s) Oral two times a day  insulin glargine Injectable (LANTUS) 20 Unit(s) SubCutaneous at bedtime  insulin lispro (ADMELOG) corrective regimen sliding scale   SubCutaneous three times a day before meals  insulin lispro (ADMELOG) corrective regimen sliding scale   SubCutaneous at bedtime  lisinopril 20 milliGRAM(s) Oral daily  metoprolol succinate ER 50 milliGRAM(s) Oral daily  mupirocin 2% Ointment 1 Application(s) Topical two times a day  pantoprazole    Tablet 40 milliGRAM(s) Oral before breakfast  senna 2 Tablet(s) Oral at bedtime  tamsulosin 0.4 milliGRAM(s) Oral at bedtime  vancomycin  IVPB      vancomycin  IVPB 750 milliGRAM(s) IV Intermittent every 12 hours    PHYSICAL EXAM:  Vital Signs Last 24 Hrs  T(C): 36.7 (05 Aug 2023 08:48), Max: 36.8 (04 Aug 2023 23:09)  T(F): 98.1 (05 Aug 2023 08:48), Max: 98.2 (04 Aug 2023 23:09)  HR: 60 (05 Aug 2023 08:48) (60 - 66)  BP: 135/67 (05 Aug 2023 08:48) (122/53 - 143/69)  BP(mean): --  RR: 18 (05 Aug 2023 08:48) (18 - 18)  SpO2: 94% (05 Aug 2023 08:48) (91% - 95%)    Parameters below as of 05 Aug 2023 08:48  Patient On (Oxygen Delivery Method): room air    I&O's Summary    04 Aug 2023 07:01  -  05 Aug 2023 07:00  --------------------------------------------------------  IN: 1350 mL / OUT: 1800 mL / NET: -450 mL    Appearance: NAD  HEENT:  Dry oral mucosa, PERRL, EOMI	  Lymphatic: No lymphadenopathy , no edema  Cardiovascular: Normal S1 S2, No JVD, No murmurs , Peripheral pulses palpable 2+ bilaterally  Respiratory: decreased bsd 	  Gastrointestinal:  Soft, Non-tender, + BS	  Musculoskeletal: Normal range of motion, normal strength  Psychiatry:  Mood & affect appropriate  Vascular: DP/PT 0/4, B/L, CFT <3 seconds B/L, Temperature gradient warm to cool, B/L.   Neuro: Epicritic sensation dimished to the level of toes, B/L.  Musculoskeletal/Ortho: Unremarkable.  Skin: s/p BL heel wound debridement dressings c/d/i     LABS:    CARDIAC MARKERS:    TELEMETRY: 	    ECG:  	  RADIOLOGY:   DIAGNOSTIC TESTING:  [ ] Echocardiogram:  [ ]  Catheterization:  [ ] Stress Test:    OTHER:

## 2023-08-05 NOTE — PROGRESS NOTE ADULT - SUBJECTIVE AND OBJECTIVE BOX
STEPHANIE KAYE  63y Female  MRN:[id number]    Patient is a 63y old  Female who presents with a chief complaint of foot infection    HPI:   62 y/o F PMHx of DMH2 on insulin, HTN, HLD p/w right heel ulcer. Patient is sent by her podiatrist for questionable right foot procedure.  She is unsure how long she has had this ulcer for.  States that she was recently hospitalized in the near [facility] for an evaluation of the right foot infection.  States she still had a 102 fever yesterday which resolved.  Denies any fevers today, chills, chest pain, shortness of breath, abdominal pain, nausea vomiting diarrhea.  States she ambulates with a walker at baseline.  Accompanied by her sister-in-law (25 Jul 2023 21:58)      pt now s/p bilateral foot heel wound debridement with right foot kerecis graft application, and right foot calcaneus bone biopsy on 7/27      Patient seen and evaluated at bedside. No acute events overnight except as noted.    Interval HPI: no acute events o/n        PAST MEDICAL & SURGICAL HISTORY:  Diabetes      Hypertension      Hypercholesteremia          REVIEW OF SYSTEMS:  as per hpi     VITALS:   Vital Signs Last 24 Hrs  T(C): 36.7 (05 Aug 2023 20:52), Max: 36.9 (05 Aug 2023 16:52)  T(F): 98.1 (05 Aug 2023 20:52), Max: 98.5 (05 Aug 2023 16:52)  HR: 66 (05 Aug 2023 20:52) (59 - 66)  BP: 130/75 (05 Aug 2023 20:52) (122/53 - 178/75)  BP(mean): --  RR: 18 (05 Aug 2023 20:52) (18 - 18)  SpO2: 94% (05 Aug 2023 20:52) (91% - 97%)    Parameters below as of 05 Aug 2023 20:52  Patient On (Oxygen Delivery Method): room air          PHYSICAL EXAM:  GENERAL: NAD, well-developed  HEAD:  Atraumatic, Normocephalic  EYES: EOMI, PERRLA, conjunctiva and sclera clear  NECK: Supple, No JVD  CHEST/LUNG: Clear to auscultation bilaterally; No wheeze  HEART: S1, S2; No murmurs, rubs, or gallops  ABDOMEN: Soft, Nontender, Nondistended; Bowel sounds present  EXTREMITIES:  2+ Peripheral Pulses, No clubbing, cyanosis, or edema. b/l lower ext dressing in place   PSYCH: Normal affect  NEUROLOGY: AAOX3; non-focal  SKIN: No rashes or lesions    Consultant(s) Notes Reviewed:  [x ] YES  [ ] NO  Care Discussed with Consultants/Other Providers [ x] YES  [ ] NO    MEDS:   MEDICATIONS  (STANDING):  artificial  tears Solution 1 Drop(s) Both EYES three times a day  atorvastatin 40 milliGRAM(s) Oral at bedtime  chlorhexidine 2% Cloths 1 Application(s) Topical <User Schedule>  dextrose 5%. 1000 milliLiter(s) (50 mL/Hr) IV Continuous <Continuous>  dextrose 5%. 1000 milliLiter(s) (100 mL/Hr) IV Continuous <Continuous>  dextrose 50% Injectable 25 Gram(s) IV Push once  dextrose 50% Injectable 12.5 Gram(s) IV Push once  dextrose 50% Injectable 25 Gram(s) IV Push once  furosemide    Tablet 20 milliGRAM(s) Oral daily  glucagon  Injectable 1 milliGRAM(s) IntraMuscular once  heparin   Injectable 5000 Unit(s) SubCutaneous every 8 hours  hydrALAZINE 50 milliGRAM(s) Oral two times a day  insulin glargine Injectable (LANTUS) 24 Unit(s) SubCutaneous at bedtime  insulin lispro (ADMELOG) corrective regimen sliding scale   SubCutaneous at bedtime  insulin lispro (ADMELOG) corrective regimen sliding scale   SubCutaneous three times a day before meals  insulin lispro Injectable (ADMELOG) 9 Unit(s) SubCutaneous three times a day with meals  lisinopril 20 milliGRAM(s) Oral daily  metoprolol succinate ER 50 milliGRAM(s) Oral daily  mupirocin 2% Ointment 1 Application(s) Topical two times a day  pantoprazole    Tablet 40 milliGRAM(s) Oral before breakfast  piperacillin/tazobactam IVPB.. 3.375 Gram(s) IV Intermittent every 8 hours  senna 2 Tablet(s) Oral at bedtime  tamsulosin 0.4 milliGRAM(s) Oral at bedtime  vancomycin  IVPB 750 milliGRAM(s) IV Intermittent every 12 hours    MEDICATIONS  (PRN):  acetaminophen     Tablet .. 650 milliGRAM(s) Oral every 6 hours PRN Mild Pain (1 - 3)  dextrose Oral Gel 15 Gram(s) Oral once PRN Blood Glucose LESS THAN 70 milliGRAM(s)/deciliter  ondansetron Injectable 4 milliGRAM(s) IV Push every 8 hours PRN Nausea and/or Vomiting        ALLERGIES:  Allergy Status Unknown      LABS:                                            10.7   8.33  )-----------( 260      ( 05 Aug 2023 06:49 )             32.4   08-05    139  |  102  |  23  ----------------------------<  289<H>  3.4<L>   |  28  |  1.00    Ca    9.2      05 Aug 2023 06:49          < from: MR Ankle w/ IV Cont, Right (07.26.23 @ 18:31) >    IMPRESSION:  1.  There is a ulcer overlying the heel roughly spanning 5.4 cm extending   to the lateral calcaneus. There is minimal osseous edema within the   lateral calcaneus with mild postcontrast enhancement and without   significant loss of normal T1 fatty marrow. This is possibly representing   early osteomyelitis versus reactive in nature.  2.  Questionable minimal osseous edema within the fourth metatarsal head   with postcontrast enhancement which is incompletely evaluated. These   findings are possibly secondary to reactive degenerative changes versus   osteomyelitis if there is adjacent ulcer. Clinical correlation is advised.    --- End of Report ---        < end of copied text >     < from: Xray Foot AP + Lateral + Oblique, Right (07.25.23 @ 17:54) >    IMPRESSION:    No acute fracture or dislocation of the right foot. Midfoot degenerative   changes are noted.    Soft tissue defect at the heel related to known heel ulcer. No osseous   erosive changes of the calcaneus or adjacent osseousstructures. Soft   tissue edema is also noted along the dorsal foot and anterior lower shin.   No tracking subcutaneous gas.    Along the first distal phalanx base, there are juxtaarticular erosions at   the medial aspect and productive changes at the lateral aspect. Findings   may be due to underlying inflammatory arthritis or crystalline   arthropathy.    Vascular calcifications.      --- End of Report ---    < end of copied text >   STEPHANIE KAYE  63y Female  MRN:[id number]    Patient is a 63y old  Female who presents with a chief complaint of foot infection    HPI:   62 y/o F PMHx of DMH2 on insulin, HTN, HLD p/w right heel ulcer. Patient is sent by her podiatrist for questionable right foot procedure.  She is unsure how long she has had this ulcer for.  States that she was recently hospitalized in the near [facility] for an evaluation of the right foot infection.  States she still had a 102 fever yesterday which resolved.  Denies any fevers today, chills, chest pain, shortness of breath, abdominal pain, nausea vomiting diarrhea.  States she ambulates with a walker at baseline.  Accompanied by her sister-in-law (25 Jul 2023 21:58)      pt now s/p bilateral foot heel wound debridement with right foot kerecis graft application, and right foot calcaneus bone biopsy on 7/27      Patient seen and evaluated at bedside. No acute events overnight except as noted.    Interval HPI: no acute events o/n        PAST MEDICAL & SURGICAL HISTORY:  Diabetes      Hypertension      Hypercholesteremia          REVIEW OF SYSTEMS:  as per hpi     VITALS:   Vital Signs Last 24 Hrs  T(C): 36.7 (05 Aug 2023 20:52), Max: 36.9 (05 Aug 2023 16:52)  T(F): 98.1 (05 Aug 2023 20:52), Max: 98.5 (05 Aug 2023 16:52)  HR: 66 (05 Aug 2023 20:52) (59 - 66)  BP: 130/75 (05 Aug 2023 20:52) (122/53 - 178/75)  BP(mean): --  RR: 18 (05 Aug 2023 20:52) (18 - 18)  SpO2: 94% (05 Aug 2023 20:52) (91% - 97%)    Parameters below as of 05 Aug 2023 20:52  Patient On (Oxygen Delivery Method): room air          PHYSICAL EXAM:  GENERAL: NAD, well-developed  HEAD:  Atraumatic, Normocephalic  EYES: EOMI, PERRLA, conjunctiva and sclera clear  NECK: Supple, No JVD  CHEST/LUNG: Clear to auscultation bilaterally; No wheeze  HEART: S1, S2; No murmurs, rubs, or gallops  ABDOMEN: Soft, Nontender, Nondistended; Bowel sounds present  EXTREMITIES:  2+ Peripheral Pulses, No clubbing, cyanosis, or edema. b/l lower ext dressing in place   PSYCH: Normal affect  NEUROLOGY: AAOX3; non-focal  SKIN: No rashes or lesions    Consultant(s) Notes Reviewed:  [x ] YES  [ ] NO  Care Discussed with Consultants/Other Providers [ x] YES  [ ] NO    MEDS:   MEDICATIONS  (STANDING):  artificial  tears Solution 1 Drop(s) Both EYES three times a day  atorvastatin 40 milliGRAM(s) Oral at bedtime  chlorhexidine 2% Cloths 1 Application(s) Topical <User Schedule>  dextrose 5%. 1000 milliLiter(s) (50 mL/Hr) IV Continuous <Continuous>  dextrose 5%. 1000 milliLiter(s) (100 mL/Hr) IV Continuous <Continuous>  dextrose 50% Injectable 25 Gram(s) IV Push once  dextrose 50% Injectable 12.5 Gram(s) IV Push once  dextrose 50% Injectable 25 Gram(s) IV Push once  furosemide    Tablet 20 milliGRAM(s) Oral daily  glucagon  Injectable 1 milliGRAM(s) IntraMuscular once  heparin   Injectable 5000 Unit(s) SubCutaneous every 8 hours  hydrALAZINE 50 milliGRAM(s) Oral two times a day  insulin glargine Injectable (LANTUS) 24 Unit(s) SubCutaneous at bedtime  insulin lispro (ADMELOG) corrective regimen sliding scale   SubCutaneous at bedtime  insulin lispro (ADMELOG) corrective regimen sliding scale   SubCutaneous three times a day before meals  insulin lispro Injectable (ADMELOG) 9 Unit(s) SubCutaneous three times a day with meals  lisinopril 20 milliGRAM(s) Oral daily  metoprolol succinate ER 50 milliGRAM(s) Oral daily  mupirocin 2% Ointment 1 Application(s) Topical two times a day  pantoprazole    Tablet 40 milliGRAM(s) Oral before breakfast  piperacillin/tazobactam IVPB.. 3.375 Gram(s) IV Intermittent every 8 hours  senna 2 Tablet(s) Oral at bedtime  tamsulosin 0.4 milliGRAM(s) Oral at bedtime  vancomycin  IVPB 750 milliGRAM(s) IV Intermittent every 12 hours    MEDICATIONS  (PRN):  acetaminophen     Tablet .. 650 milliGRAM(s) Oral every 6 hours PRN Mild Pain (1 - 3)  dextrose Oral Gel 15 Gram(s) Oral once PRN Blood Glucose LESS THAN 70 milliGRAM(s)/deciliter  ondansetron Injectable 4 milliGRAM(s) IV Push every 8 hours PRN Nausea and/or Vomiting        ALLERGIES:  Allergy Status Unknown      LABS:                                            10.7   8.33  )-----------( 260      ( 05 Aug 2023 06:49 )             32.4   08-05    139  |  102  |  23  ----------------------------<  289<H>  3.4<L>   |  28  |  1.00    Ca    9.2      05 Aug 2023 06:49          < from: MR Ankle w/ IV Cont, Right (07.26.23 @ 18:31) >    IMPRESSION:  1.  There is a ulcer overlying the heel roughly spanning 5.4 cm extending   to the lateral calcaneus. There is minimal osseous edema within the   lateral calcaneus with mild postcontrast enhancement and without   significant loss of normal T1 fatty marrow. This is possibly representing   early osteomyelitis versus reactive in nature.  2.  Questionable minimal osseous edema within the fourth metatarsal head   with postcontrast enhancement which is incompletely evaluated. These   findings are possibly secondary to reactive degenerative changes versus   osteomyelitis if there is adjacent ulcer. Clinical correlation is advised.    --- End of Report ---        < end of copied text >     < from: Xray Foot AP + Lateral + Oblique, Right (07.25.23 @ 17:54) >    IMPRESSION:    No acute fracture or dislocation of the right foot. Midfoot degenerative   changes are noted.    Soft tissue defect at the heel related to known heel ulcer. No osseous   erosive changes of the calcaneus or adjacent osseousstructures. Soft   tissue edema is also noted along the dorsal foot and anterior lower shin.   No tracking subcutaneous gas.    Along the first distal phalanx base, there are juxtaarticular erosions at   the medial aspect and productive changes at the lateral aspect. Findings   may be due to underlying inflammatory arthritis or crystalline   arthropathy.    Vascular calcifications.      --- End of Report ---    < end of copied text >   STEPHANIE KAYE  63y Female  MRN:[id number]    Patient is a 63y old  Female who presents with a chief complaint of foot infection    HPI:   64 y/o F PMHx of DMH2 on insulin, HTN, HLD p/w right heel ulcer. Patient is sent by her podiatrist for questionable right foot procedure.  She is unsure how long she has had this ulcer for.  States that she was recently hospitalized in the near [facility] for an evaluation of the right foot infection.  States she still had a 102 fever yesterday which resolved.  Denies any fevers today, chills, chest pain, shortness of breath, abdominal pain, nausea vomiting diarrhea.  States she ambulates with a walker at baseline.  Accompanied by her sister-in-law (25 Jul 2023 21:58)      pt now s/p bilateral foot heel wound debridement with right foot kerecis graft application, and right foot calcaneus bone biopsy on 7/27      Patient seen and evaluated at bedside. No acute events overnight except as noted.    Interval HPI: no acute events o/n        PAST MEDICAL & SURGICAL HISTORY:  Diabetes      Hypertension      Hypercholesteremia          REVIEW OF SYSTEMS:  as per hpi     VITALS:   Vital Signs Last 24 Hrs  T(C): 36.7 (05 Aug 2023 20:52), Max: 36.9 (05 Aug 2023 16:52)  T(F): 98.1 (05 Aug 2023 20:52), Max: 98.5 (05 Aug 2023 16:52)  HR: 66 (05 Aug 2023 20:52) (59 - 66)  BP: 130/75 (05 Aug 2023 20:52) (122/53 - 178/75)  BP(mean): --  RR: 18 (05 Aug 2023 20:52) (18 - 18)  SpO2: 94% (05 Aug 2023 20:52) (91% - 97%)    Parameters below as of 05 Aug 2023 20:52  Patient On (Oxygen Delivery Method): room air          PHYSICAL EXAM:  GENERAL: NAD, well-developed  HEAD:  Atraumatic, Normocephalic  EYES: EOMI, PERRLA, conjunctiva and sclera clear  NECK: Supple, No JVD  CHEST/LUNG: Clear to auscultation bilaterally; No wheeze  HEART: S1, S2; No murmurs, rubs, or gallops  ABDOMEN: Soft, Nontender, Nondistended; Bowel sounds present  EXTREMITIES:  2+ Peripheral Pulses, No clubbing, cyanosis, or edema. b/l lower ext dressing in place   PSYCH: Normal affect  NEUROLOGY: AAOX3; non-focal  SKIN: No rashes or lesions    Consultant(s) Notes Reviewed:  [x ] YES  [ ] NO  Care Discussed with Consultants/Other Providers [ x] YES  [ ] NO    MEDS:   MEDICATIONS  (STANDING):  artificial  tears Solution 1 Drop(s) Both EYES three times a day  atorvastatin 40 milliGRAM(s) Oral at bedtime  chlorhexidine 2% Cloths 1 Application(s) Topical <User Schedule>  dextrose 5%. 1000 milliLiter(s) (50 mL/Hr) IV Continuous <Continuous>  dextrose 5%. 1000 milliLiter(s) (100 mL/Hr) IV Continuous <Continuous>  dextrose 50% Injectable 25 Gram(s) IV Push once  dextrose 50% Injectable 12.5 Gram(s) IV Push once  dextrose 50% Injectable 25 Gram(s) IV Push once  furosemide    Tablet 20 milliGRAM(s) Oral daily  glucagon  Injectable 1 milliGRAM(s) IntraMuscular once  heparin   Injectable 5000 Unit(s) SubCutaneous every 8 hours  hydrALAZINE 50 milliGRAM(s) Oral two times a day  insulin glargine Injectable (LANTUS) 24 Unit(s) SubCutaneous at bedtime  insulin lispro (ADMELOG) corrective regimen sliding scale   SubCutaneous at bedtime  insulin lispro (ADMELOG) corrective regimen sliding scale   SubCutaneous three times a day before meals  insulin lispro Injectable (ADMELOG) 9 Unit(s) SubCutaneous three times a day with meals  lisinopril 20 milliGRAM(s) Oral daily  metoprolol succinate ER 50 milliGRAM(s) Oral daily  mupirocin 2% Ointment 1 Application(s) Topical two times a day  pantoprazole    Tablet 40 milliGRAM(s) Oral before breakfast  piperacillin/tazobactam IVPB.. 3.375 Gram(s) IV Intermittent every 8 hours  senna 2 Tablet(s) Oral at bedtime  tamsulosin 0.4 milliGRAM(s) Oral at bedtime  vancomycin  IVPB 750 milliGRAM(s) IV Intermittent every 12 hours    MEDICATIONS  (PRN):  acetaminophen     Tablet .. 650 milliGRAM(s) Oral every 6 hours PRN Mild Pain (1 - 3)  dextrose Oral Gel 15 Gram(s) Oral once PRN Blood Glucose LESS THAN 70 milliGRAM(s)/deciliter  ondansetron Injectable 4 milliGRAM(s) IV Push every 8 hours PRN Nausea and/or Vomiting        ALLERGIES:  Allergy Status Unknown      LABS:                                            10.7   8.33  )-----------( 260      ( 05 Aug 2023 06:49 )             32.4   08-05    139  |  102  |  23  ----------------------------<  289<H>  3.4<L>   |  28  |  1.00    Ca    9.2      05 Aug 2023 06:49          < from: MR Ankle w/ IV Cont, Right (07.26.23 @ 18:31) >    IMPRESSION:  1.  There is a ulcer overlying the heel roughly spanning 5.4 cm extending   to the lateral calcaneus. There is minimal osseous edema within the   lateral calcaneus with mild postcontrast enhancement and without   significant loss of normal T1 fatty marrow. This is possibly representing   early osteomyelitis versus reactive in nature.  2.  Questionable minimal osseous edema within the fourth metatarsal head   with postcontrast enhancement which is incompletely evaluated. These   findings are possibly secondary to reactive degenerative changes versus   osteomyelitis if there is adjacent ulcer. Clinical correlation is advised.    --- End of Report ---        < end of copied text >     < from: Xray Foot AP + Lateral + Oblique, Right (07.25.23 @ 17:54) >    IMPRESSION:    No acute fracture or dislocation of the right foot. Midfoot degenerative   changes are noted.    Soft tissue defect at the heel related to known heel ulcer. No osseous   erosive changes of the calcaneus or adjacent osseousstructures. Soft   tissue edema is also noted along the dorsal foot and anterior lower shin.   No tracking subcutaneous gas.    Along the first distal phalanx base, there are juxtaarticular erosions at   the medial aspect and productive changes at the lateral aspect. Findings   may be due to underlying inflammatory arthritis or crystalline   arthropathy.    Vascular calcifications.      --- End of Report ---    < end of copied text >

## 2023-08-05 NOTE — PROGRESS NOTE ADULT - ASSESSMENT
62 y/o F PMHx of DMH2 on insulin, HTN, HLD p/w right heel ulcer.    Assessment  DMT2: 63y Female with DM T2 with hyperglycemia, A1C 8.4%, was on insulin at home, now on basal bolus insulin with coverage, blood sugars running high. Has right heel ulcer. Started on basal insulin, glucose trending high. Insulin adjusted   ?Gastroparesis: Has nausea at times, improved.   Foot wound: on medications, monitored. Podiatry eval/work up. MRSA wound on isolation   HTN: on antihypertensive medications, monitored, asymptomatic.  HLD: stable, on statin, monitored.    Discussed plan and management with Attending   Armando Toscano NP - TEAMS  Dr Wesley Hannah  [phone]         64 y/o F PMHx of DMH2 on insulin, HTN, HLD p/w right heel ulcer.    Assessment  DMT2: 63y Female with DM T2 with hyperglycemia, A1C 8.4%, was on insulin at home, now on basal bolus insulin with coverage, blood sugars running high. Has right heel ulcer. Started on basal insulin, glucose trending high. Insulin adjusted   ?Gastroparesis: Has nausea at times, improved.   Foot wound: on medications, monitored. Podiatry eval/work up. MRSA wound on isolation   HTN: on antihypertensive medications, monitored, asymptomatic.  HLD: stable, on statin, monitored.    Discussed plan and management with Attending   Armando Toscano NP - TEAMS  Dr Wesley Hannah  [phone]         62 y/o F PMHx of DMH2 on insulin, HTN, HLD p/w right heel ulcer.    Assessment  DMT2: 63y Female with DM T2 with hyperglycemia, A1C 8.4%, was on insulin at home, now on basal bolus insulin with coverage, blood sugars running high. Has right heel ulcer. Started on basal insulin, glucose trending high. Insulin adjusted   ?Gastroparesis: Has nausea at times, improved.   Foot wound: on medications, monitored. Podiatry eval/work up. MRSA wound on isolation   HTN: on antihypertensive medications, monitored, asymptomatic.  HLD: stable, on statin, monitored.    Discussed plan and management with Attending   Armando Toscano NP - TEAMS  Dr Wesley Hannah  [phone]

## 2023-08-05 NOTE — PROGRESS NOTE ADULT - SUBJECTIVE AND OBJECTIVE BOX
Chief complaint  Patient is a 63y old  Female who presents with a chief complaint of foot wound (04 Aug 2023 16:56)         Labs and Fingersticks  CAPILLARY BLOOD GLUCOSE      POCT Blood Glucose.: 262 mg/dL (05 Aug 2023 07:46)  POCT Blood Glucose.: 287 mg/dL (04 Aug 2023 21:09)  POCT Blood Glucose.: 267 mg/dL (04 Aug 2023 16:46)  POCT Blood Glucose.: 264 mg/dL (04 Aug 2023 12:11)      Anion Gap: 9 (08-05 @ 06:49)      Calcium: 9.2 (08-05 @ 06:49)          08-05    139  |  102  |  23  ----------------------------<  289<H>  3.4<L>   |  28  |  1.00    Ca    9.2      05 Aug 2023 06:49                          10.7   8.33  )-----------( 260      ( 05 Aug 2023 06:49 )             32.4     Medications  MEDICATIONS  (STANDING):  artificial  tears Solution 1 Drop(s) Both EYES three times a day  atorvastatin 40 milliGRAM(s) Oral at bedtime  chlorhexidine 2% Cloths 1 Application(s) Topical <User Schedule>  dextrose 5%. 1000 milliLiter(s) (100 mL/Hr) IV Continuous <Continuous>  dextrose 5%. 1000 milliLiter(s) (50 mL/Hr) IV Continuous <Continuous>  dextrose 50% Injectable 25 Gram(s) IV Push once  dextrose 50% Injectable 12.5 Gram(s) IV Push once  dextrose 50% Injectable 25 Gram(s) IV Push once  furosemide    Tablet 20 milliGRAM(s) Oral daily  glucagon  Injectable 1 milliGRAM(s) IntraMuscular once  heparin   Injectable 5000 Unit(s) SubCutaneous every 8 hours  hydrALAZINE 50 milliGRAM(s) Oral two times a day  insulin glargine Injectable (LANTUS) 24 Unit(s) SubCutaneous at bedtime  insulin lispro (ADMELOG) corrective regimen sliding scale   SubCutaneous at bedtime  insulin lispro (ADMELOG) corrective regimen sliding scale   SubCutaneous three times a day before meals  insulin lispro Injectable (ADMELOG) 9 Unit(s) SubCutaneous three times a day with meals  lisinopril 20 milliGRAM(s) Oral daily  metoprolol succinate ER 50 milliGRAM(s) Oral daily  mupirocin 2% Ointment 1 Application(s) Topical two times a day  pantoprazole    Tablet 40 milliGRAM(s) Oral before breakfast  piperacillin/tazobactam IVPB.. 3.375 Gram(s) IV Intermittent every 8 hours  potassium chloride    Tablet ER 40 milliEquivalent(s) Oral once  senna 2 Tablet(s) Oral at bedtime  tamsulosin 0.4 milliGRAM(s) Oral at bedtime  vancomycin  IVPB 750 milliGRAM(s) IV Intermittent every 12 hours      Physical Exam  General: Patient comfortable in bed   Vital Signs Last 12 Hrs  T(F): 98.1 (08-05-23 @ 08:48), Max: 98.1 (08-05-23 @ 08:48)  HR: 60 (08-05-23 @ 08:48) (60 - 66)  BP: 135/67 (08-05-23 @ 08:48) (135/67 - 143/69)  BP(mean): --  RR: 18 (08-05-23 @ 08:48) (18 - 18)  SpO2: 94% (08-05-23 @ 08:48) (94% - 94%)    CVS: S1S2   Respiratory: No wheezing, no crepitations  GI: Abdomen soft, bowel sounds positive  Musculoskeletal:  moves all extremities  : Voiding

## 2023-08-05 NOTE — PROGRESS NOTE ADULT - ASSESSMENT
62 y/o F PMHx of DMH2 on insulin, HTN, HLD p/w right heel ulcer. Patient is sent by her podiatrist for questionable right foot procedure.  She is unsure how long she has had this ulcer for.  States that she was recently hospitalized in the near [facility] for an evaluation of the right foot infection.  States she still had a 102 fever yesterday which resolved.  Denies any fevers today, chills, chest pain, shortness of breath, abdominal pain, nausea vomiting diarrhea.  States she ambulates with a walker at baseline.  Accompanied by her sister-in-law. 64 y/o F PMHx of DMH2 on insulin, HTN, HLD p/w right heel ulcer. Patient is sent by her podiatrist for questionable right foot procedure.  She is unsure how long she has had this ulcer for.  States that she was recently hospitalized in the near [facility] for an evaluation of the right foot infection.  States she still had a 102 fever yesterday which resolved.  Denies any fevers today, chills, chest pain, shortness of breath, abdominal pain, nausea vomiting diarrhea.  States she ambulates with a walker at baseline.  Accompanied by her sister-in-law. 64 y/o F PMHx of DMH2 on insulin, HTN, HLD p/w right heel ulcer. Patient is sent by her podiatrist for questionable right foot procedure.  She is unsure how long she has had this ulcer for.  States that she was recently hospitalized in the near [facility] for an evaluation of the right foot infection.  States she still had a 102 fever yesterday which resolved.  Denies any fevers today, chills, chest pain, shortness of breath, abdominal pain, nausea vomiting diarrhea.  States she ambulates with a walker at baseline.  Accompanied by her sister-in-law.

## 2023-08-06 LAB
ANION GAP SERPL CALC-SCNC: 14 MMOL/L — SIGNIFICANT CHANGE UP (ref 5–17)
BUN SERPL-MCNC: 22 MG/DL — SIGNIFICANT CHANGE UP (ref 7–23)
CALCIUM SERPL-MCNC: 9.4 MG/DL — SIGNIFICANT CHANGE UP (ref 8.4–10.5)
CHLORIDE SERPL-SCNC: 102 MMOL/L — SIGNIFICANT CHANGE UP (ref 96–108)
CO2 SERPL-SCNC: 27 MMOL/L — SIGNIFICANT CHANGE UP (ref 22–31)
CREAT SERPL-MCNC: 1.01 MG/DL — SIGNIFICANT CHANGE UP (ref 0.5–1.3)
EGFR: 62 ML/MIN/1.73M2 — SIGNIFICANT CHANGE UP
GLUCOSE BLDC GLUCOMTR-MCNC: 139 MG/DL — HIGH (ref 70–99)
GLUCOSE BLDC GLUCOMTR-MCNC: 148 MG/DL — HIGH (ref 70–99)
GLUCOSE BLDC GLUCOMTR-MCNC: 163 MG/DL — HIGH (ref 70–99)
GLUCOSE BLDC GLUCOMTR-MCNC: 170 MG/DL — HIGH (ref 70–99)
GLUCOSE SERPL-MCNC: 145 MG/DL — HIGH (ref 70–99)
HCT VFR BLD CALC: 32.6 % — LOW (ref 34.5–45)
HGB BLD-MCNC: 10.6 G/DL — LOW (ref 11.5–15.5)
MAGNESIUM SERPL-MCNC: 1.6 MG/DL — SIGNIFICANT CHANGE UP (ref 1.6–2.6)
MCHC RBC-ENTMCNC: 27.3 PG — SIGNIFICANT CHANGE UP (ref 27–34)
MCHC RBC-ENTMCNC: 32.5 GM/DL — SIGNIFICANT CHANGE UP (ref 32–36)
MCV RBC AUTO: 84 FL — SIGNIFICANT CHANGE UP (ref 80–100)
NRBC # BLD: 0 /100 WBCS — SIGNIFICANT CHANGE UP (ref 0–0)
PLATELET # BLD AUTO: 273 K/UL — SIGNIFICANT CHANGE UP (ref 150–400)
POTASSIUM SERPL-MCNC: 3.4 MMOL/L — LOW (ref 3.5–5.3)
POTASSIUM SERPL-SCNC: 3.4 MMOL/L — LOW (ref 3.5–5.3)
RBC # BLD: 3.88 M/UL — SIGNIFICANT CHANGE UP (ref 3.8–5.2)
RBC # FLD: 14.6 % — HIGH (ref 10.3–14.5)
SODIUM SERPL-SCNC: 143 MMOL/L — SIGNIFICANT CHANGE UP (ref 135–145)
VANCOMYCIN TROUGH SERPL-MCNC: 16.8 UG/ML — SIGNIFICANT CHANGE UP (ref 10–20)
WBC # BLD: 7.83 K/UL — SIGNIFICANT CHANGE UP (ref 3.8–10.5)
WBC # FLD AUTO: 7.83 K/UL — SIGNIFICANT CHANGE UP (ref 3.8–10.5)

## 2023-08-06 RX ORDER — POTASSIUM CHLORIDE 20 MEQ
40 PACKET (EA) ORAL ONCE
Refills: 0 | Status: COMPLETED | OUTPATIENT
Start: 2023-08-06 | End: 2023-08-06

## 2023-08-06 RX ORDER — MAGNESIUM SULFATE 500 MG/ML
1 VIAL (ML) INJECTION ONCE
Refills: 0 | Status: COMPLETED | OUTPATIENT
Start: 2023-08-06 | End: 2023-08-06

## 2023-08-06 RX ORDER — INSULIN GLARGINE 100 [IU]/ML
25 INJECTION, SOLUTION SUBCUTANEOUS AT BEDTIME
Refills: 0 | Status: DISCONTINUED | OUTPATIENT
Start: 2023-08-06 | End: 2023-08-07

## 2023-08-06 RX ADMIN — Medication 100 GRAM(S): at 17:04

## 2023-08-06 RX ADMIN — Medication 250 MILLIGRAM(S): at 04:44

## 2023-08-06 RX ADMIN — Medication 50 MILLIGRAM(S): at 04:45

## 2023-08-06 RX ADMIN — Medication 40 MILLIEQUIVALENT(S): at 17:03

## 2023-08-06 RX ADMIN — HEPARIN SODIUM 5000 UNIT(S): 5000 INJECTION INTRAVENOUS; SUBCUTANEOUS at 13:09

## 2023-08-06 RX ADMIN — LISINOPRIL 20 MILLIGRAM(S): 2.5 TABLET ORAL at 04:45

## 2023-08-06 RX ADMIN — HEPARIN SODIUM 5000 UNIT(S): 5000 INJECTION INTRAVENOUS; SUBCUTANEOUS at 22:19

## 2023-08-06 RX ADMIN — PANTOPRAZOLE SODIUM 40 MILLIGRAM(S): 20 TABLET, DELAYED RELEASE ORAL at 04:45

## 2023-08-06 RX ADMIN — CHLORHEXIDINE GLUCONATE 1 APPLICATION(S): 213 SOLUTION TOPICAL at 04:46

## 2023-08-06 RX ADMIN — PIPERACILLIN AND TAZOBACTAM 25 GRAM(S): 4; .5 INJECTION, POWDER, LYOPHILIZED, FOR SOLUTION INTRAVENOUS at 13:09

## 2023-08-06 RX ADMIN — Medication 1 DROP(S): at 04:46

## 2023-08-06 RX ADMIN — Medication 250 MILLIGRAM(S): at 17:04

## 2023-08-06 RX ADMIN — Medication 1: at 08:19

## 2023-08-06 RX ADMIN — Medication 1: at 11:52

## 2023-08-06 RX ADMIN — MUPIROCIN 1 APPLICATION(S): 20 OINTMENT TOPICAL at 17:04

## 2023-08-06 RX ADMIN — PIPERACILLIN AND TAZOBACTAM 25 GRAM(S): 4; .5 INJECTION, POWDER, LYOPHILIZED, FOR SOLUTION INTRAVENOUS at 22:18

## 2023-08-06 RX ADMIN — PIPERACILLIN AND TAZOBACTAM 25 GRAM(S): 4; .5 INJECTION, POWDER, LYOPHILIZED, FOR SOLUTION INTRAVENOUS at 04:44

## 2023-08-06 RX ADMIN — Medication 50 MILLIGRAM(S): at 17:04

## 2023-08-06 RX ADMIN — TAMSULOSIN HYDROCHLORIDE 0.4 MILLIGRAM(S): 0.4 CAPSULE ORAL at 22:18

## 2023-08-06 RX ADMIN — MUPIROCIN 1 APPLICATION(S): 20 OINTMENT TOPICAL at 04:46

## 2023-08-06 RX ADMIN — Medication 9 UNIT(S): at 08:19

## 2023-08-06 RX ADMIN — INSULIN GLARGINE 25 UNIT(S): 100 INJECTION, SOLUTION SUBCUTANEOUS at 22:18

## 2023-08-06 RX ADMIN — ATORVASTATIN CALCIUM 40 MILLIGRAM(S): 80 TABLET, FILM COATED ORAL at 22:18

## 2023-08-06 RX ADMIN — Medication 9 UNIT(S): at 11:52

## 2023-08-06 RX ADMIN — HEPARIN SODIUM 5000 UNIT(S): 5000 INJECTION INTRAVENOUS; SUBCUTANEOUS at 04:44

## 2023-08-06 RX ADMIN — Medication 9 UNIT(S): at 17:03

## 2023-08-06 RX ADMIN — Medication 20 MILLIGRAM(S): at 04:45

## 2023-08-06 NOTE — PROGRESS NOTE ADULT - SUBJECTIVE AND OBJECTIVE BOX
STEPHANIE KAYE  63y Female  MRN:[id number]    Patient is a 63y old  Female who presents with a chief complaint of foot infection    HPI:   64 y/o F PMHx of DMH2 on insulin, HTN, HLD p/w right heel ulcer. Patient is sent by her podiatrist for questionable right foot procedure.  She is unsure how long she has had this ulcer for.  States that she was recently hospitalized in the near [facility] for an evaluation of the right foot infection.  States she still had a 102 fever yesterday which resolved.  Denies any fevers today, chills, chest pain, shortness of breath, abdominal pain, nausea vomiting diarrhea.  States she ambulates with a walker at baseline.  Accompanied by her sister-in-law (25 Jul 2023 21:58)      pt now s/p bilateral foot heel wound debridement with right foot kerecis graft application, and right foot calcaneus bone biopsy on 7/27      Patient seen and evaluated at bedside. No acute events overnight except as noted.    Interval HPI: no acute events o/n        PAST MEDICAL & SURGICAL HISTORY:  Diabetes      Hypertension      Hypercholesteremia          REVIEW OF SYSTEMS:  as per hpi     VITALS:   Vital Signs Last 24 Hrs  T(C): 36.9 (06 Aug 2023 08:14), Max: 36.9 (05 Aug 2023 16:52)  T(F): 98.5 (06 Aug 2023 08:14), Max: 98.5 (05 Aug 2023 16:52)  HR: 61 (06 Aug 2023 08:14) (59 - 66)  BP: 148/68 (06 Aug 2023 08:14) (130/75 - 178/75)  BP(mean): --  RR: 18 (06 Aug 2023 08:14) (18 - 18)  SpO2: 96% (06 Aug 2023 08:14) (94% - 97%)    Parameters below as of 06 Aug 2023 08:14  Patient On (Oxygen Delivery Method): room air          PHYSICAL EXAM:  GENERAL: NAD, well-developed  HEAD:  Atraumatic, Normocephalic  EYES: EOMI, PERRLA, conjunctiva and sclera clear  NECK: Supple, No JVD  CHEST/LUNG: Clear to auscultation bilaterally; No wheeze  HEART: S1, S2; No murmurs, rubs, or gallops  ABDOMEN: Soft, Nontender, Nondistended; Bowel sounds present  EXTREMITIES:  2+ Peripheral Pulses, No clubbing, cyanosis, or edema. b/l lower ext dressing in place   PSYCH: Normal affect  NEUROLOGY: AAOX3; non-focal  SKIN: No rashes or lesions    Consultant(s) Notes Reviewed:  [x ] YES  [ ] NO  Care Discussed with Consultants/Other Providers [ x] YES  [ ] NO    MEDS:   MEDICATIONS  (STANDING):  artificial  tears Solution 1 Drop(s) Both EYES three times a day  atorvastatin 40 milliGRAM(s) Oral at bedtime  chlorhexidine 2% Cloths 1 Application(s) Topical <User Schedule>  dextrose 5%. 1000 milliLiter(s) (50 mL/Hr) IV Continuous <Continuous>  dextrose 5%. 1000 milliLiter(s) (100 mL/Hr) IV Continuous <Continuous>  dextrose 50% Injectable 25 Gram(s) IV Push once  dextrose 50% Injectable 12.5 Gram(s) IV Push once  dextrose 50% Injectable 25 Gram(s) IV Push once  furosemide    Tablet 20 milliGRAM(s) Oral daily  glucagon  Injectable 1 milliGRAM(s) IntraMuscular once  heparin   Injectable 5000 Unit(s) SubCutaneous every 8 hours  hydrALAZINE 50 milliGRAM(s) Oral two times a day  insulin glargine Injectable (LANTUS) 24 Unit(s) SubCutaneous at bedtime  insulin lispro (ADMELOG) corrective regimen sliding scale   SubCutaneous at bedtime  insulin lispro (ADMELOG) corrective regimen sliding scale   SubCutaneous three times a day before meals  insulin lispro Injectable (ADMELOG) 9 Unit(s) SubCutaneous three times a day with meals  lisinopril 20 milliGRAM(s) Oral daily  metoprolol succinate ER 50 milliGRAM(s) Oral daily  mupirocin 2% Ointment 1 Application(s) Topical two times a day  pantoprazole    Tablet 40 milliGRAM(s) Oral before breakfast  piperacillin/tazobactam IVPB.. 3.375 Gram(s) IV Intermittent every 8 hours  senna 2 Tablet(s) Oral at bedtime  tamsulosin 0.4 milliGRAM(s) Oral at bedtime  vancomycin  IVPB 750 milliGRAM(s) IV Intermittent every 12 hours    MEDICATIONS  (PRN):  acetaminophen     Tablet .. 650 milliGRAM(s) Oral every 6 hours PRN Mild Pain (1 - 3)  dextrose Oral Gel 15 Gram(s) Oral once PRN Blood Glucose LESS THAN 70 milliGRAM(s)/deciliter  ondansetron Injectable 4 milliGRAM(s) IV Push every 8 hours PRN Nausea and/or Vomiting        ALLERGIES:  Allergy Status Unknown      LABS:                         10.6   7.83  )-----------( 273      ( 06 Aug 2023 06:54 )             32.6   08-06    143  |  102  |  22  ----------------------------<  145<H>  3.4<L>   |  27  |  1.01    Ca    9.4      06 Aug 2023 06:53  Mg     1.6     08-06            < from: MR Ankle w/ IV Cont, Right (07.26.23 @ 18:31) >    IMPRESSION:  1.  There is a ulcer overlying the heel roughly spanning 5.4 cm extending   to the lateral calcaneus. There is minimal osseous edema within the   lateral calcaneus with mild postcontrast enhancement and without   significant loss of normal T1 fatty marrow. This is possibly representing   early osteomyelitis versus reactive in nature.  2.  Questionable minimal osseous edema within the fourth metatarsal head   with postcontrast enhancement which is incompletely evaluated. These   findings are possibly secondary to reactive degenerative changes versus   osteomyelitis if there is adjacent ulcer. Clinical correlation is advised.    --- End of Report ---        < end of copied text >     < from: Xray Foot AP + Lateral + Oblique, Right (07.25.23 @ 17:54) >    IMPRESSION:    No acute fracture or dislocation of the right foot. Midfoot degenerative   changes are noted.    Soft tissue defect at the heel related to known heel ulcer. No osseous   erosive changes of the calcaneus or adjacent osseousstructures. Soft   tissue edema is also noted along the dorsal foot and anterior lower shin.   No tracking subcutaneous gas.    Along the first distal phalanx base, there are juxtaarticular erosions at   the medial aspect and productive changes at the lateral aspect. Findings   may be due to underlying inflammatory arthritis or crystalline   arthropathy.    Vascular calcifications.      --- End of Report ---    < end of copied text >   STEPHANIE KAYE  63y Female  MRN:[id number]    Patient is a 63y old  Female who presents with a chief complaint of foot infection    HPI:   64 y/o F PMHx of DMH2 on insulin, HTN, HLD p/w right heel ulcer. Patient is sent by her podiatrist for questionable right foot procedure.  She is unsure how long she has had this ulcer for.  States that she was recently hospitalized in the near [facility] for an evaluation of the right foot infection.  States she still had a 102 fever yesterday which resolved.  Denies any fevers today, chills, chest pain, shortness of breath, abdominal pain, nausea vomiting diarrhea.  States she ambulates with a walker at baseline.  Accompanied by her sister-in-law (25 Jul 2023 21:58)      pt now s/p bilateral foot heel wound debridement with right foot kerecis graft application, and right foot calcaneus bone biopsy on 7/27      Patient seen and evaluated at bedside. No acute events overnight except as noted.    Interval HPI: no acute events o/n        PAST MEDICAL & SURGICAL HISTORY:  Diabetes      Hypertension      Hypercholesteremia          REVIEW OF SYSTEMS:  as per hpi     VITALS:   Vital Signs Last 24 Hrs  T(C): 36.9 (06 Aug 2023 08:14), Max: 36.9 (05 Aug 2023 16:52)  T(F): 98.5 (06 Aug 2023 08:14), Max: 98.5 (05 Aug 2023 16:52)  HR: 61 (06 Aug 2023 08:14) (59 - 66)  BP: 148/68 (06 Aug 2023 08:14) (130/75 - 178/75)  BP(mean): --  RR: 18 (06 Aug 2023 08:14) (18 - 18)  SpO2: 96% (06 Aug 2023 08:14) (94% - 97%)    Parameters below as of 06 Aug 2023 08:14  Patient On (Oxygen Delivery Method): room air          PHYSICAL EXAM:  GENERAL: NAD, well-developed  HEAD:  Atraumatic, Normocephalic  EYES: EOMI, PERRLA, conjunctiva and sclera clear  NECK: Supple, No JVD  CHEST/LUNG: Clear to auscultation bilaterally; No wheeze  HEART: S1, S2; No murmurs, rubs, or gallops  ABDOMEN: Soft, Nontender, Nondistended; Bowel sounds present  EXTREMITIES:  2+ Peripheral Pulses, No clubbing, cyanosis, or edema. b/l lower ext dressing in place   PSYCH: Normal affect  NEUROLOGY: AAOX3; non-focal  SKIN: No rashes or lesions    Consultant(s) Notes Reviewed:  [x ] YES  [ ] NO  Care Discussed with Consultants/Other Providers [ x] YES  [ ] NO    MEDS:   MEDICATIONS  (STANDING):  artificial  tears Solution 1 Drop(s) Both EYES three times a day  atorvastatin 40 milliGRAM(s) Oral at bedtime  chlorhexidine 2% Cloths 1 Application(s) Topical <User Schedule>  dextrose 5%. 1000 milliLiter(s) (50 mL/Hr) IV Continuous <Continuous>  dextrose 5%. 1000 milliLiter(s) (100 mL/Hr) IV Continuous <Continuous>  dextrose 50% Injectable 25 Gram(s) IV Push once  dextrose 50% Injectable 12.5 Gram(s) IV Push once  dextrose 50% Injectable 25 Gram(s) IV Push once  furosemide    Tablet 20 milliGRAM(s) Oral daily  glucagon  Injectable 1 milliGRAM(s) IntraMuscular once  heparin   Injectable 5000 Unit(s) SubCutaneous every 8 hours  hydrALAZINE 50 milliGRAM(s) Oral two times a day  insulin glargine Injectable (LANTUS) 24 Unit(s) SubCutaneous at bedtime  insulin lispro (ADMELOG) corrective regimen sliding scale   SubCutaneous at bedtime  insulin lispro (ADMELOG) corrective regimen sliding scale   SubCutaneous three times a day before meals  insulin lispro Injectable (ADMELOG) 9 Unit(s) SubCutaneous three times a day with meals  lisinopril 20 milliGRAM(s) Oral daily  metoprolol succinate ER 50 milliGRAM(s) Oral daily  mupirocin 2% Ointment 1 Application(s) Topical two times a day  pantoprazole    Tablet 40 milliGRAM(s) Oral before breakfast  piperacillin/tazobactam IVPB.. 3.375 Gram(s) IV Intermittent every 8 hours  senna 2 Tablet(s) Oral at bedtime  tamsulosin 0.4 milliGRAM(s) Oral at bedtime  vancomycin  IVPB 750 milliGRAM(s) IV Intermittent every 12 hours    MEDICATIONS  (PRN):  acetaminophen     Tablet .. 650 milliGRAM(s) Oral every 6 hours PRN Mild Pain (1 - 3)  dextrose Oral Gel 15 Gram(s) Oral once PRN Blood Glucose LESS THAN 70 milliGRAM(s)/deciliter  ondansetron Injectable 4 milliGRAM(s) IV Push every 8 hours PRN Nausea and/or Vomiting        ALLERGIES:  Allergy Status Unknown      LABS:                         10.6   7.83  )-----------( 273      ( 06 Aug 2023 06:54 )             32.6   08-06    143  |  102  |  22  ----------------------------<  145<H>  3.4<L>   |  27  |  1.01    Ca    9.4      06 Aug 2023 06:53  Mg     1.6     08-06            < from: MR Ankle w/ IV Cont, Right (07.26.23 @ 18:31) >    IMPRESSION:  1.  There is a ulcer overlying the heel roughly spanning 5.4 cm extending   to the lateral calcaneus. There is minimal osseous edema within the   lateral calcaneus with mild postcontrast enhancement and without   significant loss of normal T1 fatty marrow. This is possibly representing   early osteomyelitis versus reactive in nature.  2.  Questionable minimal osseous edema within the fourth metatarsal head   with postcontrast enhancement which is incompletely evaluated. These   findings are possibly secondary to reactive degenerative changes versus   osteomyelitis if there is adjacent ulcer. Clinical correlation is advised.    --- End of Report ---        < end of copied text >     < from: Xray Foot AP + Lateral + Oblique, Right (07.25.23 @ 17:54) >    IMPRESSION:    No acute fracture or dislocation of the right foot. Midfoot degenerative   changes are noted.    Soft tissue defect at the heel related to known heel ulcer. No osseous   erosive changes of the calcaneus or adjacent osseousstructures. Soft   tissue edema is also noted along the dorsal foot and anterior lower shin.   No tracking subcutaneous gas.    Along the first distal phalanx base, there are juxtaarticular erosions at   the medial aspect and productive changes at the lateral aspect. Findings   may be due to underlying inflammatory arthritis or crystalline   arthropathy.    Vascular calcifications.      --- End of Report ---    < end of copied text >   STEPHANIE KAYE  63y Female  MRN:[id number]    Patient is a 63y old  Female who presents with a chief complaint of foot infection    HPI:   64 y/o F PMHx of DMH2 on insulin, HTN, HLD p/w right heel ulcer. Patient is sent by her podiatrist for questionable right foot procedure.  She is unsure how long she has had this ulcer for.  States that she was recently hospitalized in the near [facility] for an evaluation of the right foot infection.  States she still had a 102 fever yesterday which resolved.  Denies any fevers today, chills, chest pain, shortness of breath, abdominal pain, nausea vomiting diarrhea.  States she ambulates with a walker at baseline.  Accompanied by her sister-in-law (25 Jul 2023 21:58)      pt now s/p bilateral foot heel wound debridement with right foot kerecis graft application, and right foot calcaneus bone biopsy on 7/27      Patient seen and evaluated at bedside. No acute events overnight except as noted.    Interval HPI: no acute events o/n        PAST MEDICAL & SURGICAL HISTORY:  Diabetes      Hypertension      Hypercholesteremia          REVIEW OF SYSTEMS:  as per hpi     VITALS:   Vital Signs Last 24 Hrs  T(C): 36.9 (06 Aug 2023 08:14), Max: 36.9 (05 Aug 2023 16:52)  T(F): 98.5 (06 Aug 2023 08:14), Max: 98.5 (05 Aug 2023 16:52)  HR: 61 (06 Aug 2023 08:14) (59 - 66)  BP: 148/68 (06 Aug 2023 08:14) (130/75 - 178/75)  BP(mean): --  RR: 18 (06 Aug 2023 08:14) (18 - 18)  SpO2: 96% (06 Aug 2023 08:14) (94% - 97%)    Parameters below as of 06 Aug 2023 08:14  Patient On (Oxygen Delivery Method): room air          PHYSICAL EXAM:  GENERAL: NAD, well-developed  HEAD:  Atraumatic, Normocephalic  EYES: EOMI, PERRLA, conjunctiva and sclera clear  NECK: Supple, No JVD  CHEST/LUNG: Clear to auscultation bilaterally; No wheeze  HEART: S1, S2; No murmurs, rubs, or gallops  ABDOMEN: Soft, Nontender, Nondistended; Bowel sounds present  EXTREMITIES:  2+ Peripheral Pulses, No clubbing, cyanosis, or edema. b/l lower ext dressing in place   PSYCH: Normal affect  NEUROLOGY: AAOX3; non-focal  SKIN: No rashes or lesions    Consultant(s) Notes Reviewed:  [x ] YES  [ ] NO  Care Discussed with Consultants/Other Providers [ x] YES  [ ] NO    MEDS:   MEDICATIONS  (STANDING):  artificial  tears Solution 1 Drop(s) Both EYES three times a day  atorvastatin 40 milliGRAM(s) Oral at bedtime  chlorhexidine 2% Cloths 1 Application(s) Topical <User Schedule>  dextrose 5%. 1000 milliLiter(s) (50 mL/Hr) IV Continuous <Continuous>  dextrose 5%. 1000 milliLiter(s) (100 mL/Hr) IV Continuous <Continuous>  dextrose 50% Injectable 25 Gram(s) IV Push once  dextrose 50% Injectable 12.5 Gram(s) IV Push once  dextrose 50% Injectable 25 Gram(s) IV Push once  furosemide    Tablet 20 milliGRAM(s) Oral daily  glucagon  Injectable 1 milliGRAM(s) IntraMuscular once  heparin   Injectable 5000 Unit(s) SubCutaneous every 8 hours  hydrALAZINE 50 milliGRAM(s) Oral two times a day  insulin glargine Injectable (LANTUS) 24 Unit(s) SubCutaneous at bedtime  insulin lispro (ADMELOG) corrective regimen sliding scale   SubCutaneous at bedtime  insulin lispro (ADMELOG) corrective regimen sliding scale   SubCutaneous three times a day before meals  insulin lispro Injectable (ADMELOG) 9 Unit(s) SubCutaneous three times a day with meals  lisinopril 20 milliGRAM(s) Oral daily  metoprolol succinate ER 50 milliGRAM(s) Oral daily  mupirocin 2% Ointment 1 Application(s) Topical two times a day  pantoprazole    Tablet 40 milliGRAM(s) Oral before breakfast  piperacillin/tazobactam IVPB.. 3.375 Gram(s) IV Intermittent every 8 hours  senna 2 Tablet(s) Oral at bedtime  tamsulosin 0.4 milliGRAM(s) Oral at bedtime  vancomycin  IVPB 750 milliGRAM(s) IV Intermittent every 12 hours    MEDICATIONS  (PRN):  acetaminophen     Tablet .. 650 milliGRAM(s) Oral every 6 hours PRN Mild Pain (1 - 3)  dextrose Oral Gel 15 Gram(s) Oral once PRN Blood Glucose LESS THAN 70 milliGRAM(s)/deciliter  ondansetron Injectable 4 milliGRAM(s) IV Push every 8 hours PRN Nausea and/or Vomiting        ALLERGIES:  Allergy Status Unknown      LABS:                         10.6   7.83  )-----------( 273      ( 06 Aug 2023 06:54 )             32.6   08-06    143  |  102  |  22  ----------------------------<  145<H>  3.4<L>   |  27  |  1.01    Ca    9.4      06 Aug 2023 06:53  Mg     1.6     08-06            < from: MR Ankle w/ IV Cont, Right (07.26.23 @ 18:31) >    IMPRESSION:  1.  There is a ulcer overlying the heel roughly spanning 5.4 cm extending   to the lateral calcaneus. There is minimal osseous edema within the   lateral calcaneus with mild postcontrast enhancement and without   significant loss of normal T1 fatty marrow. This is possibly representing   early osteomyelitis versus reactive in nature.  2.  Questionable minimal osseous edema within the fourth metatarsal head   with postcontrast enhancement which is incompletely evaluated. These   findings are possibly secondary to reactive degenerative changes versus   osteomyelitis if there is adjacent ulcer. Clinical correlation is advised.    --- End of Report ---        < end of copied text >     < from: Xray Foot AP + Lateral + Oblique, Right (07.25.23 @ 17:54) >    IMPRESSION:    No acute fracture or dislocation of the right foot. Midfoot degenerative   changes are noted.    Soft tissue defect at the heel related to known heel ulcer. No osseous   erosive changes of the calcaneus or adjacent osseousstructures. Soft   tissue edema is also noted along the dorsal foot and anterior lower shin.   No tracking subcutaneous gas.    Along the first distal phalanx base, there are juxtaarticular erosions at   the medial aspect and productive changes at the lateral aspect. Findings   may be due to underlying inflammatory arthritis or crystalline   arthropathy.    Vascular calcifications.      --- End of Report ---    < end of copied text >

## 2023-08-06 NOTE — PROGRESS NOTE ADULT - SUBJECTIVE AND OBJECTIVE BOX
Subjective: Patient seen and examined. No new events except as noted.     REVIEW OF SYSTEMS:    CONSTITUTIONAL: + weakness, fevers or chills  EYES/ENT: No visual changes;  No vertigo or throat pain   NECK: No pain or stiffness  RESPIRATORY: No cough, wheezing, hemoptysis; No shortness of breath  CARDIOVASCULAR: No chest pain or palpitations  GASTROINTESTINAL: No abdominal or epigastric pain. No nausea, vomiting, or hematemesis; No diarrhea or constipation. No melena or hematochezia.  GENITOURINARY: No dysuria, frequency or hematuria  NEUROLOGICAL: No numbness or weakness  SKIN: No itching, burning, rashes, or lesions   All other review of systems is negative unless indicated above.    MEDICATIONS:  MEDICATIONS  (STANDING):  artificial  tears Solution 1 Drop(s) Both EYES three times a day  atorvastatin 40 milliGRAM(s) Oral at bedtime  chlorhexidine 2% Cloths 1 Application(s) Topical <User Schedule>  dextrose 5%. 1000 milliLiter(s) (50 mL/Hr) IV Continuous <Continuous>  dextrose 5%. 1000 milliLiter(s) (100 mL/Hr) IV Continuous <Continuous>  dextrose 50% Injectable 25 Gram(s) IV Push once  dextrose 50% Injectable 12.5 Gram(s) IV Push once  dextrose 50% Injectable 25 Gram(s) IV Push once  furosemide    Tablet 20 milliGRAM(s) Oral daily  glucagon  Injectable 1 milliGRAM(s) IntraMuscular once  heparin   Injectable 5000 Unit(s) SubCutaneous every 8 hours  hydrALAZINE 50 milliGRAM(s) Oral two times a day  insulin glargine Injectable (LANTUS) 25 Unit(s) SubCutaneous at bedtime  insulin lispro (ADMELOG) corrective regimen sliding scale   SubCutaneous at bedtime  insulin lispro (ADMELOG) corrective regimen sliding scale   SubCutaneous three times a day before meals  insulin lispro Injectable (ADMELOG) 9 Unit(s) SubCutaneous three times a day with meals  lisinopril 20 milliGRAM(s) Oral daily  metoprolol succinate ER 50 milliGRAM(s) Oral daily  mupirocin 2% Ointment 1 Application(s) Topical two times a day  pantoprazole    Tablet 40 milliGRAM(s) Oral before breakfast  piperacillin/tazobactam IVPB.. 3.375 Gram(s) IV Intermittent every 8 hours  senna 2 Tablet(s) Oral at bedtime  tamsulosin 0.4 milliGRAM(s) Oral at bedtime  vancomycin  IVPB 750 milliGRAM(s) IV Intermittent every 12 hours      PHYSICAL EXAM:  T(C): 36.9 (08-06-23 @ 08:14), Max: 36.9 (08-05-23 @ 16:52)  HR: 61 (08-06-23 @ 08:14) (59 - 66)  BP: 148/68 (08-06-23 @ 08:14) (130/75 - 178/75)  RR: 18 (08-06-23 @ 08:14) (18 - 18)  SpO2: 96% (08-06-23 @ 08:14) (94% - 97%)  Wt(kg): --  I&O's Summary    05 Aug 2023 07:01  -  06 Aug 2023 07:00  --------------------------------------------------------  IN: 910 mL / OUT: 2150 mL / NET: -1240 mL            Appearance: NAD  HEENT:  Dry oral mucosa, PERRL, EOMI	  Lymphatic: No lymphadenopathy , no edema  Cardiovascular: Normal S1 S2, No JVD, No murmurs , Peripheral pulses palpable 2+ bilaterally  Respiratory: decreased bsd 	  Gastrointestinal:  Soft, Non-tender, + BS	  Musculoskeletal: Normal range of motion, normal strength  Psychiatry:  Mood & affect appropriate  Vascular: DP/PT 0/4, B/L, CFT <3 seconds B/L, Temperature gradient warm to cool, B/L.   Neuro: Epicritic sensation dimished to the level of toes, B/L.  Musculoskeletal/Ortho: Unremarkable.  Skin: s/p BL heel wound debridement dressings c/d/i           LABS:    CARDIAC MARKERS:                                10.6   7.83  )-----------( 273      ( 06 Aug 2023 06:54 )             32.6     08-06    143  |  102  |  22  ----------------------------<  145<H>  3.4<L>   |  27  |  1.01    Ca    9.4      06 Aug 2023 06:53  Mg     1.6     08-06      proBNP:   Lipid Profile:   HgA1c:   TSH:             TELEMETRY: 	    ECG:  	  RADIOLOGY:   DIAGNOSTIC TESTING:  [ ] Echocardiogram:  [ ]  Catheterization:  [ ] Stress Test:    OTHER:

## 2023-08-06 NOTE — PROGRESS NOTE ADULT - ASSESSMENT
62 y/o F PMHx of DMH2 on insulin, HTN, HLD p/w right heel ulcer.    Assessment  DMT2: 63y Female with DM T2 with hyperglycemia, A1C 8.4%, was on insulin at home, now on basal bolus insulin with coverage, blood sugars running high. Has right heel ulcer. Started on basal insulin, glucose trending high. Insulin adjusted   ?Gastroparesis: Has nausea at times, improved.   Foot wound: on medications, monitored. Podiatry eval/work up. MRSA wound on isolation   HTN: on antihypertensive medications, monitored, asymptomatic.  HLD: stable, on statin, monitored.    Discussed plan and management with Attending   Armando Toscano NP - TEAMS  Dr Wesley Hannah  [phone]         64 y/o F PMHx of DMH2 on insulin, HTN, HLD p/w right heel ulcer.    Assessment  DMT2: 63y Female with DM T2 with hyperglycemia, A1C 8.4%, was on insulin at home, now on basal bolus insulin with coverage, blood sugars running high. Has right heel ulcer. Started on basal insulin, glucose trending high. Insulin adjusted   ?Gastroparesis: Has nausea at times, improved.   Foot wound: on medications, monitored. Podiatry eval/work up. MRSA wound on isolation   HTN: on antihypertensive medications, monitored, asymptomatic.  HLD: stable, on statin, monitored.    Discussed plan and management with Attending   Armando Toscano NP - TEAMS  Dr Wesley Hannah  [phone]         64 y/o F PMHx of DMH2 on insulin, HTN, HLD p/w right heel ulcer.    Assessment  DMT2: 63y Female with DM T2 with hyperglycemia, A1C 8.4%, was on insulin at home, now on basal bolus insulin with coverage, blood sugars running high. Has right heel ulcer. Started on basal insulin, glucose trending high. Insulin adjusted   ?Gastroparesis: Has nausea at times, improved.   Foot wound: on medications, monitored. Podiatry eval/work up. MRSA wound on isolation   HTN: on antihypertensive medications, monitored, asymptomatic.  HLD: stable, on statin, monitored.    Discussed plan and management with Attending   Armando Toscano NP - TEAMS  Dr Wesley Hannah  [phone]

## 2023-08-06 NOTE — PROGRESS NOTE ADULT - PROBLEM SELECTOR PLAN 1
Increased Lantus 25 units qHS  and continue ADMELOG 9 units before each meal for now.   Will continue monitoring FS, log, and glucose trends, will Follow up.  Patient counseled for compliance with consistent low carb diet and exercise as tolerated outpatient.   If continue nausea and vomiting Consider Gastric emptying study, to r/o Gastroparesis.    Was using insulin at home, basal and bolus. Discharge home on current insulin doses if sugars remain stable  may use her home insulin brands

## 2023-08-06 NOTE — PROGRESS NOTE ADULT - ASSESSMENT
62 y/o F PMHx of DMH2 on insulin, HTN, HLD p/w right heel ulcer. Patient is sent by her podiatrist for questionable right foot procedure.  She is unsure how long she has had this ulcer for.  States that she was recently hospitalized in the near [facility] for an evaluation of the right foot infection.  States she still had a 102 fever yesterday which resolved.  Denies any fevers today, chills, chest pain, shortness of breath, abdominal pain, nausea vomiting diarrhea.  States she ambulates with a walker at baseline.  Accompanied by her sister-in-law. 64 y/o F PMHx of DMH2 on insulin, HTN, HLD p/w right heel ulcer. Patient is sent by her podiatrist for questionable right foot procedure.  She is unsure how long she has had this ulcer for.  States that she was recently hospitalized in the near [facility] for an evaluation of the right foot infection.  States she still had a 102 fever yesterday which resolved.  Denies any fevers today, chills, chest pain, shortness of breath, abdominal pain, nausea vomiting diarrhea.  States she ambulates with a walker at baseline.  Accompanied by her sister-in-law. 62 y/o F PMHx of DMH2 on insulin, HTN, HLD p/w right heel ulcer. Patient is sent by her podiatrist for questionable right foot procedure.  She is unsure how long she has had this ulcer for.  States that she was recently hospitalized in the near [facility] for an evaluation of the right foot infection.  States she still had a 102 fever yesterday which resolved.  Denies any fevers today, chills, chest pain, shortness of breath, abdominal pain, nausea vomiting diarrhea.  States she ambulates with a walker at baseline.  Accompanied by her sister-in-law.

## 2023-08-06 NOTE — PROGRESS NOTE ADULT - SUBJECTIVE AND OBJECTIVE BOX
Chief complaint  Patient is a 63y old  Female who presents with a chief complaint of foot wound (04 Aug 2023 16:56)         Labs and Fingersticks  CAPILLARY BLOOD GLUCOSE      POCT Blood Glucose.: 163 mg/dL (06 Aug 2023 08:04)  POCT Blood Glucose.: 140 mg/dL (05 Aug 2023 21:47)  POCT Blood Glucose.: 119 mg/dL (05 Aug 2023 21:03)  POCT Blood Glucose.: 131 mg/dL (05 Aug 2023 16:49)  POCT Blood Glucose.: 179 mg/dL (05 Aug 2023 11:57)      Anion Gap: 14 (08-06 @ 06:53)  Anion Gap: 9 (08-05 @ 06:49)      Calcium: 9.4 (08-06 @ 06:53)  Calcium: 9.2 (08-05 @ 06:49)          08-06    143  |  102  |  22  ----------------------------<  145<H>  3.4<L>   |  27  |  1.01    Ca    9.4      06 Aug 2023 06:53  Mg     1.6     08-06                          10.6   7.83  )-----------( 273      ( 06 Aug 2023 06:54 )             32.6     Medications  MEDICATIONS  (STANDING):  artificial  tears Solution 1 Drop(s) Both EYES three times a day  atorvastatin 40 milliGRAM(s) Oral at bedtime  chlorhexidine 2% Cloths 1 Application(s) Topical <User Schedule>  dextrose 5%. 1000 milliLiter(s) (50 mL/Hr) IV Continuous <Continuous>  dextrose 5%. 1000 milliLiter(s) (100 mL/Hr) IV Continuous <Continuous>  dextrose 50% Injectable 25 Gram(s) IV Push once  dextrose 50% Injectable 12.5 Gram(s) IV Push once  dextrose 50% Injectable 25 Gram(s) IV Push once  furosemide    Tablet 20 milliGRAM(s) Oral daily  glucagon  Injectable 1 milliGRAM(s) IntraMuscular once  heparin   Injectable 5000 Unit(s) SubCutaneous every 8 hours  hydrALAZINE 50 milliGRAM(s) Oral two times a day  insulin glargine Injectable (LANTUS) 25 Unit(s) SubCutaneous at bedtime  insulin lispro (ADMELOG) corrective regimen sliding scale   SubCutaneous three times a day before meals  insulin lispro (ADMELOG)  regimen sliding scale   SubCutaneous at bedtime  insulin lispro Injectable (ADMELOG) 9 Unit(s) SubCutaneous three times a day with meals  lisinopril 20 milliGRAM(s) Oral daily  metoprolol succinate ER 50 milliGRAM(s) Oral daily  mupirocin 2% Ointment 1 Application(s) Topical two times a day  pantoprazole    Tablet 40 milliGRAM(s) Oral before breakfast  piperacillin/tazobactam IVPB.. 3.375 Gram(s) IV Intermittent every 8 hours  senna 2 Tablet(s) Oral at bedtime  tamsulosin 0.4 milliGRAM(s) Oral at bedtime  vancomycin  IVPB 750 milliGRAM(s) IV Intermittent every 12 hours      Physical Exam  General: Patient comfortable in bed   Vital Signs Last 12 Hrs  T(F): 98.1 (08-05-23 @ 20:52), Max: 98.1 (08-05-23 @ 20:52)  HR: 66 (08-05-23 @ 20:52) (66 - 66)  BP: 130/75 (08-05-23 @ 20:52) (130/75 - 130/75)  BP(mean): --  RR: 18 (08-05-23 @ 20:52) (18 - 18)  SpO2: 94% (08-05-23 @ 20:52) (94% - 94%)    CVS: S1S2   Respiratory: No wheezing, no crepitations  GI: Abdomen soft, bowel sounds positive  Musculoskeletal:  moves all extremities  : Voiding

## 2023-08-07 ENCOUNTER — TRANSCRIPTION ENCOUNTER (OUTPATIENT)
Age: 63
End: 2023-08-07

## 2023-08-07 VITALS
DIASTOLIC BLOOD PRESSURE: 64 MMHG | RESPIRATION RATE: 18 BRPM | OXYGEN SATURATION: 92 % | SYSTOLIC BLOOD PRESSURE: 149 MMHG | TEMPERATURE: 98 F | HEART RATE: 60 BPM

## 2023-08-07 LAB
ANION GAP SERPL CALC-SCNC: 9 MMOL/L — SIGNIFICANT CHANGE UP (ref 5–17)
BUN SERPL-MCNC: 21 MG/DL — SIGNIFICANT CHANGE UP (ref 7–23)
CALCIUM SERPL-MCNC: 9.1 MG/DL — SIGNIFICANT CHANGE UP (ref 8.4–10.5)
CHLORIDE SERPL-SCNC: 103 MMOL/L — SIGNIFICANT CHANGE UP (ref 96–108)
CO2 SERPL-SCNC: 28 MMOL/L — SIGNIFICANT CHANGE UP (ref 22–31)
CREAT SERPL-MCNC: 1.14 MG/DL — SIGNIFICANT CHANGE UP (ref 0.5–1.3)
EGFR: 53 ML/MIN/1.73M2 — LOW
GLUCOSE BLDC GLUCOMTR-MCNC: 149 MG/DL — HIGH (ref 70–99)
GLUCOSE BLDC GLUCOMTR-MCNC: 157 MG/DL — HIGH (ref 70–99)
GLUCOSE BLDC GLUCOMTR-MCNC: 185 MG/DL — HIGH (ref 70–99)
GLUCOSE SERPL-MCNC: 158 MG/DL — HIGH (ref 70–99)
HCT VFR BLD CALC: 32.3 % — LOW (ref 34.5–45)
HGB BLD-MCNC: 10.4 G/DL — LOW (ref 11.5–15.5)
MAGNESIUM SERPL-MCNC: 1.7 MG/DL — SIGNIFICANT CHANGE UP (ref 1.6–2.6)
MCHC RBC-ENTMCNC: 27.2 PG — SIGNIFICANT CHANGE UP (ref 27–34)
MCHC RBC-ENTMCNC: 32.2 GM/DL — SIGNIFICANT CHANGE UP (ref 32–36)
MCV RBC AUTO: 84.3 FL — SIGNIFICANT CHANGE UP (ref 80–100)
NRBC # BLD: 0 /100 WBCS — SIGNIFICANT CHANGE UP (ref 0–0)
PHOSPHATE SERPL-MCNC: 3.5 MG/DL — SIGNIFICANT CHANGE UP (ref 2.5–4.5)
PLATELET # BLD AUTO: 266 K/UL — SIGNIFICANT CHANGE UP (ref 150–400)
POTASSIUM SERPL-MCNC: 3.7 MMOL/L — SIGNIFICANT CHANGE UP (ref 3.5–5.3)
POTASSIUM SERPL-SCNC: 3.7 MMOL/L — SIGNIFICANT CHANGE UP (ref 3.5–5.3)
RBC # BLD: 3.83 M/UL — SIGNIFICANT CHANGE UP (ref 3.8–5.2)
RBC # FLD: 14.7 % — HIGH (ref 10.3–14.5)
SODIUM SERPL-SCNC: 140 MMOL/L — SIGNIFICANT CHANGE UP (ref 135–145)
WBC # BLD: 7.95 K/UL — SIGNIFICANT CHANGE UP (ref 3.8–10.5)
WBC # FLD AUTO: 7.95 K/UL — SIGNIFICANT CHANGE UP (ref 3.8–10.5)

## 2023-08-07 PROCEDURE — C1751: CPT

## 2023-08-07 PROCEDURE — 36415 COLL VENOUS BLD VENIPUNCTURE: CPT

## 2023-08-07 PROCEDURE — 87040 BLOOD CULTURE FOR BACTERIA: CPT

## 2023-08-07 PROCEDURE — 85610 PROTHROMBIN TIME: CPT

## 2023-08-07 PROCEDURE — 87205 SMEAR GRAM STAIN: CPT

## 2023-08-07 PROCEDURE — 85025 COMPLETE CBC W/AUTO DIFF WBC: CPT

## 2023-08-07 PROCEDURE — 97110 THERAPEUTIC EXERCISES: CPT

## 2023-08-07 PROCEDURE — 97161 PT EVAL LOW COMPLEX 20 MIN: CPT

## 2023-08-07 PROCEDURE — 87102 FUNGUS ISOLATION CULTURE: CPT

## 2023-08-07 PROCEDURE — 87640 STAPH A DNA AMP PROBE: CPT

## 2023-08-07 PROCEDURE — 36569 INSJ PICC 5 YR+ W/O IMAGING: CPT

## 2023-08-07 PROCEDURE — 86140 C-REACTIVE PROTEIN: CPT

## 2023-08-07 PROCEDURE — 93005 ELECTROCARDIOGRAM TRACING: CPT

## 2023-08-07 PROCEDURE — 87641 MR-STAPH DNA AMP PROBE: CPT

## 2023-08-07 PROCEDURE — 86803 HEPATITIS C AB TEST: CPT

## 2023-08-07 PROCEDURE — 73630 X-RAY EXAM OF FOOT: CPT

## 2023-08-07 PROCEDURE — 87070 CULTURE OTHR SPECIMN AEROBIC: CPT

## 2023-08-07 PROCEDURE — 80202 ASSAY OF VANCOMYCIN: CPT

## 2023-08-07 PROCEDURE — 85027 COMPLETE CBC AUTOMATED: CPT

## 2023-08-07 PROCEDURE — 86901 BLOOD TYPING SEROLOGIC RH(D): CPT

## 2023-08-07 PROCEDURE — 71045 X-RAY EXAM CHEST 1 VIEW: CPT

## 2023-08-07 PROCEDURE — 73722 MRI JOINT OF LWR EXTR W/DYE: CPT

## 2023-08-07 PROCEDURE — 83735 ASSAY OF MAGNESIUM: CPT

## 2023-08-07 PROCEDURE — 80048 BASIC METABOLIC PNL TOTAL CA: CPT

## 2023-08-07 PROCEDURE — 82962 GLUCOSE BLOOD TEST: CPT

## 2023-08-07 PROCEDURE — 97605 NEG PRS WND THER DME<=50SQCM: CPT

## 2023-08-07 PROCEDURE — 97164 PT RE-EVAL EST PLAN CARE: CPT

## 2023-08-07 PROCEDURE — 87186 SC STD MICRODIL/AGAR DIL: CPT

## 2023-08-07 PROCEDURE — 87077 CULTURE AEROBIC IDENTIFY: CPT

## 2023-08-07 PROCEDURE — 86850 RBC ANTIBODY SCREEN: CPT

## 2023-08-07 PROCEDURE — 86900 BLOOD TYPING SEROLOGIC ABO: CPT

## 2023-08-07 PROCEDURE — 84100 ASSAY OF PHOSPHORUS: CPT

## 2023-08-07 PROCEDURE — 80053 COMPREHEN METABOLIC PANEL: CPT

## 2023-08-07 PROCEDURE — 88311 DECALCIFY TISSUE: CPT

## 2023-08-07 PROCEDURE — 85730 THROMBOPLASTIN TIME PARTIAL: CPT

## 2023-08-07 PROCEDURE — 99285 EMERGENCY DEPT VISIT HI MDM: CPT | Mod: 25

## 2023-08-07 PROCEDURE — 97116 GAIT TRAINING THERAPY: CPT

## 2023-08-07 PROCEDURE — 85652 RBC SED RATE AUTOMATED: CPT

## 2023-08-07 PROCEDURE — 88307 TISSUE EXAM BY PATHOLOGIST: CPT

## 2023-08-07 PROCEDURE — 99261: CPT

## 2023-08-07 PROCEDURE — 87075 CULTR BACTERIA EXCEPT BLOOD: CPT

## 2023-08-07 PROCEDURE — 83036 HEMOGLOBIN GLYCOSYLATED A1C: CPT

## 2023-08-07 RX ORDER — DAPTOMYCIN 500 MG/10ML
500 INJECTION, POWDER, LYOPHILIZED, FOR SOLUTION INTRAVENOUS
Qty: 3 | Refills: 0
Start: 2023-08-07 | End: 2023-09-05

## 2023-08-07 RX ORDER — MUPIROCIN 20 MG/G
1 OINTMENT TOPICAL
Qty: 0 | Refills: 0 | DISCHARGE
Start: 2023-08-07

## 2023-08-07 RX ORDER — POTASSIUM CHLORIDE 20 MEQ
20 PACKET (EA) ORAL ONCE
Refills: 0 | Status: COMPLETED | OUTPATIENT
Start: 2023-08-07 | End: 2023-08-07

## 2023-08-07 RX ORDER — PANTOPRAZOLE SODIUM 20 MG/1
1 TABLET, DELAYED RELEASE ORAL
Qty: 30 | Refills: 0
Start: 2023-08-07 | End: 2023-09-05

## 2023-08-07 RX ORDER — CIPROFLOXACIN LACTATE 400MG/40ML
1 VIAL (ML) INTRAVENOUS
Qty: 60 | Refills: 0
Start: 2023-08-07 | End: 2023-09-05

## 2023-08-07 RX ORDER — MAGNESIUM OXIDE 400 MG ORAL TABLET 241.3 MG
400 TABLET ORAL ONCE
Refills: 0 | Status: COMPLETED | OUTPATIENT
Start: 2023-08-07 | End: 2023-08-07

## 2023-08-07 RX ORDER — INSULIN GLARGINE 100 [IU]/ML
26 INJECTION, SOLUTION SUBCUTANEOUS AT BEDTIME
Refills: 0 | Status: DISCONTINUED | OUTPATIENT
Start: 2023-08-07 | End: 2023-08-07

## 2023-08-07 RX ORDER — SENNA PLUS 8.6 MG/1
2 TABLET ORAL
Qty: 0 | Refills: 0 | DISCHARGE
Start: 2023-08-07

## 2023-08-07 RX ADMIN — Medication 50 MILLIGRAM(S): at 16:54

## 2023-08-07 RX ADMIN — Medication 250 MILLIGRAM(S): at 05:26

## 2023-08-07 RX ADMIN — Medication 20 MILLIGRAM(S): at 05:25

## 2023-08-07 RX ADMIN — HEPARIN SODIUM 5000 UNIT(S): 5000 INJECTION INTRAVENOUS; SUBCUTANEOUS at 13:22

## 2023-08-07 RX ADMIN — MUPIROCIN 1 APPLICATION(S): 20 OINTMENT TOPICAL at 05:26

## 2023-08-07 RX ADMIN — PIPERACILLIN AND TAZOBACTAM 25 GRAM(S): 4; .5 INJECTION, POWDER, LYOPHILIZED, FOR SOLUTION INTRAVENOUS at 12:01

## 2023-08-07 RX ADMIN — MAGNESIUM OXIDE 400 MG ORAL TABLET 400 MILLIGRAM(S): 241.3 TABLET ORAL at 08:44

## 2023-08-07 RX ADMIN — MUPIROCIN 1 APPLICATION(S): 20 OINTMENT TOPICAL at 16:56

## 2023-08-07 RX ADMIN — Medication 20 MILLIEQUIVALENT(S): at 08:44

## 2023-08-07 RX ADMIN — Medication 9 UNIT(S): at 08:42

## 2023-08-07 RX ADMIN — Medication 9 UNIT(S): at 12:22

## 2023-08-07 RX ADMIN — HEPARIN SODIUM 5000 UNIT(S): 5000 INJECTION INTRAVENOUS; SUBCUTANEOUS at 05:25

## 2023-08-07 RX ADMIN — CHLORHEXIDINE GLUCONATE 1 APPLICATION(S): 213 SOLUTION TOPICAL at 05:26

## 2023-08-07 RX ADMIN — Medication 250 MILLIGRAM(S): at 16:29

## 2023-08-07 RX ADMIN — Medication 1: at 08:41

## 2023-08-07 RX ADMIN — PIPERACILLIN AND TAZOBACTAM 25 GRAM(S): 4; .5 INJECTION, POWDER, LYOPHILIZED, FOR SOLUTION INTRAVENOUS at 07:23

## 2023-08-07 RX ADMIN — Medication 9 UNIT(S): at 16:48

## 2023-08-07 RX ADMIN — Medication 50 MILLIGRAM(S): at 11:57

## 2023-08-07 RX ADMIN — Medication 1 DROP(S): at 05:25

## 2023-08-07 RX ADMIN — Medication 1: at 16:47

## 2023-08-07 RX ADMIN — LISINOPRIL 20 MILLIGRAM(S): 2.5 TABLET ORAL at 11:57

## 2023-08-07 RX ADMIN — PANTOPRAZOLE SODIUM 40 MILLIGRAM(S): 20 TABLET, DELAYED RELEASE ORAL at 05:27

## 2023-08-07 NOTE — PROGRESS NOTE ADULT - PROBLEM SELECTOR PROBLEM 2
Hypertension

## 2023-08-07 NOTE — PROGRESS NOTE ADULT - ASSESSMENT
64 y/o F PMHx of DMH2 on insulin, HTN, HLD p/w right heel ulcer. Patient is sent by her podiatrist for questionable right foot procedure.  She is unsure how long she has had this ulcer for.  States that she was recently hospitalized in the near [facility] for an evaluation of the right foot infection.  States she still had a 102 fever yesterday which resolved.  Denies any fevers today, chills, chest pain, shortness of breath, abdominal pain, nausea vomiting diarrhea.  States she ambulates with a walker at baseline.  Accompanied by her sister-in-law. 64 y/o F PMHx of DMH2 on insulin, HTN, HLD p/w right heel ulcer. Patient is sent by her podiatrist for questionable right foot procedure.  She is unsure how long she has had this ulcer for.  States that she was recently hospitalized in the near [facility] for an evaluation of the right foot infection.  States she still had a 102 fever yesterday which resolved.  Denies any fevers today, chills, chest pain, shortness of breath, abdominal pain, nausea vomiting diarrhea.  States she ambulates with a walker at baseline.  Accompanied by her sister-in-law. 62 y/o F PMHx of DMH2 on insulin, HTN, HLD p/w right heel ulcer. Patient is sent by her podiatrist for questionable right foot procedure.  She is unsure how long she has had this ulcer for.  States that she was recently hospitalized in the near [facility] for an evaluation of the right foot infection.  States she still had a 102 fever yesterday which resolved.  Denies any fevers today, chills, chest pain, shortness of breath, abdominal pain, nausea vomiting diarrhea.  States she ambulates with a walker at baseline.  Accompanied by her sister-in-law.

## 2023-08-07 NOTE — PROGRESS NOTE ADULT - PROBLEM SELECTOR PLAN 3
RISS
Will continue statin, primary team FU.
RISS
Will continue statin, primary team FU.
RISS
Will continue statin, primary team FU.
RISS
Will continue statin, primary team FU.
Will continue statin, primary team FU.

## 2023-08-07 NOTE — PROGRESS NOTE ADULT - TIME BILLING
Advanced care planning was discussed with patient and family.  Advanced care planning forms were reviewed and discussed as appropriate.  Differential diagnosis and plan of care discussed with patient after the evaluation.   Pain assessed and judicious use of narcotics when appropriate was discussed.  Importance of Fall prevention discussed.  Counseling on Smoking and Alcohol cessation was offered when appropriate.  Counseling on Diet, exercise, and medication compliance was done.
Advanced care planning was discussed with patient and family.  Advanced care planning forms were reviewed and discussed as appropriate.  Differential diagnosis and plan of care discussed with patient after the evaluation.   Pain assessed and judicious use of narcotics when appropriate was discussed.  Importance of Fall prevention discussed.  Counseling on Smoking and Alcohol cessation was offered when appropriate.  Counseling on Diet, exercise, and medication compliance was done.
sterile water
Advanced care planning was discussed with patient and family.  Advanced care planning forms were reviewed and discussed as appropriate.  Differential diagnosis and plan of care discussed with patient after the evaluation.   Pain assessed and judicious use of narcotics when appropriate was discussed.  Importance of Fall prevention discussed.  Counseling on Smoking and Alcohol cessation was offered when appropriate.  Counseling on Diet, exercise, and medication compliance was done.

## 2023-08-07 NOTE — PROGRESS NOTE ADULT - PROBLEM SELECTOR PLAN 1
Continue Lantus 25 units qHS  and continue ADMELOG 9 units before each meal for now.   Will continue monitoring FS, log, and glucose trends, will Follow up.  Patient counseled for compliance with consistent low carb diet and exercise as tolerated outpatient.   If continue nausea and vomiting Consider Gastric emptying study, to r/o Gastroparesis.    Was using insulin at home, basal and bolus. Discharge home on current insulin doses if sugars remain stable  may use her home insulin brands  FU outpatient

## 2023-08-07 NOTE — DISCHARGE NOTE NURSING/CASE MANAGEMENT/SOCIAL WORK - NSDCFUADDAPPT_GEN_ALL_CORE_FT
Podiatry Discharge Instructions:  Follow up: Please follow up with Dr. Flores within 1 week of discharge from the hospital, please call [phone] for appointment and discuss that you recently were seen in the hospital.  Wound Care: Please manage wound vac to right foot at 125mmHg with black foam and change 3x per week.  Weight bearing: Please weight bear as tolerated to both feet on the tops in heel offloading shoes.  Antibiotics: Please continue as instructed.   Podiatry Discharge Instructions:  Follow up: Please follow up with Dr. Flores within 1 week of discharge from the hospital, please call [phone] for appointment and discuss that you recently were seen in the hospital.  Wound Care: Please manage wound vac to right foot at 125mmHg with black foam and change 3x per week.  Weight bearing: Please weight bear as tolerated to both feet on the tops in heel offloading shoes.  Antibiotics: Please continue as instructed.   Podiatry Discharge Instructions:  Follow up: Please follow up with Dr. Flores within 1 week of discharge from the hospital, please call [phone] for appointment and discuss that you recently were seen in the hospital.  Wound Care: Please manage wound vac to right foot at 125mmHg with black foam and change 3x per week.  Weight bearing: Please weight bear as tolerated to both feet on the tops in heel offloading shoes.  Antibiotics: Please continue as instructed.

## 2023-08-07 NOTE — DISCHARGE NOTE NURSING/CASE MANAGEMENT/SOCIAL WORK - PATIENT PORTAL LINK FT
You can access the FollowMyHealth Patient Portal offered by [facility] by registering at the following website: [email or web address]. By joining [employer]’s FollowMyHealth portal, you will also be able to view your health information using other applications (apps) compatible with our system. You can access the FollowMyHealth Patient Portal offered by [facility] by registering at the following website: [email or web address]. By joining [email or web address]’s FollowMyHealth portal, you will also be able to view your health information using other applications (apps) compatible with our system. You can access the FollowMyHealth Patient Portal offered by [facility] by registering at the following website: [email or web address]. By joining [employer]’s FollowMyHealth portal, you will also be able to view your health information using other applications (apps) compatible with our system.

## 2023-08-07 NOTE — PROGRESS NOTE ADULT - NS ATTEND AMEND GEN_ALL_CORE FT
Airway patent, Nasal mucosa clear. Mouth with normal mucosa. Throat has no vesicles, no oropharyngeal exudates and uvula is midline.
Patient seen, Lab reviewed and plan of care discussed with the NP
Patient seen, Lab reviewed, plan of care discussed with the NP
Patient seen, lab reviewed, plan of care discussed with the NP.
Patient seen. Lab reviewed, plan of care discussed with the NP.
patient seen, Lab reviewed, Plan of care discussed with the NP.
Patient seen, Lab reviewed. plan of care discussed with the NP.
Patient seen, Lab reviewed ,plan of care discussed with the NP.
Patient seen, Lab reviewed, plan of care discussed with the NP.
patient seen lab reviewed plan of care discussed with the NP.
Patient seen, lab reviewed, Plan of care discussed with the NP.

## 2023-08-07 NOTE — PROGRESS NOTE ADULT - SUBJECTIVE AND OBJECTIVE BOX
Chief complaint  Patient is a 63y old  Female who presents with a chief complaint of foot wound (04 Aug 2023 16:56)         Labs and Fingersticks  CAPILLARY BLOOD GLUCOSE      POCT Blood Glucose.: 148 mg/dL (06 Aug 2023 21:51)  POCT Blood Glucose.: 139 mg/dL (06 Aug 2023 16:56)  POCT Blood Glucose.: 170 mg/dL (06 Aug 2023 11:44)      Anion Gap: 9 (08-07 @ 07:10)  Anion Gap: 14 (08-06 @ 06:53)      Calcium: 9.1 (08-07 @ 07:10)  Calcium: 9.4 (08-06 @ 06:53)          08-07    140  |  103  |  21  ----------------------------<  158<H>  3.7   |  28  |  1.14    Ca    9.1      07 Aug 2023 07:10  Phos  3.5     08-07  Mg     1.7     08-07                          10.4   7.95  )-----------( 266      ( 07 Aug 2023 07:00 )             32.3     Medications  MEDICATIONS  (STANDING):  artificial  tears Solution 1 Drop(s) Both EYES three times a day  atorvastatin 40 milliGRAM(s) Oral at bedtime  chlorhexidine 2% Cloths 1 Application(s) Topical <User Schedule>  dextrose 5%. 1000 milliLiter(s) (100 mL/Hr) IV Continuous <Continuous>  dextrose 5%. 1000 milliLiter(s) (50 mL/Hr) IV Continuous <Continuous>  dextrose 50% Injectable 25 Gram(s) IV Push once  dextrose 50% Injectable 12.5 Gram(s) IV Push once  dextrose 50% Injectable 25 Gram(s) IV Push once  furosemide    Tablet 20 milliGRAM(s) Oral daily  glucagon  Injectable 1 milliGRAM(s) IntraMuscular once  heparin   Injectable 5000 Unit(s) SubCutaneous every 8 hours  hydrALAZINE 50 milliGRAM(s) Oral two times a day  insulin glargine Injectable (LANTUS) 25 Unit(s) SubCutaneous at bedtime  insulin lispro (ADMELOG) corrective regimen sliding scale   SubCutaneous three times a day before meals  insulin lispro (ADMELOG) corrective regimen sliding scale   SubCutaneous at bedtime  insulin lispro Injectable (ADMELOG) 9 Unit(s) SubCutaneous three times a day with meals  lisinopril 20 milliGRAM(s) Oral daily  magnesium oxide 400 milliGRAM(s) Oral once  metoprolol succinate ER 50 milliGRAM(s) Oral daily  mupirocin 2% Ointment 1 Application(s) Topical two times a day  pantoprazole    Tablet 40 milliGRAM(s) Oral before breakfast  piperacillin/tazobactam IVPB.. 3.375 Gram(s) IV Intermittent every 8 hours  potassium chloride    Tablet ER 20 milliEquivalent(s) Oral once  senna 2 Tablet(s) Oral at bedtime  tamsulosin 0.4 milliGRAM(s) Oral at bedtime  vancomycin  IVPB 750 milliGRAM(s) IV Intermittent every 12 hours      Physical Exam  General: Patient comfortable in bed   Vital Signs Last 12 Hrs  T(F): 98.1 (08-06-23 @ 23:16), Max: 98.1 (08-06-23 @ 23:16)  HR: 68 (08-06-23 @ 23:16) (68 - 68)  BP: 132/63 (08-06-23 @ 23:16) (132/63 - 132/63)  BP(mean): --  RR: 18 (08-06-23 @ 23:16) (18 - 18)  SpO2: 92% (08-06-23 @ 23:16) (92% - 92%)    CVS: S1S2   Respiratory: No wheezing, no crepitations  GI: Abdomen soft, bowel sounds positive  Musculoskeletal:  moves all extremities  : Voiding

## 2023-08-07 NOTE — PROGRESS NOTE ADULT - PROVIDER SPECIALTY LIST ADULT
Cardiology
Cardiology
Endocrinology
Infectious Disease
Internal Medicine
Podiatry
Podiatry
Internal Medicine
Internal Medicine
Infectious Disease
Infectious Disease
Internal Medicine
Podiatry
Internal Medicine
Internal Medicine
Podiatry
Cardiology
Infectious Disease
Infectious Disease
Podiatry
Endocrinology
Infectious Disease
Endocrinology
Endocrinology
Infectious Disease
Endocrinology
Endocrinology
Cardiology
Endocrinology
Endocrinology
Cardiology
Endocrinology
Cardiology

## 2023-08-07 NOTE — PROGRESS NOTE ADULT - SUBJECTIVE AND OBJECTIVE BOX
STEPHANIE KAYE  63y Female  MRN:[id number]    Patient is a 63y old  Female who presents with a chief complaint of foot infection    HPI:   64 y/o F PMHx of DMH2 on insulin, HTN, HLD p/w right heel ulcer. Patient is sent by her podiatrist for questionable right foot procedure.  She is unsure how long she has had this ulcer for.  States that she was recently hospitalized in the near [facility] for an evaluation of the right foot infection.  States she still had a 102 fever yesterday which resolved.  Denies any fevers today, chills, chest pain, shortness of breath, abdominal pain, nausea vomiting diarrhea.  States she ambulates with a walker at baseline.  Accompanied by her sister-in-law (25 Jul 2023 21:58)      pt now s/p bilateral foot heel wound debridement with right foot kerecis graft application, and right foot calcaneus bone biopsy on 7/27      Patient seen and evaluated at bedside. No acute events overnight except as noted.    Interval HPI: no acute events o/n        PAST MEDICAL & SURGICAL HISTORY:  Diabetes      Hypertension      Hypercholesteremia          REVIEW OF SYSTEMS:  as per hpi     VITALS:   Vital Signs Last 24 Hrs  T(C): 36.8 (07 Aug 2023 16:57), Max: 36.8 (07 Aug 2023 16:57)  T(F): 98.2 (07 Aug 2023 16:57), Max: 98.2 (07 Aug 2023 16:57)  HR: 60 (07 Aug 2023 16:57) (60 - 68)  BP: 149/64 (07 Aug 2023 16:57) (132/63 - 180/74)  BP(mean): --  RR: 18 (07 Aug 2023 16:57) (18 - 18)  SpO2: 92% (07 Aug 2023 16:57) (92% - 92%)    Parameters below as of 06 Aug 2023 23:16  Patient On (Oxygen Delivery Method): room air        PHYSICAL EXAM:  GENERAL: NAD, well-developed  HEAD:  Atraumatic, Normocephalic  EYES: EOMI, PERRLA, conjunctiva and sclera clear  NECK: Supple, No JVD  CHEST/LUNG: Clear to auscultation bilaterally; No wheeze  HEART: S1, S2; No murmurs, rubs, or gallops  ABDOMEN: Soft, Nontender, Nondistended; Bowel sounds present  EXTREMITIES:  2+ Peripheral Pulses, No clubbing, cyanosis, or edema. b/l lower ext dressing in place   PSYCH: Normal affect  NEUROLOGY: AAOX3; non-focal  SKIN: No rashes or lesions    Consultant(s) Notes Reviewed:  [x ] YES  [ ] NO  Care Discussed with Consultants/Other Providers [ x] YES  [ ] NO    MEDS:   MEDICATIONS  (STANDING):  artificial  tears Solution 1 Drop(s) Both EYES three times a day  atorvastatin 40 milliGRAM(s) Oral at bedtime  chlorhexidine 2% Cloths 1 Application(s) Topical <User Schedule>  dextrose 5%. 1000 milliLiter(s) (50 mL/Hr) IV Continuous <Continuous>  dextrose 5%. 1000 milliLiter(s) (100 mL/Hr) IV Continuous <Continuous>  dextrose 50% Injectable 25 Gram(s) IV Push once  dextrose 50% Injectable 12.5 Gram(s) IV Push once  dextrose 50% Injectable 25 Gram(s) IV Push once  furosemide    Tablet 20 milliGRAM(s) Oral daily  glucagon  Injectable 1 milliGRAM(s) IntraMuscular once  heparin   Injectable 5000 Unit(s) SubCutaneous every 8 hours  hydrALAZINE 50 milliGRAM(s) Oral two times a day  insulin glargine Injectable (LANTUS) 24 Unit(s) SubCutaneous at bedtime  insulin lispro (ADMELOG) corrective regimen sliding scale   SubCutaneous at bedtime  insulin lispro (ADMELOG) corrective regimen sliding scale   SubCutaneous three times a day before meals  insulin lispro Injectable (ADMELOG) 9 Unit(s) SubCutaneous three times a day with meals  lisinopril 20 milliGRAM(s) Oral daily  metoprolol succinate ER 50 milliGRAM(s) Oral daily  mupirocin 2% Ointment 1 Application(s) Topical two times a day  pantoprazole    Tablet 40 milliGRAM(s) Oral before breakfast  piperacillin/tazobactam IVPB.. 3.375 Gram(s) IV Intermittent every 8 hours  senna 2 Tablet(s) Oral at bedtime  tamsulosin 0.4 milliGRAM(s) Oral at bedtime  vancomycin  IVPB 750 milliGRAM(s) IV Intermittent every 12 hours    MEDICATIONS  (PRN):  acetaminophen     Tablet .. 650 milliGRAM(s) Oral every 6 hours PRN Mild Pain (1 - 3)  dextrose Oral Gel 15 Gram(s) Oral once PRN Blood Glucose LESS THAN 70 milliGRAM(s)/deciliter  ondansetron Injectable 4 milliGRAM(s) IV Push every 8 hours PRN Nausea and/or Vomiting        ALLERGIES:  Allergy Status Unknown      LABS:                                    10.4   7.95  )-----------( 266      ( 07 Aug 2023 07:00 )             32.3   08-07    140  |  103  |  21  ----------------------------<  158<H>  3.7   |  28  |  1.14    Ca    9.1      07 Aug 2023 07:10  Phos  3.5     08-07  Mg     1.7     08-07            < from: MR Ankle w/ IV Cont, Right (07.26.23 @ 18:31) >    IMPRESSION:  1.  There is a ulcer overlying the heel roughly spanning 5.4 cm extending   to the lateral calcaneus. There is minimal osseous edema within the   lateral calcaneus with mild postcontrast enhancement and without   significant loss of normal T1 fatty marrow. This is possibly representing   early osteomyelitis versus reactive in nature.  2.  Questionable minimal osseous edema within the fourth metatarsal head   with postcontrast enhancement which is incompletely evaluated. These   findings are possibly secondary to reactive degenerative changes versus   osteomyelitis if there is adjacent ulcer. Clinical correlation is advised.    --- End of Report ---        < end of copied text >     < from: Xray Foot AP + Lateral + Oblique, Right (07.25.23 @ 17:54) >    IMPRESSION:    No acute fracture or dislocation of the right foot. Midfoot degenerative   changes are noted.    Soft tissue defect at the heel related to known heel ulcer. No osseous   erosive changes of the calcaneus or adjacent osseousstructures. Soft   tissue edema is also noted along the dorsal foot and anterior lower shin.   No tracking subcutaneous gas.    Along the first distal phalanx base, there are juxtaarticular erosions at   the medial aspect and productive changes at the lateral aspect. Findings   may be due to underlying inflammatory arthritis or crystalline   arthropathy.    Vascular calcifications.      --- End of Report ---    < end of copied text >   STEPHANIE KAYE  63y Female  MRN:[id number]    Patient is a 63y old  Female who presents with a chief complaint of foot infection    HPI:   64 y/o F PMHx of DMH2 on insulin, HTN, HLD p/w right heel ulcer. Patient is sent by her podiatrist for questionable right foot procedure.  She is unsure how long she has had this ulcer for.  States that she was recently hospitalized in the near [facility] for an evaluation of the right foot infection.  States she still had a 102 fever yesterday which resolved.  Denies any fevers today, chills, chest pain, shortness of breath, abdominal pain, nausea vomiting diarrhea.  States she ambulates with a walker at baseline.  Accompanied by her sister-in-law (25 Jul 2023 21:58)      pt now s/p bilateral foot heel wound debridement with right foot kerecis graft application, and right foot calcaneus bone biopsy on 7/27      Patient seen and evaluated at bedside. No acute events overnight except as noted.    Interval HPI: no acute events o/n        PAST MEDICAL & SURGICAL HISTORY:  Diabetes      Hypertension      Hypercholesteremia          REVIEW OF SYSTEMS:  as per hpi     VITALS:   Vital Signs Last 24 Hrs  T(C): 36.8 (07 Aug 2023 16:57), Max: 36.8 (07 Aug 2023 16:57)  T(F): 98.2 (07 Aug 2023 16:57), Max: 98.2 (07 Aug 2023 16:57)  HR: 60 (07 Aug 2023 16:57) (60 - 68)  BP: 149/64 (07 Aug 2023 16:57) (132/63 - 180/74)  BP(mean): --  RR: 18 (07 Aug 2023 16:57) (18 - 18)  SpO2: 92% (07 Aug 2023 16:57) (92% - 92%)    Parameters below as of 06 Aug 2023 23:16  Patient On (Oxygen Delivery Method): room air        PHYSICAL EXAM:  GENERAL: NAD, well-developed  HEAD:  Atraumatic, Normocephalic  EYES: EOMI, PERRLA, conjunctiva and sclera clear  NECK: Supple, No JVD  CHEST/LUNG: Clear to auscultation bilaterally; No wheeze  HEART: S1, S2; No murmurs, rubs, or gallops  ABDOMEN: Soft, Nontender, Nondistended; Bowel sounds present  EXTREMITIES:  2+ Peripheral Pulses, No clubbing, cyanosis, or edema. b/l lower ext dressing in place   PSYCH: Normal affect  NEUROLOGY: AAOX3; non-focal  SKIN: No rashes or lesions    Consultant(s) Notes Reviewed:  [x ] YES  [ ] NO  Care Discussed with Consultants/Other Providers [ x] YES  [ ] NO    MEDS:   MEDICATIONS  (STANDING):  artificial  tears Solution 1 Drop(s) Both EYES three times a day  atorvastatin 40 milliGRAM(s) Oral at bedtime  chlorhexidine 2% Cloths 1 Application(s) Topical <User Schedule>  dextrose 5%. 1000 milliLiter(s) (50 mL/Hr) IV Continuous <Continuous>  dextrose 5%. 1000 milliLiter(s) (100 mL/Hr) IV Continuous <Continuous>  dextrose 50% Injectable 25 Gram(s) IV Push once  dextrose 50% Injectable 12.5 Gram(s) IV Push once  dextrose 50% Injectable 25 Gram(s) IV Push once  furosemide    Tablet 20 milliGRAM(s) Oral daily  glucagon  Injectable 1 milliGRAM(s) IntraMuscular once  heparin   Injectable 5000 Unit(s) SubCutaneous every 8 hours  hydrALAZINE 50 milliGRAM(s) Oral two times a day  insulin glargine Injectable (LANTUS) 24 Unit(s) SubCutaneous at bedtime  insulin lispro (ADMELOG) corrective regimen sliding scale   SubCutaneous at bedtime  insulin lispro (ADMELOG) corrective regimen sliding scale   SubCutaneous three times a day before meals  insulin lispro Injectable (ADMELOG) 9 Unit(s) SubCutaneous three times a day with meals  lisinopril 20 milliGRAM(s) Oral daily  metoprolol succinate ER 50 milliGRAM(s) Oral daily  mupirocin 2% Ointment 1 Application(s) Topical two times a day  pantoprazole    Tablet 40 milliGRAM(s) Oral before breakfast  piperacillin/tazobactam IVPB.. 3.375 Gram(s) IV Intermittent every 8 hours  senna 2 Tablet(s) Oral at bedtime  tamsulosin 0.4 milliGRAM(s) Oral at bedtime  vancomycin  IVPB 750 milliGRAM(s) IV Intermittent every 12 hours    MEDICATIONS  (PRN):  acetaminophen     Tablet .. 650 milliGRAM(s) Oral every 6 hours PRN Mild Pain (1 - 3)  dextrose Oral Gel 15 Gram(s) Oral once PRN Blood Glucose LESS THAN 70 milliGRAM(s)/deciliter  ondansetron Injectable 4 milliGRAM(s) IV Push every 8 hours PRN Nausea and/or Vomiting        ALLERGIES:  Allergy Status Unknown      LABS:                                    10.4   7.95  )-----------( 266      ( 07 Aug 2023 07:00 )             32.3   08-07    140  |  103  |  21  ----------------------------<  158<H>  3.7   |  28  |  1.14    Ca    9.1      07 Aug 2023 07:10  Phos  3.5     08-07  Mg     1.7     08-07            < from: MR Ankle w/ IV Cont, Right (07.26.23 @ 18:31) >    IMPRESSION:  1.  There is a ulcer overlying the heel roughly spanning 5.4 cm extending   to the lateral calcaneus. There is minimal osseous edema within the   lateral calcaneus with mild postcontrast enhancement and without   significant loss of normal T1 fatty marrow. This is possibly representing   early osteomyelitis versus reactive in nature.  2.  Questionable minimal osseous edema within the fourth metatarsal head   with postcontrast enhancement which is incompletely evaluated. These   findings are possibly secondary to reactive degenerative changes versus   osteomyelitis if there is adjacent ulcer. Clinical correlation is advised.    --- End of Report ---        < end of copied text >     < from: Xray Foot AP + Lateral + Oblique, Right (07.25.23 @ 17:54) >    IMPRESSION:    No acute fracture or dislocation of the right foot. Midfoot degenerative   changes are noted.    Soft tissue defect at the heel related to known heel ulcer. No osseous   erosive changes of the calcaneus or adjacent osseousstructures. Soft   tissue edema is also noted along the dorsal foot and anterior lower shin.   No tracking subcutaneous gas.    Along the first distal phalanx base, there are juxtaarticular erosions at   the medial aspect and productive changes at the lateral aspect. Findings   may be due to underlying inflammatory arthritis or crystalline   arthropathy.    Vascular calcifications.      --- End of Report ---    < end of copied text >   STEPHANIE KAYE  63y Female  MRN:[id number]    Patient is a 63y old  Female who presents with a chief complaint of foot infection    HPI:   64 y/o F PMHx of DMH2 on insulin, HTN, HLD p/w right heel ulcer. Patient is sent by her podiatrist for questionable right foot procedure.  She is unsure how long she has had this ulcer for.  States that she was recently hospitalized in the near [facility] for an evaluation of the right foot infection.  States she still had a 102 fever yesterday which resolved.  Denies any fevers today, chills, chest pain, shortness of breath, abdominal pain, nausea vomiting diarrhea.  States she ambulates with a walker at baseline.  Accompanied by her sister-in-law (25 Jul 2023 21:58)      pt now s/p bilateral foot heel wound debridement with right foot kerecis graft application, and right foot calcaneus bone biopsy on 7/27      Patient seen and evaluated at bedside. No acute events overnight except as noted.    Interval HPI: no acute events o/n        PAST MEDICAL & SURGICAL HISTORY:  Diabetes      Hypertension      Hypercholesteremia          REVIEW OF SYSTEMS:  as per hpi     VITALS:   Vital Signs Last 24 Hrs  T(C): 36.8 (07 Aug 2023 16:57), Max: 36.8 (07 Aug 2023 16:57)  T(F): 98.2 (07 Aug 2023 16:57), Max: 98.2 (07 Aug 2023 16:57)  HR: 60 (07 Aug 2023 16:57) (60 - 68)  BP: 149/64 (07 Aug 2023 16:57) (132/63 - 180/74)  BP(mean): --  RR: 18 (07 Aug 2023 16:57) (18 - 18)  SpO2: 92% (07 Aug 2023 16:57) (92% - 92%)    Parameters below as of 06 Aug 2023 23:16  Patient On (Oxygen Delivery Method): room air        PHYSICAL EXAM:  GENERAL: NAD, well-developed  HEAD:  Atraumatic, Normocephalic  EYES: EOMI, PERRLA, conjunctiva and sclera clear  NECK: Supple, No JVD  CHEST/LUNG: Clear to auscultation bilaterally; No wheeze  HEART: S1, S2; No murmurs, rubs, or gallops  ABDOMEN: Soft, Nontender, Nondistended; Bowel sounds present  EXTREMITIES:  2+ Peripheral Pulses, No clubbing, cyanosis, or edema. b/l lower ext dressing in place   PSYCH: Normal affect  NEUROLOGY: AAOX3; non-focal  SKIN: No rashes or lesions    Consultant(s) Notes Reviewed:  [x ] YES  [ ] NO  Care Discussed with Consultants/Other Providers [ x] YES  [ ] NO    MEDS:   MEDICATIONS  (STANDING):  artificial  tears Solution 1 Drop(s) Both EYES three times a day  atorvastatin 40 milliGRAM(s) Oral at bedtime  chlorhexidine 2% Cloths 1 Application(s) Topical <User Schedule>  dextrose 5%. 1000 milliLiter(s) (50 mL/Hr) IV Continuous <Continuous>  dextrose 5%. 1000 milliLiter(s) (100 mL/Hr) IV Continuous <Continuous>  dextrose 50% Injectable 25 Gram(s) IV Push once  dextrose 50% Injectable 12.5 Gram(s) IV Push once  dextrose 50% Injectable 25 Gram(s) IV Push once  furosemide    Tablet 20 milliGRAM(s) Oral daily  glucagon  Injectable 1 milliGRAM(s) IntraMuscular once  heparin   Injectable 5000 Unit(s) SubCutaneous every 8 hours  hydrALAZINE 50 milliGRAM(s) Oral two times a day  insulin glargine Injectable (LANTUS) 24 Unit(s) SubCutaneous at bedtime  insulin lispro (ADMELOG) corrective regimen sliding scale   SubCutaneous at bedtime  insulin lispro (ADMELOG) corrective regimen sliding scale   SubCutaneous three times a day before meals  insulin lispro Injectable (ADMELOG) 9 Unit(s) SubCutaneous three times a day with meals  lisinopril 20 milliGRAM(s) Oral daily  metoprolol succinate ER 50 milliGRAM(s) Oral daily  mupirocin 2% Ointment 1 Application(s) Topical two times a day  pantoprazole    Tablet 40 milliGRAM(s) Oral before breakfast  piperacillin/tazobactam IVPB.. 3.375 Gram(s) IV Intermittent every 8 hours  senna 2 Tablet(s) Oral at bedtime  tamsulosin 0.4 milliGRAM(s) Oral at bedtime  vancomycin  IVPB 750 milliGRAM(s) IV Intermittent every 12 hours    MEDICATIONS  (PRN):  acetaminophen     Tablet .. 650 milliGRAM(s) Oral every 6 hours PRN Mild Pain (1 - 3)  dextrose Oral Gel 15 Gram(s) Oral once PRN Blood Glucose LESS THAN 70 milliGRAM(s)/deciliter  ondansetron Injectable 4 milliGRAM(s) IV Push every 8 hours PRN Nausea and/or Vomiting        ALLERGIES:  Allergy Status Unknown      LABS:                                    10.4   7.95  )-----------( 266      ( 07 Aug 2023 07:00 )             32.3   08-07    140  |  103  |  21  ----------------------------<  158<H>  3.7   |  28  |  1.14    Ca    9.1      07 Aug 2023 07:10  Phos  3.5     08-07  Mg     1.7     08-07            < from: MR Ankle w/ IV Cont, Right (07.26.23 @ 18:31) >    IMPRESSION:  1.  There is a ulcer overlying the heel roughly spanning 5.4 cm extending   to the lateral calcaneus. There is minimal osseous edema within the   lateral calcaneus with mild postcontrast enhancement and without   significant loss of normal T1 fatty marrow. This is possibly representing   early osteomyelitis versus reactive in nature.  2.  Questionable minimal osseous edema within the fourth metatarsal head   with postcontrast enhancement which is incompletely evaluated. These   findings are possibly secondary to reactive degenerative changes versus   osteomyelitis if there is adjacent ulcer. Clinical correlation is advised.    --- End of Report ---        < end of copied text >     < from: Xray Foot AP + Lateral + Oblique, Right (07.25.23 @ 17:54) >    IMPRESSION:    No acute fracture or dislocation of the right foot. Midfoot degenerative   changes are noted.    Soft tissue defect at the heel related to known heel ulcer. No osseous   erosive changes of the calcaneus or adjacent osseousstructures. Soft   tissue edema is also noted along the dorsal foot and anterior lower shin.   No tracking subcutaneous gas.    Along the first distal phalanx base, there are juxtaarticular erosions at   the medial aspect and productive changes at the lateral aspect. Findings   may be due to underlying inflammatory arthritis or crystalline   arthropathy.    Vascular calcifications.      --- End of Report ---    < end of copied text >

## 2023-08-07 NOTE — PROGRESS NOTE ADULT - SUBJECTIVE AND OBJECTIVE BOX
Patient is a 63y old  Female who presents with a chief complaint of foot wound (04 Aug 2023 16:56)       INTERVAL HPI/OVERNIGHT EVENTS:  Patient seen and evaluated at bedside.  Pt is resting comfortable in NAD. Denies N/V/F/C.    Allergies    No Known Allergies    Intolerances        Vital Signs Last 24 Hrs  T(C): 36.7 (06 Aug 2023 23:16), Max: 36.7 (06 Aug 2023 17:11)  T(F): 98.1 (06 Aug 2023 23:16), Max: 98.1 (06 Aug 2023 17:11)  HR: 68 (06 Aug 2023 23:16) (61 - 68)  BP: 132/63 (06 Aug 2023 23:16) (132/63 - 155/68)  BP(mean): --  RR: 18 (06 Aug 2023 23:16) (18 - 18)  SpO2: 92% (06 Aug 2023 23:16) (92% - 96%)    Parameters below as of 06 Aug 2023 23:16  Patient On (Oxygen Delivery Method): room air        LABS:                        10.4   7.95  )-----------( 266      ( 07 Aug 2023 07:00 )             32.3     08-07    140  |  103  |  21  ----------------------------<  158<H>  3.7   |  28  |  1.14    Ca    9.1      07 Aug 2023 07:10  Phos  3.5     08-07  Mg     1.7     08-07        Urinalysis Basic - ( 07 Aug 2023 07:10 )    Color: x / Appearance: x / SG: x / pH: x  Gluc: 158 mg/dL / Ketone: x  / Bili: x / Urobili: x   Blood: x / Protein: x / Nitrite: x   Leuk Esterase: x / RBC: x / WBC x   Sq Epi: x / Non Sq Epi: x / Bacteria: x      CAPILLARY BLOOD GLUCOSE      POCT Blood Glucose.: 185 mg/dL (07 Aug 2023 08:38)  POCT Blood Glucose.: 148 mg/dL (06 Aug 2023 21:51)  POCT Blood Glucose.: 139 mg/dL (06 Aug 2023 16:56)  POCT Blood Glucose.: 170 mg/dL (06 Aug 2023 11:44)      Lower Extremity Physical Exam:  Vascular: DP/PT 0/4, B/L, CFT <3 seconds B/L, Temperature gradient warm to cool, B/L.   Neuro: Epicritic sensation dimished to the level of toes, B/L.  Musculoskeletal/Ortho: Unremarkable.  Skin: s/p BL heel wound debridement with right foot graft application and bone biopsy (DOS 7/27): graft incorporating, scant serosanguinous drainage, no purulence, no erythema, no acute signs of infection.       RADIOLOGY & ADDITIONAL TESTS:

## 2023-08-07 NOTE — PROGRESS NOTE ADULT - ASSESSMENT
64 y/o F PMHx of DMH2 on insulin, HTN, HLD p/w right heel ulcer.    Assessment  DMT2: 63y Female with DM T2 with hyperglycemia, A1C 8.4%, was on insulin at home, now on basal bolus insulin with coverage, blood sugars running high. Has right heel ulcer. Started on basal insulin, glucose trending high. Insulin adjusted   ?Gastroparesis: Has nausea at times, improved.   Foot wound: on medications, monitored. Podiatry eval/work up. MRSA wound on isolation   HTN: on antihypertensive medications, monitored, asymptomatic.  HLD: stable, on statin, monitored.    Discussed plan and management with Attending   Armando Toscano NP - TEAMS  Dr Wesley Hannah  [phone]         62 y/o F PMHx of DMH2 on insulin, HTN, HLD p/w right heel ulcer.    Assessment  DMT2: 63y Female with DM T2 with hyperglycemia, A1C 8.4%, was on insulin at home, now on basal bolus insulin with coverage, blood sugars running high. Has right heel ulcer. Started on basal insulin, glucose trending high. Insulin adjusted   ?Gastroparesis: Has nausea at times, improved.   Foot wound: on medications, monitored. Podiatry eval/work up. MRSA wound on isolation   HTN: on antihypertensive medications, monitored, asymptomatic.  HLD: stable, on statin, monitored.    Discussed plan and management with Attending   Armando Toscano NP - TEAMS  Dr Wesley Hannah  [phone]         64 y/o F PMHx of DMH2 on insulin, HTN, HLD p/w right heel ulcer.    Assessment  DMT2: 63y Female with DM T2 with hyperglycemia, A1C 8.4%, was on insulin at home, now on basal bolus insulin with coverage, blood sugars running high. Has right heel ulcer. Started on basal insulin, glucose trending high. Insulin adjusted   ?Gastroparesis: Has nausea at times, improved.   Foot wound: on medications, monitored. Podiatry eval/work up. MRSA wound on isolation   HTN: on antihypertensive medications, monitored, asymptomatic.  HLD: stable, on statin, monitored.    Discussed plan and management with Attending   Armando Toscano NP - TEAMS  Dr Wesley Hannah  [phone]

## 2023-08-07 NOTE — PROGRESS NOTE ADULT - PROBLEM SELECTOR PROBLEM 3
Diabetes
HLD (hyperlipidemia)
Diabetes
HLD (hyperlipidemia)
HLD (hyperlipidemia)
Diabetes
HLD (hyperlipidemia)
Diabetes

## 2023-08-07 NOTE — DISCHARGE NOTE NURSING/CASE MANAGEMENT/SOCIAL WORK - NSDCPEFALRISK_GEN_ALL_CORE
For information on Fall & Injury Prevention, visit: [email or web address] OR  [email or web address] OR  https://www.cdc.gov/steadi/patient.html For information on Fall & Injury Prevention, visit: [email or web address] OR  [email or web address] OR  https://www.cdc.gov/steadi/patient.html For information on Fall & Injury Prevention, visit: [email or web address] OR  [email or web address] OR  https://www.cdc.gov/steadi/patient.html

## 2023-08-07 NOTE — PROGRESS NOTE ADULT - SUBJECTIVE AND OBJECTIVE BOX
Subjective: Patient seen and examined. No new events except as noted.   Pending d/c home.     REVIEW OF SYSTEMS:    CONSTITUTIONAL: + weakness, fevers or chills  EYES/ENT: No visual changes;  No vertigo or throat pain   NECK: No pain or stiffness  RESPIRATORY: No cough, wheezing, hemoptysis; No shortness of breath  CARDIOVASCULAR: No chest pain or palpitations  GASTROINTESTINAL: No abdominal or epigastric pain. No nausea, vomiting, or hematemesis; No diarrhea or constipation. No melena or hematochezia.  GENITOURINARY: No dysuria, frequency or hematuria  NEUROLOGICAL: No numbness or weakness  SKIN: No itching, burning, rashes, or lesions   All other review of systems is negative unless indicated above.    MEDICATIONS:  MEDICATIONS  (STANDING):  artificial  tears Solution 1 Drop(s) Both EYES three times a day  atorvastatin 40 milliGRAM(s) Oral at bedtime  chlorhexidine 2% Cloths 1 Application(s) Topical <User Schedule>  dextrose 5%. 1000 milliLiter(s) (50 mL/Hr) IV Continuous <Continuous>  dextrose 5%. 1000 milliLiter(s) (100 mL/Hr) IV Continuous <Continuous>  dextrose 50% Injectable 25 Gram(s) IV Push once  dextrose 50% Injectable 12.5 Gram(s) IV Push once  dextrose 50% Injectable 25 Gram(s) IV Push once  furosemide    Tablet 20 milliGRAM(s) Oral daily  glucagon  Injectable 1 milliGRAM(s) IntraMuscular once  heparin   Injectable 5000 Unit(s) SubCutaneous every 8 hours  hydrALAZINE 50 milliGRAM(s) Oral two times a day  insulin glargine Injectable (LANTUS) 25 Unit(s) SubCutaneous at bedtime  insulin lispro (ADMELOG) corrective regimen sliding scale   SubCutaneous at bedtime  insulin lispro (ADMELOG) corrective regimen sliding scale   SubCutaneous three times a day before meals  insulin lispro Injectable (ADMELOG) 9 Unit(s) SubCutaneous three times a day with meals  lisinopril 20 milliGRAM(s) Oral daily  metoprolol succinate ER 50 milliGRAM(s) Oral daily  mupirocin 2% Ointment 1 Application(s) Topical two times a day  pantoprazole    Tablet 40 milliGRAM(s) Oral before breakfast  piperacillin/tazobactam IVPB.. 3.375 Gram(s) IV Intermittent every 8 hours  senna 2 Tablet(s) Oral at bedtime  tamsulosin 0.4 milliGRAM(s) Oral at bedtime  vancomycin  IVPB 750 milliGRAM(s) IV Intermittent every 12 hours    PHYSICAL EXAM:  Vital Signs Last 24 Hrs  T(C): 36.7 (06 Aug 2023 23:16), Max: 36.7 (06 Aug 2023 17:11)  T(F): 98.1 (06 Aug 2023 23:16), Max: 98.1 (06 Aug 2023 17:11)  HR: 68 (07 Aug 2023 11:57) (61 - 68)  BP: 180/74 (07 Aug 2023 11:57) (132/63 - 180/74)  BP(mean): --  RR: 18 (06 Aug 2023 23:16) (18 - 18)  SpO2: 92% (06 Aug 2023 23:16) (92% - 96%)    Parameters below as of 06 Aug 2023 23:16  Patient On (Oxygen Delivery Method): room air    I&O's Summary    06 Aug 2023 07:01  -  07 Aug 2023 07:00  --------------------------------------------------------  IN: 350 mL / OUT: 2800 mL / NET: -2450 mL    Appearance: NAD  HEENT:  Dry oral mucosa, PERRL, EOMI	  Lymphatic: No lymphadenopathy , no edema  Cardiovascular: Normal S1 S2, No JVD, No murmurs , Peripheral pulses palpable 2+ bilaterally  Respiratory: decreased bs	  Gastrointestinal:  Soft, Non-tender, + BS	  Musculoskeletal: Normal range of motion, normal strength  Vascular: DP/PT 0/4, B/L, CFT <3 seconds B/L, Temperature gradient warm to cool, B/L.   Neuro: Epicritic sensation dimished to the level of toes, B/L.  Musculoskeletal/Ortho: Unremarkable.  Skin: s/p BL heel wound debridement dressings c/d/i     LABS:    CARDIAC MARKERS:                        10.4   7.95  )-----------( 266      ( 07 Aug 2023 07:00 )             32.3     08-07    140  |  103  |  21  ----------------------------<  158<H>  3.7   |  28  |  1.14    Ca    9.1      07 Aug 2023 07:10  Phos  3.5     08-07  Mg     1.7     08-07    proBNP:   Lipid Profile:   HgA1c:   TSH:     TELEMETRY: 	    ECG:  	  RADIOLOGY:   DIAGNOSTIC TESTING:  [ ] Echocardiogram:  [ ]  Catheterization:  [ ] Stress Test:    OTHER:

## 2023-08-07 NOTE — PROGRESS NOTE ADULT - PROBLEM SELECTOR PROBLEM 1
Wound of right foot
Diabetes
Diabetes
Wound of right foot
Diabetes
Wound of right foot
Diabetes
Wound of right foot
Wound of right foot
Diabetes
Diabetes
Wound of right foot
Wound of right foot

## 2023-08-08 NOTE — CHART NOTE - NSCHARTNOTEFT_GEN_A_CORE
Encounter #: [id number]  DOS: 7/27/23  b/l heel wound debridement, right foot kerecis application, right calcaneal biopsy     Utilizing a 15 blade and versajet debridement system with normal saline, excisional debridement was done Encounter #: [id number]  DOS: 7/27/23  b/l heel wound debridement, right foot kerecis application, right calcaneal biopsy     Utilizing a 15 blade and versajet debridement system with normal saline, excisional debridement was done Encounter #: [id number]  DOS: 7/27/23  b/l heel wound debridement, right foot kerecis application, right calcaneal biopsy     Utilizing a 15 blade and versajet debridement system with normal saline, excisional debridement was done

## 2023-08-11 LAB
SURGICAL PATHOLOGY STUDY: SIGNIFICANT CHANGE UP

## 2023-08-16 ENCOUNTER — INPATIENT (INPATIENT)
Facility: HOSPITAL | Age: 63
LOS: 4 days | Discharge: HOME | DRG: 683 | End: 2023-08-21
Attending: INTERNAL MEDICINE | Admitting: INTERNAL MEDICINE
Payer: MEDICARE

## 2023-08-16 ENCOUNTER — NON-APPOINTMENT (OUTPATIENT)
Age: 63
End: 2023-08-16

## 2023-08-16 VITALS
SYSTOLIC BLOOD PRESSURE: 128 MMHG | RESPIRATION RATE: 20 BRPM | DIASTOLIC BLOOD PRESSURE: 65 MMHG | TEMPERATURE: 98 F | HEART RATE: 69 BPM | WEIGHT: 199.96 LBS | HEIGHT: 63 IN

## 2023-08-16 DIAGNOSIS — S91.309A UNSPECIFIED OPEN WOUND, UNSPECIFIED FOOT, INITIAL ENCOUNTER: ICD-10-CM

## 2023-08-16 DIAGNOSIS — Z98.890 OTHER SPECIFIED POSTPROCEDURAL STATES: Chronic | ICD-10-CM

## 2023-08-16 DIAGNOSIS — E78.5 HYPERLIPIDEMIA, UNSPECIFIED: ICD-10-CM

## 2023-08-16 DIAGNOSIS — E87.1 HYPO-OSMOLALITY AND HYPONATREMIA: ICD-10-CM

## 2023-08-16 DIAGNOSIS — E11.9 TYPE 2 DIABETES MELLITUS WITHOUT COMPLICATIONS: ICD-10-CM

## 2023-08-16 DIAGNOSIS — Z90.49 ACQUIRED ABSENCE OF OTHER SPECIFIED PARTS OF DIGESTIVE TRACT: Chronic | ICD-10-CM

## 2023-08-16 DIAGNOSIS — Z29.9 ENCOUNTER FOR PROPHYLACTIC MEASURES, UNSPECIFIED: ICD-10-CM

## 2023-08-16 DIAGNOSIS — N17.9 ACUTE KIDNEY FAILURE, UNSPECIFIED: ICD-10-CM

## 2023-08-16 DIAGNOSIS — D64.9 ANEMIA, UNSPECIFIED: ICD-10-CM

## 2023-08-16 DIAGNOSIS — I10 ESSENTIAL (PRIMARY) HYPERTENSION: ICD-10-CM

## 2023-08-16 PROBLEM — Z00.00 ENCOUNTER FOR PREVENTIVE HEALTH EXAMINATION: Status: ACTIVE | Noted: 2023-08-16

## 2023-08-16 LAB
ALBUMIN SERPL ELPH-MCNC: 3.5 G/DL — SIGNIFICANT CHANGE UP (ref 3.3–5)
ALP SERPL-CCNC: 65 U/L — SIGNIFICANT CHANGE UP (ref 40–120)
ALT FLD-CCNC: 13 U/L — SIGNIFICANT CHANGE UP (ref 10–45)
ANION GAP SERPL CALC-SCNC: 13 MMOL/L — SIGNIFICANT CHANGE UP (ref 5–17)
APPEARANCE UR: CLEAR — SIGNIFICANT CHANGE UP
APTT BLD: 30.4 SEC — SIGNIFICANT CHANGE UP (ref 24.5–35.6)
AST SERPL-CCNC: 15 U/L — SIGNIFICANT CHANGE UP (ref 10–40)
BACTERIA # UR AUTO: NEGATIVE — SIGNIFICANT CHANGE UP
BASOPHILS # BLD AUTO: 0.07 K/UL — SIGNIFICANT CHANGE UP (ref 0–0.2)
BASOPHILS NFR BLD AUTO: 1 % — SIGNIFICANT CHANGE UP (ref 0–2)
BILIRUB SERPL-MCNC: 0.4 MG/DL — SIGNIFICANT CHANGE UP (ref 0.2–1.2)
BILIRUB UR-MCNC: NEGATIVE — SIGNIFICANT CHANGE UP
BUN SERPL-MCNC: 72 MG/DL — HIGH (ref 7–23)
CALCIUM SERPL-MCNC: 9.6 MG/DL — SIGNIFICANT CHANGE UP (ref 8.4–10.5)
CHLORIDE SERPL-SCNC: 98 MMOL/L — SIGNIFICANT CHANGE UP (ref 96–108)
CHLORIDE UR-SCNC: <20 MMOL/L — SIGNIFICANT CHANGE UP
CO2 SERPL-SCNC: 20 MMOL/L — LOW (ref 22–31)
COLOR SPEC: SIGNIFICANT CHANGE UP
CREAT ?TM UR-MCNC: 35 MG/DL — SIGNIFICANT CHANGE UP
CREAT SERPL-MCNC: 3.75 MG/DL — HIGH (ref 0.5–1.3)
DIFF PNL FLD: NEGATIVE — SIGNIFICANT CHANGE UP
EGFR: 13 ML/MIN/1.73M2 — LOW
EOSINOPHIL # BLD AUTO: 0.22 K/UL — SIGNIFICANT CHANGE UP (ref 0–0.5)
EOSINOPHIL NFR BLD AUTO: 3.2 % — SIGNIFICANT CHANGE UP (ref 0–6)
EPI CELLS # UR: 0 /HPF — SIGNIFICANT CHANGE UP
GLUCOSE SERPL-MCNC: 144 MG/DL — HIGH (ref 70–99)
GLUCOSE UR QL: NEGATIVE — SIGNIFICANT CHANGE UP
HCT VFR BLD CALC: 29.3 % — LOW (ref 34.5–45)
HGB BLD-MCNC: 9.9 G/DL — LOW (ref 11.5–15.5)
HYALINE CASTS # UR AUTO: 0 /LPF — SIGNIFICANT CHANGE UP (ref 0–2)
IMM GRANULOCYTES NFR BLD AUTO: 0.3 % — SIGNIFICANT CHANGE UP (ref 0–0.9)
INR BLD: 1.2 RATIO — HIGH (ref 0.85–1.18)
KETONES UR-MCNC: NEGATIVE — SIGNIFICANT CHANGE UP
LEUKOCYTE ESTERASE UR-ACNC: NEGATIVE — SIGNIFICANT CHANGE UP
LYMPHOCYTES # BLD AUTO: 1.45 K/UL — SIGNIFICANT CHANGE UP (ref 1–3.3)
LYMPHOCYTES # BLD AUTO: 21.1 % — SIGNIFICANT CHANGE UP (ref 13–44)
MAGNESIUM SERPL-MCNC: 2.3 MG/DL — SIGNIFICANT CHANGE UP (ref 1.6–2.6)
MCHC RBC-ENTMCNC: 27.6 PG — SIGNIFICANT CHANGE UP (ref 27–34)
MCHC RBC-ENTMCNC: 33.8 GM/DL — SIGNIFICANT CHANGE UP (ref 32–36)
MCV RBC AUTO: 81.6 FL — SIGNIFICANT CHANGE UP (ref 80–100)
MONOCYTES # BLD AUTO: 0.55 K/UL — SIGNIFICANT CHANGE UP (ref 0–0.9)
MONOCYTES NFR BLD AUTO: 8 % — SIGNIFICANT CHANGE UP (ref 2–14)
NEUTROPHILS # BLD AUTO: 4.55 K/UL — SIGNIFICANT CHANGE UP (ref 1.8–7.4)
NEUTROPHILS NFR BLD AUTO: 66.4 % — SIGNIFICANT CHANGE UP (ref 43–77)
NITRITE UR-MCNC: NEGATIVE — SIGNIFICANT CHANGE UP
NRBC # BLD: 0 /100 WBCS — SIGNIFICANT CHANGE UP (ref 0–0)
OSMOLALITY SERPL: 311 MOSMOL/KG — HIGH (ref 280–301)
PH UR: 6 — SIGNIFICANT CHANGE UP (ref 5–8)
PHOSPHATE SERPL-MCNC: 5.2 MG/DL — HIGH (ref 2.5–4.5)
PLATELET # BLD AUTO: 322 K/UL — SIGNIFICANT CHANGE UP (ref 150–400)
POTASSIUM SERPL-MCNC: 5 MMOL/L — SIGNIFICANT CHANGE UP (ref 3.5–5.3)
POTASSIUM SERPL-SCNC: 5 MMOL/L — SIGNIFICANT CHANGE UP (ref 3.5–5.3)
POTASSIUM UR-SCNC: 12 MMOL/L — SIGNIFICANT CHANGE UP
PROT ?TM UR-MCNC: 26 MG/DL — HIGH (ref 0–12)
PROT SERPL-MCNC: 7.8 G/DL — SIGNIFICANT CHANGE UP (ref 6–8.3)
PROT UR-MCNC: ABNORMAL
PROT/CREAT UR-RTO: 0.7 RATIO — HIGH (ref 0–0.2)
PROTHROM AB SERPL-ACNC: 12.5 SEC — SIGNIFICANT CHANGE UP (ref 9.5–13)
RBC # BLD: 3.59 M/UL — LOW (ref 3.8–5.2)
RBC # FLD: 14.6 % — HIGH (ref 10.3–14.5)
RBC CASTS # UR COMP ASSIST: 1 /HPF — SIGNIFICANT CHANGE UP (ref 0–4)
SODIUM SERPL-SCNC: 131 MMOL/L — LOW (ref 135–145)
SODIUM UR-SCNC: 24 MMOL/L — SIGNIFICANT CHANGE UP
SP GR SPEC: 1.01 — LOW (ref 1.01–1.02)
UROBILINOGEN FLD QL: NEGATIVE — SIGNIFICANT CHANGE UP
WBC # BLD: 6.86 K/UL — SIGNIFICANT CHANGE UP (ref 3.8–10.5)
WBC # FLD AUTO: 6.86 K/UL — SIGNIFICANT CHANGE UP (ref 3.8–10.5)
WBC UR QL: 3 /HPF — SIGNIFICANT CHANGE UP (ref 0–5)

## 2023-08-16 PROCEDURE — 99285 EMERGENCY DEPT VISIT HI MDM: CPT | Mod: GC

## 2023-08-16 PROCEDURE — 71045 X-RAY EXAM CHEST 1 VIEW: CPT | Mod: 26

## 2023-08-16 PROCEDURE — 99223 1ST HOSP IP/OBS HIGH 75: CPT

## 2023-08-16 RX ORDER — INSULIN GLARGINE 100 [IU]/ML
13 INJECTION, SOLUTION SUBCUTANEOUS AT BEDTIME
Refills: 0 | Status: DISCONTINUED | OUTPATIENT
Start: 2023-08-17 | End: 2023-08-21

## 2023-08-16 RX ORDER — SODIUM CHLORIDE 9 MG/ML
1000 INJECTION, SOLUTION INTRAVENOUS
Refills: 0 | Status: DISCONTINUED | OUTPATIENT
Start: 2023-08-16 | End: 2023-08-21

## 2023-08-16 RX ORDER — INSULIN LISPRO 100/ML
VIAL (ML) SUBCUTANEOUS AT BEDTIME
Refills: 0 | Status: DISCONTINUED | OUTPATIENT
Start: 2023-08-16 | End: 2023-08-21

## 2023-08-16 RX ORDER — DEXTROSE 50 % IN WATER 50 %
25 SYRINGE (ML) INTRAVENOUS ONCE
Refills: 0 | Status: DISCONTINUED | OUTPATIENT
Start: 2023-08-16 | End: 2023-08-21

## 2023-08-16 RX ORDER — METOPROLOL TARTRATE 50 MG
50 TABLET ORAL DAILY
Refills: 0 | Status: DISCONTINUED | OUTPATIENT
Start: 2023-08-16 | End: 2023-08-21

## 2023-08-16 RX ORDER — TAMSULOSIN HYDROCHLORIDE 0.4 MG/1
0.4 CAPSULE ORAL AT BEDTIME
Refills: 0 | Status: DISCONTINUED | OUTPATIENT
Start: 2023-08-16 | End: 2023-08-21

## 2023-08-16 RX ORDER — PIPERACILLIN AND TAZOBACTAM 4; .5 G/20ML; G/20ML
3.38 INJECTION, POWDER, LYOPHILIZED, FOR SOLUTION INTRAVENOUS ONCE
Refills: 0 | Status: COMPLETED | OUTPATIENT
Start: 2023-08-16 | End: 2023-08-16

## 2023-08-16 RX ORDER — INSULIN LISPRO 100/ML
4 VIAL (ML) SUBCUTANEOUS
Refills: 0 | Status: DISCONTINUED | OUTPATIENT
Start: 2023-08-16 | End: 2023-08-21

## 2023-08-16 RX ORDER — HYDRALAZINE HCL 50 MG
50 TABLET ORAL
Refills: 0 | Status: DISCONTINUED | OUTPATIENT
Start: 2023-08-16 | End: 2023-08-21

## 2023-08-16 RX ORDER — DEXTROSE 50 % IN WATER 50 %
15 SYRINGE (ML) INTRAVENOUS ONCE
Refills: 0 | Status: DISCONTINUED | OUTPATIENT
Start: 2023-08-16 | End: 2023-08-21

## 2023-08-16 RX ORDER — SENNA PLUS 8.6 MG/1
2 TABLET ORAL AT BEDTIME
Refills: 0 | Status: DISCONTINUED | OUTPATIENT
Start: 2023-08-16 | End: 2023-08-21

## 2023-08-16 RX ORDER — CHLORHEXIDINE GLUCONATE 213 G/1000ML
1 SOLUTION TOPICAL DAILY
Refills: 0 | Status: DISCONTINUED | OUTPATIENT
Start: 2023-08-16 | End: 2023-08-21

## 2023-08-16 RX ORDER — GLUCAGON INJECTION, SOLUTION 0.5 MG/.1ML
1 INJECTION, SOLUTION SUBCUTANEOUS ONCE
Refills: 0 | Status: DISCONTINUED | OUTPATIENT
Start: 2023-08-16 | End: 2023-08-21

## 2023-08-16 RX ORDER — PANTOPRAZOLE SODIUM 20 MG/1
40 TABLET, DELAYED RELEASE ORAL
Refills: 0 | Status: DISCONTINUED | OUTPATIENT
Start: 2023-08-16 | End: 2023-08-21

## 2023-08-16 RX ORDER — HEPARIN SODIUM 5000 [USP'U]/ML
5000 INJECTION INTRAVENOUS; SUBCUTANEOUS EVERY 8 HOURS
Refills: 0 | Status: DISCONTINUED | OUTPATIENT
Start: 2023-08-16 | End: 2023-08-21

## 2023-08-16 RX ORDER — PIPERACILLIN AND TAZOBACTAM 4; .5 G/20ML; G/20ML
3.38 INJECTION, POWDER, LYOPHILIZED, FOR SOLUTION INTRAVENOUS ONCE
Refills: 0 | Status: COMPLETED | OUTPATIENT
Start: 2023-08-17 | End: 2023-08-17

## 2023-08-16 RX ORDER — PIPERACILLIN AND TAZOBACTAM 4; .5 G/20ML; G/20ML
3.38 INJECTION, POWDER, LYOPHILIZED, FOR SOLUTION INTRAVENOUS EVERY 12 HOURS
Refills: 0 | Status: DISCONTINUED | OUTPATIENT
Start: 2023-08-17 | End: 2023-08-17

## 2023-08-16 RX ORDER — ATORVASTATIN CALCIUM 80 MG/1
40 TABLET, FILM COATED ORAL AT BEDTIME
Refills: 0 | Status: DISCONTINUED | OUTPATIENT
Start: 2023-08-16 | End: 2023-08-21

## 2023-08-16 RX ORDER — VANCOMYCIN HCL 1 G
1000 VIAL (EA) INTRAVENOUS ONCE
Refills: 0 | Status: COMPLETED | OUTPATIENT
Start: 2023-08-16 | End: 2023-08-16

## 2023-08-16 RX ORDER — DEXTROSE 50 % IN WATER 50 %
12.5 SYRINGE (ML) INTRAVENOUS ONCE
Refills: 0 | Status: DISCONTINUED | OUTPATIENT
Start: 2023-08-16 | End: 2023-08-21

## 2023-08-16 RX ORDER — INSULIN GLARGINE 100 [IU]/ML
13 INJECTION, SOLUTION SUBCUTANEOUS ONCE
Refills: 0 | Status: COMPLETED | OUTPATIENT
Start: 2023-08-16 | End: 2023-08-16

## 2023-08-16 RX ORDER — INSULIN LISPRO 100/ML
VIAL (ML) SUBCUTANEOUS
Refills: 0 | Status: DISCONTINUED | OUTPATIENT
Start: 2023-08-16 | End: 2023-08-21

## 2023-08-16 NOTE — H&P ADULT - NSHPLABSRESULTS_GEN_ALL_CORE
LABS:                        9.9    6.86  )-----------( 322      ( 16 Aug 2023 18:20 )             29.3     08-    131<L>  |  98  |  72<H>  ----------------------------<  144<H>  5.0   |  20<L>  |  3.75<H>    Ca    9.6      16 Aug 2023 18:20  Phos  5.2       Mg     2.3         TPro  7.8  /  Alb  3.5  /  TBili  0.4  /  DBili  x   /  AST  15  /  ALT  13  /  AlkPhos  65  -    PT/INR - ( 16 Aug 2023 18:20 )   PT: 12.5 sec;   INR: 1.20 ratio         PTT - ( 16 Aug 2023 18:20 )  PTT:30.4 sec  Urinalysis Basic - ( 16 Aug 2023 19:47 )    Color: Light Yellow / Appearance: Clear / S.007 / pH: x  Gluc: x / Ketone: Negative  / Bili: Negative / Urobili: Negative   Blood: x / Protein: Trace / Nitrite: Negative   Leuk Esterase: Negative / RBC: 1 /hpf / WBC 3 /HPF   Sq Epi: x / Non Sq Epi: x / Bacteria: Negative    RADIOLOGY & ADDITIONAL TESTS:    < from: Xray Chest 1 View AP/PA (23 @ 19:22) >      ******PRELIMINARY REPORT******      ******PRELIMINARY REPORT******         ACC: [id number] EXAM:  XR CHEST AP OR PA 1V   ORDERED BY: DAVI CANNON     PROCEDURE DATE:  2023    ******PRELIMINARY REPORT******      ******PRELIMINARY REPORT******           INTERPRETATION:  Clear lungs. Right PICC terminates in SVC.        ******PRELIMINARY REPORT******      ******PRELIMINARY REPORT******       MAREK HYATT MD; Resident Radiologist  This document is a PRELIMINARY interpretation and is pending final   attending approval. Aug 16 2023  7:25PM    < end of copied text > LABS:                        9.9    6.86  )-----------( 322      ( 16 Aug 2023 18:20 )             29.3     08-    131<L>  |  98  |  72<H>  ----------------------------<  144<H>  5.0   |  20<L>  |  3.75<H>    Ca    9.6      16 Aug 2023 18:20  Phos  5.2       Mg     2.3         TPro  7.8  /  Alb  3.5  /  TBili  0.4  /  DBili  x   /  AST  15  /  ALT  13  /  AlkPhos  65  -    PT/INR - ( 16 Aug 2023 18:20 )   PT: 12.5 sec;   INR: 1.20 ratio         PTT - ( 16 Aug 2023 18:20 )  PTT:30.4 sec  Urinalysis Basic - ( 16 Aug 2023 19:47 )    Color: Light Yellow / Appearance: Clear / S.007 / pH: x  Gluc: x / Ketone: Negative  / Bili: Negative / Urobili: Negative   Blood: x / Protein: Trace / Nitrite: Negative   Leuk Esterase: Negative / RBC: 1 /hpf / WBC 3 /HPF   Sq Epi: x / Non Sq Epi: x / Bacteria: Negative    RADIOLOGY & ADDITIONAL TESTS:    < from: Xray Chest 1 View AP/PA (23 @ 19:22) >      ******PRELIMINARY REPORT******      ******PRELIMINARY REPORT******         ACC: [id number] EXAM:  XR CHEST AP OR PA 1V   ORDERED BY: DAVI CANNON     PROCEDURE DATE:  2023    ******PRELIMINARY REPORT******      ******PRELIMINARY REPORT******           INTERPRETATION:  Clear lungs. Right PICC terminates in SVC.        ******PRELIMINARY REPORT******      ******PRELIMINARY REPORT******       MAREK HYATT MD; Resident Radiologist  This document is a PRELIMINARY interpretation and is pending final   attending approval. Aug 16 2023  7:25PM    < end of copied text > LABS:                        9.9    6.86  )-----------( 322      ( 16 Aug 2023 18:20 )             29.3     08-    131<L>  |  98  |  72<H>  ----------------------------<  144<H>  5.0   |  20<L>  |  3.75<H>    Ca    9.6      16 Aug 2023 18:20  Phos  5.2       Mg     2.3         TPro  7.8  /  Alb  3.5  /  TBili  0.4  /  DBili  x   /  AST  15  /  ALT  13  /  AlkPhos  65  -    PT/INR - ( 16 Aug 2023 18:20 )   PT: 12.5 sec;   INR: 1.20 ratio         PTT - ( 16 Aug 2023 18:20 )  PTT:30.4 sec  Urinalysis Basic - ( 16 Aug 2023 19:47 )    Color: Light Yellow / Appearance: Clear / S.007 / pH: x  Gluc: x / Ketone: Negative  / Bili: Negative / Urobili: Negative   Blood: x / Protein: Trace / Nitrite: Negative   Leuk Esterase: Negative / RBC: 1 /hpf / WBC 3 /HPF   Sq Epi: x / Non Sq Epi: x / Bacteria: Negative    RADIOLOGY & ADDITIONAL TESTS:    < from: Xray Chest 1 View AP/PA (23 @ 19:22) >      ******PRELIMINARY REPORT******      ******PRELIMINARY REPORT******         ACC: [id number] EXAM:  XR CHEST AP OR PA 1V   ORDERED BY: DAVI CANNON     PROCEDURE DATE:  2023    ******PRELIMINARY REPORT******      ******PRELIMINARY REPORT******           INTERPRETATION:  Clear lungs. Right PICC terminates in SVC.        ******PRELIMINARY REPORT******      ******PRELIMINARY REPORT******       MAREK HYATT MD; Resident Radiologist  This document is a PRELIMINARY interpretation and is pending final   attending approval. Aug 16 2023  7:25PM    < end of copied text >

## 2023-08-16 NOTE — H&P ADULT - PROBLEM SELECTOR PLAN 3
Na 131  - add on Sosm, Uosm  - trend BMP Na 131, sOsm elevated  - add on Uosm  - hypertonic though glucose not elevated, will check lipid panel in AM to eval for hyperTGL  - trend BMP

## 2023-08-16 NOTE — ED ADULT NURSE NOTE - OBJECTIVE STATEMENT
62 yo F presents to ED A+OX3 accompanied by sister c/o abnormal lab results. Patient states she had outpatient lab work done by her ID doctor yesterday and was called today and told to come to ED for "abnormal kidney function." Patient states she was recently hospitalized and d/c on antibiotics through a PICC line. Patient has PICC line to right arm, dressing is clean dry and intact. States she is receiving Daptomycin once a day at night. Denies fever, chills, abdominal pain, back pain, changes in urinary output. Patient is sitting up in stretcher in no acute distress. Breathing spontaneous and unlabored on room air. Skin warm pink and dry. Wound vac noted to right foot. 64 yo F presents to ED A+OX3 accompanied by sister c/o abnormal lab results. Patient states she had outpatient lab work done by her ID doctor yesterday and was called today and told to come to ED for "abnormal kidney function." Patient states she was recently hospitalized and d/c on antibiotics through a PICC line. Patient has PICC line to right arm, dressing is clean dry and intact. States she is receiving Daptomycin once a day at night. Denies fever, chills, abdominal pain, back pain, changes in urinary output. Patient is sitting up in stretcher in no acute distress. Breathing spontaneous and unlabored on room air. Skin warm pink and dry. Wound vac noted to right foot.

## 2023-08-16 NOTE — ED ADULT TRIAGE NOTE - TEMPERATURE IN FAHRENHEIT (DEGREES F)
Left a message on patients verified voice mail with following message per Mireya Osborn PA-C            ---- Message from Mireya Osborn PA-C sent at 5/15/2017  7:52 AM CDT -----  pls call pt, normal CXR     98.5

## 2023-08-16 NOTE — ED ADULT NURSE NOTE - CAS EDP DISCH DISPOSITION ADMI
[facility] [place]

## 2023-08-16 NOTE — H&P ADULT - PROBLEM SELECTOR PLAN 8
chem DVT ppx: HSQ  diet: low K/phos  precaution: fall chem DVT ppx: HSQ  diet: low K/phos  precaution: fall  dispo: pending PT luz maria, med improvement

## 2023-08-16 NOTE — H&P ADULT - ASSESSMENT
63F IDDM, HTN, HLD, recent admit 7/25-8/7 iso b/l heel wound c/f OM s/p debridement w/ R foot graft application and bone bx (7/27) subsequent vac application dc'd on dapto and cipro (plan until 9/7) via RUE PICC  a/w BANDAR found on outpt lab monitoring 63F IDDM, HTN, HLD, recent admit 7/25-8/7 iso b/l heel wound c/f OM s/p debridement w/ R foot graft application and bone bx (7/27) subsequent vac application dc'd on dapto and cipro (plan until 9/7) via RUE PICC a/w BANDAR found on outpt lab monitoring

## 2023-08-16 NOTE — H&P ADULT - PROBLEM SELECTOR PLAN 5
home lantus 26u, novolog 9u TID   - will dose 50% home needs basal 13, bolus 4 TID  - adjust per ISS req home lantus 26u, novolog 9u TID on dc however pt states that she has been using lantus 20 and novolog 3u TID given low FS at home   - will dose 50% home needs basal 13, bolus 4 TID  - adjust per ISS req

## 2023-08-16 NOTE — ED PROVIDER NOTE - OBJECTIVE STATEMENT
63-year-old female with past medical history of hypertension, hyperlipidemia, diabetes with recent admission 7/25 through 8/7 for bilateral foot wounds found to be MRSA positive with wound VAC in place currently on daptomycin through a PICC line presents to the ED stating that she had blood work done yesterday with Dr. Daniels and was called today with worsening kidney function (?Cr 4) and was told to come to ED. Pt does report some bilateral lower extremity swelling for the past two days as well. Denies any fevers/chills, uri sxs, cough, chest pain/sob/lightheadedness/palpitations/orthopnea, abd pain, n/v/d, dark/bloody stools, urinary sxs/flank pain. Pt states that she's still making urine.   Pods: Dr. Flores  ID: Dr. Daniels

## 2023-08-16 NOTE — ED PROVIDER NOTE - PROGRESS NOTE DETAILS
Hali Nelson- pt stable. left message for Dr. Murguia for admission. pt updated on plan. Hali Nelson- spoke to Dr. Murguia for admission

## 2023-08-16 NOTE — H&P ADULT - HISTORY OF PRESENT ILLNESS
ED Course  VS: afebrile tmax 36.9C, HR 60s, BP 120s-130s/60s, RR 16-20, SpO2% 97-98 RA  Labs: normocytic (MCV 81.6) anemia H/H 9.9/29.3; hypoNa 131, BUN/SCr 72/3.75 (GFR 13), phos 5.2, unable to calc FeUrea (urine urea unavailable), otherwise w/ elevated protein/Cr ratio 0.7   Imaging:  - CXR: clear lungs, R PICC terminates in SVC   Received: n/a    admit to medicine for further eval/mgt    63F IDDM, HTN, HLD, recent admit 7/25-8/7 iso b/l heel wound c/f OM s/p debridement w/ R foot graft application and bone bx (7/27) subsequent vac application dc'd on dapto and cipro (plan until 9/7) via RUE PICC who presents for elevated SCr on outpt lab monitoring. Pt reports feeling generally well in interim of discharge, though has noticed mild increase in chronic LE edema x days (superimposed on b/l LE edema since 1990s per her). She denies new med intake, change in PO intake (tends not to drink enough water but states this is not new), n/v/d, melena/hematochezia, dysuria/hematuria, change in urinary frequency. Otherwise denies HA, fever, chills, new visual disturbance (L visual deficit at bl x yrs per pt, s/p ophtho surgeries), rhinorrhea, odynophagia, dysphagia, CP, palpitations, SOB, cough, abd pain, new numbness/weakness/tingling (states RLE weak compared to LLE after pods procedure last admit) or other complaint.    Of note she relays last dapto dose 8/15, last cipro dose this AM. She states that she has been reducing basal/bolus insulin dose at home given borderline low FS.    ED Course  VS: afebrile tmax 36.9C, HR 60s, BP 120s-130s/60s, RR 16-20, SpO2% 97-98 RA  Labs: normocytic (MCV 81.6) anemia H/H 9.9/29.3; hypoNa 131, BUN/SCr 72/3.75 (GFR 13), phos 5.2, unable to calc FeUrea (urine urea unavailable), otherwise w/ elevated protein/Cr ratio 0.7   Imaging:  - CXR: clear lungs, R PICC terminates in SVC   Received: n/a    admit to medicine for further eval/mgt

## 2023-08-16 NOTE — H&P ADULT - PROBLEM SELECTOR PLAN 2
b/l heel wound c/f OM s/p debridement w/ R foot graft application and bone bx (7/27) subsequent vac application dc'd on dapto and cipro (plan until 9/7) via [facility] PIC followed by Dr. Daniels   pt was initially covered w/ vanc/zosyn as per Dr. Daniels, transitioned to dapto w/ oral cipro given pt unable to go to [facility] (therefore oral required)  - c/w abx, will d/w ID in AM re optimal choice  - wound c/s for maintenance b/l heel wound c/f OM s/p debridement w/ R foot graft application and bone bx (7/27) subsequent vac application dc'd on dapto and cipro (plan until 9/7) via [facility] PIC followed by Dr. Daniels   pt was initially covered w/ vanc/zosyn as per Dr. Daniels, transitioned to dapto w/ oral cipro given pt unable to go to [facility] (therefore oral required)  - c/w abx, will d/w ID in AM re optimal choice  - wound c/s for maintenance b/l heel wound c/f OM s/p debridement w/ R foot graft application and bone bx (7/27) subsequent vac application dc'd on dapto and cipro (plan until 9/7) via [facility] PIC followed by Dr. Daniels   pt was initially covered w/ vanc/zosyn as per Dr. Daniels, transitioned to dapto w/ oral cipro given pt unable to go to [facility] (therefore oral required)  - c/w abx, will d/w ID in AM re optimal choice  - wound c/s for maintenance b/l heel wound c/f OM s/p debridement w/ R foot graft application and bone bx (7/27) subsequent vac application dc'd on dapto and cipro (plan until 9/7) via [facility] PICC followed by Dr. Daniels   pt was initially covered w/ vanc/zosyn as per Dr. Daniels, transitioned to dapto w/ oral cipro given pt unable to go to [facility] (therefore oral required)  - will c/w vanc, zosyn w/ renal dosing (pt last dose dapto 8/15 per her, and last cipro this AM) can d/w ID in AM re optimal choice  - wound c/s for maintenance b/l heel wound c/f OM s/p debridement w/ R foot graft application and bone bx (7/27) subsequent vac application dc'd on dapto and cipro (plan until 9/7) via [facility] PICC followed by Dr. Daniels   pt was initially covered w/ vanc/zosyn as per Dr. Daniels, transitioned to dapto w/ oral cipro given pt unable to go to [facility] (therefore oral required)  - will c/w vanc, zosyn w/ renal dosing (pt last dose dapto 8/15 per her, and last cipro this AM) can d/w ID in AM re optimal choice  - wound c/s for maintenance b/l heel wound c/f OM s/p debridement w/ R foot graft application and bone bx (7/27) subsequent vac application dc'd on dapto and cipro (plan until 9/7) via [facility] PICC followed by Dr. Daniels   pt was initially covered w/ vanc/zosyn as per Dr. Daniels, transitioned to dapto w/ oral cipro given pt unable to go to [facility] (therefore oral required)  - will c/w vanc, zosyn w/ renal dosing (pt last dose dapto 8/15 per her, and last cipro this AM) can d/w ID in AM re optimal choice  - wound c/s for maintenance b/l heel wound c/f OM s/p debridement w/ R foot graft application and bone bx (7/27) subsequent vac application dc'd on dapto and cipro (plan until 9/7) via [facility] PICC followed by Dr. Daniels   pt was initially covered w/ vanc/zosyn as per Dr. Daniels, transitioned to dapto w/ oral cipro given pt unable to go to [facility] (therefore oral required)  - will c/w vanc, zosyn w/ renal dosing (pt last dose dapto 8/15 per her, and last cipro this AM) can d/w ID in AM re optimal choice  - wound c/s for maintenance  - pods eval in AM  - b/l duplex LE given tenderness w/ worse edema b/l heel wound c/f OM s/p debridement w/ R foot graft application and bone bx (7/27) subsequent vac application dc'd on dapto and cipro (plan until 9/7) via [facility] PICC followed by Dr. Daniels   pt was initially covered w/ vanc/zosyn as per Dr. Daniels, transitioned to dapto w/ oral cipro given pt unable to go to [facility] (therefore oral required)  - will c/w vanc, zosyn w/ renal dosing (pt last dose dapto 8/15 per her, and last cipro this AM) can d/w ID in AM re optimal choice  - wound c/s for maintenance  - pods eval in AM  - b/l duplex LE given tenderness w/ worse edema b/l heel wound c/f OM s/p debridement w/ R foot graft application and bone bx (7/27) subsequent vac application dc'd on dapto and cipro (plan until 9/7) via [facility] PICC followed by Dr. Daniels   pt was initially covered w/ vanc/zosyn as per Dr. Daniels, transitioned to dapto w/ oral cipro given pt unable to go to [facility] (therefore oral required)  - will c/w vanc, zosyn w/ renal dosing (pt last dose dapto 8/15 per her, and last cipro this AM) can d/w ID in AM re optimal choice  - wound c/s for maintenance  - pods eval in AM  - b/l duplex LE given tenderness w/ worse edema b/l heel wound c/f OM s/p debridement w/ R foot graft application and bone bx (7/27) subsequent vac application dc'd on dapto and cipro (plan until 9/7) via [facility] PICC followed by Dr. Daniels   pt was initially covered w/ vanc/zosyn as per Dr. Daniels, transitioned to dapto w/ oral cipro given pt unable to go to [facility] (therefore oral required)  - will c/w vanc, zosyn w/ renal dosing (pt last dose dapto 8/15 per her, and last cipro this AM) can d/w ID in AM re optimal choice  - check vanc trough 8/17 to redose  - wound c/s for maintenance  - pods eval in AM  - b/l duplex LE given tenderness w/ worse edema b/l heel wound c/f OM s/p debridement w/ R foot graft application and bone bx (7/27) subsequent vac application dc'd on dapto and cipro (plan until 9/7) via [facility] PICC followed by Dr. Daniels   pt was initially covered w/ vanc/zosyn as per Dr. Daniels, transitioned to dapto w/ oral cipro given pt unable to go to [facility] (therefore oral required)  - will c/w vanc, zosyn w/ renal dosing (pt last dose dapto 8/15 per her, and last cipro this AM) can d/w ID in AM re optimal choice  - check vanc trough 8/17 to redose  - wound c/s for maintenance  - pods eval in AM  - b/l duplex LE given tenderness w/ worse edema b/l heel wound c/f OM s/p debridement w/ R foot graft application and bone bx (7/27) subsequent vac application dc'd on dapto and cipro (plan until 9/7) via [facility] PICC followed by Dr. Daniels   pt was initially covered w/ vanc/zosyn as per Dr. Daniels, transitioned to dapto w/ oral cipro given pt unable to go to [facility] (therefore oral required)  - will c/w vanc, zosyn w/ renal dosing (pt last dose dapto 8/15 per her, and last cipro this AM) can d/w ID in AM re optimal choice  - check vanc trough 8/17 to redose  - wound c/s for maintenance  - pods eval in AM  - b/l duplex LE given tenderness w/ worse edema

## 2023-08-16 NOTE — ED PROVIDER NOTE - PHYSICAL EXAMINATION
GENERAL: no acute distress, non-toxic appearing  HEENT: normal conjunctiva, oral mucosa moist  CARDIAC: regular rate and regular rhythm, bp reassuring, 2+ dp pulses bilaterally  PULM: clear to ascultation bilaterally, no increased wob, sats well on RA  GI: abdomen nondistended, soft, nontender  : no CVA tenderness, no suprapubic tenderness  NEURO: alert and oriented x 3, normal speech, moving all extremities without lateralization  MSK: no visible deformities, bilateral lower extremity edema  SKIN: no visible acute rashes, surg site appears to be healing well  PSYCH: appropriate mood and affect

## 2023-08-16 NOTE — ED PROVIDER NOTE - RAPID ASSESSMENT
63-year-old female with past medical history of hypertension, hyperlipidemia, diabetes with recent admission 7/25 through 8/7 for bilateral foot wounds found to be MRSA positive with wound VAC in place currently on daptomycin through a PICC line presents the emergency department complaining of abnormal lab values.  Patient reports that yesterday the doctor came to the house and did blood work and today she received a call that her kidney function was severely elevated and she must come to the hospital.  She is not sure the exact level but thinks that she heard "4".  She reports that she has had some mild pain in her feet but has otherwise been doing very well and would have not come to the hospital if she was not advised to do so by her doctor.    Rapid assessment by Laurie Dumont PA-C full eval to be performed in ED

## 2023-08-16 NOTE — ED ADULT TRIAGE NOTE - CHIEF COMPLAINT QUOTE
infected right foot wound. Repeat blood test noted for abnormal kidney profile. PiCC line for antibiotic infusion.

## 2023-08-16 NOTE — ED PROVIDER NOTE - ATTENDING CONTRIBUTION TO CARE
64 yo female with PMH as noted including recent osteo d/c on PICC abx @ home now returns for BANDAR.  potassium OK, otherwise hemodynamically stable.  plan to admit to trend creatinine, renal consult and further osteo management. 62 yo female with PMH as noted including recent osteo d/c on PICC abx @ home now returns for BANDAR.  potassium OK, otherwise hemodynamically stable.  plan to admit to trend creatinine, renal consult and further osteo management.

## 2023-08-16 NOTE — H&P ADULT - NSICDXPASTMEDICALHX_GEN_ALL_CORE_FT
PAST MEDICAL HISTORY:  Diabetes     Hypercholesteremia     Hypertension      PAST MEDICAL HISTORY:  Diabetes     Hypercholesteremia     Hypertension     Wound of foot

## 2023-08-16 NOTE — ED PROVIDER NOTE - INTERPRETATION
EKG reviewed for rate, rhythm, axis, intervals and segments, including QRS morphology, P wave appearance T wave appearance, TX interval, and QT interval.  I find the EKG to be unremarkable in all of these regards except as follows: RBBB EKG reviewed for rate, rhythm, axis, intervals and segments, including QRS morphology, P wave appearance T wave appearance, TN interval, and QT interval.  I find the EKG to be unremarkable in all of these regards except as follows: RBBB EKG reviewed for rate, rhythm, axis, intervals and segments, including QRS morphology, P wave appearance T wave appearance, IL interval, and QT interval.  I find the EKG to be unremarkable in all of these regards except as follows: RBBB

## 2023-08-16 NOTE — H&P ADULT - NSICDXFAMILYHX_GEN_ALL_CORE_FT
FAMILY HISTORY:  Father  Still living? Unknown  FH: diabetes mellitus, Age at diagnosis: Age Unknown  FH: hyperlipidemia, Age at diagnosis: Age Unknown  FH: hypertension, Age at diagnosis: Age Unknown    Mother  Still living? Unknown  FH: hyperlipidemia, Age at diagnosis: Age Unknown  FH: hypertension, Age at diagnosis: Age Unknown

## 2023-08-16 NOTE — ED PROVIDER NOTE - CLINICAL SUMMARY MEDICAL DECISION MAKING FREE TEXT BOX
Zambratto, Med Tox Fellow: pt with infx/recent surgery/on diuretics/DM with tay and LE swelling. concern for likely dehydration/prerenal etiology along with abx pt getting. Plan to repeat labs, cxr, ekg, likely admit for close monitoring of kidney function.

## 2023-08-16 NOTE — H&P ADULT - PROBLEM SELECTOR PLAN 1
BUN/SCr 21/1.14 (8/7) -> 72/3.75 (GFR 13) today observed on outpt labs for monitoring  suspect pre-renal etiology vs drug-induced (direct nephrotoxicity, less likely AIN)    - bladder scan to r/o obstructive nephropathy  - add on Uurea to calc FeUrea given diuretic use to assist in exposing etiology  - add on CPK r/o rhabdo given dapto use   - strict Is/Os, daily wt  - trend BMP  - avoid nephrotoxins, dose meds per GFR  - nephro c/s in AM BUN/SCr 21/1.14 (8/7) -> 72/3.75 (GFR 13) today observed on outpt labs for monitoring  suspect pre-renal etiology vs drug-induced (direct nephrotoxicity, less likely AIN)    protein:Cr elevation though not nephrotic range  - bladder scan to r/o obstructive nephropathy  - add on Uurea to calc FeUrea given diuretic use to assist in exposing etiology  - add on CPK r/o rhabdo given dapto use   - strict Is/Os, daily wt  - trend BMP  - avoid nephrotoxins, dose meds per GFR  - nephro c/s in AM BUN/SCr 21/1.14 (8/7) -> 72/3.75 (GFR 13) today observed on outpt labs for monitoring  suspect pre-renal etiology vs drug-induced (direct nephrotoxicity, less likely AIN)    protein:Cr elevation though not nephrotic range  - bladder scan to r/o obstructive nephropathy however pt w/ urine output in ED   - add on Uurea to calc FeUrea given diuretic use to assist in exposing etiology and det whether fluid challenge appropriate (pt euvolemic appearing overall on exam apart from LE changes (which are confounded by chronic LE edema))  - add on CPK r/o rhabdo given dapto use   - strict Is/Os, daily wt  - trend BMP  - avoid nephrotoxins, dose meds per GFR  - nephro c/s in AM BUN/SCr 21/1.14 (8/7) -> 72/3.75 (GFR 13) today observed on outpt labs for monitoring  suspect pre-renal etiology vs drug-induced (direct nephrotoxicity, less likely AIN)    protein:Cr elevation though not nephrotic range  - bladder scan/renal US to r/o obstructive nephropathy however pt w/ urine output in ED   - add on Uurea to calc FeUrea given diuretic use to assist in exposing etiology and det whether fluid challenge appropriate (pt euvolemic appearing overall on exam apart from LE changes (which are confounded by chronic LE edema))  - add on CPK r/o rhabdo given dapto use   - strict Is/Os, daily wt  - trend BMP  - avoid nephrotoxins, dose meds per GFR  - nephro c/s in AM

## 2023-08-16 NOTE — H&P ADULT - PROBLEM SELECTOR PLAN 6
home lasix 20mg QD, hydralazine 50mg BID, lisinopril 20mg QD, toprol 50mg QD  - hold ACE, diuretics; c/w hydral, toprol  - monitor BP home lasix 20mg QD, hydralazine 50mg BID, lisinopril 20mg QD, toprol 50mg QD  - hold ACE, diuretics given BANDAR; c/w hydral, toprol  - monitor BP

## 2023-08-16 NOTE — ED ADULT NURSE NOTE - NSFALLUNIVINTERV_ED_ALL_ED
Bed/Stretcher in lowest position, wheels locked, appropriate side rails in place/Call bell, personal items and telephone in reach/Instruct patient to call for assistance before getting out of bed/chair/stretcher/Non-slip footwear applied when patient is off stretcher/[place] to call system/Physically safe environment - no spills, clutter or unnecessary equipment/Purposeful proactive rounding/Room/bathroom lighting operational, light cord in reach Bed/Stretcher in lowest position, wheels locked, appropriate side rails in place/Call bell, personal items and telephone in reach/Instruct patient to call for assistance before getting out of bed/chair/stretcher/Non-slip footwear applied when patient is off stretcher/[place] to call system/Physically safe environment - no spills, clutter or unnecessary equipment/Purposeful proactive rounding/Room/bathroom lighting operational, light cord in reach Bed/Stretcher in lowest position, wheels locked, appropriate side rails in place/Call bell, personal items and telephone in reach/Instruct patient to call for assistance before getting out of bed/chair/stretcher/Non-slip footwear applied when patient is off stretcher/[place] to call system/Physically safe environment - no spills, clutter or unnecessary equipment/Purposeful proactive rounding/Room/bathroom lighting operational, light cord in reach

## 2023-08-16 NOTE — ED PROVIDER NOTE - NSCAREINITIATED _GEN_ER
Physician Documentation                                                                           

 [facility]                                                                 

Name: Delroy Mcrae Jr                                                                            

Age: 83 yrs                                                                                       

Sex: Male                                                                                         

: 1937                                                                                   

MRN: [id number]                                                                                   

Arrival Date: 2021                                                                          

Time: :28                                                                                       

Account#: [id number]                                                                            

Bed 7                                                                                             

Private MD:                                                                                       

ED Physician Lalo Whitmore                                                                         

cardiologist.       

Juan Rosado RN                    RN   [id number]                                                  

Lalo Whitmore MD MD   [id number]                                                  

                                                                                                  

************************************************************************************************** Jose Boles(Attending)

## 2023-08-16 NOTE — H&P ADULT - NSHPPHYSICALEXAM_GEN_ALL_CORE
PHYSICAL EXAM:  GENERAL: Obese-appearing, seated upright in ED stretcher in NAD, well-groomed, well-developed, pleasant to interview  HEAD: Atraumatic, Normocephalic  EYES: Pupils equal in size, irregular in shape (pt reports is chronic), minimally reactive to light,  w/ some L eye strabismus (pt reports is chronic) conjunctiva and sclera clear  ENMT: No tonsillar erythema, exudates, or enlargement; Moist mucous membranes  NECK: Supple w/o JVD  NERVOUS SYSTEM: Alert & Oriented X4, Good concentration; UE strength 5/5, RLE weaker than LLE (lifting both minimally off bed), sensation intact b/l UE and LE   CHEST/LUNG: Clear to auscultation bilaterally; No rales, rhonchi, wheezing, or rubs  HEART: Regular rate and rhythm; No murmurs, rubs, or gallops  ABDOMEN: Soft, Nontender, Nondistended; Bowel sounds present. No guarding, rebound tenderness, or rigidity.  EXTREMITIES: 2+ Peripheral Pulses +b/l LE hyperpigmented, erythematous, scaly appearing w/ nonpitting edema, not warm, though tender bilaterally. Removed L foot wound dressing to reveal heel wound w/o overt bleed/purulence however RLE wound vac in place, pt requests not to unwrap dressing as she would prefer podiatry to eval in AM - no bleed/purulence saturating dressing. Pt able to wiggle toes b/l and sensation intact.

## 2023-08-16 NOTE — H&P ADULT - NSHPSOCIALHISTORY_GEN_ALL_CORE
lives alone, ambulates w/ walker, born in [facility] immigrated to  1960[place], retired book-keeper and school cafeteria  lives alone, ambulates w/ walker, born in [facility] immigrated to  1960[place], retired book-keeper and school cafeteria  lives alone, ambulates w/ walker, born in [facility] immigrated to  1960s, retired book-keeper and school cafeteria

## 2023-08-16 NOTE — H&P ADULT - NSHPREVIEWOFSYSTEMS_GEN_ALL_CORE
REVIEW OF SYSTEMS:  CONSTITUTIONAL: No fever or chills  EYES: No new visual disturbances or eye pain +L eye visual deficit chronic as per HPI  ENMT: No sinus or throat pain  RESPIRATORY: No shortness of breath or cough  CARDIOVASCULAR: No chest pain or dizziness +increased LE edema x days as per HPI  GASTROINTESTINAL: No abdominal or epigastric pain. No diarrhea or constipation. No melena or hematochezia.   GENITOURINARY: No dysuria or hematuria  NEUROLOGICAL: No headaches, new loss of strength or numbness (RLE weak compared to LLE since during last admit as per HPI)   MUSCULOSKELETAL: No joint pain or swelling; No muscle, back, or extremity pain

## 2023-08-17 LAB
ANION GAP SERPL CALC-SCNC: 13 MMOL/L — SIGNIFICANT CHANGE UP (ref 5–17)
BUN SERPL-MCNC: 70 MG/DL — HIGH (ref 7–23)
CALCIUM SERPL-MCNC: 9 MG/DL — SIGNIFICANT CHANGE UP (ref 8.4–10.5)
CHLORIDE SERPL-SCNC: 101 MMOL/L — SIGNIFICANT CHANGE UP (ref 96–108)
CHOLEST SERPL-MCNC: 122 MG/DL — SIGNIFICANT CHANGE UP
CK SERPL-CCNC: 63 U/L — SIGNIFICANT CHANGE UP (ref 25–170)
CK SERPL-CCNC: 68 U/L — SIGNIFICANT CHANGE UP (ref 25–170)
CO2 SERPL-SCNC: 20 MMOL/L — LOW (ref 22–31)
CREAT SERPL-MCNC: 3.27 MG/DL — HIGH (ref 0.5–1.3)
CULTURE RESULTS: SIGNIFICANT CHANGE UP
EGFR: 15 ML/MIN/1.73M2 — LOW
GLUCOSE BLDC GLUCOMTR-MCNC: 108 MG/DL — HIGH (ref 70–99)
GLUCOSE BLDC GLUCOMTR-MCNC: 154 MG/DL — HIGH (ref 70–99)
GLUCOSE BLDC GLUCOMTR-MCNC: 168 MG/DL — HIGH (ref 70–99)
GLUCOSE BLDC GLUCOMTR-MCNC: 93 MG/DL — SIGNIFICANT CHANGE UP (ref 70–99)
GLUCOSE SERPL-MCNC: 189 MG/DL — HIGH (ref 70–99)
HCT VFR BLD CALC: 27 % — LOW (ref 34.5–45)
HDLC SERPL-MCNC: 34 MG/DL — LOW
HGB BLD-MCNC: 8.9 G/DL — LOW (ref 11.5–15.5)
LIPID PNL WITH DIRECT LDL SERPL: 72 MG/DL — SIGNIFICANT CHANGE UP
MAGNESIUM SERPL-MCNC: 2.1 MG/DL — SIGNIFICANT CHANGE UP (ref 1.6–2.6)
MCHC RBC-ENTMCNC: 27.4 PG — SIGNIFICANT CHANGE UP (ref 27–34)
MCHC RBC-ENTMCNC: 33 GM/DL — SIGNIFICANT CHANGE UP (ref 32–36)
MCV RBC AUTO: 83.1 FL — SIGNIFICANT CHANGE UP (ref 80–100)
MRSA PCR RESULT.: SIGNIFICANT CHANGE UP
NON HDL CHOLESTEROL: 88 MG/DL — SIGNIFICANT CHANGE UP
NRBC # BLD: 0 /100 WBCS — SIGNIFICANT CHANGE UP (ref 0–0)
OSMOLALITY UR: 181 MOS/KG — LOW (ref 300–900)
PHOSPHATE SERPL-MCNC: 4.7 MG/DL — HIGH (ref 2.5–4.5)
PLATELET # BLD AUTO: 273 K/UL — SIGNIFICANT CHANGE UP (ref 150–400)
POTASSIUM SERPL-MCNC: 4.4 MMOL/L — SIGNIFICANT CHANGE UP (ref 3.5–5.3)
POTASSIUM SERPL-SCNC: 4.4 MMOL/L — SIGNIFICANT CHANGE UP (ref 3.5–5.3)
RBC # BLD: 3.25 M/UL — LOW (ref 3.8–5.2)
RBC # FLD: 14.7 % — HIGH (ref 10.3–14.5)
S AUREUS DNA NOSE QL NAA+PROBE: SIGNIFICANT CHANGE UP
SODIUM SERPL-SCNC: 134 MMOL/L — LOW (ref 135–145)
SPECIMEN SOURCE: SIGNIFICANT CHANGE UP
TRIGL SERPL-MCNC: 76 MG/DL — SIGNIFICANT CHANGE UP
UUN UR-MCNC: 270 MG/DL — SIGNIFICANT CHANGE UP
VANCOMYCIN TROUGH SERPL-MCNC: 10.6 UG/ML — SIGNIFICANT CHANGE UP (ref 10–20)
WBC # BLD: 5.84 K/UL — SIGNIFICANT CHANGE UP (ref 3.8–10.5)
WBC # FLD AUTO: 5.84 K/UL — SIGNIFICANT CHANGE UP (ref 3.8–10.5)

## 2023-08-17 PROCEDURE — 99223 1ST HOSP IP/OBS HIGH 75: CPT

## 2023-08-17 PROCEDURE — 76770 US EXAM ABDO BACK WALL COMP: CPT | Mod: 26

## 2023-08-17 RX ORDER — SODIUM CHLORIDE 9 MG/ML
1000 INJECTION INTRAMUSCULAR; INTRAVENOUS; SUBCUTANEOUS
Refills: 0 | Status: DISCONTINUED | OUTPATIENT
Start: 2023-08-17 | End: 2023-08-18

## 2023-08-17 RX ORDER — ACETAMINOPHEN 500 MG
975 TABLET ORAL ONCE
Refills: 0 | Status: COMPLETED | OUTPATIENT
Start: 2023-08-17 | End: 2023-08-17

## 2023-08-17 RX ADMIN — SODIUM CHLORIDE 100 MILLILITER(S): 9 INJECTION INTRAMUSCULAR; INTRAVENOUS; SUBCUTANEOUS at 10:12

## 2023-08-17 RX ADMIN — HEPARIN SODIUM 5000 UNIT(S): 5000 INJECTION INTRAVENOUS; SUBCUTANEOUS at 17:47

## 2023-08-17 RX ADMIN — CHLORHEXIDINE GLUCONATE 1 APPLICATION(S): 213 SOLUTION TOPICAL at 18:07

## 2023-08-17 RX ADMIN — ATORVASTATIN CALCIUM 40 MILLIGRAM(S): 80 TABLET, FILM COATED ORAL at 21:31

## 2023-08-17 RX ADMIN — Medication 250 MILLIGRAM(S): at 01:06

## 2023-08-17 RX ADMIN — PIPERACILLIN AND TAZOBACTAM 200 GRAM(S): 4; .5 INJECTION, POWDER, LYOPHILIZED, FOR SOLUTION INTRAVENOUS at 00:18

## 2023-08-17 RX ADMIN — INSULIN GLARGINE 13 UNIT(S): 100 INJECTION, SOLUTION SUBCUTANEOUS at 00:19

## 2023-08-17 RX ADMIN — PANTOPRAZOLE SODIUM 40 MILLIGRAM(S): 20 TABLET, DELAYED RELEASE ORAL at 07:33

## 2023-08-17 RX ADMIN — Medication 50 MILLIGRAM(S): at 17:46

## 2023-08-17 RX ADMIN — Medication 1 DROP(S): at 18:07

## 2023-08-17 RX ADMIN — Medication 975 MILLIGRAM(S): at 03:00

## 2023-08-17 RX ADMIN — Medication 1: at 08:19

## 2023-08-17 RX ADMIN — HEPARIN SODIUM 5000 UNIT(S): 5000 INJECTION INTRAVENOUS; SUBCUTANEOUS at 05:02

## 2023-08-17 RX ADMIN — INSULIN GLARGINE 13 UNIT(S): 100 INJECTION, SOLUTION SUBCUTANEOUS at 22:53

## 2023-08-17 RX ADMIN — TAMSULOSIN HYDROCHLORIDE 0.4 MILLIGRAM(S): 0.4 CAPSULE ORAL at 21:31

## 2023-08-17 RX ADMIN — Medication 4 UNIT(S): at 07:51

## 2023-08-17 RX ADMIN — Medication 4 UNIT(S): at 11:22

## 2023-08-17 RX ADMIN — Medication 50 MILLIGRAM(S): at 05:02

## 2023-08-17 RX ADMIN — Medication 4 UNIT(S): at 18:20

## 2023-08-17 RX ADMIN — Medication 0: at 00:21

## 2023-08-17 RX ADMIN — PIPERACILLIN AND TAZOBACTAM 25 GRAM(S): 4; .5 INJECTION, POWDER, LYOPHILIZED, FOR SOLUTION INTRAVENOUS at 07:33

## 2023-08-17 RX ADMIN — Medication 975 MILLIGRAM(S): at 02:39

## 2023-08-17 NOTE — CONSULT NOTE ADULT - SUBJECTIVE AND OBJECTIVE BOX
HPI: Ms. 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 She returned yesterday to the [facility] ER as advised by her outpatient physicians after routine bloodwork returned notable for a creatinine in the 3s. She endorsed mild increase in chronic leg swelling of late; she denies fever, rash, urinary changes, or other new symptoms. She last received Daptomycin 8/15; she last received Cipro 8/16 prior to coming to the ER. She received a dose of IV Vanco in the ER, and was placed on IV Zosyn; she is now off Cipro + Dapto.    I see that her meds from home included Hydralazine 50mg po bid, Lisinopril 20mg po qd, Lasix 20mg po qd, and Toprol 50mg po daily. I see that her SBP is in the 100s since admission; it was generally 140s-150s at the tail end of last admission. She is on the Toprol and Hydralazine for now; off Lasix + Lisinopril    PAST MEDICAL & SURGICAL HISTORY:  Diabetes  Hypertension  Hypercholesteremia  Wound of foot  Eye surgery  Cholecystectomy    Allergies  No Known Allergies    SOCIAL HISTORY:  Denies ETOh,Smoking,     FAMILY HISTORY:  FH: hypertension (Father, Mother)  FH: diabetes mellitus (Father)  FH: hyperlipidemia (Father, Mother)    REVIEW OF SYSTEMS:  CONSTITUTIONAL: No weakness, fevers or chills  EYES/ENT: No visual changes;  No vertigo or throat pain   NECK: No pain or stiffness  RESPIRATORY: No cough, wheezing, hemoptysis; No shortness of breath  CARDIOVASCULAR: No chest pain or palpitations; (+)b/l LE swelling  GASTROINTESTINAL: No abdominal or epigastric pain. No nausea, vomiting, or hematemesis; No diarrhea or constipation. No melena or hematochezia.  GENITOURINARY: No dysuria, frequency or hematuria  NEUROLOGICAL: No numbness or weakness  SKIN: No itching, burning, rashes, or lesions   All other review of systems is negative unless indicated above.    VITAL:  T(C): , Max: 36.9 (08-16-23 @ 16:46)  T(F): , Max: 98.5 (08-16-23 @ 16:46)  HR: 54 (08-17-23 @ 07:38)  BP: 104/65 (08-17-23 @ 07:38)  BP(mean): 78 (08-17-23 @ 07:38)  RR: 17 (08-17-23 @ 07:38)  SpO2: 95% (08-17-23 @ 07:38)  Wt(kg): --    PHYSICAL EXAM:  Constitutional: NAD, Alert  HEENT: NCAT, MMM  Neck: Supple, No JVD  Respiratory: CTA-b/l  Cardiovascular: pranav s1s2  Gastrointestinal: BS+, soft, NT/ND  Extremities: (+)RUE PICC  Neurological: no focal deficits; strength grossly intact  Back: no CVAT b/l  Skin: R heel wound dressed    LABS:                        8.9    5.84  )-----------( 273      ( 17 Aug 2023 05:06 )             27.0     Na(134)/K(4.4)/Cl(101)/HCO3(20)/BUN(70)/Cr(3.27)Glu(189)/Ca(9.0)/Mg(2.1)/PO4(4.7)    08-17 @ 05:06  Na(131)/K(5.0)/Cl(98)/HCO3(20)/BUN(72)/Cr(3.75)Glu(144)/Ca(9.6)/Mg(2.3)/PO4(5.2)    08-16 @ 18:20  Alb 3.5    (8/7/23) - BUN/creatinine: 21/1.14    (8/16/23) - UA - no blood, no prot; 3wbc, 1rbc    Sodium, Random Urine: 24 mmol/L (08-16 @ 19:47)  Creatinine, Random Urine: 35 mg/dL (08-16 @ 19:47)  Protein/Creatinine Ratio Calculation: 0.7 Ratio (08-16 @ 19:47)  Chloride, Random Urine: <20 mmol/L (08-16 @ 19:47)  Potassium, Random Urine: 12 mmol/L (08-16 @ 19:47)    IMAGING:  < from: Xray Chest 1 View AP/PA (08.16.23 @ 19:22) >  Clear lungs. Right PICC terminates in SVC.    < from: MR Ankle w/ IV Cont, Right (07.26.23 @ 18:31) >  1.  There is a ulcer overlying the heel roughly spanning 5.4 cm extending   to the lateral calcaneus. There is minimal osseous edema within the   lateral calcaneus with mild postcontrast enhancement and without   significant loss of normal T1 fatty marrow. This is possibly representing   early osteomyelitis versus reactive in nature.  2.  Questionable minimal osseous edema within the fourth metatarsal head   with postcontrast enhancement which is incompletely evaluated. These   findings are possibly secondary to reactive degenerative changes versus   osteomyelitis if there is adjacent ulcer. Clinical correlation is advised.        ASSESSMENT:  (1)BANDAR - is this all prerenally mediated? The fact that the creatinine has already dropped from 3.7 to 3.2 (off Lisinopril and off Lasix) argues in favor of prerenal azotemia. Additionally, I see that he systolic BP is significantly lower now than it was 1-2 weeks ago (100s rather than 140s), placing her at risk for hemodynamically mediated BANDAR. Whereas Daptomycin can cause rhabdomyolysis, the lack of blood on UA on admission argues against this...that being said, I would certainly check a CPK to confirm the lack of rhabdo/rhabdo-associated BANDAR here. The lack of significant pyuria here argues against Cipro-associated AIN.    (2)Lytes - acceptable      RECOMMEND:  (1)No Lasix for now    (2)No Lisinopril for now    (3)Give NS 100cc/h x 10h    (4)No objection to use of Ciprofloxacin at this point. Would refrain from use of Daptomycin for now    (5)Check CPK with next lab set    (6)BMP+Mg+PO4 daily    (7)Dose new meds for GFR 15-20ml/min      Thank you for involving [facility] in this patient's care.    With warm regards,    Richard Harrison MD   [facility]  Office: [phone]  Cell: [phone]               HPI: Ms. 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 She returned yesterday to the [facility] ER as advised by her outpatient physicians after routine bloodwork returned notable for a creatinine in the 3s. She endorsed mild increase in chronic leg swelling of late; she denies fever, rash, urinary changes, or other new symptoms. She last received Daptomycin 8/15; she last received Cipro 8/16 prior to coming to the ER. She received a dose of IV Vanco in the ER, and was placed on IV Zosyn; she is now off Cipro + Dapto.    I see that her meds from home included Hydralazine 50mg po bid, Lisinopril 20mg po qd, Lasix 20mg po qd, and Toprol 50mg po daily. I see that her SBP is in the 100s since admission; it was generally 140s-150s at the tail end of last admission. She is on the Toprol and Hydralazine for now; off Lasix + Lisinopril    PAST MEDICAL & SURGICAL HISTORY:  Diabetes  Hypertension  Hypercholesteremia  Wound of foot  Eye surgery  Cholecystectomy    Allergies  No Known Allergies    SOCIAL HISTORY:  Denies ETOh,Smoking,     FAMILY HISTORY:  FH: hypertension (Father, Mother)  FH: diabetes mellitus (Father)  FH: hyperlipidemia (Father, Mother)    REVIEW OF SYSTEMS:  CONSTITUTIONAL: No weakness, fevers or chills  EYES/ENT: No visual changes;  No vertigo or throat pain   NECK: No pain or stiffness  RESPIRATORY: No cough, wheezing, hemoptysis; No shortness of breath  CARDIOVASCULAR: No chest pain or palpitations; (+)b/l LE swelling  GASTROINTESTINAL: No abdominal or epigastric pain. No nausea, vomiting, or hematemesis; No diarrhea or constipation. No melena or hematochezia.  GENITOURINARY: No dysuria, frequency or hematuria  NEUROLOGICAL: No numbness or weakness  SKIN: No itching, burning, rashes, or lesions   All other review of systems is negative unless indicated above.    VITAL:  T(C): , Max: 36.9 (08-16-23 @ 16:46)  T(F): , Max: 98.5 (08-16-23 @ 16:46)  HR: 54 (08-17-23 @ 07:38)  BP: 104/65 (08-17-23 @ 07:38)  BP(mean): 78 (08-17-23 @ 07:38)  RR: 17 (08-17-23 @ 07:38)  SpO2: 95% (08-17-23 @ 07:38)  Wt(kg): --    PHYSICAL EXAM:  Constitutional: NAD, Alert  HEENT: NCAT, MMM  Neck: Supple, No JVD  Respiratory: CTA-b/l  Cardiovascular: pranav s1s2  Gastrointestinal: BS+, soft, NT/ND  Extremities: (+)RUE PICC  Neurological: no focal deficits; strength grossly intact  Back: no CVAT b/l  Skin: R heel wound dressed    LABS:                        8.9    5.84  )-----------( 273      ( 17 Aug 2023 05:06 )             27.0     Na(134)/K(4.4)/Cl(101)/HCO3(20)/BUN(70)/Cr(3.27)Glu(189)/Ca(9.0)/Mg(2.1)/PO4(4.7)    08-17 @ 05:06  Na(131)/K(5.0)/Cl(98)/HCO3(20)/BUN(72)/Cr(3.75)Glu(144)/Ca(9.6)/Mg(2.3)/PO4(5.2)    08-16 @ 18:20  Alb 3.5    (8/7/23) - BUN/creatinine: 21/1.14    (8/16/23) - UA - no blood, no prot; 3wbc, 1rbc    Sodium, Random Urine: 24 mmol/L (08-16 @ 19:47)  Creatinine, Random Urine: 35 mg/dL (08-16 @ 19:47)  Protein/Creatinine Ratio Calculation: 0.7 Ratio (08-16 @ 19:47)  Chloride, Random Urine: <20 mmol/L (08-16 @ 19:47)  Potassium, Random Urine: 12 mmol/L (08-16 @ 19:47)    IMAGING:  < from: Xray Chest 1 View AP/PA (08.16.23 @ 19:22) >  Clear lungs. Right PICC terminates in SVC.    < from: MR Ankle w/ IV Cont, Right (07.26.23 @ 18:31) >  1.  There is a ulcer overlying the heel roughly spanning 5.4 cm extending   to the lateral calcaneus. There is minimal osseous edema within the   lateral calcaneus with mild postcontrast enhancement and without   significant loss of normal T1 fatty marrow. This is possibly representing   early osteomyelitis versus reactive in nature.  2.  Questionable minimal osseous edema within the fourth metatarsal head   with postcontrast enhancement which is incompletely evaluated. These   findings are possibly secondary to reactive degenerative changes versus   osteomyelitis if there is adjacent ulcer. Clinical correlation is advised.        ASSESSMENT:  (1)BANDAR - is this all prerenally mediated? The fact that the creatinine has already dropped from 3.7 to 3.2 (off Lisinopril and off Lasix) argues in favor of prerenal azotemia. Additionally, I see that he systolic BP is significantly lower now than it was 1-2 weeks ago (100s rather than 140s), placing her at risk for hemodynamically mediated BANDAR. Whereas Daptomycin can cause rhabdomyolysis, the lack of blood on UA on admission argues against this...that being said, I would certainly check a CPK to confirm the lack of rhabdo/rhabdo-associated BANDAR here. The lack of significant pyuria here argues against Cipro-associated AIN.    (2)Lytes - acceptable      RECOMMEND:  (1)No Lasix for now    (2)No Lisinopril for now    (3)Give NS 100cc/h x 10h    (4)No objection to use of Ciprofloxacin at this point. Would refrain from use of Daptomycin for now    (5)Check CPK with next lab set    (6)BMP+Mg+PO4 daily    (7)Dose new meds for GFR 15-20ml/min      Thank you for involving [facility] in this patient's care.    With warm regards,    Richard Harrison MD   [facility]  Office: [phone]  Cell: [phone]               HPI: Ms. 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 She returned yesterday to the [facility] ER as advised by her outpatient physicians after routine bloodwork returned notable for a creatinine in the 3s. She endorsed mild increase in chronic leg swelling of late; she denies fever, rash, urinary changes, or other new symptoms. She last received Daptomycin 8/15; she last received Cipro 8/16 prior to coming to the ER. She received a dose of IV Vanco in the ER, and was placed on IV Zosyn; she is now off Cipro + Dapto.    I see that her meds from home included Hydralazine 50mg po bid, Lisinopril 20mg po qd, Lasix 20mg po qd, and Toprol 50mg po daily. I see that her SBP is in the 100s since admission; it was generally 140s-150s at the tail end of last admission. She is on the Toprol and Hydralazine for now; off Lasix + Lisinopril    PAST MEDICAL & SURGICAL HISTORY:  Diabetes  Hypertension  Hypercholesteremia  Wound of foot  Eye surgery  Cholecystectomy    Allergies  No Known Allergies    SOCIAL HISTORY:  Denies ETOh,Smoking,     FAMILY HISTORY:  FH: hypertension (Father, Mother)  FH: diabetes mellitus (Father)  FH: hyperlipidemia (Father, Mother)    REVIEW OF SYSTEMS:  CONSTITUTIONAL: No weakness, fevers or chills  EYES/ENT: No visual changes;  No vertigo or throat pain   NECK: No pain or stiffness  RESPIRATORY: No cough, wheezing, hemoptysis; No shortness of breath  CARDIOVASCULAR: No chest pain or palpitations; (+)b/l LE swelling  GASTROINTESTINAL: No abdominal or epigastric pain. No nausea, vomiting, or hematemesis; No diarrhea or constipation. No melena or hematochezia.  GENITOURINARY: No dysuria, frequency or hematuria  NEUROLOGICAL: No numbness or weakness  SKIN: No itching, burning, rashes, or lesions   All other review of systems is negative unless indicated above.    VITAL:  T(C): , Max: 36.9 (08-16-23 @ 16:46)  T(F): , Max: 98.5 (08-16-23 @ 16:46)  HR: 54 (08-17-23 @ 07:38)  BP: 104/65 (08-17-23 @ 07:38)  BP(mean): 78 (08-17-23 @ 07:38)  RR: 17 (08-17-23 @ 07:38)  SpO2: 95% (08-17-23 @ 07:38)  Wt(kg): --    PHYSICAL EXAM:  Constitutional: NAD, Alert  HEENT: NCAT, MMM  Neck: Supple, No JVD  Respiratory: CTA-b/l  Cardiovascular: pranav s1s2  Gastrointestinal: BS+, soft, NT/ND  Extremities: (+)RUE PICC  Neurological: no focal deficits; strength grossly intact  Back: no CVAT b/l  Skin: R heel wound dressed    LABS:                        8.9    5.84  )-----------( 273      ( 17 Aug 2023 05:06 )             27.0     Na(134)/K(4.4)/Cl(101)/HCO3(20)/BUN(70)/Cr(3.27)Glu(189)/Ca(9.0)/Mg(2.1)/PO4(4.7)    08-17 @ 05:06  Na(131)/K(5.0)/Cl(98)/HCO3(20)/BUN(72)/Cr(3.75)Glu(144)/Ca(9.6)/Mg(2.3)/PO4(5.2)    08-16 @ 18:20  Alb 3.5    (8/7/23) - BUN/creatinine: 21/1.14    (8/16/23) - UA - no blood, no prot; 3wbc, 1rbc    Sodium, Random Urine: 24 mmol/L (08-16 @ 19:47)  Creatinine, Random Urine: 35 mg/dL (08-16 @ 19:47)  Protein/Creatinine Ratio Calculation: 0.7 Ratio (08-16 @ 19:47)  Chloride, Random Urine: <20 mmol/L (08-16 @ 19:47)  Potassium, Random Urine: 12 mmol/L (08-16 @ 19:47)    IMAGING:  < from: Xray Chest 1 View AP/PA (08.16.23 @ 19:22) >  Clear lungs. Right PICC terminates in SVC.    < from: MR Ankle w/ IV Cont, Right (07.26.23 @ 18:31) >  1.  There is a ulcer overlying the heel roughly spanning 5.4 cm extending   to the lateral calcaneus. There is minimal osseous edema within the   lateral calcaneus with mild postcontrast enhancement and without   significant loss of normal T1 fatty marrow. This is possibly representing   early osteomyelitis versus reactive in nature.  2.  Questionable minimal osseous edema within the fourth metatarsal head   with postcontrast enhancement which is incompletely evaluated. These   findings are possibly secondary to reactive degenerative changes versus   osteomyelitis if there is adjacent ulcer. Clinical correlation is advised.        ASSESSMENT:  (1)BANDAR - is this all prerenally mediated? The fact that the creatinine has already dropped from 3.7 to 3.2 (off Lisinopril and off Lasix) argues in favor of prerenal azotemia. Additionally, I see that he systolic BP is significantly lower now than it was 1-2 weeks ago (100s rather than 140s), placing her at risk for hemodynamically mediated BANDAR. Whereas Daptomycin can cause rhabdomyolysis, the lack of blood on UA on admission argues against this...that being said, I would certainly check a CPK to confirm the lack of rhabdo/rhabdo-associated BANDAR here. The lack of significant pyuria here argues against Cipro-associated AIN.    (2)Lytes - acceptable      RECOMMEND:  (1)No Lasix for now    (2)No Lisinopril for now    (3)Give NS 100cc/h x 10h    (4)No objection to use of Ciprofloxacin at this point. Would refrain from use of Daptomycin for now    (5)Check CPK with next lab set    (6)BMP+Mg+PO4 daily    (7)Dose new meds for GFR 15-20ml/min      Thank you for involving [facility] in this patient's care.    With warm regards,    Richard Harrison MD   [facility]  Office: [phone]  Cell: [phone]               HPI: Ms. 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 She returned yesterday to the [facility] ER as advised by her outpatient physicians after routine bloodwork returned notable for a creatinine in the 3s. She endorsed mild increase in chronic leg swelling of late; she denies fever, rash, urinary changes, or other new symptoms. She last received Daptomycin 8/15; she last received Cipro 8/16 prior to coming to the ER. She received a dose of IV Vanco in the ER, and was placed on IV Zosyn; she is now off Cipro + Dapto.    I see that her meds from home included Hydralazine 50mg po bid, Lisinopril 20mg po qd, Lasix 20mg po qd, and Toprol 50mg po daily. I see that her SBP is in the 100s since admission; it was generally 140s-150s at the tail end of last admission. She is on the Toprol and Hydralazine for now; off Lasix + Lisinopril    Ms. Castle denies notable urinary changes. She denies NSAID use. She denies past history of CKD or BANDAR.      PAST MEDICAL & SURGICAL HISTORY:  Diabetes  Hypertension  Hypercholesteremia  Wound of foot  Eye surgery  Cholecystectomy    Allergies  No Known Allergies    SOCIAL HISTORY:  Denies ETOh,Smoking,     FAMILY HISTORY:  FH: hypertension (Father, Mother)  FH: diabetes mellitus (Father)  FH: hyperlipidemia (Father, Mother)    REVIEW OF SYSTEMS:  CONSTITUTIONAL: No weakness, fevers or chills  EYES/ENT: No visual changes;  No vertigo or throat pain   NECK: No pain or stiffness  RESPIRATORY: No cough, wheezing, hemoptysis; No shortness of breath  CARDIOVASCULAR: No chest pain or palpitations; (+)b/l LE swelling  GASTROINTESTINAL: No abdominal or epigastric pain. No nausea, vomiting, or hematemesis; No diarrhea or constipation. No melena or hematochezia.  GENITOURINARY: No dysuria, frequency or hematuria  NEUROLOGICAL: No numbness or weakness  SKIN: No itching, burning, rashes, or lesions   All other review of systems is negative unless indicated above.    VITAL:  T(C): , Max: 36.9 (08-16-23 @ 16:46)  T(F): , Max: 98.5 (08-16-23 @ 16:46)  HR: 54 (08-17-23 @ 07:38)  BP: 104/65 (08-17-23 @ 07:38)  BP(mean): 78 (08-17-23 @ 07:38)  RR: 17 (08-17-23 @ 07:38)  SpO2: 95% (08-17-23 @ 07:38)  Wt(kg): --    PHYSICAL EXAM:  Constitutional: NAD, Alert  HEENT: NCAT, MMM  Neck: Supple, No JVD  Respiratory: CTA-b/l  Cardiovascular: pranav reg s1s2, (+)1/6 DEREJE  Gastrointestinal: BS+, soft, NT/ND  : (+)primafit  Extremities: (+)RUE PICC; 3+ b/l LE edema  Neurological: no focal deficits; strength grossly intact  Back: no CVAT b/l  Skin: R heel wound dressed; (+)fibrotic changes of LE from chronic dermatitis    LABS:                        8.9    5.84  )-----------( 273      ( 17 Aug 2023 05:06 )             27.0     Na(134)/K(4.4)/Cl(101)/HCO3(20)/BUN(70)/Cr(3.27)Glu(189)/Ca(9.0)/Mg(2.1)/PO4(4.7)    08-17 @ 05:06  Na(131)/K(5.0)/Cl(98)/HCO3(20)/BUN(72)/Cr(3.75)Glu(144)/Ca(9.6)/Mg(2.3)/PO4(5.2)    08-16 @ 18:20  Alb 3.5    (8/7/23) - BUN/creatinine: 21/1.14    (8/16/23) - UA - no blood, no prot; 3wbc, 1rbc    Sodium, Random Urine: 24 mmol/L (08-16 @ 19:47)  Creatinine, Random Urine: 35 mg/dL (08-16 @ 19:47)  Protein/Creatinine Ratio Calculation: 0.7 Ratio (08-16 @ 19:47)  Chloride, Random Urine: <20 mmol/L (08-16 @ 19:47)  Potassium, Random Urine: 12 mmol/L (08-16 @ 19:47)    IMAGING:  < from: Xray Chest 1 View AP/PA (08.16.23 @ 19:22) >  Clear lungs. Right PICC terminates in SVC.    < from: MR Ankle w/ IV Cont, Right (07.26.23 @ 18:31) >  1.  There is a ulcer overlying the heel roughly spanning 5.4 cm extending   to the lateral calcaneus. There is minimal osseous edema within the   lateral calcaneus with mild postcontrast enhancement and without   significant loss of normal T1 fatty marrow. This is possibly representing   early osteomyelitis versus reactive in nature.  2.  Questionable minimal osseous edema within the fourth metatarsal head   with postcontrast enhancement which is incompletely evaluated. These   findings are possibly secondary to reactive degenerative changes versus   osteomyelitis if there is adjacent ulcer. Clinical correlation is advised.        ASSESSMENT:  (1)BANDAR - is this all prerenally mediated? The fact that the creatinine has already dropped from 3.7 to 3.2 (off Lisinopril and off Lasix) argues in favor of prerenal azotemia. Additionally, I see that he systolic BP is significantly lower now than it was 1-2 weeks ago (100s rather than 140s), placing her at risk for hemodynamically mediated BANDAR. Whereas Daptomycin can cause rhabdomyolysis, the lack of blood on UA on admission argues against this...that being said, I would certainly check a CPK to confirm the lack of rhabdo/rhabdo-associated BANDAR here. The lack of significant pyuria here argues against Cipro-associated AIN.    (2)Lytes - acceptable      RECOMMEND:  (1)No Lasix for now    (2)No Lisinopril for now    (3)Give NS 100cc/h x 10h    (4)No objection to use of Ciprofloxacin at this point. Would refrain from use of Daptomycin for now    (5)Check CPK with next lab set    (6)BMP+Mg+PO4 daily    (7)Dose new meds for GFR 15-20ml/min      Thank you for involving [facility] in this patient's care.    With warm regards,    Richard Harrison MD   [facility]  Office: [phone]  Cell: [phone]               HPI: Ms. 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 She returned yesterday to the [facility] ER as advised by her outpatient physicians after routine bloodwork returned notable for a creatinine in the 3s. She endorsed mild increase in chronic leg swelling of late; she denies fever, rash, urinary changes, or other new symptoms. She last received Daptomycin 8/15; she last received Cipro 8/16 prior to coming to the ER. She received a dose of IV Vanco in the ER, and was placed on IV Zosyn; she is now off Cipro + Dapto.    I see that her meds from home included Hydralazine 50mg po bid, Lisinopril 20mg po qd, Lasix 20mg po qd, and Toprol 50mg po daily. I see that her SBP is in the 100s since admission; it was generally 140s-150s at the tail end of last admission. She is on the Toprol and Hydralazine for now; off Lasix + Lisinopril    Ms. Castle denies notable urinary changes. She denies NSAID use. She denies past history of CKD or BANDAR.      PAST MEDICAL & SURGICAL HISTORY:  Diabetes  Hypertension  Hypercholesteremia  Wound of foot  Eye surgery  Cholecystectomy    Allergies  No Known Allergies    SOCIAL HISTORY:  Denies ETOh,Smoking,     FAMILY HISTORY:  FH: hypertension (Father, Mother)  FH: diabetes mellitus (Father)  FH: hyperlipidemia (Father, Mother)    REVIEW OF SYSTEMS:  CONSTITUTIONAL: No weakness, fevers or chills  EYES/ENT: No visual changes;  No vertigo or throat pain   NECK: No pain or stiffness  RESPIRATORY: No cough, wheezing, hemoptysis; No shortness of breath  CARDIOVASCULAR: No chest pain or palpitations; (+)b/l LE swelling  GASTROINTESTINAL: No abdominal or epigastric pain. No nausea, vomiting, or hematemesis; No diarrhea or constipation. No melena or hematochezia.  GENITOURINARY: No dysuria, frequency or hematuria  NEUROLOGICAL: No numbness or weakness  SKIN: No itching, burning, rashes, or lesions   All other review of systems is negative unless indicated above.    VITAL:  T(C): , Max: 36.9 (08-16-23 @ 16:46)  T(F): , Max: 98.5 (08-16-23 @ 16:46)  HR: 54 (08-17-23 @ 07:38)  BP: 104/65 (08-17-23 @ 07:38)  BP(mean): 78 (08-17-23 @ 07:38)  RR: 17 (08-17-23 @ 07:38)  SpO2: 95% (08-17-23 @ 07:38)  Wt(kg): --    PHYSICAL EXAM:  Constitutional: NAD, Alert  HEENT: NCAT, MMM  Neck: Supple, No JVD  Respiratory: CTA-b/l  Cardiovascular: pranav reg s1s2, (+)1/6 DEREJE  Gastrointestinal: BS+, soft, NT/ND  : (+)primafit  Extremities: (+)RUE PICC; 3+ b/l LE edema  Neurological: no focal deficits; strength grossly intact  Back: no CVAT b/l  Skin: R heel wound dressed; (+)fibrotic changes of LE from chronic dermatitis    LABS:                        8.9    5.84  )-----------( 273      ( 17 Aug 2023 05:06 )             27.0     Na(134)/K(4.4)/Cl(101)/HCO3(20)/BUN(70)/Cr(3.27)Glu(189)/Ca(9.0)/Mg(2.1)/PO4(4.7)    08-17 @ 05:06  Na(131)/K(5.0)/Cl(98)/HCO3(20)/BUN(72)/Cr(3.75)Glu(144)/Ca(9.6)/Mg(2.3)/PO4(5.2)    08-16 @ 18:20  Alb 3.5    (8/7/23) - BUN/creatinine: 21/1.14    (8/16/23) - UA - no blood, no prot; 3wbc, 1rbc    Sodium, Random Urine: 24 mmol/L (08-16 @ 19:47)  Creatinine, Random Urine: 35 mg/dL (08-16 @ 19:47)  Protein/Creatinine Ratio Calculation: 0.7 Ratio (08-16 @ 19:47)  Chloride, Random Urine: <20 mmol/L (08-16 @ 19:47)  Potassium, Random Urine: 12 mmol/L (08-16 @ 19:47)    IMAGING:  < from: Xray Chest 1 View AP/PA (08.16.23 @ 19:22) >  Clear lungs. Right PICC terminates in SVC.    < from: MR Ankle w/ IV Cont, Right (07.26.23 @ 18:31) >  1.  There is a ulcer overlying the heel roughly spanning 5.4 cm extending   to the lateral calcaneus. There is minimal osseous edema within the   lateral calcaneus with mild postcontrast enhancement and without   significant loss of normal T1 fatty marrow. This is possibly representing   early osteomyelitis versus reactive in nature.  2.  Questionable minimal osseous edema within the fourth metatarsal head   with postcontrast enhancement which is incompletely evaluated. These   findings are possibly secondary to reactive degenerative changes versus   osteomyelitis if there is adjacent ulcer. Clinical correlation is advised.        ASSESSMENT:  (1)BANDAR - is this all prerenally mediated? The fact that the creatinine has already dropped from 3.7 to 3.2 (off Lisinopril and off Lasix) argues in favor of prerenal azotemia. Additionally, I see that he systolic BP is significantly lower now than it was 1-2 weeks ago (100s rather than 140s), placing her at risk for hemodynamically mediated BANDAR. Whereas Daptomycin can cause rhabdomyolysis, the lack of blood on UA on admission argues against this...that being said, I would certainly check a CPK to confirm the lack of rhabdo/rhabdo-associated BANDAR here. The lack of significant pyuria here argues against Cipro-associated AIN.    (2)Lytes - acceptable      RECOMMEND:  (1)No Lasix for now    (2)No Lisinopril for now    (3)Give NS 100cc/h x 10h    (4)No objection to use of Ciprofloxacin at this point. Would refrain from use of Daptomycin for now    (5)Check CPK with next lab set    (6)BMP+Mg+PO4 daily    (7)Dose new meds for GFR 15-20ml/min      Thank you for involving [facility] in this patient's care.    With warm regards,    Richard Harrison MD   [facility]  Office: [phone]  Cell: [phone]               HPI: Ms. 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 She returned yesterday to the [facility] ER as advised by her outpatient physicians after routine bloodwork returned notable for a creatinine in the 3s. She endorsed mild increase in chronic leg swelling of late; she denies fever, rash, urinary changes, or other new symptoms. She last received Daptomycin 8/15; she last received Cipro 8/16 prior to coming to the ER. She received a dose of IV Vanco in the ER, and was placed on IV Zosyn; she is now off Cipro + Dapto.    I see that her meds from home included Hydralazine 50mg po bid, Lisinopril 20mg po qd, Lasix 20mg po qd, and Toprol 50mg po daily. I see that her SBP is in the 100s since admission; it was generally 140s-150s at the tail end of last admission. She is on the Toprol and Hydralazine for now; off Lasix + Lisinopril    Ms. Castle denies notable urinary changes. She denies NSAID use. She denies past history of CKD or BANDAR.      PAST MEDICAL & SURGICAL HISTORY:  Diabetes  Hypertension  Hypercholesteremia  Wound of foot  Eye surgery  Cholecystectomy    Allergies  No Known Allergies    SOCIAL HISTORY:  Denies ETOh,Smoking,     FAMILY HISTORY:  FH: hypertension (Father, Mother)  FH: diabetes mellitus (Father)  FH: hyperlipidemia (Father, Mother)    REVIEW OF SYSTEMS:  CONSTITUTIONAL: No weakness, fevers or chills  EYES/ENT: No visual changes;  No vertigo or throat pain   NECK: No pain or stiffness  RESPIRATORY: No cough, wheezing, hemoptysis; No shortness of breath  CARDIOVASCULAR: No chest pain or palpitations; (+)b/l LE swelling  GASTROINTESTINAL: No abdominal or epigastric pain. No nausea, vomiting, or hematemesis; No diarrhea or constipation. No melena or hematochezia.  GENITOURINARY: No dysuria, frequency or hematuria  NEUROLOGICAL: No numbness or weakness  SKIN: No itching, burning, rashes, or lesions   All other review of systems is negative unless indicated above.    VITAL:  T(C): , Max: 36.9 (08-16-23 @ 16:46)  T(F): , Max: 98.5 (08-16-23 @ 16:46)  HR: 54 (08-17-23 @ 07:38)  BP: 104/65 (08-17-23 @ 07:38)  BP(mean): 78 (08-17-23 @ 07:38)  RR: 17 (08-17-23 @ 07:38)  SpO2: 95% (08-17-23 @ 07:38)  Wt(kg): --    PHYSICAL EXAM:  Constitutional: NAD, Alert  HEENT: NCAT, MMM  Neck: Supple, No JVD  Respiratory: CTA-b/l  Cardiovascular: pranav reg s1s2, (+)1/6 DEREJE  Gastrointestinal: BS+, soft, NT/ND  : (+)primafit  Extremities: (+)RUE PICC; 3+ b/l LE edema  Neurological: no focal deficits; strength grossly intact  Back: no CVAT b/l  Skin: R heel wound dressed; (+)fibrotic changes of LE from chronic dermatitis    LABS:                        8.9    5.84  )-----------( 273      ( 17 Aug 2023 05:06 )             27.0     Na(134)/K(4.4)/Cl(101)/HCO3(20)/BUN(70)/Cr(3.27)Glu(189)/Ca(9.0)/Mg(2.1)/PO4(4.7)    08-17 @ 05:06  Na(131)/K(5.0)/Cl(98)/HCO3(20)/BUN(72)/Cr(3.75)Glu(144)/Ca(9.6)/Mg(2.3)/PO4(5.2)    08-16 @ 18:20  Alb 3.5    (8/7/23) - BUN/creatinine: 21/1.14    (8/16/23) - UA - no blood, no prot; 3wbc, 1rbc    Sodium, Random Urine: 24 mmol/L (08-16 @ 19:47)  Creatinine, Random Urine: 35 mg/dL (08-16 @ 19:47)  Protein/Creatinine Ratio Calculation: 0.7 Ratio (08-16 @ 19:47)  Chloride, Random Urine: <20 mmol/L (08-16 @ 19:47)  Potassium, Random Urine: 12 mmol/L (08-16 @ 19:47)    IMAGING:  < from: Xray Chest 1 View AP/PA (08.16.23 @ 19:22) >  Clear lungs. Right PICC terminates in SVC.    < from: MR Ankle w/ IV Cont, Right (07.26.23 @ 18:31) >  1.  There is a ulcer overlying the heel roughly spanning 5.4 cm extending   to the lateral calcaneus. There is minimal osseous edema within the   lateral calcaneus with mild postcontrast enhancement and without   significant loss of normal T1 fatty marrow. This is possibly representing   early osteomyelitis versus reactive in nature.  2.  Questionable minimal osseous edema within the fourth metatarsal head   with postcontrast enhancement which is incompletely evaluated. These   findings are possibly secondary to reactive degenerative changes versus   osteomyelitis if there is adjacent ulcer. Clinical correlation is advised.        ASSESSMENT:  (1)BANDAR - is this all prerenally mediated? The fact that the creatinine has already dropped from 3.7 to 3.2 (off Lisinopril and off Lasix) argues in favor of prerenal azotemia. Additionally, I see that he systolic BP is significantly lower now than it was 1-2 weeks ago (100s rather than 140s), placing her at risk for hemodynamically mediated BANDAR. Whereas Daptomycin can cause rhabdomyolysis, the lack of blood on UA on admission argues against this...that being said, I would certainly check a CPK to confirm the lack of rhabdo/rhabdo-associated BANDAR here. The lack of significant pyuria here argues against Cipro-associated AIN.    (2)Lytes - acceptable      RECOMMEND:  (1)No Lasix for now    (2)No Lisinopril for now    (3)Give NS 100cc/h x 10h    (4)No objection to use of Ciprofloxacin at this point. Would refrain from use of Daptomycin for now    (5)Check CPK with next lab set    (6)BMP+Mg+PO4 daily    (7)Dose new meds for GFR 15-20ml/min      Thank you for involving [facility] in this patient's care.    With warm regards,    Richard Harrison MD   [facility]  Office: [phone]  Cell: [phone]

## 2023-08-17 NOTE — CONSULT NOTE ADULT - SUBJECTIVE AND OBJECTIVE BOX
Patient is a 63y old  Female who presents with a chief complaint of BANDAR (17 Aug 2023 08:03)    HPI:  63F IDDM, HTN, HLD, recent admit 7/25-8/7 iso b/l heel wound c/f OM s/p debridement w/ R foot graft application and bone bx (7/27) subsequent vac application dc'd on dapto and cipro (plan until 9/7) via RUE PICC who presents for elevated SCr on outpt lab monitoring. Pt reports feeling generally well in interim of discharge, though has noticed mild increase in chronic LE edema x days (superimposed on b/l LE edema since 1990s per her). She denies new med intake, change in PO intake (tends not to drink enough water but states this is not new), n/v/d, melena/hematochezia, dysuria/hematuria, change in urinary frequency. Otherwise denies HA, fever, chills, new visual disturbance (L visual deficit at bl x yrs per pt, s/p ophtho surgeries), rhinorrhea, odynophagia, dysphagia, CP, palpitations, SOB, cough, abd pain, new numbness/weakness/tingling (states RLE weak compared to LLE after pods procedure last admit) or other complaint.    Of note she relays last dapto dose 8/15, last cipro dose this AM. She states that she has been reducing basal/bolus insulin dose at home given borderline low FS.    ED Course  VS: afebrile tmax 36.9C, HR 60s, BP 120s-130s/60s, RR 16-20, SpO2% 97-98 RA  Labs: normocytic (MCV 81.6) anemia H/H 9.9/29.3; hypoNa 131, BUN/SCr 72/3.75 (GFR 13), phos 5.2, unable to calc FeUrea (urine urea unavailable), otherwise w/ elevated protein/Cr ratio 0.7   Imaging:  - CXR: clear lungs, R PICC terminates in SVC   Received: n/a    admit to medicine for further eval/mgt    (16 Aug 2023 20:50)    recently hospitalized  7/25 --> 8/7/23   62 y/o F PMHx of DMH2 on insulin &7/26/23: A1C= 8.4%), HTN, HLD, BMI = 35.3,  bilateral heel ulcer and fever to 102F day prior to admission. ID consulted for eval of bilateral foot ulcer.   admitted 7/25/23  She notes nausea, emesis malaise  Right heel ulcer is long standing - Currently with extensive black eschar with sl separation at edges, drainage on dressing and malodour with likley underlying  infection, however malodor suggests infection.     Antibiotics  Vanco 7/25--> 7/26; 7/31--> 8/7; 8/16 -->  Zosyn 7/25-->8/7; 8/16 -->  Daptomycin 8/8 --> 8/16  Cipro 500 mg po  8/8 --> 8/16    MRI foot suggested  possible osteomyelitis calcaneus  however bone bx was negative for osteo    7/25 ESR/CRP = 85/41  7/26  nasal swab POSITIVE MRSA PCR .  7/27 bilateral foot wound debridement,  right foot wound debridement with Integra graft application and bone biopsy for culture/R/O osteo. Intraoperative finding suggested residual bone infection present.  VAC in placement    Patient was doing well at home, maintaining non weight bearing on heel with Vac dressing changed every M W F  8/15 Creat = 4.1  - after I received this lab value, I called and asked pt to come to ED    She is in no distress, denies foot pain      PAST MEDICAL & SURGICAL HISTORY:  Diabetes  Hypertension  Hypercholesteremia  Wound of foot  H/O eye surgery  S/P cholecystectomy    Social history:  has assistance of sister,   no tob    FAMILY HISTORY:  FH: hypertension (Father, Mother)    FH: diabetes mellitus (Father)    FH: hyperlipidemia (Father, Mother)      REVIEW OF SYSTEMS:  CONSTITUTIONAL: No weakness, fevers or chills; no lightheadeness  EYES/ENT: No visual changes;  No vertigo or throat pain   NECK: No pain or stiffness  RESPIRATORY: No cough, wheezing, hemoptysis; No shortness of breath  CARDIOVASCULAR: No chest pain or palpitations  GASTROINTESTINAL: No abdominal or epigastric pain. No nausea, vomiting, or hematemesis; No diarrhea or constipation. No melena or hematochezia.  GENITOURINARY: No dysuria, frequency or hematuria  NEUROLOGICAL: No numbness or weakness  SKIN: No itching, burning, rashes, or lesions   All other review of systems is negative unless indicated above    Allergies  No Known Allergies    Antimicrobials:  piperacillin/tazobactam IVPB.. 3.375 Gram(s) IV Intermittent every 12 hours      Vital Signs Last 24 Hrs  T(C): 36.7 (17 Aug 2023 07:38), Max: 36.9 (16 Aug 2023 16:46)  T(F): 98 (17 Aug 2023 07:38), Max: 98.5 (16 Aug 2023 16:46)  HR: 54 (17 Aug 2023 07:38) (54 - 69)  BP: 104/65 (17 Aug 2023 07:38) (104/65 - 132/65)  BP(mean): 78 (17 Aug 2023 07:38) (78 - 85)  RR: 17 (17 Aug 2023 07:38) (16 - 20)  SpO2: 95% (17 Aug 2023 07:38) (95% - 99%)    Parameters below as of 17 Aug 2023 05:00  Patient On (Oxygen Delivery Method): room air        PHYSICAL EXAM:  General: WN/WD NAD, Non-toxic  Neurology: A&Ox3, nonfocal  Respiratory: Clear to auscultation bilaterally  CV: RRR, S1S2, no murmurs, rubs or gallops  Abdominal: Soft, Non-tender, non-distended, normal bowel sounds  Extremities: le edema, chronic venous stasis changes,   right heel examined with podiatry  - wound is clean, granulated  Line Sites: Clear  Skin: No rash                          8.9    5.84  )-----------( 273      ( 17 Aug 2023 05:06 )             27.0     08-17    134<L>  |  101  |  70<H>  ----------------------------<  189<H>  4.4   |  20<L>  |  3.27<H>  Creatinine: 3.27 (08-17 @ 05:06)    Creatinine: 3.75 (08-16 @ 18:20)      8/7/23 Creat = 1.14    Ca    9.0      17 Aug 2023 05:06  Phos  4.7     08-17  Mg     2.1     08-17    TPro  7.8  /  Alb  3.5  /  TBili  0.4  /  DBili  x   /  AST  15  /  ALT  13  /  AlkPhos  65  08-16        MICROBIOLOGY:  .Tissue Other  07-27-23   No fungus isolated at 2 weeks.  --  Morganella morganii  Methicillin resistant Staphylococcus aureus  Klebsiella pneumoniae  Enterococcus faecalis (vancomycin resistant)      .Abscess right heel wound  07-25-23   Numerous Proteus mirabilis  Numerous Pseudomonas aeruginosa  Moderate Enterococcus faecalis (vancomycin resistant)  Few Methicillin Resistant Staphylococcus aureus  Moderate Klebsiella oxytoca/Raoultella ornithinolytica  --  Proteus mirabilis  Pseudomonas aeruginosa  Enterococcus faecalis (vancomycin resistant)  Methicillin resistant Staphylococcus aureus  Klebsiella oxytoca /Raoutella ornithinolytica      .Blood Blood  07-25-23   No growth at 5 days  --  --      .Blood Blood  07-25-23   No growth at 5 days  --  --      Radiology:  < from: Xray Chest 1 View AP/PA (08.16.23 @ 19:22) >  FINDINGS:  Right-sided PICC terminates in SVC  The heart is normal in size.  The lungs are clear.  There is no pneumothorax or pleural effusion.  No acute bony abnormality.    IMPRESSION:  Clear lungs.    < end of copied text >      Mikael Daniels MD; Division of Infectious Disease; Pager: [phone]; nights and weekends: [phone] Patient is a 63y old  Female who presents with a chief complaint of BANDAR (17 Aug 2023 08:03)    HPI:  63F IDDM, HTN, HLD, recent admit 7/25-8/7 iso b/l heel wound c/f OM s/p debridement w/ R foot graft application and bone bx (7/27) subsequent vac application dc'd on dapto and cipro (plan until 9/7) via RUE PICC who presents for elevated SCr on outpt lab monitoring. Pt reports feeling generally well in interim of discharge, though has noticed mild increase in chronic LE edema x days (superimposed on b/l LE edema since 1990s per her). She denies new med intake, change in PO intake (tends not to drink enough water but states this is not new), n/v/d, melena/hematochezia, dysuria/hematuria, change in urinary frequency. Otherwise denies HA, fever, chills, new visual disturbance (L visual deficit at bl x yrs per pt, s/p ophtho surgeries), rhinorrhea, odynophagia, dysphagia, CP, palpitations, SOB, cough, abd pain, new numbness/weakness/tingling (states RLE weak compared to LLE after pods procedure last admit) or other complaint.    Of note she relays last dapto dose 8/15, last cipro dose this AM. She states that she has been reducing basal/bolus insulin dose at home given borderline low FS.    ED Course  VS: afebrile tmax 36.9C, HR 60s, BP 120s-130s/60s, RR 16-20, SpO2% 97-98 RA  Labs: normocytic (MCV 81.6) anemia H/H 9.9/29.3; hypoNa 131, BUN/SCr 72/3.75 (GFR 13), phos 5.2, unable to calc FeUrea (urine urea unavailable), otherwise w/ elevated protein/Cr ratio 0.7   Imaging:  - CXR: clear lungs, R PICC terminates in SVC   Received: n/a    admit to medicine for further eval/mgt    (16 Aug 2023 20:50)    recently hospitalized  7/25 --> 8/7/23   62 y/o F PMHx of DMH2 on insulin &7/26/23: A1C= 8.4%), HTN, HLD, BMI = 35.3,  bilateral heel ulcer and fever to 102F day prior to admission. ID consulted for eval of bilateral foot ulcer.   admitted 7/25/23  She notes nausea, emesis malaise  Right heel ulcer is long standing - Currently with extensive black eschar with sl separation at edges, drainage on dressing and malodour with likley underlying  infection, however malodor suggests infection.     Antibiotics  Vanco 7/25--> 7/26; 7/31--> 8/7; 8/16 -->  Zosyn 7/25-->8/7; 8/16 -->  Daptomycin 8/8 --> 8/16  Cipro 500 mg po  8/8 --> 8/16    MRI foot suggested  possible osteomyelitis calcaneus  however bone bx was negative for osteo    7/25 ESR/CRP = 85/41  7/26  nasal swab POSITIVE MRSA PCR .  7/27 bilateral foot wound debridement,  right foot wound debridement with Integra graft application and bone biopsy for culture/R/O osteo. Intraoperative finding suggested residual bone infection present.  VAC in placement    Patient was doing well at home, maintaining non weight bearing on heel with Vac dressing changed every M W F  8/15 Creat = 4.1  - after I received this lab value, I called and asked pt to come to ED    She is in no distress, denies foot pain      PAST MEDICAL & SURGICAL HISTORY:  Diabetes  Hypertension  Hypercholesteremia  Wound of foot  H/O eye surgery  S/P cholecystectomy    Social history:  has assistance of sister,   no tob    FAMILY HISTORY:  FH: hypertension (Father, Mother)    FH: diabetes mellitus (Father)    FH: hyperlipidemia (Father, Mother)      REVIEW OF SYSTEMS:  CONSTITUTIONAL: No weakness, fevers or chills; no lightheadeness  EYES/ENT: No visual changes;  No vertigo or throat pain   NECK: No pain or stiffness  RESPIRATORY: No cough, wheezing, hemoptysis; No shortness of breath  CARDIOVASCULAR: No chest pain or palpitations  GASTROINTESTINAL: No abdominal or epigastric pain. No nausea, vomiting, or hematemesis; No diarrhea or constipation. No melena or hematochezia.  GENITOURINARY: No dysuria, frequency or hematuria  NEUROLOGICAL: No numbness or weakness  SKIN: No itching, burning, rashes, or lesions   All other review of systems is negative unless indicated above    Allergies  No Known Allergies    Antimicrobials:  piperacillin/tazobactam IVPB.. 3.375 Gram(s) IV Intermittent every 12 hours      Vital Signs Last 24 Hrs  T(C): 36.7 (17 Aug 2023 07:38), Max: 36.9 (16 Aug 2023 16:46)  T(F): 98 (17 Aug 2023 07:38), Max: 98.5 (16 Aug 2023 16:46)  HR: 54 (17 Aug 2023 07:38) (54 - 69)  BP: 104/65 (17 Aug 2023 07:38) (104/65 - 132/65)  BP(mean): 78 (17 Aug 2023 07:38) (78 - 85)  RR: 17 (17 Aug 2023 07:38) (16 - 20)  SpO2: 95% (17 Aug 2023 07:38) (95% - 99%)    Parameters below as of 17 Aug 2023 05:00  Patient On (Oxygen Delivery Method): room air        PHYSICAL EXAM:  General: WN/WD NAD, Non-toxic  Neurology: A&Ox3, nonfocal  Respiratory: Clear to auscultation bilaterally  CV: RRR, S1S2, no murmurs, rubs or gallops  Abdominal: Soft, Non-tender, non-distended, normal bowel sounds  Extremities: le edema, chronic venous stasis changes,   right heel examined with podiatry  - wound is clean, granulated  Line Sites: Clear  Skin: No rash                          8.9    5.84  )-----------( 273      ( 17 Aug 2023 05:06 )             27.0     08-17    134<L>  |  101  |  70<H>  ----------------------------<  189<H>  4.4   |  20<L>  |  3.27<H>  Creatinine: 3.27 (08-17 @ 05:06)    Creatinine: 3.75 (08-16 @ 18:20)      8/7/23 Creat = 1.14    Ca    9.0      17 Aug 2023 05:06  Phos  4.7     08-17  Mg     2.1     08-17    TPro  7.8  /  Alb  3.5  /  TBili  0.4  /  DBili  x   /  AST  15  /  ALT  13  /  AlkPhos  65  08-16        MICROBIOLOGY:  .Tissue Other  07-27-23   No fungus isolated at 2 weeks.  --  Morganella morganii  Methicillin resistant Staphylococcus aureus  Klebsiella pneumoniae  Enterococcus faecalis (vancomycin resistant)      .Abscess right heel wound  07-25-23   Numerous Proteus mirabilis  Numerous Pseudomonas aeruginosa  Moderate Enterococcus faecalis (vancomycin resistant)  Few Methicillin Resistant Staphylococcus aureus  Moderate Klebsiella oxytoca/Raoultella ornithinolytica  --  Proteus mirabilis  Pseudomonas aeruginosa  Enterococcus faecalis (vancomycin resistant)  Methicillin resistant Staphylococcus aureus  Klebsiella oxytoca /Raoutella ornithinolytica      .Blood Blood  07-25-23   No growth at 5 days  --  --      .Blood Blood  07-25-23   No growth at 5 days  --  --      Radiology:  < from: Xray Chest 1 View AP/PA (08.16.23 @ 19:22) >  FINDINGS:  Right-sided PICC terminates in SVC  The heart is normal in size.  The lungs are clear.  There is no pneumothorax or pleural effusion.  No acute bony abnormality.    IMPRESSION:  Clear lungs.    < end of copied text >      Mikael Daniels MD; Division of Infectious Disease; Pager: [phone]; nights and weekends: [phone] Patient is a 63y old  Female who presents with a chief complaint of BANDAR (17 Aug 2023 08:03)    HPI:  63F IDDM, HTN, HLD, recent admit 7/25-8/7 iso b/l heel wound c/f OM s/p debridement w/ R foot graft application and bone bx (7/27) subsequent vac application dc'd on dapto and cipro (plan until 9/7) via RUE PICC who presents for elevated SCr on outpt lab monitoring. Pt reports feeling generally well in interim of discharge, though has noticed mild increase in chronic LE edema x days (superimposed on b/l LE edema since 1990s per her). She denies new med intake, change in PO intake (tends not to drink enough water but states this is not new), n/v/d, melena/hematochezia, dysuria/hematuria, change in urinary frequency. Otherwise denies HA, fever, chills, new visual disturbance (L visual deficit at bl x yrs per pt, s/p ophtho surgeries), rhinorrhea, odynophagia, dysphagia, CP, palpitations, SOB, cough, abd pain, new numbness/weakness/tingling (states RLE weak compared to LLE after pods procedure last admit) or other complaint.    Of note she relays last dapto dose 8/15, last cipro dose this AM. She states that she has been reducing basal/bolus insulin dose at home given borderline low FS.    ED Course  VS: afebrile tmax 36.9C, HR 60s, BP 120s-130s/60s, RR 16-20, SpO2% 97-98 RA  Labs: normocytic (MCV 81.6) anemia H/H 9.9/29.3; hypoNa 131, BUN/SCr 72/3.75 (GFR 13), phos 5.2, unable to calc FeUrea (urine urea unavailable), otherwise w/ elevated protein/Cr ratio 0.7   Imaging:  - CXR: clear lungs, R PICC terminates in SVC   Received: n/a    admit to medicine for further eval/mgt    (16 Aug 2023 20:50)    recently hospitalized  7/25 --> 8/7/23   64 y/o F PMHx of DMH2 on insulin &7/26/23: A1C= 8.4%), HTN, HLD, BMI = 35.3,  bilateral heel ulcer and fever to 102F day prior to admission. ID consulted for eval of bilateral foot ulcer.   admitted 7/25/23  She notes nausea, emesis malaise  Right heel ulcer is long standing - Currently with extensive black eschar with sl separation at edges, drainage on dressing and malodour with likley underlying  infection, however malodor suggests infection.     Antibiotics  Vanco 7/25--> 7/26; 7/31--> 8/7; 8/16 -->  Zosyn 7/25-->8/7; 8/16 -->  Daptomycin 8/8 --> 8/16  Cipro 500 mg po  8/8 --> 8/16    MRI foot suggested  possible osteomyelitis calcaneus  however bone bx was negative for osteo    7/25 ESR/CRP = 85/41  7/26  nasal swab POSITIVE MRSA PCR .  7/27 bilateral foot wound debridement,  right foot wound debridement with Integra graft application and bone biopsy for culture/R/O osteo. Intraoperative finding suggested residual bone infection present.  VAC in placement    Patient was doing well at home, maintaining non weight bearing on heel with Vac dressing changed every M W F  8/15 Creat = 4.1  - after I received this lab value, I called and asked pt to come to ED    She is in no distress, denies foot pain      PAST MEDICAL & SURGICAL HISTORY:  Diabetes  Hypertension  Hypercholesteremia  Wound of foot  H/O eye surgery  S/P cholecystectomy    Social history:  has assistance of sister,   no tob    FAMILY HISTORY:  FH: hypertension (Father, Mother)    FH: diabetes mellitus (Father)    FH: hyperlipidemia (Father, Mother)      REVIEW OF SYSTEMS:  CONSTITUTIONAL: No weakness, fevers or chills; no lightheadeness  EYES/ENT: No visual changes;  No vertigo or throat pain   NECK: No pain or stiffness  RESPIRATORY: No cough, wheezing, hemoptysis; No shortness of breath  CARDIOVASCULAR: No chest pain or palpitations  GASTROINTESTINAL: No abdominal or epigastric pain. No nausea, vomiting, or hematemesis; No diarrhea or constipation. No melena or hematochezia.  GENITOURINARY: No dysuria, frequency or hematuria  NEUROLOGICAL: No numbness or weakness  SKIN: No itching, burning, rashes, or lesions   All other review of systems is negative unless indicated above    Allergies  No Known Allergies    Antimicrobials:  piperacillin/tazobactam IVPB.. 3.375 Gram(s) IV Intermittent every 12 hours      Vital Signs Last 24 Hrs  T(C): 36.7 (17 Aug 2023 07:38), Max: 36.9 (16 Aug 2023 16:46)  T(F): 98 (17 Aug 2023 07:38), Max: 98.5 (16 Aug 2023 16:46)  HR: 54 (17 Aug 2023 07:38) (54 - 69)  BP: 104/65 (17 Aug 2023 07:38) (104/65 - 132/65)  BP(mean): 78 (17 Aug 2023 07:38) (78 - 85)  RR: 17 (17 Aug 2023 07:38) (16 - 20)  SpO2: 95% (17 Aug 2023 07:38) (95% - 99%)    Parameters below as of 17 Aug 2023 05:00  Patient On (Oxygen Delivery Method): room air        PHYSICAL EXAM:  General: WN/WD NAD, Non-toxic  Neurology: A&Ox3, nonfocal  Respiratory: Clear to auscultation bilaterally  CV: RRR, S1S2, no murmurs, rubs or gallops  Abdominal: Soft, Non-tender, non-distended, normal bowel sounds  Extremities: le edema, chronic venous stasis changes,   right heel examined with podiatry  - wound is clean, granulated  Line Sites: Clear  Skin: No rash                          8.9    5.84  )-----------( 273      ( 17 Aug 2023 05:06 )             27.0     08-17    134<L>  |  101  |  70<H>  ----------------------------<  189<H>  4.4   |  20<L>  |  3.27<H>  Creatinine: 3.27 (08-17 @ 05:06)    Creatinine: 3.75 (08-16 @ 18:20)      8/7/23 Creat = 1.14    Ca    9.0      17 Aug 2023 05:06  Phos  4.7     08-17  Mg     2.1     08-17    TPro  7.8  /  Alb  3.5  /  TBili  0.4  /  DBili  x   /  AST  15  /  ALT  13  /  AlkPhos  65  08-16        MICROBIOLOGY:  .Tissue Other  07-27-23   No fungus isolated at 2 weeks.  --  Morganella morganii  Methicillin resistant Staphylococcus aureus  Klebsiella pneumoniae  Enterococcus faecalis (vancomycin resistant)      .Abscess right heel wound  07-25-23   Numerous Proteus mirabilis  Numerous Pseudomonas aeruginosa  Moderate Enterococcus faecalis (vancomycin resistant)  Few Methicillin Resistant Staphylococcus aureus  Moderate Klebsiella oxytoca/Raoultella ornithinolytica  --  Proteus mirabilis  Pseudomonas aeruginosa  Enterococcus faecalis (vancomycin resistant)  Methicillin resistant Staphylococcus aureus  Klebsiella oxytoca /Raoutella ornithinolytica      .Blood Blood  07-25-23   No growth at 5 days  --  --      .Blood Blood  07-25-23   No growth at 5 days  --  --      Radiology:  < from: Xray Chest 1 View AP/PA (08.16.23 @ 19:22) >  FINDINGS:  Right-sided PICC terminates in SVC  The heart is normal in size.  The lungs are clear.  There is no pneumothorax or pleural effusion.  No acute bony abnormality.    IMPRESSION:  Clear lungs.    < end of copied text >      Mikael Daniels MD; Division of Infectious Disease; Pager: [phone]; nights and weekends: [phone]

## 2023-08-17 NOTE — PHYSICAL THERAPY INITIAL EVALUATION ADULT - ADDITIONAL COMMENTS
lives alone (family upstairs) in house with 0 BERNY, 1st floor set up; IND with RW at baseline, has wheelchair at home lives alone (family upstairs) in house with 0 BERNY, 1st floor set up; IND with RW at baseline, has wheelchair at home. Pt reports that she has previously used a home attendant to assist and plans to reinstate services.

## 2023-08-17 NOTE — CHART NOTE - NSCHARTNOTEFT_GEN_A_CORE
Wound Care Team Note:    Request for wound care consult for foot wound received from team. Ms. Castle was seen and is being managed by Podiatry. Will defer to podiatry for management. Will not follow. Please refer any questions or concerns to podiatry.    Valencia Francisco, NP-C, CWOCN

## 2023-08-17 NOTE — CONSULT NOTE ADULT - ASSESSMENT
63F IDDM, HTN, HLD, recent admit 7/25-8/7 iso b/l heel wound. She underwent debridement 7/27/23 and was being treated with prolonged antibiotics for possible osteo  Admitted for BANDAR  Nephrology assessment appreciated  - most likely pre-renal  Examined with Podiatry  - wound is clean  The surg path has been read and is negative for osteo    Suggest  STOP Antibiotics  local care   to resume vac dressing  trend creat  fluid management as per primary team and Nephrology

## 2023-08-17 NOTE — ED ADULT NURSE REASSESSMENT NOTE - NSFALLHARMRISKINTERV_ED_ALL_ED
Assistance OOB with selected safe patient handling equipment if applicable/Assistance with ambulation/Communicate risk of Fall with Harm to all staff, patient, and family/Monitor gait and stability/Provide visual cue: red socks, yellow wristband, yellow gown, etc/Reinforce activity limits and safety measures with patient and family/Bed in lowest position, wheels locked, appropriate side rails in place/Call bell, personal items and telephone in reach/Instruct patient to call for assistance before getting out of bed/chair/stretcher/Non-slip footwear applied when patient is off stretcher/[place] to call system/Physically safe environment - no spills, clutter or unnecessary equipment/Purposeful Proactive Rounding/Room/bathroom lighting operational, light cord in reach Assistance OOB with selected safe patient handling equipment if applicable/Assistance with ambulation/Communicate risk of Fall with Harm to all staff, patient, and family/Monitor gait and stability/Provide visual cue: red socks, yellow wristband, yellow gown, etc/Reinforce activity limits and safety measures with patient and family/Bed in lowest position, wheels locked, appropriate side rails in place/Call bell, personal items and telephone in reach/Instruct patient to call for assistance before getting out of bed/chair/stretcher/Non-slip footwear applied when patient is off stretcher/[place] to call system/Physically safe environment - no spills, clutter or unnecessary equipment/Purposeful Proactive Rounding/Room/bathroom lighting operational, light cord in reach Assistance OOB with selected safe patient handling equipment if applicable/Assistance with ambulation/Communicate risk of Fall with Harm to all staff, patient, and family/Monitor gait and stability/Provide visual cue: red socks, yellow wristband, yellow gown, etc/Reinforce activity limits and safety measures with patient and family/Bed in lowest position, wheels locked, appropriate side rails in place/Call bell, personal items and telephone in reach/Instruct patient to call for assistance before getting out of bed/chair/stretcher/Non-slip footwear applied when patient is off stretcher/[place] to call system/Physically safe environment - no spills, clutter or unnecessary equipment/Purposeful Proactive Rounding/Room/bathroom lighting operational, light cord in reach

## 2023-08-17 NOTE — PROGRESS NOTE ADULT - SUBJECTIVE AND OBJECTIVE BOX
STEPHANIE KAYE  63y Female  MRN:[id number]    Patient is a 63y old  Female who presents with a chief complaint of BANDAR (17 Aug 2023 12:15)    HPI:  63F IDDM, HTN, HLD, recent admit 7/25-8/7 iso b/l heel wound c/f OM s/p debridement w/ R foot graft application and bone bx (7/27) subsequent vac application dc'd on dapto and cipro (plan until 9/7) via RUE PICC who presents for elevated SCr on outpt lab monitoring. Pt reports feeling generally well in interim of discharge, though has noticed mild increase in chronic LE edema x days (superimposed on b/l LE edema since 1990s per her). She denies new med intake, change in PO intake (tends not to drink enough water but states this is not new), n/v/d, melena/hematochezia, dysuria/hematuria, change in urinary frequency. Otherwise denies HA, fever, chills, new visual disturbance (L visual deficit at bl x yrs per pt, s/p ophtho surgeries), rhinorrhea, odynophagia, dysphagia, CP, palpitations, SOB, cough, abd pain, new numbness/weakness/tingling (states RLE weak compared to LLE after pods procedure last admit) or other complaint.    Of note she relays last dapto dose 8/15, last cipro dose this AM. She states that she has been reducing basal/bolus insulin dose at home given borderline low FS.    ED Course  VS: afebrile tmax 36.9C, HR 60s, BP 120s-130s/60s, RR 16-20, SpO2% 97-98 RA  Labs: normocytic (MCV 81.6) anemia H/H 9.9/29.3; hypoNa 131, BUN/SCr 72/3.75 (GFR 13), phos 5.2, unable to calc FeUrea (urine urea unavailable), otherwise w/ elevated protein/Cr ratio 0.7   Imaging:  - CXR: clear lungs, R PICC terminates in SVC   Received: n/a    admit to medicine for further eval/mgt    (16 Aug 2023 20:50)      Patient seen and evaluated at bedside. No acute events overnight except as noted.    Interval HPI: no acute events o/n     PAST MEDICAL & SURGICAL HISTORY:  Diabetes      Hypertension      Hypercholesteremia      Wound of foot      H/O eye surgery      S/P cholecystectomy          REVIEW OF SYSTEMS:  as per hpi       VITALS:  Vital Signs Last 24 Hrs  T(C): 36.7 (17 Aug 2023 07:38), Max: 36.9 (16 Aug 2023 16:46)  T(F): 98 (17 Aug 2023 07:38), Max: 98.5 (16 Aug 2023 16:46)  HR: 54 (17 Aug 2023 07:38) (54 - 69)  BP: 104/65 (17 Aug 2023 07:38) (104/65 - 132/65)  BP(mean): 78 (17 Aug 2023 07:38) (78 - 85)  RR: 17 (17 Aug 2023 07:38) (16 - 20)  SpO2: 95% (17 Aug 2023 07:38) (95% - 99%)    Parameters below as of 17 Aug 2023 05:00  Patient On (Oxygen Delivery Method): room air      CAPILLARY BLOOD GLUCOSE      POCT Blood Glucose.: 93 mg/dL (17 Aug 2023 11:12)  POCT Blood Glucose.: 168 mg/dL (17 Aug 2023 07:40)  POCT Blood Glucose.: 154 mg/dL (17 Aug 2023 00:15)    I&O's Summary      PHYSICAL EXAM:  GENERAL: NAD, well-developed  HEAD:  Atraumatic, Normocephalic  EYES: EOMI, PERRLA, conjunctiva and sclera clear  NECK: Supple, No JVD  CHEST/LUNG: Clear to auscultation bilaterally; No wheeze  HEART: S1, S2; No murmurs, rubs, or gallops  ABDOMEN: Soft, Nontender, Nondistended; Bowel sounds present  EXTREMITIES:  2+ Peripheral Pulses, No clubbing, cyanosis, or edema. b/l lower ext wounds. chronic changes   PSYCH: Normal affect  NEUROLOGY: AAOX3; non-focal  SKIN: No rashes or lesions    Consultant(s) Notes Reviewed:  [x ] YES  [ ] NO  Care Discussed with Consultants/Other Providers [ x] YES  [ ] NO    MEDS:  MEDICATIONS  (STANDING):  artificial tears (preservative free) Ophthalmic Solution 1 Drop(s) Both EYES three times a day  atorvastatin 40 milliGRAM(s) Oral at bedtime  chlorhexidine 2% Cloths 1 Application(s) Topical daily  dextrose 5%. 1000 milliLiter(s) (50 mL/Hr) IV Continuous <Continuous>  dextrose 5%. 1000 milliLiter(s) (100 mL/Hr) IV Continuous <Continuous>  dextrose 50% Injectable 25 Gram(s) IV Push once  dextrose 50% Injectable 12.5 Gram(s) IV Push once  dextrose 50% Injectable 25 Gram(s) IV Push once  glucagon  Injectable 1 milliGRAM(s) IntraMuscular once  heparin   Injectable 5000 Unit(s) SubCutaneous every 8 hours  hydrALAZINE 50 milliGRAM(s) Oral two times a day  insulin glargine Injectable (LANTUS) 13 Unit(s) SubCutaneous at bedtime  insulin lispro (ADMELOG) corrective regimen sliding scale   SubCutaneous three times a day before meals  insulin lispro (ADMELOG) corrective regimen sliding scale   SubCutaneous at bedtime  insulin lispro Injectable (ADMELOG) 4 Unit(s) SubCutaneous three times a day before meals  metoprolol succinate ER 50 milliGRAM(s) Oral daily  pantoprazole    Tablet 40 milliGRAM(s) Oral before breakfast  senna 2 Tablet(s) Oral at bedtime  sodium chloride 0.9%. 1000 milliLiter(s) (100 mL/Hr) IV Continuous <Continuous>  tamsulosin 0.4 milliGRAM(s) Oral at bedtime    MEDICATIONS  (PRN):  dextrose Oral Gel 15 Gram(s) Oral once PRN Blood Glucose LESS THAN 70 milliGRAM(s)/deciliter    ALLERGIES:  No Known Allergies      LABS:                        8.9    5.84  )-----------( 273      ( 17 Aug 2023 05:06 )             27.0     08-17    134<L>  |  101  |  70<H>  ----------------------------<  189<H>  4.4   |  20<L>  |  3.27<H>    Ca    9.0      17 Aug 2023 05:06  Phos  4.7     08-17  Mg     2.1     08-17    TPro  7.8  /  Alb  3.5  /  TBili  0.4  /  DBili  x   /  AST  15  /  ALT  13  /  AlkPhos  65  08-16    PT/INR - ( 16 Aug 2023 18:20 )   PT: 12.5 sec;   INR: 1.20 ratio         PTT - ( 16 Aug 2023 18:20 )  PTT:30.4 sec  CARDIAC MARKERS ( 17 Aug 2023 05:06 )  x     / x     / 63 U/L / x     / x      CARDIAC MARKERS ( 16 Aug 2023 18:20 )  x     / x     / 68 U/L / x     / x          LIVER FUNCTIONS - ( 16 Aug 2023 18:20 )  Alb: 3.5 g/dL / Pro: 7.8 g/dL / ALK PHOS: 65 U/L / ALT: 13 U/L / AST: 15 U/L / GGT: x           Urinalysis Basic - ( 17 Aug 2023 05:06 )    Color: x / Appearance: x / SG: x / pH: x  Gluc: 189 mg/dL / Ketone: x  / Bili: x / Urobili: x   Blood: x / Protein: x / Nitrite: x   Leuk Esterase: x / RBC: x / WBC x   Sq Epi: x / Non Sq Epi: x / Bacteria: x      STEPHANIE KAYE  63y Female  MRN:[id number]    Patient is a 63y old  Female who presents with a chief complaint of BANDAR (17 Aug 2023 12:15)    HPI:  63F IDDM, HTN, HLD, recent admit 7/25-8/7 iso b/l heel wound c/f OM s/p debridement w/ R foot graft application and bone bx (7/27) subsequent vac application dc'd on dapto and cipro (plan until 9/7) via RUE PICC who presents for elevated SCr on outpt lab monitoring. Pt reports feeling generally well in interim of discharge, though has noticed mild increase in chronic LE edema x days (superimposed on b/l LE edema since 1990s per her). She denies new med intake, change in PO intake (tends not to drink enough water but states this is not new), n/v/d, melena/hematochezia, dysuria/hematuria, change in urinary frequency. Otherwise denies HA, fever, chills, new visual disturbance (L visual deficit at bl x yrs per pt, s/p ophtho surgeries), rhinorrhea, odynophagia, dysphagia, CP, palpitations, SOB, cough, abd pain, new numbness/weakness/tingling (states RLE weak compared to LLE after pods procedure last admit) or other complaint.    Of note she relays last dapto dose 8/15, last cipro dose this AM. She states that she has been reducing basal/bolus insulin dose at home given borderline low FS.    ED Course  VS: afebrile tmax 36.9C, HR 60s, BP 120s-130s/60s, RR 16-20, SpO2% 97-98 RA  Labs: normocytic (MCV 81.6) anemia H/H 9.9/29.3; hypoNa 131, BUN/SCr 72/3.75 (GFR 13), phos 5.2, unable to calc FeUrea (urine urea unavailable), otherwise w/ elevated protein/Cr ratio 0.7   Imaging:  - CXR: clear lungs, R PICC terminates in SVC   Received: n/a    admit to medicine for further eval/mgt    (16 Aug 2023 20:50)      Patient seen and evaluated at bedside. No acute events overnight except as noted.    Interval HPI: no acute events o/n     PAST MEDICAL & SURGICAL HISTORY:  Diabetes      Hypertension      Hypercholesteremia      Wound of foot      H/O eye surgery      S/P cholecystectomy          REVIEW OF SYSTEMS:  as per hpi       VITALS:  Vital Signs Last 24 Hrs  T(C): 36.7 (17 Aug 2023 07:38), Max: 36.9 (16 Aug 2023 16:46)  T(F): 98 (17 Aug 2023 07:38), Max: 98.5 (16 Aug 2023 16:46)  HR: 54 (17 Aug 2023 07:38) (54 - 69)  BP: 104/65 (17 Aug 2023 07:38) (104/65 - 132/65)  BP(mean): 78 (17 Aug 2023 07:38) (78 - 85)  RR: 17 (17 Aug 2023 07:38) (16 - 20)  SpO2: 95% (17 Aug 2023 07:38) (95% - 99%)    Parameters below as of 17 Aug 2023 05:00  Patient On (Oxygen Delivery Method): room air      CAPILLARY BLOOD GLUCOSE      POCT Blood Glucose.: 93 mg/dL (17 Aug 2023 11:12)  POCT Blood Glucose.: 168 mg/dL (17 Aug 2023 07:40)  POCT Blood Glucose.: 154 mg/dL (17 Aug 2023 00:15)    I&O's Summary      PHYSICAL EXAM:  GENERAL: NAD, well-developed  HEAD:  Atraumatic, Normocephalic  EYES: EOMI, PERRLA, conjunctiva and sclera clear  NECK: Supple, No JVD  CHEST/LUNG: Clear to auscultation bilaterally; No wheeze  HEART: S1, S2; No murmurs, rubs, or gallops  ABDOMEN: Soft, Nontender, Nondistended; Bowel sounds present  EXTREMITIES:  2+ Peripheral Pulses, No clubbing, cyanosis, or edema. b/l lower ext wounds. chronic changes   PSYCH: Normal affect  NEUROLOGY: AAOX3; non-focal  SKIN: No rashes or lesions    Consultant(s) Notes Reviewed:  [x ] YES  [ ] NO  Care Discussed with Consultants/Other Providers [ x] YES  [ ] NO    MEDS:  MEDICATIONS  (STANDING):  artificial tears (preservative free) Ophthalmic Solution 1 Drop(s) Both EYES three times a day  atorvastatin 40 milliGRAM(s) Oral at bedtime  chlorhexidine 2% Cloths 1 Application(s) Topical daily  dextrose 5%. 1000 milliLiter(s) (50 mL/Hr) IV Continuous <Continuous>  dextrose 5%. 1000 milliLiter(s) (100 mL/Hr) IV Continuous <Continuous>  dextrose 50% Injectable 25 Gram(s) IV Push once  dextrose 50% Injectable 12.5 Gram(s) IV Push once  dextrose 50% Injectable 25 Gram(s) IV Push once  glucagon  Injectable 1 milliGRAM(s) IntraMuscular once  heparin   Injectable 5000 Unit(s) SubCutaneous every 8 hours  hydrALAZINE 50 milliGRAM(s) Oral two times a day  insulin glargine Injectable (LANTUS) 13 Unit(s) SubCutaneous at bedtime  insulin lispro (ADMELOG) corrective regimen sliding scale   SubCutaneous three times a day before meals  insulin lispro (ADMELOG) corrective regimen sliding scale   SubCutaneous at bedtime  insulin lispro Injectable (ADMELOG) 4 Unit(s) SubCutaneous three times a day before meals  metoprolol succinate ER 50 milliGRAM(s) Oral daily  pantoprazole    Tablet 40 milliGRAM(s) Oral before breakfast  senna 2 Tablet(s) Oral at bedtime  sodium chloride 0.9%. 1000 milliLiter(s) (100 mL/Hr) IV Continuous <Continuous>  tamsulosin 0.4 milliGRAM(s) Oral at bedtime    MEDICATIONS  (PRN):  dextrose Oral Gel 15 Gram(s) Oral once PRN Blood Glucose LESS THAN 70 milliGRAM(s)/deciliter    ALLERGIES:  No Known Allergies      LABS:                        8.9    5.84  )-----------( 273      ( 17 Aug 2023 05:06 )             27.0     08-17    134<L>  |  101  |  70<H>  ----------------------------<  189<H>  4.4   |  20<L>  |  3.27<H>    Ca    9.0      17 Aug 2023 05:06  Phos  4.7     08-17  Mg     2.1     08-17    TPro  7.8  /  Alb  3.5  /  TBili  0.4  /  DBili  x   /  AST  15  /  ALT  13  /  AlkPhos  65  08-16    PT/INR - ( 16 Aug 2023 18:20 )   PT: 12.5 sec;   INR: 1.20 ratio         PTT - ( 16 Aug 2023 18:20 )  PTT:30.4 sec  CARDIAC MARKERS ( 17 Aug 2023 05:06 )  x     / x     / 63 U/L / x     / x      CARDIAC MARKERS ( 16 Aug 2023 18:20 )  x     / x     / 68 U/L / x     / x          LIVER FUNCTIONS - ( 16 Aug 2023 18:20 )  Alb: 3.5 g/dL / Pro: 7.8 g/dL / ALK PHOS: 65 U/L / ALT: 13 U/L / AST: 15 U/L / GGT: x           Urinalysis Basic - ( 17 Aug 2023 05:06 )    Color: x / Appearance: x / SG: x / pH: x  Gluc: 189 mg/dL / Ketone: x  / Bili: x / Urobili: x   Blood: x / Protein: x / Nitrite: x   Leuk Esterase: x / RBC: x / WBC x   Sq Epi: x / Non Sq Epi: x / Bacteria: x      STEPHANIE KAYE  63y Female  MRN:[id number]    Patient is a 63y old  Female who presents with a chief complaint of BANDAR (17 Aug 2023 12:15)    HPI:  63F IDDM, HTN, HLD, recent admit 7/25-8/7 iso b/l heel wound c/f OM s/p debridement w/ R foot graft application and bone bx (7/27) subsequent vac application dc'd on dapto and cipro (plan until 9/7) via RUE PICC who presents for elevated SCr on outpt lab monitoring. Pt reports feeling generally well in interim of discharge, though has noticed mild increase in chronic LE edema x days (superimposed on b/l LE edema since 1990s per her). She denies new med intake, change in PO intake (tends not to drink enough water but states this is not new), n/v/d, melena/hematochezia, dysuria/hematuria, change in urinary frequency. Otherwise denies HA, fever, chills, new visual disturbance (L visual deficit at bl x yrs per pt, s/p ophtho surgeries), rhinorrhea, odynophagia, dysphagia, CP, palpitations, SOB, cough, abd pain, new numbness/weakness/tingling (states RLE weak compared to LLE after pods procedure last admit) or other complaint.    Of note she relays last dapto dose 8/15, last cipro dose this AM. She states that she has been reducing basal/bolus insulin dose at home given borderline low FS.    ED Course  VS: afebrile tmax 36.9C, HR 60s, BP 120s-130s/60s, RR 16-20, SpO2% 97-98 RA  Labs: normocytic (MCV 81.6) anemia H/H 9.9/29.3; hypoNa 131, BUN/SCr 72/3.75 (GFR 13), phos 5.2, unable to calc FeUrea (urine urea unavailable), otherwise w/ elevated protein/Cr ratio 0.7   Imaging:  - CXR: clear lungs, R PICC terminates in SVC   Received: n/a    admit to medicine for further eval/mgt    (16 Aug 2023 20:50)      Patient seen and evaluated at bedside. No acute events overnight except as noted.    Interval HPI: no acute events o/n     PAST MEDICAL & SURGICAL HISTORY:  Diabetes      Hypertension      Hypercholesteremia      Wound of foot      H/O eye surgery      S/P cholecystectomy          REVIEW OF SYSTEMS:  as per hpi       VITALS:  Vital Signs Last 24 Hrs  T(C): 36.7 (17 Aug 2023 07:38), Max: 36.9 (16 Aug 2023 16:46)  T(F): 98 (17 Aug 2023 07:38), Max: 98.5 (16 Aug 2023 16:46)  HR: 54 (17 Aug 2023 07:38) (54 - 69)  BP: 104/65 (17 Aug 2023 07:38) (104/65 - 132/65)  BP(mean): 78 (17 Aug 2023 07:38) (78 - 85)  RR: 17 (17 Aug 2023 07:38) (16 - 20)  SpO2: 95% (17 Aug 2023 07:38) (95% - 99%)    Parameters below as of 17 Aug 2023 05:00  Patient On (Oxygen Delivery Method): room air      CAPILLARY BLOOD GLUCOSE      POCT Blood Glucose.: 93 mg/dL (17 Aug 2023 11:12)  POCT Blood Glucose.: 168 mg/dL (17 Aug 2023 07:40)  POCT Blood Glucose.: 154 mg/dL (17 Aug 2023 00:15)    I&O's Summary      PHYSICAL EXAM:  GENERAL: NAD, well-developed  HEAD:  Atraumatic, Normocephalic  EYES: EOMI, PERRLA, conjunctiva and sclera clear  NECK: Supple, No JVD  CHEST/LUNG: Clear to auscultation bilaterally; No wheeze  HEART: S1, S2; No murmurs, rubs, or gallops  ABDOMEN: Soft, Nontender, Nondistended; Bowel sounds present  EXTREMITIES:  2+ Peripheral Pulses, No clubbing, cyanosis, or edema. b/l lower ext wounds. chronic changes   PSYCH: Normal affect  NEUROLOGY: AAOX3; non-focal  SKIN: No rashes or lesions    Consultant(s) Notes Reviewed:  [x ] YES  [ ] NO  Care Discussed with Consultants/Other Providers [ x] YES  [ ] NO    MEDS:  MEDICATIONS  (STANDING):  artificial tears (preservative free) Ophthalmic Solution 1 Drop(s) Both EYES three times a day  atorvastatin 40 milliGRAM(s) Oral at bedtime  chlorhexidine 2% Cloths 1 Application(s) Topical daily  dextrose 5%. 1000 milliLiter(s) (50 mL/Hr) IV Continuous <Continuous>  dextrose 5%. 1000 milliLiter(s) (100 mL/Hr) IV Continuous <Continuous>  dextrose 50% Injectable 25 Gram(s) IV Push once  dextrose 50% Injectable 12.5 Gram(s) IV Push once  dextrose 50% Injectable 25 Gram(s) IV Push once  glucagon  Injectable 1 milliGRAM(s) IntraMuscular once  heparin   Injectable 5000 Unit(s) SubCutaneous every 8 hours  hydrALAZINE 50 milliGRAM(s) Oral two times a day  insulin glargine Injectable (LANTUS) 13 Unit(s) SubCutaneous at bedtime  insulin lispro (ADMELOG) corrective regimen sliding scale   SubCutaneous three times a day before meals  insulin lispro (ADMELOG) corrective regimen sliding scale   SubCutaneous at bedtime  insulin lispro Injectable (ADMELOG) 4 Unit(s) SubCutaneous three times a day before meals  metoprolol succinate ER 50 milliGRAM(s) Oral daily  pantoprazole    Tablet 40 milliGRAM(s) Oral before breakfast  senna 2 Tablet(s) Oral at bedtime  sodium chloride 0.9%. 1000 milliLiter(s) (100 mL/Hr) IV Continuous <Continuous>  tamsulosin 0.4 milliGRAM(s) Oral at bedtime    MEDICATIONS  (PRN):  dextrose Oral Gel 15 Gram(s) Oral once PRN Blood Glucose LESS THAN 70 milliGRAM(s)/deciliter    ALLERGIES:  No Known Allergies      LABS:                        8.9    5.84  )-----------( 273      ( 17 Aug 2023 05:06 )             27.0     08-17    134<L>  |  101  |  70<H>  ----------------------------<  189<H>  4.4   |  20<L>  |  3.27<H>    Ca    9.0      17 Aug 2023 05:06  Phos  4.7     08-17  Mg     2.1     08-17    TPro  7.8  /  Alb  3.5  /  TBili  0.4  /  DBili  x   /  AST  15  /  ALT  13  /  AlkPhos  65  08-16    PT/INR - ( 16 Aug 2023 18:20 )   PT: 12.5 sec;   INR: 1.20 ratio         PTT - ( 16 Aug 2023 18:20 )  PTT:30.4 sec  CARDIAC MARKERS ( 17 Aug 2023 05:06 )  x     / x     / 63 U/L / x     / x      CARDIAC MARKERS ( 16 Aug 2023 18:20 )  x     / x     / 68 U/L / x     / x          LIVER FUNCTIONS - ( 16 Aug 2023 18:20 )  Alb: 3.5 g/dL / Pro: 7.8 g/dL / ALK PHOS: 65 U/L / ALT: 13 U/L / AST: 15 U/L / GGT: x           Urinalysis Basic - ( 17 Aug 2023 05:06 )    Color: x / Appearance: x / SG: x / pH: x  Gluc: 189 mg/dL / Ketone: x  / Bili: x / Urobili: x   Blood: x / Protein: x / Nitrite: x   Leuk Esterase: x / RBC: x / WBC x   Sq Epi: x / Non Sq Epi: x / Bacteria: x

## 2023-08-17 NOTE — CONSULT NOTE ADULT - SUBJECTIVE AND OBJECTIVE BOX
Patient is a 63y old  Female who presents with a chief complaint of BANDAR (17 Aug 2023 10:59)      HPI:  63F IDDM, HTN, HLD, recent admit 7/25-8/7 iso b/l heel wound c/f OM s/p debridement w/ R foot graft application and bone bx (7/27) subsequent vac application dc'd on dapto and cipro (plan until 9/7) via RUE PICC who presents for elevated SCr on outpt lab monitoring. Pt reports feeling generally well in interim of discharge, though has noticed mild increase in chronic LE edema x days (superimposed on b/l LE edema since 1990s per her). She denies new med intake, change in PO intake (tends not to drink enough water but states this is not new), n/v/d, melena/hematochezia, dysuria/hematuria, change in urinary frequency. Otherwise denies HA, fever, chills, new visual disturbance (L visual deficit at bl x yrs per pt, s/p ophtho surgeries), rhinorrhea, odynophagia, dysphagia, CP, palpitations, SOB, cough, abd pain, new numbness/weakness/tingling (states RLE weak compared to LLE after pods procedure last admit) or other complaint.    Of note she relays last dapto dose 8/15, last cipro dose this AM. She states that she has been reducing basal/bolus insulin dose at home given borderline low FS.    ED Course  VS: afebrile tmax 36.9C, HR 60s, BP 120s-130s/60s, RR 16-20, SpO2% 97-98 RA  Labs: normocytic (MCV 81.6) anemia H/H 9.9/29.3; hypoNa 131, BUN/SCr 72/3.75 (GFR 13), phos 5.2, unable to calc FeUrea (urine urea unavailable), otherwise w/ elevated protein/Cr ratio 0.7   Imaging:  - CXR: clear lungs, R PICC terminates in SVC   Received: n/a    admit to medicine for further eval/mgt    (16 Aug 2023 20:50)      PAST MEDICAL & SURGICAL HISTORY:  Diabetes      Hypertension      Hypercholesteremia      Wound of foot      H/O eye surgery      S/P cholecystectomy          MEDICATIONS  (STANDING):  artificial tears (preservative free) Ophthalmic Solution 1 Drop(s) Both EYES three times a day  atorvastatin 40 milliGRAM(s) Oral at bedtime  chlorhexidine 2% Cloths 1 Application(s) Topical daily  dextrose 5%. 1000 milliLiter(s) (50 mL/Hr) IV Continuous <Continuous>  dextrose 5%. 1000 milliLiter(s) (100 mL/Hr) IV Continuous <Continuous>  dextrose 50% Injectable 25 Gram(s) IV Push once  dextrose 50% Injectable 12.5 Gram(s) IV Push once  dextrose 50% Injectable 25 Gram(s) IV Push once  glucagon  Injectable 1 milliGRAM(s) IntraMuscular once  heparin   Injectable 5000 Unit(s) SubCutaneous every 8 hours  hydrALAZINE 50 milliGRAM(s) Oral two times a day  insulin glargine Injectable (LANTUS) 13 Unit(s) SubCutaneous at bedtime  insulin lispro (ADMELOG) corrective regimen sliding scale   SubCutaneous three times a day before meals  insulin lispro (ADMELOG) corrective regimen sliding scale   SubCutaneous at bedtime  insulin lispro Injectable (ADMELOG) 4 Unit(s) SubCutaneous three times a day before meals  metoprolol succinate ER 50 milliGRAM(s) Oral daily  pantoprazole    Tablet 40 milliGRAM(s) Oral before breakfast  senna 2 Tablet(s) Oral at bedtime  sodium chloride 0.9%. 1000 milliLiter(s) (100 mL/Hr) IV Continuous <Continuous>  tamsulosin 0.4 milliGRAM(s) Oral at bedtime    MEDICATIONS  (PRN):  dextrose Oral Gel 15 Gram(s) Oral once PRN Blood Glucose LESS THAN 70 milliGRAM(s)/deciliter      Allergies    No Known Allergies    Intolerances        VITALS:    Vital Signs Last 24 Hrs  T(C): 36.7 (17 Aug 2023 07:38), Max: 36.9 (16 Aug 2023 16:46)  T(F): 98 (17 Aug 2023 07:38), Max: 98.5 (16 Aug 2023 16:46)  HR: 54 (17 Aug 2023 07:38) (54 - 69)  BP: 104/65 (17 Aug 2023 07:38) (104/65 - 132/65)  BP(mean): 78 (17 Aug 2023 07:38) (78 - 85)  RR: 17 (17 Aug 2023 07:38) (16 - 20)  SpO2: 95% (17 Aug 2023 07:38) (95% - 99%)    Parameters below as of 17 Aug 2023 05:00  Patient On (Oxygen Delivery Method): room air        LABS:                          8.9    5.84  )-----------( 273      ( 17 Aug 2023 05:06 )             27.0       08-17    134<L>  |  101  |  70<H>  ----------------------------<  189<H>  4.4   |  20<L>  |  3.27<H>    Ca    9.0      17 Aug 2023 05:06  Phos  4.7     08-17  Mg     2.1     08-17    TPro  7.8  /  Alb  3.5  /  TBili  0.4  /  DBili  x   /  AST  15  /  ALT  13  /  AlkPhos  65  08-16      CAPILLARY BLOOD GLUCOSE      POCT Blood Glucose.: 93 mg/dL (17 Aug 2023 11:12)  POCT Blood Glucose.: 168 mg/dL (17 Aug 2023 07:40)  POCT Blood Glucose.: 154 mg/dL (17 Aug 2023 00:15)      PT/INR - ( 16 Aug 2023 18:20 )   PT: 12.5 sec;   INR: 1.20 ratio         PTT - ( 16 Aug 2023 18:20 )  PTT:30.4 sec    LOWER EXTREMITY PHYSICAL EXAM:      Vascular: DP/PT 0/4, B/L, CFT <3 seconds B/L, Temperature gradient warm to cool, B/L.   Neuro: Epicritic sensation dimished to the level of toes, B/L.  Musculoskeletal/Ortho: Unremarkable.  Skin: Right heel fibrogranular wound to subQ with periwound maceration, no erythema, no edema, no pus, no acute signs of infection. Left heel wound to subQ with no acute signs of infections.     RADIOLOGY & ADDITIONAL STUDIES:

## 2023-08-17 NOTE — ED ADULT NURSE REASSESSMENT NOTE - NS ED NURSE REASSESS COMMENT FT1
Received a 63F aaox3 usually ambulatory but unable to ambulate or bear weight secondary to wound in the rt foot/leg which was chronic and patient getting IV antibiotics to a PICC line in the left upper arm. Patient presented yesterday with c/o infected right foot wound. Repeat blood test noted for abnormal kidney profile. Patient with h/o HLD DM Chronic HTN, Hyponatremia, BANDAR, Cholecystectomy and chronic wound in the foot.  Left IV access noted 20g flushes well. Due antibiotics given as ordered and tolerated well.  Breakfast provided and Insulin coverage given as ordered.

## 2023-08-17 NOTE — PHYSICAL THERAPY INITIAL EVALUATION ADULT - PERTINENT HX OF CURRENT PROBLEM, REHAB EVAL
63F IDDM, HTN, HLD, recent admit 7/25-8/7 iso b/l heel wound c/f OM s/p debridement w/ R foot graft application and bone bx (7/27) subsequent vac application dc'd on dapto and cipro (plan until 9/7) via [facility] PICC who presents for elevated SCr on outpt lab monitoring. Pt reports feeling generally well in interim of discharge, though has noticed mild increase in chronic LE edema x days (superimposed on b/l LE edema since 1990s per her). She denies new med intake, change in PO intake (tends not to drink enough water but states this is not new), n/v/d, melena/hematochezia, dysuria/hematuria, change in urinary frequency. Otherwise denies HA, fever, chills, new visual disturbance (L visual deficit at bl x yrs per pt, s/p ophtho surgeries), rhinorrhea, odynophagia, dysphagia, CP, palpitations, SOB, cough, abd pain, new numbness/weakness/tingling (states RLE weak compared to LLE after pods procedure last admit) or other complaint. 63F IDDM, HTN, HLD, recent admit 7/25-8/7 iso b/l heel wound c/f OM s/p debridement w/ R foot graft application and bone bx (7/27) subsequent vac application dc'd on dapto and cipro (plan until 9/7) via [facility] PICC who presents for elevated SCr on outpt lab monitoring. Pt reports feeling generally well in interim of discharge, though has noticed mild increase in chronic LE edema x days (superimposed on b/l LE edema since 1990s per her). She denies new med intake, change in PO intake (tends not to drink enough water but states this is not new), n/v/d, melena/hematochezia, dysuria/hematuria, change in urinary frequency. Otherwise denies HA, fever, chills, new visual disturbance (L visual deficit at bl x yrs per pt, s/p ophtho surgeries), rhinorrhea, odynophagia, dysphagia, CP, palpitations, SOB, cough, abd pain, new numbness/weakness/tingling (states RLE weak compared to LLE after pods procedure last admit) or other complaint. 63F IDDM, HTN, HLD, recent admit 7/25-8/7 iso b/l heel wound c/f OM s/p debridement w/ R foot graft application and bone bx (7/27) subsequent vac application dc'd on dapto and cipro (plan until 9/7) [facility] who presents for elevated SCr on outpt lab monitoring. Pt reports feeling generally well in interim of discharge, though has noticed mild increase in chronic LE edema x days (superimposed on b/l LE edema since 1990s per her). She denies new med intake, change in PO intake (tends not to drink enough water but states this is not new), n/v/d, melena/hematochezia, dysuria/hematuria, change in urinary frequency. Otherwise denies HA, fever, chills, new visual disturbance (L visual deficit at bl x yrs per pt, s/p ophtho surgeries), rhinorrhea, odynophagia, dysphagia, CP, palpitations, SOB, cough, abd pain, new numbness/weakness/tingling (states RLE weak compared to LLE after pods procedure last admit) or other complaint.

## 2023-08-17 NOTE — PATIENT PROFILE ADULT - FALL HARM RISK - RISK INTERVENTIONS
Assistance OOB with selected safe patient handling equipment/Assistance with ambulation/Communicate Fall Risk and Risk Factors to all staff, patient, and family/Discuss with provider need for PT consult/Monitor gait and stability/Provide patient with walking aids - walker, cane, crutches/Reinforce activity limits and safety measures with patient and family/Visual Cue: Yellow wristband/Bed in lowest position, wheels locked, appropriate side rails in place/Call bell, personal items and telephone in reach/Instruct patient to call for assistance before getting out of bed or chair/Non-slip footwear when patient is out of bed/[place] to call system/Physically safe environment - no spills, clutter or unnecessary equipment/Purposeful Proactive Rounding/Room/bathroom lighting operational, light cord in reach Assistance OOB with selected safe patient handling equipment/Assistance with ambulation/Communicate Fall Risk and Risk Factors to all staff, patient, and family/Discuss with provider need for PT consult/Monitor gait and stability/Provide patient with walking aids - walker, cane, crutches/Reinforce activity limits and safety measures with patient and family/Visual Cue: Yellow wristband/Bed in lowest position, wheels locked, appropriate side rails in place/Call bell, personal items and telephone in reach/Instruct patient to call for assistance before getting out of bed or chair/Non-slip footwear when patient is out of bed/[place] to call system/Physically safe environment - no spills, clutter or unnecessary equipment/Purposeful Proactive Rounding/Room/bathroom lighting operational, light cord in reach Assistance OOB with selected safe patient handling equipment/Assistance with ambulation/Communicate Fall Risk and Risk Factors to all staff, patient, and family/Discuss with provider need for PT consult/Monitor gait and stability/Provide patient with walking aids - walker, cane, crutches/Reinforce activity limits and safety measures with patient and family/Visual Cue: Yellow wristband/Bed in lowest position, wheels locked, appropriate side rails in place/Call bell, personal items and telephone in reach/Instruct patient to call for assistance before getting out of bed or chair/Non-slip footwear when patient is out of bed/[place] to call system/Physically safe environment - no spills, clutter or unnecessary equipment/Purposeful Proactive Rounding/Room/bathroom lighting operational, light cord in reach

## 2023-08-17 NOTE — PROGRESS NOTE ADULT - ASSESSMENT
63F IDDM, HTN, HLD, recent admit 7/25-8/7 iso b/l heel wound c/f OM s/p debridement w/ R foot graft application and bone bx (7/27) subsequent vac application dc'd on dapto and cipro (plan until 9/7) via [facility] PICC a/w BANDAR found on outpt lab monitoring

## 2023-08-17 NOTE — CONSULT NOTE ADULT - ASSESSMENT
63F  with bilateral heel wound  - Pt seen and evaluated.  - Afebrile, no leukocytosis, ESR/CRP pending   - Right heel fibrogranular wound to subQ with periwound maceration, no erythema, no edema, no pus, no acute signs of infection. Left heel wound to subQ with no acute signs of infections  - Patient admitted to medicine for BANDAR workup   - 7/27 Right Foot calcaneal Bone Biopsy pathology: no OM   - 7/27 Right foot calcaneal bone biopsy culture:  Klebsiella, E faecalis, MRSA, Morganella   - ID recs, appreciated.  - recommending vasc consult 2/2 to non palpable pulses and venous insuficency  - Right foot VAC to be applied today tomorrow  - No acute pod intervention planned  during this admission  - Patient stable for discharge from a podiatry standpoint.  - Please have patient wear bilateral z flows at all times while in house   - Wound care instructions and follow up information listed in discharge provider note.  - Discussed with attending.   63F  with bilateral heel wound  - Pt seen and evaluated.  - Afebrile, no leukocytosis, ESR/CRP pending   - Right heel fibrogranular wound to subQ with periwound maceration, no erythema, no edema, no pus, no acute signs of infection. Left heel wound to subQ with no acute signs of infections  - Patient admitted to medicine for BADNAR workup   - 7/27 Right Foot calcaneal Bone Biopsy pathology: no OM   - 7/27 Right foot calcaneal bone biopsy culture:  Klebsiella, E faecalis, MRSA, Morganella   - ID recs, appreciated.  - recommending vasc consult 2/2 to non palpable pulses and venous insuficency  - Right foot VAC to be applied today tomorrow  - No acute pod intervention planned  during this admission  - Patient stable for discharge from a podiatry standpoint.  - Please have patient wear bilateral z flows at all times while in house   - Wound care instructions and follow up information listed in discharge provider note.  - Discussed with attending.

## 2023-08-17 NOTE — PHYSICAL THERAPY INITIAL EVALUATION ADULT - PLANNED THERAPY INTERVENTIONS, PT EVAL
74M h/o prolonged hospital admission, mesenteric ischemia s/p SBR 12/2022 c/b intraabdominal hematoma/abscess, LE gangrene pending possible AKA, now found to be severe sepsis 2/2 polymicrobial bcteremia (ESBL K.pneumo, VRE), likely 2/2 undrained intraabdominal abscess.  Team spoke to IR for possible drainage, as he is very high risk for surgical washout.    - stop vanc  - start daptomycin 500mg IV q24h  - cont meropenem 1g IV q8h  - f/u susceptibility of K.pneumo and VRE  - send surveillance BCx  - f/u IR for drainage    Team 1 will follow you.  Dr Guzman will resume care on Monday.  Case d/w primary team.    Shraddha Jerome MD, MS  Infectious Disease attending  work cell [phone]   For any questions during evening/weekend/holiday, please page ID on call  74M h/o prolonged hospital admission, mesenteric ischemia s/p SBR 12/2022 c/b intraabdominal hematoma/abscess, LE gangrene pending possible AKA, now found to be severe sepsis 2/2 polymicrobial bcteremia (ESBL K.pneumo, VRE), likely 2/2 undrained intraabdominal abscess.  CT c/a/p showed only slightly decreased abscess size.  Team spoke to IR for possible drainage, as he is very high risk for surgical washout.    - stop vanc  - start daptomycin 500mg IV q24h  - cont meropenem 1g IV q8h  - f/u susceptibility of K.pneumo and VRE  - send surveillance BCx  - f/u IR for drainage    Team 1 will follow you.  Dr Guzman will resume care on Monday.  Case d/w primary team.    Shraddha Jerome MD, MS  Infectious Disease attending  work cell [phone]   For any questions during evening/weekend/holiday, please page ID on call  GOAL: patient to propel w/c IND, household distances x50 feet and demonstrates safety with w/c parts management, brakes./balance training/gait training/strengthening/transfer training

## 2023-08-18 ENCOUNTER — TRANSCRIPTION ENCOUNTER (OUTPATIENT)
Age: 63
End: 2023-08-18

## 2023-08-18 LAB
ANION GAP SERPL CALC-SCNC: 14 MMOL/L — SIGNIFICANT CHANGE UP (ref 5–17)
BUN SERPL-MCNC: 63 MG/DL — HIGH (ref 7–23)
CALCIUM SERPL-MCNC: 9 MG/DL — SIGNIFICANT CHANGE UP (ref 8.4–10.5)
CHLORIDE SERPL-SCNC: 109 MMOL/L — HIGH (ref 96–108)
CO2 SERPL-SCNC: 19 MMOL/L — LOW (ref 22–31)
CREAT SERPL-MCNC: 3.15 MG/DL — HIGH (ref 0.5–1.3)
EGFR: 16 ML/MIN/1.73M2 — LOW
GLUCOSE BLDC GLUCOMTR-MCNC: 111 MG/DL — HIGH (ref 70–99)
GLUCOSE BLDC GLUCOMTR-MCNC: 125 MG/DL — HIGH (ref 70–99)
GLUCOSE BLDC GLUCOMTR-MCNC: 140 MG/DL — HIGH (ref 70–99)
GLUCOSE BLDC GLUCOMTR-MCNC: 159 MG/DL — HIGH (ref 70–99)
GLUCOSE BLDC GLUCOMTR-MCNC: 91 MG/DL — SIGNIFICANT CHANGE UP (ref 70–99)
GLUCOSE SERPL-MCNC: 81 MG/DL — SIGNIFICANT CHANGE UP (ref 70–99)
HCT VFR BLD CALC: 28.4 % — LOW (ref 34.5–45)
HGB BLD-MCNC: 9.4 G/DL — LOW (ref 11.5–15.5)
MAGNESIUM SERPL-MCNC: 2.2 MG/DL — SIGNIFICANT CHANGE UP (ref 1.6–2.6)
MCHC RBC-ENTMCNC: 27.3 PG — SIGNIFICANT CHANGE UP (ref 27–34)
MCHC RBC-ENTMCNC: 33.1 GM/DL — SIGNIFICANT CHANGE UP (ref 32–36)
MCV RBC AUTO: 82.6 FL — SIGNIFICANT CHANGE UP (ref 80–100)
NRBC # BLD: 0 /100 WBCS — SIGNIFICANT CHANGE UP (ref 0–0)
PHOSPHATE SERPL-MCNC: 4.3 MG/DL — SIGNIFICANT CHANGE UP (ref 2.5–4.5)
PLATELET # BLD AUTO: 310 K/UL — SIGNIFICANT CHANGE UP (ref 150–400)
POTASSIUM SERPL-MCNC: 4.3 MMOL/L — SIGNIFICANT CHANGE UP (ref 3.5–5.3)
POTASSIUM SERPL-SCNC: 4.3 MMOL/L — SIGNIFICANT CHANGE UP (ref 3.5–5.3)
RBC # BLD: 3.44 M/UL — LOW (ref 3.8–5.2)
RBC # FLD: 14.8 % — HIGH (ref 10.3–14.5)
SODIUM SERPL-SCNC: 142 MMOL/L — SIGNIFICANT CHANGE UP (ref 135–145)
WBC # BLD: 5.42 K/UL — SIGNIFICANT CHANGE UP (ref 3.8–10.5)
WBC # FLD AUTO: 5.42 K/UL — SIGNIFICANT CHANGE UP (ref 3.8–10.5)

## 2023-08-18 PROCEDURE — 99231 SBSQ HOSP IP/OBS SF/LOW 25: CPT

## 2023-08-18 PROCEDURE — 93970 EXTREMITY STUDY: CPT | Mod: 26

## 2023-08-18 RX ORDER — CHLORHEXIDINE GLUCONATE 213 G/1000ML
1 SOLUTION TOPICAL
Refills: 0 | Status: DISCONTINUED | OUTPATIENT
Start: 2023-08-18 | End: 2023-08-21

## 2023-08-18 RX ORDER — SODIUM CHLORIDE 9 MG/ML
10 INJECTION INTRAMUSCULAR; INTRAVENOUS; SUBCUTANEOUS
Refills: 0 | Status: DISCONTINUED | OUTPATIENT
Start: 2023-08-18 | End: 2023-08-21

## 2023-08-18 RX ADMIN — CHLORHEXIDINE GLUCONATE 1 APPLICATION(S): 213 SOLUTION TOPICAL at 06:07

## 2023-08-18 RX ADMIN — HEPARIN SODIUM 5000 UNIT(S): 5000 INJECTION INTRAVENOUS; SUBCUTANEOUS at 22:00

## 2023-08-18 RX ADMIN — Medication 4 UNIT(S): at 12:46

## 2023-08-18 RX ADMIN — HEPARIN SODIUM 5000 UNIT(S): 5000 INJECTION INTRAVENOUS; SUBCUTANEOUS at 06:03

## 2023-08-18 RX ADMIN — PANTOPRAZOLE SODIUM 40 MILLIGRAM(S): 20 TABLET, DELAYED RELEASE ORAL at 06:06

## 2023-08-18 RX ADMIN — Medication 50 MILLIGRAM(S): at 06:03

## 2023-08-18 RX ADMIN — TAMSULOSIN HYDROCHLORIDE 0.4 MILLIGRAM(S): 0.4 CAPSULE ORAL at 21:59

## 2023-08-18 RX ADMIN — Medication 4 UNIT(S): at 09:07

## 2023-08-18 RX ADMIN — INSULIN GLARGINE 13 UNIT(S): 100 INJECTION, SOLUTION SUBCUTANEOUS at 21:59

## 2023-08-18 RX ADMIN — Medication 1 DROP(S): at 06:07

## 2023-08-18 RX ADMIN — Medication 50 MILLIGRAM(S): at 17:19

## 2023-08-18 RX ADMIN — Medication 1 DROP(S): at 14:23

## 2023-08-18 RX ADMIN — Medication 4 UNIT(S): at 17:19

## 2023-08-18 RX ADMIN — CHLORHEXIDINE GLUCONATE 1 APPLICATION(S): 213 SOLUTION TOPICAL at 11:43

## 2023-08-18 RX ADMIN — ATORVASTATIN CALCIUM 40 MILLIGRAM(S): 80 TABLET, FILM COATED ORAL at 21:58

## 2023-08-18 RX ADMIN — Medication 50 MILLIGRAM(S): at 06:02

## 2023-08-18 RX ADMIN — HEPARIN SODIUM 5000 UNIT(S): 5000 INJECTION INTRAVENOUS; SUBCUTANEOUS at 14:23

## 2023-08-18 NOTE — DISCHARGE NOTE PROVIDER - CARE PROVIDERS DIRECT ADDRESSES
,[email or web address] ,[email or web address],DirectAddress_Unknown ,[email or web address],DirectAddress_Unknown

## 2023-08-18 NOTE — DISCHARGE NOTE PROVIDER - NSDCFUADDINST_GEN_ALL_CORE_FT
Repeat blood work to check your kidneys  in 1 week - by primary doctor or kidney doctor  Repeat blood work to check your kidneys  in 1 week - Fax results to Dr. Harrison - [phone]  Pt does not have a PMD - Looking for a PMD close to house Repeat blood work to check your kidneys  in 1 week - Fax results to Dr. Harrison - [phone]  Pt does not have a PMD - Looking for a PMD close to house Repeat blood work to check your kidneys  in 1 week - Fax results to Dr. Harrison - [phone]  Pt does not have a PMD - Looking for a PMD close to house

## 2023-08-18 NOTE — PROGRESS NOTE ADULT - SUBJECTIVE AND OBJECTIVE BOX
STEPHANIE KAYE  63y Female  MRN:[id number]    Patient is a 63y old  Female who presents with a chief complaint of BANDAR (17 Aug 2023 12:15)    HPI:  63F IDDM, HTN, HLD, recent admit 7/25-8/7 iso b/l heel wound c/f OM s/p debridement w/ R foot graft application and bone bx (7/27) subsequent vac application dc'd on dapto and cipro (plan until 9/7) via RUE PICC who presents for elevated SCr on outpt lab monitoring. Pt reports feeling generally well in interim of discharge, though has noticed mild increase in chronic LE edema x days (superimposed on b/l LE edema since 1990s per her). She denies new med intake, change in PO intake (tends not to drink enough water but states this is not new), n/v/d, melena/hematochezia, dysuria/hematuria, change in urinary frequency. Otherwise denies HA, fever, chills, new visual disturbance (L visual deficit at bl x yrs per pt, s/p ophtho surgeries), rhinorrhea, odynophagia, dysphagia, CP, palpitations, SOB, cough, abd pain, new numbness/weakness/tingling (states RLE weak compared to LLE after pods procedure last admit) or other complaint.    Of note she relays last dapto dose 8/15, last cipro dose this AM. She states that she has been reducing basal/bolus insulin dose at home given borderline low FS.    ED Course  VS: afebrile tmax 36.9C, HR 60s, BP 120s-130s/60s, RR 16-20, SpO2% 97-98 RA  Labs: normocytic (MCV 81.6) anemia H/H 9.9/29.3; hypoNa 131, BUN/SCr 72/3.75 (GFR 13), phos 5.2, unable to calc FeUrea (urine urea unavailable), otherwise w/ elevated protein/Cr ratio 0.7   Imaging:  - CXR: clear lungs, R PICC terminates in SVC   Received: n/a    admit to medicine for further eval/mgt    (16 Aug 2023 20:50)      Patient seen and evaluated at bedside. No acute events overnight except as noted.    Interval HPI: no acute events o/n     PAST MEDICAL & SURGICAL HISTORY:  Diabetes      Hypertension      Hypercholesteremia      Wound of foot      H/O eye surgery      S/P cholecystectomy          REVIEW OF SYSTEMS:  as per hpi       VITALS:   Vital Signs Last 24 Hrs  T(C): 36.4 (18 Aug 2023 11:45), Max: 37.2 (18 Aug 2023 04:18)  T(F): 97.5 (18 Aug 2023 11:45), Max: 98.9 (18 Aug 2023 04:18)  HR: 59 (18 Aug 2023 11:45) (47 - 78)  BP: 176/82 (18 Aug 2023 11:45) (114/56 - 176/82)  BP(mean): --  RR: 18 (18 Aug 2023 11:45) (17 - 18)  SpO2: 96% (18 Aug 2023 11:45) (95% - 98%)    Parameters below as of 18 Aug 2023 11:45  Patient On (Oxygen Delivery Method): room air          PHYSICAL EXAM:  GENERAL: NAD, well-developed  HEAD:  Atraumatic, Normocephalic  EYES: EOMI, PERRLA, conjunctiva and sclera clear  NECK: Supple, No JVD  CHEST/LUNG: Clear to auscultation bilaterally; No wheeze  HEART: S1, S2; No murmurs, rubs, or gallops  ABDOMEN: Soft, Nontender, Nondistended; Bowel sounds present  EXTREMITIES:  2+ Peripheral Pulses, No clubbing, cyanosis, or edema. b/l lower ext wounds. chronic changes   PSYCH: Normal affect  NEUROLOGY: AAOX3; non-focal  SKIN: No rashes or lesions    Consultant(s) Notes Reviewed:  [x ] YES  [ ] NO  Care Discussed with Consultants/Other Providers [ x] YES  [ ] NO    MEDS:   MEDICATIONS  (STANDING):  artificial tears (preservative free) Ophthalmic Solution 1 Drop(s) Both EYES three times a day  atorvastatin 40 milliGRAM(s) Oral at bedtime  chlorhexidine 2% Cloths 1 Application(s) Topical daily  chlorhexidine 4% Liquid 1 Application(s) Topical <User Schedule>  dextrose 5%. 1000 milliLiter(s) (50 mL/Hr) IV Continuous <Continuous>  dextrose 5%. 1000 milliLiter(s) (100 mL/Hr) IV Continuous <Continuous>  dextrose 50% Injectable 25 Gram(s) IV Push once  dextrose 50% Injectable 12.5 Gram(s) IV Push once  dextrose 50% Injectable 25 Gram(s) IV Push once  glucagon  Injectable 1 milliGRAM(s) IntraMuscular once  heparin   Injectable 5000 Unit(s) SubCutaneous every 8 hours  hydrALAZINE 50 milliGRAM(s) Oral two times a day  insulin glargine Injectable (LANTUS) 13 Unit(s) SubCutaneous at bedtime  insulin lispro (ADMELOG) corrective regimen sliding scale   SubCutaneous three times a day before meals  insulin lispro (ADMELOG) corrective regimen sliding scale   SubCutaneous at bedtime  insulin lispro Injectable (ADMELOG) 4 Unit(s) SubCutaneous three times a day before meals  metoprolol succinate ER 50 milliGRAM(s) Oral daily  pantoprazole    Tablet 40 milliGRAM(s) Oral before breakfast  senna 2 Tablet(s) Oral at bedtime  tamsulosin 0.4 milliGRAM(s) Oral at bedtime    MEDICATIONS  (PRN):  dextrose Oral Gel 15 Gram(s) Oral once PRN Blood Glucose LESS THAN 70 milliGRAM(s)/deciliter  sodium chloride 0.9% lock flush 10 milliLiter(s) IV Push every 1 hour PRN Pre/post blood products, medications, blood draw, and to maintain line patency      ALLERGIES:  No Known Allergies      LABS:                                    9.4    5.42  )-----------( 310      ( 18 Aug 2023 07:17 )             28.4   08-18    142  |  109<H>  |  63<H>  ----------------------------<  81  4.3   |  19<L>  |  3.15<H>    Ca    9.0      18 Aug 2023 07:16  Phos  4.3     08-18  Mg     2.2     08-18    TPro  7.8  /  Alb  3.5  /  TBili  0.4  /  DBili  x   /  AST  15  /  ALT  13  /  AlkPhos  65  08-16   STEPHANIE KAYE  63y Female  MRN:[id number]    Patient is a 63y old  Female who presents with a chief complaint of BANDAR (17 Aug 2023 12:15)    HPI:  63F IDDM, HTN, HLD, recent admit 7/25-8/7 iso b/l heel wound c/f OM s/p debridement w/ R foot graft application and bone bx (7/27) subsequent vac application dc'd on dapto and cipro (plan until 9/7) via RUE PICC who presents for elevated SCr on outpt lab monitoring. Pt reports feeling generally well in interim of discharge, though has noticed mild increase in chronic LE edema x days (superimposed on b/l LE edema since 1990s per her). She denies new med intake, change in PO intake (tends not to drink enough water but states this is not new), n/v/d, melena/hematochezia, dysuria/hematuria, change in urinary frequency. Otherwise denies HA, fever, chills, new visual disturbance (L visual deficit at bl x yrs per pt, s/p ophtho surgeries), rhinorrhea, odynophagia, dysphagia, CP, palpitations, SOB, cough, abd pain, new numbness/weakness/tingling (states RLE weak compared to LLE after pods procedure last admit) or other complaint.    Of note she relays last dapto dose 8/15, last cipro dose this AM. She states that she has been reducing basal/bolus insulin dose at home given borderline low FS.    ED Course  VS: afebrile tmax 36.9C, HR 60s, BP 120s-130s/60s, RR 16-20, SpO2% 97-98 RA  Labs: normocytic (MCV 81.6) anemia H/H 9.9/29.3; hypoNa 131, BUN/SCr 72/3.75 (GFR 13), phos 5.2, unable to calc FeUrea (urine urea unavailable), otherwise w/ elevated protein/Cr ratio 0.7   Imaging:  - CXR: clear lungs, R PICC terminates in SVC   Received: n/a    admit to medicine for further eval/mgt    (16 Aug 2023 20:50)      Patient seen and evaluated at bedside. No acute events overnight except as noted.    Interval HPI: no acute events o/n     PAST MEDICAL & SURGICAL HISTORY:  Diabetes      Hypertension      Hypercholesteremia      Wound of foot      H/O eye surgery      S/P cholecystectomy          REVIEW OF SYSTEMS:  as per hpi       VITALS:   Vital Signs Last 24 Hrs  T(C): 36.4 (18 Aug 2023 11:45), Max: 37.2 (18 Aug 2023 04:18)  T(F): 97.5 (18 Aug 2023 11:45), Max: 98.9 (18 Aug 2023 04:18)  HR: 59 (18 Aug 2023 11:45) (47 - 78)  BP: 176/82 (18 Aug 2023 11:45) (114/56 - 176/82)  BP(mean): --  RR: 18 (18 Aug 2023 11:45) (17 - 18)  SpO2: 96% (18 Aug 2023 11:45) (95% - 98%)    Parameters below as of 18 Aug 2023 11:45  Patient On (Oxygen Delivery Method): room air          PHYSICAL EXAM:  GENERAL: NAD, well-developed  HEAD:  Atraumatic, Normocephalic  EYES: EOMI, PERRLA, conjunctiva and sclera clear  NECK: Supple, No JVD  CHEST/LUNG: Clear to auscultation bilaterally; No wheeze  HEART: S1, S2; No murmurs, rubs, or gallops  ABDOMEN: Soft, Nontender, Nondistended; Bowel sounds present  EXTREMITIES:  2+ Peripheral Pulses, No clubbing, cyanosis, or edema. b/l lower ext wounds. chronic changes   PSYCH: Normal affect  NEUROLOGY: AAOX3; non-focal  SKIN: No rashes or lesions    Consultant(s) Notes Reviewed:  [x ] YES  [ ] NO  Care Discussed with Consultants/Other Providers [ x] YES  [ ] NO    MEDS:   MEDICATIONS  (STANDING):  artificial tears (preservative free) Ophthalmic Solution 1 Drop(s) Both EYES three times a day  atorvastatin 40 milliGRAM(s) Oral at bedtime  chlorhexidine 2% Cloths 1 Application(s) Topical daily  chlorhexidine 4% Liquid 1 Application(s) Topical <User Schedule>  dextrose 5%. 1000 milliLiter(s) (50 mL/Hr) IV Continuous <Continuous>  dextrose 5%. 1000 milliLiter(s) (100 mL/Hr) IV Continuous <Continuous>  dextrose 50% Injectable 25 Gram(s) IV Push once  dextrose 50% Injectable 12.5 Gram(s) IV Push once  dextrose 50% Injectable 25 Gram(s) IV Push once  glucagon  Injectable 1 milliGRAM(s) IntraMuscular once  heparin   Injectable 5000 Unit(s) SubCutaneous every 8 hours  hydrALAZINE 50 milliGRAM(s) Oral two times a day  insulin glargine Injectable (LANTUS) 13 Unit(s) SubCutaneous at bedtime  insulin lispro (ADMELOG) corrective regimen sliding scale   SubCutaneous three times a day before meals  insulin lispro (ADMELOG) corrective regimen sliding scale   SubCutaneous at bedtime  insulin lispro Injectable (ADMELOG) 4 Unit(s) SubCutaneous three times a day before meals  metoprolol succinate ER 50 milliGRAM(s) Oral daily  pantoprazole    Tablet 40 milliGRAM(s) Oral before breakfast  senna 2 Tablet(s) Oral at bedtime  tamsulosin 0.4 milliGRAM(s) Oral at bedtime    MEDICATIONS  (PRN):  dextrose Oral Gel 15 Gram(s) Oral once PRN Blood Glucose LESS THAN 70 milliGRAM(s)/deciliter  sodium chloride 0.9% lock flush 10 milliLiter(s) IV Push every 1 hour PRN Pre/post blood products, medications, blood draw, and to maintain line patency      ALLERGIES:  No Known Allergies      LABS:                                    9.4    5.42  )-----------( 310      ( 18 Aug 2023 07:17 )             28.4   08-18    142  |  109<H>  |  63<H>  ----------------------------<  81  4.3   |  19<L>  |  3.15<H>    Ca    9.0      18 Aug 2023 07:16  Phos  4.3     08-18  Mg     2.2     08-18    TPro  7.8  /  Alb  3.5  /  TBili  0.4  /  DBili  x   /  AST  15  /  ALT  13  /  AlkPhos  65  08-16   STEPHANIE KAYE  63y Female  MRN:[id number]    Patient is a 63y old  Female who presents with a chief complaint of BANDAR (17 Aug 2023 12:15)    HPI:  63F IDDM, HTN, HLD, recent admit 7/25-8/7 iso b/l heel wound c/f OM s/p debridement w/ R foot graft application and bone bx (7/27) subsequent vac application dc'd on dapto and cipro (plan until 9/7) via RUE PICC who presents for elevated SCr on outpt lab monitoring. Pt reports feeling generally well in interim of discharge, though has noticed mild increase in chronic LE edema x days (superimposed on b/l LE edema since 1990s per her). She denies new med intake, change in PO intake (tends not to drink enough water but states this is not new), n/v/d, melena/hematochezia, dysuria/hematuria, change in urinary frequency. Otherwise denies HA, fever, chills, new visual disturbance (L visual deficit at bl x yrs per pt, s/p ophtho surgeries), rhinorrhea, odynophagia, dysphagia, CP, palpitations, SOB, cough, abd pain, new numbness/weakness/tingling (states RLE weak compared to LLE after pods procedure last admit) or other complaint.    Of note she relays last dapto dose 8/15, last cipro dose this AM. She states that she has been reducing basal/bolus insulin dose at home given borderline low FS.    ED Course  VS: afebrile tmax 36.9C, HR 60s, BP 120s-130s/60s, RR 16-20, SpO2% 97-98 RA  Labs: normocytic (MCV 81.6) anemia H/H 9.9/29.3; hypoNa 131, BUN/SCr 72/3.75 (GFR 13), phos 5.2, unable to calc FeUrea (urine urea unavailable), otherwise w/ elevated protein/Cr ratio 0.7   Imaging:  - CXR: clear lungs, R PICC terminates in SVC   Received: n/a    admit to medicine for further eval/mgt    (16 Aug 2023 20:50)      Patient seen and evaluated at bedside. No acute events overnight except as noted.    Interval HPI: no acute events o/n     PAST MEDICAL & SURGICAL HISTORY:  Diabetes      Hypertension      Hypercholesteremia      Wound of foot      H/O eye surgery      S/P cholecystectomy          REVIEW OF SYSTEMS:  as per hpi       VITALS:   Vital Signs Last 24 Hrs  T(C): 36.4 (18 Aug 2023 11:45), Max: 37.2 (18 Aug 2023 04:18)  T(F): 97.5 (18 Aug 2023 11:45), Max: 98.9 (18 Aug 2023 04:18)  HR: 59 (18 Aug 2023 11:45) (47 - 78)  BP: 176/82 (18 Aug 2023 11:45) (114/56 - 176/82)  BP(mean): --  RR: 18 (18 Aug 2023 11:45) (17 - 18)  SpO2: 96% (18 Aug 2023 11:45) (95% - 98%)    Parameters below as of 18 Aug 2023 11:45  Patient On (Oxygen Delivery Method): room air          PHYSICAL EXAM:  GENERAL: NAD, well-developed  HEAD:  Atraumatic, Normocephalic  EYES: EOMI, PERRLA, conjunctiva and sclera clear  NECK: Supple, No JVD  CHEST/LUNG: Clear to auscultation bilaterally; No wheeze  HEART: S1, S2; No murmurs, rubs, or gallops  ABDOMEN: Soft, Nontender, Nondistended; Bowel sounds present  EXTREMITIES:  2+ Peripheral Pulses, No clubbing, cyanosis, or edema. b/l lower ext wounds. chronic changes   PSYCH: Normal affect  NEUROLOGY: AAOX3; non-focal  SKIN: No rashes or lesions    Consultant(s) Notes Reviewed:  [x ] YES  [ ] NO  Care Discussed with Consultants/Other Providers [ x] YES  [ ] NO    MEDS:   MEDICATIONS  (STANDING):  artificial tears (preservative free) Ophthalmic Solution 1 Drop(s) Both EYES three times a day  atorvastatin 40 milliGRAM(s) Oral at bedtime  chlorhexidine 2% Cloths 1 Application(s) Topical daily  chlorhexidine 4% Liquid 1 Application(s) Topical <User Schedule>  dextrose 5%. 1000 milliLiter(s) (50 mL/Hr) IV Continuous <Continuous>  dextrose 5%. 1000 milliLiter(s) (100 mL/Hr) IV Continuous <Continuous>  dextrose 50% Injectable 25 Gram(s) IV Push once  dextrose 50% Injectable 12.5 Gram(s) IV Push once  dextrose 50% Injectable 25 Gram(s) IV Push once  glucagon  Injectable 1 milliGRAM(s) IntraMuscular once  heparin   Injectable 5000 Unit(s) SubCutaneous every 8 hours  hydrALAZINE 50 milliGRAM(s) Oral two times a day  insulin glargine Injectable (LANTUS) 13 Unit(s) SubCutaneous at bedtime  insulin lispro (ADMELOG) corrective regimen sliding scale   SubCutaneous three times a day before meals  insulin lispro (ADMELOG) corrective regimen sliding scale   SubCutaneous at bedtime  insulin lispro Injectable (ADMELOG) 4 Unit(s) SubCutaneous three times a day before meals  metoprolol succinate ER 50 milliGRAM(s) Oral daily  pantoprazole    Tablet 40 milliGRAM(s) Oral before breakfast  senna 2 Tablet(s) Oral at bedtime  tamsulosin 0.4 milliGRAM(s) Oral at bedtime    MEDICATIONS  (PRN):  dextrose Oral Gel 15 Gram(s) Oral once PRN Blood Glucose LESS THAN 70 milliGRAM(s)/deciliter  sodium chloride 0.9% lock flush 10 milliLiter(s) IV Push every 1 hour PRN Pre/post blood products, medications, blood draw, and to maintain line patency      ALLERGIES:  No Known Allergies      LABS:                                    9.4    5.42  )-----------( 310      ( 18 Aug 2023 07:17 )             28.4   08-18    142  |  109<H>  |  63<H>  ----------------------------<  81  4.3   |  19<L>  |  3.15<H>    Ca    9.0      18 Aug 2023 07:16  Phos  4.3     08-18  Mg     2.2     08-18    TPro  7.8  /  Alb  3.5  /  TBili  0.4  /  DBili  x   /  AST  15  /  ALT  13  /  AlkPhos  65  08-16

## 2023-08-18 NOTE — DISCHARGE NOTE PROVIDER - PROVIDER TOKENS
PROVIDER:[TOKEN:[[id number]],FOLLOWUP:[1 week]],PROVIDER:[TOKEN:[[id number]],FOLLOWUP:[1 week]] PROVIDER:[TOKEN:[[id number]],FOLLOWUP:[1 week]],PROVIDER:[TOKEN:[[id number]],FOLLOWUP:[1 week]] PROVIDER:[TOKEN:[[id number]],FOLLOWUP:[1 week]],PROVIDER:[TOKEN:[[id number]],FOLLOWUP:[1 week]]

## 2023-08-18 NOTE — PROGRESS NOTE ADULT - ASSESSMENT
63F  with bilateral heel wound  - Pt seen and evaluated.  - Afebrile, no leukocytosis, ESR/CRP pending   - Right heel fibrogranular wound to subQ with periwound maceration, no erythema, no edema, no pus, no acute signs of infection. Left heel wound to subQ with no acute signs of infections  - Patient admitted to medicine for BANDAR workup   - 7/27 Right Foot calcaneal Bone Biopsy pathology: no OM   - 7/27 Right foot calcaneal bone biopsy culture:  Klebsiella, E faecalis, MRSA, Morganella   - ID recs, appreciated.  - recommending vasc consult   - Right foot VAC to be applied   - No acute pod intervention planned  during this admission  - Patient stable for discharge from a podiatry standpoint.  - Please have patient wear bilateral z flows at all times while in house, strict decubitus precautions   - Wound care instructions and follow up information listed in discharge provider note.  - Discussed with attending.

## 2023-08-18 NOTE — PROGRESS NOTE ADULT - ASSESSMENT
63F IDDM, HTN, HLD, recent admit 7/25-8/7 iso b/l heel wound. She underwent debridement 7/27/23 and was being treated with prolonged antibiotics for possible osteo  Admitted for BANDAR  Nephrology assessment appreciated  - most likely pre-renal  Normal CK excludes Daptomycin related rhabdo as contributer to BANDAR  Examined  8/16 with Podiatry  - wound is clean  The surg path has been read and is negative for osteo      Recent Antibiotics  Vanco 7/25--> 7/26; 7/31--> 8/7; 8/16 --> 8/17  Zosyn 7/25-->8/7; 8/16  Daptomycin 8/8 --> 8/16  Cipro 500 mg po  8/8 --> 8/16    Suggest  maintain off Antibiotics  local care    trend creat  fluid management as per primary team and Nephrology    Signing off case  Please reconsult ID as needed

## 2023-08-18 NOTE — DISCHARGE NOTE PROVIDER - NSDCFUADDAPPT_GEN_ALL_CORE_FT
Podiatry Discharge Instructions:  Follow up: Please follow up with Dr. Flores within 1 week of discharge from the hospital, please call [phone] for appointment and discuss that you recently were seen in the hospital.  Wound Care: Please apply santyl to left foot wound followed by 4x4 gauze and beatrice daily. Please apply vac to right foot at 125 mmhg, 3x/week, continuously until your follow up appointment .  Weight bearing: Please weight bear as tolerated in a surgical shoe to Left and forefoot offloadign show to Right    Podiatry Discharge Instructions:  Follow up: Please follow up with Dr. Flores within 1 week of discharge from the hospital, please call [phone] for appointment and discuss that you recently were seen in the hospital.  Wound Care: Please apply santyl to left foot wound followed by 4x4 gauze and beatrice daily. Please apply vac to right foot at 125 mmhg, 3x/week, continuously until your follow up appointment .  Weight bearing: Please weight bear as tolerated in a surgical shoe to Left and forefoot offloadign show to Right    Podiatry Discharge Instructions:  Follow up: Please follow up with Dr. Flores within 1 week of discharge from the hospital, please call [phone] for appointment and discuss that you recently were seen in the hospital.  Wound Care: Please apply santyl to left foot wound followed by 4x4 gauze and beatrice daily. Please apply vac to right foot at 125 mmhg, 3x/week, continuously until your follow up appointment .  Weight bearing: Please weight bear as tolerated in a surgical shoe to Left and forefoot offloadign show to Right    Podiatry Discharge Instructions:  Follow up: Please follow up with Dr. Flores within 1 week of discharge from the hospital, please call [phone] for appointment and discuss that you recently were seen in the hospital.  Wound Care: Please apply santyl to left foot wound followed by 4x4 gauze and Jonathan daily. Please apply vac to right foot at 125 mmHg, 3x/week, continuously until your follow up appointment .  Weight bearing: Please weight bear as tolerated in a surgical shoe to Left and forefoot offloading show to Right    Podiatry Discharge Instructions:  Follow up: Please follow up with Dr. Flores within 1 week of discharge from the hospital, please call [phone] for appointment and discuss that you recently were seen in the hospital.  Wound Care: Please apply santyl to left foot wound followed by 4x4 gauze and Jonathan daily. Please apply vac to right foot at 125 mmHg, 3x/week, continuously until your follow up appointment .  Weight bearing: Please weight bear as tolerated in a surgical shoe to Left and forefoot offloading show to Right    Podiatry Discharge Instructions:  Follow up: Please follow up with Dr. Flores within 1 week of discharge from the hospital, please call [phone] for appointment and discuss that you recently were seen in the hospital.  Wound Care: Please apply santyl to left foot wound followed by 4x4 gauze and Jonathan daily. Please apply vac to right foot at 125 mmHg, 3x/week, continuously until your follow up appointment .  Weight bearing: Please weight bear as tolerated in a surgical shoe to Left and forefoot offloading show to Right

## 2023-08-18 NOTE — PROGRESS NOTE ADULT - SUBJECTIVE AND OBJECTIVE BOX
Follow Up:  heel wound    Interval History/ROS:  no pain, in good spirits, sister at bedside    Allergies  No Known Allergies    ANTIMICROBIALS:      OTHER MEDS:  MEDICATIONS  (STANDING):  atorvastatin 40 at bedtime  dextrose 50% Injectable 25 once  dextrose 50% Injectable 12.5 once  dextrose 50% Injectable 25 once  dextrose Oral Gel 15 once PRN  glucagon  Injectable 1 once  heparin   Injectable 5000 every 8 hours  hydrALAZINE 50 two times a day  insulin glargine Injectable (LANTUS) 13 at bedtime  insulin lispro (ADMELOG) corrective regimen sliding scale  three times a day before meals  insulin lispro (ADMELOG) corrective regimen sliding scale  at bedtime  insulin lispro Injectable (ADMELOG) 4 three times a day before meals  metoprolol succinate ER 50 daily  pantoprazole    Tablet 40 before breakfast  senna 2 at bedtime  tamsulosin 0.4 at bedtime      Vital Signs Last 24 Hrs  T(C): 36.4 (18 Aug 2023 11:45), Max: 37.2 (18 Aug 2023 04:18)  T(F): 97.5 (18 Aug 2023 11:45), Max: 98.9 (18 Aug 2023 04:18)  HR: 59 (18 Aug 2023 11:45) (47 - 78)  BP: 176/82 (18 Aug 2023 11:45) (114/56 - 176/82)  BP(mean): --  RR: 18 (18 Aug 2023 11:45) (17 - 18)  SpO2: 96% (18 Aug 2023 11:45) (95% - 98%)    Parameters below as of 18 Aug 2023 11:45  Patient On (Oxygen Delivery Method): room air        PHYSICAL EXAM:  General: WN/WD NAD, Non-toxic  Neurology: A&Ox3, nonfocal  Respiratory: Clear to auscultation bilaterally  CV: RRR, S1S2, no murmurs, rubs or gallops  Abdominal: Soft, Non-tender, non-distended  Extremities: ++ le edema, venous stasis changes  Line Sites: Clear  Skin: No rash                          9.4    5.42  )-----------( 310      ( 18 Aug 2023 07:17 )             28.4       08-18    142  |  109<H>  |  63<H>  ----------------------------<  81  4.3   |  19<L>  |  3.15<H>  Creatinine: 3.15 (08-18 @ 07:16)    Creatinine: 3.27 (08-17 @ 05:06)    Creatinine: 3.75 (08-16 @ 18:20)     8/15 (out-pt) Creat = 4.04  CK 51   8/7 (last hospitalization) Creat = 1.14        Ca    9.0      18 Aug 2023 07:16  Phos  4.3     08-18  Mg     2.2     08-18  (08.17.23 @ 05:06)  Creatine Kinase, Serum: 63 U/L  (08.16.23 @ 18:20)  Creatine Kinase, Serum: 68 U/L  TPro  7.8  /  Alb  3.5  /  TBili  0.4  /  DBili  x   /  AST  15  /  ALT  13  /  AlkPhos  65  08-16      Urinalysis Basic - ( 18 Aug 2023 07:16 )    Color: x / Appearance: x / SG: x / pH: x  Gluc: 81 mg/dL / Ketone: x  / Bili: x / Urobili: x   Blood: x / Protein: x / Nitrite: x   Leuk Esterase: x / RBC: x / WBC x   Sq Epi: x / Non Sq Epi: x / Bacteria: x        MICROBIOLOGY:  Clean Catch Clean Catch (Midstream)  08-16-23   <10,000 CFU/mL Normal Urogenital Maryann  --  --      .Tissue Other  07-27-23   No fungus isolated at 2 weeks.  --  Morganella morganii  Methicillin resistant Staphylococcus aureus  Klebsiella pneumoniae  Enterococcus faecalis (vancomycin resistant)      .Abscess right heel wound  07-25-23   Numerous Proteus mirabilis  Numerous Pseudomonas aeruginosa  Moderate Enterococcus faecalis (vancomycin resistant)  Few Methicillin Resistant Staphylococcus aureus  Moderate Klebsiella oxytoca/Raoultella ornithinolytica  --  Proteus mirabilis  Pseudomonas aeruginosa  Enterococcus faecalis (vancomycin resistant)  Methicillin resistant Staphylococcus aureus  Klebsiella oxytoca /Raoutella ornithinolytica      .Blood Blood  07-25-23   No growth at 5 days  --  --      .Blood Blood  07-25-23   No growth at 5 days  --  --    RADIOLOGY:    Mikael Daniels MD; Division of Infectious Disease; Pager: [phone]; nights and weekends: [phone]   Follow Up:  heel wound    Interval History/ROS:  no pain, in good spirits, sister at bedside    Allergies  No Known Allergies    ANTIMICROBIALS:      OTHER MEDS:  MEDICATIONS  (STANDING):  atorvastatin 40 at bedtime  dextrose 50% Injectable 25 once  dextrose 50% Injectable 12.5 once  dextrose 50% Injectable 25 once  dextrose Oral Gel 15 once PRN  glucagon  Injectable 1 once  heparin   Injectable 5000 every 8 hours  hydrALAZINE 50 two times a day  insulin glargine Injectable (LANTUS) 13 at bedtime  insulin lispro (ADMELOG) corrective regimen sliding scale  three times a day before meals  insulin lispro (ADMELOG) corrective regimen sliding scale  at bedtime  insulin lispro Injectable (ADMELOG) 4 three times a day before meals  metoprolol succinate ER 50 daily  pantoprazole    Tablet 40 before breakfast  senna 2 at bedtime  tamsulosin 0.4 at bedtime      Vital Signs Last 24 Hrs  T(C): 36.4 (18 Aug 2023 11:45), Max: 37.2 (18 Aug 2023 04:18)  T(F): 97.5 (18 Aug 2023 11:45), Max: 98.9 (18 Aug 2023 04:18)  HR: 59 (18 Aug 2023 11:45) (47 - 78)  BP: 176/82 (18 Aug 2023 11:45) (114/56 - 176/82)  BP(mean): --  RR: 18 (18 Aug 2023 11:45) (17 - 18)  SpO2: 96% (18 Aug 2023 11:45) (95% - 98%)    Parameters below as of 18 Aug 2023 11:45  Patient On (Oxygen Delivery Method): room air        PHYSICAL EXAM:  General: WN/WD NAD, Non-toxic  Neurology: A&Ox3, nonfocal  Respiratory: Clear to auscultation bilaterally  CV: RRR, S1S2, no murmurs, rubs or gallops  Abdominal: Soft, Non-tender, non-distended  Extremities: ++ le edema, venous stasis changes  Line Sites: Clear  Skin: No rash                          9.4    5.42  )-----------( 310      ( 18 Aug 2023 07:17 )             28.4       08-18    142  |  109<H>  |  63<H>  ----------------------------<  81  4.3   |  19<L>  |  3.15<H>  Creatinine: 3.15 (08-18 @ 07:16)    Creatinine: 3.27 (08-17 @ 05:06)    Creatinine: 3.75 (08-16 @ 18:20)     8/15 (out-pt) Creat = 4.04  CK 51   8/7 (last hospitalization) Creat = 1.14        Ca    9.0      18 Aug 2023 07:16  Phos  4.3     08-18  Mg     2.2     08-18  (08.17.23 @ 05:06)  Creatine Kinase, Serum: 63 U/L  (08.16.23 @ 18:20)  Creatine Kinase, Serum: 68 U/L  TPro  7.8  /  Alb  3.5  /  TBili  0.4  /  DBili  x   /  AST  15  /  ALT  13  /  AlkPhos  65  08-16      Urinalysis Basic - ( 18 Aug 2023 07:16 )    Color: x / Appearance: x / SG: x / pH: x  Gluc: 81 mg/dL / Ketone: x  / Bili: x / Urobili: x   Blood: x / Protein: x / Nitrite: x   Leuk Esterase: x / RBC: x / WBC x   Sq Epi: x / Non Sq Epi: x / Bacteria: x        MICROBIOLOGY:  Clean Catch Clean Catch (Midstream)  08-16-23   <10,000 CFU/mL Normal Urogenital Maryann  --  --      .Tissue Other  07-27-23   No fungus isolated at 2 weeks.  --  Morganella morganii  Methicillin resistant Staphylococcus aureus  Klebsiella pneumoniae  Enterococcus faecalis (vancomycin resistant)      .Abscess right heel wound  07-25-23   Numerous Proteus mirabilis  Numerous Pseudomonas aeruginosa  Moderate Enterococcus faecalis (vancomycin resistant)  Few Methicillin Resistant Staphylococcus aureus  Moderate Klebsiella oxytoca/Raoultella ornithinolytica  --  Proteus mirabilis  Pseudomonas aeruginosa  Enterococcus faecalis (vancomycin resistant)  Methicillin resistant Staphylococcus aureus  Klebsiella oxytoca /Raoutella ornithinolytica      .Blood Blood  07-25-23   No growth at 5 days  --  --      .Blood Blood  07-25-23   No growth at 5 days  --  --    RADIOLOGY:    Mikael Daniels MD; Division of Infectious Disease; Pager: [phone]; nights and weekends: [phone]   Follow Up:  heel wound    Interval History/ROS:  no pain, in good spirits, sister at bedside    Allergies  No Known Allergies    ANTIMICROBIALS:      OTHER MEDS:  MEDICATIONS  (STANDING):  atorvastatin 40 at bedtime  dextrose 50% Injectable 25 once  dextrose 50% Injectable 12.5 once  dextrose 50% Injectable 25 once  dextrose Oral Gel 15 once PRN  glucagon  Injectable 1 once  heparin   Injectable 5000 every 8 hours  hydrALAZINE 50 two times a day  insulin glargine Injectable (LANTUS) 13 at bedtime  insulin lispro (ADMELOG) corrective regimen sliding scale  three times a day before meals  insulin lispro (ADMELOG) corrective regimen sliding scale  at bedtime  insulin lispro Injectable (ADMELOG) 4 three times a day before meals  metoprolol succinate ER 50 daily  pantoprazole    Tablet 40 before breakfast  senna 2 at bedtime  tamsulosin 0.4 at bedtime      Vital Signs Last 24 Hrs  T(C): 36.4 (18 Aug 2023 11:45), Max: 37.2 (18 Aug 2023 04:18)  T(F): 97.5 (18 Aug 2023 11:45), Max: 98.9 (18 Aug 2023 04:18)  HR: 59 (18 Aug 2023 11:45) (47 - 78)  BP: 176/82 (18 Aug 2023 11:45) (114/56 - 176/82)  BP(mean): --  RR: 18 (18 Aug 2023 11:45) (17 - 18)  SpO2: 96% (18 Aug 2023 11:45) (95% - 98%)    Parameters below as of 18 Aug 2023 11:45  Patient On (Oxygen Delivery Method): room air        PHYSICAL EXAM:  General: WN/WD NAD, Non-toxic  Neurology: A&Ox3, nonfocal  Respiratory: Clear to auscultation bilaterally  CV: RRR, S1S2, no murmurs, rubs or gallops  Abdominal: Soft, Non-tender, non-distended  Extremities: ++ le edema, venous stasis changes  Line Sites: Clear  Skin: No rash                          9.4    5.42  )-----------( 310      ( 18 Aug 2023 07:17 )             28.4       08-18    142  |  109<H>  |  63<H>  ----------------------------<  81  4.3   |  19<L>  |  3.15<H>  Creatinine: 3.15 (08-18 @ 07:16)    Creatinine: 3.27 (08-17 @ 05:06)    Creatinine: 3.75 (08-16 @ 18:20)     8/15 (out-pt) Creat = 4.04  CK 51   8/7 (last hospitalization) Creat = 1.14        Ca    9.0      18 Aug 2023 07:16  Phos  4.3     08-18  Mg     2.2     08-18  (08.17.23 @ 05:06)  Creatine Kinase, Serum: 63 U/L  (08.16.23 @ 18:20)  Creatine Kinase, Serum: 68 U/L  TPro  7.8  /  Alb  3.5  /  TBili  0.4  /  DBili  x   /  AST  15  /  ALT  13  /  AlkPhos  65  08-16      Urinalysis Basic - ( 18 Aug 2023 07:16 )    Color: x / Appearance: x / SG: x / pH: x  Gluc: 81 mg/dL / Ketone: x  / Bili: x / Urobili: x   Blood: x / Protein: x / Nitrite: x   Leuk Esterase: x / RBC: x / WBC x   Sq Epi: x / Non Sq Epi: x / Bacteria: x        MICROBIOLOGY:  Clean Catch Clean Catch (Midstream)  08-16-23   <10,000 CFU/mL Normal Urogenital Maryann  --  --      .Tissue Other  07-27-23   No fungus isolated at 2 weeks.  --  Morganella morganii  Methicillin resistant Staphylococcus aureus  Klebsiella pneumoniae  Enterococcus faecalis (vancomycin resistant)      .Abscess right heel wound  07-25-23   Numerous Proteus mirabilis  Numerous Pseudomonas aeruginosa  Moderate Enterococcus faecalis (vancomycin resistant)  Few Methicillin Resistant Staphylococcus aureus  Moderate Klebsiella oxytoca/Raoultella ornithinolytica  --  Proteus mirabilis  Pseudomonas aeruginosa  Enterococcus faecalis (vancomycin resistant)  Methicillin resistant Staphylococcus aureus  Klebsiella oxytoca /Raoutella ornithinolytica      .Blood Blood  07-25-23   No growth at 5 days  --  --      .Blood Blood  07-25-23   No growth at 5 days  --  --    RADIOLOGY:    Mikael Daniels MD; Division of Infectious Disease; Pager: [phone]; nights and weekends: [phone]

## 2023-08-18 NOTE — PROGRESS NOTE ADULT - SUBJECTIVE AND OBJECTIVE BOX
Patient is a 63y old  Female who presents with a chief complaint of BANDAR (17 Aug 2023 13:16)       INTERVAL HPI/OVERNIGHT EVENTS:  Patient seen and evaluated at bedside.  Pt is resting comfortable in NAD. Denies N/V/F/C.      Allergies    No Known Allergies    Intolerances        Vital Signs Last 24 Hrs  T(C): 36.6 (18 Aug 2023 08:10), Max: 37.2 (18 Aug 2023 04:18)  T(F): 97.9 (18 Aug 2023 08:10), Max: 98.9 (18 Aug 2023 04:18)  HR: 63 (18 Aug 2023 08:10) (47 - 78)  BP: 119/69 (18 Aug 2023 08:10) (114/56 - 164/74)  BP(mean): --  RR: 18 (18 Aug 2023 08:10) (17 - 18)  SpO2: 95% (18 Aug 2023 08:10) (95% - 98%)    Parameters below as of 18 Aug 2023 08:10  Patient On (Oxygen Delivery Method): room air        LABS:                        9.4    5.42  )-----------( 310      ( 18 Aug 2023 07:17 )             28.4     08-18    142  |  109<H>  |  63<H>  ----------------------------<  81  4.3   |  19<L>  |  3.15<H>    Ca    9.0      18 Aug 2023 07:16  Phos  4.3     08-18  Mg     2.2     08-18    TPro  7.8  /  Alb  3.5  /  TBili  0.4  /  DBili  x   /  AST  15  /  ALT  13  /  AlkPhos  65  08-16    PT/INR - ( 16 Aug 2023 18:20 )   PT: 12.5 sec;   INR: 1.20 ratio         PTT - ( 16 Aug 2023 18:20 )  PTT:30.4 sec  Urinalysis Basic - ( 18 Aug 2023 07:16 )    Color: x / Appearance: x / SG: x / pH: x  Gluc: 81 mg/dL / Ketone: x  / Bili: x / Urobili: x   Blood: x / Protein: x / Nitrite: x   Leuk Esterase: x / RBC: x / WBC x   Sq Epi: x / Non Sq Epi: x / Bacteria: x      CAPILLARY BLOOD GLUCOSE      POCT Blood Glucose.: 91 mg/dL (18 Aug 2023 09:02)  POCT Blood Glucose.: 108 mg/dL (17 Aug 2023 22:46)  POCT Blood Glucose.: 93 mg/dL (17 Aug 2023 11:12)      Lower Extremity Physical Exam:  Vascular: DP/PT 0/4, B/L, CFT <3 seconds B/L, Temperature gradient warm to cool, B/L.   Neuro: Epicritic sensation dimished to the level of toes, B/L.  Musculoskeletal/Ortho: Unremarkable.  Skin: Right heel fibrogranular wound to subQ with periwound maceration, no erythema, no edema, no pus, no acute signs of infection. Left heel wound to subQ with no acute signs of infections.    RADIOLOGY & ADDITIONAL TESTS:

## 2023-08-18 NOTE — DISCHARGE NOTE PROVIDER - CARE PROVIDER_API CALL
Richard Harrison  Nephrology  [address], [address]  [place] [address]  Phone: [phone]  Fax: [phone]  Follow Up Time: 1 week    Ronald Flores  Podiatric Medicine and Surgery  [address]  [place] [address]  Phone: [phone]  Fax: [phone]  Follow Up Time: 1 week   Richard Harrison  Nephrology  [address], [address]  [place] [address]  Phone: [phone]  Fax: [phone]  Follow Up Time: 1 week    Ronald Flores  Podiatric Medicine and Surgery  [address]  [place] [address]  Phone: [phone]  Fax: [phone]  Follow Up Time: 1 week   Richard Harrison  Nephrology  [address], [address]  [place] [address]  Phone: [phone]  Fax: [phone]  Follow Up Time: 1 week    Ronald Flores  Podiatric Medicine and Surgery  [address]  [place] [address]  Phone: [phone]  Fax: [phone]  Follow Up Time: 1 week

## 2023-08-18 NOTE — DISCHARGE NOTE PROVIDER - HOSPITAL COURSE
63F IDDM, HTN, HLD, recent admit 7/25-8/7 iso b/l heel wound c/f OM s/p debridement w/ R foot graft application and bone bx (7/27) subsequent vac application dc'd on dapto and cipro (plan until 9/7) via RUE PICC a/w BANDAR found on outpt lab monitoring on daptomycin through a PICC line presents to the ED kidney function (?Cr 4) and was told to come to ED. Pt does report some bilateral LE swelling for the past two days as well.     # Acute renal failure   - Creat of 3.75,   - CK negative  - Normal CK excludes Daptomycin related rhabdo as contributer to BANDAR  - Renal consulted   - Hold ACE/diuretics   - s/p IVF,    - Renal US neg for hydroneprosis  - Creatinine 2.6 on 8/21/23  - Renal cleared pt for discharge - repeat BMP in 1 week   - Follow up with renal Dr. Harrison in 1-4 weeks    # Hyponatremia   - Na 131  - improved    # Chronic severe LE edema;   - resumed PO Lasix 20mg daily    # Chronic HTN   - Lisinopril held due to BANDAR  - Elevated blood pressure  - Increased Hydralazine 50mg  to TID  - Continue Toprol  - Follow up with PMD in 1 week    # B/l heel wound/OM  - Seen by podiatry  - Afebrile, no leukocytosis  - Right heel fibrogranular wound to subQ with periwound maceration, no erythema, no edema, no pus, no acute signs of infection. Left heel wound to subQ with no acute signs of infections  - 7/27 Right Foot calcaneal Bone Biopsy pathology: no OM   - 7/27 Right foot calcaneal bone biopsy culture:  Klebsiella, E faecalis, MRSA, Morganella   - ID recs - - Now off Abts. PICC line removed per discussion with Dr. Hernández on 8/21/23  - recommending vasc consult   - Right foot VAC applied   - No acute pod intervention planned  during this admission  - Patient stable for discharge from a podiatry standpoint, follow up as outpatient  - Patient to wear bilateral z flows at all times while in house, strict decubitus precautions     # Normocytic anemia  - Chronic, monitor.    #Insulin dependent type 2 diabetes mellitus.   - home lantus 26u, novolog 9u TID on dc however pt states that she has been using lantus 20 and novolog 3u TID given low FS at home   - Dose reduced buy 50% home needs basal 13, bolus 4 TID    # Hyperlipidemia  - Continue home atorvastatin 40mg QD    Medically cleared by renal and Dr. Hernández to discharge patient home with Home care and continue wound vac to right heel

## 2023-08-18 NOTE — PROGRESS NOTE ADULT - SUBJECTIVE AND OBJECTIVE BOX
Overnight events noted      VITAL:  T(C): , Max: 37.2 (08-18-23 @ 04:18)  T(F): , Max: 98.9 (08-18-23 @ 04:18)  HR: 63 (08-18-23 @ 08:10)  BP: 119/69 (08-18-23 @ 08:10)  BP(mean): --  RR: 18 (08-18-23 @ 08:10)  SpO2: 95% (08-18-23 @ 08:10)  Wt(kg): --      PHYSICAL EXAM:  Constitutional: NAD, Alert  HEENT: NCAT, MMM  Neck: Supple, No JVD  Respiratory: CTA-b/l  Cardiovascular: pranav reg s1s2, (+)1/6 DEREJE  Gastrointestinal: BS+, soft, NT/ND  : (+)primafit  Extremities: (+)RUE PICC; 3+ b/l LE edema  Neurological: no focal deficits; strength grossly intact  Back: no CVAT b/l  Skin: R heel wound dressed; (+)fibrotic changes of LE from chronic dermatitis      LABS:                        9.4    5.42  )-----------( 310      ( 18 Aug 2023 07:17 )             28.4     Na(142)/K(4.3)/Cl(109)/HCO3(19)/BUN(63)/Cr(3.15)Glu(81)/Ca(9.0)/Mg(2.2)/PO4(4.3)    08-18 @ 07:16  Na(134)/K(4.4)/Cl(101)/HCO3(20)/BUN(70)/Cr(3.27)Glu(189)/Ca(9.0)/Mg(2.1)/PO4(4.7)    08-17 @ 05:06  Na(131)/K(5.0)/Cl(98)/HCO3(20)/BUN(72)/Cr(3.75)Glu(144)/Ca(9.6)/Mg(2.3)/PO4(5.2)    08-16 @ 18:20    CPK 63    Sodium, Random Urine: 24 mmol/L (08-16 @ 19:47)  Creatinine, Random Urine: 35 mg/dL (08-16 @ 19:47)  Protein/Creatinine Ratio Calculation: 0.7 Ratio (08-16 @ 19:47)  Chloride, Random Urine: <20 mmol/L (08-16 @ 19:47)  Potassium, Random Urine: 12 mmol/L (08-16 @ 19:47)  Osmolality, Random Urine: 181 mos/kg (08-16 @ 19:47)      IMPRESSION: 63F w/ HTN, DM2, and R heel MRSA osteomyelitis s/p debridement 7/27/23, of late on outpt IV Dapto/Cipro; 8/16/23 admitted with BANDAR    (1)BANDAR - urine studies not particularly suggestive of prerenal azotemia...and the fact that the creatinine has not improved much today relative to yesterday also argues against prerenal azotemia. Likely a significant component of ATN here. Did the Daptomycin cause this?    (2)Lytes - acceptable    (3)CV - chronic severe LE edema; no renal contraindication to adding diuretics at this point      RECOMMEND:  (1)Can add Lasix 40mg IV daily    (2)No ACEI/ARB for now    (3)No objection to use of Ciprofloxacin at this point. Would refrain from use of Daptomycin for now    (4)BMP+Mg+PO4 daily    (5)Dose new meds for GFR 15-20ml/min          Richard Harrison MD  [facility]  Office/on call physician: [phone]  Cell (7a-7p): [phone]       Overnight events noted      VITAL:  T(C): , Max: 37.2 (08-18-23 @ 04:18)  T(F): , Max: 98.9 (08-18-23 @ 04:18)  HR: 63 (08-18-23 @ 08:10)  BP: 119/69 (08-18-23 @ 08:10)  BP(mean): --  RR: 18 (08-18-23 @ 08:10)  SpO2: 95% (08-18-23 @ 08:10)  Wt(kg): --      PHYSICAL EXAM:  Constitutional: NAD, Alert  HEENT: NCAT, MMM  Neck: Supple, No JVD  Respiratory: CTA-b/l  Cardiovascular: pranav reg s1s2, (+)1/6 DEREJE  Gastrointestinal: BS+, soft, NT/ND  : (+)primafit  Extremities: (+)RUE PICC; 3+ b/l LE edema  Neurological: no focal deficits; strength grossly intact  Back: no CVAT b/l  Skin: R heel wound dressed; (+)fibrotic changes of LE from chronic dermatitis      LABS:                        9.4    5.42  )-----------( 310      ( 18 Aug 2023 07:17 )             28.4     Na(142)/K(4.3)/Cl(109)/HCO3(19)/BUN(63)/Cr(3.15)Glu(81)/Ca(9.0)/Mg(2.2)/PO4(4.3)    08-18 @ 07:16  Na(134)/K(4.4)/Cl(101)/HCO3(20)/BUN(70)/Cr(3.27)Glu(189)/Ca(9.0)/Mg(2.1)/PO4(4.7)    08-17 @ 05:06  Na(131)/K(5.0)/Cl(98)/HCO3(20)/BUN(72)/Cr(3.75)Glu(144)/Ca(9.6)/Mg(2.3)/PO4(5.2)    08-16 @ 18:20    CPK 63    Sodium, Random Urine: 24 mmol/L (08-16 @ 19:47)  Creatinine, Random Urine: 35 mg/dL (08-16 @ 19:47)  Protein/Creatinine Ratio Calculation: 0.7 Ratio (08-16 @ 19:47)  Chloride, Random Urine: <20 mmol/L (08-16 @ 19:47)  Potassium, Random Urine: 12 mmol/L (08-16 @ 19:47)  Osmolality, Random Urine: 181 mos/kg (08-16 @ 19:47)      IMPRESSION: 63F w/ HTN, DM2, and R heel MRSA osteomyelitis s/p debridement 7/27/23, of late on outpt IV Dapto/Cipro; 8/16/23 admitted with BANDAR    (1)BANDAR - urine studies not particularly suggestive of prerenal azotemia...and the fact that the creatinine has not improved much today relative to yesterday also argues against prerenal azotemia. Likely a significant component of ATN here. Did the Daptomycin cause this?    (2)Lytes - acceptable    (3)CV - chronic severe LE edema; no renal contraindication to adding diuretics at this point      RECOMMEND:  (1)Can add Lasix 40mg IV daily    (2)No ACEI/ARB for now    (3)No objection to use of Ciprofloxacin at this point. Would refrain from use of Daptomycin for now    (4)BMP+Mg+PO4 daily    (5)Dose new meds for GFR 15-20ml/min          Richard Harrison MD  [facility]  Office/on call physician: [phone]  Cell (7a-7p): [phone]       Overnight events noted      VITAL:  T(C): , Max: 37.2 (08-18-23 @ 04:18)  T(F): , Max: 98.9 (08-18-23 @ 04:18)  HR: 63 (08-18-23 @ 08:10)  BP: 119/69 (08-18-23 @ 08:10)  BP(mean): --  RR: 18 (08-18-23 @ 08:10)  SpO2: 95% (08-18-23 @ 08:10)  Wt(kg): --      PHYSICAL EXAM:  Constitutional: NAD, Alert  HEENT: NCAT, MMM  Neck: Supple, No JVD  Respiratory: CTA-b/l  Cardiovascular: pranav reg s1s2, (+)1/6 DEREJE  Gastrointestinal: BS+, soft, NT/ND  : (+)primafit  Extremities: (+)RUE PICC; 3+ b/l LE edema  Neurological: no focal deficits; strength grossly intact  Back: no CVAT b/l  Skin: R heel wound dressed; (+)fibrotic changes of LE from chronic dermatitis      LABS:                        9.4    5.42  )-----------( 310      ( 18 Aug 2023 07:17 )             28.4     Na(142)/K(4.3)/Cl(109)/HCO3(19)/BUN(63)/Cr(3.15)Glu(81)/Ca(9.0)/Mg(2.2)/PO4(4.3)    08-18 @ 07:16  Na(134)/K(4.4)/Cl(101)/HCO3(20)/BUN(70)/Cr(3.27)Glu(189)/Ca(9.0)/Mg(2.1)/PO4(4.7)    08-17 @ 05:06  Na(131)/K(5.0)/Cl(98)/HCO3(20)/BUN(72)/Cr(3.75)Glu(144)/Ca(9.6)/Mg(2.3)/PO4(5.2)    08-16 @ 18:20    CPK 63    Sodium, Random Urine: 24 mmol/L (08-16 @ 19:47)  Creatinine, Random Urine: 35 mg/dL (08-16 @ 19:47)  Protein/Creatinine Ratio Calculation: 0.7 Ratio (08-16 @ 19:47)  Chloride, Random Urine: <20 mmol/L (08-16 @ 19:47)  Potassium, Random Urine: 12 mmol/L (08-16 @ 19:47)  Osmolality, Random Urine: 181 mos/kg (08-16 @ 19:47)      IMPRESSION: 63F w/ HTN, DM2, and R heel MRSA osteomyelitis s/p debridement 7/27/23, of late on outpt IV Dapto/Cipro; 8/16/23 admitted with BANDAR    (1)BANDAR - urine studies not particularly suggestive of prerenal azotemia...and the fact that the creatinine has not improved much today relative to yesterday also argues against prerenal azotemia. Likely a significant component of ATN here. Did the Daptomycin cause this?    (2)Lytes - acceptable    (3)CV - chronic severe LE edema; no renal contraindication to adding diuretics at this point      RECOMMEND:  (1)Can add Lasix 40mg IV daily    (2)No ACEI/ARB for now    (3)No objection to use of Ciprofloxacin at this point. Would refrain from use of Daptomycin for now    (4)BMP+Mg+PO4 daily    (5)Dose new meds for GFR 15-20ml/min          Richard Harrison MD  [facility]  Office/on call physician: [phone]  Cell (7a-7p): [phone]       Overnight events noted      VITAL:  T(C): , Max: 37.2 (08-18-23 @ 04:18)  T(F): , Max: 98.9 (08-18-23 @ 04:18)  HR: 63 (08-18-23 @ 08:10)  BP: 119/69 (08-18-23 @ 08:10)  BP(mean): --  RR: 18 (08-18-23 @ 08:10)  SpO2: 95% (08-18-23 @ 08:10)  Wt(kg): --      PHYSICAL EXAM:  Constitutional: NAD, Alert  HEENT: NCAT, MMM  Neck: Supple, No JVD  Respiratory: CTA-b/l  Cardiovascular: pranav reg s1s2, (+)1/6 DEREJE  Gastrointestinal: BS+, soft, NT/ND  : (+)primafit  Extremities: (+)RUE PICC; 3+ b/l LE edema  Neurological: no focal deficits; strength grossly intact  Back: no CVAT b/l  Skin: R heel wound dressed; (+)fibrotic changes of LE from chronic dermatitis      LABS:                        9.4    5.42  )-----------( 310      ( 18 Aug 2023 07:17 )             28.4     Na(142)/K(4.3)/Cl(109)/HCO3(19)/BUN(63)/Cr(3.15)Glu(81)/Ca(9.0)/Mg(2.2)/PO4(4.3)    08-18 @ 07:16  Na(134)/K(4.4)/Cl(101)/HCO3(20)/BUN(70)/Cr(3.27)Glu(189)/Ca(9.0)/Mg(2.1)/PO4(4.7)    08-17 @ 05:06  Na(131)/K(5.0)/Cl(98)/HCO3(20)/BUN(72)/Cr(3.75)Glu(144)/Ca(9.6)/Mg(2.3)/PO4(5.2)    08-16 @ 18:20    CPK 63    Sodium, Random Urine: 24 mmol/L (08-16 @ 19:47)  Creatinine, Random Urine: 35 mg/dL (08-16 @ 19:47)  Protein/Creatinine Ratio Calculation: 0.7 Ratio (08-16 @ 19:47)  Chloride, Random Urine: <20 mmol/L (08-16 @ 19:47)  Potassium, Random Urine: 12 mmol/L (08-16 @ 19:47)  Osmolality, Random Urine: 181 mos/kg (08-16 @ 19:47)      IMAGING:  < from: US Kidney and Bladder (08.17.23 @ 14:13) >  No renal calculus or hydronephrosis.  The kidneys demonstrate increased cortical echogenicity compatible with   renal parenchymal disease.  Bilateral ureteral jets are visualized within the urinary bladder. There   is wall thickening of the distal ureters at the level of the   ureterovesical junction with protrusion into the bladder base, of   uncertain significance. Suggest correlation with urinalysis and   short-term follow-up ultrasound of the bladder to reevaluate.          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 Likely a significant component of ATN here. Did the Daptomycin cause this?    (2)Lytes - acceptable    (3)CV - chronic severe LE edema; no renal contraindication to adding diuretics at this point      RECOMMEND:  (1)Can add Lasix 40mg IV daily    (2)No ACEI/ARB for now    (3)No objection to use of Ciprofloxacin at this point. Would refrain from use of Daptomycin for now    (4)BMP+Mg+PO4 daily    (5)Dose new meds for GFR 15-20ml/min          Richard Harrison MD  [facility]  Office/on call physician: [phone]  Cell (7a-7p): [phone]       Overnight events noted      VITAL:  T(C): , Max: 37.2 (08-18-23 @ 04:18)  T(F): , Max: 98.9 (08-18-23 @ 04:18)  HR: 63 (08-18-23 @ 08:10)  BP: 119/69 (08-18-23 @ 08:10)  BP(mean): --  RR: 18 (08-18-23 @ 08:10)  SpO2: 95% (08-18-23 @ 08:10)  Wt(kg): --      PHYSICAL EXAM:  Constitutional: NAD, Alert  HEENT: NCAT, MMM  Neck: Supple, No JVD  Respiratory: CTA-b/l  Cardiovascular: pranav reg s1s2, (+)1/6 DEREJE  Gastrointestinal: BS+, soft, NT/ND  : (+)primafit  Extremities: (+)RUE PICC; 3+ b/l LE edema  Neurological: no focal deficits; strength grossly intact  Back: no CVAT b/l  Skin: R heel wound dressed; (+)fibrotic changes of LE from chronic dermatitis      LABS:                        9.4    5.42  )-----------( 310      ( 18 Aug 2023 07:17 )             28.4     Na(142)/K(4.3)/Cl(109)/HCO3(19)/BUN(63)/Cr(3.15)Glu(81)/Ca(9.0)/Mg(2.2)/PO4(4.3)    08-18 @ 07:16  Na(134)/K(4.4)/Cl(101)/HCO3(20)/BUN(70)/Cr(3.27)Glu(189)/Ca(9.0)/Mg(2.1)/PO4(4.7)    08-17 @ 05:06  Na(131)/K(5.0)/Cl(98)/HCO3(20)/BUN(72)/Cr(3.75)Glu(144)/Ca(9.6)/Mg(2.3)/PO4(5.2)    08-16 @ 18:20    CPK 63    Sodium, Random Urine: 24 mmol/L (08-16 @ 19:47)  Creatinine, Random Urine: 35 mg/dL (08-16 @ 19:47)  Protein/Creatinine Ratio Calculation: 0.7 Ratio (08-16 @ 19:47)  Chloride, Random Urine: <20 mmol/L (08-16 @ 19:47)  Potassium, Random Urine: 12 mmol/L (08-16 @ 19:47)  Osmolality, Random Urine: 181 mos/kg (08-16 @ 19:47)      IMAGING:  < from: US Kidney and Bladder (08.17.23 @ 14:13) >  No renal calculus or hydronephrosis.  The kidneys demonstrate increased cortical echogenicity compatible with   renal parenchymal disease.  Bilateral ureteral jets are visualized within the urinary bladder. There   is wall thickening of the distal ureters at the level of the   ureterovesical junction with protrusion into the bladder base, of   uncertain significance. Suggest correlation with urinalysis and   short-term follow-up ultrasound of the bladder to reevaluate.          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 Likely a significant component of ATN here. Did the Daptomycin cause this?    (2)Lytes - acceptable    (3)CV - chronic severe LE edema; no renal contraindication to adding diuretics at this point      RECOMMEND:  (1)Can add Lasix 40mg IV daily    (2)No ACEI/ARB for now    (3)No objection to use of Ciprofloxacin at this point. Would refrain from use of Daptomycin for now    (4)BMP+Mg+PO4 daily    (5)Dose new meds for GFR 15-20ml/min          Richard Harrison MD  [facility]  Office/on call physician: [phone]  Cell (7a-7p): [phone]       Overnight events noted      VITAL:  T(C): , Max: 37.2 (08-18-23 @ 04:18)  T(F): , Max: 98.9 (08-18-23 @ 04:18)  HR: 63 (08-18-23 @ 08:10)  BP: 119/69 (08-18-23 @ 08:10)  BP(mean): --  RR: 18 (08-18-23 @ 08:10)  SpO2: 95% (08-18-23 @ 08:10)  Wt(kg): --      PHYSICAL EXAM:  Constitutional: NAD, Alert  HEENT: NCAT, MMM  Neck: Supple, No JVD  Respiratory: CTA-b/l  Cardiovascular: pranav reg s1s2, (+)1/6 DEREJE  Gastrointestinal: BS+, soft, NT/ND  : (+)primafit  Extremities: (+)RUE PICC; 3+ b/l LE edema  Neurological: no focal deficits; strength grossly intact  Back: no CVAT b/l  Skin: R heel wound dressed; (+)fibrotic changes of LE from chronic dermatitis      LABS:                        9.4    5.42  )-----------( 310      ( 18 Aug 2023 07:17 )             28.4     Na(142)/K(4.3)/Cl(109)/HCO3(19)/BUN(63)/Cr(3.15)Glu(81)/Ca(9.0)/Mg(2.2)/PO4(4.3)    08-18 @ 07:16  Na(134)/K(4.4)/Cl(101)/HCO3(20)/BUN(70)/Cr(3.27)Glu(189)/Ca(9.0)/Mg(2.1)/PO4(4.7)    08-17 @ 05:06  Na(131)/K(5.0)/Cl(98)/HCO3(20)/BUN(72)/Cr(3.75)Glu(144)/Ca(9.6)/Mg(2.3)/PO4(5.2)    08-16 @ 18:20    CPK 63    Sodium, Random Urine: 24 mmol/L (08-16 @ 19:47)  Creatinine, Random Urine: 35 mg/dL (08-16 @ 19:47)  Protein/Creatinine Ratio Calculation: 0.7 Ratio (08-16 @ 19:47)  Chloride, Random Urine: <20 mmol/L (08-16 @ 19:47)  Potassium, Random Urine: 12 mmol/L (08-16 @ 19:47)  Osmolality, Random Urine: 181 mos/kg (08-16 @ 19:47)      IMAGING:  < from: US Kidney and Bladder (08.17.23 @ 14:13) >  No renal calculus or hydronephrosis.  The kidneys demonstrate increased cortical echogenicity compatible with   renal parenchymal disease.  Bilateral ureteral jets are visualized within the urinary bladder. There   is wall thickening of the distal ureters at the level of the   ureterovesical junction with protrusion into the bladder base, of   uncertain significance. Suggest correlation with urinalysis and   short-term follow-up ultrasound of the bladder to reevaluate.          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 Likely a significant component of ATN here. Did the Daptomycin cause this?    (2)Lytes - acceptable    (3)CV - chronic severe LE edema; no renal contraindication to adding diuretics at this point      RECOMMEND:  (1)Can add Lasix 40mg IV daily    (2)No ACEI/ARB for now    (3)No objection to use of Ciprofloxacin at this point. Would refrain from use of Daptomycin for now    (4)BMP+Mg+PO4 daily    (5)Dose new meds for GFR 15-20ml/min          Richard Harrison MD  [facility]  Office/on call physician: [phone]  Cell (7a-7p): [phone]       Overnight events noted      VITAL:  T(C): , Max: 37.2 (08-18-23 @ 04:18)  T(F): , Max: 98.9 (08-18-23 @ 04:18)  HR: 63 (08-18-23 @ 08:10)  BP: 119/69 (08-18-23 @ 08:10)  BP(mean): --  RR: 18 (08-18-23 @ 08:10)  SpO2: 95% (08-18-23 @ 08:10)  Wt(kg): --      PHYSICAL EXAM:  Constitutional: NAD, Alert  HEENT: NCAT, MMM  Neck: Supple, No JVD  Respiratory: CTA-b/l  Cardiovascular: pranav reg s1s2, (+)1/6 DEREJE  Gastrointestinal: BS+, soft, NT/ND  : (+)primafit  Extremities: (+)RUE PICC; 3+ b/l LE edema  Neurological: no focal deficits; strength grossly intact  Back: no CVAT b/l  Skin: R heel wound dressed; (+)fibrotic changes of LE from chronic dermatitis      LABS:                        9.4    5.42  )-----------( 310      ( 18 Aug 2023 07:17 )             28.4     Na(142)/K(4.3)/Cl(109)/HCO3(19)/BUN(63)/Cr(3.15)Glu(81)/Ca(9.0)/Mg(2.2)/PO4(4.3)    08-18 @ 07:16  Na(134)/K(4.4)/Cl(101)/HCO3(20)/BUN(70)/Cr(3.27)Glu(189)/Ca(9.0)/Mg(2.1)/PO4(4.7)    08-17 @ 05:06  Na(131)/K(5.0)/Cl(98)/HCO3(20)/BUN(72)/Cr(3.75)Glu(144)/Ca(9.6)/Mg(2.3)/PO4(5.2)    08-16 @ 18:20    CPK 63    Sodium, Random Urine: 24 mmol/L (08-16 @ 19:47)  Creatinine, Random Urine: 35 mg/dL (08-16 @ 19:47)  Protein/Creatinine Ratio Calculation: 0.7 Ratio (08-16 @ 19:47)  Chloride, Random Urine: <20 mmol/L (08-16 @ 19:47)  Potassium, Random Urine: 12 mmol/L (08-16 @ 19:47)  Osmolality, Random Urine: 181 mos/kg (08-16 @ 19:47)      IMAGING:  < from: US Kidney and Bladder (08.17.23 @ 14:13) >  No renal calculus or hydronephrosis.  The kidneys demonstrate increased cortical echogenicity compatible with   renal parenchymal disease.  Bilateral ureteral jets are visualized within the urinary bladder. There   is wall thickening of the distal ureters at the level of the   ureterovesical junction with protrusion into the bladder base, of   uncertain significance. Suggest correlation with urinalysis and   short-term follow-up ultrasound of the bladder to reevaluate.          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 Likely a significant component of ATN here. Did the Daptomycin cause this? Although the urine sediment is not that active, we must consider possibility of postinfectious GN (no need for ASLO - the recent infection was staph, not strep)    (2)Lytes - acceptable    (3)CV - chronic severe LE edema; no renal contraindication to adding diuretics at this point      RECOMMEND:  (1)Can add Lasix 40mg IV daily    (2)No ACEI/ARB for now    (3)No objection to use of Ciprofloxacin at this point. Would refrain from use of Daptomycin for now    (4)Check C3, C4, and uric acid levels    (5)BMP+Mg+PO4 daily    (6)Dose new meds for GFR 15-20ml/min          Richard Harrison MD  [facility]  Office/on call physician: [phone]  Cell (7a-7p): [phone]       Overnight events noted      VITAL:  T(C): , Max: 37.2 (08-18-23 @ 04:18)  T(F): , Max: 98.9 (08-18-23 @ 04:18)  HR: 63 (08-18-23 @ 08:10)  BP: 119/69 (08-18-23 @ 08:10)  BP(mean): --  RR: 18 (08-18-23 @ 08:10)  SpO2: 95% (08-18-23 @ 08:10)  Wt(kg): --      PHYSICAL EXAM:  Constitutional: NAD, Alert  HEENT: NCAT, MMM  Neck: Supple, No JVD  Respiratory: CTA-b/l  Cardiovascular: pranav reg s1s2, (+)1/6 DEREJE  Gastrointestinal: BS+, soft, NT/ND  : (+)primafit  Extremities: (+)RUE PICC; 3+ b/l LE edema  Neurological: no focal deficits; strength grossly intact  Back: no CVAT b/l  Skin: R heel wound dressed; (+)fibrotic changes of LE from chronic dermatitis      LABS:                        9.4    5.42  )-----------( 310      ( 18 Aug 2023 07:17 )             28.4     Na(142)/K(4.3)/Cl(109)/HCO3(19)/BUN(63)/Cr(3.15)Glu(81)/Ca(9.0)/Mg(2.2)/PO4(4.3)    08-18 @ 07:16  Na(134)/K(4.4)/Cl(101)/HCO3(20)/BUN(70)/Cr(3.27)Glu(189)/Ca(9.0)/Mg(2.1)/PO4(4.7)    08-17 @ 05:06  Na(131)/K(5.0)/Cl(98)/HCO3(20)/BUN(72)/Cr(3.75)Glu(144)/Ca(9.6)/Mg(2.3)/PO4(5.2)    08-16 @ 18:20    CPK 63    Sodium, Random Urine: 24 mmol/L (08-16 @ 19:47)  Creatinine, Random Urine: 35 mg/dL (08-16 @ 19:47)  Protein/Creatinine Ratio Calculation: 0.7 Ratio (08-16 @ 19:47)  Chloride, Random Urine: <20 mmol/L (08-16 @ 19:47)  Potassium, Random Urine: 12 mmol/L (08-16 @ 19:47)  Osmolality, Random Urine: 181 mos/kg (08-16 @ 19:47)      IMAGING:  < from: US Kidney and Bladder (08.17.23 @ 14:13) >  No renal calculus or hydronephrosis.  The kidneys demonstrate increased cortical echogenicity compatible with   renal parenchymal disease.  Bilateral ureteral jets are visualized within the urinary bladder. There   is wall thickening of the distal ureters at the level of the   ureterovesical junction with protrusion into the bladder base, of   uncertain significance. Suggest correlation with urinalysis and   short-term follow-up ultrasound of the bladder to reevaluate.          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 Likely a significant component of ATN here. Did the Daptomycin cause this? Although the urine sediment is not that active, we must consider possibility of postinfectious GN (no need for ASLO - the recent infection was staph, not strep)    (2)Lytes - acceptable    (3)CV - chronic severe LE edema; no renal contraindication to adding diuretics at this point      RECOMMEND:  (1)Can add Lasix 40mg IV daily    (2)No ACEI/ARB for now    (3)No objection to use of Ciprofloxacin at this point. Would refrain from use of Daptomycin for now    (4)Check C3, C4, and uric acid levels    (5)BMP+Mg+PO4 daily    (6)Dose new meds for GFR 15-20ml/min          Richard Harrison MD  [facility]  Office/on call physician: [phone]  Cell (7a-7p): [phone]       Overnight events noted      VITAL:  T(C): , Max: 37.2 (08-18-23 @ 04:18)  T(F): , Max: 98.9 (08-18-23 @ 04:18)  HR: 63 (08-18-23 @ 08:10)  BP: 119/69 (08-18-23 @ 08:10)  BP(mean): --  RR: 18 (08-18-23 @ 08:10)  SpO2: 95% (08-18-23 @ 08:10)  Wt(kg): --      PHYSICAL EXAM:  Constitutional: NAD, Alert  HEENT: NCAT, MMM  Neck: Supple, No JVD  Respiratory: CTA-b/l  Cardiovascular: pranav reg s1s2, (+)1/6 DEREJE  Gastrointestinal: BS+, soft, NT/ND  : (+)primafit  Extremities: (+)RUE PICC; 3+ b/l LE edema  Neurological: no focal deficits; strength grossly intact  Back: no CVAT b/l  Skin: R heel wound dressed; (+)fibrotic changes of LE from chronic dermatitis      LABS:                        9.4    5.42  )-----------( 310      ( 18 Aug 2023 07:17 )             28.4     Na(142)/K(4.3)/Cl(109)/HCO3(19)/BUN(63)/Cr(3.15)Glu(81)/Ca(9.0)/Mg(2.2)/PO4(4.3)    08-18 @ 07:16  Na(134)/K(4.4)/Cl(101)/HCO3(20)/BUN(70)/Cr(3.27)Glu(189)/Ca(9.0)/Mg(2.1)/PO4(4.7)    08-17 @ 05:06  Na(131)/K(5.0)/Cl(98)/HCO3(20)/BUN(72)/Cr(3.75)Glu(144)/Ca(9.6)/Mg(2.3)/PO4(5.2)    08-16 @ 18:20    CPK 63    Sodium, Random Urine: 24 mmol/L (08-16 @ 19:47)  Creatinine, Random Urine: 35 mg/dL (08-16 @ 19:47)  Protein/Creatinine Ratio Calculation: 0.7 Ratio (08-16 @ 19:47)  Chloride, Random Urine: <20 mmol/L (08-16 @ 19:47)  Potassium, Random Urine: 12 mmol/L (08-16 @ 19:47)  Osmolality, Random Urine: 181 mos/kg (08-16 @ 19:47)      IMAGING:  < from: US Kidney and Bladder (08.17.23 @ 14:13) >  No renal calculus or hydronephrosis.  The kidneys demonstrate increased cortical echogenicity compatible with   renal parenchymal disease.  Bilateral ureteral jets are visualized within the urinary bladder. There   is wall thickening of the distal ureters at the level of the   ureterovesical junction with protrusion into the bladder base, of   uncertain significance. Suggest correlation with urinalysis and   short-term follow-up ultrasound of the bladder to reevaluate.          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 Likely a significant component of ATN here. Did the Daptomycin cause this? Although the urine sediment is not that active, we must consider possibility of postinfectious GN (no need for ASLO - the recent infection was staph, not strep)    (2)Lytes - acceptable    (3)CV - chronic severe LE edema; no renal contraindication to adding diuretics at this point      RECOMMEND:  (1)Can add Lasix 40mg IV daily    (2)No ACEI/ARB for now    (3)No objection to use of Ciprofloxacin at this point. Would refrain from use of Daptomycin for now    (4)Check C3, C4, and uric acid levels    (5)BMP+Mg+PO4 daily    (6)Dose new meds for GFR 15-20ml/min          Rihcard Harrison MD  [facility]  Office/on call physician: [phone]  Cell (7a-7p): [phone]       no pain, no sob      VITAL:  T(C): , Max: 37.2 (08-18-23 @ 04:18)  T(F): , Max: 98.9 (08-18-23 @ 04:18)  HR: 63 (08-18-23 @ 08:10)  BP: 119/69 (08-18-23 @ 08:10)  BP(mean): --  RR: 18 (08-18-23 @ 08:10)  SpO2: 95% (08-18-23 @ 08:10)  Wt(kg): --      PHYSICAL EXAM:  Constitutional: NAD, Alert  HEENT: NCAT, MMM  Neck: Supple, No JVD  Respiratory: CTA-b/l  Cardiovascular: pranav reg s1s2, (+)1/6 DEREJE  Gastrointestinal: BS+, soft, NT/ND  Extremities: (+)RUE PICC; 3+ b/l LE edema  Neurological: no focal deficits; strength grossly intact  Back: no CVAT b/l  Skin: R heel wound with VAC dressing; (+)fibrotic/hyperpigmented changes of b/l LE from chronic dermatitis      LABS:                        9.4    5.42  )-----------( 310      ( 18 Aug 2023 07:17 )             28.4     Na(142)/K(4.3)/Cl(109)/HCO3(19)/BUN(63)/Cr(3.15)Glu(81)/Ca(9.0)/Mg(2.2)/PO4(4.3)    08-18 @ 07:16  Na(134)/K(4.4)/Cl(101)/HCO3(20)/BUN(70)/Cr(3.27)Glu(189)/Ca(9.0)/Mg(2.1)/PO4(4.7)    08-17 @ 05:06  Na(131)/K(5.0)/Cl(98)/HCO3(20)/BUN(72)/Cr(3.75)Glu(144)/Ca(9.6)/Mg(2.3)/PO4(5.2)    08-16 @ 18:20    CPK 63    Sodium, Random Urine: 24 mmol/L (08-16 @ 19:47)  Creatinine, Random Urine: 35 mg/dL (08-16 @ 19:47)  Protein/Creatinine Ratio Calculation: 0.7 Ratio (08-16 @ 19:47)  Chloride, Random Urine: <20 mmol/L (08-16 @ 19:47)  Potassium, Random Urine: 12 mmol/L (08-16 @ 19:47)  Osmolality, Random Urine: 181 mos/kg (08-16 @ 19:47)      IMAGING:  < from: US Kidney and Bladder (08.17.23 @ 14:13) >  No renal calculus or hydronephrosis.  The kidneys demonstrate increased cortical echogenicity compatible with   renal parenchymal disease.  Bilateral ureteral jets are visualized within the urinary bladder. There   is wall thickening of the distal ureters at the level of the   ureterovesical junction with protrusion into the bladder base, of   uncertain significance. Suggest correlation with urinalysis and   short-term follow-up ultrasound of the bladder to reevaluate.          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 Likely a significant component of ATN here. Did the Daptomycin cause this? Although the urine sediment is not that active, we must consider possibility of postinfectious GN (no need for ASLO - the recent infection was staph, not strep)    (2)Lytes - acceptable    (3)CV - chronic severe LE edema; no renal contraindication to adding diuretics at this point      RECOMMEND:  (1)Can add Lasix 40mg IV daily    (2)No ACEI/ARB for now    (3)No objection to use of Ciprofloxacin at this point. Would refrain from use of Daptomycin for now    (4)Check C3, C4, and uric acid levels    (5)BMP+Mg+PO4 daily    (6)Dose new meds for GFR 15-20ml/min          Richard Harrison MD  [facility]  Office/on call physician: [phone]  Cell (7a-7p): [phone]       no pain, no sob      VITAL:  T(C): , Max: 37.2 (08-18-23 @ 04:18)  T(F): , Max: 98.9 (08-18-23 @ 04:18)  HR: 63 (08-18-23 @ 08:10)  BP: 119/69 (08-18-23 @ 08:10)  BP(mean): --  RR: 18 (08-18-23 @ 08:10)  SpO2: 95% (08-18-23 @ 08:10)  Wt(kg): --      PHYSICAL EXAM:  Constitutional: NAD, Alert  HEENT: NCAT, MMM  Neck: Supple, No JVD  Respiratory: CTA-b/l  Cardiovascular: pranav reg s1s2, (+)1/6 DEREJE  Gastrointestinal: BS+, soft, NT/ND  Extremities: (+)RUE PICC; 3+ b/l LE edema  Neurological: no focal deficits; strength grossly intact  Back: no CVAT b/l  Skin: R heel wound with VAC dressing; (+)fibrotic/hyperpigmented changes of b/l LE from chronic dermatitis      LABS:                        9.4    5.42  )-----------( 310      ( 18 Aug 2023 07:17 )             28.4     Na(142)/K(4.3)/Cl(109)/HCO3(19)/BUN(63)/Cr(3.15)Glu(81)/Ca(9.0)/Mg(2.2)/PO4(4.3)    08-18 @ 07:16  Na(134)/K(4.4)/Cl(101)/HCO3(20)/BUN(70)/Cr(3.27)Glu(189)/Ca(9.0)/Mg(2.1)/PO4(4.7)    08-17 @ 05:06  Na(131)/K(5.0)/Cl(98)/HCO3(20)/BUN(72)/Cr(3.75)Glu(144)/Ca(9.6)/Mg(2.3)/PO4(5.2)    08-16 @ 18:20    CPK 63    Sodium, Random Urine: 24 mmol/L (08-16 @ 19:47)  Creatinine, Random Urine: 35 mg/dL (08-16 @ 19:47)  Protein/Creatinine Ratio Calculation: 0.7 Ratio (08-16 @ 19:47)  Chloride, Random Urine: <20 mmol/L (08-16 @ 19:47)  Potassium, Random Urine: 12 mmol/L (08-16 @ 19:47)  Osmolality, Random Urine: 181 mos/kg (08-16 @ 19:47)      IMAGING:  < from: US Kidney and Bladder (08.17.23 @ 14:13) >  No renal calculus or hydronephrosis.  The kidneys demonstrate increased cortical echogenicity compatible with   renal parenchymal disease.  Bilateral ureteral jets are visualized within the urinary bladder. There   is wall thickening of the distal ureters at the level of the   ureterovesical junction with protrusion into the bladder base, of   uncertain significance. Suggest correlation with urinalysis and   short-term follow-up ultrasound of the bladder to reevaluate.          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 Likely a significant component of ATN here. Did the Daptomycin cause this? Although the urine sediment is not that active, we must consider possibility of postinfectious GN (no need for ASLO - the recent infection was staph, not strep)    (2)Lytes - acceptable    (3)CV - chronic severe LE edema; no renal contraindication to adding diuretics at this point      RECOMMEND:  (1)Can add Lasix 40mg IV daily    (2)No ACEI/ARB for now    (3)No objection to use of Ciprofloxacin at this point. Would refrain from use of Daptomycin for now    (4)Check C3, C4, and uric acid levels    (5)BMP+Mg+PO4 daily    (6)Dose new meds for GFR 15-20ml/min          Richard Harrison MD  [facility]  Office/on call physician: [phone]  Cell (7a-7p): [phone]       no pain, no sob      VITAL:  T(C): , Max: 37.2 (08-18-23 @ 04:18)  T(F): , Max: 98.9 (08-18-23 @ 04:18)  HR: 63 (08-18-23 @ 08:10)  BP: 119/69 (08-18-23 @ 08:10)  BP(mean): --  RR: 18 (08-18-23 @ 08:10)  SpO2: 95% (08-18-23 @ 08:10)  Wt(kg): --      PHYSICAL EXAM:  Constitutional: NAD, Alert  HEENT: NCAT, MMM  Neck: Supple, No JVD  Respiratory: CTA-b/l  Cardiovascular: pranav reg s1s2, (+)1/6 DEREJE  Gastrointestinal: BS+, soft, NT/ND  Extremities: (+)RUE PICC; 3+ b/l LE edema  Neurological: no focal deficits; strength grossly intact  Back: no CVAT b/l  Skin: R heel wound with VAC dressing; (+)fibrotic/hyperpigmented changes of b/l LE from chronic dermatitis      LABS:                        9.4    5.42  )-----------( 310      ( 18 Aug 2023 07:17 )             28.4     Na(142)/K(4.3)/Cl(109)/HCO3(19)/BUN(63)/Cr(3.15)Glu(81)/Ca(9.0)/Mg(2.2)/PO4(4.3)    08-18 @ 07:16  Na(134)/K(4.4)/Cl(101)/HCO3(20)/BUN(70)/Cr(3.27)Glu(189)/Ca(9.0)/Mg(2.1)/PO4(4.7)    08-17 @ 05:06  Na(131)/K(5.0)/Cl(98)/HCO3(20)/BUN(72)/Cr(3.75)Glu(144)/Ca(9.6)/Mg(2.3)/PO4(5.2)    08-16 @ 18:20    CPK 63    Sodium, Random Urine: 24 mmol/L (08-16 @ 19:47)  Creatinine, Random Urine: 35 mg/dL (08-16 @ 19:47)  Protein/Creatinine Ratio Calculation: 0.7 Ratio (08-16 @ 19:47)  Chloride, Random Urine: <20 mmol/L (08-16 @ 19:47)  Potassium, Random Urine: 12 mmol/L (08-16 @ 19:47)  Osmolality, Random Urine: 181 mos/kg (08-16 @ 19:47)      IMAGING:  < from: US Kidney and Bladder (08.17.23 @ 14:13) >  No renal calculus or hydronephrosis.  The kidneys demonstrate increased cortical echogenicity compatible with   renal parenchymal disease.  Bilateral ureteral jets are visualized within the urinary bladder. There   is wall thickening of the distal ureters at the level of the   ureterovesical junction with protrusion into the bladder base, of   uncertain significance. Suggest correlation with urinalysis and   short-term follow-up ultrasound of the bladder to reevaluate.          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 Likely a significant component of ATN here. Did the Daptomycin cause this? Although the urine sediment is not that active, we must consider possibility of postinfectious GN (no need for ASLO - the recent infection was staph, not strep)    (2)Lytes - acceptable    (3)CV - chronic severe LE edema; no renal contraindication to adding diuretics at this point      RECOMMEND:  (1)Can add Lasix 40mg IV daily    (2)No ACEI/ARB for now    (3)No objection to use of Ciprofloxacin at this point. Would refrain from use of Daptomycin for now    (4)Check C3, C4, and uric acid levels    (5)BMP+Mg+PO4 daily    (6)Dose new meds for GFR 15-20ml/min          Richard Harrison MD  [facility]  Office/on call physician: [phone]  Cell (7a-7p): [phone]

## 2023-08-18 NOTE — DISCHARGE NOTE PROVIDER - NSDCHHNEEDSERVICE_GEN_ALL_CORE
Medication teaching and assessment/Rehabilitation services/Teaching and training/Wound care and assessment/Other, specify...

## 2023-08-18 NOTE — DISCHARGE NOTE PROVIDER - NSDCCAREPROVSEEN_GEN_ALL_CORE_FT
Hunter, Richard Murguia, Carl Daniels, Mikael CARVALHO Hunter, Richard Murguia, aCrl Daniels, Mikael CARVALHO

## 2023-08-18 NOTE — DISCHARGE NOTE PROVIDER - NSDCCPCAREPLAN_GEN_ALL_CORE_FT
PRINCIPAL DISCHARGE DIAGNOSIS  Diagnosis: Acute renal injury  Assessment and Plan of Treatment: You presented to hospital elevated creatinine (Creatinine of 4)   Admitted with Acute renal failure   - Creatinine was of 3.75 onadmission  - You were on Daptomycin antibiotics and blood work show that it was not the cause of your kidney injury related rhabdo as contributer to BANDAR  - Renal consulted   - Discontinued Lisinopril adn Lasix 20mg resumed for your leg swelling  - You got intravenous fluids to hydration IVF,    - Renal US negative for hydroneprosis  - Creatinine 2.6 on 8/21/23  - Renal cleared pt for discharge - repeat blood work to check your kidneys  in 1 week - by primary doctor or kidney doctor   - Follow up with renal Dr. Harrison in 1-4 weeks  Avoid taking (NSAIDs) - (ex: Ibuprofen, Advil, Celebrex, Naprosyn)  Avoid taking any nephrotoxic agents (can harm kidneys) - Intravenous contrast for diagnostic testing, combination cold medications.  Have all medications adjusted for your renal function by your Health Care Provider.  Blood pressure control is important.  Take all medication as prescribed.        SECONDARY DISCHARGE DIAGNOSES  Diagnosis: Hyponatremia  Assessment and Plan of Treatment: Sodium level was low - Na 131  - improved, normalized      Diagnosis: Edema leg  Assessment and Plan of Treatment: Resumed PO Lasix 20mg daily  Keep legs elevated       Diagnosis: Wound of foot  Assessment and Plan of Treatment: You have wounds on both heels    - Seen by podiatry  - No signs of infection  - Right heel fibrogranular wound to subQ with periwound maceration, no erythema, no edema, no pus, no acute signs of infection. Left heel wound to subQ with no acute signs of infections  - 7/27 Right Foot calcaneal Bone Biopsy pathology: no bone infection  - 7/27 Right foot calcaneal bone biopsy culture:  Klebsiella, E faecalis, MRSA, Morganella   - Infectious diseases consulted - Now off Antibiotics  - Right foot VAC applied   - No acute pod intervention planned  during this admission  - Patient stable for discharge from a podiatry standpoint.  - Patient to wear bilateral z flows at all times while in house, strict decubitus precautions.  Follow upwith podiatry as recommended    Diagnosis: Chronic hypertension  Assessment and Plan of Treatment: - Lisinopril held due to kidb=erik injury  - Elevated blood pressure  - Increased Hydralazine 50mg  to 3 x day  - Continue Toprol  - Follow up with PMD in 1 week      Diagnosis: Normocytic anemia  Assessment and Plan of Treatment: - Chronic, monitor.  Follow up with   #Insulin dependent type 2 diabetes mellitus.   - home lantus 26u, novolog 9u TID on dc however pt states that she has been using lantus 20 and novolog 3u TID given low FS at home   - Dose reduced buy 50% home needs basal 13, bolus 4 TID  # Hyperlipidemia  - Continue home atorvastatin 40mg QD    Diagnosis: Insulin dependent type 2 diabetes mellitus  Assessment and Plan of Treatment: Insulin doses reduced due to low blood sugars at home  Lantus 20 and novolog 4u with meals  HbA1c 8.4 in July  Make sure you get your HgA1c checked every three months.  Check your blood glucose before meals and at bedtime.  It's important not to skip any meals.  Keep a log of your blood glucose results and always take it with you to your doctor appointments.  Keep a list of your current medications including injectables and over the counter medications and bring this medication list with you to all your doctor appointments.  If you have not seen your ophthalmologist this year call for appointment.  Check your feet daily for redness, sores, or openings. Do not self treat. If no improvement in two days call your primary care physician for an appointment.  Low blood sugar (hypoglycemia) is a blood sugar below 70mg/dl. Check your blood sugar if you feel signs/symptoms of hypoglycemia. If your blood sugar is below 70 take 15 grams of carbohydrates (ex 4 oz of apple juice, 3-4 glucose tablets, or 4-6 oz of regular soda) wait 15 minutes and repeat blood sugar to make sure it comes up above 70.  If your blood sugar is above 70 and you are due for a meal, have a meal.  If you are not due for a meal have a snack.  This snack helps keeps your blood sugar at a safe range.      Diagnosis: Hyperlipidemia  Assessment and Plan of Treatment: - Continue home atorvastatin 40mg daily  Continue with your cholesterol medications. Eat a heart healthy diet that is low in saturated fats and salt, and includes whole grains, fruits, vegetables and lean protein; exercise regularly (consult with your physician or cardiologist first); maintain a heart healthy weight; if you smoke - quit (A resource to help you stop smoking is the [facility] – phone number [phone].). Continue to follow with your primary physician or cardiologist.  Seek medical help for dizziness, Lightheadedness, Blurry vision, Headache, Chest pain, Shortness of breath       PRINCIPAL DISCHARGE DIAGNOSIS  Diagnosis: Acute renal injury  Assessment and Plan of Treatment: You presented to hospital elevated creatinine (Creatinine of 4)   Admitted with Acute renal failure   - Creatinine was of 3.75 onadmission  - You were on Daptomycin antibiotics and blood work show that it was not the cause of your kidney injury related rhabdo as contributer to BANDAR  - Renal consulted   - Discontinued Lisinopril adn Lasix 20mg resumed for your leg swelling  - You got intravenous fluids to hydration IVF,    - Renal US negative for hydroneprosis  - Creatinine 2.6 on 8/21/23  - Renal cleared pt for discharge - repeat blood work to check your kidneys  in 1 week - by primary doctor or kidney doctor   - Follow up with renal Dr. Harrison in 1-4 weeks  Avoid taking (NSAIDs) - (ex: Ibuprofen, Advil, Celebrex, Naprosyn)  Avoid taking any nephrotoxic agents (can harm kidneys) - Intravenous contrast for diagnostic testing, combination cold medications.  Have all medications adjusted for your renal function by your Health Care Provider.  Blood pressure control is important.  Take all medication as prescribed.        SECONDARY DISCHARGE DIAGNOSES  Diagnosis: Hyponatremia  Assessment and Plan of Treatment: Sodium level was low - Na 131  - improved, normalized      Diagnosis: Edema leg  Assessment and Plan of Treatment: Resumed PO Lasix 20mg daily  Keep legs elevated       Diagnosis: Wound of foot  Assessment and Plan of Treatment: You have wounds on both heels    - Seen by podiatry  - No signs of infection  - Right heel fibrogranular wound to subQ with periwound maceration, no erythema, no edema, no pus, no acute signs of infection. Left heel wound to subQ with no acute signs of infections  - 7/27 Right Foot calcaneal Bone Biopsy pathology: no bone infection  - 7/27 Right foot calcaneal bone biopsy culture:  Klebsiella, E faecalis, MRSA, Morganella   - Infectious diseases consulted - Now off Antibiotics  - Right foot VAC applied   - No acute pod intervention planned  during this admission  - Patient stable for discharge from a podiatry standpoint.  - Patient to wear bilateral z flows at all times while in house, strict decubitus precautions.  Follow upwith podiatry as recommended    Diagnosis: Chronic hypertension  Assessment and Plan of Treatment: - Lisinopril held due to kidb=erik injury  - Elevated blood pressure  - Increased Hydralazine 50mg  to 3 x day  - Continue Toprol  - Follow up with PMD in 1 week      Diagnosis: Normocytic anemia  Assessment and Plan of Treatment: - Chronic, monitor.  Follow up with   #Insulin dependent type 2 diabetes mellitus.   - home lantus 26u, novolog 9u TID on dc however pt states that she has been using lantus 20 and novolog 3u TID given low FS at home   - Dose reduced buy 50% home needs basal 13, bolus 4 TID  # Hyperlipidemia  - Continue home atorvastatin 40mg QD    Diagnosis: Insulin dependent type 2 diabetes mellitus  Assessment and Plan of Treatment: Insulin doses reduced due to low blood sugars at home  Lantus 20 and novolog 4u with meals  HbA1c 8.4 in July  Make sure you get your HgA1c checked every three months.  Check your blood glucose before meals and at bedtime.  It's important not to skip any meals.  Keep a log of your blood glucose results and always take it with you to your doctor appointments.  Keep a list of your current medications including injectables and over the counter medications and bring this medication list with you to all your doctor appointments.  If you have not seen your ophthalmologist this year call for appointment.  Check your feet daily for redness, sores, or openings. Do not self treat. If no improvement in two days call your primary care physician for an appointment.  Low blood sugar (hypoglycemia) is a blood sugar below 70mg/dl. Check your blood sugar if you feel signs/symptoms of hypoglycemia. If your blood sugar is below 70 take 15 grams of carbohydrates (ex 4 oz of apple juice, 3-4 glucose tablets, or 4-6 oz of regular soda) wait 15 minutes and repeat blood sugar to make sure it comes up above 70.  If your blood sugar is above 70 and you are due for a meal, have a meal.  If you are not due for a meal have a snack.  This snack helps keeps your blood sugar at a safe range.      Diagnosis: Hyperlipidemia  Assessment and Plan of Treatment: - Continue home atorvastatin 40mg daily  Continue with your cholesterol medications. Eat a heart healthy diet that is low in saturated fats and salt, and includes whole grains, fruits, vegetables and lean protein; exercise regularly (consult with your physician or cardiologist first); maintain a heart healthy weight; if you smoke - quit (A resource to help you stop smoking is the [facility] – phone number [phone].). Continue to follow with your primary physician or cardiologist.  Seek medical help for dizziness, Lightheadedness, Blurry vision, Headache, Chest pain, Shortness of breath       PRINCIPAL DISCHARGE DIAGNOSIS  Diagnosis: Acute renal injury  Assessment and Plan of Treatment: You presented to hospital elevated creatinine (Creatinine of 4)   Admitted with Acute renal failure   - Creatinine was of 3.75 onadmission  - You were on Daptomycin antibiotics and blood work show that it was not the cause of your kidney injury related rhabdo as contributer to BANDAR  - Renal consulted   - Discontinued Lisinopril adn Lasix 20mg resumed for your leg swelling  - You got intravenous fluids to hydration IVF,    - Renal US negative for hydroneprosis  - Creatinine 2.6 on 8/21/23  - Renal cleared pt for discharge - repeat blood work to check your kidneys  in 1 week - by primary doctor or kidney doctor   - Follow up with renal Dr. Harrison in 1-4 weeks  Avoid taking (NSAIDs) - (ex: Ibuprofen, Advil, Celebrex, Naprosyn)  Avoid taking any nephrotoxic agents (can harm kidneys) - Intravenous contrast for diagnostic testing, combination cold medications.  Have all medications adjusted for your renal function by your Health Care Provider.  Blood pressure control is important.  Take all medication as prescribed.        SECONDARY DISCHARGE DIAGNOSES  Diagnosis: Hyponatremia  Assessment and Plan of Treatment: Sodium level was low - Na 131  - improved, normalized      Diagnosis: Edema leg  Assessment and Plan of Treatment: Resumed PO Lasix 20mg daily  Keep legs elevated       Diagnosis: Wound of foot  Assessment and Plan of Treatment: You have wounds on both heels    - Seen by podiatry  - No signs of infection  - Right heel fibrogranular wound to subQ with periwound maceration, no erythema, no edema, no pus, no acute signs of infection. Left heel wound to subQ with no acute signs of infections  - 7/27 Right Foot calcaneal Bone Biopsy pathology: no bone infection  - 7/27 Right foot calcaneal bone biopsy culture:  Klebsiella, E faecalis, MRSA, Morganella   - Infectious diseases consulted - Now off Antibiotics  - Right foot VAC applied   - No acute pod intervention planned  during this admission  - Patient stable for discharge from a podiatry standpoint.  - Patient to wear bilateral z flows at all times while in house, strict decubitus precautions.  Follow upwith podiatry as recommended    Diagnosis: Chronic hypertension  Assessment and Plan of Treatment: - Lisinopril held due to kidb=erik injury  - Elevated blood pressure  - Increased Hydralazine 50mg  to 3 x day  - Continue Toprol  - Follow up with PMD in 1 week      Diagnosis: Normocytic anemia  Assessment and Plan of Treatment: - Chronic, monitor.  Follow up with   #Insulin dependent type 2 diabetes mellitus.   - home lantus 26u, novolog 9u TID on dc however pt states that she has been using lantus 20 and novolog 3u TID given low FS at home   - Dose reduced buy 50% home needs basal 13, bolus 4 TID  # Hyperlipidemia  - Continue home atorvastatin 40mg QD    Diagnosis: Insulin dependent type 2 diabetes mellitus  Assessment and Plan of Treatment: Insulin doses reduced due to low blood sugars at home  Lantus 20 and novolog 4u with meals  HbA1c 8.4 in July  Make sure you get your HgA1c checked every three months.  Check your blood glucose before meals and at bedtime.  It's important not to skip any meals.  Keep a log of your blood glucose results and always take it with you to your doctor appointments.  Keep a list of your current medications including injectables and over the counter medications and bring this medication list with you to all your doctor appointments.  If you have not seen your ophthalmologist this year call for appointment.  Check your feet daily for redness, sores, or openings. Do not self treat. If no improvement in two days call your primary care physician for an appointment.  Low blood sugar (hypoglycemia) is a blood sugar below 70mg/dl. Check your blood sugar if you feel signs/symptoms of hypoglycemia. If your blood sugar is below 70 take 15 grams of carbohydrates (ex 4 oz of apple juice, 3-4 glucose tablets, or 4-6 oz of regular soda) wait 15 minutes and repeat blood sugar to make sure it comes up above 70.  If your blood sugar is above 70 and you are due for a meal, have a meal.  If you are not due for a meal have a snack.  This snack helps keeps your blood sugar at a safe range.      Diagnosis: Hyperlipidemia  Assessment and Plan of Treatment: - Continue home atorvastatin 40mg daily  Continue with your cholesterol medications. Eat a heart healthy diet that is low in saturated fats and salt, and includes whole grains, fruits, vegetables and lean protein; exercise regularly (consult with your physician or cardiologist first); maintain a heart healthy weight; if you smoke - quit (A resource to help you stop smoking is the [facility] – phone number [phone].). Continue to follow with your primary physician or cardiologist.  Seek medical help for dizziness, Lightheadedness, Blurry vision, Headache, Chest pain, Shortness of breath       PRINCIPAL DISCHARGE DIAGNOSIS  Diagnosis: Acute renal injury  Assessment and Plan of Treatment: You presented to hospital elevated creatinine (Creatinine of 4)   Admitted with Acute renal failure   - Creatinine was of 3.75 onadmission  - You were on Daptomycin antibiotics and blood work show that it was not the cause of your kidney injury related rhabdo as contributer to BANDAR  - Renal consulted   - Discontinued Lisinopril adn Lasix 20mg resumed for your leg swelling  - You got intravenous fluids to hydration IVF,    - Renal US negative for hydroneprosis  - Creatinine 2.6 on 8/21/23  - Renal cleared pt for discharge - repeat blood work to check your kidneys  in 1 week - by primary doctor or kidney doctor   Repeat blood work to check your kidneys  in 1 week - Fax results to Dr. Harrison - [phone]  - Follow up with renal Dr. Harrison in 1 week  Avoid taking (NSAIDs) - (ex: Ibuprofen, Advil, Celebrex, Naprosyn)  Avoid taking any nephrotoxic agents (can harm kidneys) - Intravenous contrast for diagnostic testing, combination cold medications.  Have all medications adjusted for your renal function by your Health Care Provider.  Blood pressure control is important.  Take all medication as prescribed.        SECONDARY DISCHARGE DIAGNOSES  Diagnosis: Hyponatremia  Assessment and Plan of Treatment: Sodium level was low - Na 131  - improved, normalized      Diagnosis: Edema leg  Assessment and Plan of Treatment: Resumed PO Lasix 20mg daily  Keep legs elevated       Diagnosis: Wound of foot  Assessment and Plan of Treatment: You have wounds on both heels    - Seen by podiatry  - No signs of infection  - Right heel fibrogranular wound to subQ with periwound maceration, no erythema, no edema, no pus, no acute signs of infection. Left heel wound to subQ with no acute signs of infections  - 7/27 Right Foot calcaneal Bone Biopsy pathology: no bone infection  - 7/27 Right foot calcaneal bone biopsy culture:  Klebsiella, E faecalis, MRSA, Morganella   - Infectious diseases consulted - Now off Antibiotics  - Right foot VAC applied   - No acute pod intervention planned  during this admission  - Patient stable for discharge from a podiatry standpoint.  - Patient to wear bilateral z flows at all times while in house, strict decubitus precautions.  Follow upwith podiatry as recommended    Diagnosis: Chronic hypertension  Assessment and Plan of Treatment: - Lisinopril held due to kidb=erik injury  - Elevated blood pressure  - Increased Hydralazine 50mg  to 3 x day  - Continue Toprol  - Follow up with PMD in 1 week      Diagnosis: Normocytic anemia  Assessment and Plan of Treatment: - Chronic, monitor.  Follow up with PMD      Diagnosis: Insulin dependent type 2 diabetes mellitus  Assessment and Plan of Treatment: Insulin doses reduced due to low blood sugars at home  Lantus 20 and novolog 4u with meals  HbA1c 8.4 in July  Make sure you get your HgA1c checked every three months.  Check your blood glucose before meals and at bedtime.  It's important not to skip any meals.  Keep a log of your blood glucose results and always take it with you to your doctor appointments.  Keep a list of your current medications including injectables and over the counter medications and bring this medication list with you to all your doctor appointments.  If you have not seen your ophthalmologist this year call for appointment.  Check your feet daily for redness, sores, or openings. Do not self treat. If no improvement in two days call your primary care physician for an appointment.  Low blood sugar (hypoglycemia) is a blood sugar below 70mg/dl. Check your blood sugar if you feel signs/symptoms of hypoglycemia. If your blood sugar is below 70 take 15 grams of carbohydrates (ex 4 oz of apple juice, 3-4 glucose tablets, or 4-6 oz of regular soda) wait 15 minutes and repeat blood sugar to make sure it comes up above 70.  If your blood sugar is above 70 and you are due for a meal, have a meal.  If you are not due for a meal have a snack.  This snack helps keeps your blood sugar at a safe range.      Diagnosis: Hyperlipidemia  Assessment and Plan of Treatment: - Continue home atorvastatin 40mg daily  Continue with your cholesterol medications. Eat a heart healthy diet that is low in saturated fats and salt, and includes whole grains, fruits, vegetables and lean protein; exercise regularly (consult with your physician or cardiologist first); maintain a heart healthy weight; if you smoke - quit (A resource to help you stop smoking is the [facility] – phone number [phone].). Continue to follow with your primary physician or cardiologist.  Seek medical help for dizziness, Lightheadedness, Blurry vision, Headache, Chest pain, Shortness of breath       PRINCIPAL DISCHARGE DIAGNOSIS  Diagnosis: Acute renal injury  Assessment and Plan of Treatment: You presented to hospital elevated creatinine (Creatinine of 4)   Admitted with Acute renal failure   - Creatinine was of 3.75 onadmission  - You were on Daptomycin antibiotics and blood work show that it was not the cause of your kidney injury related rhabdo as contributer to BANDAR  - Renal consulted   - Discontinued Lisinopril adn Lasix 20mg resumed for your leg swelling  - You got intravenous fluids to hydration IVF,    - Renal US negative for hydroneprosis  - Creatinine 2.6 on 8/21/23  - Renal cleared pt for discharge - repeat blood work to check your kidneys  in 1 week - by primary doctor or kidney doctor   Repeat blood work to check your kidneys  in 1 week - Fax results to Dr. Harrison - [phone]  - Follow up with renal Dr. Harrison in 1 week  Avoid taking (NSAIDs) - (ex: Ibuprofen, Advil, Celebrex, Naprosyn)  Avoid taking any nephrotoxic agents (can harm kidneys) - Intravenous contrast for diagnostic testing, combination cold medications.  Have all medications adjusted for your renal function by your Health Care Provider.  Blood pressure control is important.  Take all medication as prescribed.        SECONDARY DISCHARGE DIAGNOSES  Diagnosis: Hyponatremia  Assessment and Plan of Treatment: Sodium level was low - Na 131  - improved, normalized      Diagnosis: Edema leg  Assessment and Plan of Treatment: Resumed PO Lasix 20mg daily  Keep legs elevated       Diagnosis: Wound of foot  Assessment and Plan of Treatment: You have wounds on both heels    - Seen by podiatry  - No signs of infection  - Right heel fibrogranular wound to subQ with periwound maceration, no erythema, no edema, no pus, no acute signs of infection. Left heel wound to subQ with no acute signs of infections  - 7/27 Right Foot calcaneal Bone Biopsy pathology: no bone infection  - 7/27 Right foot calcaneal bone biopsy culture:  Klebsiella, E faecalis, MRSA, Morganella   - Infectious diseases consulted - Now off Antibiotics  - Right foot VAC applied   - No acute pod intervention planned  during this admission  - Patient stable for discharge from a podiatry standpoint.  - Patient to wear bilateral z flows at all times while in house, strict decubitus precautions.  Follow upwith podiatry as recommended    Diagnosis: Chronic hypertension  Assessment and Plan of Treatment: - Lisinopril held due to kidb=erik injury  - Elevated blood pressure  - Increased Hydralazine 50mg  to 3 x day  - Continue Toprol  - Follow up with PMD in 1 week      Diagnosis: Normocytic anemia  Assessment and Plan of Treatment: - Chronic, monitor.  Follow up with PMD      Diagnosis: Insulin dependent type 2 diabetes mellitus  Assessment and Plan of Treatment: Insulin doses reduced due to low blood sugars at home  Lantus 20 and novolog 4u with meals  HbA1c 8.4 in July  Make sure you get your HgA1c checked every three months.  Check your blood glucose before meals and at bedtime.  It's important not to skip any meals.  Keep a log of your blood glucose results and always take it with you to your doctor appointments.  Keep a list of your current medications including injectables and over the counter medications and bring this medication list with you to all your doctor appointments.  If you have not seen your ophthalmologist this year call for appointment.  Check your feet daily for redness, sores, or openings. Do not self treat. If no improvement in two days call your primary care physician for an appointment.  Low blood sugar (hypoglycemia) is a blood sugar below 70mg/dl. Check your blood sugar if you feel signs/symptoms of hypoglycemia. If your blood sugar is below 70 take 15 grams of carbohydrates (ex 4 oz of apple juice, 3-4 glucose tablets, or 4-6 oz of regular soda) wait 15 minutes and repeat blood sugar to make sure it comes up above 70.  If your blood sugar is above 70 and you are due for a meal, have a meal.  If you are not due for a meal have a snack.  This snack helps keeps your blood sugar at a safe range.      Diagnosis: Hyperlipidemia  Assessment and Plan of Treatment: - Continue home atorvastatin 40mg daily  Continue with your cholesterol medications. Eat a heart healthy diet that is low in saturated fats and salt, and includes whole grains, fruits, vegetables and lean protein; exercise regularly (consult with your physician or cardiologist first); maintain a heart healthy weight; if you smoke - quit (A resource to help you stop smoking is the [facility] – phone number [phone].). Continue to follow with your primary physician or cardiologist.  Seek medical help for dizziness, Lightheadedness, Blurry vision, Headache, Chest pain, Shortness of breath       PRINCIPAL DISCHARGE DIAGNOSIS  Diagnosis: Acute renal injury  Assessment and Plan of Treatment: You presented to hospital elevated creatinine (Creatinine of 4)   Admitted with Acute renal failure   - Creatinine was of 3.75 onadmission  - You were on Daptomycin antibiotics and blood work show that it was not the cause of your kidney injury related rhabdo as contributer to BANDAR  - Renal consulted   - Discontinued Lisinopril adn Lasix 20mg resumed for your leg swelling  - You got intravenous fluids to hydration IVF,    - Renal US negative for hydroneprosis  - Creatinine 2.6 on 8/21/23  - Renal cleared pt for discharge - repeat blood work to check your kidneys  in 1 week - by primary doctor or kidney doctor   Repeat blood work to check your kidneys  in 1 week - Fax results to Dr. Harrison - [phone]  - Follow up with renal Dr. Harrison in 1 week  Avoid taking (NSAIDs) - (ex: Ibuprofen, Advil, Celebrex, Naprosyn)  Avoid taking any nephrotoxic agents (can harm kidneys) - Intravenous contrast for diagnostic testing, combination cold medications.  Have all medications adjusted for your renal function by your Health Care Provider.  Blood pressure control is important.  Take all medication as prescribed.        SECONDARY DISCHARGE DIAGNOSES  Diagnosis: Hyponatremia  Assessment and Plan of Treatment: Sodium level was low - Na 131  - improved, normalized      Diagnosis: Edema leg  Assessment and Plan of Treatment: Resumed PO Lasix 20mg daily  Keep legs elevated       Diagnosis: Wound of foot  Assessment and Plan of Treatment: You have wounds on both heels    - Seen by podiatry  - No signs of infection  - Right heel fibrogranular wound to subQ with periwound maceration, no erythema, no edema, no pus, no acute signs of infection. Left heel wound to subQ with no acute signs of infections  - 7/27 Right Foot calcaneal Bone Biopsy pathology: no bone infection  - 7/27 Right foot calcaneal bone biopsy culture:  Klebsiella, E faecalis, MRSA, Morganella   - Infectious diseases consulted - Now off Antibiotics  - Right foot VAC applied   - No acute pod intervention planned  during this admission  - Patient stable for discharge from a podiatry standpoint.  - Patient to wear bilateral z flows at all times while in house, strict decubitus precautions.  Follow upwith podiatry as recommended    Diagnosis: Chronic hypertension  Assessment and Plan of Treatment: - Lisinopril held due to kidb=erik injury  - Elevated blood pressure  - Increased Hydralazine 50mg  to 3 x day  - Continue Toprol  - Follow up with PMD in 1 week      Diagnosis: Normocytic anemia  Assessment and Plan of Treatment: - Chronic, monitor.  Follow up with PMD      Diagnosis: Insulin dependent type 2 diabetes mellitus  Assessment and Plan of Treatment: Insulin doses reduced due to low blood sugars at home  Lantus 20 and novolog 4u with meals  HbA1c 8.4 in July  Make sure you get your HgA1c checked every three months.  Check your blood glucose before meals and at bedtime.  It's important not to skip any meals.  Keep a log of your blood glucose results and always take it with you to your doctor appointments.  Keep a list of your current medications including injectables and over the counter medications and bring this medication list with you to all your doctor appointments.  If you have not seen your ophthalmologist this year call for appointment.  Check your feet daily for redness, sores, or openings. Do not self treat. If no improvement in two days call your primary care physician for an appointment.  Low blood sugar (hypoglycemia) is a blood sugar below 70mg/dl. Check your blood sugar if you feel signs/symptoms of hypoglycemia. If your blood sugar is below 70 take 15 grams of carbohydrates (ex 4 oz of apple juice, 3-4 glucose tablets, or 4-6 oz of regular soda) wait 15 minutes and repeat blood sugar to make sure it comes up above 70.  If your blood sugar is above 70 and you are due for a meal, have a meal.  If you are not due for a meal have a snack.  This snack helps keeps your blood sugar at a safe range.      Diagnosis: Hyperlipidemia  Assessment and Plan of Treatment: - Continue home atorvastatin 40mg daily  Continue with your cholesterol medications. Eat a heart healthy diet that is low in saturated fats and salt, and includes whole grains, fruits, vegetables and lean protein; exercise regularly (consult with your physician or cardiologist first); maintain a heart healthy weight; if you smoke - quit (A resource to help you stop smoking is the [facility] – phone number [phone].). Continue to follow with your primary physician or cardiologist.  Seek medical help for dizziness, Lightheadedness, Blurry vision, Headache, Chest pain, Shortness of breath

## 2023-08-18 NOTE — DISCHARGE NOTE PROVIDER - NSDCMRMEDTOKEN_GEN_ALL_CORE_FT
atorvastatin 40 mg oral tablet: 1 tab(s) orally once a day  Cipro 500 mg oral tablet: 1 tab(s) orally 2 times a day until 9/7/23  DAPTOmycin 500 mg intravenous injection: 500 milligram(s) intravenously once a day until 9/7/23  furosemide 20 mg oral tablet: 1 tab(s) orally once a day  hydrALAZINE 50 mg oral tablet: 1 tab(s) orally 2 times a day  Lantus Solostar Pen 100 units/mL subcutaneous solution: 26 unit(s) subcutaneous once a day (at bedtime)  lisinopril 20 mg oral tablet: 1 tab(s) orally once a day  metoprolol succinate 50 mg oral tablet, extended release: 1 tab(s) orally once a day  mupirocin 2% topical ointment: 1 Apply topically to affected area 2 times a day  NovoLOG FlexPen 100 units/mL injectable solution: 9 unit(s) injectable 3 times a day (before meals) plus sliding scales  ocular lubricant ophthalmic solution: 1 drop(s) to each affected eye 3 times a day  pantoprazole 40 mg oral delayed release tablet: 1 tab(s) orally once a day (before a meal)  senna leaf extract oral tablet: 2 tab(s) orally once a day (at bedtime)  tamsulosin 0.4 mg oral capsule: 1 cap(s) orally once a day  Thera-M oral tablet: 1 tab(s) orally once a day  Tylenol 325 mg oral tablet: 2 tab(s) orally every 6 hours as needed for pain   atorvastatin 40 mg oral tablet: 1 tab(s) orally once a day  furosemide 20 mg oral tablet: 1 tab(s) orally once a day  hydrALAZINE 50 mg oral tablet: 1 tab(s) orally 3 times a day  Lantus Solostar Pen 100 units/mL subcutaneous solution: 13 unit(s) subcutaneous once a day (at bedtime)  metoprolol succinate 50 mg oral tablet, extended release: 1 tab(s) orally once a day  NovoLOG FlexPen 100 units/mL injectable solution: 4 unit(s) injectable 3 times a day (before meals) plus sliding scales  ocular lubricant ophthalmic solution: 1 drop(s) to each affected eye 3 times a day  pantoprazole 40 mg oral delayed release tablet: 1 tab(s) orally once a day (before a meal)  senna leaf extract oral tablet: 2 tab(s) orally once a day (at bedtime)  tamsulosin 0.4 mg oral capsule: 1 cap(s) orally once a day (at bedtime)  Thera-M oral tablet: 1 tab(s) orally once a day  Tylenol 325 mg oral tablet: 2 tab(s) orally every 6 hours as needed for pain   atorvastatin 40 mg oral tablet: 1 tab(s) orally once a day  BMP in 1 week: Send results to Dr. Richard Hrarison  Fax - [phone]  furosemide 20 mg oral tablet: 1 tab(s) orally once a day  hydrALAZINE 50 mg oral tablet: 1 tab(s) orally 3 times a day  Lantus Solostar Pen 100 units/mL subcutaneous solution: 13 unit(s) subcutaneous once a day (at bedtime)  metoprolol succinate 50 mg oral tablet, extended release: 1 tab(s) orally once a day  NovoLOG FlexPen 100 units/mL injectable solution: 4 unit(s) injectable 3 times a day (before meals) plus sliding scales  ocular lubricant ophthalmic solution: 1 drop(s) to each affected eye 3 times a day  pantoprazole 40 mg oral delayed release tablet: 1 tab(s) orally once a day (before a meal)  senna leaf extract oral tablet: 2 tab(s) orally once a day (at bedtime)  tamsulosin 0.4 mg oral capsule: 1 cap(s) orally once a day (at bedtime)  Thera-M oral tablet: 1 tab(s) orally once a day  Tylenol 325 mg oral tablet: 2 tab(s) orally every 6 hours as needed for pain   atorvastatin 40 mg oral tablet: 1 tab(s) orally once a day  BMP in 1 week: Send results to Dr. Richard Harrison  Fax - [phone]  furosemide 20 mg oral tablet: 1 tab(s) orally once a day  hydrALAZINE 50 mg oral tablet: 1 tab(s) orally 3 times a day  Lantus Solostar Pen 100 units/mL subcutaneous solution: 13 unit(s) subcutaneous once a day (at bedtime)  metoprolol succinate 50 mg oral tablet, extended release: 1 tab(s) orally once a day  NovoLOG FlexPen 100 units/mL injectable solution: 4 unit(s) injectable 3 times a day (before meals) plus sliding scales  ocular lubricant ophthalmic solution: 1 drop(s) to each affected eye 3 times a day  pantoprazole 40 mg oral delayed release tablet: 1 tab(s) orally once a day (before a meal)  senna leaf extract oral tablet: 2 tab(s) orally once a day (at bedtime)  tamsulosin 0.4 mg oral capsule: 1 cap(s) orally once a day (at bedtime)  Thera-M oral tablet: 1 tab(s) orally once a day  Tylenol 325 mg oral tablet: 2 tab(s) orally every 6 hours as needed for pain   atorvastatin 40 mg oral tablet: 1 tab(s) orally once a day  BMP in 1 week: Send results to Dr. Richard Harrison  Fax - [phone]  furosemide 20 mg oral tablet: 1 tab(s) orally once a day  hydrALAZINE 50 mg oral tablet: 1 tab(s) orally 3 times a day  Lantus Solostar Pen 100 units/mL subcutaneous solution: 13 unit(s) subcutaneous once a day (at bedtime)  metoprolol succinate 50 mg oral tablet, extended release: 1 tab(s) orally once a day  NovoLOG FlexPen 100 units/mL injectable solution: 4 unit(s) injectable 3 times a day (before meals) plus sliding scales  ocular lubricant ophthalmic solution: 1 drop(s) to each affected eye 3 times a day  pantoprazole 40 mg oral delayed release tablet: 1 tab(s) orally once a day (before a meal)  senna leaf extract oral tablet: 2 tab(s) orally once a day (at bedtime)  tamsulosin 0.4 mg oral capsule: 1 cap(s) orally once a day (at bedtime)  Thera-M oral tablet: 1 tab(s) orally once a day  Tylenol 325 mg oral tablet: 2 tab(s) orally every 6 hours as needed for pain

## 2023-08-19 LAB
ANION GAP SERPL CALC-SCNC: 15 MMOL/L — SIGNIFICANT CHANGE UP (ref 5–17)
BUN SERPL-MCNC: 57 MG/DL — HIGH (ref 7–23)
CALCIUM SERPL-MCNC: 9.1 MG/DL — SIGNIFICANT CHANGE UP (ref 8.4–10.5)
CHLORIDE SERPL-SCNC: 107 MMOL/L — SIGNIFICANT CHANGE UP (ref 96–108)
CO2 SERPL-SCNC: 18 MMOL/L — LOW (ref 22–31)
CREAT SERPL-MCNC: 2.83 MG/DL — HIGH (ref 0.5–1.3)
CRP SERPL-MCNC: 31 MG/L — HIGH (ref 0–4)
EGFR: 18 ML/MIN/1.73M2 — LOW
ERYTHROCYTE [SEDIMENTATION RATE] IN BLOOD: 96 MM/HR — HIGH (ref 0–20)
GLUCOSE BLDC GLUCOMTR-MCNC: 100 MG/DL — HIGH (ref 70–99)
GLUCOSE BLDC GLUCOMTR-MCNC: 116 MG/DL — HIGH (ref 70–99)
GLUCOSE BLDC GLUCOMTR-MCNC: 121 MG/DL — HIGH (ref 70–99)
GLUCOSE BLDC GLUCOMTR-MCNC: 155 MG/DL — HIGH (ref 70–99)
GLUCOSE SERPL-MCNC: 85 MG/DL — SIGNIFICANT CHANGE UP (ref 70–99)
MAGNESIUM SERPL-MCNC: 2 MG/DL — SIGNIFICANT CHANGE UP (ref 1.6–2.6)
PHOSPHATE SERPL-MCNC: 3.7 MG/DL — SIGNIFICANT CHANGE UP (ref 2.5–4.5)
POTASSIUM SERPL-MCNC: 4.2 MMOL/L — SIGNIFICANT CHANGE UP (ref 3.5–5.3)
POTASSIUM SERPL-SCNC: 4.2 MMOL/L — SIGNIFICANT CHANGE UP (ref 3.5–5.3)
SODIUM SERPL-SCNC: 140 MMOL/L — SIGNIFICANT CHANGE UP (ref 135–145)
URATE SERPL-MCNC: 8.7 MG/DL — HIGH (ref 2.5–7)

## 2023-08-19 RX ADMIN — CHLORHEXIDINE GLUCONATE 1 APPLICATION(S): 213 SOLUTION TOPICAL at 12:03

## 2023-08-19 RX ADMIN — Medication 50 MILLIGRAM(S): at 06:59

## 2023-08-19 RX ADMIN — ATORVASTATIN CALCIUM 40 MILLIGRAM(S): 80 TABLET, FILM COATED ORAL at 21:22

## 2023-08-19 RX ADMIN — TAMSULOSIN HYDROCHLORIDE 0.4 MILLIGRAM(S): 0.4 CAPSULE ORAL at 21:22

## 2023-08-19 RX ADMIN — Medication 1: at 17:13

## 2023-08-19 RX ADMIN — Medication 4 UNIT(S): at 12:28

## 2023-08-19 RX ADMIN — PANTOPRAZOLE SODIUM 40 MILLIGRAM(S): 20 TABLET, DELAYED RELEASE ORAL at 05:47

## 2023-08-19 RX ADMIN — Medication 4 UNIT(S): at 17:13

## 2023-08-19 RX ADMIN — Medication 50 MILLIGRAM(S): at 17:14

## 2023-08-19 RX ADMIN — HEPARIN SODIUM 5000 UNIT(S): 5000 INJECTION INTRAVENOUS; SUBCUTANEOUS at 21:23

## 2023-08-19 RX ADMIN — Medication 50 MILLIGRAM(S): at 05:47

## 2023-08-19 RX ADMIN — INSULIN GLARGINE 13 UNIT(S): 100 INJECTION, SOLUTION SUBCUTANEOUS at 21:40

## 2023-08-19 RX ADMIN — HEPARIN SODIUM 5000 UNIT(S): 5000 INJECTION INTRAVENOUS; SUBCUTANEOUS at 05:47

## 2023-08-19 RX ADMIN — HEPARIN SODIUM 5000 UNIT(S): 5000 INJECTION INTRAVENOUS; SUBCUTANEOUS at 13:01

## 2023-08-19 NOTE — PROGRESS NOTE ADULT - ASSESSMENT
oob to chair  on room air  afebrile  no complaints     artificial tears (preservative free) Ophthalmic Solution 1 Drop(s) Both EYES three times a day PRN  atorvastatin 40 milliGRAM(s) Oral at bedtime  chlorhexidine 2% Cloths 1 Application(s) Topical daily  chlorhexidine 4% Liquid 1 Application(s) Topical <User Schedule>  dextrose 5%. 1000 milliLiter(s) IV Continuous <Continuous>  dextrose 5%. 1000 milliLiter(s) IV Continuous <Continuous>  dextrose 50% Injectable 12.5 Gram(s) IV Push once  dextrose 50% Injectable 25 Gram(s) IV Push once  dextrose 50% Injectable 25 Gram(s) IV Push once  dextrose Oral Gel 15 Gram(s) Oral once PRN  glucagon  Injectable 1 milliGRAM(s) IntraMuscular once  heparin   Injectable 5000 Unit(s) SubCutaneous every 8 hours  hydrALAZINE 50 milliGRAM(s) Oral two times a day  insulin glargine Injectable (LANTUS) 13 Unit(s) SubCutaneous at bedtime  insulin lispro (ADMELOG) corrective regimen sliding scale   SubCutaneous three times a day before meals  insulin lispro (ADMELOG) corrective regimen sliding scale   SubCutaneous at bedtime  insulin lispro Injectable (ADMELOG) 4 Unit(s) SubCutaneous three times a day before meals  metoprolol succinate ER 50 milliGRAM(s) Oral daily  pantoprazole    Tablet 40 milliGRAM(s) Oral before breakfast  senna 2 Tablet(s) Oral at bedtime  sodium chloride 0.9% lock flush 10 milliLiter(s) IV Push every 1 hour PRN  tamsulosin 0.4 milliGRAM(s) Oral at bedtime      VITAL:  T(C): , Max: 37 (08-18-23 @ 20:20)  T(F): , Max: 98.6 (08-18-23 @ 20:20)  HR: 67 (08-19-23 @ 12:13)  BP: 154/79 (08-19-23 @ 12:13)  BP(mean): --  RR: 18 (08-19-23 @ 12:13)  SpO2: 96% (08-19-23 @ 12:13)  Wt(kg): --    08-18-23 @ 07:01  -  08-19-23 @ 07:00  --------------------------------------------------------  IN: 0 mL / OUT: 450 mL / NET: -450 mL        PHYSICAL EXAM:  Constitutional: NAD, Alert  HEENT: NCAT, MMM  Neck: Supple, No JVD  Respiratory: CTA-b/l  Cardiovascular: pranav reg s1s2, (+)1/6 DEREJE  Gastrointestinal: BS+, soft, NT/ND  Extremities: (+)RUE PICC; 3+ b/l LE edema  Neurological: no focal deficits; strength grossly intact  Back: no CVAT b/l  Skin: R heel wound with VAC dressing; (+)fibrotic/hyperpigmented changes of b/l LE from chronic dermatitis    LABS:                          9.4    5.42  )-----------( 310      ( 18 Aug 2023 07:17 )             28.4     Na(140)/K(4.2)/Cl(107)/HCO3(18)/BUN(57)/Cr(2.83)Glu(85)/Ca(9.1)/Mg(2.0)/PO4(3.7)    08-19 @ 07:40  Na(142)/K(4.3)/Cl(109)/HCO3(19)/BUN(63)/Cr(3.15)Glu(81)/Ca(9.0)/Mg(2.2)/PO4(4.3)    08-18 @ 07:16  Na(134)/K(4.4)/Cl(101)/HCO3(20)/BUN(70)/Cr(3.27)Glu(189)/Ca(9.0)/Mg(2.1)/PO4(4.7)    08-17 @ 05:06  Na(131)/K(5.0)/Cl(98)/HCO3(20)/BUN(72)/Cr(3.75)Glu(144)/Ca(9.6)/Mg(2.3)/PO4(5.2)    08-16 @ 18:20    Urinalysis Basic - ( 19 Aug 2023 07:40 )    Color: x / Appearance: x / SG: x / pH: x  Gluc: 85 mg/dL / Ketone: x  / Bili: x / Urobili: x   Blood: x / Protein: x / Nitrite: x   Leuk Esterase: x / RBC: x / WBC x   Sq Epi: x / Non Sq Epi: x / Bacteria: x        Imaging    ACC: [id number] EXAM:  DUPLEX SCAN EXT VEINS LOWER BI   ORDERED BY:    VANESA TOURE     PROCEDURE DATE:  08/18/2023          INTERPRETATION:  CLINICAL INFORMATION: Worsening bilateral lower   extremity edema and pain.  Chronic edema and wounds on both heels.    COMPARISON: None available.    TECHNIQUE: Duplex sonography of the BILATERAL LOWER extremity veins with   color and spectral Doppler, with and without compression.    FINDINGS:    RIGHT:  Normal compressibility of the RIGHT common femoral, femoral and popliteal   veins.  Doppler examination shows normal spontaneous and phasic flow.  Limited visualized of the RIGHT calf veins. No RIGHT calf vein thrombosis   detected.    LEFT:  Normal compressibility of the LEFT common femoral, femoral and popliteal   veins.  Doppler examination shows normal spontaneous and phasic flow.  Limited visualized of the LEFT calf veins. No LEFT calf vein thrombosis   detected.    IMPRESSION:  No evidence of deep venous thrombosis in either lower extremity.  There was limited visualization of the calf veins due to body habitus and   edema.  No calf vein thrombosis is detected.          IMPRESSION: 63F w/ HTN, DM2, and R heel MRSA osteomyelitis s/p debridement 7/27/23, of late on outpt IV Dapto/Cipro; 8/16/23 admitted with BANDAR    (1)BANDAR - scr improving relative to yesterday  (2)Lytes - controlled  (3)CV - chronic severe LE edema; no renal contraindication to adding diuretics at this point    RECOMMEND:  (1)Can add Lasix 40mg IV daily    (2)No ACEI/ARB for now    (3)No objection to use of Ciprofloxacin at this point. Would refrain from use of Daptomycin for now    (4)Check C3, C4    (5)BMP+Mg+PO4 daily    (6)Dose new meds for GFR 15-20ml/min          Michael Matthew NP-BC  [employer]  [phone]   oob to chair  on room air  afebrile  no complaints     artificial tears (preservative free) Ophthalmic Solution 1 Drop(s) Both EYES three times a day PRN  atorvastatin 40 milliGRAM(s) Oral at bedtime  chlorhexidine 2% Cloths 1 Application(s) Topical daily  chlorhexidine 4% Liquid 1 Application(s) Topical <User Schedule>  dextrose 5%. 1000 milliLiter(s) IV Continuous <Continuous>  dextrose 5%. 1000 milliLiter(s) IV Continuous <Continuous>  dextrose 50% Injectable 12.5 Gram(s) IV Push once  dextrose 50% Injectable 25 Gram(s) IV Push once  dextrose 50% Injectable 25 Gram(s) IV Push once  dextrose Oral Gel 15 Gram(s) Oral once PRN  glucagon  Injectable 1 milliGRAM(s) IntraMuscular once  heparin   Injectable 5000 Unit(s) SubCutaneous every 8 hours  hydrALAZINE 50 milliGRAM(s) Oral two times a day  insulin glargine Injectable (LANTUS) 13 Unit(s) SubCutaneous at bedtime  insulin lispro (ADMELOG) corrective regimen sliding scale   SubCutaneous three times a day before meals  insulin lispro (ADMELOG) corrective regimen sliding scale   SubCutaneous at bedtime  insulin lispro Injectable (ADMELOG) 4 Unit(s) SubCutaneous three times a day before meals  metoprolol succinate ER 50 milliGRAM(s) Oral daily  pantoprazole    Tablet 40 milliGRAM(s) Oral before breakfast  senna 2 Tablet(s) Oral at bedtime  sodium chloride 0.9% lock flush 10 milliLiter(s) IV Push every 1 hour PRN  tamsulosin 0.4 milliGRAM(s) Oral at bedtime      VITAL:  T(C): , Max: 37 (08-18-23 @ 20:20)  T(F): , Max: 98.6 (08-18-23 @ 20:20)  HR: 67 (08-19-23 @ 12:13)  BP: 154/79 (08-19-23 @ 12:13)  BP(mean): --  RR: 18 (08-19-23 @ 12:13)  SpO2: 96% (08-19-23 @ 12:13)  Wt(kg): --    08-18-23 @ 07:01  -  08-19-23 @ 07:00  --------------------------------------------------------  IN: 0 mL / OUT: 450 mL / NET: -450 mL        PHYSICAL EXAM:  Constitutional: NAD, Alert  HEENT: NCAT, MMM  Neck: Supple, No JVD  Respiratory: CTA-b/l  Cardiovascular: pranav reg s1s2, (+)1/6 DEREJE  Gastrointestinal: BS+, soft, NT/ND  Extremities: (+)RUE PICC; 3+ b/l LE edema  Neurological: no focal deficits; strength grossly intact  Back: no CVAT b/l  Skin: R heel wound with VAC dressing; (+)fibrotic/hyperpigmented changes of b/l LE from chronic dermatitis    LABS:                          9.4    5.42  )-----------( 310      ( 18 Aug 2023 07:17 )             28.4     Na(140)/K(4.2)/Cl(107)/HCO3(18)/BUN(57)/Cr(2.83)Glu(85)/Ca(9.1)/Mg(2.0)/PO4(3.7)    08-19 @ 07:40  Na(142)/K(4.3)/Cl(109)/HCO3(19)/BUN(63)/Cr(3.15)Glu(81)/Ca(9.0)/Mg(2.2)/PO4(4.3)    08-18 @ 07:16  Na(134)/K(4.4)/Cl(101)/HCO3(20)/BUN(70)/Cr(3.27)Glu(189)/Ca(9.0)/Mg(2.1)/PO4(4.7)    08-17 @ 05:06  Na(131)/K(5.0)/Cl(98)/HCO3(20)/BUN(72)/Cr(3.75)Glu(144)/Ca(9.6)/Mg(2.3)/PO4(5.2)    08-16 @ 18:20    Urinalysis Basic - ( 19 Aug 2023 07:40 )    Color: x / Appearance: x / SG: x / pH: x  Gluc: 85 mg/dL / Ketone: x  / Bili: x / Urobili: x   Blood: x / Protein: x / Nitrite: x   Leuk Esterase: x / RBC: x / WBC x   Sq Epi: x / Non Sq Epi: x / Bacteria: x        Imaging    ACC: [id number] EXAM:  DUPLEX SCAN EXT VEINS LOWER BI   ORDERED BY:    VANESA TOURE     PROCEDURE DATE:  08/18/2023          INTERPRETATION:  CLINICAL INFORMATION: Worsening bilateral lower   extremity edema and pain.  Chronic edema and wounds on both heels.    COMPARISON: None available.    TECHNIQUE: Duplex sonography of the BILATERAL LOWER extremity veins with   color and spectral Doppler, with and without compression.    FINDINGS:    RIGHT:  Normal compressibility of the RIGHT common femoral, femoral and popliteal   veins.  Doppler examination shows normal spontaneous and phasic flow.  Limited visualized of the RIGHT calf veins. No RIGHT calf vein thrombosis   detected.    LEFT:  Normal compressibility of the LEFT common femoral, femoral and popliteal   veins.  Doppler examination shows normal spontaneous and phasic flow.  Limited visualized of the LEFT calf veins. No LEFT calf vein thrombosis   detected.    IMPRESSION:  No evidence of deep venous thrombosis in either lower extremity.  There was limited visualization of the calf veins due to body habitus and   edema.  No calf vein thrombosis is detected.          IMPRESSION: 63F w/ HTN, DM2, and R heel MRSA osteomyelitis s/p debridement 7/27/23, of late on outpt IV Dapto/Cipro; 8/16/23 admitted with BANDAR    (1)BANDAR - scr improving relative to yesterday  (2)Lytes - controlled  (3)CV - chronic severe LE edema; no renal contraindication to adding diuretics at this point    RECOMMEND:  (1)Can add Lasix 40mg IV daily    (2)No ACEI/ARB for now    (3)No objection to use of Ciprofloxacin at this point. Would refrain from use of Daptomycin for now    (4)Check C3, C4    (5)BMP+Mg+PO4 daily    (6)Dose new meds for GFR 15-20ml/min          Michael Matthew NP-BC  [facility]  [phone]   oob to chair  on room air  afebrile  no complaints     artificial tears (preservative free) Ophthalmic Solution 1 Drop(s) Both EYES three times a day PRN  atorvastatin 40 milliGRAM(s) Oral at bedtime  chlorhexidine 2% Cloths 1 Application(s) Topical daily  chlorhexidine 4% Liquid 1 Application(s) Topical <User Schedule>  dextrose 5%. 1000 milliLiter(s) IV Continuous <Continuous>  dextrose 5%. 1000 milliLiter(s) IV Continuous <Continuous>  dextrose 50% Injectable 12.5 Gram(s) IV Push once  dextrose 50% Injectable 25 Gram(s) IV Push once  dextrose 50% Injectable 25 Gram(s) IV Push once  dextrose Oral Gel 15 Gram(s) Oral once PRN  glucagon  Injectable 1 milliGRAM(s) IntraMuscular once  heparin   Injectable 5000 Unit(s) SubCutaneous every 8 hours  hydrALAZINE 50 milliGRAM(s) Oral two times a day  insulin glargine Injectable (LANTUS) 13 Unit(s) SubCutaneous at bedtime  insulin lispro (ADMELOG) corrective regimen sliding scale   SubCutaneous three times a day before meals  insulin lispro (ADMELOG) corrective regimen sliding scale   SubCutaneous at bedtime  insulin lispro Injectable (ADMELOG) 4 Unit(s) SubCutaneous three times a day before meals  metoprolol succinate ER 50 milliGRAM(s) Oral daily  pantoprazole    Tablet 40 milliGRAM(s) Oral before breakfast  senna 2 Tablet(s) Oral at bedtime  sodium chloride 0.9% lock flush 10 milliLiter(s) IV Push every 1 hour PRN  tamsulosin 0.4 milliGRAM(s) Oral at bedtime      VITAL:  T(C): , Max: 37 (08-18-23 @ 20:20)  T(F): , Max: 98.6 (08-18-23 @ 20:20)  HR: 67 (08-19-23 @ 12:13)  BP: 154/79 (08-19-23 @ 12:13)  BP(mean): --  RR: 18 (08-19-23 @ 12:13)  SpO2: 96% (08-19-23 @ 12:13)  Wt(kg): --    08-18-23 @ 07:01  -  08-19-23 @ 07:00  --------------------------------------------------------  IN: 0 mL / OUT: 450 mL / NET: -450 mL        PHYSICAL EXAM:  Constitutional: NAD, Alert  HEENT: NCAT, MMM  Neck: Supple, No JVD  Respiratory: CTA-b/l  Cardiovascular: pranav reg s1s2, (+)1/6 DEREJE  Gastrointestinal: BS+, soft, NT/ND  Extremities: (+)RUE PICC; 3+ b/l LE edema  Neurological: no focal deficits; strength grossly intact  Back: no CVAT b/l  Skin: R heel wound with VAC dressing; (+)fibrotic/hyperpigmented changes of b/l LE from chronic dermatitis    LABS:                          9.4    5.42  )-----------( 310      ( 18 Aug 2023 07:17 )             28.4     Na(140)/K(4.2)/Cl(107)/HCO3(18)/BUN(57)/Cr(2.83)Glu(85)/Ca(9.1)/Mg(2.0)/PO4(3.7)    08-19 @ 07:40  Na(142)/K(4.3)/Cl(109)/HCO3(19)/BUN(63)/Cr(3.15)Glu(81)/Ca(9.0)/Mg(2.2)/PO4(4.3)    08-18 @ 07:16  Na(134)/K(4.4)/Cl(101)/HCO3(20)/BUN(70)/Cr(3.27)Glu(189)/Ca(9.0)/Mg(2.1)/PO4(4.7)    08-17 @ 05:06  Na(131)/K(5.0)/Cl(98)/HCO3(20)/BUN(72)/Cr(3.75)Glu(144)/Ca(9.6)/Mg(2.3)/PO4(5.2)    08-16 @ 18:20    Urinalysis Basic - ( 19 Aug 2023 07:40 )    Color: x / Appearance: x / SG: x / pH: x  Gluc: 85 mg/dL / Ketone: x  / Bili: x / Urobili: x   Blood: x / Protein: x / Nitrite: x   Leuk Esterase: x / RBC: x / WBC x   Sq Epi: x / Non Sq Epi: x / Bacteria: x        Imaging    ACC: [id number] EXAM:  DUPLEX SCAN EXT VEINS LOWER BI   ORDERED BY:    VANESA TOURE     PROCEDURE DATE:  08/18/2023          INTERPRETATION:  CLINICAL INFORMATION: Worsening bilateral lower   extremity edema and pain.  Chronic edema and wounds on both heels.    COMPARISON: None available.    TECHNIQUE: Duplex sonography of the BILATERAL LOWER extremity veins with   color and spectral Doppler, with and without compression.    FINDINGS:    RIGHT:  Normal compressibility of the RIGHT common femoral, femoral and popliteal   veins.  Doppler examination shows normal spontaneous and phasic flow.  Limited visualized of the RIGHT calf veins. No RIGHT calf vein thrombosis   detected.    LEFT:  Normal compressibility of the LEFT common femoral, femoral and popliteal   veins.  Doppler examination shows normal spontaneous and phasic flow.  Limited visualized of the LEFT calf veins. No LEFT calf vein thrombosis   detected.    IMPRESSION:  No evidence of deep venous thrombosis in either lower extremity.  There was limited visualization of the calf veins due to body habitus and   edema.  No calf vein thrombosis is detected.          IMPRESSION: 63F w/ HTN, DM2, and R heel MRSA osteomyelitis s/p debridement 7/27/23, of late on outpt IV Dapto/Cipro; 8/16/23 admitted with BANDAR    (1)BANDAR - scr improving relative to yesterday  (2)Lytes - controlled  (3)CV - chronic severe LE edema; no renal contraindication to adding diuretics at this point    RECOMMEND:  (1)Can add Lasix 40mg IV daily    (2)No ACEI/ARB for now    (3)No objection to use of Ciprofloxacin at this point. Would refrain from use of Daptomycin for now    (4)Check C3, C4    (5)BMP+Mg+PO4 daily    (6)Dose new meds for GFR 15-20ml/min          Michael Matthew NP-BC  [facility]  [phone]

## 2023-08-19 NOTE — PROGRESS NOTE ADULT - SUBJECTIVE AND OBJECTIVE BOX
STEPHANIE KAYE  63y Female  MRN:[id number]    Patient is a 63y old  Female who presents with a chief complaint of BANDAR (17 Aug 2023 12:15)    HPI:  63F IDDM, HTN, HLD, recent admit 7/25-8/7 iso b/l heel wound c/f OM s/p debridement w/ R foot graft application and bone bx (7/27) subsequent vac application dc'd on dapto and cipro (plan until 9/7) via RUE PICC who presents for elevated SCr on outpt lab monitoring. Pt reports feeling generally well in interim of discharge, though has noticed mild increase in chronic LE edema x days (superimposed on b/l LE edema since 1990s per her). She denies new med intake, change in PO intake (tends not to drink enough water but states this is not new), n/v/d, melena/hematochezia, dysuria/hematuria, change in urinary frequency. Otherwise denies HA, fever, chills, new visual disturbance (L visual deficit at bl x yrs per pt, s/p ophtho surgeries), rhinorrhea, odynophagia, dysphagia, CP, palpitations, SOB, cough, abd pain, new numbness/weakness/tingling (states RLE weak compared to LLE after pods procedure last admit) or other complaint.    Of note she relays last dapto dose 8/15, last cipro dose this AM. She states that she has been reducing basal/bolus insulin dose at home given borderline low FS.    ED Course  VS: afebrile tmax 36.9C, HR 60s, BP 120s-130s/60s, RR 16-20, SpO2% 97-98 RA  Labs: normocytic (MCV 81.6) anemia H/H 9.9/29.3; hypoNa 131, BUN/SCr 72/3.75 (GFR 13), phos 5.2, unable to calc FeUrea (urine urea unavailable), otherwise w/ elevated protein/Cr ratio 0.7   Imaging:  - CXR: clear lungs, R PICC terminates in SVC   Received: n/a    admit to medicine for further eval/mgt    (16 Aug 2023 20:50)      Patient seen and evaluated at bedside. No acute events overnight except as noted.    Interval HPI: no acute events o/n     PAST MEDICAL & SURGICAL HISTORY:  Diabetes      Hypertension      Hypercholesteremia      Wound of foot      H/O eye surgery      S/P cholecystectomy          REVIEW OF SYSTEMS:  as per hpi       VITALS:   Vital Signs Last 24 Hrs  T(C): 36.8 (19 Aug 2023 20:08), Max: 36.8 (19 Aug 2023 20:08)  T(F): 98.3 (19 Aug 2023 20:08), Max: 98.3 (19 Aug 2023 20:08)  HR: 58 (19 Aug 2023 20:08) (58 - 79)  BP: 122/66 (19 Aug 2023 20:08) (122/66 - 176/82)  BP(mean): --  RR: 18 (19 Aug 2023 20:08) (18 - 18)  SpO2: 97% (19 Aug 2023 20:08) (96% - 98%)    Parameters below as of 19 Aug 2023 20:08  Patient On (Oxygen Delivery Method): room air            PHYSICAL EXAM:  GENERAL: NAD, well-developed  HEAD:  Atraumatic, Normocephalic  EYES: EOMI, PERRLA, conjunctiva and sclera clear  NECK: Supple, No JVD  CHEST/LUNG: Clear to auscultation bilaterally; No wheeze  HEART: S1, S2; No murmurs, rubs, or gallops  ABDOMEN: Soft, Nontender, Nondistended; Bowel sounds present  EXTREMITIES:  2+ Peripheral Pulses, No clubbing, cyanosis, or edema. b/l lower ext wounds. chronic changes   PSYCH: Normal affect  NEUROLOGY: AAOX3; non-focal  SKIN: No rashes or lesions    Consultant(s) Notes Reviewed:  [x ] YES  [ ] NO  Care Discussed with Consultants/Other Providers [ x] YES  [ ] NO    MEDS:   MEDICATIONS  (STANDING):  atorvastatin 40 milliGRAM(s) Oral at bedtime  chlorhexidine 2% Cloths 1 Application(s) Topical daily  chlorhexidine 4% Liquid 1 Application(s) Topical <User Schedule>  dextrose 5%. 1000 milliLiter(s) (50 mL/Hr) IV Continuous <Continuous>  dextrose 5%. 1000 milliLiter(s) (100 mL/Hr) IV Continuous <Continuous>  dextrose 50% Injectable 25 Gram(s) IV Push once  dextrose 50% Injectable 12.5 Gram(s) IV Push once  dextrose 50% Injectable 25 Gram(s) IV Push once  glucagon  Injectable 1 milliGRAM(s) IntraMuscular once  heparin   Injectable 5000 Unit(s) SubCutaneous every 8 hours  hydrALAZINE 50 milliGRAM(s) Oral two times a day  insulin glargine Injectable (LANTUS) 13 Unit(s) SubCutaneous at bedtime  insulin lispro (ADMELOG) corrective regimen sliding scale   SubCutaneous three times a day before meals  insulin lispro (ADMELOG) corrective regimen sliding scale   SubCutaneous at bedtime  insulin lispro Injectable (ADMELOG) 4 Unit(s) SubCutaneous three times a day before meals  metoprolol succinate ER 50 milliGRAM(s) Oral daily  pantoprazole    Tablet 40 milliGRAM(s) Oral before breakfast  senna 2 Tablet(s) Oral at bedtime  tamsulosin 0.4 milliGRAM(s) Oral at bedtime    MEDICATIONS  (PRN):  artificial tears (preservative free) Ophthalmic Solution 1 Drop(s) Both EYES three times a day PRN Dry Eyes  dextrose Oral Gel 15 Gram(s) Oral once PRN Blood Glucose LESS THAN 70 milliGRAM(s)/deciliter  sodium chloride 0.9% lock flush 10 milliLiter(s) IV Push every 1 hour PRN Pre/post blood products, medications, blood draw, and to maintain line patency        ALLERGIES:  No Known Allergies      LABS:                                                          9.4    5.42  )-----------( 310      ( 18 Aug 2023 07:17 )             28.4   08-19    140  |  107  |  57<H>  ----------------------------<  85  4.2   |  18<L>  |  2.83<H>    Ca    9.1      19 Aug 2023 07:40  Phos  3.7     08-19  Mg     2.0     08-19     STEPHANIE KAYE  63y Female  MRN:[id number]    Patient is a 63y old  Female who presents with a chief complaint of BANDAR (17 Aug 2023 12:15)    HPI:  63F IDDM, HTN, HLD, recent admit 7/25-8/7 iso b/l heel wound c/f OM s/p debridement w/ R foot graft application and bone bx (7/27) subsequent vac application dc'd on dapto and cipro (plan until 9/7) via RUE PICC who presents for elevated SCr on outpt lab monitoring. Pt reports feeling generally well in interim of discharge, though has noticed mild increase in chronic LE edema x days (superimposed on b/l LE edema since 1990s per her). She denies new med intake, change in PO intake (tends not to drink enough water but states this is not new), n/v/d, melena/hematochezia, dysuria/hematuria, change in urinary frequency. Otherwise denies HA, fever, chills, new visual disturbance (L visual deficit at bl x yrs per pt, s/p ophtho surgeries), rhinorrhea, odynophagia, dysphagia, CP, palpitations, SOB, cough, abd pain, new numbness/weakness/tingling (states RLE weak compared to LLE after pods procedure last admit) or other complaint.    Of note she relays last dapto dose 8/15, last cipro dose this AM. She states that she has been reducing basal/bolus insulin dose at home given borderline low FS.    ED Course  VS: afebrile tmax 36.9C, HR 60s, BP 120s-130s/60s, RR 16-20, SpO2% 97-98 RA  Labs: normocytic (MCV 81.6) anemia H/H 9.9/29.3; hypoNa 131, BUN/SCr 72/3.75 (GFR 13), phos 5.2, unable to calc FeUrea (urine urea unavailable), otherwise w/ elevated protein/Cr ratio 0.7   Imaging:  - CXR: clear lungs, R PICC terminates in SVC   Received: n/a    admit to medicine for further eval/mgt    (16 Aug 2023 20:50)      Patient seen and evaluated at bedside. No acute events overnight except as noted.    Interval HPI: no acute events o/n     PAST MEDICAL & SURGICAL HISTORY:  Diabetes      Hypertension      Hypercholesteremia      Wound of foot      H/O eye surgery      S/P cholecystectomy          REVIEW OF SYSTEMS:  as per hpi       VITALS:   Vital Signs Last 24 Hrs  T(C): 36.8 (19 Aug 2023 20:08), Max: 36.8 (19 Aug 2023 20:08)  T(F): 98.3 (19 Aug 2023 20:08), Max: 98.3 (19 Aug 2023 20:08)  HR: 58 (19 Aug 2023 20:08) (58 - 79)  BP: 122/66 (19 Aug 2023 20:08) (122/66 - 176/82)  BP(mean): --  RR: 18 (19 Aug 2023 20:08) (18 - 18)  SpO2: 97% (19 Aug 2023 20:08) (96% - 98%)    Parameters below as of 19 Aug 2023 20:08  Patient On (Oxygen Delivery Method): room air            PHYSICAL EXAM:  GENERAL: NAD, well-developed  HEAD:  Atraumatic, Normocephalic  EYES: EOMI, PERRLA, conjunctiva and sclera clear  NECK: Supple, No JVD  CHEST/LUNG: Clear to auscultation bilaterally; No wheeze  HEART: S1, S2; No murmurs, rubs, or gallops  ABDOMEN: Soft, Nontender, Nondistended; Bowel sounds present  EXTREMITIES:  2+ Peripheral Pulses, No clubbing, cyanosis, or edema. b/l lower ext wounds. chronic changes   PSYCH: Normal affect  NEUROLOGY: AAOX3; non-focal  SKIN: No rashes or lesions    Consultant(s) Notes Reviewed:  [x ] YES  [ ] NO  Care Discussed with Consultants/Other Providers [ x] YES  [ ] NO    MEDS:   MEDICATIONS  (STANDING):  atorvastatin 40 milliGRAM(s) Oral at bedtime  chlorhexidine 2% Cloths 1 Application(s) Topical daily  chlorhexidine 4% Liquid 1 Application(s) Topical <User Schedule>  dextrose 5%. 1000 milliLiter(s) (50 mL/Hr) IV Continuous <Continuous>  dextrose 5%. 1000 milliLiter(s) (100 mL/Hr) IV Continuous <Continuous>  dextrose 50% Injectable 25 Gram(s) IV Push once  dextrose 50% Injectable 12.5 Gram(s) IV Push once  dextrose 50% Injectable 25 Gram(s) IV Push once  glucagon  Injectable 1 milliGRAM(s) IntraMuscular once  heparin   Injectable 5000 Unit(s) SubCutaneous every 8 hours  hydrALAZINE 50 milliGRAM(s) Oral two times a day  insulin glargine Injectable (LANTUS) 13 Unit(s) SubCutaneous at bedtime  insulin lispro (ADMELOG) corrective regimen sliding scale   SubCutaneous three times a day before meals  insulin lispro (ADMELOG) corrective regimen sliding scale   SubCutaneous at bedtime  insulin lispro Injectable (ADMELOG) 4 Unit(s) SubCutaneous three times a day before meals  metoprolol succinate ER 50 milliGRAM(s) Oral daily  pantoprazole    Tablet 40 milliGRAM(s) Oral before breakfast  senna 2 Tablet(s) Oral at bedtime  tamsulosin 0.4 milliGRAM(s) Oral at bedtime    MEDICATIONS  (PRN):  artificial tears (preservative free) Ophthalmic Solution 1 Drop(s) Both EYES three times a day PRN Dry Eyes  dextrose Oral Gel 15 Gram(s) Oral once PRN Blood Glucose LESS THAN 70 milliGRAM(s)/deciliter  sodium chloride 0.9% lock flush 10 milliLiter(s) IV Push every 1 hour PRN Pre/post blood products, medications, blood draw, and to maintain line patency        ALLERGIES:  No Known Allergies      LABS:                                                          9.4    5.42  )-----------( 310      ( 18 Aug 2023 07:17 )             28.4   08-19    140  |  107  |  57<H>  ----------------------------<  85  4.2   |  18<L>  |  2.83<H>    Ca    9.1      19 Aug 2023 07:40  Phos  3.7     08-19  Mg     2.0     08-19     STEPHANIE KAYE  63y Female  MRN:[id number]    Patient is a 63y old  Female who presents with a chief complaint of BANDAR (17 Aug 2023 12:15)    HPI:  63F IDDM, HTN, HLD, recent admit 7/25-8/7 iso b/l heel wound c/f OM s/p debridement w/ R foot graft application and bone bx (7/27) subsequent vac application dc'd on dapto and cipro (plan until 9/7) via RUE PICC who presents for elevated SCr on outpt lab monitoring. Pt reports feeling generally well in interim of discharge, though has noticed mild increase in chronic LE edema x days (superimposed on b/l LE edema since 1990s per her). She denies new med intake, change in PO intake (tends not to drink enough water but states this is not new), n/v/d, melena/hematochezia, dysuria/hematuria, change in urinary frequency. Otherwise denies HA, fever, chills, new visual disturbance (L visual deficit at bl x yrs per pt, s/p ophtho surgeries), rhinorrhea, odynophagia, dysphagia, CP, palpitations, SOB, cough, abd pain, new numbness/weakness/tingling (states RLE weak compared to LLE after pods procedure last admit) or other complaint.    Of note she relays last dapto dose 8/15, last cipro dose this AM. She states that she has been reducing basal/bolus insulin dose at home given borderline low FS.    ED Course  VS: afebrile tmax 36.9C, HR 60s, BP 120s-130s/60s, RR 16-20, SpO2% 97-98 RA  Labs: normocytic (MCV 81.6) anemia H/H 9.9/29.3; hypoNa 131, BUN/SCr 72/3.75 (GFR 13), phos 5.2, unable to calc FeUrea (urine urea unavailable), otherwise w/ elevated protein/Cr ratio 0.7   Imaging:  - CXR: clear lungs, R PICC terminates in SVC   Received: n/a    admit to medicine for further eval/mgt    (16 Aug 2023 20:50)      Patient seen and evaluated at bedside. No acute events overnight except as noted.    Interval HPI: no acute events o/n     PAST MEDICAL & SURGICAL HISTORY:  Diabetes      Hypertension      Hypercholesteremia      Wound of foot      H/O eye surgery      S/P cholecystectomy          REVIEW OF SYSTEMS:  as per hpi       VITALS:   Vital Signs Last 24 Hrs  T(C): 36.8 (19 Aug 2023 20:08), Max: 36.8 (19 Aug 2023 20:08)  T(F): 98.3 (19 Aug 2023 20:08), Max: 98.3 (19 Aug 2023 20:08)  HR: 58 (19 Aug 2023 20:08) (58 - 79)  BP: 122/66 (19 Aug 2023 20:08) (122/66 - 176/82)  BP(mean): --  RR: 18 (19 Aug 2023 20:08) (18 - 18)  SpO2: 97% (19 Aug 2023 20:08) (96% - 98%)    Parameters below as of 19 Aug 2023 20:08  Patient On (Oxygen Delivery Method): room air            PHYSICAL EXAM:  GENERAL: NAD, well-developed  HEAD:  Atraumatic, Normocephalic  EYES: EOMI, PERRLA, conjunctiva and sclera clear  NECK: Supple, No JVD  CHEST/LUNG: Clear to auscultation bilaterally; No wheeze  HEART: S1, S2; No murmurs, rubs, or gallops  ABDOMEN: Soft, Nontender, Nondistended; Bowel sounds present  EXTREMITIES:  2+ Peripheral Pulses, No clubbing, cyanosis, or edema. b/l lower ext wounds. chronic changes   PSYCH: Normal affect  NEUROLOGY: AAOX3; non-focal  SKIN: No rashes or lesions    Consultant(s) Notes Reviewed:  [x ] YES  [ ] NO  Care Discussed with Consultants/Other Providers [ x] YES  [ ] NO    MEDS:   MEDICATIONS  (STANDING):  atorvastatin 40 milliGRAM(s) Oral at bedtime  chlorhexidine 2% Cloths 1 Application(s) Topical daily  chlorhexidine 4% Liquid 1 Application(s) Topical <User Schedule>  dextrose 5%. 1000 milliLiter(s) (50 mL/Hr) IV Continuous <Continuous>  dextrose 5%. 1000 milliLiter(s) (100 mL/Hr) IV Continuous <Continuous>  dextrose 50% Injectable 25 Gram(s) IV Push once  dextrose 50% Injectable 12.5 Gram(s) IV Push once  dextrose 50% Injectable 25 Gram(s) IV Push once  glucagon  Injectable 1 milliGRAM(s) IntraMuscular once  heparin   Injectable 5000 Unit(s) SubCutaneous every 8 hours  hydrALAZINE 50 milliGRAM(s) Oral two times a day  insulin glargine Injectable (LANTUS) 13 Unit(s) SubCutaneous at bedtime  insulin lispro (ADMELOG) corrective regimen sliding scale   SubCutaneous three times a day before meals  insulin lispro (ADMELOG) corrective regimen sliding scale   SubCutaneous at bedtime  insulin lispro Injectable (ADMELOG) 4 Unit(s) SubCutaneous three times a day before meals  metoprolol succinate ER 50 milliGRAM(s) Oral daily  pantoprazole    Tablet 40 milliGRAM(s) Oral before breakfast  senna 2 Tablet(s) Oral at bedtime  tamsulosin 0.4 milliGRAM(s) Oral at bedtime    MEDICATIONS  (PRN):  artificial tears (preservative free) Ophthalmic Solution 1 Drop(s) Both EYES three times a day PRN Dry Eyes  dextrose Oral Gel 15 Gram(s) Oral once PRN Blood Glucose LESS THAN 70 milliGRAM(s)/deciliter  sodium chloride 0.9% lock flush 10 milliLiter(s) IV Push every 1 hour PRN Pre/post blood products, medications, blood draw, and to maintain line patency        ALLERGIES:  No Known Allergies      LABS:                                                          9.4    5.42  )-----------( 310      ( 18 Aug 2023 07:17 )             28.4   08-19    140  |  107  |  57<H>  ----------------------------<  85  4.2   |  18<L>  |  2.83<H>    Ca    9.1      19 Aug 2023 07:40  Phos  3.7     08-19  Mg     2.0     08-19

## 2023-08-20 LAB
ANION GAP SERPL CALC-SCNC: 11 MMOL/L — SIGNIFICANT CHANGE UP (ref 5–17)
BUN SERPL-MCNC: 55 MG/DL — HIGH (ref 7–23)
CALCIUM SERPL-MCNC: 9.2 MG/DL — SIGNIFICANT CHANGE UP (ref 8.4–10.5)
CHLORIDE SERPL-SCNC: 111 MMOL/L — HIGH (ref 96–108)
CO2 SERPL-SCNC: 21 MMOL/L — LOW (ref 22–31)
CREAT SERPL-MCNC: 2.38 MG/DL — HIGH (ref 0.5–1.3)
EGFR: 22 ML/MIN/1.73M2 — LOW
GLUCOSE BLDC GLUCOMTR-MCNC: 113 MG/DL — HIGH (ref 70–99)
GLUCOSE BLDC GLUCOMTR-MCNC: 115 MG/DL — HIGH (ref 70–99)
GLUCOSE BLDC GLUCOMTR-MCNC: 172 MG/DL — HIGH (ref 70–99)
GLUCOSE BLDC GLUCOMTR-MCNC: 86 MG/DL — SIGNIFICANT CHANGE UP (ref 70–99)
GLUCOSE BLDC GLUCOMTR-MCNC: 87 MG/DL — SIGNIFICANT CHANGE UP (ref 70–99)
GLUCOSE SERPL-MCNC: 105 MG/DL — HIGH (ref 70–99)
POTASSIUM SERPL-MCNC: 4.2 MMOL/L — SIGNIFICANT CHANGE UP (ref 3.5–5.3)
POTASSIUM SERPL-SCNC: 4.2 MMOL/L — SIGNIFICANT CHANGE UP (ref 3.5–5.3)
SODIUM SERPL-SCNC: 143 MMOL/L — SIGNIFICANT CHANGE UP (ref 135–145)

## 2023-08-20 RX ORDER — FUROSEMIDE 40 MG
40 TABLET ORAL DAILY
Refills: 0 | Status: DISCONTINUED | OUTPATIENT
Start: 2023-08-20 | End: 2023-08-21

## 2023-08-20 RX ADMIN — INSULIN GLARGINE 13 UNIT(S): 100 INJECTION, SOLUTION SUBCUTANEOUS at 21:59

## 2023-08-20 RX ADMIN — HEPARIN SODIUM 5000 UNIT(S): 5000 INJECTION INTRAVENOUS; SUBCUTANEOUS at 13:07

## 2023-08-20 RX ADMIN — CHLORHEXIDINE GLUCONATE 1 APPLICATION(S): 213 SOLUTION TOPICAL at 05:32

## 2023-08-20 RX ADMIN — HEPARIN SODIUM 5000 UNIT(S): 5000 INJECTION INTRAVENOUS; SUBCUTANEOUS at 05:30

## 2023-08-20 RX ADMIN — Medication 4 UNIT(S): at 09:39

## 2023-08-20 RX ADMIN — ATORVASTATIN CALCIUM 40 MILLIGRAM(S): 80 TABLET, FILM COATED ORAL at 21:59

## 2023-08-20 RX ADMIN — TAMSULOSIN HYDROCHLORIDE 0.4 MILLIGRAM(S): 0.4 CAPSULE ORAL at 21:59

## 2023-08-20 RX ADMIN — Medication 1: at 13:07

## 2023-08-20 RX ADMIN — Medication 4 UNIT(S): at 13:06

## 2023-08-20 RX ADMIN — Medication 50 MILLIGRAM(S): at 05:30

## 2023-08-20 RX ADMIN — CHLORHEXIDINE GLUCONATE 1 APPLICATION(S): 213 SOLUTION TOPICAL at 09:39

## 2023-08-20 RX ADMIN — Medication 40 MILLIGRAM(S): at 13:06

## 2023-08-20 RX ADMIN — PANTOPRAZOLE SODIUM 40 MILLIGRAM(S): 20 TABLET, DELAYED RELEASE ORAL at 05:30

## 2023-08-20 RX ADMIN — HEPARIN SODIUM 5000 UNIT(S): 5000 INJECTION INTRAVENOUS; SUBCUTANEOUS at 21:59

## 2023-08-20 RX ADMIN — Medication 50 MILLIGRAM(S): at 18:27

## 2023-08-20 NOTE — PROGRESS NOTE ADULT - ASSESSMENT
on room air  afebrile  no complaints     artificial tears (preservative free) Ophthalmic Solution 1 Drop(s) Both EYES three times a day PRN  atorvastatin 40 milliGRAM(s) Oral at bedtime  chlorhexidine 2% Cloths 1 Application(s) Topical daily  chlorhexidine 4% Liquid 1 Application(s) Topical <User Schedule>  dextrose 5%. 1000 milliLiter(s) IV Continuous <Continuous>  dextrose 5%. 1000 milliLiter(s) IV Continuous <Continuous>  dextrose 50% Injectable 25 Gram(s) IV Push once  dextrose 50% Injectable 12.5 Gram(s) IV Push once  dextrose 50% Injectable 25 Gram(s) IV Push once  dextrose Oral Gel 15 Gram(s) Oral once PRN  furosemide    Tablet 40 milliGRAM(s) Oral daily  glucagon  Injectable 1 milliGRAM(s) IntraMuscular once  heparin   Injectable 5000 Unit(s) SubCutaneous every 8 hours  hydrALAZINE 50 milliGRAM(s) Oral two times a day  insulin glargine Injectable (LANTUS) 13 Unit(s) SubCutaneous at bedtime  insulin lispro (ADMELOG) corrective regimen sliding scale   SubCutaneous three times a day before meals  insulin lispro (ADMELOG) corrective regimen sliding scale   SubCutaneous at bedtime  insulin lispro Injectable (ADMELOG) 4 Unit(s) SubCutaneous three times a day before meals  metoprolol succinate ER 50 milliGRAM(s) Oral daily  pantoprazole    Tablet 40 milliGRAM(s) Oral before breakfast  senna 2 Tablet(s) Oral at bedtime  sodium chloride 0.9% lock flush 10 milliLiter(s) IV Push every 1 hour PRN  tamsulosin 0.4 milliGRAM(s) Oral at bedtime      VITAL:  T(C): , Max: 37.1 (08-20-23 @ 04:45)  T(F): , Max: 98.7 (08-20-23 @ 04:45)  HR: 63 (08-20-23 @ 12:26)  BP: 174/79 (08-20-23 @ 12:26)  BP(mean): --  RR: 18 (08-20-23 @ 12:26)  SpO2: 99% (08-20-23 @ 12:26)  Wt(kg): --    08-19-23 @ 07:01  -  08-20-23 @ 07:00  --------------------------------------------------------  IN: 0 mL / OUT: 250 mL / NET: -250 mL    08-20-23 @ 07:01  -  08-20-23 @ 18:26  --------------------------------------------------------  IN: 0 mL / OUT: 1100 mL / NET: -1100 mL        PHYSICAL EXAM:  Constitutional: NAD, Alert  HEENT: NCAT, MMM  Neck: Supple, No JVD  Respiratory: CTA-b/l  Cardiovascular: pranav reg s1s2, (+)1/6 DEREJE  Gastrointestinal: BS+, soft, NT/ND  Extremities: (+)RUE PICC; 3+ b/l LE edema  Neurological: no focal deficits; strength grossly intact  Back: no CVAT b/l  Skin: R heel wound with VAC dressing; (+)fibrotic/hyperpigmented changes of b/l LE from chronic dermatitis    LABS:      Na(143)/K(4.2)/Cl(111)/HCO3(21)/BUN(55)/Cr(2.38)Glu(105)/Ca(9.2)/Mg(--)/PO4(--)    08-20 @ 06:59  Na(140)/K(4.2)/Cl(107)/HCO3(18)/BUN(57)/Cr(2.83)Glu(85)/Ca(9.1)/Mg(2.0)/PO4(3.7)    08-19 @ 07:40  Na(142)/K(4.3)/Cl(109)/HCO3(19)/BUN(63)/Cr(3.15)Glu(81)/Ca(9.0)/Mg(2.2)/PO4(4.3)    08-18 @ 07:16    Urinalysis Basic - ( 20 Aug 2023 06:59 )    Color: x / Appearance: x / SG: x / pH: x  Gluc: 105 mg/dL / Ketone: x  / Bili: x / Urobili: x   Blood: x / Protein: x / Nitrite: x   Leuk Esterase: x / RBC: x / WBC x   Sq Epi: x / Non Sq Epi: x / Bacteria: x            ASSESSMENT/PLAN  IMPRESSION:  No evidence of deep venous thrombosis in either lower extremity.  There was limited visualization of the calf veins due to body habitus and   edema.  No calf vein thrombosis is detected.          IMPRESSION: 63F w/ HTN, DM2, and R heel MRSA osteomyelitis s/p debridement 7/27/23, of late on outpt IV Dapto/Cipro; 8/16/23 admitted with BANDAR    (1)BANDAR - scr  much improved from yesterday  (2)Lytes - acceptable  (3)CV - chronic severe LE edema; improving   (4)Anaemia- hgb improving(8/18/23)    RECOMMEND:  (1)A/W Lasix 40mg po daily    (2)No ACEI/ARB for now    (3)No objection to use of Ciprofloxacin at this point. Would refrain from use of Daptomycin for now    (4)F/U C3, C4    (5)BMP+Mg+PO4 daily    (6)Dose new meds for GFR 15-20ml/min            Michael Matthew NP-BC  [employer]  [phone]   on room air  afebrile  no complaints     artificial tears (preservative free) Ophthalmic Solution 1 Drop(s) Both EYES three times a day PRN  atorvastatin 40 milliGRAM(s) Oral at bedtime  chlorhexidine 2% Cloths 1 Application(s) Topical daily  chlorhexidine 4% Liquid 1 Application(s) Topical <User Schedule>  dextrose 5%. 1000 milliLiter(s) IV Continuous <Continuous>  dextrose 5%. 1000 milliLiter(s) IV Continuous <Continuous>  dextrose 50% Injectable 25 Gram(s) IV Push once  dextrose 50% Injectable 12.5 Gram(s) IV Push once  dextrose 50% Injectable 25 Gram(s) IV Push once  dextrose Oral Gel 15 Gram(s) Oral once PRN  furosemide    Tablet 40 milliGRAM(s) Oral daily  glucagon  Injectable 1 milliGRAM(s) IntraMuscular once  heparin   Injectable 5000 Unit(s) SubCutaneous every 8 hours  hydrALAZINE 50 milliGRAM(s) Oral two times a day  insulin glargine Injectable (LANTUS) 13 Unit(s) SubCutaneous at bedtime  insulin lispro (ADMELOG) corrective regimen sliding scale   SubCutaneous three times a day before meals  insulin lispro (ADMELOG) corrective regimen sliding scale   SubCutaneous at bedtime  insulin lispro Injectable (ADMELOG) 4 Unit(s) SubCutaneous three times a day before meals  metoprolol succinate ER 50 milliGRAM(s) Oral daily  pantoprazole    Tablet 40 milliGRAM(s) Oral before breakfast  senna 2 Tablet(s) Oral at bedtime  sodium chloride 0.9% lock flush 10 milliLiter(s) IV Push every 1 hour PRN  tamsulosin 0.4 milliGRAM(s) Oral at bedtime      VITAL:  T(C): , Max: 37.1 (08-20-23 @ 04:45)  T(F): , Max: 98.7 (08-20-23 @ 04:45)  HR: 63 (08-20-23 @ 12:26)  BP: 174/79 (08-20-23 @ 12:26)  BP(mean): --  RR: 18 (08-20-23 @ 12:26)  SpO2: 99% (08-20-23 @ 12:26)  Wt(kg): --    08-19-23 @ 07:01  -  08-20-23 @ 07:00  --------------------------------------------------------  IN: 0 mL / OUT: 250 mL / NET: -250 mL    08-20-23 @ 07:01  -  08-20-23 @ 18:26  --------------------------------------------------------  IN: 0 mL / OUT: 1100 mL / NET: -1100 mL        PHYSICAL EXAM:  Constitutional: NAD, Alert  HEENT: NCAT, MMM  Neck: Supple, No JVD  Respiratory: CTA-b/l  Cardiovascular: pranav reg s1s2, (+)1/6 DEREJE  Gastrointestinal: BS+, soft, NT/ND  Extremities: (+)RUE PICC; 3+ b/l LE edema  Neurological: no focal deficits; strength grossly intact  Back: no CVAT b/l  Skin: R heel wound with VAC dressing; (+)fibrotic/hyperpigmented changes of b/l LE from chronic dermatitis    LABS:      Na(143)/K(4.2)/Cl(111)/HCO3(21)/BUN(55)/Cr(2.38)Glu(105)/Ca(9.2)/Mg(--)/PO4(--)    08-20 @ 06:59  Na(140)/K(4.2)/Cl(107)/HCO3(18)/BUN(57)/Cr(2.83)Glu(85)/Ca(9.1)/Mg(2.0)/PO4(3.7)    08-19 @ 07:40  Na(142)/K(4.3)/Cl(109)/HCO3(19)/BUN(63)/Cr(3.15)Glu(81)/Ca(9.0)/Mg(2.2)/PO4(4.3)    08-18 @ 07:16    Urinalysis Basic - ( 20 Aug 2023 06:59 )    Color: x / Appearance: x / SG: x / pH: x  Gluc: 105 mg/dL / Ketone: x  / Bili: x / Urobili: x   Blood: x / Protein: x / Nitrite: x   Leuk Esterase: x / RBC: x / WBC x   Sq Epi: x / Non Sq Epi: x / Bacteria: x            ASSESSMENT/PLAN  IMPRESSION:  No evidence of deep venous thrombosis in either lower extremity.  There was limited visualization of the calf veins due to body habitus and   edema.  No calf vein thrombosis is detected.          IMPRESSION: 63F w/ HTN, DM2, and R heel MRSA osteomyelitis s/p debridement 7/27/23, of late on outpt IV Dapto/Cipro; 8/16/23 admitted with BANDAR    (1)BANDAR - scr  much improved from yesterday  (2)Lytes - acceptable  (3)CV - chronic severe LE edema; improving   (4)Anaemia- hgb improving(8/18/23)    RECOMMEND:  (1)A/W Lasix 40mg po daily    (2)No ACEI/ARB for now    (3)No objection to use of Ciprofloxacin at this point. Would refrain from use of Daptomycin for now    (4)F/U C3, C4    (5)BMP+Mg+PO4 daily    (6)Dose new meds for GFR 15-20ml/min            Michael Matthew NP-BC  [facility]  [phone]   on room air  afebrile  no complaints     artificial tears (preservative free) Ophthalmic Solution 1 Drop(s) Both EYES three times a day PRN  atorvastatin 40 milliGRAM(s) Oral at bedtime  chlorhexidine 2% Cloths 1 Application(s) Topical daily  chlorhexidine 4% Liquid 1 Application(s) Topical <User Schedule>  dextrose 5%. 1000 milliLiter(s) IV Continuous <Continuous>  dextrose 5%. 1000 milliLiter(s) IV Continuous <Continuous>  dextrose 50% Injectable 25 Gram(s) IV Push once  dextrose 50% Injectable 12.5 Gram(s) IV Push once  dextrose 50% Injectable 25 Gram(s) IV Push once  dextrose Oral Gel 15 Gram(s) Oral once PRN  furosemide    Tablet 40 milliGRAM(s) Oral daily  glucagon  Injectable 1 milliGRAM(s) IntraMuscular once  heparin   Injectable 5000 Unit(s) SubCutaneous every 8 hours  hydrALAZINE 50 milliGRAM(s) Oral two times a day  insulin glargine Injectable (LANTUS) 13 Unit(s) SubCutaneous at bedtime  insulin lispro (ADMELOG) corrective regimen sliding scale   SubCutaneous three times a day before meals  insulin lispro (ADMELOG) corrective regimen sliding scale   SubCutaneous at bedtime  insulin lispro Injectable (ADMELOG) 4 Unit(s) SubCutaneous three times a day before meals  metoprolol succinate ER 50 milliGRAM(s) Oral daily  pantoprazole    Tablet 40 milliGRAM(s) Oral before breakfast  senna 2 Tablet(s) Oral at bedtime  sodium chloride 0.9% lock flush 10 milliLiter(s) IV Push every 1 hour PRN  tamsulosin 0.4 milliGRAM(s) Oral at bedtime      VITAL:  T(C): , Max: 37.1 (08-20-23 @ 04:45)  T(F): , Max: 98.7 (08-20-23 @ 04:45)  HR: 63 (08-20-23 @ 12:26)  BP: 174/79 (08-20-23 @ 12:26)  BP(mean): --  RR: 18 (08-20-23 @ 12:26)  SpO2: 99% (08-20-23 @ 12:26)  Wt(kg): --    08-19-23 @ 07:01  -  08-20-23 @ 07:00  --------------------------------------------------------  IN: 0 mL / OUT: 250 mL / NET: -250 mL    08-20-23 @ 07:01  -  08-20-23 @ 18:26  --------------------------------------------------------  IN: 0 mL / OUT: 1100 mL / NET: -1100 mL        PHYSICAL EXAM:  Constitutional: NAD, Alert  HEENT: NCAT, MMM  Neck: Supple, No JVD  Respiratory: CTA-b/l  Cardiovascular: pranav reg s1s2, (+)1/6 DEREJE  Gastrointestinal: BS+, soft, NT/ND  Extremities: (+)RUE PICC; 3+ b/l LE edema  Neurological: no focal deficits; strength grossly intact  Back: no CVAT b/l  Skin: R heel wound with VAC dressing; (+)fibrotic/hyperpigmented changes of b/l LE from chronic dermatitis    LABS:      Na(143)/K(4.2)/Cl(111)/HCO3(21)/BUN(55)/Cr(2.38)Glu(105)/Ca(9.2)/Mg(--)/PO4(--)    08-20 @ 06:59  Na(140)/K(4.2)/Cl(107)/HCO3(18)/BUN(57)/Cr(2.83)Glu(85)/Ca(9.1)/Mg(2.0)/PO4(3.7)    08-19 @ 07:40  Na(142)/K(4.3)/Cl(109)/HCO3(19)/BUN(63)/Cr(3.15)Glu(81)/Ca(9.0)/Mg(2.2)/PO4(4.3)    08-18 @ 07:16    Urinalysis Basic - ( 20 Aug 2023 06:59 )    Color: x / Appearance: x / SG: x / pH: x  Gluc: 105 mg/dL / Ketone: x  / Bili: x / Urobili: x   Blood: x / Protein: x / Nitrite: x   Leuk Esterase: x / RBC: x / WBC x   Sq Epi: x / Non Sq Epi: x / Bacteria: x            ASSESSMENT/PLAN  IMPRESSION:  No evidence of deep venous thrombosis in either lower extremity.  There was limited visualization of the calf veins due to body habitus and   edema.  No calf vein thrombosis is detected.          IMPRESSION: 63F w/ HTN, DM2, and R heel MRSA osteomyelitis s/p debridement 7/27/23, of late on outpt IV Dapto/Cipro; 8/16/23 admitted with BANDAR    (1)BANDAR - scr  much improved from yesterday  (2)Lytes - acceptable  (3)CV - chronic severe LE edema; improving   (4)Anaemia- hgb improving(8/18/23)    RECOMMEND:  (1)A/W Lasix 40mg po daily    (2)No ACEI/ARB for now    (3)No objection to use of Ciprofloxacin at this point. Would refrain from use of Daptomycin for now    (4)F/U C3, C4    (5)BMP+Mg+PO4 daily    (6)Dose new meds for GFR 15-20ml/min            Michael Matthew NP-BC  [employer]  [phone]

## 2023-08-20 NOTE — PROGRESS NOTE ADULT - SUBJECTIVE AND OBJECTIVE BOX
STEPHANIE KAYE  63y Female  MRN:[id number]    Patient is a 63y old  Female who presents with a chief complaint of BANDAR (17 Aug 2023 12:15)    HPI:  63F IDDM, HTN, HLD, recent admit 7/25-8/7 iso b/l heel wound c/f OM s/p debridement w/ R foot graft application and bone bx (7/27) subsequent vac application dc'd on dapto and cipro (plan until 9/7) via RUE PICC who presents for elevated SCr on outpt lab monitoring. Pt reports feeling generally well in interim of discharge, though has noticed mild increase in chronic LE edema x days (superimposed on b/l LE edema since 1990s per her). She denies new med intake, change in PO intake (tends not to drink enough water but states this is not new), n/v/d, melena/hematochezia, dysuria/hematuria, change in urinary frequency. Otherwise denies HA, fever, chills, new visual disturbance (L visual deficit at bl x yrs per pt, s/p ophtho surgeries), rhinorrhea, odynophagia, dysphagia, CP, palpitations, SOB, cough, abd pain, new numbness/weakness/tingling (states RLE weak compared to LLE after pods procedure last admit) or other complaint.    Of note she relays last dapto dose 8/15, last cipro dose this AM. She states that she has been reducing basal/bolus insulin dose at home given borderline low FS.    ED Course  VS: afebrile tmax 36.9C, HR 60s, BP 120s-130s/60s, RR 16-20, SpO2% 97-98 RA  Labs: normocytic (MCV 81.6) anemia H/H 9.9/29.3; hypoNa 131, BUN/SCr 72/3.75 (GFR 13), phos 5.2, unable to calc FeUrea (urine urea unavailable), otherwise w/ elevated protein/Cr ratio 0.7   Imaging:  - CXR: clear lungs, R PICC terminates in SVC   Received: n/a    admit to medicine for further eval/mgt    (16 Aug 2023 20:50)      Patient seen and evaluated at bedside. No acute events overnight except as noted.    Interval HPI: no acute events o/n     PAST MEDICAL & SURGICAL HISTORY:  Diabetes      Hypertension      Hypercholesteremia      Wound of foot      H/O eye surgery      S/P cholecystectomy          REVIEW OF SYSTEMS:  as per hpi       VITALS:   Vital Signs Last 24 Hrs  T(C): 36.4 (20 Aug 2023 12:26), Max: 37.1 (20 Aug 2023 04:45)  T(F): 97.6 (20 Aug 2023 12:26), Max: 98.7 (20 Aug 2023 04:45)  HR: 63 (20 Aug 2023 12:26) (58 - 76)  BP: 174/79 (20 Aug 2023 12:26) (122/66 - 174/79)  BP(mean): --  RR: 18 (20 Aug 2023 12:26) (18 - 18)  SpO2: 99% (20 Aug 2023 12:26) (97% - 99%)    Parameters below as of 20 Aug 2023 12:26  Patient On (Oxygen Delivery Method): room air            PHYSICAL EXAM:  GENERAL: NAD, well-developed  HEAD:  Atraumatic, Normocephalic  EYES: EOMI, PERRLA, conjunctiva and sclera clear  NECK: Supple, No JVD  CHEST/LUNG: Clear to auscultation bilaterally; No wheeze  HEART: S1, S2; No murmurs, rubs, or gallops  ABDOMEN: Soft, Nontender, Nondistended; Bowel sounds present  EXTREMITIES:  2+ Peripheral Pulses, No clubbing, cyanosis, or edema. b/l lower ext wounds. chronic changes   PSYCH: Normal affect  NEUROLOGY: AAOX3; non-focal  SKIN: No rashes or lesions    Consultant(s) Notes Reviewed:  [x ] YES  [ ] NO  Care Discussed with Consultants/Other Providers [ x] YES  [ ] NO    MEDS:   MEDICATIONS  (STANDING):  atorvastatin 40 milliGRAM(s) Oral at bedtime  chlorhexidine 2% Cloths 1 Application(s) Topical daily  chlorhexidine 4% Liquid 1 Application(s) Topical <User Schedule>  dextrose 5%. 1000 milliLiter(s) (50 mL/Hr) IV Continuous <Continuous>  dextrose 5%. 1000 milliLiter(s) (100 mL/Hr) IV Continuous <Continuous>  dextrose 50% Injectable 25 Gram(s) IV Push once  dextrose 50% Injectable 12.5 Gram(s) IV Push once  dextrose 50% Injectable 25 Gram(s) IV Push once  furosemide    Tablet 40 milliGRAM(s) Oral daily  glucagon  Injectable 1 milliGRAM(s) IntraMuscular once  heparin   Injectable 5000 Unit(s) SubCutaneous every 8 hours  hydrALAZINE 50 milliGRAM(s) Oral two times a day  insulin glargine Injectable (LANTUS) 13 Unit(s) SubCutaneous at bedtime  insulin lispro (ADMELOG) corrective regimen sliding scale   SubCutaneous three times a day before meals  insulin lispro (ADMELOG) corrective regimen sliding scale   SubCutaneous at bedtime  insulin lispro Injectable (ADMELOG) 4 Unit(s) SubCutaneous three times a day before meals  metoprolol succinate ER 50 milliGRAM(s) Oral daily  pantoprazole    Tablet 40 milliGRAM(s) Oral before breakfast  senna 2 Tablet(s) Oral at bedtime  tamsulosin 0.4 milliGRAM(s) Oral at bedtime    MEDICATIONS  (PRN):  artificial tears (preservative free) Ophthalmic Solution 1 Drop(s) Both EYES three times a day PRN Dry Eyes  dextrose Oral Gel 15 Gram(s) Oral once PRN Blood Glucose LESS THAN 70 milliGRAM(s)/deciliter  sodium chloride 0.9% lock flush 10 milliLiter(s) IV Push every 1 hour PRN Pre/post blood products, medications, blood draw, and to maintain line patency      ALLERGIES:  No Known Allergies      LABS:                        08-20    143  |  111<H>  |  55<H>  ----------------------------<  105<H>  4.2   |  21<L>  |  2.38<H>    Ca    9.2      20 Aug 2023 06:59  Phos  3.7     08-19  Mg     2.0     08-19       STEPHANIE KAYE  63y Female  MRN:[id number]    Patient is a 63y old  Female who presents with a chief complaint of BANDAR (17 Aug 2023 12:15)    HPI:  63F IDDM, HTN, HLD, recent admit 7/25-8/7 iso b/l heel wound c/f OM s/p debridement w/ R foot graft application and bone bx (7/27) subsequent vac application dc'd on dapto and cipro (plan until 9/7) via RUE PICC who presents for elevated SCr on outpt lab monitoring. Pt reports feeling generally well in interim of discharge, though has noticed mild increase in chronic LE edema x days (superimposed on b/l LE edema since 1990s per her). She denies new med intake, change in PO intake (tends not to drink enough water but states this is not new), n/v/d, melena/hematochezia, dysuria/hematuria, change in urinary frequency. Otherwise denies HA, fever, chills, new visual disturbance (L visual deficit at bl x yrs per pt, s/p ophtho surgeries), rhinorrhea, odynophagia, dysphagia, CP, palpitations, SOB, cough, abd pain, new numbness/weakness/tingling (states RLE weak compared to LLE after pods procedure last admit) or other complaint.    Of note she relays last dapto dose 8/15, last cipro dose this AM. She states that she has been reducing basal/bolus insulin dose at home given borderline low FS.    ED Course  VS: afebrile tmax 36.9C, HR 60s, BP 120s-130s/60s, RR 16-20, SpO2% 97-98 RA  Labs: normocytic (MCV 81.6) anemia H/H 9.9/29.3; hypoNa 131, BUN/SCr 72/3.75 (GFR 13), phos 5.2, unable to calc FeUrea (urine urea unavailable), otherwise w/ elevated protein/Cr ratio 0.7   Imaging:  - CXR: clear lungs, R PICC terminates in SVC   Received: n/a    admit to medicine for further eval/mgt    (16 Aug 2023 20:50)      Patient seen and evaluated at bedside. No acute events overnight except as noted.    Interval HPI: no acute events o/n     PAST MEDICAL & SURGICAL HISTORY:  Diabetes      Hypertension      Hypercholesteremia      Wound of foot      H/O eye surgery      S/P cholecystectomy          REVIEW OF SYSTEMS:  as per hpi       VITALS:   Vital Signs Last 24 Hrs  T(C): 36.4 (20 Aug 2023 12:26), Max: 37.1 (20 Aug 2023 04:45)  T(F): 97.6 (20 Aug 2023 12:26), Max: 98.7 (20 Aug 2023 04:45)  HR: 63 (20 Aug 2023 12:26) (58 - 76)  BP: 174/79 (20 Aug 2023 12:26) (122/66 - 174/79)  BP(mean): --  RR: 18 (20 Aug 2023 12:26) (18 - 18)  SpO2: 99% (20 Aug 2023 12:26) (97% - 99%)    Parameters below as of 20 Aug 2023 12:26  Patient On (Oxygen Delivery Method): room air            PHYSICAL EXAM:  GENERAL: NAD, well-developed  HEAD:  Atraumatic, Normocephalic  EYES: EOMI, PERRLA, conjunctiva and sclera clear  NECK: Supple, No JVD  CHEST/LUNG: Clear to auscultation bilaterally; No wheeze  HEART: S1, S2; No murmurs, rubs, or gallops  ABDOMEN: Soft, Nontender, Nondistended; Bowel sounds present  EXTREMITIES:  2+ Peripheral Pulses, No clubbing, cyanosis, or edema. b/l lower ext wounds. chronic changes   PSYCH: Normal affect  NEUROLOGY: AAOX3; non-focal  SKIN: No rashes or lesions    Consultant(s) Notes Reviewed:  [x ] YES  [ ] NO  Care Discussed with Consultants/Other Providers [ x] YES  [ ] NO    MEDS:   MEDICATIONS  (STANDING):  atorvastatin 40 milliGRAM(s) Oral at bedtime  chlorhexidine 2% Cloths 1 Application(s) Topical daily  chlorhexidine 4% Liquid 1 Application(s) Topical <User Schedule>  dextrose 5%. 1000 milliLiter(s) (50 mL/Hr) IV Continuous <Continuous>  dextrose 5%. 1000 milliLiter(s) (100 mL/Hr) IV Continuous <Continuous>  dextrose 50% Injectable 25 Gram(s) IV Push once  dextrose 50% Injectable 12.5 Gram(s) IV Push once  dextrose 50% Injectable 25 Gram(s) IV Push once  furosemide    Tablet 40 milliGRAM(s) Oral daily  glucagon  Injectable 1 milliGRAM(s) IntraMuscular once  heparin   Injectable 5000 Unit(s) SubCutaneous every 8 hours  hydrALAZINE 50 milliGRAM(s) Oral two times a day  insulin glargine Injectable (LANTUS) 13 Unit(s) SubCutaneous at bedtime  insulin lispro (ADMELOG) corrective regimen sliding scale   SubCutaneous three times a day before meals  insulin lispro (ADMELOG) corrective regimen sliding scale   SubCutaneous at bedtime  insulin lispro Injectable (ADMELOG) 4 Unit(s) SubCutaneous three times a day before meals  metoprolol succinate ER 50 milliGRAM(s) Oral daily  pantoprazole    Tablet 40 milliGRAM(s) Oral before breakfast  senna 2 Tablet(s) Oral at bedtime  tamsulosin 0.4 milliGRAM(s) Oral at bedtime    MEDICATIONS  (PRN):  artificial tears (preservative free) Ophthalmic Solution 1 Drop(s) Both EYES three times a day PRN Dry Eyes  dextrose Oral Gel 15 Gram(s) Oral once PRN Blood Glucose LESS THAN 70 milliGRAM(s)/deciliter  sodium chloride 0.9% lock flush 10 milliLiter(s) IV Push every 1 hour PRN Pre/post blood products, medications, blood draw, and to maintain line patency      ALLERGIES:  No Known Allergies      LABS:                        08-20    143  |  111<H>  |  55<H>  ----------------------------<  105<H>  4.2   |  21<L>  |  2.38<H>    Ca    9.2      20 Aug 2023 06:59  Phos  3.7     08-19  Mg     2.0     08-19       STEPHANIE KAYE  63y Female  MRN:[id number]    Patient is a 63y old  Female who presents with a chief complaint of BANDAR (17 Aug 2023 12:15)    HPI:  63F IDDM, HTN, HLD, recent admit 7/25-8/7 iso b/l heel wound c/f OM s/p debridement w/ R foot graft application and bone bx (7/27) subsequent vac application dc'd on dapto and cipro (plan until 9/7) via RUE PICC who presents for elevated SCr on outpt lab monitoring. Pt reports feeling generally well in interim of discharge, though has noticed mild increase in chronic LE edema x days (superimposed on b/l LE edema since 1990s per her). She denies new med intake, change in PO intake (tends not to drink enough water but states this is not new), n/v/d, melena/hematochezia, dysuria/hematuria, change in urinary frequency. Otherwise denies HA, fever, chills, new visual disturbance (L visual deficit at bl x yrs per pt, s/p ophtho surgeries), rhinorrhea, odynophagia, dysphagia, CP, palpitations, SOB, cough, abd pain, new numbness/weakness/tingling (states RLE weak compared to LLE after pods procedure last admit) or other complaint.    Of note she relays last dapto dose 8/15, last cipro dose this AM. She states that she has been reducing basal/bolus insulin dose at home given borderline low FS.    ED Course  VS: afebrile tmax 36.9C, HR 60s, BP 120s-130s/60s, RR 16-20, SpO2% 97-98 RA  Labs: normocytic (MCV 81.6) anemia H/H 9.9/29.3; hypoNa 131, BUN/SCr 72/3.75 (GFR 13), phos 5.2, unable to calc FeUrea (urine urea unavailable), otherwise w/ elevated protein/Cr ratio 0.7   Imaging:  - CXR: clear lungs, R PICC terminates in SVC   Received: n/a    admit to medicine for further eval/mgt    (16 Aug 2023 20:50)      Patient seen and evaluated at bedside. No acute events overnight except as noted.    Interval HPI: no acute events o/n     PAST MEDICAL & SURGICAL HISTORY:  Diabetes      Hypertension      Hypercholesteremia      Wound of foot      H/O eye surgery      S/P cholecystectomy          REVIEW OF SYSTEMS:  as per hpi       VITALS:   Vital Signs Last 24 Hrs  T(C): 36.4 (20 Aug 2023 12:26), Max: 37.1 (20 Aug 2023 04:45)  T(F): 97.6 (20 Aug 2023 12:26), Max: 98.7 (20 Aug 2023 04:45)  HR: 63 (20 Aug 2023 12:26) (58 - 76)  BP: 174/79 (20 Aug 2023 12:26) (122/66 - 174/79)  BP(mean): --  RR: 18 (20 Aug 2023 12:26) (18 - 18)  SpO2: 99% (20 Aug 2023 12:26) (97% - 99%)    Parameters below as of 20 Aug 2023 12:26  Patient On (Oxygen Delivery Method): room air            PHYSICAL EXAM:  GENERAL: NAD, well-developed  HEAD:  Atraumatic, Normocephalic  EYES: EOMI, PERRLA, conjunctiva and sclera clear  NECK: Supple, No JVD  CHEST/LUNG: Clear to auscultation bilaterally; No wheeze  HEART: S1, S2; No murmurs, rubs, or gallops  ABDOMEN: Soft, Nontender, Nondistended; Bowel sounds present  EXTREMITIES:  2+ Peripheral Pulses, No clubbing, cyanosis, or edema. b/l lower ext wounds. chronic changes   PSYCH: Normal affect  NEUROLOGY: AAOX3; non-focal  SKIN: No rashes or lesions    Consultant(s) Notes Reviewed:  [x ] YES  [ ] NO  Care Discussed with Consultants/Other Providers [ x] YES  [ ] NO    MEDS:   MEDICATIONS  (STANDING):  atorvastatin 40 milliGRAM(s) Oral at bedtime  chlorhexidine 2% Cloths 1 Application(s) Topical daily  chlorhexidine 4% Liquid 1 Application(s) Topical <User Schedule>  dextrose 5%. 1000 milliLiter(s) (50 mL/Hr) IV Continuous <Continuous>  dextrose 5%. 1000 milliLiter(s) (100 mL/Hr) IV Continuous <Continuous>  dextrose 50% Injectable 25 Gram(s) IV Push once  dextrose 50% Injectable 12.5 Gram(s) IV Push once  dextrose 50% Injectable 25 Gram(s) IV Push once  furosemide    Tablet 40 milliGRAM(s) Oral daily  glucagon  Injectable 1 milliGRAM(s) IntraMuscular once  heparin   Injectable 5000 Unit(s) SubCutaneous every 8 hours  hydrALAZINE 50 milliGRAM(s) Oral two times a day  insulin glargine Injectable (LANTUS) 13 Unit(s) SubCutaneous at bedtime  insulin lispro (ADMELOG) corrective regimen sliding scale   SubCutaneous three times a day before meals  insulin lispro (ADMELOG) corrective regimen sliding scale   SubCutaneous at bedtime  insulin lispro Injectable (ADMELOG) 4 Unit(s) SubCutaneous three times a day before meals  metoprolol succinate ER 50 milliGRAM(s) Oral daily  pantoprazole    Tablet 40 milliGRAM(s) Oral before breakfast  senna 2 Tablet(s) Oral at bedtime  tamsulosin 0.4 milliGRAM(s) Oral at bedtime    MEDICATIONS  (PRN):  artificial tears (preservative free) Ophthalmic Solution 1 Drop(s) Both EYES three times a day PRN Dry Eyes  dextrose Oral Gel 15 Gram(s) Oral once PRN Blood Glucose LESS THAN 70 milliGRAM(s)/deciliter  sodium chloride 0.9% lock flush 10 milliLiter(s) IV Push every 1 hour PRN Pre/post blood products, medications, blood draw, and to maintain line patency      ALLERGIES:  No Known Allergies      LABS:                        08-20    143  |  111<H>  |  55<H>  ----------------------------<  105<H>  4.2   |  21<L>  |  2.38<H>    Ca    9.2      20 Aug 2023 06:59  Phos  3.7     08-19  Mg     2.0     08-19

## 2023-08-21 ENCOUNTER — TRANSCRIPTION ENCOUNTER (OUTPATIENT)
Age: 63
End: 2023-08-21

## 2023-08-21 VITALS
TEMPERATURE: 98 F | RESPIRATION RATE: 18 BRPM | DIASTOLIC BLOOD PRESSURE: 76 MMHG | SYSTOLIC BLOOD PRESSURE: 158 MMHG | HEART RATE: 53 BPM | OXYGEN SATURATION: 98 %

## 2023-08-21 LAB
ANION GAP SERPL CALC-SCNC: 16 MMOL/L — SIGNIFICANT CHANGE UP (ref 5–17)
BUN SERPL-MCNC: 51 MG/DL — HIGH (ref 7–23)
C3 SERPL-MCNC: 106 MG/DL — SIGNIFICANT CHANGE UP (ref 81–157)
C4 SERPL-MCNC: 18 MG/DL — SIGNIFICANT CHANGE UP (ref 13–39)
CALCIUM SERPL-MCNC: 9.5 MG/DL — SIGNIFICANT CHANGE UP (ref 8.4–10.5)
CHLORIDE SERPL-SCNC: 107 MMOL/L — SIGNIFICANT CHANGE UP (ref 96–108)
CO2 SERPL-SCNC: 18 MMOL/L — LOW (ref 22–31)
CREAT SERPL-MCNC: 2.63 MG/DL — HIGH (ref 0.5–1.3)
EGFR: 20 ML/MIN/1.73M2 — LOW
GLUCOSE BLDC GLUCOMTR-MCNC: 107 MG/DL — HIGH (ref 70–99)
GLUCOSE BLDC GLUCOMTR-MCNC: 112 MG/DL — HIGH (ref 70–99)
GLUCOSE SERPL-MCNC: 96 MG/DL — SIGNIFICANT CHANGE UP (ref 70–99)
POTASSIUM SERPL-MCNC: 4.2 MMOL/L — SIGNIFICANT CHANGE UP (ref 3.5–5.3)
POTASSIUM SERPL-SCNC: 4.2 MMOL/L — SIGNIFICANT CHANGE UP (ref 3.5–5.3)
SODIUM SERPL-SCNC: 141 MMOL/L — SIGNIFICANT CHANGE UP (ref 135–145)

## 2023-08-21 RX ORDER — HYDRALAZINE HCL 50 MG
1 TABLET ORAL
Qty: 90 | Refills: 0
Start: 2023-08-21 | End: 2023-09-19

## 2023-08-21 RX ADMIN — CHLORHEXIDINE GLUCONATE 1 APPLICATION(S): 213 SOLUTION TOPICAL at 11:48

## 2023-08-21 RX ADMIN — Medication 40 MILLIGRAM(S): at 05:39

## 2023-08-21 RX ADMIN — Medication 4 UNIT(S): at 11:45

## 2023-08-21 RX ADMIN — PANTOPRAZOLE SODIUM 40 MILLIGRAM(S): 20 TABLET, DELAYED RELEASE ORAL at 05:38

## 2023-08-21 RX ADMIN — Medication 50 MILLIGRAM(S): at 05:38

## 2023-08-21 RX ADMIN — HEPARIN SODIUM 5000 UNIT(S): 5000 INJECTION INTRAVENOUS; SUBCUTANEOUS at 05:38

## 2023-08-21 RX ADMIN — Medication 4 UNIT(S): at 08:44

## 2023-08-21 RX ADMIN — Medication 50 MILLIGRAM(S): at 05:39

## 2023-08-21 RX ADMIN — CHLORHEXIDINE GLUCONATE 1 APPLICATION(S): 213 SOLUTION TOPICAL at 05:57

## 2023-08-21 NOTE — PROGRESS NOTE ADULT - PROBLEM SELECTOR PROBLEM 6
Chronic hypertension

## 2023-08-21 NOTE — PROGRESS NOTE ADULT - ASSESSMENT
63F  with bilateral heel wound  - Pt seen and evaluated.  - Afebrile, WBC 5.42  - Right heel fibrogranular wound to subQ with periwound maceration, no erythema, no edema, no pus, no acute signs of infection. Left heel wound to subQ with no acute signs of infections  - 7/27 Right Foot calcaneal Bone Biopsy pathology: no OM   - 7/27 Right foot calcaneal bone biopsy culture:  Klebsiella, E faecalis, MRSA, Morganella   - ID recs, appreciated.  - Right foot VAC to be applied   - No acute pod intervention planned  during this admission  - Patient stable for discharge from a podiatry standpoint.  - Please have patient wear bilateral z flows at all times while in house, strict decubitus precautions   - Wound care instructions and follow up information listed in discharge provider note.  - Discussed with attending.

## 2023-08-21 NOTE — PROGRESS NOTE ADULT - PROBLEM SELECTOR PLAN 2
pod and id consult noted  no further abx  VAC  wound care

## 2023-08-21 NOTE — PROGRESS NOTE ADULT - PROBLEM SELECTOR PLAN 4
chronic  no evid of bleed  monitor

## 2023-08-21 NOTE — DISCHARGE NOTE NURSING/CASE MANAGEMENT/SOCIAL WORK - [facility]ADDAPPT_GEN_ALL_CORE_FT
Podiatry Discharge Instructions:  Follow up: Please follow up with Dr. Flores within 1 week of discharge from the hospital, please call [phone] for appointment and discuss that you recently were seen in the hospital.  Wound Care: Please apply santyl to left foot wound followed by 4x4 gauze and Jonathan daily. Please apply vac to right foot at 125 mmHg, 3x/week, continuously until your follow up appointment .  Weight bearing: Please weight bear as tolerated in a surgical shoe to Left and forefoot offloading show to Right    Podiatry Discharge Instructions:  Follow up: Please follow up with Dr. Flores within 1 week of discharge from the hospital, please call [phone] for appointment and discuss that you recently were seen in the hospital.  Wound Care: Please apply santyl to left foot wound followed by 4x4 gauze and Jonathan daily. Please apply vac to right foot at 125 mmHg, 3x/week, continuously until your follow up appointment .  Weight bearing: Please weight bear as tolerated in a surgical shoe to Left and forefoot offloading show to Right    Podiatry Discharge Instructions:  Follow up: Please follow up with Dr. Flores within 1 week of discharge from the hospital, please call [phone] for appointment and discuss that you recently were seen in the hospital.  Wound Care: Please apply santyl to left foot wound followed by 4x4 gauze and Jonathan daily. Please apply vac to right foot at 125 mmHg, 3x/week, continuously until your follow up appointment .  Weight bearing: Please weight bear as tolerated in a surgical shoe to Left and forefoot offloading show to Right

## 2023-08-21 NOTE — PROGRESS NOTE ADULT - PROBLEM SELECTOR PLAN 3
improved   renal f/u

## 2023-08-21 NOTE — PROGRESS NOTE ADULT - SUBJECTIVE AND OBJECTIVE BOX
STEPHANIE KAYE, MRN [id number], 63yfemale      Patient is a 63y old  Female who presents with a chief complaint of BANDAR (21 Aug 2023 12:52)       INTERVAL HPI/OVERNIGHT EVENTS:  Patient seen and evaluated at bedside.  Pt is resting comfortable in NAD. Denies N/V/F/C.    Allergies    No Known Allergies    Intolerances        Vital Signs Last 24 Hrs  T(C): 36.7 (21 Aug 2023 11:57), Max: 36.8 (21 Aug 2023 05:04)  T(F): 98.1 (21 Aug 2023 11:57), Max: 98.3 (21 Aug 2023 05:04)  HR: 53 (21 Aug 2023 11:57) (53 - 65)  BP: 158/76 (21 Aug 2023 11:57) (158/76 - 187/86)  BP(mean): --  RR: 18 (21 Aug 2023 11:57) (18 - 18)  SpO2: 98% (21 Aug 2023 11:57) (96% - 98%)    Parameters below as of 21 Aug 2023 11:57  Patient On (Oxygen Delivery Method): room air        LABS:    08-21    141  |  107  |  51<H>  ----------------------------<  96  4.2   |  18<L>  |  2.63<H>    Ca    9.5      21 Aug 2023 07:28        Urinalysis Basic - ( 21 Aug 2023 07:28 )    Color: x / Appearance: x / SG: x / pH: x  Gluc: 96 mg/dL / Ketone: x  / Bili: x / Urobili: x   Blood: x / Protein: x / Nitrite: x   Leuk Esterase: x / RBC: x / WBC x   Sq Epi: x / Non Sq Epi: x / Bacteria: x      CAPILLARY BLOOD GLUCOSE      POCT Blood Glucose.: 112 mg/dL (21 Aug 2023 11:31)  POCT Blood Glucose.: 107 mg/dL (21 Aug 2023 08:34)  POCT Blood Glucose.: 113 mg/dL (20 Aug 2023 21:57)  POCT Blood Glucose.: 86 mg/dL (20 Aug 2023 17:56)      Lower Extremity Physical Exam:    Vascular: DP/PT 0/4, B/L, CFT <3 seconds B/L, Temperature gradient warm to cool, B/L.   Neuro: Epicritic sensation dimished to the level of toes, B/L.  Musculoskeletal/Ortho: Unremarkable.  Skin: Right heel fibrogranular wound to subQ with periwound maceration, no erythema, no edema, no pus, no acute signs of infection. Left heel wound to subQ with no acute signs of infections.      RADIOLOGY & ADDITIONAL TESTS:   STEPHANIE KAYE, MRN [id number], 63yfemale      Patient is a 63y old  Female who presents with a chief complaint of BANDAR (21 Aug 2023 12:52)       INTERVAL HPI/OVERNIGHT EVENTS:  Patient seen and evaluated at bedside.  Pt is resting comfortable in NAD. Denies N/V/F/C.    Allergies    No Known Allergies    Intolerances        Vital Signs Last 24 Hrs  T(C): 36.7 (21 Aug 2023 11:57), Max: 36.8 (21 Aug 2023 05:04)  T(F): 98.1 (21 Aug 2023 11:57), Max: 98.3 (21 Aug 2023 05:04)  HR: 53 (21 Aug 2023 11:57) (53 - 65)  BP: 158/76 (21 Aug 2023 11:57) (158/76 - 187/86)  BP(mean): --  RR: 18 (21 Aug 2023 11:57) (18 - 18)  SpO2: 98% (21 Aug 2023 11:57) (96% - 98%)    Parameters below as of 21 Aug 2023 11:57  Patient On (Oxygen Delivery Method): room air        LABS:    08-21    141  |  107  |  51<H>  ----------------------------<  96  4.2   |  18<L>  |  2.63<H>    Ca    9.5      21 Aug 2023 07:28        Urinalysis Basic - ( 21 Aug 2023 07:28 )    Color: x / Appearance: x / SG: x / pH: x  Gluc: 96 mg/dL / Ketone: x  / Bili: x / Urobili: x   Blood: x / Protein: x / Nitrite: x   Leuk Esterase: x / RBC: x / WBC x   Sq Epi: x / Non Sq Epi: x / Bacteria: x      CAPILLARY BLOOD GLUCOSE      POCT Blood Glucose.: 112 mg/dL (21 Aug 2023 11:31)  POCT Blood Glucose.: 107 mg/dL (21 Aug 2023 08:34)  POCT Blood Glucose.: 113 mg/dL (20 Aug 2023 21:57)  POCT Blood Glucose.: 86 mg/dL (20 Aug 2023 17:56)      Lower Extremity Physical Exam:    Vascular: DP/PT 0/4, B/L, CFT <3 seconds B/L, Temperature gradient warm to cool, B/L.   Neuro: Epicritic sensation dimished to the level of toes, B/L.  Musculoskeletal/Ortho: Unremarkable.  Skin: Right heel fibrogranular wound to subQ with periwound maceration, no erythema, no edema, no pus, no acute signs of infection. Left heel wound to subQ with no acute signs of infections.      RADIOLOGY & ADDITIONAL TESTS:   STEPHANIE KAYE, MRN [id number], 63yfemale      Patient is a 63y old  Female who presents with a chief complaint of BANDAR (21 Aug 2023 12:52)       INTERVAL HPI/OVERNIGHT EVENTS:  Patient seen and evaluated at bedside.  Pt is resting comfortable in NAD. Denies N/V/F/C.    Allergies    No Known Allergies    Intolerances        Vital Signs Last 24 Hrs  T(C): 36.7 (21 Aug 2023 11:57), Max: 36.8 (21 Aug 2023 05:04)  T(F): 98.1 (21 Aug 2023 11:57), Max: 98.3 (21 Aug 2023 05:04)  HR: 53 (21 Aug 2023 11:57) (53 - 65)  BP: 158/76 (21 Aug 2023 11:57) (158/76 - 187/86)  BP(mean): --  RR: 18 (21 Aug 2023 11:57) (18 - 18)  SpO2: 98% (21 Aug 2023 11:57) (96% - 98%)    Parameters below as of 21 Aug 2023 11:57  Patient On (Oxygen Delivery Method): room air        LABS:    08-21    141  |  107  |  51<H>  ----------------------------<  96  4.2   |  18<L>  |  2.63<H>    Ca    9.5      21 Aug 2023 07:28        Urinalysis Basic - ( 21 Aug 2023 07:28 )    Color: x / Appearance: x / SG: x / pH: x  Gluc: 96 mg/dL / Ketone: x  / Bili: x / Urobili: x   Blood: x / Protein: x / Nitrite: x   Leuk Esterase: x / RBC: x / WBC x   Sq Epi: x / Non Sq Epi: x / Bacteria: x      CAPILLARY BLOOD GLUCOSE      POCT Blood Glucose.: 112 mg/dL (21 Aug 2023 11:31)  POCT Blood Glucose.: 107 mg/dL (21 Aug 2023 08:34)  POCT Blood Glucose.: 113 mg/dL (20 Aug 2023 21:57)  POCT Blood Glucose.: 86 mg/dL (20 Aug 2023 17:56)      Lower Extremity Physical Exam:    Vascular: DP/PT 0/4, B/L, CFT <3 seconds B/L, Temperature gradient warm to cool, B/L.   Neuro: Epicritic sensation dimished to the level of toes, B/L.  Musculoskeletal/Ortho: Unremarkable.  Skin: Right heel fibrogranular wound to subQ with periwound maceration, no erythema, no edema, no pus, no acute signs of infection. Left heel wound to subQ with no acute signs of infections.      RADIOLOGY & ADDITIONAL TESTS:

## 2023-08-21 NOTE — PROGRESS NOTE ADULT - PROBLEM SELECTOR PLAN 8
chem DVT ppx: HSQ  diet: low K/phos  precaution: fall  dispo: pending PT luz maria,
chem DVT ppx: HSQ  diet: low K/phos  precaution: fall  dispo: home
chem DVT ppx: HSQ  diet: low K/phos  precaution: fall  dispo: pending PT luz maria,

## 2023-08-21 NOTE — DISCHARGE NOTE NURSING/CASE MANAGEMENT/SOCIAL WORK - NSDCPEFALRISK_GEN_ALL_CORE
For information on Fall & Injury Prevention, visit: [email or web address]/[email or web address]-maintains-health-[email or web address]-mobility OR  [email or web address]://www.[facility]/[email or web address]-prevention-tips-[email or web address]-avoid-injury OR  https://www.cdc.gov/steadi/patient.html For information on Fall & Injury Prevention, visit: [email or web address]://www.[facility]/news/fall-prevention-protects-and-maintains-health-and-mobility OR  https://www.[facility]/news/fall-prevention-tips-to-avoid-injury OR  https://www.cdc.gov/steadi/patient.html For information on Fall & Injury Prevention, visit: https://www.[facility]/news/fall-prevention-protects-and-maintains-health-and-mobility OR  https://www.[facility]/news/fall-prevention-tips-to-avoid-injury OR  https://www.cdc.gov/steadi/patient.html

## 2023-08-21 NOTE — PROGRESS NOTE ADULT - PROBLEM SELECTOR PLAN 6
home lasix 20mg QD, hydralazine 50mg BID, lisinopril 20mg QD, toprol 50mg QD  - hold ACE, diuretics given BANDAR; c/w hydral, toprol  - monitor BP

## 2023-08-21 NOTE — DISCHARGE NOTE NURSING/CASE MANAGEMENT/SOCIAL WORK - PATIENT PORTAL LINK FT
You can access the FollowMyHealth Patient Portal offered by [facility] by registering at the following website: [email or web address]. By joining [facility]’s FollowMyHealth portal, you will also be able to view your health information using other applications (apps) compatible with our system. You can access the FollowMyHealth Patient Portal offered by [facility] by registering at the following website: [email or web address]. By joining [employer]s FollowMyHealth portal, you will also be able to view your health information using other applications (apps) compatible with our system. You can access the FollowMyHealth Patient Portal offered by [facility] by registering at the following website: [email or web address]. By joining [facility]’s FollowMyHealth portal, you will also be able to view your health information using other applications (apps) compatible with our system.

## 2023-08-21 NOTE — PROGRESS NOTE ADULT - SUBJECTIVE AND OBJECTIVE BOX
Overnight events noted      VITAL:  T(C): , Max: 36.8 (08-21-23 @ 05:04)  T(F): , Max: 98.3 (08-21-23 @ 05:04)  HR: 53 (08-21-23 @ 11:57)  BP: 158/76 (08-21-23 @ 11:57)  BP(mean): --  RR: 18 (08-21-23 @ 11:57)  SpO2: 98% (08-21-23 @ 11:57)  Wt(kg): --      PHYSICAL EXAM:  Constitutional: NAD, Alert  HEENT: NCAT, MMM  Neck: Supple, No JVD  Respiratory: CTA-b/l  Cardiovascular: pranav reg s1s2, (+)1/6 DEREJE  Gastrointestinal: BS+, soft, NT/ND  Extremities: (+)RUE PICC; 3+ b/l LE edema  Neurological: no focal deficits; strength grossly intact  Back: no CVAT b/l  Skin: R heel wound with VAC dressing; (+)fibrotic/hyperpigmented changes of b/l LE from chronic dermatitis    LABS:    Na(141)/K(4.2)/Cl(107)/HCO3(18)/BUN(51)/Cr(2.63)Glu(96)/Ca(9.5)/Mg(--)/PO4(--)    08-21 @ 07:28  Na(143)/K(4.2)/Cl(111)/HCO3(21)/BUN(55)/Cr(2.38)Glu(105)/Ca(9.2)/Mg(--)/PO4(--)    08-20 @ 06:59  Na(140)/K(4.2)/Cl(107)/HCO3(18)/BUN(57)/Cr(2.83)Glu(85)/Ca(9.1)/Mg(2.0)/PO4(3.7)    08-19 @ 07:40    C3 106  C4 18  Uric 8.7        IMPRESSION: 63F w/ HTN, DM2, and R heel MRSA osteomyelitis s/p debridement 7/27/23, of late on outpt IV Dapto/Cipro; 8/16/23 admitted with BANDAR    (1)BANDAR - likely a significant component of ATN here; less likely post-infectious GN. Very slowly improving (with superimposed prerenally mediated fluctuations in the numbers)    (2)Lytes - acceptable    (3)CV - chronic severe LE edema; now on PO Lasix    (4)ID - R heel MRSA osteomyelitis s/p debridement; now off abx      RECOMMEND:  (1)Antihypertensives as ordered  (2)Dose new meds for GFR 20-30ml/min  (3)No renal objection to discharge with repeat BMP in 1 week; can f/u at my office in 1-4 weeks          Richard Harrison MD  [facility]  Office/on call physician: [phone]  Cell (7a-7p): [phone]       Overnight events noted      VITAL:  T(C): , Max: 36.8 (08-21-23 @ 05:04)  T(F): , Max: 98.3 (08-21-23 @ 05:04)  HR: 53 (08-21-23 @ 11:57)  BP: 158/76 (08-21-23 @ 11:57)  BP(mean): --  RR: 18 (08-21-23 @ 11:57)  SpO2: 98% (08-21-23 @ 11:57)  Wt(kg): --      PHYSICAL EXAM:  Constitutional: NAD, Alert  HEENT: NCAT, MMM  Neck: Supple, No JVD  Respiratory: CTA-b/l  Cardiovascular: pranav reg s1s2, (+)1/6 DEREJE  Gastrointestinal: BS+, soft, NT/ND  Extremities: (+)RUE PICC; 3+ b/l LE edema  Neurological: no focal deficits; strength grossly intact  Back: no CVAT b/l  Skin: R heel wound with VAC dressing; (+)fibrotic/hyperpigmented changes of b/l LE from chronic dermatitis    LABS:    Na(141)/K(4.2)/Cl(107)/HCO3(18)/BUN(51)/Cr(2.63)Glu(96)/Ca(9.5)/Mg(--)/PO4(--)    08-21 @ 07:28  Na(143)/K(4.2)/Cl(111)/HCO3(21)/BUN(55)/Cr(2.38)Glu(105)/Ca(9.2)/Mg(--)/PO4(--)    08-20 @ 06:59  Na(140)/K(4.2)/Cl(107)/HCO3(18)/BUN(57)/Cr(2.83)Glu(85)/Ca(9.1)/Mg(2.0)/PO4(3.7)    08-19 @ 07:40    C3 106  C4 18  Uric 8.7        IMPRESSION: 63F w/ HTN, DM2, and R heel MRSA osteomyelitis s/p debridement 7/27/23, of late on outpt IV Dapto/Cipro; 8/16/23 admitted with BANDAR    (1)BANDAR - likely a significant component of ATN here; less likely post-infectious GN. Very slowly improving (with superimposed prerenally mediated fluctuations in the numbers)    (2)Lytes - acceptable    (3)CV - chronic severe LE edema; now on PO Lasix    (4)ID - R heel MRSA osteomyelitis s/p debridement; now off abx      RECOMMEND:  (1)Antihypertensives as ordered  (2)Dose new meds for GFR 20-30ml/min  (3)No renal objection to discharge with repeat BMP in 1 week; can f/u at my office in 1-4 weeks          Richard Harrison MD  [facility]  Office/on call physician: [phone]  Cell (7a-7p): [phone]       Overnight events noted      VITAL:  T(C): , Max: 36.8 (08-21-23 @ 05:04)  T(F): , Max: 98.3 (08-21-23 @ 05:04)  HR: 53 (08-21-23 @ 11:57)  BP: 158/76 (08-21-23 @ 11:57)  BP(mean): --  RR: 18 (08-21-23 @ 11:57)  SpO2: 98% (08-21-23 @ 11:57)  Wt(kg): --      PHYSICAL EXAM:  Constitutional: NAD, Alert  HEENT: NCAT, MMM  Neck: Supple, No JVD  Respiratory: CTA-b/l  Cardiovascular: pranav reg s1s2, (+)1/6 DEREJE  Gastrointestinal: BS+, soft, NT/ND  Extremities: (+)RUE PICC; 3+ b/l LE edema  Neurological: no focal deficits; strength grossly intact  Back: no CVAT b/l  Skin: R heel wound with VAC dressing; (+)fibrotic/hyperpigmented changes of b/l LE from chronic dermatitis    LABS:    Na(141)/K(4.2)/Cl(107)/HCO3(18)/BUN(51)/Cr(2.63)Glu(96)/Ca(9.5)/Mg(--)/PO4(--)    08-21 @ 07:28  Na(143)/K(4.2)/Cl(111)/HCO3(21)/BUN(55)/Cr(2.38)Glu(105)/Ca(9.2)/Mg(--)/PO4(--)    08-20 @ 06:59  Na(140)/K(4.2)/Cl(107)/HCO3(18)/BUN(57)/Cr(2.83)Glu(85)/Ca(9.1)/Mg(2.0)/PO4(3.7)    08-19 @ 07:40    C3 106  C4 18  Uric 8.7        IMPRESSION: 63F w/ HTN, DM2, and R heel MRSA osteomyelitis s/p debridement 7/27/23, of late on outpt IV Dapto/Cipro; 8/16/23 admitted with BANDAR    (1)BANDAR - likely a significant component of ATN here; less likely post-infectious GN. Very slowly improving (with superimposed prerenally mediated fluctuations in the numbers)    (2)Lytes - acceptable    (3)CV - chronic severe LE edema; now on PO Lasix    (4)ID - R heel MRSA osteomyelitis s/p debridement; now off abx      RECOMMEND:  (1)Antihypertensives as ordered  (2)Dose new meds for GFR 20-30ml/min  (3)No renal objection to discharge with repeat BMP in 1 week; can f/u at my office in 1-4 weeks          Richard Harrison MD  [facility]  Office/on call physician: [phone]  Cell (7a-7p): [phone]

## 2023-08-21 NOTE — PROGRESS NOTE ADULT - SUBJECTIVE AND OBJECTIVE BOX
STEPHANIE KAYE  63y Female  MRN:[id number]    Patient is a 63y old  Female who presents with a chief complaint of BANDAR (17 Aug 2023 12:15)    HPI:  63F IDDM, HTN, HLD, recent admit 7/25-8/7 iso b/l heel wound c/f OM s/p debridement w/ R foot graft application and bone bx (7/27) subsequent vac application dc'd on dapto and cipro (plan until 9/7) via RUE PICC who presents for elevated SCr on outpt lab monitoring. Pt reports feeling generally well in interim of discharge, though has noticed mild increase in chronic LE edema x days (superimposed on b/l LE edema since 1990s per her). She denies new med intake, change in PO intake (tends not to drink enough water but states this is not new), n/v/d, melena/hematochezia, dysuria/hematuria, change in urinary frequency. Otherwise denies HA, fever, chills, new visual disturbance (L visual deficit at bl x yrs per pt, s/p ophtho surgeries), rhinorrhea, odynophagia, dysphagia, CP, palpitations, SOB, cough, abd pain, new numbness/weakness/tingling (states RLE weak compared to LLE after pods procedure last admit) or other complaint.    Of note she relays last dapto dose 8/15, last cipro dose this AM. She states that she has been reducing basal/bolus insulin dose at home given borderline low FS.    ED Course  VS: afebrile tmax 36.9C, HR 60s, BP 120s-130s/60s, RR 16-20, SpO2% 97-98 RA  Labs: normocytic (MCV 81.6) anemia H/H 9.9/29.3; hypoNa 131, BUN/SCr 72/3.75 (GFR 13), phos 5.2, unable to calc FeUrea (urine urea unavailable), otherwise w/ elevated protein/Cr ratio 0.7   Imaging:  - CXR: clear lungs, R PICC terminates in SVC   Received: n/a    admit to medicine for further eval/mgt    (16 Aug 2023 20:50)      Patient seen and evaluated at bedside. No acute events overnight except as noted.    Interval HPI: no acute events o/n     PAST MEDICAL & SURGICAL HISTORY:  Diabetes      Hypertension      Hypercholesteremia      Wound of foot      H/O eye surgery      S/P cholecystectomy          REVIEW OF SYSTEMS:  as per hpi       VITALS:   Vital Signs Last 24 Hrs  T(C): 36.7 (21 Aug 2023 11:57), Max: 36.8 (21 Aug 2023 05:04)  T(F): 98.1 (21 Aug 2023 11:57), Max: 98.3 (21 Aug 2023 05:04)  HR: 53 (21 Aug 2023 11:57) (53 - 65)  BP: 158/76 (21 Aug 2023 11:57) (158/76 - 187/86)  BP(mean): --  RR: 18 (21 Aug 2023 11:57) (18 - 18)  SpO2: 98% (21 Aug 2023 11:57) (96% - 98%)    Parameters below as of 21 Aug 2023 11:57  Patient On (Oxygen Delivery Method): room air              PHYSICAL EXAM:  GENERAL: NAD, well-developed  HEAD:  Atraumatic, Normocephalic  EYES: EOMI, PERRLA, conjunctiva and sclera clear  NECK: Supple, No JVD  CHEST/LUNG: Clear to auscultation bilaterally; No wheeze  HEART: S1, S2; No murmurs, rubs, or gallops  ABDOMEN: Soft, Nontender, Nondistended; Bowel sounds present  EXTREMITIES:  2+ Peripheral Pulses, No clubbing, cyanosis, or edema. b/l lower ext wounds. chronic changes   PSYCH: Normal affect  NEUROLOGY: AAOX3; non-focal  SKIN: No rashes or lesions    Consultant(s) Notes Reviewed:  [x ] YES  [ ] NO  Care Discussed with Consultants/Other Providers [ x] YES  [ ] NO    MEDS:   MEDICATIONS  (STANDING):  atorvastatin 40 milliGRAM(s) Oral at bedtime  chlorhexidine 2% Cloths 1 Application(s) Topical daily  chlorhexidine 4% Liquid 1 Application(s) Topical <User Schedule>  dextrose 5%. 1000 milliLiter(s) (50 mL/Hr) IV Continuous <Continuous>  dextrose 5%. 1000 milliLiter(s) (100 mL/Hr) IV Continuous <Continuous>  dextrose 50% Injectable 25 Gram(s) IV Push once  dextrose 50% Injectable 12.5 Gram(s) IV Push once  dextrose 50% Injectable 25 Gram(s) IV Push once  furosemide    Tablet 40 milliGRAM(s) Oral daily  glucagon  Injectable 1 milliGRAM(s) IntraMuscular once  heparin   Injectable 5000 Unit(s) SubCutaneous every 8 hours  hydrALAZINE 50 milliGRAM(s) Oral two times a day  insulin glargine Injectable (LANTUS) 13 Unit(s) SubCutaneous at bedtime  insulin lispro (ADMELOG) corrective regimen sliding scale   SubCutaneous three times a day before meals  insulin lispro (ADMELOG) corrective regimen sliding scale   SubCutaneous at bedtime  insulin lispro Injectable (ADMELOG) 4 Unit(s) SubCutaneous three times a day before meals  metoprolol succinate ER 50 milliGRAM(s) Oral daily  pantoprazole    Tablet 40 milliGRAM(s) Oral before breakfast  senna 2 Tablet(s) Oral at bedtime  tamsulosin 0.4 milliGRAM(s) Oral at bedtime    MEDICATIONS  (PRN):  artificial tears (preservative free) Ophthalmic Solution 1 Drop(s) Both EYES three times a day PRN Dry Eyes  dextrose Oral Gel 15 Gram(s) Oral once PRN Blood Glucose LESS THAN 70 milliGRAM(s)/deciliter  sodium chloride 0.9% lock flush 10 milliLiter(s) IV Push every 1 hour PRN Pre/post blood products, medications, blood draw, and to maintain line patency      ALLERGIES:  No Known Allergies      LABS:                      08-21    141  |  107  |  51<H>  ----------------------------<  96  4.2   |  18<L>  |  2.63<H>    Ca    9.5      21 Aug 2023 07:28         STEPHANIE KAYE  63y Female  MRN:[id number]    Patient is a 63y old  Female who presents with a chief complaint of BANDAR (17 Aug 2023 12:15)    HPI:  63F IDDM, HTN, HLD, recent admit 7/25-8/7 iso b/l heel wound c/f OM s/p debridement w/ R foot graft application and bone bx (7/27) subsequent vac application dc'd on dapto and cipro (plan until 9/7) via RUE PICC who presents for elevated SCr on outpt lab monitoring. Pt reports feeling generally well in interim of discharge, though has noticed mild increase in chronic LE edema x days (superimposed on b/l LE edema since 1990s per her). She denies new med intake, change in PO intake (tends not to drink enough water but states this is not new), n/v/d, melena/hematochezia, dysuria/hematuria, change in urinary frequency. Otherwise denies HA, fever, chills, new visual disturbance (L visual deficit at bl x yrs per pt, s/p ophtho surgeries), rhinorrhea, odynophagia, dysphagia, CP, palpitations, SOB, cough, abd pain, new numbness/weakness/tingling (states RLE weak compared to LLE after pods procedure last admit) or other complaint.    Of note she relays last dapto dose 8/15, last cipro dose this AM. She states that she has been reducing basal/bolus insulin dose at home given borderline low FS.    ED Course  VS: afebrile tmax 36.9C, HR 60s, BP 120s-130s/60s, RR 16-20, SpO2% 97-98 RA  Labs: normocytic (MCV 81.6) anemia H/H 9.9/29.3; hypoNa 131, BUN/SCr 72/3.75 (GFR 13), phos 5.2, unable to calc FeUrea (urine urea unavailable), otherwise w/ elevated protein/Cr ratio 0.7   Imaging:  - CXR: clear lungs, R PICC terminates in SVC   Received: n/a    admit to medicine for further eval/mgt    (16 Aug 2023 20:50)      Patient seen and evaluated at bedside. No acute events overnight except as noted.    Interval HPI: no acute events o/n     PAST MEDICAL & SURGICAL HISTORY:  Diabetes      Hypertension      Hypercholesteremia      Wound of foot      H/O eye surgery      S/P cholecystectomy          REVIEW OF SYSTEMS:  as per hpi       VITALS:   Vital Signs Last 24 Hrs  T(C): 36.7 (21 Aug 2023 11:57), Max: 36.8 (21 Aug 2023 05:04)  T(F): 98.1 (21 Aug 2023 11:57), Max: 98.3 (21 Aug 2023 05:04)  HR: 53 (21 Aug 2023 11:57) (53 - 65)  BP: 158/76 (21 Aug 2023 11:57) (158/76 - 187/86)  BP(mean): --  RR: 18 (21 Aug 2023 11:57) (18 - 18)  SpO2: 98% (21 Aug 2023 11:57) (96% - 98%)    Parameters below as of 21 Aug 2023 11:57  Patient On (Oxygen Delivery Method): room air              PHYSICAL EXAM:  GENERAL: NAD, well-developed  HEAD:  Atraumatic, Normocephalic  EYES: EOMI, PERRLA, conjunctiva and sclera clear  NECK: Supple, No JVD  CHEST/LUNG: Clear to auscultation bilaterally; No wheeze  HEART: S1, S2; No murmurs, rubs, or gallops  ABDOMEN: Soft, Nontender, Nondistended; Bowel sounds present  EXTREMITIES:  2+ Peripheral Pulses, No clubbing, cyanosis, or edema. b/l lower ext wounds. chronic changes   PSYCH: Normal affect  NEUROLOGY: AAOX3; non-focal  SKIN: No rashes or lesions    Consultant(s) Notes Reviewed:  [x ] YES  [ ] NO  Care Discussed with Consultants/Other Providers [ x] YES  [ ] NO    MEDS:   MEDICATIONS  (STANDING):  atorvastatin 40 milliGRAM(s) Oral at bedtime  chlorhexidine 2% Cloths 1 Application(s) Topical daily  chlorhexidine 4% Liquid 1 Application(s) Topical <User Schedule>  dextrose 5%. 1000 milliLiter(s) (50 mL/Hr) IV Continuous <Continuous>  dextrose 5%. 1000 milliLiter(s) (100 mL/Hr) IV Continuous <Continuous>  dextrose 50% Injectable 25 Gram(s) IV Push once  dextrose 50% Injectable 12.5 Gram(s) IV Push once  dextrose 50% Injectable 25 Gram(s) IV Push once  furosemide    Tablet 40 milliGRAM(s) Oral daily  glucagon  Injectable 1 milliGRAM(s) IntraMuscular once  heparin   Injectable 5000 Unit(s) SubCutaneous every 8 hours  hydrALAZINE 50 milliGRAM(s) Oral two times a day  insulin glargine Injectable (LANTUS) 13 Unit(s) SubCutaneous at bedtime  insulin lispro (ADMELOG) corrective regimen sliding scale   SubCutaneous three times a day before meals  insulin lispro (ADMELOG) corrective regimen sliding scale   SubCutaneous at bedtime  insulin lispro Injectable (ADMELOG) 4 Unit(s) SubCutaneous three times a day before meals  metoprolol succinate ER 50 milliGRAM(s) Oral daily  pantoprazole    Tablet 40 milliGRAM(s) Oral before breakfast  senna 2 Tablet(s) Oral at bedtime  tamsulosin 0.4 milliGRAM(s) Oral at bedtime    MEDICATIONS  (PRN):  artificial tears (preservative free) Ophthalmic Solution 1 Drop(s) Both EYES three times a day PRN Dry Eyes  dextrose Oral Gel 15 Gram(s) Oral once PRN Blood Glucose LESS THAN 70 milliGRAM(s)/deciliter  sodium chloride 0.9% lock flush 10 milliLiter(s) IV Push every 1 hour PRN Pre/post blood products, medications, blood draw, and to maintain line patency      ALLERGIES:  No Known Allergies      LABS:                      08-21    141  |  107  |  51<H>  ----------------------------<  96  4.2   |  18<L>  |  2.63<H>    Ca    9.5      21 Aug 2023 07:28         STEPHANIE KAYE  63y Female  MRN:[id number]    Patient is a 63y old  Female who presents with a chief complaint of BANDAR (17 Aug 2023 12:15)    HPI:  63F IDDM, HTN, HLD, recent admit 7/25-8/7 iso b/l heel wound c/f OM s/p debridement w/ R foot graft application and bone bx (7/27) subsequent vac application dc'd on dapto and cipro (plan until 9/7) via RUE PICC who presents for elevated SCr on outpt lab monitoring. Pt reports feeling generally well in interim of discharge, though has noticed mild increase in chronic LE edema x days (superimposed on b/l LE edema since 1990s per her). She denies new med intake, change in PO intake (tends not to drink enough water but states this is not new), n/v/d, melena/hematochezia, dysuria/hematuria, change in urinary frequency. Otherwise denies HA, fever, chills, new visual disturbance (L visual deficit at bl x yrs per pt, s/p ophtho surgeries), rhinorrhea, odynophagia, dysphagia, CP, palpitations, SOB, cough, abd pain, new numbness/weakness/tingling (states RLE weak compared to LLE after pods procedure last admit) or other complaint.    Of note she relays last dapto dose 8/15, last cipro dose this AM. She states that she has been reducing basal/bolus insulin dose at home given borderline low FS.    ED Course  VS: afebrile tmax 36.9C, HR 60s, BP 120s-130s/60s, RR 16-20, SpO2% 97-98 RA  Labs: normocytic (MCV 81.6) anemia H/H 9.9/29.3; hypoNa 131, BUN/SCr 72/3.75 (GFR 13), phos 5.2, unable to calc FeUrea (urine urea unavailable), otherwise w/ elevated protein/Cr ratio 0.7   Imaging:  - CXR: clear lungs, R PICC terminates in SVC   Received: n/a    admit to medicine for further eval/mgt    (16 Aug 2023 20:50)      Patient seen and evaluated at bedside. No acute events overnight except as noted.    Interval HPI: no acute events o/n     PAST MEDICAL & SURGICAL HISTORY:  Diabetes      Hypertension      Hypercholesteremia      Wound of foot      H/O eye surgery      S/P cholecystectomy          REVIEW OF SYSTEMS:  as per hpi       VITALS:   Vital Signs Last 24 Hrs  T(C): 36.7 (21 Aug 2023 11:57), Max: 36.8 (21 Aug 2023 05:04)  T(F): 98.1 (21 Aug 2023 11:57), Max: 98.3 (21 Aug 2023 05:04)  HR: 53 (21 Aug 2023 11:57) (53 - 65)  BP: 158/76 (21 Aug 2023 11:57) (158/76 - 187/86)  BP(mean): --  RR: 18 (21 Aug 2023 11:57) (18 - 18)  SpO2: 98% (21 Aug 2023 11:57) (96% - 98%)    Parameters below as of 21 Aug 2023 11:57  Patient On (Oxygen Delivery Method): room air              PHYSICAL EXAM:  GENERAL: NAD, well-developed  HEAD:  Atraumatic, Normocephalic  EYES: EOMI, PERRLA, conjunctiva and sclera clear  NECK: Supple, No JVD  CHEST/LUNG: Clear to auscultation bilaterally; No wheeze  HEART: S1, S2; No murmurs, rubs, or gallops  ABDOMEN: Soft, Nontender, Nondistended; Bowel sounds present  EXTREMITIES:  2+ Peripheral Pulses, No clubbing, cyanosis, or edema. b/l lower ext wounds. chronic changes   PSYCH: Normal affect  NEUROLOGY: AAOX3; non-focal  SKIN: No rashes or lesions    Consultant(s) Notes Reviewed:  [x ] YES  [ ] NO  Care Discussed with Consultants/Other Providers [ x] YES  [ ] NO    MEDS:   MEDICATIONS  (STANDING):  atorvastatin 40 milliGRAM(s) Oral at bedtime  chlorhexidine 2% Cloths 1 Application(s) Topical daily  chlorhexidine 4% Liquid 1 Application(s) Topical <User Schedule>  dextrose 5%. 1000 milliLiter(s) (50 mL/Hr) IV Continuous <Continuous>  dextrose 5%. 1000 milliLiter(s) (100 mL/Hr) IV Continuous <Continuous>  dextrose 50% Injectable 25 Gram(s) IV Push once  dextrose 50% Injectable 12.5 Gram(s) IV Push once  dextrose 50% Injectable 25 Gram(s) IV Push once  furosemide    Tablet 40 milliGRAM(s) Oral daily  glucagon  Injectable 1 milliGRAM(s) IntraMuscular once  heparin   Injectable 5000 Unit(s) SubCutaneous every 8 hours  hydrALAZINE 50 milliGRAM(s) Oral two times a day  insulin glargine Injectable (LANTUS) 13 Unit(s) SubCutaneous at bedtime  insulin lispro (ADMELOG) corrective regimen sliding scale   SubCutaneous three times a day before meals  insulin lispro (ADMELOG) corrective regimen sliding scale   SubCutaneous at bedtime  insulin lispro Injectable (ADMELOG) 4 Unit(s) SubCutaneous three times a day before meals  metoprolol succinate ER 50 milliGRAM(s) Oral daily  pantoprazole    Tablet 40 milliGRAM(s) Oral before breakfast  senna 2 Tablet(s) Oral at bedtime  tamsulosin 0.4 milliGRAM(s) Oral at bedtime    MEDICATIONS  (PRN):  artificial tears (preservative free) Ophthalmic Solution 1 Drop(s) Both EYES three times a day PRN Dry Eyes  dextrose Oral Gel 15 Gram(s) Oral once PRN Blood Glucose LESS THAN 70 milliGRAM(s)/deciliter  sodium chloride 0.9% lock flush 10 milliLiter(s) IV Push every 1 hour PRN Pre/post blood products, medications, blood draw, and to maintain line patency      ALLERGIES:  No Known Allergies      LABS:                      08-21    141  |  107  |  51<H>  ----------------------------<  96  4.2   |  18<L>  |  2.63<H>    Ca    9.5      21 Aug 2023 07:28

## 2023-08-21 NOTE — PROGRESS NOTE ADULT - PROBLEM SELECTOR PLAN 7
home atorvastatin 40mg QD  - c/w home med

## 2023-08-21 NOTE — PROGRESS NOTE ADULT - PROBLEM SELECTOR PLAN 5
home lantus 26u, novolog 9u TID on dc however pt states that she has been using lantus 20 and novolog 3u TID given low FS at home   - will dose 50% home needs basal 13, bolus 4 TID  - adjust per ISS req

## 2023-08-21 NOTE — PROGRESS NOTE ADULT - NSPROGADDITIONALINFOA_GEN_ALL_CORE
Advanced care planning was discussed with patient and family.  Advanced care planning forms were reviewed and discussed as appropriate.  Differential diagnosis and plan of care discussed with patient after the evaluation.   Pain assessed and judicious use of narcotics when appropriate was discussed.  Importance of Fall prevention discussed.  Counseling on Smoking and Alcohol cessation was offered when appropriate.  Counseling on Diet, exercise, and medication compliance was done.       Approx 60 minutes spent.
Advanced care planning was discussed with patient and family.  Advanced care planning forms were reviewed and discussed as appropriate.  Differential diagnosis and plan of care discussed with patient after the evaluation.   Pain assessed and judicious use of narcotics when appropriate was discussed.  Importance of Fall prevention discussed.  Counseling on Smoking and Alcohol cessation was offered when appropriate.  Counseling on Diet, exercise, and medication compliance was done.       Approx 60 minutes spent.
dc planning    Advanced care planning was discussed with patient and family.  Advanced care planning forms were reviewed and discussed as appropriate.  Differential diagnosis and plan of care discussed with patient after the evaluation.   Pain assessed and judicious use of narcotics when appropriate was discussed.  Importance of Fall prevention discussed.  Counseling on Smoking and Alcohol cessation was offered when appropriate.  Counseling on Diet, exercise, and medication compliance was done.       Approx 60 minutes spent.
Advanced care planning was discussed with patient and family.  Advanced care planning forms were reviewed and discussed as appropriate.  Differential diagnosis and plan of care discussed with patient after the evaluation.   Pain assessed and judicious use of narcotics when appropriate was discussed.  Importance of Fall prevention discussed.  Counseling on Smoking and Alcohol cessation was offered when appropriate.  Counseling on Diet, exercise, and medication compliance was done.       Approx 60 minutes spent.
Advanced care planning was discussed with patient and family.  Advanced care planning forms were reviewed and discussed as appropriate.  Differential diagnosis and plan of care discussed with patient after the evaluation.   Pain assessed and judicious use of narcotics when appropriate was discussed.  Importance of Fall prevention discussed.  Counseling on Smoking and Alcohol cessation was offered when appropriate.  Counseling on Diet, exercise, and medication compliance was done.       Approx 60 minutes spent.

## 2023-08-21 NOTE — PROGRESS NOTE ADULT - PROBLEM SELECTOR PLAN 1
likely prerenal  nephrology consult f/u  holding  diuretics  f/u CPK r/o rhabdo - neg  - trend BMP  - avoid nephrotoxins, dose meds per GFR.  creat improving
likely prerenal  nephrology consult f/u  holding  diuretics  f/u CPK r/o rhabdo - neg  - trend BMP  - avoid nephrotoxins, dose meds per GFR.  creat improving - lasix now resumed
likely prerenal  nephrology consult f/u  holding  diuretics  f/u CPK r/o rhabdo - neg  - trend BMP  - avoid nephrotoxins, dose meds per GFR.  creat improving
likely prerenal  nephrology consult f/u  holding  diuretics  f/u CPK r/o rhabdo - neg  - trend BMP  - avoid nephrotoxins, dose meds per GFR.  creat improving - lasix now resumed
likely prerenal  nephrology consulted  hold diuretics  f/u CPK r/o rhabdo   - trend BMP  - avoid nephrotoxins, dose meds per GFR

## 2023-08-21 NOTE — PROGRESS NOTE ADULT - PROBLEM SELECTOR PROBLEM 1
BANDAR (acute kidney injury)

## 2023-08-21 NOTE — PROGRESS NOTE ADULT - PROVIDER SPECIALTY LIST ADULT
Internal Medicine
Internal Medicine
Nephrology
Podiatry
Nephrology
Nephrology
Podiatry
Nephrology
Infectious Disease
Internal Medicine

## 2023-08-26 LAB
CULTURE RESULTS: SIGNIFICANT CHANGE UP
SPECIMEN SOURCE: SIGNIFICANT CHANGE UP

## 2023-09-13 LAB
CULTURE RESULTS: SIGNIFICANT CHANGE UP
SPECIMEN SOURCE: SIGNIFICANT CHANGE UP

## 2023-10-26 ENCOUNTER — APPOINTMENT (OUTPATIENT)
Dept: INFECTIOUS DISEASE | Facility: CLINIC | Age: 63
End: 2023-10-26

## 2023-11-13 PROCEDURE — 97530 THERAPEUTIC ACTIVITIES: CPT

## 2023-11-13 PROCEDURE — 86140 C-REACTIVE PROTEIN: CPT

## 2023-11-13 PROCEDURE — 87086 URINE CULTURE/COLONY COUNT: CPT

## 2023-11-13 PROCEDURE — 80053 COMPREHEN METABOLIC PANEL: CPT

## 2023-11-13 PROCEDURE — 97116 GAIT TRAINING THERAPY: CPT

## 2023-11-13 PROCEDURE — 87640 STAPH A DNA AMP PROBE: CPT

## 2023-11-13 PROCEDURE — 85025 COMPLETE CBC W/AUTO DIFF WBC: CPT

## 2023-11-13 PROCEDURE — 80202 ASSAY OF VANCOMYCIN: CPT

## 2023-11-13 PROCEDURE — 84100 ASSAY OF PHOSPHORUS: CPT

## 2023-11-13 PROCEDURE — 97164 PT RE-EVAL EST PLAN CARE: CPT

## 2023-11-13 PROCEDURE — 84300 ASSAY OF URINE SODIUM: CPT

## 2023-11-13 PROCEDURE — 84133 ASSAY OF URINE POTASSIUM: CPT

## 2023-11-13 PROCEDURE — 87641 MR-STAPH DNA AMP PROBE: CPT

## 2023-11-13 PROCEDURE — 71045 X-RAY EXAM CHEST 1 VIEW: CPT

## 2023-11-13 PROCEDURE — 85027 COMPLETE CBC AUTOMATED: CPT

## 2023-11-13 PROCEDURE — 93970 EXTREMITY STUDY: CPT

## 2023-11-13 PROCEDURE — 84156 ASSAY OF PROTEIN URINE: CPT

## 2023-11-13 PROCEDURE — 97605 NEG PRS WND THER DME<=50SQCM: CPT

## 2023-11-13 PROCEDURE — 85610 PROTHROMBIN TIME: CPT

## 2023-11-13 PROCEDURE — 86160 COMPLEMENT ANTIGEN: CPT

## 2023-11-13 PROCEDURE — 83735 ASSAY OF MAGNESIUM: CPT

## 2023-11-13 PROCEDURE — 82550 ASSAY OF CK (CPK): CPT

## 2023-11-13 PROCEDURE — 82436 ASSAY OF URINE CHLORIDE: CPT

## 2023-11-13 PROCEDURE — 84540 ASSAY OF URINE/UREA-N: CPT

## 2023-11-13 PROCEDURE — 76770 US EXAM ABDO BACK WALL COMP: CPT

## 2023-11-13 PROCEDURE — 81001 URINALYSIS AUTO W/SCOPE: CPT

## 2023-11-13 PROCEDURE — 83935 ASSAY OF URINE OSMOLALITY: CPT

## 2023-11-13 PROCEDURE — 83930 ASSAY OF BLOOD OSMOLALITY: CPT

## 2023-11-13 PROCEDURE — 97161 PT EVAL LOW COMPLEX 20 MIN: CPT

## 2023-11-13 PROCEDURE — 99285 EMERGENCY DEPT VISIT HI MDM: CPT | Mod: 25

## 2023-11-13 PROCEDURE — 85730 THROMBOPLASTIN TIME PARTIAL: CPT

## 2023-11-13 PROCEDURE — 84550 ASSAY OF BLOOD/URIC ACID: CPT

## 2023-11-13 PROCEDURE — 80061 LIPID PANEL: CPT

## 2023-11-13 PROCEDURE — 82962 GLUCOSE BLOOD TEST: CPT

## 2023-11-13 PROCEDURE — 36415 COLL VENOUS BLD VENIPUNCTURE: CPT

## 2023-11-13 PROCEDURE — 80048 BASIC METABOLIC PNL TOTAL CA: CPT

## 2023-11-13 PROCEDURE — 82570 ASSAY OF URINE CREATININE: CPT

## 2023-11-13 PROCEDURE — 85652 RBC SED RATE AUTOMATED: CPT

## 2023-12-15 RX ORDER — METOPROLOL TARTRATE 50 MG
1 TABLET ORAL
Refills: 0 | DISCHARGE

## 2023-12-15 RX ORDER — ACETAMINOPHEN 500 MG
2 TABLET ORAL
Qty: 0 | Refills: 0 | DISCHARGE

## 2023-12-15 RX ORDER — ATORVASTATIN CALCIUM 80 MG/1
1 TABLET, FILM COATED ORAL
Refills: 0 | DISCHARGE

## 2023-12-15 RX ORDER — HYDRALAZINE HCL 50 MG
1 TABLET ORAL
Qty: 0 | Refills: 0 | DISCHARGE

## 2023-12-15 RX ORDER — MULTIVIT-MIN/FERROUS GLUCONATE 9 MG/15 ML
1 LIQUID (ML) ORAL
Refills: 0 | DISCHARGE

## 2023-12-15 RX ORDER — TAMSULOSIN HYDROCHLORIDE 0.4 MG/1
1 CAPSULE ORAL
Qty: 0 | Refills: 0 | DISCHARGE

## 2023-12-15 RX ORDER — COLLAGENASE CLOSTRIDIUM HIST. 250 UNIT/G
1 OINTMENT (GRAM) TOPICAL
Refills: 0 | DISCHARGE

## 2023-12-15 RX ORDER — LISINOPRIL 2.5 MG/1
1 TABLET ORAL
Refills: 0 | DISCHARGE

## 2023-12-15 RX ORDER — INSULIN GLARGINE 100 [IU]/ML
13 INJECTION, SOLUTION SUBCUTANEOUS
Qty: 0 | Refills: 0 | DISCHARGE

## 2023-12-15 RX ORDER — FUROSEMIDE 40 MG
1 TABLET ORAL
Refills: 0 | DISCHARGE

## 2023-12-15 RX ORDER — INSULIN ASPART 100 [IU]/ML
4 INJECTION, SOLUTION SUBCUTANEOUS
Qty: 0 | Refills: 0 | DISCHARGE

## 2025-01-16 ENCOUNTER — INPATIENT (INPATIENT)
Facility: HOSPITAL | Age: 65
LOS: 7 days | Discharge: HOME HEALTH | DRG: 541 | End: 2025-01-24
Attending: HOSPITALIST | Admitting: STUDENT IN AN ORGANIZED HEALTH CARE EDUCATION/TRAINING PROGRAM
Payer: MEDICARE

## 2025-01-16 VITALS
OXYGEN SATURATION: 95 % | DIASTOLIC BLOOD PRESSURE: 88 MMHG | SYSTOLIC BLOOD PRESSURE: 156 MMHG | RESPIRATION RATE: 18 BRPM | HEIGHT: 63 IN | HEART RATE: 77 BPM | WEIGHT: 240.08 LBS | TEMPERATURE: 99 F

## 2025-01-16 DIAGNOSIS — Z90.49 ACQUIRED ABSENCE OF OTHER SPECIFIED PARTS OF DIGESTIVE TRACT: Chronic | ICD-10-CM

## 2025-01-16 DIAGNOSIS — Z98.890 OTHER SPECIFIED POSTPROCEDURAL STATES: Chronic | ICD-10-CM

## 2025-01-16 DIAGNOSIS — M86.10 OTHER ACUTE OSTEOMYELITIS, UNSPECIFIED SITE: ICD-10-CM

## 2025-01-16 LAB
ALBUMIN SERPL ELPH-MCNC: 3.9 G/DL — SIGNIFICANT CHANGE UP (ref 3.3–5)
ALP SERPL-CCNC: 118 U/L — SIGNIFICANT CHANGE UP (ref 40–120)
ALT FLD-CCNC: 14 U/L — SIGNIFICANT CHANGE UP (ref 10–45)
ANION GAP SERPL CALC-SCNC: 15 MMOL/L — SIGNIFICANT CHANGE UP (ref 5–17)
APTT BLD: 29.6 SEC — SIGNIFICANT CHANGE UP (ref 24.5–35.6)
AST SERPL-CCNC: 17 U/L — SIGNIFICANT CHANGE UP (ref 10–40)
BASOPHILS # BLD AUTO: 0.05 K/UL — SIGNIFICANT CHANGE UP (ref 0–0.2)
BASOPHILS NFR BLD AUTO: 0.6 % — SIGNIFICANT CHANGE UP (ref 0–2)
BILIRUB SERPL-MCNC: 0.3 MG/DL — SIGNIFICANT CHANGE UP (ref 0.2–1.2)
BUN SERPL-MCNC: 62 MG/DL — HIGH (ref 7–23)
CALCIUM SERPL-MCNC: 9.5 MG/DL — SIGNIFICANT CHANGE UP (ref 8.4–10.5)
CHLORIDE SERPL-SCNC: 101 MMOL/L — SIGNIFICANT CHANGE UP (ref 96–108)
CO2 SERPL-SCNC: 22 MMOL/L — SIGNIFICANT CHANGE UP (ref 22–31)
CREAT SERPL-MCNC: 1.42 MG/DL — HIGH (ref 0.5–1.3)
CRP SERPL-MCNC: 28 MG/L — HIGH (ref 0–4)
EGFR: 41 ML/MIN/1.73M2 — LOW
EOSINOPHIL # BLD AUTO: 0.27 K/UL — SIGNIFICANT CHANGE UP (ref 0–0.5)
EOSINOPHIL NFR BLD AUTO: 3.4 % — SIGNIFICANT CHANGE UP (ref 0–6)
GLUCOSE SERPL-MCNC: 179 MG/DL — HIGH (ref 70–99)
HCT VFR BLD CALC: 33 % — LOW (ref 34.5–45)
HGB BLD-MCNC: 10.8 G/DL — LOW (ref 11.5–15.5)
IMM GRANULOCYTES NFR BLD AUTO: 0.5 % — SIGNIFICANT CHANGE UP (ref 0–0.9)
INR BLD: 1.07 RATIO — SIGNIFICANT CHANGE UP (ref 0.85–1.16)
LYMPHOCYTES # BLD AUTO: 0.97 K/UL — LOW (ref 1–3.3)
LYMPHOCYTES # BLD AUTO: 12.2 % — LOW (ref 13–44)
MCHC RBC-ENTMCNC: 26.2 PG — LOW (ref 27–34)
MCHC RBC-ENTMCNC: 32.7 G/DL — SIGNIFICANT CHANGE UP (ref 32–36)
MCV RBC AUTO: 80.1 FL — SIGNIFICANT CHANGE UP (ref 80–100)
MONOCYTES # BLD AUTO: 0.59 K/UL — SIGNIFICANT CHANGE UP (ref 0–0.9)
MONOCYTES NFR BLD AUTO: 7.4 % — SIGNIFICANT CHANGE UP (ref 2–14)
NEUTROPHILS # BLD AUTO: 6.04 K/UL — SIGNIFICANT CHANGE UP (ref 1.8–7.4)
NEUTROPHILS NFR BLD AUTO: 75.9 % — SIGNIFICANT CHANGE UP (ref 43–77)
NRBC # BLD: 0 /100 WBCS — SIGNIFICANT CHANGE UP (ref 0–0)
NRBC BLD-RTO: 0 /100 WBCS — SIGNIFICANT CHANGE UP (ref 0–0)
PLATELET # BLD AUTO: 337 K/UL — SIGNIFICANT CHANGE UP (ref 150–400)
POTASSIUM SERPL-MCNC: 4.4 MMOL/L — SIGNIFICANT CHANGE UP (ref 3.5–5.3)
POTASSIUM SERPL-SCNC: 4.4 MMOL/L — SIGNIFICANT CHANGE UP (ref 3.5–5.3)
PROT SERPL-MCNC: 8.4 G/DL — HIGH (ref 6–8.3)
PROTHROM AB SERPL-ACNC: 12.2 SEC — SIGNIFICANT CHANGE UP (ref 9.9–13.4)
RBC # BLD: 4.12 M/UL — SIGNIFICANT CHANGE UP (ref 3.8–5.2)
RBC # FLD: 14.1 % — SIGNIFICANT CHANGE UP (ref 10.3–14.5)
SODIUM SERPL-SCNC: 138 MMOL/L — SIGNIFICANT CHANGE UP (ref 135–145)
WBC # BLD: 7.96 K/UL — SIGNIFICANT CHANGE UP (ref 3.8–10.5)
WBC # FLD AUTO: 7.96 K/UL — SIGNIFICANT CHANGE UP (ref 3.8–10.5)

## 2025-01-16 PROCEDURE — 99223 1ST HOSP IP/OBS HIGH 75: CPT

## 2025-01-16 PROCEDURE — 99285 EMERGENCY DEPT VISIT HI MDM: CPT | Mod: FS

## 2025-01-16 PROCEDURE — 73630 X-RAY EXAM OF FOOT: CPT | Mod: 26,RT

## 2025-01-16 RX ORDER — VANCOMYCIN HYDROCHLORIDE 50 MG/ML
1000 KIT ORAL ONCE
Refills: 0 | Status: COMPLETED | OUTPATIENT
Start: 2025-01-16 | End: 2025-01-16

## 2025-01-16 RX ORDER — PIPERACILLIN SODIUM AND TAZOBACTAM SODIUM 2; 250 G/50ML; MG/50ML
3.38 INJECTION, POWDER, FOR SOLUTION INTRAVENOUS ONCE
Refills: 0 | Status: COMPLETED | OUTPATIENT
Start: 2025-01-16 | End: 2025-01-16

## 2025-01-16 RX ORDER — ACETAMINOPHEN 160 MG/5ML
650 SUSPENSION ORAL EVERY 6 HOURS
Refills: 0 | Status: DISCONTINUED | OUTPATIENT
Start: 2025-01-16 | End: 2025-01-24

## 2025-01-16 RX ORDER — ACETAMINOPHEN, DIPHENHYDRAMINE HCL, PHENYLEPHRINE HCL 325; 25; 5 MG/1; MG/1; MG/1
3 TABLET ORAL AT BEDTIME
Refills: 0 | Status: DISCONTINUED | OUTPATIENT
Start: 2025-01-16 | End: 2025-01-24

## 2025-01-16 RX ADMIN — PIPERACILLIN SODIUM AND TAZOBACTAM SODIUM 200 GRAM(S): 2; 250 INJECTION, POWDER, FOR SOLUTION INTRAVENOUS at 23:15

## 2025-01-16 RX ADMIN — VANCOMYCIN HYDROCHLORIDE 250 MILLIGRAM(S): KIT at 23:54

## 2025-01-16 NOTE — H&P ADULT - PROBLEM SELECTOR PLAN 1
- XR R foot - soft tissue ulcer at the plantar aspect of the calcaneus with mild adjacent erosive changes at the base of the calcaneus, questionable focal gas tracking and stippled opacities which may represent bone fragments versus radiopaque foreign bodies, with concern of osteomyelitis  - F/u podiatry consult  - C/w Ralf/Zosyn for now  - F/u blood cultures  - F/u MR R foot - XR R foot - soft tissue ulcer at the plantar aspect of the calcaneus with mild adjacent erosive changes at the base of the calcaneus, questionable focal gas tracking and stippled opacities which may represent bone fragments versus radiopaque foreign bodies, with concern of osteomyelitis  - C/w Vanc/Zosyn for now  - F/u blood cultures  - F/u MR R foot and bilateral LE dopplers  - Podiatry requesting Vascular and ID consult in AM

## 2025-01-16 NOTE — H&P ADULT - PROBLEM SELECTOR PLAN 4
- C/w Hydralazine 50mg TID  - C/w lasix 40mg daily  - C/w metoprolol succinate 50mg daily  - Hold valsartan 160mg in setting of BANDAR - C/w Hydralazine 75mg TID  - C/w lasix 40mg daily  - C/w metoprolol succinate 50mg daily  - Hold valsartan 160mg in setting of BANDAR

## 2025-01-16 NOTE — ED PROVIDER NOTE - CLINICAL SUMMARY MEDICAL DECISION MAKING FREE TEXT BOX
64-year-old female history of DM, HTN, HLD, bilateral heel ulcers coming from podiatrist Dr. Flores' office concern acute on chronic osteomyelitis of right heel.  Patient has had worsening foot pain and swelling in the right foot on heel and lateral foot. Exam as above. Will consult podiatry, start treating with popeye/zosyn, and admit.

## 2025-01-16 NOTE — ED ADULT NURSE NOTE - OBJECTIVE STATEMENT
64yoF PMH DM, HTN, HLD presents to ED with RLE cellulitis. Patient has had wounds on both heels that have been slow to heal due to DM. Patient was at podiatrist and was referred to ED for possible infection of R heel wound. Patient has been able to ambulate with walker but causes pain. At rest, on evaluation patient denies pain. Patient reports allergy to insulin, states that she had facial flushing and hives following lantus administration, has since been switched to glipizide and ozempic. Patient denies chest pain, SOB, fever, chills, cough, weakness, headache, urinary symptoms, N/V/D/C, falls, injuries.

## 2025-01-16 NOTE — ED PROVIDER NOTE - NS_BEDUNITTYPES_ED_ALL_ED
MEDICINE [employer] Pregnancy And Lactation Text: This medication is Pregnancy Category B and is considered safe. There is some data to suggest avoiding during the third trimester. It is unknown if this medication is excreted in breast milk.

## 2025-01-16 NOTE — ED PROVIDER NOTE - RAPID ASSESSMENT
65 yo F with a PMH of  IDDM, HTN, HLD, b/l heel wound c/f OM s/p debridement w/ R foot graft presents from Pods office, Dr. Larose, for concern for infected right ulcer/cellulitis with recurrent acute on chronic right heel osteo. Pt has been c/o foot pain and swelling. Given pts hx Pods recommended pt be admitted for IV abx. +chills, denies fever. No chest pain, SOB, palpitations.       **Patient was rapidly assessed in triage by Swetha monroe PA-C. A limited history was obtained. The patient will be seen and further examined and worked up in the main ED and their care will be completed by the main ED team. Receiving team will follow up on labs, medications, any clinical imaging, and perform reassessment and disposition of the patient as clinically indicated. All decisions regarding the progression of care will be made at their discretion.

## 2025-01-16 NOTE — H&P ADULT - NSHPLABSRESULTS_GEN_ALL_CORE
10.8   7.96  )-----------( 337      ( 16 Jan 2025 19:44 )             33.0     01-16    138  |  101  |  62[H]  ----------------------------<  179[H]  4.4   |  22  |  1.42[H]    Ca    9.5      16 Jan 2025 19:44    TPro  8.4[H]  /  Alb  3.9  /  TBili  0.3  /  DBili  x   /  AST  17  /  ALT  14  /  AlkPhos  118  01-16          LIVER FUNCTIONS - ( 16 Jan 2025 19:44 )  Alb: 3.9 g/dL / Pro: 8.4 g/dL / ALK PHOS: 118 U/L / ALT: 14 U/L / AST: 17 U/L / GGT: x           PT/INR - ( 16 Jan 2025 19:44 )   PT: 12.2 sec;   INR: 1.07 ratio         PTT - ( 16 Jan 2025 19:44 )  PTT:29.6 sec  Urinalysis Basic - ( 16 Jan 2025 19:44 )    Color: x / Appearance: x / SG: x / pH: x  Gluc: 179 mg/dL / Ketone: x  / Bili: x / Urobili: x   Blood: x / Protein: x / Nitrite: x   Leuk Esterase: x / RBC: x / WBC x   Sq Epi: x / Non Sq Epi: x / Bacteria: x

## 2025-01-16 NOTE — H&P ADULT - NSHPREVIEWOFSYSTEMS_GEN_ALL_CORE
Review of Systems:   CONSTITUTIONAL: No fever, weight loss  EYES: No eye pain, visual disturbances, or discharge  RESPIRATORY: No SOB. No cough, wheezing, chills or hemoptysis  CARDIOVASCULAR: No chest pain, palpitations, dizziness, or leg swelling  GASTROINTESTINAL: No abdominal or epigastric pain. No nausea, vomiting, or hematemesis; No diarrhea or constipation. No melena or hematochezia.  GENITOURINARY: No dysuria, frequency, hematuria, or incontinence  NEUROLOGICAL: No headaches, memory loss, loss of strength, numbness, or tremors  SKIN: No itching, burning, rashes, or lesions   MUSCULOSKELETAL: No joint pain or swelling; No muscle, back pain  PSYCHIATRIC: No depression, anxiety, mood swings, or difficulty sleeping  HEME/LYMPH: No easy bruising, or bleeding gums Review of Systems:   CONSTITUTIONAL: No fever, weight loss  EYES: No eye pain, visual disturbances, or discharge  RESPIRATORY: No SOB. No cough, wheezing, chills or hemoptysis  CARDIOVASCULAR: No chest pain, palpitations, dizziness, or leg swelling  GASTROINTESTINAL: No abdominal or epigastric pain. No nausea, vomiting, or hematemesis; No diarrhea or constipation. No melena or hematochezia.  GENITOURINARY: No dysuria, frequency, hematuria, or incontinence  NEUROLOGICAL: No headaches, memory loss, loss of strength, numbness, or tremors  SKIN: No itching, burning, rashes, or lesions   MUSCULOSKELETAL: +R foot and heel pain  PSYCHIATRIC: No depression, anxiety, mood swings, or difficulty sleeping  HEME/LYMPH: No easy bruising, or bleeding gums

## 2025-01-16 NOTE — H&P ADULT - ASSESSMENT
63 y/o female w/ PMHx  T2DM, HTN, HLD, b/l heel wound c/b OM s/p debridement w/ R foot graft, and CKD2 who presents from her podiatrist's office for concern of acute on chronic osteomyelitis of the right heel. XR suspicious for R calcaneal OM. Admitted for IV antibiotics and OM rule-out.

## 2025-01-16 NOTE — ED PROVIDER NOTE - PHYSICAL EXAMINATION
General: alert, oriented to person, time, place  Psych: mood appropriate  Head: normocephalic; atraumatic  Eyes: PERRLA, EOMI, conjunctivae clear bilaterally, sclerae anicteric  ENT: no nasal flaring, patent nares  Cardio: RRR, no m/r/g, pulses 2+ b/l  Resp: CATB, no w/r/r  GI: soft/nondistended/nontender  : no CVA tenderness  Neuro: normal sensation, moving all four extremities equally  Skin: No evidence of rash or bruising  MSK: normal movement of all extremities  Lymph/Vasc: Bl R>L pitting edema. R leg with erythema concerning for cellulitis to upper leg. Heel with closed black eschar. No obvious drainage or pus.

## 2025-01-16 NOTE — H&P ADULT - HISTORY OF PRESENT ILLNESS
This is a 65 y/o female w/ PMHx  T2DM, HTN, HLD, b/l heel wound c/b OM s/p debridement w/ R foot graft, and CKD2 who presents from her podiatrist's office for concern of acute on chronic osteomyelitis of the right heel. She has been foot pain which has been progressively worsening, accompanied with swelling of the outside of the right foot as well as the right heel. She went to see her podiatrist (Dr. Flores) who told her to come to the ED to rule out osteomyelitis and get IV antibiotics.    In the ED, she was afebrile and hemodynamically stable, saturating well on RA. CBC w/ normocytic anemia Hb 10.8. CMP w/ evidence of BANDAR on CKD w/ BUN/Cr 62/1.42 (baseline 0.86-1.17 in 2023). CRP elevated to 28. XR of the R foot showing a soft tissue ulcer at the plantar aspect of the calcaneus with mild adjacent erosive changes at the base of the calcaneus, questionable focal gas tracking and stippled opacities which may represent bone fragments versus radiopaque foreign bodies, with concern of osteomyelitis. Worsening juxta-articular erosive changes seen at the IP joint of the first digit. Received Vanc/Zosyn in the ED.  This is a 63 y/o female w/ PMHx  T2DM, HTN, HLD, b/l heel wound c/b OM s/p debridement w/ R foot graft, and CKD2 who presents from her podiatrist's office for concern of acute on chronic osteomyelitis of the right heel. She has been foot pain which has been progressively worsening since she last saw her podiatrist Dr. Flores on 1/8. This has been accompanied with swelling of the outside of the right foot as well as the right heel. She went to her podiatrist's office today, saw Dr. Larose, who told her to come to the ED to rule out osteomyelitis and get IV antibiotics.    In the ED, she was afebrile and hemodynamically stable, saturating well on RA. CBC w/ normocytic anemia Hb 10.8. CMP w/ evidence of BANDAR on CKD w/ BUN/Cr 62/1.42 (baseline 0.86-1.17 in 2023). CRP elevated to 28. XR of the R foot showing a soft tissue ulcer at the plantar aspect of the calcaneus with mild adjacent erosive changes at the base of the calcaneus, questionable focal gas tracking and stippled opacities which may represent bone fragments versus radiopaque foreign bodies, with concern of osteomyelitis. Worsening juxta-articular erosive changes seen at the IP joint of the first digit. Received Vanc/Zosyn in the ED.

## 2025-01-16 NOTE — H&P ADULT - PROBLEM SELECTOR PLAN 2
- BUN/Cr 62/1.42, baseline available from 2023 is 0.86-1.17  - Likely BANDAR in setting of infection  - Hold valsartan  - Monitor Cr  - Avoid nephrotoxic medications and dose meds according to GFR

## 2025-01-16 NOTE — ED ADULT NURSE NOTE - NSSEPSISSUSPECTED_ED_A_ED
Problem: Patient Care Overview  Goal: Plan of Care/Patient Progress Review  Outcome: No Change  Infant stable in open crib. Voiding and stooling. Started IDF feedings today.  x 3 feeds in a row, no scaling due to mom's very low milk supply. Mom is B&B in 241 staying for protected breastfeeding. No spits, spells, cluster desaturations at end of feedings to 87% self limiting.       No

## 2025-01-16 NOTE — ED PROVIDER NOTE - NSICDXPASTMEDICALHX_GEN_ALL_CORE_FT
PAST MEDICAL HISTORY:  Diabetes     Diabetes     History of hypertension     Hypercholesteremia     Hypercholesterolemia     Hypertension     Wound of foot

## 2025-01-16 NOTE — H&P ADULT - PROBLEM SELECTOR PLAN 3
- Takes Jardiance 10mg + Repaglinide 0.5mg + Lantus 11u QHS at home  - Lantus 7u QHS + KIN while admitted, modify as needed  - F/u A1c - Patient states she stopped taking Lantus after charli due to always having itching with it, and states she had the same reaction w/ Novolog, which was stopped a year ago; states she hasn't had itching since stopping Lantus  - Supposed to be on Jardiance 10 + Repaglinide 1mg TID, hasn't started jardiance yet  - Willing to try insulin while admitted with use of benadryl  - KIN for now, benadryl 12.5mg TID PRN ordered

## 2025-01-16 NOTE — ED PROVIDER NOTE - OBJECTIVE STATEMENT
64-year-old female history of DM, HTN, HLD, bilateral heel ulcers coming from podiatrist Dr. Flores office concern acute on chronic osteomyelitis of right heel.  Patient has had worsening foot pain and swelling in the right foot on heel and lateral foot.  Dr. Flores recommended coming to ED for admission and IV antibiotics.  Patient has had several surgeries legs.  Denies any fevers, chills, CP, SOB, abdominal pain, N/V/C/D 64-year-old female history of DM, HTN, HLD, bilateral heel ulcers coming from podiatrist Dr. Flores office concern acute on chronic osteomyelitis of right heel.  Patient has had worsening foot pain and swelling in the right foot on heel and lateral foot.  Dr. Flores recommended coming to ED for admission and IV antibiotics.  Patient has had several surgeries legs.  Denies any fevers, chills, CP, SOB, abdominal pain, N/V/C/D    Attendinyo female presents with redness and swelling around the right heel wound.  sent by podiatry for IV antibiotics and podiatry evaluation.

## 2025-01-16 NOTE — H&P ADULT - NSHPPHYSICALEXAM_GEN_ALL_CORE
Vital Signs Last 24 Hrs  T(C): 36.9 (16 Jan 2025 23:10), Max: 37 (16 Jan 2025 16:48)  T(F): 98.4 (16 Jan 2025 23:10), Max: 98.6 (16 Jan 2025 16:48)  HR: 74 (16 Jan 2025 23:10) (74 - 77)  BP: 149/76 (16 Jan 2025 23:10) (149/76 - 156/88)  BP(mean): --  RR: 16 (16 Jan 2025 23:10) (16 - 18)  SpO2: 94% (16 Jan 2025 23:10) (94% - 95%)    Parameters below as of 16 Jan 2025 23:10  Patient On (Oxygen Delivery Method): room air        CONSTITUTIONAL: Well-groomed, in no apparent distress  EYES: No conjunctival or scleral injection, non-icteric;   NECK: Trachea midline without palpable neck mass  RESPIRATORY: Breathing comfortably; lungs CTA without wheeze/rhonchi/rales  CARDIOVASCULAR: +S1S2, RRR, no M/G/R; no lower extremity edema  GASTROINTESTINAL: No palpable masses or tenderness, +BS throughout, no rebound/guarding  SKIN: No rashes or ulcers noted  NEUROLOGIC: Sensation intact in LEs b/l to light touch  PSYCHIATRIC: A+O x 3; mood and affect appropriate; appropriate insight and judgment Vital Signs Last 24 Hrs  T(C): 36.9 (16 Jan 2025 23:10), Max: 37 (16 Jan 2025 16:48)  T(F): 98.4 (16 Jan 2025 23:10), Max: 98.6 (16 Jan 2025 16:48)  HR: 74 (16 Jan 2025 23:10) (74 - 77)  BP: 149/76 (16 Jan 2025 23:10) (149/76 - 156/88)  BP(mean): --  RR: 16 (16 Jan 2025 23:10) (16 - 18)  SpO2: 94% (16 Jan 2025 23:10) (94% - 95%)    Parameters below as of 16 Jan 2025 23:10  Patient On (Oxygen Delivery Method): room air        CONSTITUTIONAL: Well-groomed, in no apparent distress  EYES: No conjunctival or scleral injection, non-icteric;   NECK: Trachea midline without palpable neck mass  RESPIRATORY: Breathing comfortably; lungs CTA without wheeze/rhonchi/rales  CARDIOVASCULAR: +S1S2, RRR, no M/G/R; no lower extremity edema  GASTROINTESTINAL: No palpable masses or tenderness, +BS throughout, no rebound/guarding  SKIN: No rashes or ulcers noted  NEUROLOGIC: Sensation intact in LEs b/l to light touch  MSK: Stasis dermatitis on bilateral LEs, RLE greater in size than LLE, wound with black discoloration seen on R heel  PSYCHIATRIC: A+O x 3; mood and affect appropriate; appropriate insight and judgment

## 2025-01-17 DIAGNOSIS — N17.9 ACUTE KIDNEY FAILURE, UNSPECIFIED: ICD-10-CM

## 2025-01-17 DIAGNOSIS — E11.9 TYPE 2 DIABETES MELLITUS WITHOUT COMPLICATIONS: ICD-10-CM

## 2025-01-17 DIAGNOSIS — E78.5 HYPERLIPIDEMIA, UNSPECIFIED: ICD-10-CM

## 2025-01-17 DIAGNOSIS — N18.30 CHRONIC KIDNEY DISEASE, STAGE 3 UNSPECIFIED: ICD-10-CM

## 2025-01-17 DIAGNOSIS — M86.171 OTHER ACUTE OSTEOMYELITIS, RIGHT ANKLE AND FOOT: ICD-10-CM

## 2025-01-17 DIAGNOSIS — Z29.9 ENCOUNTER FOR PROPHYLACTIC MEASURES, UNSPECIFIED: ICD-10-CM

## 2025-01-17 DIAGNOSIS — I10 ESSENTIAL (PRIMARY) HYPERTENSION: ICD-10-CM

## 2025-01-17 PROBLEM — S91.309A UNSPECIFIED OPEN WOUND, UNSPECIFIED FOOT, INITIAL ENCOUNTER: Chronic | Status: ACTIVE | Noted: 2023-08-16

## 2025-01-17 PROBLEM — E78.00 PURE HYPERCHOLESTEROLEMIA, UNSPECIFIED: Chronic | Status: ACTIVE | Noted: 2023-07-25

## 2025-01-17 LAB
A1C WITH ESTIMATED AVERAGE GLUCOSE RESULT: 7.9 % — HIGH (ref 4–5.6)
ALBUMIN SERPL ELPH-MCNC: 3.3 G/DL — SIGNIFICANT CHANGE UP (ref 3.3–5)
ALP SERPL-CCNC: 96 U/L — SIGNIFICANT CHANGE UP (ref 40–120)
ALT FLD-CCNC: 12 U/L — SIGNIFICANT CHANGE UP (ref 10–45)
ANION GAP SERPL CALC-SCNC: 13 MMOL/L — SIGNIFICANT CHANGE UP (ref 5–17)
AST SERPL-CCNC: 11 U/L — SIGNIFICANT CHANGE UP (ref 10–40)
BILIRUB SERPL-MCNC: 0.3 MG/DL — SIGNIFICANT CHANGE UP (ref 0.2–1.2)
BUN SERPL-MCNC: 58 MG/DL — HIGH (ref 7–23)
CALCIUM SERPL-MCNC: 9 MG/DL — SIGNIFICANT CHANGE UP (ref 8.4–10.5)
CHLORIDE SERPL-SCNC: 103 MMOL/L — SIGNIFICANT CHANGE UP (ref 96–108)
CHOLEST SERPL-MCNC: 106 MG/DL — SIGNIFICANT CHANGE UP
CO2 SERPL-SCNC: 23 MMOL/L — SIGNIFICANT CHANGE UP (ref 22–31)
CREAT SERPL-MCNC: 1.47 MG/DL — HIGH (ref 0.5–1.3)
EGFR: 39 ML/MIN/1.73M2 — LOW
ERYTHROCYTE [SEDIMENTATION RATE] IN BLOOD: 120 MM/HR — HIGH (ref 0–20)
ESTIMATED AVERAGE GLUCOSE: 180 MG/DL — HIGH (ref 68–114)
GLUCOSE BLDC GLUCOMTR-MCNC: 156 MG/DL — HIGH (ref 70–99)
GLUCOSE BLDC GLUCOMTR-MCNC: 157 MG/DL — HIGH (ref 70–99)
GLUCOSE BLDC GLUCOMTR-MCNC: 163 MG/DL — HIGH (ref 70–99)
GLUCOSE BLDC GLUCOMTR-MCNC: 188 MG/DL — HIGH (ref 70–99)
GLUCOSE SERPL-MCNC: 133 MG/DL — HIGH (ref 70–99)
GRAM STN FLD: ABNORMAL
HCT VFR BLD CALC: 28.7 % — LOW (ref 34.5–45)
HDLC SERPL-MCNC: 27 MG/DL — LOW
HGB BLD-MCNC: 9.2 G/DL — LOW (ref 11.5–15.5)
LIPID PNL WITH DIRECT LDL SERPL: 58 MG/DL — SIGNIFICANT CHANGE UP
MCHC RBC-ENTMCNC: 26.1 PG — LOW (ref 27–34)
MCHC RBC-ENTMCNC: 32.1 G/DL — SIGNIFICANT CHANGE UP (ref 32–36)
MCV RBC AUTO: 81.3 FL — SIGNIFICANT CHANGE UP (ref 80–100)
NON HDL CHOLESTEROL: 79 MG/DL — SIGNIFICANT CHANGE UP
NRBC # BLD: 0 /100 WBCS — SIGNIFICANT CHANGE UP (ref 0–0)
NRBC BLD-RTO: 0 /100 WBCS — SIGNIFICANT CHANGE UP (ref 0–0)
PLATELET # BLD AUTO: 274 K/UL — SIGNIFICANT CHANGE UP (ref 150–400)
POTASSIUM SERPL-MCNC: 3.9 MMOL/L — SIGNIFICANT CHANGE UP (ref 3.5–5.3)
POTASSIUM SERPL-SCNC: 3.9 MMOL/L — SIGNIFICANT CHANGE UP (ref 3.5–5.3)
PROT SERPL-MCNC: 6.9 G/DL — SIGNIFICANT CHANGE UP (ref 6–8.3)
RBC # BLD: 3.53 M/UL — LOW (ref 3.8–5.2)
RBC # FLD: 14.2 % — SIGNIFICANT CHANGE UP (ref 10.3–14.5)
SODIUM SERPL-SCNC: 139 MMOL/L — SIGNIFICANT CHANGE UP (ref 135–145)
SPECIMEN SOURCE: SIGNIFICANT CHANGE UP
TRIGL SERPL-MCNC: 115 MG/DL — SIGNIFICANT CHANGE UP
WBC # BLD: 6.82 K/UL — SIGNIFICANT CHANGE UP (ref 3.8–10.5)
WBC # FLD AUTO: 6.82 K/UL — SIGNIFICANT CHANGE UP (ref 3.8–10.5)

## 2025-01-17 PROCEDURE — G0545: CPT

## 2025-01-17 PROCEDURE — 99223 1ST HOSP IP/OBS HIGH 75: CPT

## 2025-01-17 PROCEDURE — 93923 UPR/LXTR ART STDY 3+ LVLS: CPT | Mod: 26

## 2025-01-17 PROCEDURE — 73718 MRI LOWER EXTREMITY W/O DYE: CPT | Mod: 26,RT

## 2025-01-17 PROCEDURE — 73721 MRI JNT OF LWR EXTRE W/O DYE: CPT | Mod: 26,RT

## 2025-01-17 PROCEDURE — 93970 EXTREMITY STUDY: CPT | Mod: 26

## 2025-01-17 PROCEDURE — 99232 SBSQ HOSP IP/OBS MODERATE 35: CPT

## 2025-01-17 RX ORDER — HYDROMORPHONE HYDROCHLORIDE 4 MG/ML
0.5 INJECTION, SOLUTION INTRAMUSCULAR; INTRAVENOUS; SUBCUTANEOUS ONCE
Refills: 0 | Status: DISCONTINUED | OUTPATIENT
Start: 2025-01-17 | End: 2025-01-17

## 2025-01-17 RX ORDER — HYDRALAZINE HCL 100 MG
75 TABLET ORAL THREE TIMES A DAY
Refills: 0 | Status: DISCONTINUED | OUTPATIENT
Start: 2025-01-17 | End: 2025-01-24

## 2025-01-17 RX ORDER — DM/PSEUDOEPHED/ACETAMINOPHEN 10-30-250
12.5 CAPSULE ORAL ONCE
Refills: 0 | Status: DISCONTINUED | OUTPATIENT
Start: 2025-01-17 | End: 2025-01-21

## 2025-01-17 RX ORDER — DIPHENHYDRAMINE HCL 25 MG
12.5 CAPSULE ORAL THREE TIMES A DAY
Refills: 0 | Status: DISCONTINUED | OUTPATIENT
Start: 2025-01-17 | End: 2025-01-24

## 2025-01-17 RX ORDER — GLUCAGON 3 MG/1
1 POWDER NASAL ONCE
Refills: 0 | Status: DISCONTINUED | OUTPATIENT
Start: 2025-01-17 | End: 2025-01-21

## 2025-01-17 RX ORDER — SODIUM CHLORIDE 9 G/ML
1000 INJECTION, SOLUTION INTRAVENOUS
Refills: 0 | Status: DISCONTINUED | OUTPATIENT
Start: 2025-01-17 | End: 2025-01-21

## 2025-01-17 RX ORDER — HYDRALAZINE HCL 100 MG
1 TABLET ORAL
Refills: 0 | DISCHARGE

## 2025-01-17 RX ORDER — VANCOMYCIN HYDROCHLORIDE 50 MG/ML
1500 KIT ORAL EVERY 24 HOURS
Refills: 0 | Status: DISCONTINUED | OUTPATIENT
Start: 2025-01-17 | End: 2025-01-21

## 2025-01-17 RX ORDER — DM/PSEUDOEPHED/ACETAMINOPHEN 10-30-250
15 CAPSULE ORAL ONCE
Refills: 0 | Status: DISCONTINUED | OUTPATIENT
Start: 2025-01-17 | End: 2025-01-21

## 2025-01-17 RX ORDER — PIPERACILLIN SODIUM AND TAZOBACTAM SODIUM 2; 250 G/50ML; MG/50ML
3.38 INJECTION, POWDER, FOR SOLUTION INTRAVENOUS EVERY 8 HOURS
Refills: 0 | Status: DISCONTINUED | OUTPATIENT
Start: 2025-01-17 | End: 2025-01-21

## 2025-01-17 RX ORDER — METOPROLOL SUCCINATE 25 MG
50 TABLET, EXTENDED RELEASE 24 HR ORAL DAILY
Refills: 0 | Status: DISCONTINUED | OUTPATIENT
Start: 2025-01-17 | End: 2025-01-24

## 2025-01-17 RX ORDER — INSULIN LISPRO 100/ML
VIAL (ML) SUBCUTANEOUS
Refills: 0 | Status: DISCONTINUED | OUTPATIENT
Start: 2025-01-17 | End: 2025-01-21

## 2025-01-17 RX ORDER — DM/PSEUDOEPHED/ACETAMINOPHEN 10-30-250
25 CAPSULE ORAL ONCE
Refills: 0 | Status: DISCONTINUED | OUTPATIENT
Start: 2025-01-17 | End: 2025-01-21

## 2025-01-17 RX ORDER — ATORVASTATIN CALCIUM 80 MG/1
40 TABLET, FILM COATED ORAL AT BEDTIME
Refills: 0 | Status: DISCONTINUED | OUTPATIENT
Start: 2025-01-17 | End: 2025-01-24

## 2025-01-17 RX ORDER — VANCOMYCIN HYDROCHLORIDE 50 MG/ML
1750 KIT ORAL EVERY 24 HOURS
Refills: 0 | Status: DISCONTINUED | OUTPATIENT
Start: 2025-01-17 | End: 2025-01-17

## 2025-01-17 RX ADMIN — Medication 12.5 MILLIGRAM(S): at 13:43

## 2025-01-17 RX ADMIN — PIPERACILLIN SODIUM AND TAZOBACTAM SODIUM 25 GRAM(S): 2; 250 INJECTION, POWDER, FOR SOLUTION INTRAVENOUS at 03:50

## 2025-01-17 RX ADMIN — Medication 12.5 MILLIGRAM(S): at 18:16

## 2025-01-17 RX ADMIN — ACETAMINOPHEN 650 MILLIGRAM(S): 160 SUSPENSION ORAL at 02:07

## 2025-01-17 RX ADMIN — Medication 1: at 13:43

## 2025-01-17 RX ADMIN — Medication 1: at 18:17

## 2025-01-17 RX ADMIN — Medication 75 MILLIGRAM(S): at 21:45

## 2025-01-17 RX ADMIN — Medication 50 MILLIGRAM(S): at 06:08

## 2025-01-17 RX ADMIN — Medication 75 MILLIGRAM(S): at 13:48

## 2025-01-17 RX ADMIN — Medication 1: at 08:30

## 2025-01-17 RX ADMIN — HYDROMORPHONE HYDROCHLORIDE 0.5 MILLIGRAM(S): 4 INJECTION, SOLUTION INTRAMUSCULAR; INTRAVENOUS; SUBCUTANEOUS at 05:25

## 2025-01-17 RX ADMIN — Medication 12.5 MILLIGRAM(S): at 08:31

## 2025-01-17 RX ADMIN — HYDROMORPHONE HYDROCHLORIDE 0.5 MILLIGRAM(S): 4 INJECTION, SOLUTION INTRAMUSCULAR; INTRAVENOUS; SUBCUTANEOUS at 04:54

## 2025-01-17 RX ADMIN — PIPERACILLIN SODIUM AND TAZOBACTAM SODIUM 25 GRAM(S): 2; 250 INJECTION, POWDER, FOR SOLUTION INTRAVENOUS at 21:45

## 2025-01-17 RX ADMIN — Medication 40 MILLIGRAM(S): at 06:08

## 2025-01-17 RX ADMIN — ATORVASTATIN CALCIUM 40 MILLIGRAM(S): 80 TABLET, FILM COATED ORAL at 21:45

## 2025-01-17 RX ADMIN — PIPERACILLIN SODIUM AND TAZOBACTAM SODIUM 25 GRAM(S): 2; 250 INJECTION, POWDER, FOR SOLUTION INTRAVENOUS at 13:42

## 2025-01-17 RX ADMIN — Medication 75 MILLIGRAM(S): at 06:08

## 2025-01-17 NOTE — PROGRESS NOTE ADULT - PROBLEM SELECTOR PLAN 2
- BUN/Cr 62/1.42, baseline available from 2023 is 0.86-1.17  - Likely BANDAR in setting of infection  - Pt had KI in August   - Hold valsartan  - Monitor Cr  - Avoid nephrotoxic medications and dose meds according to GFR - BUN/Cr 62/1.42, baseline available from 2023 is 0.86-1.17  - Likely BANDAR in setting of infection  - Pt had BANDAR in August, Cr is lower than that hospitalization   - Hold valsartan  - Monitor Cr  - Avoid nephrotoxic medications and dose meds according to GFR

## 2025-01-17 NOTE — PROGRESS NOTE ADULT - SUBJECTIVE AND OBJECTIVE BOX
[facility] Division of Hospital Medicine  Angela Blas MD  Available via MS Teams      SUBJECTIVE / OVERNIGHT EVENTS: No acute events overnight. Pain controlled.    ADDITIONAL REVIEW OF SYSTEMS: ROS negative    MEDICATIONS  (STANDING):  atorvastatin 40 milliGRAM(s) Oral at bedtime  dextrose 5%. 1000 milliLiter(s) (100 mL/Hr) IV Continuous <Continuous>  dextrose 5%. 1000 milliLiter(s) (50 mL/Hr) IV Continuous <Continuous>  dextrose 50% Injectable 25 Gram(s) IV Push once  dextrose 50% Injectable 12.5 Gram(s) IV Push once  dextrose 50% Injectable 25 Gram(s) IV Push once  furosemide    Tablet 40 milliGRAM(s) Oral daily  glucagon  Injectable 1 milliGRAM(s) IntraMuscular once  hydrALAZINE 75 milliGRAM(s) Oral three times a day  insulin lispro (ADMELOG) corrective regimen sliding scale   SubCutaneous three times a day before meals  metoprolol succinate ER 50 milliGRAM(s) Oral daily  piperacillin/tazobactam IVPB.. 3.375 Gram(s) IV Intermittent every 8 hours  vancomycin  IVPB 1500 milliGRAM(s) IV Intermittent every 24 hours    MEDICATIONS  (PRN):  acetaminophen     Tablet .. 650 milliGRAM(s) Oral every 6 hours PRN Temp greater or equal to 38C (100.4F), Mild Pain (1 - 3)  dextrose Oral Gel 15 Gram(s) Oral once PRN Blood Glucose LESS THAN 70 milliGRAM(s)/deciliter  diphenhydrAMINE Elixir 12.5 milliGRAM(s) Oral three times a day PRN Rash and/or Itching  melatonin 3 milliGRAM(s) Oral at bedtime PRN Insomnia      I&O's Summary      PHYSICAL EXAM:  Vital Signs Last 24 Hrs  T(C): 36.6 (17 Jan 2025 13:50), Max: 37.1 (17 Jan 2025 04:22)  T(F): 97.8 (17 Jan 2025 13:50), Max: 98.8 (17 Jan 2025 04:22)  HR: 69 (17 Jan 2025 13:50) (69 - 78)  BP: 151/74 (17 Jan 2025 13:50) (149/70 - 164/74)  BP(mean): --  RR: 18 (17 Jan 2025 13:50) (16 - 18)  SpO2: 95% (17 Jan 2025 13:50) (93% - 95%)    Parameters below as of 17 Jan 2025 13:50  Patient On (Oxygen Delivery Method): room air      CONSTITUTIONAL: NAD, well-developed, well-groomed  NECK: Supple  RESPIRATORY: Normal respiratory effort; lungs are clear to auscultation bilaterally  CARDIOVASCULAR: Regular rate and rhythm, normal S1 and S2  ABDOMEN: Nontender to palpation  PSYCH: A+O to person, place, and time    LABS:                        9.2    6.82  )-----------( 274      ( 17 Jan 2025 07:31 )             28.7     01-17    139  |  103  |  58[H]  ----------------------------<  133[H]  3.9   |  23  |  1.47[H]    Ca    9.0      17 Jan 2025 07:31    TPro  6.9  /  Alb  3.3  /  TBili  0.3  /  DBili  x   /  AST  11  /  ALT  12  /  AlkPhos  96  01-17    PT/INR - ( 16 Jan 2025 19:44 )   PT: 12.2 sec;   INR: 1.07 ratio         PTT - ( 16 Jan 2025 19:44 )  PTT:29.6 sec      Urinalysis Basic - ( 17 Jan 2025 07:31 )    Color: x / Appearance: x / SG: x / pH: x  Gluc: 133 mg/dL / Ketone: x  / Bili: x / Urobili: x   Blood: x / Protein: x / Nitrite: x   Leuk Esterase: x / RBC: x / WBC x   Sq Epi: x / Non Sq Epi: x / Bacteria: x        Culture - Abscess with Gram Stain (collected 17 Jan 2025 01:11)  Source: .Abscess  Gram Stain (17 Jan 2025 12:20):    No polymorphonuclear leukocytes seen    Few Gram Positive Rods per oil power field             [facility] Division of Hospital Medicine  Angela Blas MD  Available via MS Teams      SUBJECTIVE / OVERNIGHT EVENTS: No acute events overnight. Pain controlled.    ADDITIONAL REVIEW OF SYSTEMS: ROS negative    MEDICATIONS  (STANDING):  atorvastatin 40 milliGRAM(s) Oral at bedtime  dextrose 5%. 1000 milliLiter(s) (100 mL/Hr) IV Continuous <Continuous>  dextrose 5%. 1000 milliLiter(s) (50 mL/Hr) IV Continuous <Continuous>  dextrose 50% Injectable 25 Gram(s) IV Push once  dextrose 50% Injectable 12.5 Gram(s) IV Push once  dextrose 50% Injectable 25 Gram(s) IV Push once  furosemide    Tablet 40 milliGRAM(s) Oral daily  glucagon  Injectable 1 milliGRAM(s) IntraMuscular once  hydrALAZINE 75 milliGRAM(s) Oral three times a day  insulin lispro (ADMELOG) corrective regimen sliding scale   SubCutaneous three times a day before meals  metoprolol succinate ER 50 milliGRAM(s) Oral daily  piperacillin/tazobactam IVPB.. 3.375 Gram(s) IV Intermittent every 8 hours  vancomycin  IVPB 1500 milliGRAM(s) IV Intermittent every 24 hours    MEDICATIONS  (PRN):  acetaminophen     Tablet .. 650 milliGRAM(s) Oral every 6 hours PRN Temp greater or equal to 38C (100.4F), Mild Pain (1 - 3)  dextrose Oral Gel 15 Gram(s) Oral once PRN Blood Glucose LESS THAN 70 milliGRAM(s)/deciliter  diphenhydrAMINE Elixir 12.5 milliGRAM(s) Oral three times a day PRN Rash and/or Itching  melatonin 3 milliGRAM(s) Oral at bedtime PRN Insomnia      I&O's Summary      PHYSICAL EXAM:  Vital Signs Last 24 Hrs  T(C): 36.6 (17 Jan 2025 13:50), Max: 37.1 (17 Jan 2025 04:22)  T(F): 97.8 (17 Jan 2025 13:50), Max: 98.8 (17 Jan 2025 04:22)  HR: 69 (17 Jan 2025 13:50) (69 - 78)  BP: 151/74 (17 Jan 2025 13:50) (149/70 - 164/74)  BP(mean): --  RR: 18 (17 Jan 2025 13:50) (16 - 18)  SpO2: 95% (17 Jan 2025 13:50) (93% - 95%)    Parameters below as of 17 Jan 2025 13:50  Patient On (Oxygen Delivery Method): room air      CONSTITUTIONAL: NAD, well-developed, well-groomed  NECK: Supple  RESPIRATORY: Normal respiratory effort; lungs are clear to auscultation bilaterally  CARDIOVASCULAR: Regular rate and rhythm, normal S1 and S2  ABDOMEN: Nontender to palpation  PSYCH: A+O to person, place, and time  MSK: B/l lower legs wrapped    LABS:                        9.2    6.82  )-----------( 274      ( 17 Jan 2025 07:31 )             28.7     01-17    139  |  103  |  58[H]  ----------------------------<  133[H]  3.9   |  23  |  1.47[H]    Ca    9.0      17 Jan 2025 07:31    TPro  6.9  /  Alb  3.3  /  TBili  0.3  /  DBili  x   /  AST  11  /  ALT  12  /  AlkPhos  96  01-17    PT/INR - ( 16 Jan 2025 19:44 )   PT: 12.2 sec;   INR: 1.07 ratio         PTT - ( 16 Jan 2025 19:44 )  PTT:29.6 sec      Urinalysis Basic - ( 17 Jan 2025 07:31 )    Color: x / Appearance: x / SG: x / pH: x  Gluc: 133 mg/dL / Ketone: x  / Bili: x / Urobili: x   Blood: x / Protein: x / Nitrite: x   Leuk Esterase: x / RBC: x / WBC x   Sq Epi: x / Non Sq Epi: x / Bacteria: x        Culture - Abscess with Gram Stain (collected 17 Jan 2025 01:11)  Source: .Abscess  Gram Stain (17 Jan 2025 12:20):    No polymorphonuclear leukocytes seen    Few Gram Positive Rods per oil power field

## 2025-01-17 NOTE — PROGRESS NOTE ADULT - SUBJECTIVE AND OBJECTIVE BOX
Patient is a 64y old  Female who presents with a chief complaint of Concern for osteomyelitis (17 Jan 2025 01:07)       INTERVAL HPI/OVERNIGHT EVENTS:  Patient seen and evaluated at bedside.  Pt is resting comfortable in NAD. Denies N/V/F/C.      Allergies    NovoLog (Pruritus)  Lantus (Pruritus)    Intolerances        Vital Signs Last 24 Hrs  T(C): 36.3 (17 Jan 2025 08:34), Max: 37.1 (17 Jan 2025 04:22)  T(F): 97.4 (17 Jan 2025 08:34), Max: 98.8 (17 Jan 2025 04:22)  HR: 69 (17 Jan 2025 08:34) (69 - 78)  BP: 152/81 (17 Jan 2025 08:34) (149/70 - 164/74)  BP(mean): --  RR: 18 (17 Jan 2025 08:34) (16 - 18)  SpO2: 93% (17 Jan 2025 08:34) (93% - 95%)    Parameters below as of 17 Jan 2025 08:34  Patient On (Oxygen Delivery Method): room air        LABS:                        9.2    6.82  )-----------( 274      ( 17 Jan 2025 07:31 )             28.7     01-17    139  |  103  |  58[H]  ----------------------------<  133[H]  3.9   |  23  |  1.47[H]    Ca    9.0      17 Jan 2025 07:31    TPro  6.9  /  Alb  3.3  /  TBili  0.3  /  DBili  x   /  AST  11  /  ALT  12  /  AlkPhos  96  01-17    PT/INR - ( 16 Jan 2025 19:44 )   PT: 12.2 sec;   INR: 1.07 ratio         PTT - ( 16 Jan 2025 19:44 )  PTT:29.6 sec  Urinalysis Basic - ( 17 Jan 2025 07:31 )    Color: x / Appearance: x / SG: x / pH: x  Gluc: 133 mg/dL / Ketone: x  / Bili: x / Urobili: x   Blood: x / Protein: x / Nitrite: x   Leuk Esterase: x / RBC: x / WBC x   Sq Epi: x / Non Sq Epi: x / Bacteria: x      CAPILLARY BLOOD GLUCOSE      POCT Blood Glucose.: 163 mg/dL (17 Jan 2025 07:50)      Lower Extremity Physical Exam:  Vascular: DP/PT 0/4, B/L, CFT <3 seconds B/L, Temperature gradient warm to cool, B/L.   Neuro: Epicritic sensation dimished to the level of toes, B/L.  Musculoskeletal/Ortho: positive hohmann and flores on right lower extremity  Skin: Right heel fibrogranular wound to dermis, no malodor, no pus. Bilateral lower extremity venous stasis wounds to dermis.       RADIOLOGY & ADDITIONAL TESTS:

## 2025-01-17 NOTE — PROGRESS NOTE ADULT - PROBLEM SELECTOR PLAN 1
- XR R foot - soft tissue ulcer at the plantar aspect of the calcaneus with mild adjacent erosive changes at the base of the calcaneus, questionable focal gas tracking and stippled opacities which may represent bone fragments versus radiopaque foreign bodies, with concern of osteomyelitis  - C/w Vanc/Zosyn for now  - F/u blood cultures  - F/u MR R foot and bilateral LE dopplers  - VIKY PVR pending for vascular consult  - ID consulted rec continuing Vanc zosyn - XR R foot - soft tissue ulcer at the plantar aspect of the calcaneus with mild adjacent erosive changes at the base of the calcaneus, questionable focal gas tracking and stippled opacities which may represent bone fragments versus radiopaque foreign bodies, with concern of osteomyelitis  - C/w Vanc/Zosyn for now  - F/u blood cultures  - F/u MR R foot and bilateral LE dopplers  - Duplex negative for DVT  - VIKY PVR pending for vascular consult  - ID consulted rec continuing Vanc/ Zosyn

## 2025-01-17 NOTE — CONSULT NOTE ADULT - SUBJECTIVE AND OBJECTIVE BOX
Patient is a 64y old  Female who presents with a chief complaint of Concern for osteomyelitis (17 Jan 2025 10:14)    HPI:  This is a 65 y/o woman with   T2DM (1/17/24 A1C = 7.9%), obesity BMI = 42.5, HTN, HLD, b/l heel wound c/b OM s/p debridement w/ R foot graft, and CKD2 who presents from her podiatrist's office for concern of acute on chronic osteomyelitis of the right heel. She has been foot pain which has been progressively worsening since she last saw her podiatrist Dr. Flores on 1/8. This has been accompanied with swelling of the outside of the right foot as well as the right heel. She went to her podiatrist's office today, saw Dr. Larose, who told her to come to the ED to rule out osteomyelitis and get IV antibiotics.    In the ED, she was afebrile and hemodynamically stable, saturating well on RA. CBC w/ normocytic anemia Hb 10.8. CMP w/ evidence of BANDAR on CKD w/ BUN/Cr 62/1.42 (baseline 0.86-1.17 in 2023). CRP elevated to 28. XR of the R foot showing a soft tissue ulcer at the plantar aspect of the calcaneus with mild adjacent erosive changes at the base of the calcaneus, questionable focal gas tracking and stippled opacities which may represent bone fragments versus radiopaque foreign bodies, with concern of osteomyelitis. Worsening juxta-articular erosive changes seen at the IP joint of the first digit. Received Vanc/Zosyn in the ED.  (16 Jan 2025 23:38)    long standing right heel wound  Previously hospitalized  7/25 --> 8/3/23 with right heel wound suspected OM,   underwent heel debridement on 7/27/23 Cultures: Klebs Ent faecalias, MRSA, Morganella   graft applied  put on Cipro/Dapto  Biopsy of right heel did not sow OM  8/16 --> 8/21/13 rehospitalized for BANDAR   (CK normal - no evidence of Dapto associated myositis with rhabdo) antibiotics discontinued and renal function improved     She denies recent trauma to the heel. She has been feeling generally well  1/17 Podiatry exam notes Right heel fibrogranular wound to dermis, no malodor, no pus. Bilateral lower extremity venous stasis wounds to dermis.        PAST MEDICAL & SURGICAL HISTORY:  Diabetes  Hypertension  Hypercholesteremia  Wound of foot  H/O eye surgery  S/P cholecystectomy    Social history:  no tob    FAMILY HISTORY:  FH: hypertension (Father, Mother)    FH: diabetes mellitus (Father)    FH: hyperlipidemia (Father, Mother)        REVIEW OF SYSTEMS:  CONSTITUTIONAL: No weakness, fevers or chills  EYES/ENT: No visual changes;  No vertigo or throat pain   NECK: No pain or stiffness  RESPIRATORY: No cough, wheezing, hemoptysis; No shortness of breath  CARDIOVASCULAR: No chest pain or palpitations  GASTROINTESTINAL: No abdominal or epigastric pain. No nausea, vomiting, or hematemesis; No diarrhea or constipation. No melena or hematochezia.  GENITOURINARY: No dysuria, frequency or hematuria  NEUROLOGICAL: No numbness or weakness  SKIN: No itching, burning, rashes, or lesions   All other review of systems is negative unless indicated above    Allergies  NovoLog (Pruritus)  Lantus (Pruritus)    Antimicrobials:  piperacillin/tazobactam IVPB.. 3.375 Gram(s) IV Intermittent every 8 hours  vancomycin  IVPB 1500 milliGRAM(s) IV Intermittent every 24 hours      Vital Signs Last 24 Hrs  T(C): 36.6 (17 Jan 2025 13:50), Max: 37.1 (17 Jan 2025 04:22)  T(F): 97.8 (17 Jan 2025 13:50), Max: 98.8 (17 Jan 2025 04:22)  HR: 69 (17 Jan 2025 13:50) (69 - 78)  BP: 151/74 (17 Jan 2025 13:50) (149/70 - 164/74)  BP(mean): --  RR: 18 (17 Jan 2025 13:50) (16 - 18)  SpO2: 95% (17 Jan 2025 13:50) (93% - 95%)    Parameters below as of 17 Jan 2025 13:50  Patient On (Oxygen Delivery Method): room air        PHYSICAL EXAM:  General: WN/WD NAD, Non-toxic  Neurology: A&Ox3, nonfocal  right eye with upward deviation  Respiratory: Clear to auscultation bilaterally  CV: RRR, S1S2, no murmurs, rubs or gallops  Abdominal: Soft, Non-tender, non-distended  Extremities: No edema  dressings not taken down  Line Sites: Clear  Skin: No rash                          9.2    6.82  )-----------( 274      ( 17 Jan 2025 07:31 )             28.7   WBC Count: 6.82 (01-17 @ 07:31)  WBC Count: 7.96 (01-16 @ 19:44)    01-17    139  |  103  |  58[H]  ----------------------------<  133[H]  3.9   |  23  |  1.47[H]  Creatinine: 1.47 (01-17 @ 07:31)    Creatinine: 1.42 (01-16 @ 19:44)    Ca    9.0      17 Jan 2025 07:31    TPro  6.9  /  Alb  3.3  /  TBili  0.3  /  DBili  x   /  AST  11  /  ALT  12  /  AlkPhos  96  01-17        MICROBIOLOGY:  .Abscess  01-17-25 --  --    No polymorphonuclear leukocytes seen  Few Gram Positive Rods per oil power field    Radiology:  < from: [facility] Physiol Extremity Lower 3+ Level, BI (01.17.25 @ 13:05) >  IMPRESSION: No evidence of DVT in either lower extremity in the   visualized venous segments.    There is limited visualization of the calf veins.    < end of copied text >  < from: Xray Foot AP + Lateral + Oblique, Right (01.16.25 @ 20:43) >  FINDINGS:  A soft tissue ulcer is seen at the plantar aspect of the calcaneus. There   is mild erosive changes and questionable focal gas tracking seen in the   soft tissues. Stippled opacities may represent bone fragments versus   radiopaque foreign bodies.  Worsening juxta-articular erosive changes at the interphalangeal joint of   the first digit.  No acute fracture or dislocation.  Tarsometatarsal alignment maintained without evidence for a Lisfranc   injury.  Prominent vascular calcifications.    IMPRESSION:  Soft tissue ulcer seen at the plantar aspect of the calcaneus with mild   adjacent erosive changes at the base of the calcaneus, questionable focal   gas tracking and stippled opacities which may represent bone fragments   versus radiopaque foreign bodies. Cannot exclude osteomyelitis.    Worsening juxta-articular erosive changes seen at the interphalangeal   interphalangeal joint of the first digit.    < end of copied text >      Mikael Daniels MD; Division of Infectious Disease; Pager: [phone]; nights and weekends: [phone]

## 2025-01-17 NOTE — PROGRESS NOTE ADULT - ASSESSMENT
63F with right foot heel wound to subQ  - Pt seen and evaluated.  - Afebrile, no leukocytosis, , CRP 2.8  - Right heel fibrogranular wound to subQ no malodor, no pus. Bilateral lower extremity venous stasis wounds to dermis.   - Right foot xray: IPJ 1 erosions, possible foreign body/OM of calcaneus, questionable gas   - Right foot MRI pending  - Right foot wound culture pending  - VIKY/PVR pending  - Recommend ID consult,   - Recommend Vasc consult  - Recommend bilateral venous duplex   - Pod plan local wound care pending Right foot MRI   - Discussed with attending.

## 2025-01-17 NOTE — PROGRESS NOTE ADULT - PROBLEM SELECTOR PLAN 3
- Patient states she stopped taking Lantus after charli due to always having itching with it, and states she had the same reaction w/ Novolog, which was stopped a year ago; states she hasn't had itching since stopping Lantus  - Supposed to be on Jardiance 10 + Repaglinide 1mg TID, hasn't started jardiance yet  - Willing to try insulin while admitted with use of benadryl  - KIN for now, benadryl 12.5mg TID PRN ordered

## 2025-01-17 NOTE — CONSULT NOTE ADULT - ASSESSMENT
65 y/o woman with   T2DM (1/17/24 A1C = 7.9%), obesity BMI = 42.5, HTN, HLD, b/l heel wound c/b OM s/p debridement w/ R foot graft, and CKD2 admitted 1/16/24 with concern for osteomyelitis and worsening right foot wounds  1/17 -- no evidence of DVTs  1/16 ESR/CRP = 120/28    h/o MRSA colonization  7/27/23 wound culture isolates were susceptible to Zosyn/Vanco: Klebs pn, Moranella, Enterococcus Feacalis, MRSAm Corynebacterium    Antibiotics  Vancomycin 1/16 -->  Zosyn 1/16 -->      Suggest  Continue Vanco/Zosyn for now  Await Right foot MRI, Right foot wound culture pending, VIKY/PVR pending    I will be out this weekend  - ID service will follow up  - Recommend bilateral venous duplex

## 2025-01-17 NOTE — PATIENT PROFILE ADULT - FALL HARM RISK - TYPE OF ASSESSMENT
Transitional Care Management - Discharge Notification    Patient is being discharged from [facility] on 6/29.    TCM appointment scheduled for 7/6.    Please remember to initiate the Transitional Care Management telephone encounter within 2 business days of 6/29/23.     Admission

## 2025-01-17 NOTE — CONSULT NOTE ADULT - ASSESSMENT
63F with right foot heel wound to subQ  - Pt seen and evaluated.  - Afebrile, no leukocytosis, ESR pending, CRP 2.8  - Right heel fibrogranular wound to subQ no malodor, no pus. Bilateral lower extremity venous stasis wounds to dermis.   - Right foot xray: IPJ 1 erosions, possible foreign body/OM of calcaneus, questionable gas   - Ordered right foot MRI  - Patient admitted to medicine  - Recommend ID consult, Right foot wound cultured  - Recommend Vasc consult, ordered VIKY/PVR  - Recommend bilateral venous duplex   - Pod plan local wound care pending Right foot MRI   - Discussed with attending.

## 2025-01-17 NOTE — CONSULT NOTE ADULT - SUBJECTIVE AND OBJECTIVE BOX
Patient is a 64y old  Female who presents with a chief complaint of Concern for osteomyelitis (16 Jan 2025 23:38)      HPI:  This is a 65 y/o female w/ PMHx  T2DM, HTN, HLD, b/l heel wound c/b OM s/p debridement w/ R foot graft, and CKD2 who presents from her podiatrist's office for concern of acute on chronic osteomyelitis of the right heel. She has been foot pain which has been progressively worsening, accompanied with swelling of the outside of the right foot as well as the right heel. She went to see her podiatrist (Dr. Flores) who told her to come to the ED to rule out osteomyelitis and get IV antibiotics.    In the ED, she was afebrile and hemodynamically stable, saturating well on RA. CBC w/ normocytic anemia Hb 10.8. CMP w/ evidence of BANDAR on CKD w/ BUN/Cr 62/1.42 (baseline 0.86-1.17 in 2023). CRP elevated to 28. XR of the R foot showing a soft tissue ulcer at the plantar aspect of the calcaneus with mild adjacent erosive changes at the base of the calcaneus, questionable focal gas tracking and stippled opacities which may represent bone fragments versus radiopaque foreign bodies, with concern of osteomyelitis. Worsening juxta-articular erosive changes seen at the IP joint of the first digit. Received Vanc/Zosyn in the ED.  (16 Jan 2025 23:38)      PAST MEDICAL & SURGICAL HISTORY:  Diabetes      Hypertension      Hypercholesteremia      Wound of foot      H/O eye surgery      S/P cholecystectomy          MEDICATIONS  (STANDING):  piperacillin/tazobactam IVPB.. 3.375 Gram(s) IV Intermittent every 8 hours  vancomycin  IVPB 1500 milliGRAM(s) IV Intermittent every 24 hours    MEDICATIONS  (PRN):  acetaminophen     Tablet .. 650 milliGRAM(s) Oral every 6 hours PRN Temp greater or equal to 38C (100.4F), Mild Pain (1 - 3)  melatonin 3 milliGRAM(s) Oral at bedtime PRN Insomnia      Allergies    NovoLog (Pruritus)  Lantus (Pruritus)    Intolerances        VITALS:    Vital Signs Last 24 Hrs  T(C): 36.5 (17 Jan 2025 00:34), Max: 37 (16 Jan 2025 16:48)  T(F): 97.7 (17 Jan 2025 00:34), Max: 98.6 (16 Jan 2025 16:48)  HR: 78 (17 Jan 2025 00:34) (74 - 78)  BP: 164/74 (17 Jan 2025 00:34) (149/76 - 164/74)  BP(mean): --  RR: 18 (17 Jan 2025 00:34) (16 - 18)  SpO2: 94% (17 Jan 2025 00:34) (94% - 95%)    Parameters below as of 17 Jan 2025 00:34  Patient On (Oxygen Delivery Method): room air        LABS:                          10.8   7.96  )-----------( 337      ( 16 Jan 2025 19:44 )             33.0       01-16    138  |  101  |  62[H]  ----------------------------<  179[H]  4.4   |  22  |  1.42[H]    Ca    9.5      16 Jan 2025 19:44    TPro  8.4[H]  /  Alb  3.9  /  TBili  0.3  /  DBili  x   /  AST  17  /  ALT  14  /  AlkPhos  118  01-16      CAPILLARY BLOOD GLUCOSE          PT/INR - ( 16 Jan 2025 19:44 )   PT: 12.2 sec;   INR: 1.07 ratio         PTT - ( 16 Jan 2025 19:44 )  PTT:29.6 sec    LOWER EXTREMITY PHYSICAL EXAM:    Vascular: DP/PT 0/4, B/L, CFT <3 seconds B/L, Temperature gradient warm to cool, B/L.   Neuro: Epicritic sensation dimished to the level of toes, B/L.  Musculoskeletal/Ortho: positive hohmann and flores on right lower extremity  Skin: Right heel fibrogranular wound to dermis, no malodor, no pus. Bilateral lower extremity venous stasis wounds to dermis.       RADIOLOGY & ADDITIONAL STUDIES:    < from: Xray Foot AP + Lateral + Oblique, Right (01.16.25 @ 20:43) >  ACC: [id number] EXAM:  XR FOOT COMP MIN 3 VIEWS RT   ORDERED BY:  YULISSA CHEN     PROCEDURE DATE:  01/16/2025          INTERPRETATION:  CLINICAL INDICATION: Assess for right heel ulcer.    TECHNIQUE:  3 views of the right foot.    COMPARISON: Right foot radiograph 7/25/2023    FINDINGS:  A soft tissue ulcer is seen at the plantar aspect of the calcaneus. There   is mild erosive changes and questionable focal gas tracking seen in the   soft tissues. Stippled opacities may represent bone fragments versus   radiopaque foreign bodies.  Worsening juxta-articular erosive changes at the interphalangeal joint of   the first digit.  No acute fracture or dislocation.  Tarsometatarsal alignment maintained without evidence for a Lisfranc   injury.  Prominent vascular calcifications.    IMPRESSION:  Soft tissue ulcer seen at the plantar aspect of the calcaneus with mild   adjacent erosive changes at the base of the calcaneus, questionable focal   gas tracking and stippled opacities which may represent bone fragments   versus radiopaque foreign bodies. Cannot exclude osteomyelitis.    Worsening juxta-articular erosive changes seen at the interphalangeal   interphalangeal joint of the first digit.    --- End of Report ---          GUEVARA CROOK MD; Resident Radiologist  This document has been electronically signed.   RUTHIE VILLALOBOS MD; Attending Radiologist  This document has been electronically signed. Jan 16 2025  9:01PM    < end of copied text >

## 2025-01-17 NOTE — PROGRESS NOTE ADULT - ASSESSMENT
65 y/o female w/ PMHx  T2DM, HTN, HLD, b/l heel wound c/b OM s/p debridement w/ R foot graft, and CKD2 who presents from her podiatrist's office for concern of acute on chronic osteomyelitis of the right heel. XR suspicious for R calcaneal OM. Admitted for IV antibiotics and OM rule-out.

## 2025-01-18 LAB
ANION GAP SERPL CALC-SCNC: 11 MMOL/L — SIGNIFICANT CHANGE UP (ref 5–17)
BUN SERPL-MCNC: 58 MG/DL — HIGH (ref 7–23)
CALCIUM SERPL-MCNC: 8.8 MG/DL — SIGNIFICANT CHANGE UP (ref 8.4–10.5)
CHLORIDE SERPL-SCNC: 105 MMOL/L — SIGNIFICANT CHANGE UP (ref 96–108)
CO2 SERPL-SCNC: 24 MMOL/L — SIGNIFICANT CHANGE UP (ref 22–31)
CREAT SERPL-MCNC: 1.78 MG/DL — HIGH (ref 0.5–1.3)
EGFR: 31 ML/MIN/1.73M2 — LOW
GLUCOSE BLDC GLUCOMTR-MCNC: 186 MG/DL — HIGH (ref 70–99)
GLUCOSE BLDC GLUCOMTR-MCNC: 192 MG/DL — HIGH (ref 70–99)
GLUCOSE BLDC GLUCOMTR-MCNC: 193 MG/DL — HIGH (ref 70–99)
GLUCOSE BLDC GLUCOMTR-MCNC: 199 MG/DL — HIGH (ref 70–99)
GLUCOSE SERPL-MCNC: 213 MG/DL — HIGH (ref 70–99)
HCT VFR BLD CALC: 29.7 % — LOW (ref 34.5–45)
HGB BLD-MCNC: 9.6 G/DL — LOW (ref 11.5–15.5)
MAGNESIUM SERPL-MCNC: 2.1 MG/DL — SIGNIFICANT CHANGE UP (ref 1.6–2.6)
MCHC RBC-ENTMCNC: 26.7 PG — LOW (ref 27–34)
MCHC RBC-ENTMCNC: 32.3 G/DL — SIGNIFICANT CHANGE UP (ref 32–36)
MCV RBC AUTO: 82.7 FL — SIGNIFICANT CHANGE UP (ref 80–100)
NRBC # BLD: 0 /100 WBCS — SIGNIFICANT CHANGE UP (ref 0–0)
NRBC BLD-RTO: 0 /100 WBCS — SIGNIFICANT CHANGE UP (ref 0–0)
PHOSPHATE SERPL-MCNC: 4.1 MG/DL — SIGNIFICANT CHANGE UP (ref 2.5–4.5)
PLATELET # BLD AUTO: 267 K/UL — SIGNIFICANT CHANGE UP (ref 150–400)
POTASSIUM SERPL-MCNC: 4.3 MMOL/L — SIGNIFICANT CHANGE UP (ref 3.5–5.3)
POTASSIUM SERPL-SCNC: 4.3 MMOL/L — SIGNIFICANT CHANGE UP (ref 3.5–5.3)
RBC # BLD: 3.59 M/UL — LOW (ref 3.8–5.2)
RBC # FLD: 14.1 % — SIGNIFICANT CHANGE UP (ref 10.3–14.5)
SODIUM SERPL-SCNC: 140 MMOL/L — SIGNIFICANT CHANGE UP (ref 135–145)
WBC # BLD: 5.63 K/UL — SIGNIFICANT CHANGE UP (ref 3.8–10.5)
WBC # FLD AUTO: 5.63 K/UL — SIGNIFICANT CHANGE UP (ref 3.8–10.5)

## 2025-01-18 PROCEDURE — 99232 SBSQ HOSP IP/OBS MODERATE 35: CPT | Mod: GC

## 2025-01-18 RX ADMIN — Medication 1: at 18:34

## 2025-01-18 RX ADMIN — Medication 75 MILLIGRAM(S): at 13:14

## 2025-01-18 RX ADMIN — Medication 1: at 12:12

## 2025-01-18 RX ADMIN — Medication 75 MILLIGRAM(S): at 05:57

## 2025-01-18 RX ADMIN — Medication 12.5 MILLIGRAM(S): at 18:35

## 2025-01-18 RX ADMIN — Medication 12.5 MILLIGRAM(S): at 08:17

## 2025-01-18 RX ADMIN — PIPERACILLIN SODIUM AND TAZOBACTAM SODIUM 25 GRAM(S): 2; 250 INJECTION, POWDER, FOR SOLUTION INTRAVENOUS at 21:52

## 2025-01-18 RX ADMIN — ATORVASTATIN CALCIUM 40 MILLIGRAM(S): 80 TABLET, FILM COATED ORAL at 21:53

## 2025-01-18 RX ADMIN — Medication 1: at 08:17

## 2025-01-18 RX ADMIN — PIPERACILLIN SODIUM AND TAZOBACTAM SODIUM 25 GRAM(S): 2; 250 INJECTION, POWDER, FOR SOLUTION INTRAVENOUS at 12:11

## 2025-01-18 RX ADMIN — Medication 12.5 MILLIGRAM(S): at 12:12

## 2025-01-18 RX ADMIN — PIPERACILLIN SODIUM AND TAZOBACTAM SODIUM 25 GRAM(S): 2; 250 INJECTION, POWDER, FOR SOLUTION INTRAVENOUS at 05:59

## 2025-01-18 RX ADMIN — Medication 75 MILLIGRAM(S): at 21:53

## 2025-01-18 RX ADMIN — VANCOMYCIN HYDROCHLORIDE 300 MILLIGRAM(S): KIT at 01:50

## 2025-01-18 RX ADMIN — Medication 50 MILLIGRAM(S): at 05:58

## 2025-01-18 RX ADMIN — Medication 40 MILLIGRAM(S): at 05:57

## 2025-01-18 RX ADMIN — ACETAMINOPHEN 650 MILLIGRAM(S): 160 SUSPENSION ORAL at 00:01

## 2025-01-18 NOTE — PROGRESS NOTE ADULT - PROBLEM SELECTOR PROBLEM 1
Acute osteomyelitis of right calcaneus Protopic Counseling: Patient may experience a mild burning sensation during topical application. Protopic is not approved in children less than 2 years of age. There have been case reports of hematologic and skin malignancies in patients using topical calcineurin inhibitors although causality is questionable.

## 2025-01-18 NOTE — PROGRESS NOTE ADULT - PROBLEM SELECTOR PLAN 1
- XR R foot - soft tissue ulcer at the plantar aspect of the calcaneus with mild adjacent erosive changes at the base of the calcaneus, questionable focal gas tracking and stippled opacities which may represent bone fragments versus radiopaque foreign bodies, with concern of osteomyelitis  - C/w Vanc/Zosyn for now  - BCx negative to date.   - F/u MR R foot (performed, not red)  - Duplex negative for DVT  - VIKY PVR pending for vascular consult  - ID consulted rec continuing Vanc/ Zosyn

## 2025-01-18 NOTE — PROGRESS NOTE ADULT - PROBLEM SELECTOR PLAN 2
-continuing to uptrend,   -check bladder scan post-void, but doubt obstructive given no apparent hydro  -hold lasix (patient with infection, doubt great po intake)  -trend. If uptrends further next 24-48 hours will obtain dedicated renal imaging.

## 2025-01-18 NOTE — PROGRESS NOTE ADULT - SUBJECTIVE AND OBJECTIVE BOX
[facility] Division of Hospital Medicine  Brigid Greenberg DO  Available via MS Teams      SUBJECTIVE / OVERNIGHT EVENTS:No acute events overnight. PAtient with +chills, no other complaints at this time.     ADDITIONAL REVIEW OF SYSTEMS: ROS negative        I&O's Summary      PHYSICAL EXAM:  Vital Signs Last 24 Hrs  T(C): 37 (18 Jan 2025 12:09), Max: 37.1 (18 Jan 2025 00:42)  T(F): 98.6 (18 Jan 2025 12:09), Max: 98.8 (18 Jan 2025 00:42)  HR: 65 (18 Jan 2025 12:09) (65 - 74)  BP: 120/83 (18 Jan 2025 12:09) (120/83 - 151/74)  BP(mean): --  RR: 18 (18 Jan 2025 12:09) (18 - 18)  SpO2: 94% (18 Jan 2025 12:09) (93% - 96%)    Parameters below as of 18 Jan 2025 12:09  Patient On (Oxygen Delivery Method): room air          CONSTITUTIONAL: NAD  NECK: Supple  RESPIRATORY: Normal respiratory effort; lungs are clear to auscultation bilaterally  CARDIOVASCULAR: Regular rate and rhythm, normal S1 and S2  ABDOMEN: NT/ND.+BS  . Unofficial POCUS: full bladder (pre-void), no hydronephrosis on unofficial POCUS.  PSYCH: A+O to person, place, and time  MSK: B/l lower legs wrapped Extremities warm x4    LABS:                                   9.6    5.63  )-----------( 267      ( 18 Jan 2025 06:55 )             29.7   01-18    140  |  105  |  58[H]  ----------------------------<  213[H]  4.3   |  24  |  1.78[H]    Ca    8.8      18 Jan 2025 06:55  Phos  4.1     01-18  Mg     2.1     01-18    TPro  6.9  /  Alb  3.3  /  TBili  0.3  /  DBili  x   /  AST  11  /  ALT  12  /  AlkPhos  96  01-17        Culture - Abscess with Gram Stain (collected 17 Jan 2025 01:11)  Source: .Abscess  Gram Stain (17 Jan 2025 12:20):    No polymorphonuclear leukocytes seen    Few Gram Positive Rods per oil power field

## 2025-01-19 LAB
-  AZTREONAM: SIGNIFICANT CHANGE UP
-  CEFEPIME: SIGNIFICANT CHANGE UP
-  CEFTAZIDIME: SIGNIFICANT CHANGE UP
-  CIPROFLOXACIN: SIGNIFICANT CHANGE UP
-  CLINDAMYCIN: SIGNIFICANT CHANGE UP
-  ERYTHROMYCIN: SIGNIFICANT CHANGE UP
-  GENTAMICIN: SIGNIFICANT CHANGE UP
-  IMIPENEM: SIGNIFICANT CHANGE UP
-  LEVOFLOXACIN: SIGNIFICANT CHANGE UP
-  MEROPENEM: SIGNIFICANT CHANGE UP
-  OXACILLIN: SIGNIFICANT CHANGE UP
-  PENICILLIN: SIGNIFICANT CHANGE UP
-  PIPERACILLIN/TAZOBACTAM: SIGNIFICANT CHANGE UP
-  RIFAMPIN: SIGNIFICANT CHANGE UP
-  TETRACYCLINE: SIGNIFICANT CHANGE UP
-  TRIMETHOPRIM/SULFAMETHOXAZOLE: SIGNIFICANT CHANGE UP
-  VANCOMYCIN: SIGNIFICANT CHANGE UP
ANION GAP SERPL CALC-SCNC: 14 MMOL/L — SIGNIFICANT CHANGE UP (ref 5–17)
BUN SERPL-MCNC: 49 MG/DL — HIGH (ref 7–23)
CALCIUM SERPL-MCNC: 9.1 MG/DL — SIGNIFICANT CHANGE UP (ref 8.4–10.5)
CHLORIDE SERPL-SCNC: 99 MMOL/L — SIGNIFICANT CHANGE UP (ref 96–108)
CO2 SERPL-SCNC: 23 MMOL/L — SIGNIFICANT CHANGE UP (ref 22–31)
CREAT SERPL-MCNC: 1.57 MG/DL — HIGH (ref 0.5–1.3)
EGFR: 36 ML/MIN/1.73M2 — LOW
GLUCOSE BLDC GLUCOMTR-MCNC: 173 MG/DL — HIGH (ref 70–99)
GLUCOSE BLDC GLUCOMTR-MCNC: 206 MG/DL — HIGH (ref 70–99)
GLUCOSE BLDC GLUCOMTR-MCNC: 216 MG/DL — HIGH (ref 70–99)
GLUCOSE BLDC GLUCOMTR-MCNC: 235 MG/DL — HIGH (ref 70–99)
GLUCOSE SERPL-MCNC: 196 MG/DL — HIGH (ref 70–99)
HCT VFR BLD CALC: 31.5 % — LOW (ref 34.5–45)
HGB BLD-MCNC: 10.2 G/DL — LOW (ref 11.5–15.5)
MCHC RBC-ENTMCNC: 26.3 PG — LOW (ref 27–34)
MCHC RBC-ENTMCNC: 32.4 G/DL — SIGNIFICANT CHANGE UP (ref 32–36)
MCV RBC AUTO: 81.2 FL — SIGNIFICANT CHANGE UP (ref 80–100)
METHOD TYPE: SIGNIFICANT CHANGE UP
METHOD TYPE: SIGNIFICANT CHANGE UP
NRBC # BLD: 0 /100 WBCS — SIGNIFICANT CHANGE UP (ref 0–0)
NRBC BLD-RTO: 0 /100 WBCS — SIGNIFICANT CHANGE UP (ref 0–0)
PLATELET # BLD AUTO: 285 K/UL — SIGNIFICANT CHANGE UP (ref 150–400)
POTASSIUM SERPL-MCNC: 4 MMOL/L — SIGNIFICANT CHANGE UP (ref 3.5–5.3)
POTASSIUM SERPL-SCNC: 4 MMOL/L — SIGNIFICANT CHANGE UP (ref 3.5–5.3)
RBC # BLD: 3.88 M/UL — SIGNIFICANT CHANGE UP (ref 3.8–5.2)
RBC # FLD: 14.3 % — SIGNIFICANT CHANGE UP (ref 10.3–14.5)
SODIUM SERPL-SCNC: 136 MMOL/L — SIGNIFICANT CHANGE UP (ref 135–145)
WBC # BLD: 6.47 K/UL — SIGNIFICANT CHANGE UP (ref 3.8–10.5)
WBC # FLD AUTO: 6.47 K/UL — SIGNIFICANT CHANGE UP (ref 3.8–10.5)

## 2025-01-19 PROCEDURE — 99233 SBSQ HOSP IP/OBS HIGH 50: CPT

## 2025-01-19 PROCEDURE — G0545: CPT

## 2025-01-19 PROCEDURE — 99232 SBSQ HOSP IP/OBS MODERATE 35: CPT | Mod: GC

## 2025-01-19 RX ADMIN — Medication 12.5 MILLIGRAM(S): at 12:17

## 2025-01-19 RX ADMIN — Medication 1: at 08:03

## 2025-01-19 RX ADMIN — ACETAMINOPHEN 650 MILLIGRAM(S): 160 SUSPENSION ORAL at 05:01

## 2025-01-19 RX ADMIN — PIPERACILLIN SODIUM AND TAZOBACTAM SODIUM 25 GRAM(S): 2; 250 INJECTION, POWDER, FOR SOLUTION INTRAVENOUS at 12:18

## 2025-01-19 RX ADMIN — PIPERACILLIN SODIUM AND TAZOBACTAM SODIUM 25 GRAM(S): 2; 250 INJECTION, POWDER, FOR SOLUTION INTRAVENOUS at 21:51

## 2025-01-19 RX ADMIN — Medication 75 MILLIGRAM(S): at 13:28

## 2025-01-19 RX ADMIN — PIPERACILLIN SODIUM AND TAZOBACTAM SODIUM 25 GRAM(S): 2; 250 INJECTION, POWDER, FOR SOLUTION INTRAVENOUS at 05:02

## 2025-01-19 RX ADMIN — Medication 12.5 MILLIGRAM(S): at 05:01

## 2025-01-19 RX ADMIN — Medication 2: at 18:21

## 2025-01-19 RX ADMIN — Medication 75 MILLIGRAM(S): at 21:52

## 2025-01-19 RX ADMIN — ACETAMINOPHEN 650 MILLIGRAM(S): 160 SUSPENSION ORAL at 06:01

## 2025-01-19 RX ADMIN — Medication 2: at 12:17

## 2025-01-19 RX ADMIN — Medication 50 MILLIGRAM(S): at 05:02

## 2025-01-19 RX ADMIN — VANCOMYCIN HYDROCHLORIDE 300 MILLIGRAM(S): KIT at 02:04

## 2025-01-19 RX ADMIN — ATORVASTATIN CALCIUM 40 MILLIGRAM(S): 80 TABLET, FILM COATED ORAL at 21:52

## 2025-01-19 RX ADMIN — Medication 75 MILLIGRAM(S): at 05:02

## 2025-01-19 NOTE — DISCHARGE NOTE PROVIDER - NSFOLLOWUPCLINICS_GEN_ALL_ED_FT
[facility] Endocrinology  Endocrinology  [address] [place] [address]  Phone: [phone]  Fax:

## 2025-01-19 NOTE — DISCHARGE NOTE PROVIDER - PROVIDER TOKENS
PROVIDER:[TOKEN:[[id number]],FOLLOWUP:[1 week]],FREE:[LAST:[Santy],FIRST:[Noemi],PHONE:[(   )    -],FAX:[(   )    -]] PROVIDER:[TOKEN:[[id number]],FOLLOWUP:[1 week]],FREE:[LAST:[Santy],FIRST:[Noemi],PHONE:[(   )    -],FAX:[(   )    -]],PROVIDER:[TOKEN:[[id number]],SCHEDULEDAPPT:[02/13/2025],SCHEDULEDAPPTTIME:[11:30 AM]]

## 2025-01-19 NOTE — DISCHARGE NOTE PROVIDER - HOSPITAL COURSE
HPI:  This is a 65 y/o female w/ PMHx  T2DM, HTN, HLD, b/l heel wound c/b OM s/p debridement w/ R foot graft, and CKD2 who presents from her podiatrist's office for concern of acute on chronic osteomyelitis of the right heel. She has been foot pain which has been progressively worsening since she last saw her podiatrist Dr. Flores on 1/8. This has been accompanied with swelling of the outside of the right foot as well as the right heel. She went to her podiatrist's office today, saw Dr. Larose, who told her to come to the ED to rule out osteomyelitis and get IV antibiotics.    In the ED, she was afebrile and hemodynamically stable, saturating well on RA. CBC w/ normocytic anemia Hb 10.8. CMP w/ evidence of BANDAR on CKD w/ BUN/Cr 62/1.42 (baseline 0.86-1.17 in 2023). CRP elevated to 28. XR of the R foot showing a soft tissue ulcer at the plantar aspect of the calcaneus with mild adjacent erosive changes at the base of the calcaneus, questionable focal gas tracking and stippled opacities which may represent bone fragments versus radiopaque foreign bodies, with concern of osteomyelitis. Worsening juxta-articular erosive changes seen at the IP joint of the first digit. Received Vanc/Zosyn in the ED.  (16 Jan 2025 23:38)    Hospital Course:  Patient admitted for soft tissue infection of foot. MRI of the R foot not conclusive but suggestive of OM, stress fracture of R calcaneus noted. Podiatry also with low c/f OM based on exam. Cultures growing multiple organisms: staph aureus, pseudomonas, and e. faecalis. Patient completed zosyn during admission. Vascular was consulted VIKY/PVR appreciated; not consistent with severe PAD - outpt f/u. No plans for intervention per Podiatry, continue with offloading shoe (at bedside) for calcaneus stress fracture noted on MRI.  Hospital course complicated by BANDAR on CKD; lasix and valsartan are on hold. Patient received IV fluids, creatinine improving  Endocrinology was consulted for T2DM. Patient states she stopped taking Lantus after charli due to always having itching with it, and states she had the same reaction w/ Novolog, which was stopped a year ago; states she hasn't had itching since stopping Lantus. Supposed to be on Jardiance 10 + Repaglinide 1mg TID, hasn't started jardiance yet, noted to have pruritis with both lantus and admelog inpatient. Discussed with allergy/immunology fellow Dr. Thalia Hutton on 1/22. given already on zyrtec currently, skin testing not feasible inpatient - they will arrange for f/u outpatient but patient states already has appointment on 2/20. Patient to continue regular insulin, tradjenta and ozempic  Patient started on nifedipine given BP above goal    Important Medication Changes and Reason:  >Hold valsartan and lasix in the setting of BANDAR   >Start NPH, tradjenta and ozempic for DM   >Start nifedipine for HTN    Active or Pending Issues Requiring Follow-up:    Advanced Directives:   [ ] Full code  [ ] DNR  [ ] Hospice    Discharge Diagnoses:         HPI:  This is a 65 y/o female w/ PMHx  T2DM, HTN, HLD, b/l heel wound c/b OM s/p debridement w/ R foot graft, and CKD2 who presents from her podiatrist's office for concern of acute on chronic osteomyelitis of the right heel. She has been foot pain which has been progressively worsening since she last saw her podiatrist Dr. Flores on 1/8. This has been accompanied with swelling of the outside of the right foot as well as the right heel. She went to her podiatrist's office today, saw Dr. Larose, who told her to come to the ED to rule out osteomyelitis and get IV antibiotics.    In the ED, she was afebrile and hemodynamically stable, saturating well on RA. CBC w/ normocytic anemia Hb 10.8. CMP w/ evidence of BANDAR on CKD w/ BUN/Cr 62/1.42 (baseline 0.86-1.17 in 2023). CRP elevated to 28. XR of the R foot showing a soft tissue ulcer at the plantar aspect of the calcaneus with mild adjacent erosive changes at the base of the calcaneus, questionable focal gas tracking and stippled opacities which may represent bone fragments versus radiopaque foreign bodies, with concern of osteomyelitis. Worsening juxta-articular erosive changes seen at the IP joint of the first digit. Received Vanc/Zosyn in the ED.  (16 Jan 2025 23:38)    Hospital Course:  Patient admitted for soft tissue infection of foot. MRI of the R foot not conclusive but suggestive of OM, stress fracture of R calcaneus noted. Podiatry also with low c/f OM based on exam. Cultures growing multiple organisms: staph aureus, pseudomonas, and e. faecalis. Patient completed zosyn during admission. Vascular was consulted VIKY/PVR appreciated; not consistent with severe PAD - outpt f/u. No plans for intervention per Podiatry, continue with offloading shoe (at bedside) for calcaneus stress fracture noted on MRI.  Hospital course complicated by BANDAR on CKD; lasix and valsartan are on hold. Patient received IV fluids, creatinine improving  Endocrinology was consulted for T2DM. Patient states she stopped taking Lantus after charli due to always having itching with it, and states she had the same reaction w/ Novolog, which was stopped a year ago; states she hasn't had itching since stopping Lantus. Supposed to be on Jardiance 10 + Repaglinide 1mg TID, hasn't started jardiance yet, noted to have pruritis with both lantus and admelog inpatient. Discussed with allergy/immunology fellow Dr. Thalia Hutton on 1/22. given already on zyrtec currently, skin testing not feasible inpatient - they will arrange for f/u outpatient but patient states already has appointment on 2/20. Patient to continue regular insulin, tradjenta and ozempic  Patient started on nifedipine given BP above goal    Important Medication Changes and Reason:  >Hold valsartan and lasix in the setting of BANDAR   >Start NPH, tradjenta and ozempic for DM   >Start nifedipine for HTN    Active or Pending Issues Requiring Follow-up:  >PCP follow up within one week for further outpatient management   >Podiatry follow up for further outpatient management  >Endocrinology follow up for further outpatient management     Advanced Directives:   [X] Full code  [ ] DNR  [ ] Hospice    Discharge Diagnoses:  >OM  >DM2  >HTN  >BANDAR     Discharge/dispo/med rec discussed with medical attending Dr. Guerrero. Patient is medically cleared for discharge with outpatient follow up

## 2025-01-19 NOTE — PROGRESS NOTE ADULT - ASSESSMENT
63F with right foot heel wound to subQ  - Pt seen and evaluated.  - Afebrile, no leukocytosis  - Right heel fibrogranular wound to subQ no malodor, no pus. Bilateral lower extremity venous stasis wounds to dermis.   - Right foot xray: IPJ 1 erosions, possible foreign body/OM of calcaneus, questionable gas   - Right foot MRI: calcaneal stress fracture, IPJ 1 possible inflammatory arthropathy vs infection. There is no open wounds or clinical signs of infection of right hallux.   - Right foot wound culture: Staph aureus, E faecalis, Pseudomonas   - VIKY/PVR pending  - Recommend ID consult  - Recommend Rheum consult   - Recommend Vasc consult  - Bilateral venous duplex: no DVT, limited visualization of calf veins   - Pod stable for discharge pending final ID recs/ final vasc recs   - Wound care instruction and follow up information in discharge note provider   - Discussed with attending.

## 2025-01-19 NOTE — DISCHARGE NOTE PROVIDER - CARE PROVIDERS DIRECT ADDRESSES
,DirectAddress_Unknown,DirectAddress_Unknown ,DirectAddress_Unknown,DirectAddress_Unknown,[email or web address]

## 2025-01-19 NOTE — PROGRESS NOTE ADULT - ASSESSMENT
63 y/o woman with   T2DM (1/17/24 A1C = 7.9%), obesity BMI = 42.5, HTN, HLD, b/l heel wound c/b OM s/p debridement w/ R foot graft, and CKD2 admitted 1/16/24 with concern for osteomyelitis and worsening right foot wounds  1/17 -- no evidence of DVTs  1/16 ESR/CRP = 120/28  h/o MRSA colonization  7/27/23 wound culture isolates were susceptible to Zosyn/Vanco: Klebs pn, Moranella, Enterococcus Feacalis, MRSAm Corynebacterium    Antibiotics  Vancomycin 1/16 -->  Zosyn 1/16 -->    01/19: Loose stool x 1, need to follow.  Abdominal exam benign.  White blood cell count normal.  Doubt C. difficile at this juncture, but will need to remain vigilant for this possibility.  Creatinine approximately stable.  Abdominal exam benign.  I do not see a role to give empiric enteral vancomycin.  No vancomycin level as of yet.  MRI consistent with osteomyelitis.  Surface cultures with Pseudomonas aeruginosa and Staphylococcus aureus.  No sensitivity profile yet.    Suggestions  Follow-up sensitivity of wound isolate  Continue vancomycin  Intensive monitoring of vancomycin levels and creatinine are required to monitor for renal toxicity. Target AUC:CARLOS for MRSA infections is 400-600.   Continue Zosyn  Await vascular evaluation  Monitor abdominal exam, stool output    If any ID input is needed over the remainder of the long weekend, please call [phone]. Thanks.    Norris Noonan MD  Attending Physician  [facility]  Division of Infectious Diseases  [phone]

## 2025-01-19 NOTE — DISCHARGE NOTE PROVIDER - NSRESEARCHGRANT_MLMHIDDEN_GEN_A_CORE
September 24, 2021     Patient: Patrice Myles   YOB: 2012   Date of Visit: 9/24/2021       To Whom it May Concern:    Patrice Myles is under my professional care  She was seen in my office on 9/24/2021  She may return to school on 09/27/21  If you have any questions or concerns, please don't hesitate to call           Sincerely,          Hans Hogan MD        CC: No Recipients yes

## 2025-01-19 NOTE — DISCHARGE NOTE PROVIDER - NSDCFUADDAPPT_GEN_ALL_CORE_FT
Podiatry Discharge Instructions:  Follow up: Please follow up with Dr. Flores within 1 week of discharge from the hospital, please call [phone] for appointment and discuss that you recently were seen in the hospital.  Wound Care: Please apply Aquacel Ag to bilateral foot wounds followed by 4x4 gauze, ABD pad, beatrice, and ACE bandage daily, thank you!   Weight bearing: Please weight bear as tolerated in heel offloading shoe.  Antibiotics: Please continue as instructed.

## 2025-01-19 NOTE — DISCHARGE NOTE PROVIDER - NSDCMRMEDTOKEN_GEN_ALL_CORE_FT
atorvastatin 40 mg oral tablet: 1 tab(s) orally once a day  ergocalciferol 1.25 mg (50,000 intl units) oral tablet: 1 tab(s) orally once a week on wednesdays  furosemide 40 mg oral tablet: 1 tab(s) orally once a day  hydrALAZINE 25 mg oral tablet: 1 tab(s) orally 3 times a day  hydrALAZINE 50 mg oral tablet: 1 tab(s) orally 3 times a day  metoprolol succinate 50 mg oral tablet, extended release: 1 tab(s) orally once a day  repaglinide 1 mg oral tablet: 1 tab(s) orally 3 times a day (with meals)  valsartan 160 mg oral tablet: 1 tab(s) orally once a day   atorvastatin 40 mg oral tablet: 1 tab(s) orally once a day  cetirizine 10 mg oral tablet: 1 tab(s) orally once a day  ergocalciferol 1.25 mg (50,000 intl units) oral tablet: 1 tab(s) orally once a week on wednesdays  HumuLIN N KwikPen 100 units/mL subcutaneous suspension: 7 suspension subcutaneous 2 times a day at 8AM and 8PM  hydrALAZINE 25 mg oral tablet: 1 tab(s) orally 3 times a day  hydrALAZINE 50 mg oral tablet: 1 tab(s) orally 3 times a day  Insulin Pen Needles, 4mm: 1 application subcutaneously 4 times a day. ** Use with insulin pen **  metoprolol succinate 50 mg oral tablet, extended release: 1 tab(s) orally once a day  NIFEdipine 30 mg oral tablet, extended release: 1 tab(s) orally once a day  Ozempic 4 mg/3 mL (1 mg dose) subcutaneous solution: 1 milligram(s) subcutaneously once a week  Tradjenta 5 mg oral tablet: 1 tab(s) orally once a day   atorvastatin 40 mg oral tablet: 1 tab(s) orally once a day  cetirizine 10 mg oral tablet: 1 tab(s) orally once a day  ergocalciferol 1.25 mg (50,000 intl units) oral tablet: 1 tab(s) orally once a week on wednesdays  hydrALAZINE 25 mg oral tablet: 1 tab(s) orally 3 times a day  hydrALAZINE 50 mg oral tablet: 1 tab(s) orally 3 times a day  Insulin Pen Needles, 4mm: 1 application subcutaneously 4 times a day. ** Use with insulin pen **  metoprolol succinate 50 mg oral tablet, extended release: 1 tab(s) orally once a day  NIFEdipine 30 mg oral tablet, extended release: 1 tab(s) orally once a day  NovoLIN N FlexPen 100 units/mL subcutaneous suspension: 7 unit(s) subcutaneous 2 times a day 8 AM and 8PM  Ozempic 4 mg/3 mL (1 mg dose) subcutaneous solution: 1 milligram(s) subcutaneously once a week  Tradjenta 5 mg oral tablet: 1 tab(s) orally once a day

## 2025-01-19 NOTE — DISCHARGE NOTE PROVIDER - NSDCCPCAREPLAN_GEN_ALL_CORE_FT
PRINCIPAL DISCHARGE DIAGNOSIS  Diagnosis: Acute on chronic osteomyelitis  Assessment and Plan of Treatment: You were seen by podiatry during admission and completed a course of antibiotics  Follow up with your PCP and podiatry for further outpatient management      SECONDARY DISCHARGE DIAGNOSES  Diagnosis: Acute kidney injury superimposed on CKD  Assessment and Plan of Treatment: Avoid taking NSAIDs (ex: Ibuprofen, Advil, Celebrex, Naprosyn) and other agents that can harm the kidneys such as intravenous contrast for diagnostic testing, combination cold medications, etc. until you are instructed to do so by your Primary Care Physician.  Have all of your medications adjusted for your renal function by your Health Care Provider.  Blood pressure control is important.  Take all medication as prescribed.  Do not overconsume foods that are high in potassium, such as bananas, until you are instructed to do so by your primary care physician.   Continue to hold valsartan and lasix until outpatient follow up    Diagnosis: Type 2 diabetes mellitus with hyperglycemia  Assessment and Plan of Treatment: Make sure you get your HgA1c checked every three months.  If you take oral diabetes medications, check your blood glucose at least two times a day.  If you take short-acting insulin, check your blood glucose before meals and at bedtime.  It's important not to skip any meals.  Keep a log of your blood glucose results and always take it with you to your doctor appointments.  Keep a list of your current medications including over the counter medications and bring this medication list with you to all your doctor appointments.  If you have not seen your ophthalmologist this year, call for appointment.  Check your feet daily for redness, sores, or openings.  Do not self treat.  If there is no improvement in two days, call your primary care physician for an appointment.    Diagnosis: HTN (hypertension)  Assessment and Plan of Treatment: Continue to follow a low salt/sodium diet.  Perform physical activities as tolerated in consultation with your Primary Care Provider and physical therapist.  Take all medications as prescribed.  Follow up with your medical doctor for routine blood pressure monitoring at your next visit.  Notify your doctor if you have any of the following symptoms:  Dizziness, lightheadedness, blurry vision, headache, chest pain, or shortness of breath.

## 2025-01-19 NOTE — DISCHARGE NOTE PROVIDER - NSDCCAREPROVSEEN_GEN_ALL_CORE_FT
Maritza Guerrero Yolanda, Maritza Daniels, Mikael Raymundo, Aren Yolanda, Maritza Flores, Ronald Daniels, Mikael Raymundo, Aren

## 2025-01-19 NOTE — DISCHARGE NOTE PROVIDER - CARE PROVIDER_API CALL
Ronald Flores  Podiatric Medicine and Surgery  [address]  [place] [address]  Phone: [phone]  Fax: [phone]  Follow Up Time: 1 week    Noemi Madera  Phone: (   )    -  Fax: (   )    -  Follow Up Time:    Ronald Flores  Podiatric Medicine and Surgery  [address]  [place] [address]  Phone: [phone]  Fax: [phone]  Follow Up Time: 1 week    Noemi Madera  Phone: (   )    -  Fax: (   )    -  Follow Up Time:     Amee Reddy  Allergy and Immunology  [address] [place] [address]  Phone: [phone]  Fax: [phone]  Scheduled Appointment: 02/13/2025 11:30 AM

## 2025-01-19 NOTE — PROGRESS NOTE ADULT - PROBLEM SELECTOR PLAN 1
- MR not conclusive but suggestive of OM  -continue vanc/zosyn, noted cultures for pseudomonas and Staph aureus.  -while VIKY/PVR ordered 1/17, not actually performed, report is duplicate of Duplex LE. Re-ordered 1/19 and asked radiology to expedite. Once resulted, will ask vascular to weigh in. Patient has vascular surgeon, but not within system.

## 2025-01-19 NOTE — PROGRESS NOTE ADULT - SUBJECTIVE AND OBJECTIVE BOX
Patient is a 64y old  Female who presents with a chief complaint of Concern for osteomyelitis (19 Jan 2025 13:54)       INTERVAL HPI/OVERNIGHT EVENTS:  Patient seen and evaluated at bedside.  Pt is resting comfortable in NAD. Denies N/V/F/C.    Allergies    NovoLog (Pruritus)  Lantus (Pruritus)    Intolerances        Vital Signs Last 24 Hrs  T(C): 36.7 (19 Jan 2025 12:42), Max: 37.1 (18 Jan 2025 23:29)  T(F): 98 (19 Jan 2025 12:42), Max: 98.8 (18 Jan 2025 23:29)  HR: 67 (19 Jan 2025 12:42) (67 - 73)  BP: 160/79 (19 Jan 2025 12:42) (149/83 - 160/79)  BP(mean): --  RR: 18 (19 Jan 2025 12:42) (18 - 18)  SpO2: 96% (19 Jan 2025 12:42) (94% - 96%)    Parameters below as of 19 Jan 2025 12:42  Patient On (Oxygen Delivery Method): room air        LABS:                        10.2   6.47  )-----------( 285      ( 19 Jan 2025 06:54 )             31.5     01-19    136  |  99  |  49[H]  ----------------------------<  196[H]  4.0   |  23  |  1.57[H]    Ca    9.1      19 Jan 2025 06:55  Phos  4.1     01-18  Mg     2.1     01-18        Urinalysis Basic - ( 19 Jan 2025 06:55 )    Color: x / Appearance: x / SG: x / pH: x  Gluc: 196 mg/dL / Ketone: x  / Bili: x / Urobili: x   Blood: x / Protein: x / Nitrite: x   Leuk Esterase: x / RBC: x / WBC x   Sq Epi: x / Non Sq Epi: x / Bacteria: x      CAPILLARY BLOOD GLUCOSE      POCT Blood Glucose.: 206 mg/dL (19 Jan 2025 12:05)  POCT Blood Glucose.: 173 mg/dL (19 Jan 2025 07:25)  POCT Blood Glucose.: 186 mg/dL (18 Jan 2025 21:32)  POCT Blood Glucose.: 193 mg/dL (18 Jan 2025 17:43)      Lower Extremity Physical Exam:  Vascular: DP/PT 0/4, B/L, CFT <3 seconds B/L, Temperature gradient warm to cool, B/L.   Neuro: Epicritic sensation dimished to the level of toes, B/L.  Musculoskeletal/Ortho: positive hohmann and flores on right lower extremity  Skin: Right heel fibrogranular wound to dermis, no malodor, no pus. Bilateral lower extremity venous stasis wounds to dermis.     RADIOLOGY & ADDITIONAL TESTS:

## 2025-01-19 NOTE — PROGRESS NOTE ADULT - PROBLEM SELECTOR PLAN 3
- Patient states she stopped taking Lantus after charli due to always having itching with it, and states she had the same reaction w/ Novolog, which was stopped a year ago; states she hasn't had itching since stopping Lantus  - Supposed to be on Jardiance 10 + Repaglinide 1mg TID, hasn't started jardiance yet  - Willing to try insulin while admitted with use of benadryl  - KIN for now, benadryl 12.5mg TID PRN ordered  -if unable to tolerate lispro, will ask endocrine to weigh in.

## 2025-01-19 NOTE — PROGRESS NOTE ADULT - SUBJECTIVE AND OBJECTIVE BOX
[facility]  Division of Infectious Diseases  [phone]    Name: STEPHANIE KAYE  Age: 64y  Gender: Female  MRN: [id number]    Interval History--  Notes reviewed.     Past Medical History--  Diabetes    History of hypertension    Hypercholesterolemia    Diabetes    Hypertension    Hypercholesteremia    Wound of foot    H/O eye surgery    S/P cholecystectomy        For details regarding the patient's social history, family history, and other miscellaneous elements, please refer the initial infectious diseases consultation and/or the admitting history and physical examination for this admission.    Allergies    NovoLog (Pruritus)  Lantus (Pruritus)    Intolerances        Medications--  Antibiotics:  piperacillin/tazobactam IVPB.. 3.375 Gram(s) IV Intermittent every 8 hours  vancomycin  IVPB 1500 milliGRAM(s) IV Intermittent every 24 hours    Immunologic:    Other:  acetaminophen     Tablet .. PRN  atorvastatin  dextrose 5%.  dextrose 5%.  dextrose 50% Injectable  dextrose 50% Injectable  dextrose 50% Injectable  dextrose Oral Gel PRN  diphenhydrAMINE Elixir PRN  glucagon  Injectable  hydrALAZINE  insulin lispro (ADMELOG) corrective regimen sliding scale  melatonin PRN  metoprolol succinate ER      Review of Systems--  A 10-point review of systems was obtained.     Pertinent positives and negatives--  Constitutional: No fevers. No Chills. No Rigors.   Cardiovascular: No chest pain. No palpitations.  Respiratory: No shortness of breath. No cough.  Gastrointestinal: No nausea or vomiting. No diarrhea or constipation.   Psychiatric: Pleasant. Appropriate affect.    Review of systems otherwise negative except as previously noted.    Physical Examination--  Vital Signs: T(F): 98.2 (01-19-25 @ 04:17), Max: 98.8 (01-18-25 @ 23:29)  HR: 71 (01-19-25 @ 04:17)  BP: 156/73 (01-19-25 @ 04:17)  RR: 18 (01-19-25 @ 04:17)  SpO2: 96% (01-19-25 @ 04:17)  Wt(kg): --  General: Nontoxic-appearing Female in no acute distress.  HEENT: AT/NC. PERRL. EOMI. Anicteric. Conjunctiva pink and moist. Oropharynx clear. Dentition fair.  Neck: Not rigid. No sense of mass.  Nodes: None palpable.  Lungs: Clear bilaterally without rales, wheezing or rhonchi  Heart: Regular rate and rhythm. No Murmur. No rub. No gallop. No palpable thrill.  Abdomen: Bowel sounds present and normoactive. Soft. Nondistended. Nontender. No sense of mass. No organomegaly.  Back: No spinal tenderness. No costovertebral angle tenderness.   Extremities: No cyanosis or clubbing. No edema.   Skin: Warm. Dry. Good turgor. No rash. No vasculitic stigmata.  Psychiatric: Appropriate affect and mood for situation.         Laboratory Studies--  CBC                        10.2   6.47  )-----------( 285      ( 19 Jan 2025 06:54 )             31.5       Chemistries  01-18    140  |  105  |  58[H]  ----------------------------<  213[H]  4.3   |  24  |  1.78[H]    Ca    8.8      18 Jan 2025 06:55  Phos  4.1     01-18  Mg     2.1     01-18        Culture Data    Culture - Abscess with Gram Stain (collected 17 Jan 2025 01:11)  Source: .Abscess  Gram Stain (17 Jan 2025 12:20):    No polymorphonuclear leukocytes seen    Few Gram Positive Rods per oil power field  Preliminary Report (18 Jan 2025 16:05):    Few Pseudomonas aeruginosa    Rare Staphylococcus aureus    Commensal zeynep consistent with body site    Culture - Blood (collected 16 Jan 2025 23:05)  Source: .Blood BLOOD  Preliminary Report (19 Jan 2025 03:01):    No growth at 48 Hours    Culture - Blood (collected 16 Jan 2025 19:05)  Source: .Blood BLOOD  Preliminary Report (19 Jan 2025 01:02):    No growth at 48 Hours        < from: MR Foot No Cont, Right (01.17.25 @ 21:00) >    ACC: [id number] EXAM:  MR FOOT RT   ORDERED BY:  EVONNE AGUILAR     ACC: [id number] EXAM:  MR ANKLE RT   ORDERED BY:  KATERINA COOK     PROCEDURE DATE:  01/17/2025          INTERPRETATION:  MR ANKLE RIGHT, MR FOOT RIGHT dated 1/17/2025 8:59 PM    INDICATION: Pain and swelling    COMPARISON: Radiographs dated 1/16/2025    TECHNIQUE: Multi-sequential, multiplanar MRI imaging of the right foot   and right ankle was performed per standard protocol.    FINDINGS: Degraded by motion artifact. This is most prominent in the   forefoot.    BONE MARROW: Moderate erosive change at the first toe interphalangeal   joint with bony loss at the head of the first proximal phalanx and the   base of the first distal phalanx. There is marrow edema noted throughout   the calcaneus with a superimposed curvilinear hypointense T1 line which   extends from the posterior calcaneal facet to the anterior articular   surface of the calcaneus at the calcaneocuboid joint. There is mild   patchy marrow edema noted at the lateral tibial plafond. Thinning of the   cartilage at the posterior subtalar joint. Thinning and productive   changes at the superior talonavicular joint. Moderate cartilage loss and   dorsal spurring between the navicula and middle/medial cuneiform.   Thinning and cartilage loss at the second, third, and fourth TMT with   subchondral cyst formation.  SYNOVIUM/ JOINT FLUID: Small fluid in the first interphalangeal joint.  TENDONS: No full-thickness tendon tear or retraction.  MUSCLES: Atrophy of the musculature the foot with edema is nonspecific   but can be seen with neuropathic change  NEUROVASCULAR STRUCTURES: Preserved  SUBCUTANEOUS SOFT TISSUES: Diffuse soft tissue swelling about the foot.   Skin wound suspected over the posterior heel. Suspected skin wound at the   great toe dorsal surface. Skin thickening and small skin erosions along   the plantar surface of the great toe. No drainable collection.    IMPRESSION:    1.  Posterior heel and great toe skin wound with soft tissue swelling is   concerning for cellulitis. No drainable collection.  2.  Exuberant marrow edema with superimposed stress fracture throughout   the calcaneus contacting the distal calcaneal articular surface.   Evaluation for infection in this region is limited by the exuberant   marrow edema.  3.  Erosive changes at the great toe interphalangeal joint which   differential considerations include both infectious and inflammatory   etiologies. Correlate for an overlying skin wound which would increase   the probability of osteomyelitis in this region.    Findings were discussed with  Dr. Aguilar on 1/18/2025 10:56 AM  with read   back.    --- End of Report ---    FIDELIA MARTINEZ MD; Attending Radiologist  This document has been electronically signed. Jan18 2025 10:57AM    < end of copied text >       [facility]  Division of Infectious Diseases  [phone]    Name: STEPHANIE KAYE  Age: 64y  Gender: Female  MRN: [id number]    Covering Dr. ROSEY Daniels    Interval History--  Notes reviewed. Seen earlier today.  Had loose stool once today.  Denies any abdominal discomfort.  Denies any blood in her stool.Denies any fevers chills or rigors.  Complains of generalized itching, does not have any new rash.    Past Medical History--  Diabetes    History of hypertension    Hypercholesterolemia    Diabetes    Hypertension    Hypercholesteremia    Wound of foot    H/O eye surgery    S/P cholecystectomy        For details regarding the patient's social history, family history, and other miscellaneous elements, please refer the initial infectious diseases consultation and/or the admitting history and physical examination for this admission.    Allergies    NovoLog (Pruritus)  Lantus (Pruritus)    Intolerances        Medications--  Antibiotics:  piperacillin/tazobactam IVPB.. 3.375 Gram(s) IV Intermittent every 8 hours  vancomycin  IVPB 1500 milliGRAM(s) IV Intermittent every 24 hours    Immunologic:    Other:  acetaminophen     Tablet .. PRN  atorvastatin  dextrose 5%.  dextrose 5%.  dextrose 50% Injectable  dextrose 50% Injectable  dextrose 50% Injectable  dextrose Oral Gel PRN  diphenhydrAMINE Elixir PRN  glucagon  Injectable  hydrALAZINE  insulin lispro (ADMELOG) corrective regimen sliding scale  melatonin PRN  metoprolol succinate ER      Review of Systems--  A 10-point review of systems was obtained.   Review of systems otherwise negative except as previously noted.    Physical Examination--  Vital Signs: T(F): 98.2 (01-19-25 @ 04:17), Max: 98.8 (01-18-25 @ 23:29)  HR: 71 (01-19-25 @ 04:17)  BP: 156/73 (01-19-25 @ 04:17)  RR: 18 (01-19-25 @ 04:17)  SpO2: 96% (01-19-25 @ 04:17)  Wt(kg): --  General: Nontoxic-appearing Female in no acute distress.  HEENT: AT/NC Anicteric. Conjunctiva pink and moist. Oropharynx clear.   Neck: Not rigid. No sense of mass.  Nodes: None palpable.  Lungs: Decreased breath sounds bilaterally  Heart: Regular rate and rhythm.l.  Abdomen: Bowel sounds present and normoactive. Soft. Nondistended. Nontender. Obese.  Extremities: No cyanosis or clubbing. Lower extremity was dressed with Ace and Jonathan gauze.  Skin: Warm. Dry. Good turgor. Seborrheic dermatitis like rash on her face. No vasculitic stigmata.  Psychiatric: Appropriate affect and mood for situation.         Laboratory Studies--  CBC                        10.2   6.47  )-----------( 285      ( 19 Jan 2025 06:54 )             31.5       Chemistries  01-18    140  |  105  |  58[H]  ----------------------------<  213[H]  4.3   |  24  |  1.78[H]    Creatinine Trend  1.57 mg/dL (01-19-25 @ 06:55)  1.78 mg/dL (01-18-25 @ 06:55)  1.47 mg/dL (01-17-25 @ 07:31)  1.42 mg/dL (01-16-25 @ 19:44)      Ca    8.8      18 Jan 2025 06:55  Phos  4.1     01-18  Mg     2.1     01-18      Culture Data    Culture - Abscess with Gram Stain (collected 17 Jan 2025 01:11)  Source: .Abscess  Gram Stain (17 Jan 2025 12:20):    No polymorphonuclear leukocytes seen    Few Gram Positive Rods per oil power field  Preliminary Report (18 Jan 2025 16:05):    Few Pseudomonas aeruginosa    Rare Staphylococcus aureus    Commensal zeynep consistent with body site    Culture - Blood (collected 16 Jan 2025 23:05)  Source: .Blood BLOOD  Preliminary Report (19 Jan 2025 03:01):    No growth at 48 Hours    Culture - Blood (collected 16 Jan 2025 19:05)  Source: .Blood BLOOD  Preliminary Report (19 Jan 2025 01:02):    No growth at 48 Hours        < from: MR Foot No Cont, Right (01.17.25 @ 21:00) >    ACC: [id number] EXAM:  MR FOOT RT   ORDERED BY:  EVONNE AGUILAR     ACC: [id number] EXAM:  MR ANKLE RT   ORDERED BY:  KATERINA COOK     PROCEDURE DATE:  01/17/2025          INTERPRETATION:  MR ANKLE RIGHT, MR FOOT RIGHT dated 1/17/2025 8:59 PM    INDICATION: Pain and swelling    COMPARISON: Radiographs dated 1/16/2025    TECHNIQUE: Multi-sequential, multiplanar MRI imaging of the right foot   and right ankle was performed per standard protocol.    FINDINGS: Degraded by motion artifact. This is most prominent in the   forefoot.    BONE MARROW: Moderate erosive change at the first toe interphalangeal   joint with bony loss at the head of the first proximal phalanx and the   base of the first distal phalanx. There is marrow edema noted throughout   the calcaneus with a superimposed curvilinear hypointense T1 line which   extends from the posterior calcaneal facet to the anterior articular   surface of the calcaneus at the calcaneocuboid joint. There is mild   patchy marrow edema noted at the lateral tibial plafond. Thinning of the   cartilage at the posterior subtalar joint. Thinning and productive   changes at the superior talonavicular joint. Moderate cartilage loss and   dorsal spurring between the navicula and middle/medial cuneiform.   Thinning and cartilage loss at the second, third, and fourth TMT with   subchondral cyst formation.  SYNOVIUM/ JOINT FLUID: Small fluid in the first interphalangeal joint.  TENDONS: No full-thickness tendon tear or retraction.  MUSCLES: Atrophy of the musculature the foot with edema is nonspecific   but can be seen with neuropathic change  NEUROVASCULAR STRUCTURES: Preserved  SUBCUTANEOUS SOFT TISSUES: Diffuse soft tissue swelling about the foot.   Skin wound suspected over the posterior heel. Suspected skin wound at the   great toe dorsal surface. Skin thickening and small skin erosions along   the plantar surface of the great toe. No drainable collection.    IMPRESSION:    1.  Posterior heel and great toe skin wound with soft tissue swelling is   concerning for cellulitis. No drainable collection.  2.  Exuberant marrow edema with superimposed stress fracture throughout   the calcaneus contacting the distal calcaneal articular surface.   Evaluation for infection in this region is limited by the exuberant   marrow edema.  3.  Erosive changes at the great toe interphalangeal joint which   differential considerations include both infectious and inflammatory   etiologies. Correlate for an overlying skin wound which would increase   the probability of osteomyelitis in this region.    Findings were discussed with  Dr. Aguilar on 1/18/2025 10:56 AM  with read   back.    --- End of Report ---    FIDELIA MARTINEZ MD; Attending Radiologist  This document has been electronically signed. Jan18 2025 10:57AM    < end of copied text >

## 2025-01-19 NOTE — DISCHARGE NOTE PROVIDER - NSDCFUSCHEDAPPT_GEN_ALL_CORE_FT
Amee Reddy  [facility]  [address]  Scheduled Appointment: 02/13/2025

## 2025-01-20 LAB
-  AMPICILLIN: SIGNIFICANT CHANGE UP
-  VANCOMYCIN: SIGNIFICANT CHANGE UP
ALBUMIN SERPL ELPH-MCNC: 3.2 G/DL — LOW (ref 3.3–5)
ALP SERPL-CCNC: 89 U/L — SIGNIFICANT CHANGE UP (ref 40–120)
ALT FLD-CCNC: 12 U/L — SIGNIFICANT CHANGE UP (ref 10–45)
ANION GAP SERPL CALC-SCNC: 12 MMOL/L — SIGNIFICANT CHANGE UP (ref 5–17)
AST SERPL-CCNC: 13 U/L — SIGNIFICANT CHANGE UP (ref 10–40)
BILIRUB SERPL-MCNC: 0.3 MG/DL — SIGNIFICANT CHANGE UP (ref 0.2–1.2)
BUN SERPL-MCNC: 44 MG/DL — HIGH (ref 7–23)
CALCIUM SERPL-MCNC: 9 MG/DL — SIGNIFICANT CHANGE UP (ref 8.4–10.5)
CHLORIDE SERPL-SCNC: 104 MMOL/L — SIGNIFICANT CHANGE UP (ref 96–108)
CO2 SERPL-SCNC: 24 MMOL/L — SIGNIFICANT CHANGE UP (ref 22–31)
CREAT SERPL-MCNC: 1.75 MG/DL — HIGH (ref 0.5–1.3)
EGFR: 32 ML/MIN/1.73M2 — LOW
GLUCOSE BLDC GLUCOMTR-MCNC: 209 MG/DL — HIGH (ref 70–99)
GLUCOSE BLDC GLUCOMTR-MCNC: 216 MG/DL — HIGH (ref 70–99)
GLUCOSE BLDC GLUCOMTR-MCNC: 240 MG/DL — HIGH (ref 70–99)
GLUCOSE BLDC GLUCOMTR-MCNC: 272 MG/DL — HIGH (ref 70–99)
GLUCOSE BLDC GLUCOMTR-MCNC: 281 MG/DL — HIGH (ref 70–99)
GLUCOSE SERPL-MCNC: 214 MG/DL — HIGH (ref 70–99)
METHOD TYPE: SIGNIFICANT CHANGE UP
POTASSIUM SERPL-MCNC: 3.8 MMOL/L — SIGNIFICANT CHANGE UP (ref 3.5–5.3)
POTASSIUM SERPL-SCNC: 3.8 MMOL/L — SIGNIFICANT CHANGE UP (ref 3.5–5.3)
PROT SERPL-MCNC: 7 G/DL — SIGNIFICANT CHANGE UP (ref 6–8.3)
SODIUM SERPL-SCNC: 140 MMOL/L — SIGNIFICANT CHANGE UP (ref 135–145)

## 2025-01-20 PROCEDURE — 99232 SBSQ HOSP IP/OBS MODERATE 35: CPT | Mod: GC

## 2025-01-20 RX ORDER — DIPHENHYDRAMINE HCL 25 MG
12.5 CAPSULE ORAL ONCE
Refills: 0 | Status: DISCONTINUED | OUTPATIENT
Start: 2025-01-20 | End: 2025-01-24

## 2025-01-20 RX ORDER — INSULIN GLARGINE-YFGN 100 [IU]/ML
10 INJECTION, SOLUTION SUBCUTANEOUS AT BEDTIME
Refills: 0 | Status: DISCONTINUED | OUTPATIENT
Start: 2025-01-20 | End: 2025-01-21

## 2025-01-20 RX ORDER — LINAGLIPTIN 5 MG/1
5 TABLET, FILM COATED ORAL DAILY
Refills: 0 | Status: DISCONTINUED | OUTPATIENT
Start: 2025-01-20 | End: 2025-01-24

## 2025-01-20 RX ADMIN — ATORVASTATIN CALCIUM 40 MILLIGRAM(S): 80 TABLET, FILM COATED ORAL at 21:17

## 2025-01-20 RX ADMIN — Medication 50 MILLIGRAM(S): at 05:23

## 2025-01-20 RX ADMIN — Medication 3: at 18:34

## 2025-01-20 RX ADMIN — INSULIN GLARGINE-YFGN 10 UNIT(S): 100 INJECTION, SOLUTION SUBCUTANEOUS at 22:47

## 2025-01-20 RX ADMIN — PIPERACILLIN SODIUM AND TAZOBACTAM SODIUM 25 GRAM(S): 2; 250 INJECTION, POWDER, FOR SOLUTION INTRAVENOUS at 21:17

## 2025-01-20 RX ADMIN — Medication 75 MILLIGRAM(S): at 16:10

## 2025-01-20 RX ADMIN — VANCOMYCIN HYDROCHLORIDE 300 MILLIGRAM(S): KIT at 01:43

## 2025-01-20 RX ADMIN — ACETAMINOPHEN 650 MILLIGRAM(S): 160 SUSPENSION ORAL at 22:47

## 2025-01-20 RX ADMIN — Medication 75 MILLIGRAM(S): at 05:22

## 2025-01-20 RX ADMIN — PIPERACILLIN SODIUM AND TAZOBACTAM SODIUM 25 GRAM(S): 2; 250 INJECTION, POWDER, FOR SOLUTION INTRAVENOUS at 05:22

## 2025-01-20 RX ADMIN — Medication 2: at 08:13

## 2025-01-20 RX ADMIN — PIPERACILLIN SODIUM AND TAZOBACTAM SODIUM 25 GRAM(S): 2; 250 INJECTION, POWDER, FOR SOLUTION INTRAVENOUS at 12:58

## 2025-01-20 RX ADMIN — Medication 75 MILLIGRAM(S): at 21:17

## 2025-01-20 RX ADMIN — ACETAMINOPHEN 650 MILLIGRAM(S): 160 SUSPENSION ORAL at 23:50

## 2025-01-20 RX ADMIN — Medication 2: at 12:58

## 2025-01-20 RX ADMIN — LINAGLIPTIN 5 MILLIGRAM(S): 5 TABLET, FILM COATED ORAL at 22:00

## 2025-01-20 NOTE — CONSULT NOTE ADULT - SUBJECTIVE AND OBJECTIVE BOX
Vascular Surgery Consult  Consulting attending: Dr. Zaidi    HPI:  64F hx DM, HTN, HLD, b/l heel wound c/b OM s/p debridement w/ R foot graft, and CKD2 who presents from her podiatrist's office for concern of acute on chronic osteomyelitis of the right heel. She has been foot pain which has been progressively worsening since she last saw her podiatrist Dr. Flores on 1/8. This has been accompanied with swelling of the outside of the right foot as well as the right heel. She went to her podiatrist's office, saw Dr. Larose, and was told to present ED to rule out osteomyelitis and get IV antibiotics. At [facility], CRP elevated to 28. XR of the R foot showing a soft tissue ulcer at the plantar aspect of the calcaneus with mild adjacent erosive changes at the base of the calcaneus, questionable focal gas tracking and stippled opacities which may represent bone fragments versus radiopaque foreign bodies, with concern of osteomyelitis. Worsening juxta-articular erosive changes seen at the IP joint of the first digit. Received Vanc/Zosyn in the ED. Podiatry not planning intervention at this time, however recommended vascular surgery consult.      PAST MEDICAL HISTORY:  Diabetes    History of hypertension    Hypercholesterolemia    Diabetes    Hypertension    Hypercholesteremia    Wound of foot        PAST SURGICAL HISTORY:  H/O eye surgery    S/P cholecystectomy        MEDICATIONS:  acetaminophen     Tablet .. 650 milliGRAM(s) Oral every 6 hours PRN  atorvastatin 40 milliGRAM(s) Oral at bedtime  dextrose 5%. 1000 milliLiter(s) IV Continuous <Continuous>  dextrose 5%. 1000 milliLiter(s) IV Continuous <Continuous>  dextrose 50% Injectable 25 Gram(s) IV Push once  dextrose 50% Injectable 12.5 Gram(s) IV Push once  dextrose 50% Injectable 25 Gram(s) IV Push once  dextrose Oral Gel 15 Gram(s) Oral once PRN  diphenhydrAMINE Elixir 12.5 milliGRAM(s) Oral three times a day PRN  diphenhydrAMINE Elixir 12.5 milliGRAM(s) Oral once PRN  glucagon  Injectable 1 milliGRAM(s) IntraMuscular once  hydrALAZINE 75 milliGRAM(s) Oral three times a day  insulin lispro (ADMELOG) corrective regimen sliding scale   SubCutaneous three times a day before meals  melatonin 3 milliGRAM(s) Oral at bedtime PRN  metoprolol succinate ER 50 milliGRAM(s) Oral daily  piperacillin/tazobactam IVPB.. 3.375 Gram(s) IV Intermittent every 8 hours  vancomycin  IVPB 1500 milliGRAM(s) IV Intermittent every 24 hours      ALLERGIES:  NovoLog (Pruritus)  Lantus (Pruritus)      VITALS & I/Os:  Vital Signs Last 24 Hrs  T(C): 36.4 (20 Jan 2025 10:26), Max: 37.3 (19 Jan 2025 16:39)  T(F): 97.5 (20 Jan 2025 10:26), Max: 99.1 (19 Jan 2025 16:39)  HR: 70 (20 Jan 2025 10:26) (65 - 74)  BP: 157/78 (20 Jan 2025 10:26) (118/69 - 157/78)  BP(mean): --  RR: 18 (20 Jan 2025 10:26) (18 - 18)  SpO2: 95% (20 Jan 2025 10:26) (94% - 96%)    Parameters below as of 20 Jan 2025 10:26  Patient On (Oxygen Delivery Method): room air        I&O's Summary      PHYSICAL EXAM:  General: Not acutely distressed  Respiratory: Nonlabored respirations  Cardiovascular: Pulse present  Abdominal: No palpable thrill  Extremities: Warm  Vascular:     LABS:                        10.2   6.47  )-----------( 285      ( 19 Jan 2025 06:54 )             31.5     01-20    140  |  104  |  44[H]  ----------------------------<  214[H]  3.8   |  24  |  1.75[H]    Ca    9.0      20 Jan 2025 07:28    TPro  7.0  /  Alb  3.2[L]  /  TBili  0.3  /  DBili  x   /  AST  13  /  ALT  12  /  AlkPhos  89  01-20    Lactate:              Urinalysis Basic - ( 20 Jan 2025 07:28 )    Color: x / Appearance: x / SG: x / pH: x  Gluc: 214 mg/dL / Ketone: x  / Bili: x / Urobili: x   Blood: x / Protein: x / Nitrite: x   Leuk Esterase: x / RBC: x / WBC x   Sq Epi: x / Non Sq Epi: x / Bacteria: x        IMAGING:                                                                                               Vascular Surgery Consult  Consulting attending: Dr. Zaidi    HPI:  64F hx DM, HTN, HLD, b/l heel wound c/b OM s/p debridement w/ R foot graft, and CKD2 who presents from her podiatrist's office for concern of acute on chronic osteomyelitis of the right heel. She has been foot pain which has been progressively worsening since she last saw her podiatrist Dr. Flores on 1/8. This has been accompanied with swelling of the outside of the right foot as well as the right heel. She went to her podiatrist's office, saw Dr. Larose, and was told to present ED to rule out osteomyelitis and get IV antibiotics. At [facility], CRP elevated to 28. XR of the R foot showing a soft tissue ulcer at the plantar aspect of the calcaneus with mild adjacent erosive changes at the base of the calcaneus, questionable focal gas tracking and stippled opacities which may represent bone fragments versus radiopaque foreign bodies, with concern of osteomyelitis. Worsening juxta-articular erosive changes seen at the IP joint of the first digit. Received Vanc/Zosyn in the ED. Podiatry not planning intervention at this time, however recommended vascular surgery consult.    Per patient, she has seen two different vascular surgeons in the past, and she has had at least one angiogram in each lower extremity to "open up" her arteries, as well as likely vein ablations, anteriorly and posteriorly on the RLE, and anteriorly on the LLE. She reports she has had chronic edema and weeping of her bl LEs since 1996, when she banged her right leg, but her left leg has been more weepy of late. She also has some chronic numbness bl, as well as pain. In her left foot she experiences intermittent pain which she describes as feeling like electric shocks, onset by movement. On her right she has heel pain when she ambulates, and more intermittent right lateral foot pain. She ambulates with a walker.    PAST MEDICAL HISTORY:  Diabetes    History of hypertension    Hypercholesterolemia    Diabetes    Hypertension    Hypercholesteremia    Wound of foot        PAST SURGICAL HISTORY:  H/O eye surgery    S/P cholecystectomy        MEDICATIONS:  acetaminophen     Tablet .. 650 milliGRAM(s) Oral every 6 hours PRN  atorvastatin 40 milliGRAM(s) Oral at bedtime  dextrose 5%. 1000 milliLiter(s) IV Continuous <Continuous>  dextrose 5%. 1000 milliLiter(s) IV Continuous <Continuous>  dextrose 50% Injectable 25 Gram(s) IV Push once  dextrose 50% Injectable 12.5 Gram(s) IV Push once  dextrose 50% Injectable 25 Gram(s) IV Push once  dextrose Oral Gel 15 Gram(s) Oral once PRN  diphenhydrAMINE Elixir 12.5 milliGRAM(s) Oral three times a day PRN  diphenhydrAMINE Elixir 12.5 milliGRAM(s) Oral once PRN  glucagon  Injectable 1 milliGRAM(s) IntraMuscular once  hydrALAZINE 75 milliGRAM(s) Oral three times a day  insulin lispro (ADMELOG) corrective regimen sliding scale   SubCutaneous three times a day before meals  melatonin 3 milliGRAM(s) Oral at bedtime PRN  metoprolol succinate ER 50 milliGRAM(s) Oral daily  piperacillin/tazobactam IVPB.. 3.375 Gram(s) IV Intermittent every 8 hours  vancomycin  IVPB 1500 milliGRAM(s) IV Intermittent every 24 hours      ALLERGIES:  NovoLog (Pruritus)  Lantus (Pruritus)      VITALS & I/Os:  Vital Signs Last 24 Hrs  T(C): 36.4 (20 Jan 2025 10:26), Max: 37.3 (19 Jan 2025 16:39)  T(F): 97.5 (20 Jan 2025 10:26), Max: 99.1 (19 Jan 2025 16:39)  HR: 70 (20 Jan 2025 10:26) (65 - 74)  BP: 157/78 (20 Jan 2025 10:26) (118/69 - 157/78)  BP(mean): --  RR: 18 (20 Jan 2025 10:26) (18 - 18)  SpO2: 95% (20 Jan 2025 10:26) (94% - 96%)    Parameters below as of 20 Jan 2025 10:26  Patient On (Oxygen Delivery Method): room air        I&O's Summary      PHYSICAL EXAM:  General: Not acutely distressed  Respiratory: Nonlabored respirations  Cardiovascular: Pulse present  Abdominal: No palpable thrill  Extremities: Warm, chronic dry scaling of bl lower extremities with right heel wound to dermis w/ granulation tissue no discharge. Mild bl foot numbness, sensation otherwise intact. Motor function intact  Vascular: L DP palpable, R DP dopplerable, bl PT/popliteal dopplerable. Bl femoral arteries palpable    LABS:                        10.2   6.47  )-----------( 285      ( 19 Jan 2025 06:54 )             31.5     01-20    140  |  104  |  44[H]  ----------------------------<  214[H]  3.8   |  24  |  1.75[H]    Ca    9.0      20 Jan 2025 07:28    TPro  7.0  /  Alb  3.2[L]  /  TBili  0.3  /  DBili  x   /  AST  13  /  ALT  12  /  AlkPhos  89  01-20    Lactate:              Urinalysis Basic - ( 20 Jan 2025 07:28 )    Color: x / Appearance: x / SG: x / pH: x  Gluc: 214 mg/dL / Ketone: x  / Bili: x / Urobili: x   Blood: x / Protein: x / Nitrite: x   Leuk Esterase: x / RBC: x / WBC x   Sq Epi: x / Non Sq Epi: x / Bacteria: x        IMAGING:  < from: [facility] Physiol Extremity Lower 3+ Level, BI (01.17.25 @ 13:05) >    ACC: [id number] EXAM:  PHYSIOL EXTREM LOW 3+ LEV BI   ORDERED BY:  EVONNE AGUILAR     PROCEDURE DATE:  01/17/2025          INTERPRETATION:  CLINICAL INFORMATION: 64-year-old with bilateral leg   swelling.    COMPARISON: 8/18/2023    TECHNIQUE: Duplex evaluation of the deep venous system of the right and   left lower extremity was performed to include the greater and lesser   saphenous veins.    FINDINGS:    Both common femoral, superficial femoral and popliteal veins are patent   and compressible without evidence of thrombus.    There is limited visualization of the calf veins.    IMPRESSION: No evidence of DVT in either lower extremity in the   visualized venous segments.    There is limited visualization of the calf veins.    --- End of Report ---            GREGORY DE LA CRUZ MD; Attending Radiologist  This document has been electronically signed. Jan 17 2025  2:56PM    < end of copied text >

## 2025-01-20 NOTE — PROGRESS NOTE ADULT - SUBJECTIVE AND OBJECTIVE BOX
[facility] Division of Hospital Medicine  Brigid Greenberg DO  Available via MS Teams      SUBJECTIVE / OVERNIGHT EVENTS:No acute events overnight. +pruritis with insulin.     ADDITIONAL REVIEW OF SYSTEMS: ROS negative        I&O's Summary      PHYSICAL EXAM:  Vital Signs Last 24 Hrs  T(C): 36.4 (20 Jan 2025 10:26), Max: 37.3 (19 Jan 2025 16:39)  T(F): 97.5 (20 Jan 2025 10:26), Max: 99.1 (19 Jan 2025 16:39)  HR: 70 (20 Jan 2025 10:26) (65 - 74)  BP: 157/78 (20 Jan 2025 10:26) (118/69 - 157/78)  BP(mean): --  RR: 18 (20 Jan 2025 10:26) (18 - 18)  SpO2: 95% (20 Jan 2025 10:26) (94% - 96%)    Parameters below as of 20 Jan 2025 10:26  Patient On (Oxygen Delivery Method): room air      CONSTITUTIONAL: NAD  NECK: Supple  RESPIRATORY: Normal respiratory effort; lungs are clear to auscultation bilaterally  CARDIOVASCULAR: Regular rate and rhythm, normal S1 and S2  ABDOMEN: NT/ND.+BS  PSYCH: A+O to person, place, and time  MSK: B/l lower legs wrapped Extremities warm x4    LABS:                                              10.2   6.47  )-----------( 285      ( 19 Jan 2025 06:54 )             31.5   01-20    140  |  104  |  44[H]  ----------------------------<  214[H]  3.8   |  24  |  1.75[H]    Ca    9.0      20 Jan 2025 07:28    TPro  7.0  /  Alb  3.2[L]  /  TBili  0.3  /  DBili  x   /  AST  13  /  ALT  12  /  AlkPhos  89  01-20          Culture - Abscess with Gram Stain (collected 17 Jan 2025 01:11)  Source: .Abscess  Gram Stain (17 Jan 2025 12:20):    No polymorphonuclear leukocytes seen    Few Gram Positive Rods per oil power field  Preliminary Report (19 Jan 2025 11:59):    Few Pseudomonas aeruginosa    Rare Staphylococcus aureus    Rare Enterococcus faecalis    Commensal zeynep consistent with body site  Organism: Pseudomonas aeruginosa  Staphylococcus aureus (19 Jan 2025 11:57)  Organism: Staphylococcus aureus (19 Jan 2025 11:57)  Organism: Pseudomonas aeruginosa (19 Jan 2025 11:56)    Culture - Blood (collected 16 Jan 2025 23:05)  Source: .Blood BLOOD  Preliminary Report (19 Jan 2025 03:01):    No growth at 48 Hours    Culture - Blood (collected 16 Jan 2025 19:05)  Source: .Blood BLOOD  Preliminary Report (19 Jan 2025 01:02):    No growth at 48 Hours

## 2025-01-20 NOTE — CONSULT NOTE ADULT - ASSESSMENT
64F w/ right heel wound and chronic venous stasis disease.     Recommendations:  -Bl VIKY/PVR w/ toe pressures.  -Outpatient follow-up for venous studies.    Discussed with vascular surgery fellow on call on behalf of Dr. Zaidi.    Vascular Surgery  Please page [phone] for all questions.

## 2025-01-20 NOTE — PROGRESS NOTE ADULT - PROBLEM SELECTOR PLAN 1
- MR not conclusive but suggestive of OM  -continue vanc/zosyn, noted cultures for pseudomonas and Staph aureus.  -spoke with vascular lab, VIKY/PVR WAS perforemd 1/17, images did not go over.   -they are attempting to speak to radiology to addend their report. in VERBAL discussion, VIKY was 1.3, TBI also 1.3.  -vascular consult to be placed 1/20  -follow up podiatry regarding plan for surgical procedure

## 2025-01-20 NOTE — PROGRESS NOTE ADULT - PROBLEM SELECTOR PLAN 3
- Patient states she stopped taking Lantus after charli due to always having itching with it, and states she had the same reaction w/ Novolog, which was stopped a year ago; states she hasn't had itching since stopping Lantus  - Supposed to be on Jardiance 10 + Repaglinide 1mg TID, hasn't started jardiance yet  - still with pruritis at this time with humoilog  -will ask endocrine for any alternative agents given hyperglycemic but having AE from humolog.

## 2025-01-21 LAB
ANION GAP SERPL CALC-SCNC: 10 MMOL/L — SIGNIFICANT CHANGE UP (ref 5–17)
BUN SERPL-MCNC: 40 MG/DL — HIGH (ref 7–23)
CALCIUM SERPL-MCNC: 9.3 MG/DL — SIGNIFICANT CHANGE UP (ref 8.4–10.5)
CHLORIDE SERPL-SCNC: 104 MMOL/L — SIGNIFICANT CHANGE UP (ref 96–108)
CO2 SERPL-SCNC: 24 MMOL/L — SIGNIFICANT CHANGE UP (ref 22–31)
CREAT SERPL-MCNC: 1.72 MG/DL — HIGH (ref 0.5–1.3)
EGFR: 32 ML/MIN/1.73M2 — LOW
GLUCOSE BLDC GLUCOMTR-MCNC: 214 MG/DL — HIGH (ref 70–99)
GLUCOSE BLDC GLUCOMTR-MCNC: 244 MG/DL — HIGH (ref 70–99)
GLUCOSE BLDC GLUCOMTR-MCNC: 248 MG/DL — HIGH (ref 70–99)
GLUCOSE BLDC GLUCOMTR-MCNC: 314 MG/DL — HIGH (ref 70–99)
GLUCOSE SERPL-MCNC: 252 MG/DL — HIGH (ref 70–99)
HCT VFR BLD CALC: 31.3 % — LOW (ref 34.5–45)
HGB BLD-MCNC: 10.1 G/DL — LOW (ref 11.5–15.5)
MCHC RBC-ENTMCNC: 26.6 PG — LOW (ref 27–34)
MCHC RBC-ENTMCNC: 32.3 G/DL — SIGNIFICANT CHANGE UP (ref 32–36)
MCV RBC AUTO: 82.6 FL — SIGNIFICANT CHANGE UP (ref 80–100)
NRBC # BLD: 0 /100 WBCS — SIGNIFICANT CHANGE UP (ref 0–0)
NRBC BLD-RTO: 0 /100 WBCS — SIGNIFICANT CHANGE UP (ref 0–0)
PLATELET # BLD AUTO: 256 K/UL — SIGNIFICANT CHANGE UP (ref 150–400)
POTASSIUM SERPL-MCNC: 3.6 MMOL/L — SIGNIFICANT CHANGE UP (ref 3.5–5.3)
POTASSIUM SERPL-SCNC: 3.6 MMOL/L — SIGNIFICANT CHANGE UP (ref 3.5–5.3)
RBC # BLD: 3.79 M/UL — LOW (ref 3.8–5.2)
RBC # FLD: 14.2 % — SIGNIFICANT CHANGE UP (ref 10.3–14.5)
SODIUM SERPL-SCNC: 138 MMOL/L — SIGNIFICANT CHANGE UP (ref 135–145)
VANCOMYCIN TROUGH SERPL-MCNC: 18.1 UG/ML — SIGNIFICANT CHANGE UP (ref 10–20)
WBC # BLD: 6.56 K/UL — SIGNIFICANT CHANGE UP (ref 3.8–10.5)
WBC # FLD AUTO: 6.56 K/UL — SIGNIFICANT CHANGE UP (ref 3.8–10.5)

## 2025-01-21 PROCEDURE — 99232 SBSQ HOSP IP/OBS MODERATE 35: CPT

## 2025-01-21 PROCEDURE — 99233 SBSQ HOSP IP/OBS HIGH 50: CPT

## 2025-01-21 PROCEDURE — G0545: CPT

## 2025-01-21 PROCEDURE — 99223 1ST HOSP IP/OBS HIGH 75: CPT

## 2025-01-21 PROCEDURE — 93923 UPR/LXTR ART STDY 3+ LVLS: CPT | Mod: 26

## 2025-01-21 RX ORDER — PIPERACILLIN SODIUM AND TAZOBACTAM SODIUM 2; 250 G/50ML; MG/50ML
3.38 INJECTION, POWDER, FOR SOLUTION INTRAVENOUS EVERY 8 HOURS
Refills: 0 | Status: COMPLETED | OUTPATIENT
Start: 2025-01-21 | End: 2025-01-23

## 2025-01-21 RX ORDER — INSULIN NPH HUMAN ISOPHANE 100/ML (3)
7 INSULIN PEN (ML) SUBCUTANEOUS EVERY 12 HOURS
Refills: 0 | Status: DISCONTINUED | OUTPATIENT
Start: 2025-01-21 | End: 2025-01-24

## 2025-01-21 RX ORDER — HEPARIN SODIUM,PORCINE 10000/ML
5000 VIAL (ML) INJECTION EVERY 8 HOURS
Refills: 0 | Status: DISCONTINUED | OUTPATIENT
Start: 2025-01-21 | End: 2025-01-24

## 2025-01-21 RX ORDER — CETIRIZINE HCL 10 MG
10 TABLET ORAL DAILY
Refills: 0 | Status: DISCONTINUED | OUTPATIENT
Start: 2025-01-21 | End: 2025-01-24

## 2025-01-21 RX ADMIN — Medication 4: at 18:50

## 2025-01-21 RX ADMIN — PIPERACILLIN SODIUM AND TAZOBACTAM SODIUM 25 GRAM(S): 2; 250 INJECTION, POWDER, FOR SOLUTION INTRAVENOUS at 05:48

## 2025-01-21 RX ADMIN — Medication 12.5 MILLIGRAM(S): at 12:03

## 2025-01-21 RX ADMIN — Medication 75 MILLIGRAM(S): at 05:49

## 2025-01-21 RX ADMIN — PIPERACILLIN SODIUM AND TAZOBACTAM SODIUM 25 GRAM(S): 2; 250 INJECTION, POWDER, FOR SOLUTION INTRAVENOUS at 12:39

## 2025-01-21 RX ADMIN — Medication 2: at 08:08

## 2025-01-21 RX ADMIN — Medication 10 MILLIGRAM(S): at 22:16

## 2025-01-21 RX ADMIN — Medication 50 MILLIGRAM(S): at 05:48

## 2025-01-21 RX ADMIN — VANCOMYCIN HYDROCHLORIDE 300 MILLIGRAM(S): KIT at 01:54

## 2025-01-21 RX ADMIN — Medication 75 MILLIGRAM(S): at 17:18

## 2025-01-21 RX ADMIN — Medication 2: at 12:32

## 2025-01-21 RX ADMIN — ATORVASTATIN CALCIUM 40 MILLIGRAM(S): 80 TABLET, FILM COATED ORAL at 22:16

## 2025-01-21 RX ADMIN — Medication 75 MILLIGRAM(S): at 22:16

## 2025-01-21 RX ADMIN — PIPERACILLIN SODIUM AND TAZOBACTAM SODIUM 25 GRAM(S): 2; 250 INJECTION, POWDER, FOR SOLUTION INTRAVENOUS at 22:15

## 2025-01-21 RX ADMIN — LINAGLIPTIN 5 MILLIGRAM(S): 5 TABLET, FILM COATED ORAL at 11:55

## 2025-01-21 NOTE — PROGRESS NOTE ADULT - PROBLEM SELECTOR PLAN 3
- Patient states she stopped taking Lantus after charli due to always having itching with it, and states she had the same reaction w/ Novolog, which was stopped a year ago; states she hasn't had itching since stopping Lantus  - Supposed to be on Jardiance 10 + Repaglinide 1mg TID, hasn't started jardiance yet  - still with pruritis at this time with humalog  - Endo consulted for assistance, recs appreciated  - c/w regular insulin sliding scale  - lantus changed to NPH 7units q12h as per Endo recs  - monitor FS qAC + qHS  - start cetirizine daily as per Endo recs

## 2025-01-21 NOTE — PROGRESS NOTE ADULT - ASSESSMENT
63 y/o woman with   T2DM (1/17/24 A1C = 7.9%), obesity BMI = 42.5, HTN, HLD, b/l heel wound c/b OM s/p debridement w/ R foot graft, and CKD2 admitted 1/16/24 with concern for osteomyelitis and worsening right foot wounds  1/17 -- no evidence of DVTs; MRI demonstrates stress fracture in calcaneous with exuberant reaction  1/16 ESR/CRP = 120/28    h/o MRSA colonization  7/27/23 wound culture isolates were susceptible to Zosyn/Vanco: Klebs pn, Moranella, Enterococcus Feacalis, MRSAm Corynebacterium    1/21 discussed with Podiatry resident at bedside and image of heel wound reviewed  - wound improved, closed, no malodour, very low concern for oteomyelitis    Antibiotics  Vancomycin 1/16 -->  Zosyn 1/16 -->      Suggest  STOP Vanco  Continue Zosyn through 1/23    should pt have offloading of right calcaneous for stress fracture?

## 2025-01-21 NOTE — PROGRESS NOTE ADULT - ASSESSMENT
63 y/o female w/ PMHx  T2DM, HTN, HLD, b/l heel wound c/b OM s/p debridement w/ R foot graft, and CKD2 who presents from her podiatrist's office for concern of acute on chronic osteomyelitis of the right heel. XR suspicious for R calcaneal OM. Admitted for IV antibiotics and wound infection.

## 2025-01-21 NOTE — PROGRESS NOTE ADULT - PROBLEM SELECTOR PLAN 1
- MR not conclusive but suggestive of OM  - cultures growing multiple organisms: staph aureus, psuedomonas, and e. faecalis  - Podiatry and ID consult recs appreciated  - stopped vanco on 1/21 as per ID  - c/w IV zosyn through 1/23  - Vascular surgery f/u re: VIKY/PVR  - no plans for intervention per Podiatry

## 2025-01-21 NOTE — PROGRESS NOTE ADULT - SUBJECTIVE AND OBJECTIVE BOX
Follow Up: heel infection     Interval History/ROS: concerned about the potential for recurrence    Allergies  NovoLog (Pruritus)  Lantus (Pruritus)    ANTIMICROBIALS:  piperacillin/tazobactam IVPB.. 3.375 every 8 hours  vancomycin  IVPB 1500 every 24 hours      OTHER MEDS:  MEDICATIONS  (STANDING):  acetaminophen     Tablet .. 650 every 6 hours PRN  atorvastatin 40 at bedtime  diphenhydrAMINE Elixir 12.5 three times a day PRN  diphenhydrAMINE Elixir 12.5 once PRN  hydrALAZINE 75 three times a day  insulin glargine Injectable (LANTUS) 10 at bedtime  insulin regular  human corrective regimen sliding scale  three times a day before meals  insulin regular  human corrective regimen sliding scale  at bedtime  linagliptin 5 daily  melatonin 3 at bedtime PRN  metoprolol succinate ER 50 daily      Vital Signs Last 24 Hrs  T(C): 36.8 (21 Jan 2025 10:35), Max: 36.9 (21 Jan 2025 04:28)  T(F): 98.2 (21 Jan 2025 10:35), Max: 98.4 (21 Jan 2025 04:28)  HR: 66 (21 Jan 2025 10:35) (66 - 71)  BP: 130/73 (21 Jan 2025 10:35) (130/73 - 158/76)  BP(mean): --  RR: 18 (21 Jan 2025 10:35) (18 - 18)  SpO2: 95% (21 Jan 2025 10:35) (92% - 95%)    Parameters below as of 21 Jan 2025 10:35  Patient On (Oxygen Delivery Method): room air        PHYSICAL EXAM:  General: WN/WD NAD, Non-toxic  Neurology: A&Ox3, nonfocal  Respiratory: Clear to auscultation bilaterally  CV: RRR, S1S2, no murmurs, rubs or gallops  Abdominal: Soft, Non-tender, non-distended, normal bowel sounds  Extremities: No edema  Line Sites: Clear  Skin: No rash                          10.1   6.56  )-----------( 256      ( 21 Jan 2025 01:23 )             31.3       01-21    138  |  104  |  40[H]  ----------------------------<  252[H]  3.6   |  24  |  1.72[H]    Ca    9.3      21 Jan 2025 01:23    TPro  7.0  /  Alb  3.2[L]  /  TBili  0.3  /  DBili  x   /  AST  13  /  ALT  12  /  AlkPhos  89  01-20      MICROBIOLOGY:  .Abscess  01-17-25   Few Pseudomonas aeruginosa  SUSC to all tested  Rare Staphylococcus aureus  = MSSA  Rare Enterococcus faecalis  SUSC to AMP        .Blood BLOOD  01-16-25   No growth at 4 days  --  --      .Blood BLOOD  01-16-25   No growth at 4 days  --  --    Vancomycin Level, Trough: 18.1 ug/mL (01-21-25 @ 01:23)    RADIOLOGY:  < from: [facility] Physiol Extremity Lower 3+ Level, BI (01.21.25 @ 10:42) >  FINDINGS:    Both common femoral, superficial femoral and popliteal veins are patent   and compressible without evidence of thrombus.    There is limited visualization of the calf veins.    IMPRESSION: No evidence of DVT in either lower extremity in the   visualized venous segments.    There is limited visualization of the calf veins.    < end of copied text >  < from: VA Physiol Extremity Lower 3+ Level, BI (01.21.25 @ 08:26) >  FINDINGS /  IMPRESSION:    Right VIKY is 1.33. Left VIKY is 1.36. Right TBI is 1.17. Left TBI is 1.27.    Bilateral ABIs and TBIs are elevated, possibly in the setting of   noncompressible vasculature.    Segmental pressures were not obtained due to vessel calcification.    PVR waveforms are nonrevealing. Correlate with arterial duplex ultrasound   as clinically warranted due to limitations of the study.    < end of copied text >  < from: MR Foot No Cont, Right (01.17.25 @ 21:00) >  FINDINGS: Degraded by motion artifact. This is most prominent in the   forefoot.    BONE MARROW: Moderate erosive change at the first toe interphalangeal   joint with bony loss at the head of the first proximal phalanx and the   base of the first distal phalanx. There is marrow edema noted throughout   the calcaneus with a superimposed curvilinear hypointense T1 line which   extends from the posterior calcaneal facet to the anterior articular   surface of the calcaneus at the calcaneocuboid joint. There is mild   patchy marrow edema noted at the lateral tibial plafond. Thinning of the   cartilage at the posterior subtalar joint. Thinning and productive   changes at the superior talonavicular joint. Moderate cartilage loss and   dorsal spurring between the navicula and middle/medial cuneiform.   Thinning and cartilage loss at the second, third, and fourth TMT with   subchondral cyst formation.  SYNOVIUM/ JOINT FLUID: Small fluid in the first interphalangeal joint.  TENDONS: No full-thickness tendon tear or retraction.  MUSCLES: Atrophy of the musculature the foot with edema is nonspecific   but can be seen with neuropathic change  NEUROVASCULAR STRUCTURES: Preserved  SUBCUTANEOUS SOFT TISSUES: Diffuse soft tissue swelling about the foot.   Skin wound suspected over the posterior heel. Suspected skin wound at the   great toe dorsal surface. Skin thickening and small skin erosions along   the plantar surface of the great toe. No drainable collection.    IMPRESSION:    1.  Posterior heel and great toe skin wound with soft tissue swelling is   concerning for cellulitis. No drainable collection.  2.  Exuberant marrow edema with superimposed stress fracture throughout   the calcaneus contacting the distal calcaneal articular surface.   Evaluation for infection in this region is limited by the exuberant   marrow edema.  3.  Erosive changes at the great toe interphalangeal joint which   differential considerations include both infectious and inflammatory   etiologies. Correlate for an overlying skin wound which would increase   the probability of osteomyelitis in this region.    < end of copied text >      Mikael Daniels MD; Division of Infectious Disease; Pager: [phone]; nights and weekends: [phone]

## 2025-01-21 NOTE — PROGRESS NOTE ADULT - SUBJECTIVE AND OBJECTIVE BOX
Patient is a 64y old  Female who presents with a chief complaint of Concern for osteomyelitis (21 Jan 2025 10:29)       INTERVAL HPI/OVERNIGHT EVENTS:  Patient seen and evaluated at bedside.  Pt is resting comfortable in NAD. Denies N/V/F/C.    Allergies    NovoLog (Pruritus)  Lantus (Pruritus)    Intolerances        Vital Signs Last 24 Hrs  T(C): 36.8 (21 Jan 2025 10:35), Max: 36.9 (21 Jan 2025 04:28)  T(F): 98.2 (21 Jan 2025 10:35), Max: 98.4 (21 Jan 2025 04:28)  HR: 66 (21 Jan 2025 10:35) (66 - 71)  BP: 130/73 (21 Jan 2025 10:35) (130/73 - 158/76)  BP(mean): --  RR: 18 (21 Jan 2025 10:35) (18 - 18)  SpO2: 95% (21 Jan 2025 10:35) (92% - 95%)    Parameters below as of 21 Jan 2025 10:35  Patient On (Oxygen Delivery Method): room air        LABS:                        10.1   6.56  )-----------( 256      ( 21 Jan 2025 01:23 )             31.3     01-21    138  |  104  |  40[H]  ----------------------------<  252[H]  3.6   |  24  |  1.72[H]    Ca    9.3      21 Jan 2025 01:23    TPro  7.0  /  Alb  3.2[L]  /  TBili  0.3  /  DBili  x   /  AST  13  /  ALT  12  /  AlkPhos  89  01-20      Urinalysis Basic - ( 21 Jan 2025 01:23 )    Color: x / Appearance: x / SG: x / pH: x  Gluc: 252 mg/dL / Ketone: x  / Bili: x / Urobili: x   Blood: x / Protein: x / Nitrite: x   Leuk Esterase: x / RBC: x / WBC x   Sq Epi: x / Non Sq Epi: x / Bacteria: x      CAPILLARY BLOOD GLUCOSE      POCT Blood Glucose.: 244 mg/dL (21 Jan 2025 11:34)  POCT Blood Glucose.: 248 mg/dL (21 Jan 2025 07:26)  POCT Blood Glucose.: 281 mg/dL (20 Jan 2025 22:38)  POCT Blood Glucose.: 272 mg/dL (20 Jan 2025 17:49)      Lower Extremity Physical Exam:  Vascular: DP/PT 0/4, B/L, CFT <3 seconds B/L, Temperature gradient warm to cool, B/L.   Neuro: Epicritic sensation dimished to the level of toes, B/L.  Musculoskeletal/Ortho: positive hohmann and flores on right lower extremity  Skin: Right heel fibrogranular wound to dermis, no malodor, no pus. Bilateral lower extremity venous stasis wounds to dermis.   RADIOLOGY & ADDITIONAL TESTS:

## 2025-01-21 NOTE — CONSULT NOTE ADULT - REASON FOR ADMISSION
Concern for osteomyelitis

## 2025-01-21 NOTE — CONSULT NOTE ADULT - ATTENDING COMMENTS
65 y/o female w/ PMHx  T2DM, HTN, HLD, b/l heel wound c/b OM s/p debridement w/ R foot graft, and CKD2 who presents from her podiatrist's office for concern of acute on chronic osteomyelitis of the right heel. Endocrinology consulted for insulin recommendations given insulin allergic reaction. Patient in the past had pruritis with Lantus. Patient is currently on Lantus and regular insulin sliding scale and Tradjenta.   Fasting sugars above goal, recommend to increase basal insulin to 14 units. Will trial patient on NPH insulin with q12 dosing of 7 units BID. Continue with regular insulin sliding scale, if sugars remain elevated tomorrow, start patient on standing regular insulin. Continue with tradjenta 5mg daily.   Will start cetrizine to assist with the itching relief. If any worsening in the symptoms, may need to consider ceasing insulin all together however at this time, patient's blood sugars are in the 200s, and patient will require insulin with anti-histamine especially in the setting of osteomyelitis.

## 2025-01-21 NOTE — PROGRESS NOTE ADULT - ASSESSMENT
63F with right foot heel wound to subQ  - Pt seen and evaluated.  - Afebrile, no leukocytosis  - Right heel fibrogranular wound to subQ no malodor, no pus. Bilateral lower extremity venous stasis wounds to dermis.   - Right foot xray: IPJ 1 erosions, possible foreign body/OM of calcaneus, questionable gas   - Right foot MRI: calcaneal stress fracture, IPJ 1 possible inflammatory arthropathy vs infection. There is no open wounds or clinical signs of infection of right hallux.   - Right foot wound culture: Staph aureus, E faecalis, Pseudomonas   - VIKY/PVR pending  - ID recs, appreciated   - Recommend Rheum consult   - Vasc recs, appreciated   - Bilateral venous duplex: no DVT, limited visualization of calf veins   - Pod stable for discharge pending final ID recs/ final vasc recs   - Wound care instruction and follow up information in discharge note provider   - Discussed with attending.

## 2025-01-21 NOTE — PROGRESS NOTE ADULT - NSPROGADDITIONALINFOA_GEN_ALL_CORE
.  Maritza Guerrero MD  Division of Hospital Medicine  [facility]   Available on Microsoft Teams - messages preferred prior to calls.    Plan discussed with patient and medicine NP Chelsea.

## 2025-01-21 NOTE — CONSULT NOTE ADULT - SUBJECTIVE AND OBJECTIVE BOX
Request for consultation: Diabetic with allergic reaction to insulin      HPI:  65 y/o female w/ PMHx  T2DM, HTN, HLD, b/l heel wound c/b OM s/p debridement w/ R foot graft, and CKD2 who presents from her podiatrist's office for concern of acute on chronic osteomyelitis of the right heel. In the ED, XR of the R foot showing a soft tissue ulcer at the plantar aspect of the calcaneus with mild adjacent erosive changes at the base of the calcaneus, questionable focal gas tracking and stippled opacities which may represent bone fragments versus radiopaque foreign bodies, with concern of osteomyelitis. Worsening juxta-articular erosive changes seen at the IP joint of the first digit. Podiatry, vascular and ID consulted.     Given pt's inability to tolerate home insulin prior to admission and pruritis when being given insulin products, Endocrinology consulted for recommendations.      Diabetes History:  Outpatient Endocrinologist:  A1c: 7.9%  Outpatient regimen:   Inpatient regimen: Lantus 10 units qd, regular insulin ISS, Tradjenta 5mg qd       FAMILY HISTORY:  FH: hypertension (Father, Mother)    FH: diabetes mellitus (Father)    FH: hyperlipidemia (Father, Mother)      PAST MEDICAL & SURGICAL HISTORY:  Diabetes  Hypertension  Hypercholesteremia  Wound of foot  H/O eye surgery  S/P cholecystectomy          Review of Systems:  All review of systems negative, except for those marked:  General:		[] Abnormal:  Pulmonary:		[] Abnormal:  Cardiac:		[] Abnormal:  Gastrointestinal:	[] Abnormal:  ENT:			[] Abnormal:  Renal/Urologic:		[] Abnormal:  Musculoskeletal:	[] Abnormal:  Endocrine:		[] Abnormal:  Hematologic:		[] Abnormal:  Neurologic:		[] Abnormal:  Skin:			[] Abnormal:  Allergy/Immune:	[] Abnormal:  Psychiatric:		[] Abnormal:    Allergies    NovoLog (Pruritus)  Lantus (Pruritus)    Intolerances      MEDICATIONS  (STANDING):  atorvastatin 40 milliGRAM(s) Oral at bedtime  hydrALAZINE 75 milliGRAM(s) Oral three times a day  insulin glargine Injectable (LANTUS) 10 Unit(s) SubCutaneous at bedtime  insulin regular  human corrective regimen sliding scale   SubCutaneous three times a day before meals  insulin regular  human corrective regimen sliding scale   SubCutaneous at bedtime  linagliptin 5 milliGRAM(s) Oral daily  metoprolol succinate ER 50 milliGRAM(s) Oral daily  piperacillin/tazobactam IVPB.. 3.375 Gram(s) IV Intermittent every 8 hours  vancomycin  IVPB 1500 milliGRAM(s) IV Intermittent every 24 hours    MEDICATIONS  (PRN):  acetaminophen     Tablet .. 650 milliGRAM(s) Oral every 6 hours PRN Temp greater or equal to 38C (100.4F), Mild Pain (1 - 3)  diphenhydrAMINE Elixir 12.5 milliGRAM(s) Oral three times a day PRN Rash and/or Itching  diphenhydrAMINE Elixir 12.5 milliGRAM(s) Oral once PRN Rash and/or Itching  melatonin 3 milliGRAM(s) Oral at bedtime PRN Insomnia      Vital Signs Last 24 Hrs  T(C): 36.9 (21 Jan 2025 04:28), Max: 36.9 (21 Jan 2025 04:28)  T(F): 98.4 (21 Jan 2025 04:28), Max: 98.4 (21 Jan 2025 04:28)  HR: 66 (21 Jan 2025 04:28) (66 - 71)  BP: 157/77 (21 Jan 2025 04:28) (157/77 - 158/76)  BP(mean): --  RR: 18 (21 Jan 2025 04:28) (18 - 18)  SpO2: 95% (21 Jan 2025 04:28) (92% - 95%)    Parameters below as of 21 Jan 2025 04:28  Patient On (Oxygen Delivery Method): room air          PHYSICAL EXAM  Constitutional: wn/wd in NAD.   HEENT: NCAT, MMM, OP clear, EOMI, no proptosis or lid retraction  Neck: no thyromegaly or palpable thyroid nodules   Respiratory: lungs CTAB.  Cardiovascular: regular rhythm, normal S1 and S2, no audible murmurs, no peripheral edema  GI: soft, NT/ND, no masses/HSM appreciated.  Neurology: no tremors, DTR 2+  Skin: no visible rashes/lesions  Psychiatric: AAO x 3, normal affect/mood.      LABS                          10.1   6.56  )-----------( 256      ( 21 Jan 2025 01:23 )             31.3     01-21    138  |  104  |  40[H]  ----------------------------<  252[H]  3.6   |  24  |  1.72[H]    Ca    9.3      21 Jan 2025 01:23    TPro  7.0  /  Alb  3.2[L]  /  TBili  0.3  /  DBili  x   /  AST  13  /  ALT  12  /  AlkPhos  89  01-20        CAPILLARY BLOOD GLUCOSE      POCT Blood Glucose.: 248 mg/dL (21 Jan 2025 07:26)  POCT Blood Glucose.: 281 mg/dL (20 Jan 2025 22:38)  POCT Blood Glucose.: 272 mg/dL (20 Jan 2025 17:49)  POCT Blood Glucose.: 240 mg/dL (20 Jan 2025 12:56)  POCT Blood Glucose.: 216 mg/dL (20 Jan 2025 11:43) Request for consultation: Diabetic with allergic reaction to insulin      HPI:  63 y/o female w/ PMHx  T2DM, HTN, HLD, b/l heel wound c/b OM s/p debridement w/ R foot graft, and CKD2 who presents from her podiatrist's office for concern of acute on chronic osteomyelitis of the right heel. In the ED, XR of the R foot showing a soft tissue ulcer at the plantar aspect of the calcaneus with mild adjacent erosive changes at the base of the calcaneus, questionable focal gas tracking and stippled opacities which may represent bone fragments versus radiopaque foreign bodies, with concern of osteomyelitis. Worsening juxta-articular erosive changes seen at the IP joint of the first digit. Podiatry, vascular and ID consulted.     Given pt's inability to tolerate home insulin prior to admission and pruritis when being given insulin products, Endocrinology consulted for recommendations.    Of note, pt diagnosed with DM in 2005 and at that time prescribed Novolog, which she didn't tolerate and was switched to just metformin. In 2021, switched to glipizide, and glyxambi. In 2022, due to stopping prior DM meds and uncontrolled FS at that time with a heel wound, transitioned to Lantus. Around this time, starting experiencing pruritis. With her Endocrinologist, tried stopping Lantus (and continuing with rest of DM regimen: Ozempic, Repaglinide) around 12/25/2024, where she noticed significant improvement in pruritis.      Diabetes History:  Outpatient Endocrinologist: Dr Soliz ([facility])  A1c: 7.9%  Outpatient regimen: Lantus 11 units (but stopped 12/2024), Repaglinide 1g TID pre-meal, Ozempic 1mg qweekly  Inpatient regimen: Lantus 10 units qd, regular insulin ISS, Tradjenta 5mg qd       FAMILY HISTORY:  FH: hypertension (Father, Mother)    FH: diabetes mellitus (Father)    FH: hyperlipidemia (Father, Mother)      PAST MEDICAL & SURGICAL HISTORY:  Diabetes  Hypertension  Hypercholesteremia  Wound of foot  H/O eye surgery  S/P cholecystectomy          Review of Systems:  All review of systems negative, except for those marked:  General:		[] Abnormal:  Pulmonary:		[] Abnormal:  Cardiac:		[] Abnormal:  Gastrointestinal:	[] Abnormal:  ENT:			[] Abnormal:  Renal/Urologic:		[] Abnormal:  Musculoskeletal:	[] Abnormal:  Endocrine:		[] Abnormal:  Hematologic:		[] Abnormal:  Neurologic:		[] Abnormal:  Skin:			[] Abnormal:  Allergy/Immune: pruritis in back, thighs, arms  Psychiatric:		[] Abnormal:    Allergies    NovoLog (Pruritus)  Lantus (Pruritus)    Intolerances      MEDICATIONS  (STANDING):  atorvastatin 40 milliGRAM(s) Oral at bedtime  hydrALAZINE 75 milliGRAM(s) Oral three times a day  insulin glargine Injectable (LANTUS) 10 Unit(s) SubCutaneous at bedtime  insulin regular  human corrective regimen sliding scale   SubCutaneous three times a day before meals  insulin regular  human corrective regimen sliding scale   SubCutaneous at bedtime  linagliptin 5 milliGRAM(s) Oral daily  metoprolol succinate ER 50 milliGRAM(s) Oral daily  piperacillin/tazobactam IVPB.. 3.375 Gram(s) IV Intermittent every 8 hours  vancomycin  IVPB 1500 milliGRAM(s) IV Intermittent every 24 hours    MEDICATIONS  (PRN):  acetaminophen     Tablet .. 650 milliGRAM(s) Oral every 6 hours PRN Temp greater or equal to 38C (100.4F), Mild Pain (1 - 3)  diphenhydrAMINE Elixir 12.5 milliGRAM(s) Oral three times a day PRN Rash and/or Itching  diphenhydrAMINE Elixir 12.5 milliGRAM(s) Oral once PRN Rash and/or Itching  melatonin 3 milliGRAM(s) Oral at bedtime PRN Insomnia      Vital Signs Last 24 Hrs  T(C): 36.9 (21 Jan 2025 04:28), Max: 36.9 (21 Jan 2025 04:28)  T(F): 98.4 (21 Jan 2025 04:28), Max: 98.4 (21 Jan 2025 04:28)  HR: 66 (21 Jan 2025 04:28) (66 - 71)  BP: 157/77 (21 Jan 2025 04:28) (157/77 - 158/76)  BP(mean): --  RR: 18 (21 Jan 2025 04:28) (18 - 18)  SpO2: 95% (21 Jan 2025 04:28) (92% - 95%)    Parameters below as of 21 Jan 2025 04:28  Patient On (Oxygen Delivery Method): room air          PHYSICAL EXAM  Constitutional: wn/wd in NAD.   HEENT: NCAT, MMM, OP clear, EOMI, no proptosis or lid retraction  Neck: no thyromegaly or palpable thyroid nodules   Respiratory: lungs CTAB.  Cardiovascular: regular rhythm, normal S1 and S2, no audible murmurs, no peripheral edema  GI: soft, NT/ND, no masses/HSM appreciated.  Neurology: no tremors, DTR 2+  Skin: papular rash w/o wheels noted in mid to lower back and b/l thighs  Psychiatric: AAO x 3, normal affect/mood.      LABS                          10.1   6.56  )-----------( 256      ( 21 Jan 2025 01:23 )             31.3     01-21    138  |  104  |  40[H]  ----------------------------<  252[H]  3.6   |  24  |  1.72[H]    Ca    9.3      21 Jan 2025 01:23    TPro  7.0  /  Alb  3.2[L]  /  TBili  0.3  /  DBili  x   /  AST  13  /  ALT  12  /  AlkPhos  89  01-20        CAPILLARY BLOOD GLUCOSE      POCT Blood Glucose.: 248 mg/dL (21 Jan 2025 07:26)  POCT Blood Glucose.: 281 mg/dL (20 Jan 2025 22:38)  POCT Blood Glucose.: 272 mg/dL (20 Jan 2025 17:49)  POCT Blood Glucose.: 240 mg/dL (20 Jan 2025 12:56)  POCT Blood Glucose.: 216 mg/dL (20 Jan 2025 11:43)

## 2025-01-21 NOTE — CONSULT NOTE ADULT - ASSESSMENT
63 y/o female w/ PMHx  T2DM, HTN, HLD, b/l heel wound c/b OM s/p debridement w/ R foot graft, and CKD2 who presents from her podiatrist's office for concern of acute on chronic osteomyelitis of the right heel. Endocrinology consulted for insulin recommendations given insulin allergic reaction.    #T2DM  #Insulin allergic reaction    -     RECOMMENDATIONS ARE PRELIMINARY UNTIL ATTENDING ATTESTATION 65 y/o female w/ PMHx  T2DM, HTN, HLD, b/l heel wound c/b OM s/p debridement w/ R foot graft, and CKD2 who presents from her podiatrist's office for concern of acute on chronic osteomyelitis of the right heel. Endocrinology consulted for insulin recommendations given insulin allergic reaction.    #T2DM  #Insulin allergic reaction    - currently still complaining of pruritis, but has been given lantus (insulin analog) as well as regular insulin (human insulin), so unclear if reaction from lantus or both types of insulin. But given known reaction with lantus, pruritis reaction likely 2/2 lantus than regular insulin  - switch from lantus 10 units to NPH 7 units in AM and PM  - c/w regular insulin ISS pre-meal and qhs   - would recommend starting cetirizine 10mg qd to help with pruritis     RECOMMENDATIONS ARE PRELIMINARY UNTIL DISCUSSED WITH ATTENDING/ATTENDING ATTESTATION 63 y/o female w/ PMHx  T2DM, HTN, HLD, b/l heel wound c/b OM s/p debridement w/ R foot graft, and CKD2 who presents from her podiatrist's office for concern of acute on chronic osteomyelitis of the right heel. Endocrinology consulted for insulin recommendations given insulin allergic reaction.    #T2DM  #Insulin allergic reaction    - currently still complaining of pruritis, but has been given lantus (insulin analog) as well as regular insulin (human insulin), so unclear if reaction from lantus or both types of insulin. But given known reaction with lantus, pruritis reaction likely 2/2 lantus than regular insulin  - switch from lantus 10 units to NPH 7 units in AM and PM  - c/w regular insulin ISS pre-meal and qhs   - would recommend starting cetirizine 10mg qd to help with pruritis   - please change antibiotics to be dissolved in NS than D5 if possible  - discharge recommendations pending    RECOMMENDATIONS ARE PRELIMINARY UNTIL DISCUSSED WITH ATTENDING/ATTENDING ATTESTATION 65 y/o female w/ PMHx  T2DM, HTN, HLD, b/l heel wound c/b OM s/p debridement w/ R foot graft, and CKD2 who presents from her podiatrist's office for concern of acute on chronic osteomyelitis of the right heel. Endocrinology consulted for insulin recommendations given insulin allergic reaction.    #T2DM  #Insulin allergic reaction    - currently still complaining of pruritis, but has been given lantus (insulin analog) as well as regular insulin (human insulin), so unclear if reaction from lantus or both types of insulin. But given known reaction with lantus, pruritis reaction likely 2/2 lantus than regular insulin  - switch from lantus 10 units to NPH 7 units in AM and PM  - c/w regular insulin ISS pre-meal and qhs - may need standing meal time coverage depending on next 24h FS  - would recommend starting cetirizine 10mg qd to help with pruritis   - please change antibiotics to be dissolved in NS than D5 if possible  - discharge recommendations pending 63 y/o female w/ PMHx  T2DM, HTN, HLD, b/l heel wound c/b OM s/p debridement w/ R foot graft, and CKD2 who presents from her podiatrist's office for concern of acute on chronic osteomyelitis of the right heel. Endocrinology consulted for insulin recommendations given insulin allergic reaction.    #T2DM  #Insulin allergic reaction    - currently still complaining of pruritis, but has been given lantus (insulin analog) as well as regular insulin (human insulin), so unclear if reaction from lantus or both types of insulin. But given known reaction with lantus, pruritis reaction likely 2/2 lantus than regular insulin  - switch from lantus 10 units to NPH 7 units in AM and PM  - c/w regular insulin ISS pre-meal and qhs - may need standing meal time coverage depending on next 24h FS  - would recommend starting cetirizine 10mg qd to help with pruritis   - please change antibiotics to be dissolved in NS than D5 if possible  - discharge recommendations pending    #HTN  - Goal BP <130/80  - Defer management per primary team     #HLD  - Goal LDL <70 in the setting of diabetes  - Please check fasting lipid panel if not checked recently

## 2025-01-22 DIAGNOSIS — L08.9 LOCAL INFECTION OF THE SKIN AND SUBCUTANEOUS TISSUE, UNSPECIFIED: ICD-10-CM

## 2025-01-22 LAB
-  DAPTOMYCIN: SIGNIFICANT CHANGE UP
-  LINEZOLID: SIGNIFICANT CHANGE UP
ALBUMIN SERPL ELPH-MCNC: 3.3 G/DL — SIGNIFICANT CHANGE UP (ref 3.3–5)
ALP SERPL-CCNC: 96 U/L — SIGNIFICANT CHANGE UP (ref 40–120)
ALT FLD-CCNC: 16 U/L — SIGNIFICANT CHANGE UP (ref 10–45)
ANION GAP SERPL CALC-SCNC: 13 MMOL/L — SIGNIFICANT CHANGE UP (ref 5–17)
AST SERPL-CCNC: 17 U/L — SIGNIFICANT CHANGE UP (ref 10–40)
BILIRUB SERPL-MCNC: 0.3 MG/DL — SIGNIFICANT CHANGE UP (ref 0.2–1.2)
BUN SERPL-MCNC: 42 MG/DL — HIGH (ref 7–23)
CALCIUM SERPL-MCNC: 9.2 MG/DL — SIGNIFICANT CHANGE UP (ref 8.4–10.5)
CHLORIDE SERPL-SCNC: 107 MMOL/L — SIGNIFICANT CHANGE UP (ref 96–108)
CO2 SERPL-SCNC: 21 MMOL/L — LOW (ref 22–31)
CREAT SERPL-MCNC: 1.8 MG/DL — HIGH (ref 0.5–1.3)
CULTURE RESULTS: ABNORMAL
CULTURE RESULTS: SIGNIFICANT CHANGE UP
CULTURE RESULTS: SIGNIFICANT CHANGE UP
EGFR: 31 ML/MIN/1.73M2 — LOW
GLUCOSE BLDC GLUCOMTR-MCNC: 166 MG/DL — HIGH (ref 70–99)
GLUCOSE BLDC GLUCOMTR-MCNC: 176 MG/DL — HIGH (ref 70–99)
GLUCOSE BLDC GLUCOMTR-MCNC: 179 MG/DL — HIGH (ref 70–99)
GLUCOSE BLDC GLUCOMTR-MCNC: 189 MG/DL — HIGH (ref 70–99)
GLUCOSE BLDC GLUCOMTR-MCNC: 208 MG/DL — HIGH (ref 70–99)
GLUCOSE SERPL-MCNC: 178 MG/DL — HIGH (ref 70–99)
HCT VFR BLD CALC: 32.2 % — LOW (ref 34.5–45)
HGB BLD-MCNC: 10.4 G/DL — LOW (ref 11.5–15.5)
MAGNESIUM SERPL-MCNC: 1.9 MG/DL — SIGNIFICANT CHANGE UP (ref 1.6–2.6)
MCHC RBC-ENTMCNC: 26.9 PG — LOW (ref 27–34)
MCHC RBC-ENTMCNC: 32.3 G/DL — SIGNIFICANT CHANGE UP (ref 32–36)
MCV RBC AUTO: 83.4 FL — SIGNIFICANT CHANGE UP (ref 80–100)
NRBC # BLD: 0 /100 WBCS — SIGNIFICANT CHANGE UP (ref 0–0)
NRBC BLD-RTO: 0 /100 WBCS — SIGNIFICANT CHANGE UP (ref 0–0)
ORGANISM # SPEC MICROSCOPIC CNT: ABNORMAL
PLATELET # BLD AUTO: 272 K/UL — SIGNIFICANT CHANGE UP (ref 150–400)
POTASSIUM SERPL-MCNC: 4 MMOL/L — SIGNIFICANT CHANGE UP (ref 3.5–5.3)
POTASSIUM SERPL-SCNC: 4 MMOL/L — SIGNIFICANT CHANGE UP (ref 3.5–5.3)
PROT SERPL-MCNC: 7.1 G/DL — SIGNIFICANT CHANGE UP (ref 6–8.3)
RBC # BLD: 3.86 M/UL — SIGNIFICANT CHANGE UP (ref 3.8–5.2)
RBC # FLD: 14.4 % — SIGNIFICANT CHANGE UP (ref 10.3–14.5)
SODIUM SERPL-SCNC: 141 MMOL/L — SIGNIFICANT CHANGE UP (ref 135–145)
SPECIMEN SOURCE: SIGNIFICANT CHANGE UP
URATE SERPL-MCNC: 6.8 MG/DL — SIGNIFICANT CHANGE UP (ref 2.5–7)
WBC # BLD: 6.78 K/UL — SIGNIFICANT CHANGE UP (ref 3.8–10.5)
WBC # FLD AUTO: 6.78 K/UL — SIGNIFICANT CHANGE UP (ref 3.8–10.5)

## 2025-01-22 PROCEDURE — 99232 SBSQ HOSP IP/OBS MODERATE 35: CPT | Mod: GC

## 2025-01-22 PROCEDURE — 99232 SBSQ HOSP IP/OBS MODERATE 35: CPT

## 2025-01-22 PROCEDURE — 99233 SBSQ HOSP IP/OBS HIGH 50: CPT

## 2025-01-22 PROCEDURE — G0545: CPT

## 2025-01-22 RX ORDER — BACTERIOSTATIC SODIUM CHLORIDE 0.9 %
1000 VIAL (ML) INJECTION
Refills: 0 | Status: DISCONTINUED | OUTPATIENT
Start: 2025-01-22 | End: 2025-01-23

## 2025-01-22 RX ORDER — MAGNESIUM SULFATE 0.8 MEQ/ML
1 AMPUL (ML) INJECTION ONCE
Refills: 0 | Status: COMPLETED | OUTPATIENT
Start: 2025-01-22 | End: 2025-01-22

## 2025-01-22 RX ADMIN — Medication 100 GRAM(S): at 10:12

## 2025-01-22 RX ADMIN — ATORVASTATIN CALCIUM 40 MILLIGRAM(S): 80 TABLET, FILM COATED ORAL at 22:17

## 2025-01-22 RX ADMIN — LINAGLIPTIN 5 MILLIGRAM(S): 5 TABLET, FILM COATED ORAL at 12:22

## 2025-01-22 RX ADMIN — Medication 1: at 08:35

## 2025-01-22 RX ADMIN — PIPERACILLIN SODIUM AND TAZOBACTAM SODIUM 25 GRAM(S): 2; 250 INJECTION, POWDER, FOR SOLUTION INTRAVENOUS at 22:17

## 2025-01-22 RX ADMIN — PIPERACILLIN SODIUM AND TAZOBACTAM SODIUM 25 GRAM(S): 2; 250 INJECTION, POWDER, FOR SOLUTION INTRAVENOUS at 06:05

## 2025-01-22 RX ADMIN — Medication 5000 UNIT(S): at 16:06

## 2025-01-22 RX ADMIN — Medication 75 MILLIGRAM(S): at 16:05

## 2025-01-22 RX ADMIN — Medication 10 MILLIGRAM(S): at 12:22

## 2025-01-22 RX ADMIN — Medication 75 MILLIGRAM(S): at 22:17

## 2025-01-22 RX ADMIN — Medication 7 UNIT(S): at 18:37

## 2025-01-22 RX ADMIN — Medication 2: at 12:22

## 2025-01-22 RX ADMIN — Medication 7 UNIT(S): at 06:28

## 2025-01-22 RX ADMIN — Medication 50 MILLIGRAM(S): at 06:08

## 2025-01-22 RX ADMIN — Medication 75 MILLIGRAM(S): at 06:09

## 2025-01-22 RX ADMIN — Medication 5000 UNIT(S): at 22:17

## 2025-01-22 RX ADMIN — Medication 75 MILLILITER(S): at 10:12

## 2025-01-22 RX ADMIN — Medication 2 UNIT(S): at 18:36

## 2025-01-22 RX ADMIN — Medication 1: at 18:36

## 2025-01-22 RX ADMIN — PIPERACILLIN SODIUM AND TAZOBACTAM SODIUM 25 GRAM(S): 2; 250 INJECTION, POWDER, FOR SOLUTION INTRAVENOUS at 16:07

## 2025-01-22 NOTE — PROGRESS NOTE ADULT - ASSESSMENT
65 y/o female w/ PMHx  T2DM, HTN, HLD, b/l heel wound c/b OM s/p debridement w/ R foot graft, and CKD2 who presents from her podiatrist's office for concern of acute on chronic osteomyelitis of the right heel. Endocrinology consulted for insulin recommendations given insulin allergic reaction.    #T2DM  #Insulin allergic reaction    - currently still complaining of pruritis, but has been given lantus (insulin analog) as well as regular insulin (human insulin), so unclear if reaction from lantus or both types of insulin. But given known reaction with lantus, pruritis reaction likely 2/2 lantus than regular insulin  - switched from lantus 10 units to NPH 7 units in AM and PM starting 1/22  - C/w NPH 7U q12h     - Per pt, when she stopped lantus outpt, it took a few days before pruritis stopped, so likely will need a few days before pruritis stops  - can start 1U regular insulin TID pre-meal for now   - c/w regular insulin ISS pre-meal and qhs  - c/w cetirizine 10mg qd to help with pruritis   - discharge recommendations pending    #HTN  - Goal BP <130/80  - Defer management per primary team     #HLD  - Goal LDL <70 in the setting of diabetes  - Please check fasting lipid panel if not checked recently  63 y/o female w/ PMHx  T2DM, HTN, HLD, b/l heel wound c/b OM s/p debridement w/ R foot graft, and CKD2 who presents from her podiatrist's office for concern of acute on chronic osteomyelitis of the right heel. Endocrinology consulted for insulin recommendations given insulin allergic reaction.    #T2DM  #Insulin allergic reaction    - currently still complaining of pruritis, but has been given lantus (insulin analog) as well as regular insulin (human insulin), so unclear if reaction from lantus or both types of insulin. But given known reaction with lantus, pruritis reaction likely 2/2 lantus than regular insulin  - switched from lantus 10 units to NPH 7 units in AM and PM starting 1/22  - C/w NPH 7U q12h     - Per pt, when she stopped lantus outpt, it took a few days before pruritis stopped, so likely will need a few days before pruritis stops  - can start 2U regular insulin TID pre-meal for now   - c/w regular insulin ISS pre-meal and qhs  - c/w cetirizine 10mg qd to help with pruritis   - discharge recommendations pending    #HTN  - Goal BP <130/80  - Defer management per primary team     #HLD  - Goal LDL <70 in the setting of diabetes  - Please check fasting lipid panel if not checked recently  63 y/o female w/ PMHx  T2DM, HTN, HLD, b/l heel wound c/b OM s/p debridement w/ R foot graft, and CKD2 who presents from her podiatrist's office for concern of acute on chronic osteomyelitis of the right heel. Endocrinology consulted for insulin recommendations given insulin allergic reaction.    #T2DM  #Insulin allergic reaction    - currently still complaining of pruritis, but has been given lantus (insulin analog) as well as regular insulin (human insulin), so unclear if reaction from lantus or both types of insulin. But given known reaction with lantus, pruritis reaction likely 2/2 lantus than regular insulin  - switched from lantus 10 units to NPH 7 units in AM and PM starting 1/22  - C/w NPH 7U q12h     - Per pt, when she stopped lantus outpt, it took a few days before pruritis stopped, so likely will need a few days before pruritis stops  - can start 2U regular insulin TID pre-meal for now   - c/w regular insulin ISS pre-meal and qhs  - c/w cetirizine 10mg qd to help with pruritis   - would recommend Allergy and Immunology c/s in or outpt   - discharge recommendations pending    #HTN  - Goal BP <130/80  - Defer management per primary team     #HLD  - Goal LDL <70 in the setting of diabetes  - Please check fasting lipid panel if not checked recently

## 2025-01-22 NOTE — PHYSICAL THERAPY INITIAL EVALUATION ADULT - NS ASR WT BEARING DETAIL RLE
in cast shoe as per podiatry/weight-bearing as tolerated in R heel offloading shoe as per podiatry/weight-bearing as tolerated

## 2025-01-22 NOTE — PROGRESS NOTE ADULT - SUBJECTIVE AND OBJECTIVE BOX
Patient is a 64y old  Female who presents with a chief complaint of Concern for osteomyelitis (21 Jan 2025 19:01)      INTERVAL HPI/OVERNIGHT EVENTS: NAEO. No lantus administered last night, and NPH administered this AM. Still complaining of pruritis.      CONSTITUTIONAL:  Negative fever or chills, feels well, good appetite  EYES:  Negative  blurry vision or double vision  CARDIOVASCULAR:  Negative for chest pain or palpitations  RESPIRATORY:  Negative for cough, wheezing, or SOB   GASTROINTESTINAL:  Negative for nausea, vomiting, diarrhea, constipation, or abdominal pain  NEUROLOGIC:  No headache, confusion, dizziness, lightheadedness    MEDICATIONS  (STANDING):  atorvastatin 40 milliGRAM(s) Oral at bedtime  cetirizine 10 milliGRAM(s) Oral daily  heparin   Injectable 5000 Unit(s) SubCutaneous every 8 hours  hydrALAZINE 75 milliGRAM(s) Oral three times a day  insulin NPH human recombinant 7 Unit(s) SubCutaneous every 12 hours  insulin regular  human corrective regimen sliding scale   SubCutaneous three times a day before meals  insulin regular  human corrective regimen sliding scale   SubCutaneous at bedtime  linagliptin 5 milliGRAM(s) Oral daily  metoprolol succinate ER 50 milliGRAM(s) Oral daily  piperacillin/tazobactam IVPB.. 3.375 Gram(s) IV Intermittent every 8 hours  sodium chloride 0.9%. 1000 milliLiter(s) (75 mL/Hr) IV Continuous <Continuous>    MEDICATIONS  (PRN):  acetaminophen     Tablet .. 650 milliGRAM(s) Oral every 6 hours PRN Temp greater or equal to 38C (100.4F), Mild Pain (1 - 3)  diphenhydrAMINE Elixir 12.5 milliGRAM(s) Oral once PRN Rash and/or Itching  diphenhydrAMINE Elixir 12.5 milliGRAM(s) Oral three times a day PRN Rash and/or Itching  melatonin 3 milliGRAM(s) Oral at bedtime PRN Insomnia      PHYSICAL EXAM  Vital Signs Last 24 Hrs  T(C): 36.3 (22 Jan 2025 07:51), Max: 37.1 (21 Jan 2025 16:44)  T(F): 97.4 (22 Jan 2025 07:51), Max: 98.7 (21 Jan 2025 16:44)  HR: 58 (22 Jan 2025 08:31) (58 - 75)  BP: 139/76 (22 Jan 2025 08:31) (120/69 - 148/77)  BP(mean): --  RR: 18 (22 Jan 2025 07:51) (18 - 18)  SpO2: 97% (22 Jan 2025 08:31) (94% - 97%)    Parameters below as of 22 Jan 2025 08:31  Patient On (Oxygen Delivery Method): room air        Constitutional: wn/wd in NAD.   HEENT: NCAT, MMM, OP clear, EOMI, no proptosis or lid retraction  Respiratory: lungs CTAB.  Cardiovascular: regular rhythm, normal S1 and S2, no audible murmurs, no peripheral edema  GI: soft, NT/ND, no masses/HSM appreciated.  Neurology: no tremors, DTR 2+  Skin: no visible rashes/lesions  Psychiatric: AAO x 3, normal affect/mood.    LABS:                        10.4   6.78  )-----------( 272      ( 22 Jan 2025 07:23 )             32.2     01-22    141  |  107  |  42[H]  ----------------------------<  178[H]  4.0   |  21[L]  |  1.80[H]    Ca    9.2      22 Jan 2025 07:22  Mg     1.9     01-22    TPro  7.1  /  Alb  3.3  /  TBili  0.3  /  DBili  x   /  AST  17  /  ALT  16  /  AlkPhos  96  01-22      Urinalysis Basic - ( 22 Jan 2025 07:22 )    Color: x / Appearance: x / SG: x / pH: x  Gluc: 178 mg/dL / Ketone: x  / Bili: x / Urobili: x   Blood: x / Protein: x / Nitrite: x   Leuk Esterase: x / RBC: x / WBC x   Sq Epi: x / Non Sq Epi: x / Bacteria: x          HbA1C: 7.9  CAPILLARY BLOOD GLUCOSE      POCT Blood Glucose.: 179 mg/dL (22 Jan 2025 07:42)  POCT Blood Glucose.: 166 mg/dL (22 Jan 2025 05:51)  POCT Blood Glucose.: 214 mg/dL (21 Jan 2025 22:00)  POCT Blood Glucose.: 314 mg/dL (21 Jan 2025 17:57)  POCT Blood Glucose.: 244 mg/dL (21 Jan 2025 11:34)      Insulin Sliding Scale requirements X 24 Hours: 7 units of ISS

## 2025-01-22 NOTE — PHYSICAL THERAPY INITIAL EVALUATION ADULT - GENERAL OBSERVATIONS, REHAB EVAL
Pt received ambulating in room to bathroom, A&0x4, +IV, +bilateral ace wrap, +R cast shoe, following 100% multi-step commands. Pt resents from her podiatrist's office for concern of acute on chronic osteomyelitis of the right heel. XR suspicious for R calcaneal OM. MR Foot showing Exuberant marrow edema with superimposed stress fracture throughout the calcaneus contacting the distal calcaneal articular surface, confirmed with ISABEL Tran. Pt received ambulating in room to bathroom, A&0x4, +IV, +bilateral ace wrap, +R cast shoe, following 100% multi-step commands. Pt resents from her podiatrist's office for concern of acute on chronic osteomyelitis of the right heel. XR suspicious for R calcaneal OM. MR Foot showing Exuberant marrow edema with superimposed stress fracture throughout the calcaneus contacting the distal calcaneal articular surface, confirmed with resident Tanja; WBAT RLE in heel offloading shoe.

## 2025-01-22 NOTE — PROGRESS NOTE ADULT - PROBLEM SELECTOR PLAN 2
stable  - continue to hold valsartan and lasix  - Cr slightly uptrending today. possibly i/s/o recent vanco/zosyn combination  - will give gentle IVF with NS for total 1L today  - monitor Cr on BMP

## 2025-01-22 NOTE — PROGRESS NOTE ADULT - ASSESSMENT
65 y/o female w/ PMHx  T2DM, HTN, HLD, b/l heel wound c/b OM s/p debridement w/ R foot graft, and CKD2 who presents from her podiatrist's office for concern of acute on chronic osteomyelitis of the right heel. XR suspicious for R calcaneal OM. Admitted for IV antibiotics and wound infection.

## 2025-01-22 NOTE — PROGRESS NOTE ADULT - SUBJECTIVE AND OBJECTIVE BOX
Maritza Guerrero MD  Division of Hospital Medicine  [facility]   Available on Microsoft Teams (Mon-Fri 8am-5pm)    * messages preferred prior to calls  Other Times:  [phone]      Patient is a 64y old  Female who presents with a chief complaint of Concern for osteomyelitis (22 Jan 2025 11:24)      SUBJECTIVE / OVERNIGHT EVENTS: no acute events overnight. no fever, chills, chest pain, nor dyspnea. offloading shoe at bedside and did well with PT earlier this morning. still with some pruritis of upper back despite changing insulin.  ADDITIONAL REVIEW OF SYSTEMS:    MEDICATIONS  (STANDING):  atorvastatin 40 milliGRAM(s) Oral at bedtime  cetirizine 10 milliGRAM(s) Oral daily  heparin   Injectable 5000 Unit(s) SubCutaneous every 8 hours  hydrALAZINE 75 milliGRAM(s) Oral three times a day  insulin NPH human recombinant 7 Unit(s) SubCutaneous every 12 hours  insulin regular  human corrective regimen sliding scale   SubCutaneous three times a day before meals  insulin regular  human corrective regimen sliding scale   SubCutaneous at bedtime  linagliptin 5 milliGRAM(s) Oral daily  metoprolol succinate ER 50 milliGRAM(s) Oral daily  piperacillin/tazobactam IVPB.. 3.375 Gram(s) IV Intermittent every 8 hours  sodium chloride 0.9%. 1000 milliLiter(s) (75 mL/Hr) IV Continuous <Continuous>    MEDICATIONS  (PRN):  acetaminophen     Tablet .. 650 milliGRAM(s) Oral every 6 hours PRN Temp greater or equal to 38C (100.4F), Mild Pain (1 - 3)  diphenhydrAMINE Elixir 12.5 milliGRAM(s) Oral once PRN Rash and/or Itching  diphenhydrAMINE Elixir 12.5 milliGRAM(s) Oral three times a day PRN Rash and/or Itching  melatonin 3 milliGRAM(s) Oral at bedtime PRN Insomnia      CAPILLARY BLOOD GLUCOSE      POCT Blood Glucose.: 208 mg/dL (22 Jan 2025 11:38)  POCT Blood Glucose.: 179 mg/dL (22 Jan 2025 07:42)  POCT Blood Glucose.: 166 mg/dL (22 Jan 2025 05:51)  POCT Blood Glucose.: 214 mg/dL (21 Jan 2025 22:00)  POCT Blood Glucose.: 314 mg/dL (21 Jan 2025 17:57)    I&O's Summary      PHYSICAL EXAM:  Vital Signs Last 24 Hrs  T(C): 36.3 (22 Jan 2025 11:37), Max: 37.1 (21 Jan 2025 16:44)  T(F): 97.4 (22 Jan 2025 11:37), Max: 98.7 (21 Jan 2025 16:44)  HR: 60 (22 Jan 2025 11:37) (58 - 75)  BP: 160/80 (22 Jan 2025 11:37) (120/69 - 160/80)  BP(mean): --  RR: 18 (22 Jan 2025 11:37) (18 - 18)  SpO2: 98% (22 Jan 2025 11:37) (94% - 98%)    Parameters below as of 22 Jan 2025 11:37  Patient On (Oxygen Delivery Method): room air    CONSTITUTIONAL: NAD, well-developed, well-groomed  EYES: PERRLA; conjunctiva and sclera clear  ENMT: Moist oral mucosa, no pharyngeal injection or exudates; normal dentition  NECK: Supple, no palpable masses; no thyromegaly  RESPIRATORY: Normal respiratory effort; lungs are clear to auscultation bilaterally  CARDIOVASCULAR: Regular rate and rhythm, normal S1 and S2, no murmur/rub/gallop  ABDOMEN: Soft, Nondistended, Nontender to palpation, normoactive bowel sounds  MUSCULOSKELETAL: No clubbing or cyanosis of digits; no joint swelling or tenderness to palpation  PSYCH: A+O to person, place, and time; affect appropriate  NEUROLOGY: CN 2-12 are intact and symmetric; no gross sensory deficits   SKIN: No rashes; no palpable lesions, +b/l LE in dressing and wrapped    LABS:                        10.4   6.78  )-----------( 272      ( 22 Jan 2025 07:23 )             32.2     01-22    141  |  107  |  42[H]  ----------------------------<  178[H]  4.0   |  21[L]  |  1.80[H]    Ca    9.2      22 Jan 2025 07:22  Mg     1.9     01-22    TPro  7.1  /  Alb  3.3  /  TBili  0.3  /  DBili  x   /  AST  17  /  ALT  16  /  AlkPhos  96  01-22        Urinalysis Basic - ( 22 Jan 2025 07:22 )    Color: x / Appearance: x / SG: x / pH: x  Gluc: 178 mg/dL / Ketone: x  / Bili: x / Urobili: x   Blood: x / Protein: x / Nitrite: x   Leuk Esterase: x / RBC: x / WBC x   Sq Epi: x / Non Sq Epi: x / Bacteria: x        RADIOLOGY & ADDITIONAL TESTS:  Results Reviewed:   Imaging Personally Reviewed:  Electrocardiogram Personally Reviewed:    COORDINATION OF CARE:  Care Discussed with Consultants/Other Providers [Y]: medicine ISABEL Tran  Prior or Outpatient Records Reviewed [Y/N]:

## 2025-01-22 NOTE — PHYSICAL THERAPY INITIAL EVALUATION ADULT - LIVES WITH, PROFILE
losartan (COZAAR) 25 mg tablet Take 25 mg by mouth daily     From med list at visit 9/21/23 with Dr Allie Elena alone

## 2025-01-22 NOTE — PROGRESS NOTE ADULT - ASSESSMENT
65 y/o woman with   T2DM (1/17/24 A1C = 7.9%), obesity BMI = 42.5, HTN, HLD, b/l heel wound c/b OM s/p debridement w/ R foot graft, and CKD2 admitted 1/16/24 with concern for osteomyelitis and worsening right foot wounds  1/17 -- no evidence of DVTs; MRI demonstrates stress fracture in calcaneous with exuberant reaction  1/16 ESR/CRP = 120/28    h/o MRSA colonization  7/27/23 wound culture isolates were susceptible to Zosyn/Vanco: Klebs pn, Moranella, Enterococcus Feacalis, MRSAm Corynebacterium    1/21 discussed with Podiatry resident at bedside and image of heel wound reviewed  - wound improved, closed, no malodour, very low concern for oteomyelitis  1/22 Vascular notes: "right heel wound and chronic venous stasis disease. Patient VIKY/PVR performed, not consistent with severe PAD."  PT assessment appreciated Pt demonstrates safe and independent functional mobility. pt requires no skilled PT at this time.     Antibiotics  Vancomycin 1/16 -->  Zosyn 1/16 -->      Suggest  Complete Zosyn 1/23

## 2025-01-22 NOTE — PHYSICAL THERAPY INITIAL EVALUATION ADULT - ACTIVE RANGE OF MOTION EXAMINATION, REHAB EVAL
shravan. upper extremity Active ROM was WNL (within normal limits)/bilateral lower extremity Active ROM was WNL (within normal limits)

## 2025-01-22 NOTE — PROGRESS NOTE ADULT - NSPROGADDITIONALINFOA_GEN_ALL_CORE
.  Maritza Guerrero MD  Division of Hospital Medicine  [facility]   Available on Microsoft Teams - messages preferred prior to calls.    To complete IV abx on 1/23. Anticipate possible d/c on 1/24 pending final Endo recs.    Plan discussed with patient and medicine NP Chelsea. .  Maritza Guerrero MD  Division of Hospital Medicine  [facility]   Available on Microsoft Teams - messages preferred prior to calls.    To complete IV abx on 1/23. Anticipate possible d/c on 1/24 pending final Endo recs and Allergy eval    Plan discussed with patient, Endo Attending Dr. Acuna, and medicine NP Chelsea. .  Maritza Guerrero MD  Division of Hospital Medicine  [facility]   Available on Microsoft Teams - messages preferred prior to calls.    To complete IV abx on 1/23. Anticipate possible d/c on 1/24 pending final Endo recs    Plan discussed with patient, Endo Attending Dr. Acuna, and medicine NP Chelsea.

## 2025-01-22 NOTE — PROGRESS NOTE ADULT - PROBLEM SELECTOR PLAN 1
- MRI of the R foot not conclusive but suggestive of OM, stress fracture of R calcaneus noted  - Podiatry also with low c/f OM based on exam  - cultures growing multiple organisms: staph aureus, pseudomonas, and e. faecalis  - Podiatry and ID consult recs appreciated  - stopped vanco on 1/21 as per ID  - c/w IV zosyn through 1/23  - Vascular surgery f/u re: VIKY/PVR - outpatient f/u likely. vascular team to follow-up today  - no plans for intervention per Podiatry  - c/w offloading shoe (at bedside) for calcaneus stress fracture noted on MRI - MRI of the R foot not conclusive but suggestive of OM, stress fracture of R calcaneus noted  - Podiatry also with low c/f OM based on exam  - cultures growing multiple organisms: staph aureus, pseudomonas, and e. faecalis  - Podiatry and ID consult recs appreciated  - stopped vanco on 1/21 as per ID  - c/w IV zosyn through 1/23  - Vascular surgery f/u re: VIKY/PVR appreciated; not consistent with severe PAD - outpt f/u   - no plans for intervention per Podiatry  - c/w offloading shoe (at bedside) for calcaneus stress fracture noted on MRI

## 2025-01-22 NOTE — PHYSICAL THERAPY INITIAL EVALUATION ADULT - ADDITIONAL COMMENTS
Pt resides in a private house alone, 0 steps to enter. Pt was independent prior to admission with use of rolling walker and/or cane. Pt also owns shower chair.

## 2025-01-22 NOTE — PROGRESS NOTE ADULT - PROBLEM SELECTOR PLAN 3
- Patient states she stopped taking Lantus after christmas due to always having itching with it, and states she had the same reaction w/ Novolog, which was stopped a year ago; states she hasn't had itching since stopping Lantus  - Supposed to be on Jardiance 10 + Repaglinide 1mg TID, hasn't started jardiance yet  - still with pruritis at this time with humalog  - Endo consulted for assistance, recs appreciated  - c/w regular insulin sliding scale  - lantus changed to NPH 7units q12h  on 1/21 as per Endo recs but still with some pruritis today  - will defer basal/bolus adjustments to Endo  - monitor FS qAC + qHS  - c/w cetirizine 10mg daily daily as per Endo recs - Patient states she stopped taking Lantus after christmas due to always having itching with it, and states she had the same reaction w/ Novolog, which was stopped a year ago; states she hasn't had itching since stopping Lantus  - Supposed to be on Jardiance 10 + Repaglinide 1mg TID, hasn't started jardiance yet  - still with pruritis at this time with humalog  - Endo consulted for assistance, recs appreciated  - c/w regular insulin sliding scale  - lantus changed to NPH 7units q12h  on 1/21 as per Endo recs but still with some pruritis today  - will defer basal/bolus adjustments to Endo  - monitor FS qAC + qHS  - c/w cetirizine 10mg daily as per Endo recs  - inpatient allergy/immunology consult to be called today - Patient states she stopped taking Lantus after christmas due to always having itching with it, and states she had the same reaction w/ Novolog, which was stopped a year ago; states she hasn't had itching since stopping Lantus  - Supposed to be on Jardiance 10 + Repaglinide 1mg TID, hasn't started jardiance yet  - still with pruritis at this time with humalog  - Endo consulted for assistance, recs appreciated  - c/w regular insulin sliding scale  - lantus changed to NPH 7units q12h  on 1/21 as per Endo recs but still with some pruritis today  - will defer basal/bolus adjustments to Endo  - monitor FS qAC + qHS  - c/w cetirizine 10mg daily as per Endo recs  - d/w allergy/immunology fellow Dr. Thalia Hutton on 1/22. given already on zyrtec currently, skin testing not feasible inpatient - they will arrange for f/u outpatient Patient states she stopped taking Lantus after christmas due to always having itching with it, and states she had the same reaction w/ Novolog, which was stopped a year ago; states she hasn't had itching since stopping Lantus  - Supposed to be on Jardiance 10 + Repaglinide 1mg TID, hasn't started jardiance yet  - noted to have pruritis with both lantus and admelog inpatient  - Endo consulted for assistance, recs appreciated  - c/w regular insulin sliding scale  - lantus changed to NPH 7units q12h  on 1/21 as per Endo recs but still with some pruritis today  - will defer basal/bolus adjustments to Endo  - monitor FS qAC + qHS  - c/w cetirizine 10mg daily as per Endo recs  - d/w allergy/immunology fellow Dr. Thalia Hutton on 1/22. given already on zyrtec currently, skin testing not feasible inpatient - they will arrange for f/u outpatient

## 2025-01-22 NOTE — PROGRESS NOTE ADULT - SUBJECTIVE AND OBJECTIVE BOX
Follow Up: heel infection     Interval History/ROS: she avoids weight bearing on heel due to pain    Allergies  NovoLog (Pruritus)  Lantus (Pruritus)    ANTIMICROBIALS:  piperacillin/tazobactam IVPB.. 3.375 every 8 hours      OTHER MEDS:  MEDICATIONS  (STANDING):  acetaminophen     Tablet .. 650 every 6 hours PRN  atorvastatin 40 at bedtime  cetirizine 10 daily  diphenhydrAMINE Elixir 12.5 three times a day PRN  diphenhydrAMINE Elixir 12.5 once PRN  heparin   Injectable 5000 every 8 hours  hydrALAZINE 75 three times a day  insulin NPH human recombinant 7 every 12 hours  insulin regular  human corrective regimen sliding scale  three times a day before meals  insulin regular  human corrective regimen sliding scale  at bedtime  insulin regular  human recombinant 2 before breakfast  insulin regular  human recombinant 2 before lunch  insulin regular  human recombinant 2 before dinner  linagliptin 5 daily  melatonin 3 at bedtime PRN  metoprolol succinate ER 50 daily      Vital Signs Last 24 Hrs  T(C): 36.5 (22 Jan 2025 16:17), Max: 36.7 (21 Jan 2025 22:02)  T(F): 97.7 (22 Jan 2025 16:17), Max: 98.1 (21 Jan 2025 22:02)  HR: 68 (22 Jan 2025 16:17) (58 - 75)  BP: 168/74 (22 Jan 2025 16:17) (137/79 - 168/74)  BP(mean): --  RR: 18 (22 Jan 2025 16:17) (18 - 18)  SpO2: 99% (22 Jan 2025 16:17) (94% - 99%)    Parameters below as of 22 Jan 2025 16:17  Patient On (Oxygen Delivery Method): room air        PHYSICAL EXAM:  General: WN/WD NAD, Non-toxic  Neurology: A&Ox3, nonfocal  Respiratory: Clear to auscultation bilaterally  CV: RRR, S1S2, no murmurs, rubs or gallops  Abdominal: Soft, Non-tender, non-distended, normal bowel sounds  Extremities: ace wrapped, dressing bilateral 2+ LE edema  Line Sites: Clear  Skin: No rash                          10.4   6.78  )-----------( 272      ( 22 Jan 2025 07:23 )             32.2       01-22    141  |  107  |  42[H]  ----------------------------<  178[H]  4.0   |  21[L]  |  1.80[H]    Ca    9.2      22 Jan 2025 07:22  Mg     1.9     01-22    TPro  7.1  /  Alb  3.3  /  TBili  0.3  /  DBili  x   /  AST  17  /  ALT  16  /  AlkPhos  96  01-22    MICROBIOLOGY:  .Abscess  01-17-25   Few Pseudomonas aeruginosa  Rare Staphylococcus aureus  Rare Enterococcus faecalis (vancomycin resistant, susc AMP)  Commensal zeynep consistent with body site  --  Pseudomonas aeruginosa  Staphylococcus aureus  Enterococcus faecalis (vancomycin resistant)      .Blood BLOOD  01-16-25   No growth at 5 days  --  --      .Blood BLOOD  01-16-25   No growth at 5 days  --  --      RADIOLOGY:  < from: MR Foot No Cont, Right (01.17.25 @ 21:00) >  IMPRESSION:    1.  Posterior heel and great toe skin wound with soft tissue swelling is   concerning for cellulitis. No drainable collection.  2.  Exuberant marrow edema with superimposed stress fracture throughout   the calcaneus contacting the distal calcaneal articular surface.   Evaluation for infection in this region is limited by the exuberant   marrow edema.  3.  Erosive changes at the great toe interphalangeal joint which   differential considerations include both infectious and inflammatory   etiologies. Correlate for an overlying skin wound which would increase   the probability of osteomyelitis in this region.    < end of copied text >      Mikael Daniels MD; Division of Infectious Disease; Pager: [phone]; nights and weekends: [phone]

## 2025-01-23 ENCOUNTER — TRANSCRIPTION ENCOUNTER (OUTPATIENT)
Age: 65
End: 2025-01-23

## 2025-01-23 DIAGNOSIS — E11.65 TYPE 2 DIABETES MELLITUS WITH HYPERGLYCEMIA: ICD-10-CM

## 2025-01-23 LAB
ANION GAP SERPL CALC-SCNC: 11 MMOL/L — SIGNIFICANT CHANGE UP (ref 5–17)
BUN SERPL-MCNC: 48 MG/DL — HIGH (ref 7–23)
CALCIUM SERPL-MCNC: 9.6 MG/DL — SIGNIFICANT CHANGE UP (ref 8.4–10.5)
CHLORIDE SERPL-SCNC: 107 MMOL/L — SIGNIFICANT CHANGE UP (ref 96–108)
CO2 SERPL-SCNC: 23 MMOL/L — SIGNIFICANT CHANGE UP (ref 22–31)
CREAT SERPL-MCNC: 1.86 MG/DL — HIGH (ref 0.5–1.3)
EGFR: 30 ML/MIN/1.73M2 — LOW
GLUCOSE BLDC GLUCOMTR-MCNC: 135 MG/DL — HIGH (ref 70–99)
GLUCOSE BLDC GLUCOMTR-MCNC: 135 MG/DL — HIGH (ref 70–99)
GLUCOSE BLDC GLUCOMTR-MCNC: 137 MG/DL — HIGH (ref 70–99)
GLUCOSE BLDC GLUCOMTR-MCNC: 155 MG/DL — HIGH (ref 70–99)
GLUCOSE BLDC GLUCOMTR-MCNC: 174 MG/DL — HIGH (ref 70–99)
GLUCOSE SERPL-MCNC: 160 MG/DL — HIGH (ref 70–99)
HCT VFR BLD CALC: 34.1 % — LOW (ref 34.5–45)
HGB BLD-MCNC: 10.9 G/DL — LOW (ref 11.5–15.5)
MAGNESIUM SERPL-MCNC: 2.1 MG/DL — SIGNIFICANT CHANGE UP (ref 1.6–2.6)
MCHC RBC-ENTMCNC: 26.8 PG — LOW (ref 27–34)
MCHC RBC-ENTMCNC: 32 G/DL — SIGNIFICANT CHANGE UP (ref 32–36)
MCV RBC AUTO: 83.8 FL — SIGNIFICANT CHANGE UP (ref 80–100)
NRBC # BLD: 0 /100 WBCS — SIGNIFICANT CHANGE UP (ref 0–0)
NRBC BLD-RTO: 0 /100 WBCS — SIGNIFICANT CHANGE UP (ref 0–0)
PLATELET # BLD AUTO: 260 K/UL — SIGNIFICANT CHANGE UP (ref 150–400)
POTASSIUM SERPL-MCNC: 4.3 MMOL/L — SIGNIFICANT CHANGE UP (ref 3.5–5.3)
POTASSIUM SERPL-SCNC: 4.3 MMOL/L — SIGNIFICANT CHANGE UP (ref 3.5–5.3)
RBC # BLD: 4.07 M/UL — SIGNIFICANT CHANGE UP (ref 3.8–5.2)
RBC # FLD: 14.6 % — HIGH (ref 10.3–14.5)
SODIUM SERPL-SCNC: 141 MMOL/L — SIGNIFICANT CHANGE UP (ref 135–145)
WBC # BLD: 6.35 K/UL — SIGNIFICANT CHANGE UP (ref 3.8–10.5)
WBC # FLD AUTO: 6.35 K/UL — SIGNIFICANT CHANGE UP (ref 3.8–10.5)

## 2025-01-23 PROCEDURE — 99232 SBSQ HOSP IP/OBS MODERATE 35: CPT | Mod: GC

## 2025-01-23 PROCEDURE — G0545: CPT

## 2025-01-23 PROCEDURE — 99232 SBSQ HOSP IP/OBS MODERATE 35: CPT

## 2025-01-23 RX ORDER — NIFEDIPINE 90 MG/1
30 TABLET, FILM COATED, EXTENDED RELEASE ORAL DAILY
Refills: 0 | Status: DISCONTINUED | OUTPATIENT
Start: 2025-01-23 | End: 2025-01-24

## 2025-01-23 RX ORDER — BACTERIOSTATIC SODIUM CHLORIDE 0.9 %
1000 VIAL (ML) INJECTION
Refills: 0 | Status: DISCONTINUED | OUTPATIENT
Start: 2025-01-23 | End: 2025-01-24

## 2025-01-23 RX ADMIN — Medication 75 MILLIGRAM(S): at 06:45

## 2025-01-23 RX ADMIN — Medication 5000 UNIT(S): at 14:21

## 2025-01-23 RX ADMIN — PIPERACILLIN SODIUM AND TAZOBACTAM SODIUM 25 GRAM(S): 2; 250 INJECTION, POWDER, FOR SOLUTION INTRAVENOUS at 14:21

## 2025-01-23 RX ADMIN — Medication 75 MILLIGRAM(S): at 14:21

## 2025-01-23 RX ADMIN — Medication 7 UNIT(S): at 06:44

## 2025-01-23 RX ADMIN — PIPERACILLIN SODIUM AND TAZOBACTAM SODIUM 25 GRAM(S): 2; 250 INJECTION, POWDER, FOR SOLUTION INTRAVENOUS at 06:45

## 2025-01-23 RX ADMIN — Medication 5000 UNIT(S): at 06:44

## 2025-01-23 RX ADMIN — LINAGLIPTIN 5 MILLIGRAM(S): 5 TABLET, FILM COATED ORAL at 12:11

## 2025-01-23 RX ADMIN — Medication 75 MILLIGRAM(S): at 22:18

## 2025-01-23 RX ADMIN — Medication 5000 UNIT(S): at 22:18

## 2025-01-23 RX ADMIN — NIFEDIPINE 30 MILLIGRAM(S): 90 TABLET, FILM COATED, EXTENDED RELEASE ORAL at 18:05

## 2025-01-23 RX ADMIN — ATORVASTATIN CALCIUM 40 MILLIGRAM(S): 80 TABLET, FILM COATED ORAL at 22:18

## 2025-01-23 RX ADMIN — Medication 100 MILLILITER(S): at 09:03

## 2025-01-23 RX ADMIN — Medication 2 UNIT(S): at 09:07

## 2025-01-23 RX ADMIN — Medication 50 MILLIGRAM(S): at 06:44

## 2025-01-23 RX ADMIN — Medication 10 MILLIGRAM(S): at 12:11

## 2025-01-23 RX ADMIN — Medication 7 UNIT(S): at 18:05

## 2025-01-23 RX ADMIN — Medication 2 UNIT(S): at 12:11

## 2025-01-23 RX ADMIN — PIPERACILLIN SODIUM AND TAZOBACTAM SODIUM 25 GRAM(S): 2; 250 INJECTION, POWDER, FOR SOLUTION INTRAVENOUS at 22:18

## 2025-01-23 NOTE — PROGRESS NOTE ADULT - SUBJECTIVE AND OBJECTIVE BOX
Follow Up:  heel infection    Interval History/ROS: she is concerned about vulnerability to future infections - doing ok    Allergies  NovoLog (Pruritus)  Lantus (Pruritus)        ANTIMICROBIALS:  piperacillin/tazobactam IVPB.. 3.375 every 8 hours      OTHER MEDS:  MEDICATIONS  (STANDING):  acetaminophen     Tablet .. 650 every 6 hours PRN  atorvastatin 40 at bedtime  cetirizine 10 daily  diphenhydrAMINE Elixir 12.5 once PRN  diphenhydrAMINE Elixir 12.5 three times a day PRN  heparin   Injectable 5000 every 8 hours  hydrALAZINE 75 three times a day  insulin NPH human recombinant 7 every 12 hours  linagliptin 5 daily  melatonin 3 at bedtime PRN  metoprolol succinate ER 50 daily  NIFEdipine XL 30 daily      Vital Signs Last 24 Hrs  T(C): 36.7 (23 Jan 2025 16:40), Max: 37.1 (23 Jan 2025 11:30)  T(F): 98 (23 Jan 2025 16:40), Max: 98.7 (23 Jan 2025 11:30)  HR: 72 (23 Jan 2025 16:40) (64 - 72)  BP: 170/84 (23 Jan 2025 16:40) (112/69 - 186/81)  BP(mean): --  RR: 16 (23 Jan 2025 16:40) (16 - 18)  SpO2: 98% (23 Jan 2025 16:40) (94% - 98%)    Parameters below as of 23 Jan 2025 16:40  Patient On (Oxygen Delivery Method): room air        PHYSICAL EXAM:  General: NAD, Non-toxic  Neurology: A&Ox3, nonfocal  Respiratory: Clear to auscultation bilaterally  CV: RRR, S1S2, no murmurs, rubs or gallops  Abdominal: Soft, Non-tender, non-distended, normal bowel sounds  Extremities: ++ edema  dressing clean and dry  Line Sites: Clear  Skin: No rash                          10.9   6.35  )-----------( 260      ( 23 Jan 2025 07:01 )             34.1     01-23    141  |  107  |  48[H]  ----------------------------<  160[H]  4.3   |  23  |  1.86[H]    Ca    9.6      23 Jan 2025 07:01  Mg     2.1     01-23    TPro  7.1  /  Alb  3.3  /  TBili  0.3  /  DBili  x   /  AST  17  /  ALT  16  /  AlkPhos  96  01-22        MICROBIOLOGY:  .Abscess  01-17-25   Few Pseudomonas aeruginosa  Rare Staphylococcus aureus  Rare Enterococcus faecalis (vancomycin resistant)  Commensal zeynep consistent with body site      .Blood BLOOD  01-16-25   No growth at 5 days  --  --      .Blood BLOOD  01-16-25   No growth at 5 days  --  --    RADIOLOGY:  < from: [facility] Physiol Extremity Lower 3+ Level, BI (01.21.25 @ 10:42) >  IMPRESSION: No evidence of DVT in either lower extremity in the   visualized venous segments.    There is limited visualization of the calf veins.    < end of copied text >  < from: [facility] Physiol Extremity Lower 3+ Level, BI (01.21.25 @ 08:26) >  FINDINGS /  IMPRESSION:    Right VIKY is 1.33. Left VIKY is 1.36. Right TBI is 1.17. Left TBI is 1.27.    Bilateral ABIs and TBIs are elevated, possibly in the setting of   noncompressible vasculature.    Segmental pressures were not obtained due to vessel calcification.    PVR waveforms are nonrevealing. Correlate with arterial duplex ultrasound   as clinically warranted due to limitations of the study.    < end of copied text >  < from: MR Foot No Cont, Right (01.17.25 @ 21:00) >  FINDINGS: Degraded by motion artifact. This is most prominent in the   forefoot.    BONE MARROW: Moderate erosive change at the first toe interphalangeal   joint with bony loss at the head of the first proximal phalanx and the   base of the first distal phalanx. There is marrow edema noted throughout   the calcaneus with a superimposed curvilinear hypointense T1 line which   extends from the posterior calcaneal facet to the anterior articular   surface of the calcaneus at the calcaneocuboid joint. There is mild   patchy marrow edema noted at the lateral tibial plafond. Thinning of the   cartilage at the posterior subtalar joint. Thinning and productive   changes at the superior talonavicular joint. Moderate cartilage loss and   dorsal spurring between the navicula and middle/medial cuneiform.   Thinning and cartilage loss at the second, third, and fourth TMT with   subchondral cyst formation.  SYNOVIUM/ JOINT FLUID: Small fluid in the first interphalangeal joint.  TENDONS: No full-thickness tendon tear or retraction.  MUSCLES: Atrophy of the musculature the foot with edema is nonspecific   but can be seen with neuropathic change  NEUROVASCULAR STRUCTURES: Preserved  SUBCUTANEOUS SOFT TISSUES: Diffuse soft tissue swelling about the foot.   Skin wound suspected over the posterior heel. Suspected skin wound at the   great toe dorsal surface. Skin thickening and small skin erosions along   the plantar surface of the great toe. No drainable collection.    IMPRESSION:    1.  Posterior heel and great toe skin wound with soft tissue swelling is   concerning for cellulitis. No drainable collection.  2.  Exuberant marrow edema with superimposed stress fracture throughout   the calcaneus contacting the distal calcaneal articular surface.   Evaluation for infection in this region is limited by the exuberant   marrow edema.  3.  Erosive changes at the great toe interphalangeal joint which   differential considerations include both infectious and inflammatory   etiologies. Correlate for an overlying skin wound which would increase   the probability of osteomyelitis in this region.    < end of copied text >      Mikael Daniels MD; Division of Infectious Disease; Pager: [phone]; nights and weekends: [phone]

## 2025-01-23 NOTE — PROGRESS NOTE ADULT - SUBJECTIVE AND OBJECTIVE BOX
Patient is a 64y old  Female who presents with a chief complaint of Concern for osteomyelitis (22 Jan 2025 18:03)      INTERVAL HPI/OVERNIGHT EVENTS:      CONSTITUTIONAL:  Negative fever or chills, feels well, good appetite  EYES:  Negative  blurry vision or double vision  CARDIOVASCULAR:  Negative for chest pain or palpitations  RESPIRATORY:  Negative for cough, wheezing, or SOB   GASTROINTESTINAL:  Negative for nausea, vomiting, diarrhea, constipation, or abdominal pain  GENITOURINARY:  Negative frequency, urgency or dysuria  NEUROLOGIC:  No headache, confusion, dizziness, lightheadedness    MEDICATIONS  (STANDING):  atorvastatin 40 milliGRAM(s) Oral at bedtime  cetirizine 10 milliGRAM(s) Oral daily  heparin   Injectable 5000 Unit(s) SubCutaneous every 8 hours  hydrALAZINE 75 milliGRAM(s) Oral three times a day  insulin NPH human recombinant 7 Unit(s) SubCutaneous every 12 hours  insulin regular  human corrective regimen sliding scale   SubCutaneous three times a day before meals  insulin regular  human corrective regimen sliding scale   SubCutaneous at bedtime  insulin regular  human recombinant 2 Unit(s) SubCutaneous before breakfast  insulin regular  human recombinant 2 Unit(s) SubCutaneous before lunch  insulin regular  human recombinant 2 Unit(s) SubCutaneous before dinner  linagliptin 5 milliGRAM(s) Oral daily  metoprolol succinate ER 50 milliGRAM(s) Oral daily  piperacillin/tazobactam IVPB.. 3.375 Gram(s) IV Intermittent every 8 hours  sodium chloride 0.9%. 1000 milliLiter(s) (100 mL/Hr) IV Continuous <Continuous>    MEDICATIONS  (PRN):  acetaminophen     Tablet .. 650 milliGRAM(s) Oral every 6 hours PRN Temp greater or equal to 38C (100.4F), Mild Pain (1 - 3)  diphenhydrAMINE Elixir 12.5 milliGRAM(s) Oral once PRN Rash and/or Itching  diphenhydrAMINE Elixir 12.5 milliGRAM(s) Oral three times a day PRN Rash and/or Itching  melatonin 3 milliGRAM(s) Oral at bedtime PRN Insomnia      PHYSICAL EXAM  Vital Signs Last 24 Hrs  T(C): 36.8 (23 Jan 2025 04:26), Max: 36.8 (23 Jan 2025 04:26)  T(F): 98.2 (23 Jan 2025 04:26), Max: 98.2 (23 Jan 2025 04:26)  HR: 64 (23 Jan 2025 04:26) (60 - 68)  BP: 147/78 (23 Jan 2025 04:26) (112/69 - 168/74)  BP(mean): --  RR: 18 (23 Jan 2025 04:26) (18 - 18)  SpO2: 96% (23 Jan 2025 04:26) (94% - 99%)    Parameters below as of 23 Jan 2025 04:26  Patient On (Oxygen Delivery Method): room air        Constitutional: wn/wd in NAD.   HEENT: NCAT, MMM, OP clear, EOMI, no proptosis or lid retraction  Neck: no thyromegaly or palpable thyroid nodules   Respiratory: lungs CTAB.  Cardiovascular: regular rhythm, normal S1 and S2, no audible murmurs, no peripheral edema  GI: soft, NT/ND, no masses/HSM appreciated.  Neurology: no tremors, DTR 2+  Skin: no visible rashes/lesions  Psychiatric: AAO x 3, normal affect/mood.    LABS:                        10.9   6.35  )-----------( 260      ( 23 Jan 2025 07:01 )             34.1     01-23    141  |  107  |  48[H]  ----------------------------<  160[H]  4.3   |  23  |  1.86[H]    Ca    9.6      23 Jan 2025 07:01  Mg     2.1     01-23    TPro  7.1  /  Alb  3.3  /  TBili  0.3  /  DBili  x   /  AST  17  /  ALT  16  /  AlkPhos  96  01-22      Urinalysis Basic - ( 23 Jan 2025 07:01 )    Color: x / Appearance: x / SG: x / pH: x  Gluc: 160 mg/dL / Ketone: x  / Bili: x / Urobili: x   Blood: x / Protein: x / Nitrite: x   Leuk Esterase: x / RBC: x / WBC x   Sq Epi: x / Non Sq Epi: x / Bacteria: x          HbA1C:   CAPILLARY BLOOD GLUCOSE      POCT Blood Glucose.: 137 mg/dL (23 Jan 2025 09:06)  POCT Blood Glucose.: 155 mg/dL (23 Jan 2025 06:41)  POCT Blood Glucose.: 189 mg/dL (22 Jan 2025 21:24)  POCT Blood Glucose.: 176 mg/dL (22 Jan 2025 18:00)  POCT Blood Glucose.: 208 mg/dL (22 Jan 2025 11:38)      Insulin Sliding Scale requirements X 24 Hours:   Patient is a 64y old  Female who presents with a chief complaint of Concern for osteomyelitis (22 Jan 2025 18:03)      INTERVAL HPI/OVERNIGHT EVENTS: NAEO but continues to complain of significant pruritis.       CONSTITUTIONAL:  Negative fever or chills, feels well, good appetite  EYES:  Negative  blurry vision or double vision  CARDIOVASCULAR:  Negative for chest pain or palpitations  RESPIRATORY:  Negative for cough, wheezing, or SOB   GASTROINTESTINAL:  Negative for nausea, vomiting, diarrhea, constipation, or abdominal pain  GENITOURINARY:  Negative frequency, urgency or dysuria  NEUROLOGIC:  No headache, confusion, dizziness, lightheadedness    MEDICATIONS  (STANDING):  atorvastatin 40 milliGRAM(s) Oral at bedtime  cetirizine 10 milliGRAM(s) Oral daily  heparin   Injectable 5000 Unit(s) SubCutaneous every 8 hours  hydrALAZINE 75 milliGRAM(s) Oral three times a day  insulin NPH human recombinant 7 Unit(s) SubCutaneous every 12 hours  insulin regular  human corrective regimen sliding scale   SubCutaneous three times a day before meals  insulin regular  human corrective regimen sliding scale   SubCutaneous at bedtime  insulin regular  human recombinant 2 Unit(s) SubCutaneous before breakfast  insulin regular  human recombinant 2 Unit(s) SubCutaneous before lunch  insulin regular  human recombinant 2 Unit(s) SubCutaneous before dinner  linagliptin 5 milliGRAM(s) Oral daily  metoprolol succinate ER 50 milliGRAM(s) Oral daily  piperacillin/tazobactam IVPB.. 3.375 Gram(s) IV Intermittent every 8 hours  sodium chloride 0.9%. 1000 milliLiter(s) (100 mL/Hr) IV Continuous <Continuous>    MEDICATIONS  (PRN):  acetaminophen     Tablet .. 650 milliGRAM(s) Oral every 6 hours PRN Temp greater or equal to 38C (100.4F), Mild Pain (1 - 3)  diphenhydrAMINE Elixir 12.5 milliGRAM(s) Oral once PRN Rash and/or Itching  diphenhydrAMINE Elixir 12.5 milliGRAM(s) Oral three times a day PRN Rash and/or Itching  melatonin 3 milliGRAM(s) Oral at bedtime PRN Insomnia      PHYSICAL EXAM  Vital Signs Last 24 Hrs  T(C): 36.8 (23 Jan 2025 04:26), Max: 36.8 (23 Jan 2025 04:26)  T(F): 98.2 (23 Jan 2025 04:26), Max: 98.2 (23 Jan 2025 04:26)  HR: 64 (23 Jan 2025 04:26) (60 - 68)  BP: 147/78 (23 Jan 2025 04:26) (112/69 - 168/74)  BP(mean): --  RR: 18 (23 Jan 2025 04:26) (18 - 18)  SpO2: 96% (23 Jan 2025 04:26) (94% - 99%)    Parameters below as of 23 Jan 2025 04:26  Patient On (Oxygen Delivery Method): room air        Constitutional: wn/wd in NAD.   HEENT: NCAT, MMM, OP clear, EOMI, no proptosis or lid retraction  Respiratory: lungs CTAB.  Cardiovascular: regular rhythm, normal S1 and S2, no audible murmurs, no peripheral edema  GI: soft, NT/ND, no masses/HSM appreciated.  Neurology: no tremors, DTR 2+  Skin: continued papular rash in b/l thighs L >R  Psychiatric: AAO x 3, normal affect/mood.    LABS:                        10.9   6.35  )-----------( 260      ( 23 Jan 2025 07:01 )             34.1     01-23    141  |  107  |  48[H]  ----------------------------<  160[H]  4.3   |  23  |  1.86[H]    Ca    9.6      23 Jan 2025 07:01  Mg     2.1     01-23    TPro  7.1  /  Alb  3.3  /  TBili  0.3  /  DBili  x   /  AST  17  /  ALT  16  /  AlkPhos  96  01-22      Urinalysis Basic - ( 23 Jan 2025 07:01 )    Color: x / Appearance: x / SG: x / pH: x  Gluc: 160 mg/dL / Ketone: x  / Bili: x / Urobili: x   Blood: x / Protein: x / Nitrite: x   Leuk Esterase: x / RBC: x / WBC x   Sq Epi: x / Non Sq Epi: x / Bacteria: x    CAPILLARY BLOOD GLUCOSE      POCT Blood Glucose.: 137 mg/dL (23 Jan 2025 09:06)  POCT Blood Glucose.: 155 mg/dL (23 Jan 2025 06:41)  POCT Blood Glucose.: 189 mg/dL (22 Jan 2025 21:24)  POCT Blood Glucose.: 176 mg/dL (22 Jan 2025 18:00)  POCT Blood Glucose.: 208 mg/dL (22 Jan 2025 11:38)

## 2025-01-23 NOTE — DISCHARGE NOTE NURSING/CASE MANAGEMENT/SOCIAL WORK - PATIENT PORTAL LINK FT
You can access the FollowMyHealth Patient Portal offered by [facility] by registering at the following website: [email or web address]. By joining [employer]’s FollowMyHealth portal, you will also be able to view your health information using other applications (apps) compatible with our system.

## 2025-01-23 NOTE — DISCHARGE NOTE NURSING/CASE MANAGEMENT/SOCIAL WORK - FINANCIAL ASSISTANCE
[facility] provides services at a reduced cost to those who are determined to be eligible through [facility]’s financial assistance program. Information regarding [facility]’s financial assistance program can be found by going to [email or web address] or by calling [phone].

## 2025-01-23 NOTE — PROGRESS NOTE ADULT - PROBLEM SELECTOR PLAN 3
Patient states she stopped taking Lantus after christmas due to always having itching with it, and states she had the same reaction w/ Novolog, which was stopped a year ago; states she hasn't had itching since stopping Lantus  - Supposed to be on Jardiance 10 + Repaglinide 1mg TID, hasn't started jardiance yet  - noted to have pruritis with both lantus and admelog inpatient  - Endo consulted for assistance, recs appreciated  - regular insulin discontinued. c/w NPH 7u q12h  - tradjenta 5mg daily started on 1/23  - Anticipated DC regimen per Endo: tradjenta, prandin 1mg TID w/ meals, and ozempic  - monitor FS qAC + qHS  - c/w cetirizine 10mg daily as per Endo recs  - d/w allergy/immunology fellow Dr. Thalia Hutton on 1/22. given already on zyrtec currently, skin testing not feasible inpatient - they will arrange for f/u outpatient but patient states already has appointment on 2/20

## 2025-01-23 NOTE — PROGRESS NOTE ADULT - PROBLEM SELECTOR PLAN 2
- continue to hold valsartan and lasix  - BUN/Cr uptrending. suspect 2/2 poor PO intake of free water compared to home and recent vanco/zosyn combination  - will give additional IVF today and encouraged to increase PO intake of fluids as well  - f/u repeat Cr on BMP in AM

## 2025-01-23 NOTE — PROGRESS NOTE ADULT - TIME BILLING
- Ordering, reviewing, and interpreting labs, testing, and imaging.  - Independently obtaining a review of systems and performing a physical exam  - Reviewing consultant documentation/recommendations.  - Counselling and educating patient regarding interpretation of aforementioned items and plan of care.
- Ordering, reviewing, and interpreting labs, testing, and imaging.  - Independently obtaining a review of systems and performing a physical exam  - Reviewing consultant documentation  - Counselling and educating patient regarding interpretation of aforementioned items and plan of care.

## 2025-01-23 NOTE — PROGRESS NOTE ADULT - NSPROGADDITIONALINFOA_GEN_ALL_CORE
.  Maritza Guerrero MD  Division of Hospital Medicine  [facility]   Available on Microsoft Teams - messages preferred prior to calls.    To complete IV abx on 1/23. Anticipate possible d/c on 1/24 pending final Endo recs and Cr improved.    Plan discussed with patient and medicine NP Chelsea.

## 2025-01-23 NOTE — PROGRESS NOTE ADULT - ASSESSMENT
65 y/o woman with   T2DM (1/17/24 A1C = 7.9%), obesity BMI = 42.5, HTN, HLD, b/l heel wound c/b OM s/p debridement w/ R foot graft, and CKD2 admitted 1/16/24 with concern for osteomyelitis and worsening right foot wounds  1/17 -- no evidence of DVTs; MRI demonstrates stress fracture in calcaneous with exuberant reaction  1/16 ESR/CRP = 120/28    h/o MRSA colonization  7/27/23 wound culture isolates were susceptible to Zosyn/Vanco: Klebs pn, Moranella, Enterococcus Feacalis, MRSAm Corynebacterium    1/21 discussed with Podiatry resident at bedside and image of heel wound reviewed  - wound improved, closed, no malodour, very low concern for oteomyelitis  1/22 Vascular notes: "right heel wound and chronic venous stasis disease. Patient VIKY/PVR performed, not consistent with severe PAD."  PT assessment appreciated Pt demonstrates safe and independent functional mobility. pt requires no skilled PT at this time.     Antibiotics  Vancomycin 1/16 --> 1/21  Zosyn 1/16 -->      Suggest  Complete Zosyn today  No ID objection to discharge  -- follow up with Podiatry, Vascular after discharge

## 2025-01-23 NOTE — DISCHARGE NOTE NURSING/CASE MANAGEMENT/SOCIAL WORK - NSDCFUADDAPPT_GEN_ALL_CORE_FT
Podiatry Discharge Instructions:  Follow up: Please follow up with Dr. Flores within 1 week of discharge from the hospital, please call [phone] for appointment and discuss that you recently were seen in the hospital.  Wound Care: Please apply Aquacel Ag to bilateral foot wounds followed by 4x4 gauze, ABD pad, beatrice, and ACE bandage daily, thank you!   Weight bearing: Please weight bear as tolerated in heel offloading shoe.  Antibiotics: Please continue as instructed.

## 2025-01-23 NOTE — PROGRESS NOTE ADULT - PROBLEM SELECTOR PLAN 1
- MRI of the R foot not conclusive but suggestive of OM, stress fracture of R calcaneus noted  - Podiatry also with low c/f OM based on exam  - cultures growing multiple organisms: staph aureus, pseudomonas, and e. faecalis  - Podiatry and ID consult recs appreciated  - stopped vanco on 1/21 as per ID  - c/w IV zosyn through 1/23  - Vascular surgery f/u re: VIKY/PVR appreciated; not consistent with severe PAD - outpt f/u   - no plans for intervention per Podiatry  - c/w offloading shoe (at bedside) for calcaneus stress fracture noted on MRI

## 2025-01-23 NOTE — PROGRESS NOTE ADULT - SUBJECTIVE AND OBJECTIVE BOX
Maritza Guerrero MD  Division of Hospital Medicine  [facility]   Available on Microsoft Teams (Mon-Fri 8am-5pm)    * messages preferred prior to calls  Other Times:  [phone]      Patient is a 64y old  Female who presents with a chief complaint of Concern for osteomyelitis (23 Jan 2025 09:39)      SUBJECTIVE / OVERNIGHT EVENTS: no acute events overnight. but still with ongoing pruritis mostly in upper back and trunk.  ADDITIONAL REVIEW OF SYSTEMS:    MEDICATIONS  (STANDING):  atorvastatin 40 milliGRAM(s) Oral at bedtime  cetirizine 10 milliGRAM(s) Oral daily  heparin   Injectable 5000 Unit(s) SubCutaneous every 8 hours  hydrALAZINE 75 milliGRAM(s) Oral three times a day  insulin NPH human recombinant 7 Unit(s) SubCutaneous every 12 hours  linagliptin 5 milliGRAM(s) Oral daily  metoprolol succinate ER 50 milliGRAM(s) Oral daily  NIFEdipine XL 30 milliGRAM(s) Oral daily  piperacillin/tazobactam IVPB.. 3.375 Gram(s) IV Intermittent every 8 hours  sodium chloride 0.9%. 1000 milliLiter(s) (100 mL/Hr) IV Continuous <Continuous>    MEDICATIONS  (PRN):  acetaminophen     Tablet .. 650 milliGRAM(s) Oral every 6 hours PRN Temp greater or equal to 38C (100.4F), Mild Pain (1 - 3)  diphenhydrAMINE Elixir 12.5 milliGRAM(s) Oral once PRN Rash and/or Itching  diphenhydrAMINE Elixir 12.5 milliGRAM(s) Oral three times a day PRN Rash and/or Itching  melatonin 3 milliGRAM(s) Oral at bedtime PRN Insomnia      CAPILLARY BLOOD GLUCOSE      POCT Blood Glucose.: 135 mg/dL (23 Jan 2025 17:55)  POCT Blood Glucose.: 135 mg/dL (23 Jan 2025 11:21)  POCT Blood Glucose.: 137 mg/dL (23 Jan 2025 09:06)  POCT Blood Glucose.: 155 mg/dL (23 Jan 2025 06:41)  POCT Blood Glucose.: 189 mg/dL (22 Jan 2025 21:24)    I&O's Summary      PHYSICAL EXAM:  Vital Signs Last 24 Hrs  T(C): 36.7 (23 Jan 2025 16:40), Max: 37.1 (23 Jan 2025 11:30)  T(F): 98 (23 Jan 2025 16:40), Max: 98.7 (23 Jan 2025 11:30)  HR: 72 (23 Jan 2025 16:40) (64 - 72)  BP: 170/84 (23 Jan 2025 16:40) (112/69 - 186/81)  BP(mean): --  RR: 16 (23 Jan 2025 16:40) (16 - 18)  SpO2: 98% (23 Jan 2025 16:40) (94% - 98%)    Parameters below as of 23 Jan 2025 16:40  Patient On (Oxygen Delivery Method): room air    CONSTITUTIONAL: NAD, well-developed, well-groomed  EYES: PERRLA; conjunctiva and sclera clear  ENMT: Moist oral mucosa, no pharyngeal injection or exudates; normal dentition  NECK: Supple, no palpable masses; no thyromegaly  RESPIRATORY: Normal respiratory effort; lungs are clear to auscultation bilaterally  CARDIOVASCULAR: Regular rate and rhythm, normal S1 and S2, no murmur/rub/gallop  ABDOMEN: Soft, Nondistended, Nontender to palpation, normoactive bowel sounds  MUSCULOSKELETAL: No clubbing or cyanosis of digits; no joint swelling or tenderness to palpation  PSYCH: A+O to person, place, and time; affect appropriate  NEUROLOGY: CN 2-12 are intact and symmetric; no gross sensory deficits   SKIN: No rashes; no palpable lesions, +b/l LE in dressing and wrapped    LABS:                        10.9   6.35  )-----------( 260      ( 23 Jan 2025 07:01 )             34.1     01-23    141  |  107  |  48[H]  ----------------------------<  160[H]  4.3   |  23  |  1.86[H]    Ca    9.6      23 Jan 2025 07:01  Mg     2.1     01-23    TPro  7.1  /  Alb  3.3  /  TBili  0.3  /  DBili  x   /  AST  17  /  ALT  16  /  AlkPhos  96  01-22        Urinalysis Basic - ( 23 Jan 2025 07:01 )    Color: x / Appearance: x / SG: x / pH: x  Gluc: 160 mg/dL / Ketone: x  / Bili: x / Urobili: x   Blood: x / Protein: x / Nitrite: x   Leuk Esterase: x / RBC: x / WBC x   Sq Epi: x / Non Sq Epi: x / Bacteria: x      RADIOLOGY & ADDITIONAL TESTS:  Results Reviewed: no leukocytosis, H/H stable, BUN/Cr uptrended  Imaging Personally Reviewed:  Electrocardiogram Personally Reviewed:    COORDINATION OF CARE:  Care Discussed with Consultants/Other Providers [Y]: medicine ISABEL Tran  Prior or Outpatient Records Reviewed [Y/N]:

## 2025-01-23 NOTE — PROGRESS NOTE ADULT - ATTENDING COMMENTS
Patient is a 64-year-old woman with history of type 2 diabetes, hypertension, hyperlipidemia, bilateral heel wound complicated by osteomyelitis, status post debridement with right foot graft, CKD, presenting for concern of acute on chronic osteomyelitis.  Endocrinology consulted for pretension insulin allergy.  Patient is currently on Lantus patient was switched to NPH and regular insulin yesterday.  She reports some improvement of itching.  Allergy will be consulted on this admission to consider allergy testing.  But most likely will be done outpatient as patient had just received antihistamine.  For now continue with NPH and regular insulin with meals.  Patient glycemic control moderately well, A1c 7.9% on Ozempic and repaglinide, can consider switching to Mounjaro and optimizing repaglinide as needed.  Or adding on SGLT2 inhibitor as an outpatient.
Agree with assessment and plan as above by Dr. Dotson. Reviewed all pertinent labs, glucose values, and imaging studies. Modifications made as indicated above. Pt. with Type 2 DM w/ possible allergy to insulin glargine in the past now on NPH and regular still with puritis. Can start to transition off insulin based on outpatient plan as above where she will not be on insulin once discharged. Start by d/c regular insulin first and monitor on NPH 7 units q12 and  tradjenta for now. If no improvement can then d/c NPH as well. Plan to d/c on Ozempic and repaglinide. Follow-up with outpatient endocrinologist and recommend outpatient allergy/immunology evaluation for testing.     Yasir Carrion D.O  [phone]

## 2025-01-24 VITALS
SYSTOLIC BLOOD PRESSURE: 145 MMHG | TEMPERATURE: 98 F | DIASTOLIC BLOOD PRESSURE: 77 MMHG | HEART RATE: 64 BPM | RESPIRATION RATE: 18 BRPM | OXYGEN SATURATION: 97 %

## 2025-01-24 LAB
ANION GAP SERPL CALC-SCNC: 11 MMOL/L — SIGNIFICANT CHANGE UP (ref 5–17)
BUN SERPL-MCNC: 40 MG/DL — HIGH (ref 7–23)
CALCIUM SERPL-MCNC: 9.1 MG/DL — SIGNIFICANT CHANGE UP (ref 8.4–10.5)
CHLORIDE SERPL-SCNC: 110 MMOL/L — HIGH (ref 96–108)
CO2 SERPL-SCNC: 21 MMOL/L — LOW (ref 22–31)
CREAT SERPL-MCNC: 1.61 MG/DL — HIGH (ref 0.5–1.3)
EGFR: 35 ML/MIN/1.73M2 — LOW
GLUCOSE BLDC GLUCOMTR-MCNC: 135 MG/DL — HIGH (ref 70–99)
GLUCOSE BLDC GLUCOMTR-MCNC: 139 MG/DL — HIGH (ref 70–99)
GLUCOSE SERPL-MCNC: 115 MG/DL — HIGH (ref 70–99)
POTASSIUM SERPL-MCNC: 4 MMOL/L — SIGNIFICANT CHANGE UP (ref 3.5–5.3)
POTASSIUM SERPL-SCNC: 4 MMOL/L — SIGNIFICANT CHANGE UP (ref 3.5–5.3)
SODIUM SERPL-SCNC: 142 MMOL/L — SIGNIFICANT CHANGE UP (ref 135–145)

## 2025-01-24 PROCEDURE — 73718 MRI LOWER EXTREMITY W/O DYE: CPT | Mod: MC

## 2025-01-24 PROCEDURE — 83735 ASSAY OF MAGNESIUM: CPT

## 2025-01-24 PROCEDURE — 87205 SMEAR GRAM STAIN: CPT

## 2025-01-24 PROCEDURE — 84100 ASSAY OF PHOSPHORUS: CPT

## 2025-01-24 PROCEDURE — 80061 LIPID PANEL: CPT

## 2025-01-24 PROCEDURE — 73721 MRI JNT OF LWR EXTRE W/O DYE: CPT | Mod: MC

## 2025-01-24 PROCEDURE — 85610 PROTHROMBIN TIME: CPT

## 2025-01-24 PROCEDURE — 97161 PT EVAL LOW COMPLEX 20 MIN: CPT

## 2025-01-24 PROCEDURE — 99285 EMERGENCY DEPT VISIT HI MDM: CPT | Mod: 25

## 2025-01-24 PROCEDURE — 93923 UPR/LXTR ART STDY 3+ LVLS: CPT

## 2025-01-24 PROCEDURE — 80202 ASSAY OF VANCOMYCIN: CPT

## 2025-01-24 PROCEDURE — 85025 COMPLETE CBC W/AUTO DIFF WBC: CPT

## 2025-01-24 PROCEDURE — 87186 SC STD MICRODIL/AGAR DIL: CPT

## 2025-01-24 PROCEDURE — 87075 CULTR BACTERIA EXCEPT BLOOD: CPT

## 2025-01-24 PROCEDURE — 36415 COLL VENOUS BLD VENIPUNCTURE: CPT

## 2025-01-24 PROCEDURE — 73630 X-RAY EXAM OF FOOT: CPT

## 2025-01-24 PROCEDURE — 87077 CULTURE AEROBIC IDENTIFY: CPT

## 2025-01-24 PROCEDURE — 87040 BLOOD CULTURE FOR BACTERIA: CPT

## 2025-01-24 PROCEDURE — 85652 RBC SED RATE AUTOMATED: CPT

## 2025-01-24 PROCEDURE — 80048 BASIC METABOLIC PNL TOTAL CA: CPT

## 2025-01-24 PROCEDURE — 93970 EXTREMITY STUDY: CPT

## 2025-01-24 PROCEDURE — 87070 CULTURE OTHR SPECIMN AEROBIC: CPT

## 2025-01-24 PROCEDURE — 85730 THROMBOPLASTIN TIME PARTIAL: CPT

## 2025-01-24 PROCEDURE — 86140 C-REACTIVE PROTEIN: CPT

## 2025-01-24 PROCEDURE — 84550 ASSAY OF BLOOD/URIC ACID: CPT

## 2025-01-24 PROCEDURE — 96374 THER/PROPH/DIAG INJ IV PUSH: CPT

## 2025-01-24 PROCEDURE — 80053 COMPREHEN METABOLIC PANEL: CPT

## 2025-01-24 PROCEDURE — 96375 TX/PRO/DX INJ NEW DRUG ADDON: CPT

## 2025-01-24 PROCEDURE — 82962 GLUCOSE BLOOD TEST: CPT

## 2025-01-24 PROCEDURE — 99239 HOSP IP/OBS DSCHRG MGMT >30: CPT

## 2025-01-24 PROCEDURE — 85027 COMPLETE CBC AUTOMATED: CPT

## 2025-01-24 PROCEDURE — 99232 SBSQ HOSP IP/OBS MODERATE 35: CPT

## 2025-01-24 PROCEDURE — 83036 HEMOGLOBIN GLYCOSYLATED A1C: CPT

## 2025-01-24 RX ORDER — NIFEDIPINE 90 MG/1
1 TABLET, FILM COATED, EXTENDED RELEASE ORAL
Qty: 30 | Refills: 0
Start: 2025-01-24 | End: 2025-02-22

## 2025-01-24 RX ORDER — SEMAGLUTIDE 0.25 MG/.5ML
1 INJECTION, SOLUTION SUBCUTANEOUS
Qty: 5 | Refills: 0
Start: 2025-01-24 | End: 2025-02-22

## 2025-01-24 RX ORDER — CETIRIZINE HCL 10 MG
1 TABLET ORAL
Qty: 30 | Refills: 0
Start: 2025-01-24 | End: 2025-02-22

## 2025-01-24 RX ORDER — LINAGLIPTIN 5 MG/1
1 TABLET, FILM COATED ORAL
Qty: 30 | Refills: 0
Start: 2025-01-24 | End: 2025-02-22

## 2025-01-24 RX ORDER — INSULIN NPH HUMAN ISOPHANE 100/ML (3)
7 INSULIN PEN (ML) SUBCUTANEOUS
Qty: 5 | Refills: 0
Start: 2025-01-24 | End: 2025-02-22

## 2025-01-24 RX ORDER — VALSARTAN 80 MG
1 TABLET ORAL
Refills: 0 | DISCHARGE

## 2025-01-24 RX ORDER — REPAGLINIDE 1 MG/1
1 TABLET ORAL
Refills: 0 | DISCHARGE

## 2025-01-24 RX ADMIN — NIFEDIPINE 30 MILLIGRAM(S): 90 TABLET, FILM COATED, EXTENDED RELEASE ORAL at 06:06

## 2025-01-24 RX ADMIN — Medication 10 MILLIGRAM(S): at 11:10

## 2025-01-24 RX ADMIN — Medication 5000 UNIT(S): at 06:05

## 2025-01-24 RX ADMIN — LINAGLIPTIN 5 MILLIGRAM(S): 5 TABLET, FILM COATED ORAL at 11:10

## 2025-01-24 RX ADMIN — Medication 50 MILLIGRAM(S): at 06:07

## 2025-01-24 RX ADMIN — Medication 7 UNIT(S): at 06:06

## 2025-01-24 RX ADMIN — Medication 75 MILLIGRAM(S): at 06:06

## 2025-01-24 NOTE — PROGRESS NOTE ADULT - PROBLEM SELECTOR PROBLEM 4
HTN (hypertension)
Soft tissue infection of foot
HTN (hypertension)
Soft tissue infection of foot
HTN (hypertension)

## 2025-01-24 NOTE — PROGRESS NOTE ADULT - SUBJECTIVE AND OBJECTIVE BOX
Patient seen today for follow up inpatient Diabetes Mellitus management.    Chief Complaint: Type 2 Diabetes Mellitus     INTERVAL HX:  Patient seen in [facility] SURGE 5 (4TOW) 10. Patient is alert and oriented, resting in bed. Patient reports feeling good, has been eating full meals and tolerating POs. FBG stable to 135mg/dL, 115mg/dL on blood serum. No hypoglycemia. Patient reports she is tolerating NPH insulin well, reports improvement in pruritus and feels good on this insulin. Patient is pending d/c home today, family member coming at 1200. BG have been stable and mostly at goal 100-180mg/dL while on a Consistent Carbohydrate Diet. Blood glucose levels in the last 24hrs have been 115-174mg/dL.    Review of Systems:  General: As above.  Respiratory: Denies any SOB, LE, or cough.  Gastrointestinal: Denies any n/v/d or abdominal pain.   Endocrine: Denies any polyuria, polydipsia, polyphagia, visual changes, or numbness in feet.     Allergies  NovoLog (Pruritus)  Lantus (Pruritus)      Intolerances  None.       MEDICATIONS  (STANDING):  acetaminophen     Tablet .. 650 milliGRAM(s) Oral every 6 hours PRN  atorvastatin 40 milliGRAM(s) Oral at bedtime  cetirizine 10 milliGRAM(s) Oral daily  diphenhydrAMINE Elixir 12.5 milliGRAM(s) Oral once PRN  diphenhydrAMINE Elixir 12.5 milliGRAM(s) Oral three times a day PRN  heparin   Injectable 5000 Unit(s) SubCutaneous every 8 hours  hydrALAZINE 75 milliGRAM(s) Oral three times a day  insulin NPH human recombinant 7 Unit(s) SubCutaneous every 12 hours  linagliptin 5 milliGRAM(s) Oral daily  melatonin 3 milliGRAM(s) Oral at bedtime PRN  metoprolol succinate ER 50 milliGRAM(s) Oral daily  NIFEdipine XL 30 milliGRAM(s) Oral daily      atorvastatin 40 milliGRAM(s) Oral at bedtime  insulin NPH human recombinant 7 Unit(s) SubCutaneous every 12 hours  linagliptin 5 milliGRAM(s) Oral daily          PHYSICAL EXAM:  VITALS:   T(C): 36.5 (01-24-25 @ 08:51), Max: 36.8 (01-24-25 @ 04:42)  HR: 60 (01-24-25 @ 08:51) (60 - 72)  BP: 157/77 (01-24-25 @ 08:51) (128/70 - 175/79)  RR: 18 (01-24-25 @ 08:51) (16 - 18)  SpO2: 97% (01-24-25 @ 08:51) (95% - 98%)    GENERAL: In no acute distress  Respiratory: Respirations unlabored  Extremities: Warm and dry, +BLE edema  NEURO: Alert and oriented, appropriate     LABS:  POCT Blood Glucose.: 135 mg/dL (01-24-25 @ 07:46)  POCT Blood Glucose.: 139 mg/dL (01-24-25 @ 06:04)  POCT Blood Glucose.: 174 mg/dL (01-23-25 @ 21:02)  POCT Blood Glucose.: 135 mg/dL (01-23-25 @ 17:55)  POCT Blood Glucose.: 135 mg/dL (01-23-25 @ 11:21)  POCT Blood Glucose.: 137 mg/dL (01-23-25 @ 09:06)  POCT Blood Glucose.: 155 mg/dL (01-23-25 @ 06:41)  POCT Blood Glucose.: 189 mg/dL (01-22-25 @ 21:24)  POCT Blood Glucose.: 176 mg/dL (01-22-25 @ 18:00)  POCT Blood Glucose.: 208 mg/dL (01-22-25 @ 11:38)  POCT Blood Glucose.: 179 mg/dL (01-22-25 @ 07:42)  POCT Blood Glucose.: 166 mg/dL (01-22-25 @ 05:51)  POCT Blood Glucose.: 214 mg/dL (01-21-25 @ 22:00)  POCT Blood Glucose.: 314 mg/dL (01-21-25 @ 17:57)  POCT Blood Glucose.: 244 mg/dL (01-21-25 @ 11:34)                          10.9   6.35  )-----------( 260      ( 23 Jan 2025 07:01 )             34.1     01-24    142  |  110[H]  |  40[H]  ----------------------------<  115[H]  4.0   |  21[L]  |  1.61[H]    Ca    9.1      24 Jan 2025 07:28  Mg     2.1     01-23        Urinalysis Basic - ( 24 Jan 2025 07:28 )    Color: x / Appearance: x / SG: x / pH: x  Gluc: 115 mg/dL / Ketone: x  / Bili: x / Urobili: x   Blood: x / Protein: x / Nitrite: x   Leuk Esterase: x / RBC: x / WBC x   Sq Epi: x / Non Sq Epi: x / Bacteria: x      A1C with Estimated Average Glucose Result: A1C with Estimated Average Glucose Result: 7.9 % (01-17-25 @ 07:31)

## 2025-01-24 NOTE — PROGRESS NOTE ADULT - REASON FOR ADMISSION
Concern for osteomyelitis

## 2025-01-24 NOTE — PROGRESS NOTE ADULT - ASSESSMENT
63F with right foot heel wound to subQ  - Pt seen and evaluated.  - Afebrile, no leukocytosis  - Right heel fibrogranular wound to subQ no malodor, no pus. Bilateral lower extremity venous stasis wounds to dermis.   - Right foot xray: IPJ 1 erosions, possible foreign body/OM of calcaneus, questionable gas   - Right foot MRI: calcaneal stress fracture, IPJ 1 possible inflammatory arthropathy vs infection. There is no open wounds or clinical signs of infection of right hallux.   - Right foot wound culture: Staph aureus, E faecalis, Pseudomonas   - VIKY/PVR pending  - ID recs, appreciated   - Rheum consult pending  - Vasc recs, appreciated   - Bilateral venous duplex: no DVT, limited visualization of calf veins   - Pod stable for discharge   - Wound care instruction and follow up information in discharge note provider   - Discussed with attending.

## 2025-01-24 NOTE — PROGRESS NOTE ADULT - NSPROGADDITIONALINFOA_GEN_ALL_CORE
Contact via Microsoft Teams during business hours  To reach covering provider access AMION via sunrise tools  For Urgent matters/after-hours/weekends/holidays please page endocrine fellow on call   For nonurgent matters please email [email or web address]    Please note that this patient may be followed by different provider tomorrow.  Notify endocrine 24 hours prior to discharge for final recommendations

## 2025-01-24 NOTE — PROGRESS NOTE ADULT - PROBLEM SELECTOR PLAN 5
- C/w atorvastatin 40mg QHS

## 2025-01-24 NOTE — PROGRESS NOTE ADULT - ASSESSMENT
65 y/o female w/ PMH  T2DM, HTN, HLD, b/l heel wound c/b OM s/p debridement w/ R foot graft, and CKD2 who presents from her podiatrist's office for concern of acute on chronic osteomyelitis of the right heel. XR suspicious for R calcaneal OM. Admitted for IV antibiotics and wound infection.

## 2025-01-24 NOTE — PROGRESS NOTE ADULT - PROBLEM SELECTOR PLAN 1
Inpatient Plan:  - Check BG TID AC and HS while on PO diet   - C/w NPH 7u BID (0600/1800)  - C/w cetirizine 10mg QD to help with pruritis   - Continue off ISS for now     Discharge Plan:  - D/c on NPH insulin 7u twice daily (0800/2000) plus oral Tradjenta 5mg once daily plus patient can restart her Ozempic 1mg subq once weekly.   - Patient can continue with oral Cetrizine 10mg once daily as well.   - Patient should check BG at least twice daily at home: fasting BG in am and before bedtime. Please tell patient to contact Endocrinologist if BG <70mg/dL x1.   - Recommend Allergy and Immunology follow up outpatient.   - Endocrine follow up: can f/u with her Endocrinologist Dr. Soliz ([facility]), has appt on 3/27. Patient should f/u sooner with Endocrinologist if BGs are unstable at home or she is not tolerating NPH insulin (worsening pruritis or other allergy symptoms).   - Recommend routine outpatient ophthalmology and podiatry follow up.

## 2025-01-24 NOTE — PROGRESS NOTE ADULT - PROVIDER SPECIALTY LIST ADULT
Hospitalist
Infectious Disease
Podiatry
Endocrinology
Podiatry
Infectious Disease
Podiatry
Podiatry
Endocrinology
Endocrinology
Hospitalist
Hospitalist
Internal Medicine
Internal Medicine
Hospitalist
Internal Medicine
Internal Medicine

## 2025-01-24 NOTE — PROGRESS NOTE ADULT - PROBLEM SELECTOR PLAN 3
Patient states she stopped taking Lantus after christmas due to always having itching with it, and states she had the same reaction w/ Novolog, which was stopped a year ago; states she hasn't had itching since stopping Lantus  - Supposed to be on Jardiance 10 + Repaglinide 1mg TID, hasn't started Jardiance yet  - noted to have pruritis with both lantus and admelog inpatient  - Endo consulted for assistance, recs appreciated  - regular insulin discontinued. c/w NPH 7u q12h  - tradjenta 5mg daily started on 1/23  - Anticipated DC regimen per Endo: tradjenta, NPH, and ozempic  - monitor FS qAC + qHS  - c/w cetirizine 10mg daily as per Endo recs  - d/w allergy/immunology fellow Dr. Thalia Hutton on 1/22. given already on zyrtec currently, skin testing not feasible inpatient - has virtual appointment with Dr. Lubin on 2/13 arranged by allergy/immunology team which was much appreciated

## 2025-01-24 NOTE — PROGRESS NOTE ADULT - PROBLEM SELECTOR PLAN 2
- continue to hold valsartan and lasix  - BUN/Cr elevated 2/2 poor PO intake of free water compared to home and recent vanco/zosyn combination  - Cr improved today after IVF  - d/w patient to continue to hold lasix and valsartan for now until repeat bloodwork with PCP in 1 week

## 2025-01-24 NOTE — PROGRESS NOTE ADULT - SUBJECTIVE AND OBJECTIVE BOX
present and normal in 4 extremities Maritza Guerrero MD  Division of Hospital Medicine  [facility]   Available on Microsoft Teams (Mon-Fri 8am-5pm)    * messages preferred prior to calls  Other Times:  [phone]      Patient is a 64y old  Female who presents with a chief complaint of Concern for osteomyelitis (24 Jan 2025 11:33)      SUBJECTIVE / OVERNIGHT EVENTS: no acute events overnight. no fever, chills, chest pain, nor dyspnea. feels well without complaints. states pruritis improved today. wants to go home   ADDITIONAL REVIEW OF SYSTEMS:    MEDICATIONS  (STANDING):  atorvastatin 40 milliGRAM(s) Oral at bedtime  cetirizine 10 milliGRAM(s) Oral daily  heparin   Injectable 5000 Unit(s) SubCutaneous every 8 hours  hydrALAZINE 75 milliGRAM(s) Oral three times a day  insulin NPH human recombinant 7 Unit(s) SubCutaneous every 12 hours  linagliptin 5 milliGRAM(s) Oral daily  metoprolol succinate ER 50 milliGRAM(s) Oral daily  NIFEdipine XL 30 milliGRAM(s) Oral daily    MEDICATIONS  (PRN):  acetaminophen     Tablet .. 650 milliGRAM(s) Oral every 6 hours PRN Temp greater or equal to 38C (100.4F), Mild Pain (1 - 3)  diphenhydrAMINE Elixir 12.5 milliGRAM(s) Oral once PRN Rash and/or Itching  diphenhydrAMINE Elixir 12.5 milliGRAM(s) Oral three times a day PRN Rash and/or Itching  melatonin 3 milliGRAM(s) Oral at bedtime PRN Insomnia      CAPILLARY BLOOD GLUCOSE      POCT Blood Glucose.: 135 mg/dL (24 Jan 2025 07:46)  POCT Blood Glucose.: 139 mg/dL (24 Jan 2025 06:04)  POCT Blood Glucose.: 174 mg/dL (23 Jan 2025 21:02)  POCT Blood Glucose.: 135 mg/dL (23 Jan 2025 17:55)    I&O's Summary    23 Jan 2025 07:01  -  24 Jan 2025 07:00  --------------------------------------------------------  IN: 1000 mL / OUT: 0 mL / NET: 1000 mL        PHYSICAL EXAM:  Vital Signs Last 24 Hrs  T(C): 36.4 (24 Jan 2025 12:23), Max: 36.8 (24 Jan 2025 04:42)  T(F): 97.6 (24 Jan 2025 12:23), Max: 98.2 (24 Jan 2025 04:42)  HR: 64 (24 Jan 2025 12:23) (60 - 72)  BP: 145/77 (24 Jan 2025 12:23) (128/70 - 175/79)  BP(mean): --  RR: 18 (24 Jan 2025 12:23) (16 - 18)  SpO2: 97% (24 Jan 2025 12:23) (95% - 98%)    Parameters below as of 24 Jan 2025 12:23  Patient On (Oxygen Delivery Method): room air    CONSTITUTIONAL: NAD, well-developed, well-groomed  EYES: PERRLA; conjunctiva and sclera clear  ENMT: Moist oral mucosa, no pharyngeal injection or exudates; normal dentition  NECK: Supple, no palpable masses; no thyromegaly  RESPIRATORY: Normal respiratory effort; lungs are clear to auscultation bilaterally  CARDIOVASCULAR: Regular rate and rhythm, normal S1 and S2, no murmur/rub/gallop  ABDOMEN: Soft, Nondistended, Nontender to palpation, normoactive bowel sounds  MUSCULOSKELETAL: No clubbing or cyanosis of digits; no joint swelling or tenderness to palpation  PSYCH: A+O to person, place, and time; affect appropriate  NEUROLOGY: CN 2-12 are intact and symmetric; no gross sensory deficits   SKIN: No rashes; no palpable lesions, +b/l LE in dressing and wrapped    LABS:                        10.9   6.35  )-----------( 260      ( 23 Jan 2025 07:01 )             34.1     01-24    142  |  110[H]  |  40[H]  ----------------------------<  115[H]  4.0   |  21[L]  |  1.61[H]    Ca    9.1      24 Jan 2025 07:28  Mg     2.1     01-23        Urinalysis Basic - ( 24 Jan 2025 07:28 )    Color: x / Appearance: x / SG: x / pH: x  Gluc: 115 mg/dL / Ketone: x  / Bili: x / Urobili: x   Blood: x / Protein: x / Nitrite: x   Leuk Esterase: x / RBC: x / WBC x   Sq Epi: x / Non Sq Epi: x / Bacteria: x          RADIOLOGY & ADDITIONAL TESTS:  Results Reviewed: Cr improved  Imaging Personally Reviewed:  Electrocardiogram Personally Reviewed:    COORDINATION OF CARE:  Care Discussed with Consultants/Other Providers [Y]: medicine JEIMY Wang  Prior or Outpatient Records Reviewed [Y/N]:

## 2025-01-24 NOTE — PROGRESS NOTE ADULT - NUTRITIONAL ASSESSMENT
Diet, DASH/TLC:   Sodium & Cholesterol Restricted  Consistent Carbohydrate {No Snacks} (CSTCHO) (01-17-25 @ 01:11) [Active]

## 2025-01-24 NOTE — PROGRESS NOTE ADULT - PROBLEM SELECTOR PLAN 1
- MRI of the R foot not conclusive but suggestive of OM, stress fracture of R calcaneus noted  - Podiatry also with low c/f OM based on exam  - cultures growing multiple organisms: staph aureus, pseudomonas, and e. faecalis  - Podiatry and ID consult recs appreciated  - stopped vanco on 1/21 as per ID  - completed IV zosyn on 1/23  - Vascular surgery f/u re: VIKY/PVR appreciated; not consistent with severe PAD - outpt f/u   - no plans for intervention per Podiatry  - c/w offloading shoe (at bedside) for calcaneus stress fracture noted on MRI

## 2025-01-24 NOTE — PROGRESS NOTE ADULT - ASSESSMENT
65 y/o female w/ PMHx  T2DM, HTN, HLD, b/l heel wound c/b OM s/p debridement w/ R foot graft, and CKD2 who presents from her podiatrist's office for concern of acute on chronic osteomyelitis of the right heel. Endocrinology consulted for insulin recommendations given insulin allergic reaction. Patient reports feeling good, eating full meals and tolerating POs. FBG stable, no hypoglycemia. Patient feels good on NPH insulin, reports improvement in pruritis and tolerating medication well. Patient pending d/c home today at 1200. Given BGs atable and at goal 100-180mg/dL on NPH and patient tolerating insulin, will discharge patient with NPH insulin. Patient also on PO Tradjenta 5mg once daily, can continue as outpatient.     #T2DM  #Insulin allergic reaction  A1C with Estimated Average Glucose Result: 7.9 % (01-17-25 @ 07:31)  Home regimen: Lantus 11u (stopped end of December 2024 for concerns of allergy/pruritus) plus Repaglinide 1mg TID AC plus Ozempic 1mg subq weekly

## 2025-01-24 NOTE — PROGRESS NOTE ADULT - SUBJECTIVE AND OBJECTIVE BOX
Patient is a 64y old  Female who presents with a chief complaint of Concern for osteomyelitis (23 Jan 2025 19:01)       INTERVAL HPI/OVERNIGHT EVENTS:  Patient seen and evaluated at bedside.  Pt is resting comfortable in NAD. Denies N/V/F/C.      Allergies    NovoLog (Pruritus)  Lantus (Pruritus)    Intolerances        Vital Signs Last 24 Hrs  T(C): 36.5 (24 Jan 2025 08:51), Max: 37.1 (23 Jan 2025 11:30)  T(F): 97.7 (24 Jan 2025 08:51), Max: 98.7 (23 Jan 2025 11:30)  HR: 60 (24 Jan 2025 08:51) (60 - 72)  BP: 157/77 (24 Jan 2025 08:51) (128/70 - 186/81)  BP(mean): --  RR: 18 (24 Jan 2025 08:51) (16 - 18)  SpO2: 97% (24 Jan 2025 08:51) (95% - 98%)    Parameters below as of 24 Jan 2025 08:51  Patient On (Oxygen Delivery Method): room air        LABS:                        10.9   6.35  )-----------( 260      ( 23 Jan 2025 07:01 )             34.1     01-24    142  |  110[H]  |  40[H]  ----------------------------<  115[H]  4.0   |  21[L]  |  1.61[H]    Ca    9.1      24 Jan 2025 07:28  Mg     2.1     01-23        Urinalysis Basic - ( 24 Jan 2025 07:28 )    Color: x / Appearance: x / SG: x / pH: x  Gluc: 115 mg/dL / Ketone: x  / Bili: x / Urobili: x   Blood: x / Protein: x / Nitrite: x   Leuk Esterase: x / RBC: x / WBC x   Sq Epi: x / Non Sq Epi: x / Bacteria: x      CAPILLARY BLOOD GLUCOSE      POCT Blood Glucose.: 135 mg/dL (24 Jan 2025 07:46)  POCT Blood Glucose.: 139 mg/dL (24 Jan 2025 06:04)  POCT Blood Glucose.: 174 mg/dL (23 Jan 2025 21:02)  POCT Blood Glucose.: 135 mg/dL (23 Jan 2025 17:55)  POCT Blood Glucose.: 135 mg/dL (23 Jan 2025 11:21)      Lower Extremity Physical Exam:  Vascular: DP/PT 0/4, B/L, CFT <3 seconds B/L, Temperature gradient warm to cool, B/L.   Neuro: Epicritic sensation dimished to the level of toes, B/L.  Musculoskeletal/Ortho: positive hohmann and flores on right lower extremity  Skin: Right heel fibrogranular wound to dermis, no malodor, no pus. Bilateral lower extremity venous stasis wounds to dermis.     RADIOLOGY & ADDITIONAL TESTS:

## 2025-01-24 NOTE — PROGRESS NOTE ADULT - PROBLEM SELECTOR PLAN 4
- C/w Hydralazine 75mg TID  -holding lasix  - C/w metoprolol succinate 50mg daily  - Hold valsartan 160mg in setting of BANDAR
- C/w Hydralazine 75mg TID  -holding lasix  - C/w metoprolol succinate 50mg daily  - Hold valsartan 160mg in setting of BANDAR
- C/w Hydralazine 75mg TID  - C/w metoprolol succinate 50mg daily  - continue Nifedipine 30mg XL daily on discharge - will give rx  - Hold valsartan 160mg and lasix in setting of BANDAR on CKD though improved
- C/w Hydralazine 75mg TID  - C/w lasix 40mg daily  - C/w metoprolol succinate 50mg daily  - Hold valsartan 160mg in setting of BANDAR
- C/w Hydralazine 75mg TID  - C/w metoprolol succinate 50mg daily  - Hold valsartan 160mg and lasix in setting of BANDAR
- C/w Hydralazine 75mg TID  -holding lasix  - C/w metoprolol succinate 50mg daily  - Hold valsartan 160mg in setting of BANDAR
- C/w Hydralazine 75mg TID  - C/w metoprolol succinate 50mg daily  - Hold valsartan 160mg and lasix in setting of BANDAR
- C/w Hydralazine 75mg TID  - C/w metoprolol succinate 50mg daily  - start Nifedipine 30mg XL daily given BP above goal  - Hold valsartan 160mg and lasix in setting of BANDAR

## 2025-01-24 NOTE — PROGRESS NOTE ADULT - NSPROGADDITIONALINFOA_GEN_ALL_CORE
.  Maritza Guerrero MD  Division of Hospital Medicine  [facility]   Available on Microsoft Teams - messages preferred prior to calls.    Medically clear for discharge home today with outpatient f/u with PCP, Podiatry, Endo, and Allergy & Immunology.  Discharge planning time spent: 37 minutes.    Plan discussed with patient and medicine JEIMY Wang.

## 2025-01-31 RX ORDER — ACETAMINOPHEN 160 MG/5ML
2 SUSPENSION ORAL
Refills: 0 | DISCHARGE

## 2025-01-31 NOTE — CHART NOTE - NSCHARTNOTEFT_GEN_A_CORE
HPI: 63 yo female non-insulin dependent DM type 2 (HbA1C 7.9) admitted with osteomyelitis. Has allergy to humolog, was willing to try with benedryl. Still pruritic with insulin injections. Endocrine consult for alternative agents in the inpatient setting    POCT Blood Glucose:  240 mg/dL (01-20-25 @ 12:56)  216 mg/dL (01-20-25 @ 11:43)  209 mg/dL (01-20-25 @ 07:35)  235 mg/dL (01-19-25 @ 21:17)  216 mg/dL (01-19-25 @ 17:42)      eMAR:atorvastatin   40 milliGRAM(s) Oral (01-19-25 @ 21:52)    insulin lispro (ADMELOG) corrective regimen sliding scale   2 Unit(s) SubCutaneous (01-20-25 @ 12:58)   2 Unit(s) SubCutaneous (01-20-25 @ 08:13)   2 Unit(s) SubCutaneous (01-19-25 @ 18:21)    A1C with Estimated Average Glucose Result: 7.9 % (01-17-25 @ 07:31)  eGFR: 32 mL/min/1.73m2 (01-20-25 @ 07:28)    Recommendations:  - start Lantus 10 units daily  - start Tradjenta 5mg daily  - start low REGULAR correctional scale before meals and separate scale at bedtime - spoke to pharmacy and this can be approved if specified that patient has Lispro/Admelog allergy    Full consult to follow in AM      Discussed recommendations with primary team.    Bernabe Jackson MD  Endocrine Fellow  Can be reached via Microsoft teams.    For follow up questions, discharge recommendations, or new consults, please email [email or web address]) or [email or web address] ([facility]) or call answering service at [phone] (weekdays); [phone] (nights/weekends).  For emergencies please page fellow on call.
64F w/ right heel wound and chronic venous stasis disease. Patient VIKY/PVR performed, not consistent with severe PAD.     Recommendations:  -Outpatient follow-up for venous studies.    Vascular Surgery  [id number]
Post-Discharge Medication Review: Completed	  Patient's preferred pharmacy was updated in [facility]: [facility]  [phone]	  Patient contacted to offer medication counseling post-discharge. Medication reconciliation completed. Per patient, medications include:	  	  1.	atorvastatin 40 mg oral tablet 1 tab(s) orally once a day  2.	cetirizine 10 mg oral tablet 1 tab(s) orally once a day  3.	ergocalciferol 1.25 mg (50,000 intl units) oral tablet 1 tab(s) orally once a week on wednesdays  4.	hydrALAZINE 25 mg oral tablet 1 tab(s) orally 3 times a day  5.	hydrALAZINE 50 mg oral tablet 1 tab(s) orally 3 times a day  6.	metoprolol succinate 50 mg oral tablet, extended release 1 tab(s) orally once a day  7.	NIFEdipine 30 mg oral tablet, extended release 1 tab(s) orally once a day  8.	NovoLIN N FlexPen 100 units/mL subcutaneous suspension 7 unit(s) subcutaneous 2 times a day 8 AM and 8PM  9.	Ozempic 4 mg/3 mL (1 mg dose) subcutaneous solution 1 milligram(s) subcutaneously once a week  10.	Tradjenta 5 mg oral tablet 1 tab(s) orally once a day   	   	  Medications added to OMR (updated per discussion with patient):	  11.	acetaminophen 500 mg oral tablet 2 tab(s) orally once a day (at bedtime)  	  Medication name, indication, administration, side effect, and monitoring reviewed for new medications during post discharge counseling visit with patient. Patient demonstrated understanding. Counseling offered for all medications.	  	  Tera Meneses, [place]	  Clinical Pharmacy Specialist, Pharmacy Telehealth Team	  Can be reached via MS Teams or [phone]

## 2025-02-12 ENCOUNTER — NON-APPOINTMENT (OUTPATIENT)
Age: 65
End: 2025-02-12

## 2025-02-13 ENCOUNTER — APPOINTMENT (OUTPATIENT)
Dept: PEDIATRIC ALLERGY IMMUNOLOGY | Facility: CLINIC | Age: 65
End: 2025-02-13

## 2025-06-17 NOTE — PROGRESS NOTE ADULT - ASSESSMENT
BRONCHOSPASM/BRONCHOCONSTRICTION   [x]  IMPROVE AERATION/BREATH SOUNDS  [x]   ADMINISTER BRONCHODILATOR THERAPY AS APPROPRIATE  [x]   ASSESS BREATH SOUNDS  []   IMPLEMENT AEROSOL/MDI PROTOCOL  [x]   PATIENT EDUCATION AS NEEDED    PROVIDE ADEQUATE OXYGENATION WITH ACCEPTABLE SP02/ABG'S  [x]  IDENTIFY APPROPRIATE OXYGEN THERAPY  [x]   MONITOR SP02/ABG'S AS NEEDED   [x]   PATIENT EDUCATION AS NEEDED       63 y/o female w/ PMHx  T2DM, HTN, HLD, b/l heel wound c/b OM s/p debridement w/ R foot graft, and CKD2 who presents from her podiatrist's office for concern of acute on chronic osteomyelitis of the right heel. Endocrinology consulted for insulin recommendations given insulin allergic reaction.    PLANS ARE PRELIM UNTIL DISCUSSED WITH ATTENDING    #T2DM  #Insulin allergic reaction    - currently still complaining of pruritis, but has been given lantus (insulin analog) as well as regular insulin (human insulin), so unclear if reaction from lantus or both types of insulin. But given known reaction with lantus, pruritis reaction likely 2/2 lantus than regular insulin  - switched from lantus 10 units to NPH 7 units in AM and PM starting 1/22  - C/w NPH 7U q12h     - Per pt, when she stopped lantus outpt, it took a few days before pruritis stopped, so likely will need a few days before pruritis stops  - inc to 3U regular insulin TID pre-meal   - c/w regular insulin ISS pre-meal and qhs  - c/w cetirizine 10mg qd to help with pruritis   - would recommend Allergy and Immunology c/s outpt   - discharge recommendations pending    #HTN  - Goal BP <130/80  - Defer management per primary team     #HLD  - Goal LDL <70 in the setting of diabetes  - Please check fasting lipid panel if not checked recently  65 y/o female w/ PMHx  T2DM, HTN, HLD, b/l heel wound c/b OM s/p debridement w/ R foot graft, and CKD2 who presents from her podiatrist's office for concern of acute on chronic osteomyelitis of the right heel. Endocrinology consulted for insulin recommendations given insulin allergic reaction.    PLANS ARE PRELIM UNTIL DISCUSSED WITH ATTENDING    #T2DM  #Insulin allergic reaction    - currently still complaining of pruritis, but has been given lantus (insulin analog) as well as regular insulin (human insulin), so unclear if reaction from lantus or both types of insulin. But given known reaction with lantus, pruritis reaction likely 2/2 lantus than regular insulin  - switched from lantus 10 units to NPH 7 units q12h and regular insulin starting 1/22 but pt with continued significant pruritis    -   - c/w cetirizine 10mg qd to help with pruritis   - would recommend Allergy and Immunology c/s outpt   - discharge recommendations pending    #HTN  - Goal BP <130/80  - Defer management per primary team     #HLD  - Goal LDL <70 in the setting of diabetes  - Please check fasting lipid panel if not checked recently  63 y/o female w/ PMHx  T2DM, HTN, HLD, b/l heel wound c/b OM s/p debridement w/ R foot graft, and CKD2 who presents from her podiatrist's office for concern of acute on chronic osteomyelitis of the right heel. Endocrinology consulted for insulin recommendations given insulin allergic reaction.    PLANS ARE PRELIM UNTIL DISCUSSED WITH ATTENDING    #T2DM  #Insulin allergic reaction    - currently still complaining of pruritis, but has been given lantus (insulin analog) as well as regular insulin (human insulin), so unclear if reaction from lantus or both types of insulin. But given known reaction with lantus, pruritis reaction likely 2/2 lantus than regular insulin  - switched from lantus 10 units to NPH 7 units q12h and regular insulin starting 1/22 but pt with continued significant pruritis  - would recommend changing DM regimen to Tradjenta 5mg qd, Prandin 1g TID pre-meal (hold if not eating) and if possible, ozempic 1g qweekly  - c/w cetirizine 10mg qd to help with pruritis   - would recommend Allergy and Immunology c/s outpt   - discharge recommendations pending    #HTN  - Goal BP <130/80  - Defer management per primary team     #HLD  - Goal LDL <70 in the setting of diabetes  - Please check fasting lipid panel if not checked recently  63 y/o female w/ PMHx  T2DM, HTN, HLD, b/l heel wound c/b OM s/p debridement w/ R foot graft, and CKD2 who presents from her podiatrist's office for concern of acute on chronic osteomyelitis of the right heel. Endocrinology consulted for insulin recommendations given insulin allergic reaction.    PLANS ARE PRELIM UNTIL DISCUSSED WITH ATTENDING    #T2DM  #Insulin allergic reaction    - currently still complaining of pruritis, but has been given lantus (insulin analog) as well as regular insulin (human insulin), so unclear if reaction from lantus or both types of insulin. But given known reaction with lantus, pruritis reaction likely 2/2 lantus than regular insulin  - switched from lantus 10 units to NPH 7 units q12h and regular insulin starting 1/22 but pt with continued significant pruritis  - would recommend changing DM regimen to c/w Tradjenta 5mg qd, resume home Prandin 1g TID pre-meal (hold if not eating) and if possible, ozempic 1g qweekly  - c/w cetirizine 10mg qd to help with pruritis   - would recommend Allergy and Immunology c/s outpt   - discharge recommendations pending    #HTN  - Goal BP <130/80  - Defer management per primary team     #HLD  - Goal LDL <70 in the setting of diabetes  - Please check fasting lipid panel if not checked recently  65 y/o female w/ PMHx  T2DM, HTN, HLD, b/l heel wound c/b OM s/p debridement w/ R foot graft, and CKD2 who presents from her podiatrist's office for concern of acute on chronic osteomyelitis of the right heel. Endocrinology consulted for insulin recommendations given insulin allergic reaction.    #T2DM  #Insulin allergic reaction    - currently still complaining of pruritis, but has been given lantus (insulin analog) as well as regular insulin (human insulin), so unclear if reaction from lantus or both types of insulin. But given known reaction with lantus, pruritis reaction likely 2/2 lantus than regular insulin  - switched from lantus 10 units to NPH 7 units q12h and regular insulin starting 1/22 but pt with continued significant pruritis  -Can monitor on NPH 7 units q12 and d/c regular insulin which may be contributing to her allergy.  - would recommend changing DM regimen to c/w Tradjenta 5mg qd, resume home Prandin 1g TID pre-meal (hold if not eating) and if possible, ozempic 1g qweekly  - c/w cetirizine 10mg qd to help with pruritis   - would recommend Allergy and Immunology c/s outpt   - discharge recommendations pending    #HTN  - Goal BP <130/80  - Defer management per primary team     #HLD  - Goal LDL <70 in the setting of diabetes  - Please check fasting lipid panel if not checked recently

## (undated) DEVICE — DRAPE MAGNETIC INSTRUMENT MEDIUM

## (undated) DEVICE — DRSG STOCKINETTE IMPERVIOUS XL

## (undated) DEVICE — DRSG TELFA 3 X 8

## (undated) DEVICE — NDL HYPO REGULAR BEVEL 25G X 1.5" (BLUE)

## (undated) DEVICE — SUT PLAIN GUT 4-0 18" P-12

## (undated) DEVICE — GLV 8 PROTEXIS (WHITE)

## (undated) DEVICE — VENODYNE/SCD SLEEVE CALF LARGE

## (undated) DEVICE — BLADE SCALPEL SAFETYLOCK #15

## (undated) DEVICE — DRAPE LIGHT HANDLE COVER (BLUE)

## (undated) DEVICE — DRSG STERISTRIPS 0.5 X 4"

## (undated) DEVICE — SAW BLADE MICROAIRE SAGITTAL 5.8X25.4X0.6 MM

## (undated) DEVICE — LAP PAD 18 X 18"

## (undated) DEVICE — DRAPE INSTRUMENT POUCH 6.75" X 11"

## (undated) DEVICE — DRSG ACE BANDAGE 6"

## (undated) DEVICE — PACK EXTREMITY

## (undated) DEVICE — S&N VERSAJET II EXACT HANDPIECE 14MM X 45 DEGREE

## (undated) DEVICE — BUR STRYKER OVAL SOLID CARBIDE MED 4MM

## (undated) DEVICE — SAW BLADE MICROAIRE SAGITTAL 9.4MMX25.4MMX0.6MM

## (undated) DEVICE — SUT POLYSORB 2-0 30" GS-21 UNDYED

## (undated) DEVICE — DRAPE 1/2 SHEET 40X57"

## (undated) DEVICE — PACKING GAUZE PLAIN 0.5"

## (undated) DEVICE — DRSG KLING 6"

## (undated) DEVICE — SOL IRR POUR H2O 250ML

## (undated) DEVICE — STAPLER SKIN VISI-STAT 35 WIDE

## (undated) DEVICE — SPECIMEN CONTAINER 100ML

## (undated) DEVICE — DRAPE TOWEL BLUE 17" X 24"

## (undated) DEVICE — DRAPE ISOLATION BAG 20X20"

## (undated) DEVICE — DRSG ADAPTIC SILICONE 5 X 6"

## (undated) DEVICE — SAW BLADE MICROAIRE OSCILATING 25.4MM X 90MM X 1.27MM

## (undated) DEVICE — PREP BETADINE KIT

## (undated) DEVICE — DRSG XEROFORM 1 X 8"

## (undated) DEVICE — DRSG STOCKINETTE TUBULAR COTTON 1PLY 6X72"

## (undated) DEVICE — SOL IRR POUR NS 0.9% 500ML

## (undated) DEVICE — PACKING GAUZE IODOFORM 2"

## (undated) DEVICE — MEDICATION LABELS W MARKER

## (undated) DEVICE — DRSG COMBINE 5X9"

## (undated) DEVICE — DRSG KLING 4"

## (undated) DEVICE — STRYKER INTERPULSE HANDPIECE W IRR SUCTION TUBE

## (undated) DEVICE — SOL IRR BAG NS 0.9% 3000ML

## (undated) DEVICE — SYR LUER LOK 10CC

## (undated) DEVICE — DRSG CURITY GAUZE SPONGE 4 X 4" 12-PLY

## (undated) DEVICE — DRAPE 3/4 SHEET W REINFORCEMENT 56X77"

## (undated) DEVICE — PREP BETADINE 5% STERILE OPTHALMIC SOLUTION

## (undated) DEVICE — PACKING GAUZE PLAIN 2"

## (undated) DEVICE — POSITIONER FOAM EGG CRATE ULNAR 2PCS (PINK)

## (undated) DEVICE — DRSG STOCKINETTE IMPERVIOUS MED

## (undated) DEVICE — FOLEY TRAY 16FR 5CC LTX UMETER CLOSED

## (undated) DEVICE — Device

## (undated) DEVICE — MARKING PEN W RULER

## (undated) DEVICE — WARMING BLANKET UPPER ADULT